# Patient Record
Sex: MALE | Race: WHITE | Employment: UNEMPLOYED | URBAN - METROPOLITAN AREA
[De-identification: names, ages, dates, MRNs, and addresses within clinical notes are randomized per-mention and may not be internally consistent; named-entity substitution may affect disease eponyms.]

---

## 2022-02-07 ENCOUNTER — CONSULT (OUTPATIENT)
Dept: NEUROLOGY | Facility: CLINIC | Age: 47
End: 2022-02-07
Payer: COMMERCIAL

## 2022-02-07 ENCOUNTER — TELEPHONE (OUTPATIENT)
Dept: NEUROLOGY | Facility: CLINIC | Age: 47
End: 2022-02-07

## 2022-02-07 VITALS
BODY MASS INDEX: 32.93 KG/M2 | WEIGHT: 235.2 LBS | HEART RATE: 71 BPM | SYSTOLIC BLOOD PRESSURE: 121 MMHG | DIASTOLIC BLOOD PRESSURE: 75 MMHG | HEIGHT: 71 IN | TEMPERATURE: 97.4 F

## 2022-02-07 DIAGNOSIS — R47.1 DYSARTHRIA: ICD-10-CM

## 2022-02-07 DIAGNOSIS — R29.810 LOWER FACIAL WEAKNESS: Primary | ICD-10-CM

## 2022-02-07 DIAGNOSIS — G37.9 CNS DEMYELINATING DISEASE (HCC): ICD-10-CM

## 2022-02-07 DIAGNOSIS — G81.91 RIGHT HEMIPARESIS (HCC): ICD-10-CM

## 2022-02-07 PROCEDURE — 99244 OFF/OP CNSLTJ NEW/EST MOD 40: CPT | Performed by: PSYCHIATRY & NEUROLOGY

## 2022-02-07 RX ORDER — ERGOCALCIFEROL 1.25 MG/1
50000 CAPSULE ORAL WEEKLY
Qty: 12 CAPSULE | Refills: 0 | Status: SHIPPED | OUTPATIENT
Start: 2022-02-07 | End: 2022-04-10 | Stop reason: SDUPTHER

## 2022-02-07 RX ORDER — GABAPENTIN 300 MG/1
CAPSULE ORAL
COMMUNITY
Start: 2022-01-27 | End: 2022-02-19 | Stop reason: HOSPADM
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
## 2022-02-07 NOTE — LETTER
[address] [place] [address]      To apply for Social Security Disability there are two ways in which you can do the application:     phone :  [phone],     Apply online:   [email or web address]      For a consultation regarding Lissette Efrain Jaquelin Katina Robertson [facility] service in [address] matters  Their past claims cover many conditions, including extensive experience with neurological disorders  [email or web address]        If there is any additional assistance that  Jada Joshi can provide please contact her at [phone]            Sincerely,          RUPALI Walker     [phone]  [facility]

## 2022-02-07 NOTE — TELEPHONE ENCOUNTER
Pt calling to report that [facility] is electronically sending images for MRI head, neck, and back  Pt to be reached if images are not received  [phone]  Okay to leave detailed message

## 2022-02-07 NOTE — TELEPHONE ENCOUNTER
Please follow on this request and let me know when imaging are available  Monserrat Roberson - Patient is planning SSI - please reach the patient to guide how to submit his initial forms; Patient was offered SW at [address] was tried online and unsuccessful, as per the patient  Patient is planning unemployment now

## 2022-02-08 NOTE — TELEPHONE ENCOUNTER
MSW phoned pt and discussed information about how to apply for SSD  Provided website as well as phone number  Additionally provided contact info for [employer]   Patient reported that at this time he does not have any questions    Apply for SSD via phone   [phone],     Apply for [facility] online   [email or web address]    Record[employer]t     [address] [address]

## 2022-02-09 NOTE — TELEPHONE ENCOUNTER
List of community resources listed in this encounter was sent to patient via mail  MSW remains available for any questions/ future social needs

## 2022-02-13 ENCOUNTER — APPOINTMENT (EMERGENCY)
Dept: CT IMAGING | Facility: HOSPITAL | Age: 47
DRG: 043 | End: 2022-02-13
Payer: COMMERCIAL

## 2022-02-13 ENCOUNTER — HOSPITAL ENCOUNTER (INPATIENT)
Facility: HOSPITAL | Age: 47
LOS: 6 days | DRG: 043 | End: 2022-02-19
Attending: EMERGENCY MEDICINE | Admitting: INTERNAL MEDICINE
Payer: COMMERCIAL

## 2022-02-13 ENCOUNTER — APPOINTMENT (EMERGENCY)
Dept: RADIOLOGY | Facility: HOSPITAL | Age: 47
DRG: 043 | End: 2022-02-13
Payer: COMMERCIAL

## 2022-02-13 DIAGNOSIS — R53.1 ACUTE RIGHT-SIDED WEAKNESS: Primary | ICD-10-CM

## 2022-02-13 DIAGNOSIS — G37.9 CNS DEMYELINATING DISEASE (HCC): ICD-10-CM

## 2022-02-13 DIAGNOSIS — R47.1 DYSARTHRIA: ICD-10-CM

## 2022-02-13 DIAGNOSIS — R29.810 FACIAL DROOP: ICD-10-CM

## 2022-02-13 LAB
ANION GAP SERPL CALCULATED.3IONS-SCNC: 10 MMOL/L (ref 4–13)
APTT PPP: 33 SECONDS (ref 23–37)
ATRIAL RATE: 60 BPM
ATRIAL RATE: 72 BPM
BUN SERPL-MCNC: 29 MG/DL (ref 5–25)
CALCIUM SERPL-MCNC: 9 MG/DL (ref 8.3–10.1)
CARDIAC TROPONIN I PNL SERPL HS: <2 NG/L
CHLORIDE SERPL-SCNC: 106 MMOL/L (ref 100–108)
CO2 SERPL-SCNC: 23 MMOL/L (ref 21–32)
CREAT SERPL-MCNC: 0.97 MG/DL (ref 0.6–1.3)
ERYTHROCYTE [DISTWIDTH] IN BLOOD BY AUTOMATED COUNT: 12.3 % (ref 11.6–15.1)
GFR SERPL CREATININE-BSD FRML MDRD: 91 ML/MIN/1.73SQ M
GLUCOSE SERPL-MCNC: 78 MG/DL (ref 65–140)
GLUCOSE SERPL-MCNC: 87 MG/DL (ref 65–140)
HCT VFR BLD AUTO: 45.1 % (ref 36.5–49.3)
HGB BLD-MCNC: 14.5 G/DL (ref 12–17)
INR PPP: 1.03 (ref 0.84–1.19)
MCH RBC QN AUTO: 29.6 PG (ref 26.8–34.3)
MCHC RBC AUTO-ENTMCNC: 32.2 G/DL (ref 31.4–37.4)
MCV RBC AUTO: 92 FL (ref 82–98)
P AXIS: 32 DEGREES
P AXIS: 48 DEGREES
PLATELET # BLD AUTO: 242 THOUSANDS/UL (ref 149–390)
PMV BLD AUTO: 10.1 FL (ref 8.9–12.7)
POTASSIUM SERPL-SCNC: 4.2 MMOL/L (ref 3.5–5.3)
PR INTERVAL: 174 MS
PR INTERVAL: 192 MS
PROTHROMBIN TIME: 13.4 SECONDS (ref 11.6–14.5)
QRS AXIS: 22 DEGREES
QRS AXIS: 42 DEGREES
QRSD INTERVAL: 102 MS
QRSD INTERVAL: 98 MS
QT INTERVAL: 402 MS
QT INTERVAL: 436 MS
QTC INTERVAL: 436 MS
QTC INTERVAL: 440 MS
RBC # BLD AUTO: 4.9 MILLION/UL (ref 3.88–5.62)
SODIUM SERPL-SCNC: 139 MMOL/L (ref 136–145)
T WAVE AXIS: 24 DEGREES
T WAVE AXIS: 47 DEGREES
VENTRICULAR RATE: 60 BPM
VENTRICULAR RATE: 72 BPM
WBC # BLD AUTO: 8.91 THOUSAND/UL (ref 4.31–10.16)

## 2022-02-13 PROCEDURE — 85027 COMPLETE CBC AUTOMATED: CPT | Performed by: EMERGENCY MEDICINE

## 2022-02-13 PROCEDURE — 3E03317 INTRODUCTION OF OTHER THROMBOLYTIC INTO PERIPHERAL VEIN, PERCUTANEOUS APPROACH: ICD-10-PCS | Performed by: EMERGENCY MEDICINE

## 2022-02-13 PROCEDURE — 99285 EMERGENCY DEPT VISIT HI MDM: CPT

## 2022-02-13 PROCEDURE — 93005 ELECTROCARDIOGRAM TRACING: CPT

## 2022-02-13 PROCEDURE — 85610 PROTHROMBIN TIME: CPT | Performed by: EMERGENCY MEDICINE

## 2022-02-13 PROCEDURE — G1004 CDSM NDSC: HCPCS

## 2022-02-13 PROCEDURE — 96360 HYDRATION IV INFUSION INIT: CPT

## 2022-02-13 PROCEDURE — 71045 X-RAY EXAM CHEST 1 VIEW: CPT

## 2022-02-13 PROCEDURE — 99291 CRITICAL CARE FIRST HOUR: CPT | Performed by: EMERGENCY MEDICINE

## 2022-02-13 PROCEDURE — 70496 CT ANGIOGRAPHY HEAD: CPT

## 2022-02-13 PROCEDURE — 85730 THROMBOPLASTIN TIME PARTIAL: CPT | Performed by: EMERGENCY MEDICINE

## 2022-02-13 PROCEDURE — 80048 BASIC METABOLIC PNL TOTAL CA: CPT | Performed by: EMERGENCY MEDICINE

## 2022-02-13 PROCEDURE — 99223 1ST HOSP IP/OBS HIGH 75: CPT | Performed by: INTERNAL MEDICINE

## 2022-02-13 PROCEDURE — 82948 REAGENT STRIP/BLOOD GLUCOSE: CPT

## 2022-02-13 PROCEDURE — 70498 CT ANGIOGRAPHY NECK: CPT

## 2022-02-13 PROCEDURE — 84484 ASSAY OF TROPONIN QUANT: CPT | Performed by: EMERGENCY MEDICINE

## 2022-02-13 PROCEDURE — 36415 COLL VENOUS BLD VENIPUNCTURE: CPT | Performed by: EMERGENCY MEDICINE

## 2022-02-13 PROCEDURE — 93010 ELECTROCARDIOGRAM REPORT: CPT | Performed by: INTERNAL MEDICINE

## 2022-02-13 RX ORDER — AMOXICILLIN 250 MG
1 CAPSULE ORAL
Status: DISCONTINUED | OUTPATIENT
Start: 2022-02-13 | End: 2022-02-19 | Stop reason: HOSPADM

## 2022-02-13 RX ORDER — RIBOFLAVIN (VITAMIN B2) 100 MG
2 TABLET ORAL DAILY
COMMUNITY
End: 2022-05-23 | Stop reason: SDUPTHER

## 2022-02-13 RX ORDER — LANOLIN ALCOHOL/MO/W.PET/CERES
100 CREAM (GRAM) TOPICAL DAILY
Status: DISCONTINUED | OUTPATIENT
Start: 2022-02-13 | End: 2022-02-19 | Stop reason: HOSPADM

## 2022-02-13 RX ORDER — ATORVASTATIN CALCIUM 40 MG/1
40 TABLET, FILM COATED ORAL EVERY EVENING
Status: DISCONTINUED | OUTPATIENT
Start: 2022-02-13 | End: 2022-02-15

## 2022-02-13 RX ORDER — GABAPENTIN 300 MG/1
300 CAPSULE ORAL 3 TIMES DAILY
Status: DISCONTINUED | OUTPATIENT
Start: 2022-02-13 | End: 2022-02-19 | Stop reason: HOSPADM

## 2022-02-13 RX ORDER — ASPIRIN 81 MG/1
81 TABLET, CHEWABLE ORAL DAILY
Status: DISCONTINUED | OUTPATIENT
Start: 2022-02-14 | End: 2022-02-13

## 2022-02-13 RX ORDER — THIAMINE HCL 50 MG
100 TABLET ORAL DAILY
COMMUNITY

## 2022-02-13 RX ORDER — ERGOCALCIFEROL 1.25 MG/1
50000 CAPSULE ORAL WEEKLY
Status: DISCONTINUED | OUTPATIENT
Start: 2022-02-15 | End: 2022-02-19 | Stop reason: HOSPADM

## 2022-02-13 RX ADMIN — GABAPENTIN 300 MG: 300 CAPSULE ORAL at 11:27

## 2022-02-13 RX ADMIN — THIAMINE HCL TAB 100 MG 100 MG: 100 TAB at 16:40

## 2022-02-13 RX ADMIN — IOHEXOL 90 ML: 350 INJECTION, SOLUTION INTRAVENOUS at 08:00

## 2022-02-13 RX ADMIN — CYANOCOBALAMIN TAB 500 MCG 1000 MCG: 500 TAB at 16:40

## 2022-02-13 RX ADMIN — ATORVASTATIN CALCIUM 40 MG: 40 TABLET, FILM COATED ORAL at 18:03

## 2022-02-13 RX ADMIN — SODIUM CHLORIDE 1000 ML: 0.9 INJECTION, SOLUTION INTRAVENOUS at 08:28

## 2022-02-13 RX ADMIN — GABAPENTIN 300 MG: 300 CAPSULE ORAL at 16:38

## 2022-02-13 RX ADMIN — ALTEPLASE 81 MG: KIT at 08:16

## 2022-02-13 RX ADMIN — GABAPENTIN 300 MG: 300 CAPSULE ORAL at 21:50

## 2022-02-13 RX ADMIN — SENNOSIDES AND DOCUSATE SODIUM 1 TABLET: 50; 8.6 TABLET ORAL at 21:50

## 2022-02-13 NOTE — ASSESSMENT & PLAN NOTE
· As outlined below  · Continue gabapentin for neuropathic pain  · Neurology following, appreciate recommendations

## 2022-02-13 NOTE — ED PROVIDER NOTES
History  Chief Complaint   Patient presents with    STROKE Alert     To ED with EMS for right sided weakness and speech difficultyt  Started at 5  Last known well at 0500  Patient is a 48year old M with a past medical history significant for recently diagnosed MS, per review of medical records that were scanned from [facility], patient presented with acute on chronic right sided weakness and dysarthria at the beginning of January 2020, had evaluation for CVA versus MS, did not get tpa during admission, who today is brought in by ambulance for right-sided weakness, numbness, right-sided facial droop and dysarthria  Patient reports that he woke up at 5:00 a m  This morning, was at his baseline, then fell asleep again and when he woke up he could not move his right side at all, could not feel his right side and his voice/speech sounded different  Prior to Admission Medications   Prescriptions Last Dose Informant Patient Reported? Taking? Cyanocobalamin 1000 MCG SUBL   No No   Sig: Place 1 tablet (1,000 mcg total) under the tongue daily   ergocalciferol (VITAMIN D2) 50,000 units   No No   Sig: Take 1 capsule (50,000 Units total) by mouth once a week   gabapentin (NEURONTIN) 300 mg capsule   Yes No   Sig: TAKE ONE CAPSULE BY MOUTH ON DAY 1, THEN ONE TWICE DAILY ON DAYS 2, AND ONE THREE TIMES DAILY THEREAFTER      Facility-Administered Medications: None       Past Medical History:   Diagnosis Date    MS (multiple sclerosis) ([facility] [address] )        Past Surgical History:   Procedure Laterality Date    HERNIA REPAIR      3 times    KNEE SURGERY Right        History reviewed  No pertinent family history  I have reviewed and agree with the history as documented      E-Cigarette/Vaping    E-Cigarette Use Former User      E-Cigarette/Vaping Substances    Nicotine No     THC No     CBD No     Flavoring No      Social History     Tobacco Use    Smoking status: Former Smoker    Smokeless tobacco: Former User   Vaping Use    Vaping Use: Former   Substance Use Topics    Alcohol use: Not Currently    Drug use: Never       Review of Systems   Constitutional: Negative for chills and fever  HENT: Negative for congestion and rhinorrhea  Eyes: Negative for photophobia and visual disturbance  Respiratory: Negative for cough and shortness of breath  Cardiovascular: Negative for chest pain and palpitations  Gastrointestinal: Negative for abdominal pain, constipation, diarrhea, nausea and vomiting  Genitourinary: Negative for dysuria, flank pain and hematuria  Musculoskeletal: Negative for back pain and neck pain  Skin: Negative for color change and pallor  Neurological: Positive for facial asymmetry, speech difficulty, weakness and numbness  Negative for dizziness, light-headedness and headaches  Physical Exam  Physical Exam  Vitals and nursing note reviewed  Constitutional:       General: He is not in acute distress  Appearance: Normal appearance  He is not ill-appearing, toxic-appearing or diaphoretic  HENT:      Head: Normocephalic and atraumatic  Mouth/Throat:      Mouth: Mucous membranes are moist    Eyes:      Conjunctiva/sclera: Conjunctivae normal       Pupils: Pupils are equal, round, and reactive to light  Cardiovascular:      Rate and Rhythm: Normal rate and regular rhythm  Pulses: Normal pulses  Heart sounds: Normal heart sounds  No murmur heard  Pulmonary:      Effort: Pulmonary effort is normal  No respiratory distress  Breath sounds: Normal breath sounds  No stridor  No wheezing, rhonchi or rales  Chest:      Chest wall: No tenderness  Abdominal:      General: Bowel sounds are normal  There is no distension  Palpations: Abdomen is soft  Tenderness: There is no abdominal tenderness  There is no guarding or rebound  Musculoskeletal:      Cervical back: Neck supple  Right lower leg: No edema  Left lower leg: No edema  Skin:     General: Skin is warm and dry  Neurological:      General: No focal deficit present  Mental Status: He is alert and oriented to person, place, and time  Mental status is at baseline  GCS: GCS eye subscore is 4  GCS verbal subscore is 5  GCS motor subscore is 6  Cranial Nerves: Cranial nerve deficit, dysarthria and facial asymmetry present  Sensory: Sensory deficit present  Motor: Weakness and abnormal muscle tone present     Psychiatric:         Mood and Affect: Mood normal          Behavior: Behavior normal          Vital Signs  ED Triage Vitals   Temperature Pulse Respirations Blood Pressure SpO2   02/13/22 0759 02/13/22 0753 02/13/22 0753 02/13/22 0753 02/13/22 0753   98 2 °F (36 8 °C) 77 16 119/71 99 %      Temp Source Heart Rate Source Patient Position - Orthostatic VS BP Location FiO2 (%)   02/13/22 0759 02/13/22 0753 02/13/22 0753 02/13/22 0753 --   Tympanic Monitor Lying Left arm       Pain Score       02/13/22 0753       No Pain           Vitals:    02/13/22 0900 02/13/22 0915 02/13/22 0930 02/13/22 0945   BP: 112/71 113/73 111/74 113/74   Pulse: 68 69 65 65   Patient Position - Orthostatic VS: Lying Lying Lying Lying         Visual Acuity  Visual Acuity      Most Recent Value   L Pupil Size (mm) 3   R Pupil Size (mm) 3          ED Medications  Medications   alteplase (ACTIVASE) IV bolus 9 mg (9 mg Intravenous Given 2/13/22 0815)     Followed by   alteplase (ACTIVASE) infusion 81 mg (0 mg Intravenous Stopped 2/13/22 0826)     Followed by   sodium chloride 0 9 % bolus 50 mL (has no administration in time range)   atorvastatin (LIPITOR) tablet 40 mg (has no administration in time range)   gabapentin (NEURONTIN) capsule 300 mg (has no administration in time range)   iohexol (OMNIPAQUE) 350 MG/ML injection (SINGLE-DOSE) 90 mL (90 mL Intravenous Given 2/13/22 0800)   sodium chloride 0 9 % bolus 1,000 mL (1,000 mL Intravenous New Bag 2/13/22 0828)       Diagnostic Studies  Results Reviewed     Procedure Component Value Units Date/Time    HS Troponin I 2hr [[id number]]     Lab Status: No result Specimen: Blood     HS Troponin I 4hr [[id number]]     Lab Status: No result Specimen: Blood     HS Troponin 0hr (reflex protocol) [[id number]]  (Normal) Collected: 02/13/22 0757    Lab Status: Final result Specimen: Blood from Arm, Right Updated: 02/13/22 0847     hs TnI 0hr <2 ng/L     Protime-INR [[id number]]  (Normal) Collected: 02/13/22 0757    Lab Status: Final result Specimen: Blood from Arm, Right Updated: 02/13/22 0840     Protime 13 4 seconds      INR 1 03    APTT [[id number]]  (Normal) Collected: 02/13/22 0757    Lab Status: Final result Specimen: Blood from Arm, Right Updated: 02/13/22 0840     PTT 33 seconds     Basic metabolic panel [[id number]]  (Abnormal) Collected: 02/13/22 0757    Lab Status: Final result Specimen: Blood from Arm, Right Updated: 02/13/22 0833     Sodium 139 mmol/L      Potassium 4 2 mmol/L      Chloride 106 mmol/L      CO2 23 mmol/L      ANION GAP 10 mmol/L      BUN 29 mg/dL      Creatinine 0 97 mg/dL      Glucose 78 mg/dL      Calcium 9 0 mg/dL      eGFR 91 ml/min/1 73sq m     Narrative:      Toni guidelines for Chronic Kidney Disease (CKD):     Stage 1 with normal or high GFR (GFR > 90 mL/min/1 73 square meters)    Stage 2 Mild CKD (GFR = 60-89 mL/min/1 73 square meters)    Stage 3A Moderate CKD (GFR = 45-59 mL/min/1 73 square meters)    Stage 3B Moderate CKD (GFR = 30-44 mL/min/1 73 square meters)    Stage 4 Severe CKD (GFR = 15-29 mL/min/1 73 square meters)    Stage 5 End Stage CKD (GFR <15 mL/min/1 73 square meters)  Note: GFR calculation is accurate only with a steady state creatinine    CBC and Platelet [[id number]]  (Normal) Collected: 02/13/22 0757    Lab Status: Final result Specimen: Blood from Arm, Right Updated: 02/13/22 0815     WBC 8 91 Thousand/uL      RBC 4 90 Million/uL      Hemoglobin 14 5 g/dL Hematocrit 45 1 %      MCV 92 fL      MCH 29 6 pg      MCHC 32 2 g/dL      RDW 12 3 %      Platelets 616 Thousands/uL      MPV 10 1 fL     Fingerstick Glucose (POCT) [[id number]]  (Normal) Collected: 02/13/22 0802    Lab Status: Final result Updated: 02/13/22 0803     POC Glucose 87 mg/dl                  XR chest 1 view portable   ED Interpretation by Richi Harvey DO (02/13 5801)   No acute abnormalities as interpreted by me independently       CTA stroke alert (head/neck)   Final Result by Kev Thorne MD (02/13 0846)      1  No intracranial large vessel occlusion or critical stenosis  No aneurysm  2   No hemodynamically significant stenosis of cervical carotid and vertebral arteries  No dissection  I personally discussed this study with Dr Adryan Moore on 2/13/2022 at 8:16 AM                 Workstation performed: [id number]         CT stroke alert brain   Final Result by Kev Thorne MD (02/13 0847)      1  No acute intracranial hemorrhage  2   White matter change in keeping with known history of demyelinating disease  Focal hypoattenuation centered at posterior limb of left internal capsule of uncertain etiology, ischemia versus underlying demyelinating disease                        I personally discussed this study with Dr Adryan Moore on 2/13/2022 at 8:16 AM                 Workstation performed: [id number]                    Procedures  CriticalCare Time  Performed by: Richi Harvey DO  Authorized by: Richi Harvey DO     Critical care provider statement:     Critical care time (minutes):  65    Critical care start time:  2/13/2022 8:09 AM    Critical care end time:  2/13/2022 9:30 AM    Critical care time was exclusive of:  Separately billable procedures and treating other patients and teaching time    Critical care was necessary to treat or prevent imminent or life-threatening deterioration of the following conditions:  CNS failure or compromise    Critical care was time spent personally by me on the following activities:  Blood draw for specimens, obtaining history from patient or surrogate, development of treatment plan with patient or surrogate, discussions with consultants, discussions with primary provider, evaluation of patient's response to treatment, examination of patient, review of old charts, re-evaluation of patient's condition, ordering and review of radiographic studies, ordering and review of laboratory studies and ordering and performing treatments and interventions    I assumed direction of critical care for this patient from another provider in my specialty: no      ECG 12 Lead Documentation Only    Date/Time: 2/13/2022 9:46 AM  Performed by: Poncho Mclcure DO  Authorized by: Poncho Mcclure DO     Indications / Diagnosis:  Stroke alert  ECG reviewed by me, the ED Provider: yes    Patient location:  ED  Previous ECG:     Previous ECG:  Unavailable  Interpretation:     Interpretation: normal    Rate:     ECG rate:  72    ECG rate assessment: normal    Rhythm:     Rhythm: sinus rhythm    Ectopy:     Ectopy: none    QRS:     QRS axis:  Normal    QRS intervals:  Normal  Conduction:     Conduction: normal    ST segments:     ST segments:  Normal  T waves:     T waves: normal    Comments:      qtc 440             ED Course  ED Course as of 02/13/22 0959   Sun Feb 13, 2022   0813 Patient is a tpa candidate  Discussed with Dr Doyle Conroy, neuro stroke team with whom I reviewed medical records together with  Then discussed with patient  He read up on tpa during his prior admission and is aware of risks and benefits which I reviewed with him again, including but not limited to bleeding, permanent disability  He did NOT get tpa at [facility] during his admission in January 2022   He states that he had a CT head which was negative, then an MRI which he was told showed no stroke, but did show demyelinating lesions concerning for diagnosis of MS    0829 Tpa bolus given, but per Dr Balaji Miranda, who is on the phone with radiology right now, to hold drip as imaging is concerning for either prior stroke versus demyelinating lesion in setting of demyelinating disease  4740 Dr Balaji Miranda and myself both currently calling [facility] to obtain records  Getting consent form for release of records by patient   1934 Repeat exam at this time- patient now has normal sensation (all 4 extremities feel the same) and strength is now 4 out of 5 in the RUE and RLE extremities  Slight facial droop and dysarthria still present  2119 Per Dr Balaji Miranda, at this point, we will not be able to see imaging or get report within a reasonable timeframe, so decision made not to continue the tpa post bolus administration in setting of potential old stroke versus lesions seen on imaging today  9319 Critical care at bedside   0950 [place] MRI report Impression:  There are multiple enhancing and nonenhancing lesions in the white matter of the cerebral hemispheres  The largest of these lesions is a non hand thing 1 6 cm x 1 7 cm lesion at the medial right parietal lobe  The appearance is most suspicious for demyelinating disease/multiple sclerosis  Metastatic disease is less likely  I see no evidence of acute infarction  Entire report as follows, on series 6, image 16 a 1 1 cm x 1 2 cm lesion that demonstrates increased T2 and FLAIR signal and minimal enhancement is seen at the left basal ganglia  On series 6, image 17 there are 2 foci of nonenhancing increased T2 and FLAIR signal area seen at the left corona radiata  These measure at 9 mm and 7 mm respectively  And so on- there is a full report that I will attempt to scan into epic if possible       6033 I scanned in photos of MRI reports from [facility] into media                          Stroke Assessment     Row Name 02/13/22 9323             NIH Stroke Scale    Interval Baseline      Level of Consciousness (1a ) 0      LOC Questions (1b ) 0 LOC Commands (1c ) 0      Best Gaze (2 ) 0      Visual (3 ) 0      Facial Palsy (4 ) 1      Motor Arm, Left (5a ) 0      Motor Arm, Right (5b ) 4      Motor Leg, Left (6a ) 0      Motor Leg, Right (6b ) 4      Limb Ataxia (7 ) 0      Sensory (8 ) 2      Best Language (9 ) 0      Dysarthria (10 ) 1      Extinction and Inattention (11 ) (Formerly Neglect) 0      Total 12                            SBIRT 20yo+      Most Recent Value   SBIRT (25 yo +)    In order to provide better care to our patients, we are screening all of our patients for alcohol and drug use  Would it be okay to ask you these screening questions? No Filed at: 02/13/2022 0800                    MDM  Number of Diagnoses or Management Options  Acute right-sided weakness  Dysarthria  Facial droop  Diagnosis management comments: Assessment and Plan:   49 yo M p/w acute onset right sided weakness, slurred speech, and right sided facial droop that started after 0500 this morning  NIH stroke scale 12  tpa candidate  Discussed with neurology, Dr Jourdan Torres  Disposition  Final diagnoses:   Acute right-sided weakness   Facial droop   Dysarthria     Time reflects when diagnosis was documented in both MDM as applicable and the Disposition within this note     Time User Action Codes Description Comment    2/13/2022  7:55 AM Lekiersten Chen Add [R53 1] Acute right-sided weakness     2/13/2022  8:16 AM Leocadia Gustavo Add [R29 810] Facial droop     2/13/2022  8:16 AM Leocadiish Chen Add [R47 1] Dysarthria       ED Disposition     ED Disposition Condition Date/Time Comment    Admit Stable Sun Feb 13, 2022  9:23 AM Case was discussed with critical care team and the patient's admission status was agreed to be Admission Status: inpatient status to the service of Dr Anabella Becker  Follow-up Information    None         Patient's Medications   Discharge Prescriptions    No medications on file       No discharge procedures on file      PDMP Review     None ED Provider  Electronically Signed by           Chantal Simon DO  02/13/22 1643

## 2022-02-13 NOTE — H&P
Hans Jo-Annon  H&P- Matildafelicitanehal Fee 1975, 48 y o  male MRN: [id number]  Unit/Bed#: ADRIEL Encounter: [id number]  Primary Care Provider: Bud Oates MD   Date and time admitted to hospital: 2/13/2022  7:56 AM    CNS demyelinating disease ([facility] [address] )  Assessment & Plan  · As outlined below  · Continue gabapentin for neuropathic pain  · Neurology following, appreciate recommendations     Dysarthria  Assessment & Plan  · Patient states that he had acute onset of dysarthria and R-sided weakness at 7 AM this morning  · Recently admitted to [facility] for acute onset R-sided weakness, dysarthria, R facial droop  Work-up for CVA negative, diagnosed with demyelinating disease, most likely MS  · 1/10/22 MRI brain: Multiple enhancing and nonenhancing lesions in the white matter of the cerebral hemispheres  The largest of these lesions is a nonenhancing 1 6 cm x 1 7 cm lesion at the medial right parietal lobe  The appearance is most suspicious for demyelinating disease/multiple sclerosis  Metastatic disease is less likely  I see no evidence of acute infarction  · Discharged on steroid taper with significant improvement in his symptoms  · Recently evaluated at [address] [place] [address] Road  · On 2/7 slight dysarthria still appreciated per Dr Jacobo Gibson  · 2/13 CTH: No acute intracranial hemorrhage  White matter change in keeping with known history of demyelinating disease  Focal hypoattenuation centered at posterior limb of left internal capsule of uncertain etiology, ischemia versus underlying demyelinating disease  · 2/13 CTA: No intracranial large vessel occlusion or critical stenosis  No aneurysm  No hemodynamically significant stenosis of cervical carotid and vertebral arteries  No dissection     · Patient initially given tPA bolus, however remainder of tPA not given secondary to concern for ischemia vs  new lesion  · Follow post-tPA protocol  · q1 hour neuro checks    * Right hemiparesis ([facility] [address] )  Assessment & Plan  · Recently admitted to [facility] for acute onset R-sided weakness, dysarthria, R facial droop  Work-up for CVA negative, diagnosed with demyelinating disease, most likely MS  · 1/10/22 MRI brain: Multiple enhancing and nonenhancing lesions in the white matter of the cerebral hemispheres  The largest of these lesions is a nonenhancing 1 6 cm x 1 7 cm lesion at the medial right parietal lobe  The appearance is most suspicious for demyelinating disease/multiple sclerosis  Metastatic disease is less likely  I see no evidence of acute infarction  · Discharged on steroid taper with significant improvement in his symptoms  · Recently evaluated at [address] [place] [address]  · On 2/7 slight dysarthria, 3/5 strength in RUE, 4/5 strength in LLE  · Patient was recommended to continue gabapentin for neuropathic pain and to start vitamins D, B1, B2, and B12 with follow-up in 3-4 weeks  · Unclear if current R-sided weakness and dysarthria are secondary to CVA vs  TIA vs  MS  · 2/13 CTH: No acute intracranial hemorrhage  White matter change in keeping with known history of demyelinating disease  Focal hypoattenuation centered at posterior limb of left internal capsule of uncertain etiology, ischemia versus underlying demyelinating disease  · 2/13 CTA: No intracranial large vessel occlusion or critical stenosis  No aneurysm  No hemodynamically significant stenosis of cervical carotid and vertebral arteries  No dissection     · Neurology following, appreciate recommendations  · Continue q 1 hour neuro checks and post-tPA/stroke protocol  · Start atorvastatin  · Start aspirin after 24 hours post-tPA  · Repeat CTH in 24 hours post-tPA, consider repeat MRI  · Follow-up A1C and FLP  · Swallow eval      -------------------------------------------------------------------------------------------------------------  Chief Complaint: "I couldn't move my right side "    History of Present Illness     Rayne Maher is a 48 y o  male with PMHx significant for newly diagnosed MS who presented to the Ed on 2/13 with acute-onset R-sided weakness, dysarthria, and R-sided facial droop  Patient states that he awoke around 5 am and felt normal, then around 7 AM he had tried to get out of bed with acute-onset R sided weakness and he lowered himself to the floor  He denies loss of consciousness or hitting his head during this event  He called EMS and on arrival to the ED he had a mild R facial droop, R hemiparesis, and dysarthria  He was recently evaluated at [facility] for similar symptoms but stated that those symptoms had improved and his current symptoms were acute onset  He was taken for CTH/CTA which was initially felt to be clear and he was bolused with tPA  Shortly after bolus of tPA radiology reported a small area on [facility] concerning for a new lesion from MS vs  Ischemia  Due to concern for possible new lesion, remainder of tPA was held  The patient had rapid improvement in his RUE/RLE weakness and R facial droop following the tPA administration though he has persistent dyarthria  He will be admitted to the ICU under critical care for close neurologic monitoring following tPA administration  History obtained from chart review and the patient   -------------------------------------------------------------------------------------------------------------  Dispo: Admit to Critical Care     Code Status: Level 1 - Full Code  --------------------------------------------------------------------------------------------------------------  Review of Systems   Respiratory: Negative for cough and shortness of breath  Cardiovascular: Negative for chest pain and palpitations  Gastrointestinal: Negative for abdominal distention, abdominal pain, constipation, diarrhea and nausea  Musculoskeletal: Negative for back pain  Neurological: Positive for facial asymmetry, speech difficulty, weakness, numbness and headaches   Negative for dizziness, tremors, seizures, syncope and light-headedness  All other systems reviewed and are negative  A 12-point, complete review of systems was reviewed and negative except as stated above     Physical Exam  Vitals reviewed  Constitutional:       General: He is not in acute distress  Appearance: He is not ill-appearing, toxic-appearing or diaphoretic  HENT:      Head: Normocephalic and atraumatic  Nose: Nose normal       Mouth/Throat:      Mouth: Mucous membranes are moist    Eyes:      Extraocular Movements: Extraocular movements intact  Pupils: Pupils are equal, round, and reactive to light  Cardiovascular:      Rate and Rhythm: Normal rate and regular rhythm  Heart sounds: No murmur heard  No friction rub  No gallop  Pulmonary:      Breath sounds: No wheezing, rhonchi or rales  Abdominal:      General: Abdomen is flat  There is no distension  Palpations: Abdomen is soft  Tenderness: There is no abdominal tenderness  There is no guarding or rebound  Musculoskeletal:      Right lower leg: No edema  Left lower leg: No edema  Skin:     General: Skin is warm and dry  Capillary Refill: Capillary refill takes less than 2 seconds  Neurological:      Mental Status: He is alert  Cranial Nerves: No cranial nerve deficit  Comments: Valentin VIRGEN  Strength: RUE 4/5, LUE 5/5, RLE 4/5, LLE 5/5  No pronator drift   Sensation intact throughout   Psychiatric:         Mood and Affect: Mood normal          Behavior: Behavior normal        --------------------------------------------------------------------------------------------------------------  Vitals:   Vitals:    02/13/22 0900 02/13/22 0915 02/13/22 0930 02/13/22 0945   BP: 112/71 113/73 111/74 113/74   BP Location: Left arm Left arm Left arm Left arm   Pulse: 68 69 65 65   Resp: 14 15 20 20   Temp:       TempSrc:       SpO2: 96% 96% 97% 99%   Weight:       Height:         Temp  Min: 98 2 °F (36 8 °C)  Max: 98 2 °F (36 8 °C)  IBW (Ideal Body Weight): 75 3 kg  Height: 5' 11" (180 3 cm)  Body mass index is 32 9 kg/m²  Laboratory and Diagnostics:  Results from last 7 days   Lab Units 02/13/22  0757   WBC Thousand/uL 8 91   HEMOGLOBIN g/dL 14 5   HEMATOCRIT % 45 1   PLATELETS Thousands/uL 242     Results from last 7 days   Lab Units 02/13/22  0757   SODIUM mmol/L 139   POTASSIUM mmol/L 4 2   CHLORIDE mmol/L 106   CO2 mmol/L 23   ANION GAP mmol/L 10   BUN mg/dL 29*   CREATININE mg/dL 0 97   CALCIUM mg/dL 9 0   GLUCOSE RANDOM mg/dL 78          Results from last 7 days   Lab Units 02/13/22  0757   INR  1 03   PTT seconds 33              ABG:    VBG:          Micro:        EKG: NSR  Imaging: I have personally reviewed pertinent reports  Historical Information   Past Medical History:   Diagnosis Date    MS (multiple sclerosis) ([facility] [address] )      Past Surgical History:   Procedure Laterality Date    HERNIA REPAIR      3 times    KNEE SURGERY Right      Social History   Social History     Substance and Sexual Activity   Alcohol Use Not Currently     Social History     Substance and Sexual Activity   Drug Use Never     Social History     Tobacco Use   Smoking Status Former Smoker   Smokeless Tobacco Former User       Family History:   History reviewed  No pertinent family history  I have reviewed this patient's family history and commented on sigificant items within the HPI      Medications:  Current Facility-Administered Medications   Medication Dose Route Frequency    atorvastatin (LIPITOR) tablet 40 mg  40 mg Oral QPM    gabapentin (NEURONTIN) capsule 300 mg  300 mg Oral TID    sodium chloride 0 9 % bolus 50 mL  50 mL Intravenous Once     Home medications:  Prior to Admission Medications   Prescriptions Last Dose Informant Patient Reported? Taking?    Cyanocobalamin 1000 MCG SUBL   No No   Sig: Place 1 tablet (1,000 mcg total) under the tongue daily   ergocalciferol (VITAMIN D2) 50,000 units   No No   Sig: Take 1 capsule (50,000 Units total) by mouth once a week   gabapentin (NEURONTIN) 300 mg capsule   Yes No   Sig: TAKE ONE CAPSULE BY MOUTH ON DAY 1, THEN ONE TWICE DAILY ON DAYS 2, AND ONE THREE TIMES DAILY THEREAFTER      Facility-Administered Medications: None     Allergies:  No Known Allergies    ------------------------------------------------------------------------------------------------------------  Advance Directive and Living Will:      Power of :    POLST:    ------------------------------------------------------------------------------------------------------------  Anticipated Length of Stay is > 2 midnights    Care Time Delivered:   No Critical Care time spent       Jodee Paiz PA-C        Portions of the record may have been created with voice recognition software  Occasional wrong word or "sound a like" substitutions may have occurred due to the inherent limitations of voice recognition software    Read the chart carefully and recognize, using context, where substitutions have occurred

## 2022-02-13 NOTE — ED NOTES
Faxed medical records release after speaking to Nursing Supervisor regarding medical records at [address] [place] [address] Delta, RN  02/13/22 8630

## 2022-02-13 NOTE — ASSESSMENT & PLAN NOTE
· Patient states that he had acute onset of dysarthria and R-sided weakness at 7 AM this morning  · Recently admitted to [facility] for acute onset R-sided weakness, dysarthria, R facial droop  Work-up for CVA negative, diagnosed with demyelinating disease, most likely MS  · 1/10/22 MRI brain: Multiple enhancing and nonenhancing lesions in the white matter of the cerebral hemispheres  The largest of these lesions is a nonenhancing 1 6 cm x 1 7 cm lesion at the medial right parietal lobe  The appearance is most suspicious for demyelinating disease/multiple sclerosis  Metastatic disease is less likely  I see no evidence of acute infarction  · Discharged on steroid taper with significant improvement in his symptoms  · Recently evaluated at [address]  · On 2/7 slight dysarthria still appreciated per Dr Glendy Hernandez  · 2/13 CTH: No acute intracranial hemorrhage  White matter change in keeping with known history of demyelinating disease  Focal hypoattenuation centered at posterior limb of left internal capsule of uncertain etiology, ischemia versus underlying demyelinating disease  · 2/13 CTA: No intracranial large vessel occlusion or critical stenosis  No aneurysm  No hemodynamically significant stenosis of cervical carotid and vertebral arteries  No dissection     · Patient initially given tPA bolus, however remainder of tPA not given secondary to concern for ischemia vs  new lesion  · Follow post-tPA protocol  · q1 hour neuro checks

## 2022-02-13 NOTE — ASSESSMENT & PLAN NOTE
· Recently admitted to [facility] for acute onset R-sided weakness, dysarthria, R facial droop  Work-up for CVA negative, diagnosed with demyelinating disease, most likely MS  · 1/10/22 MRI brain: Multiple enhancing and nonenhancing lesions in the white matter of the cerebral hemispheres  The largest of these lesions is a nonenhancing 1 6 cm x 1 7 cm lesion at the medial right parietal lobe  The appearance is most suspicious for demyelinating disease/multiple sclerosis  Metastatic disease is less likely  I see no evidence of acute infarction  · Discharged on steroid taper with significant improvement in his symptoms  · Recently evaluated at [address] [place] [address]  · On 2/7 slight dysarthria, 3/5 strength in RUE, 4/5 strength in LLE  · Patient was recommended to continue gabapentin for neuropathic pain and to start vitamins D, B1, B2, and B12 with follow-up in 3-4 weeks  · Unclear if current R-sided weakness and dysarthria are secondary to CVA vs  TIA vs  MS  · 2/13 CTH: No acute intracranial hemorrhage  White matter change in keeping with known history of demyelinating disease  Focal hypoattenuation centered at posterior limb of left internal capsule of uncertain etiology, ischemia versus underlying demyelinating disease  · 2/13 CTA: No intracranial large vessel occlusion or critical stenosis  No aneurysm  No hemodynamically significant stenosis of cervical carotid and vertebral arteries  No dissection     · Neurology following, appreciate recommendations  · Continue q 1 hour neuro checks and post-tPA/stroke protocol  · Start atorvastatin  · Start aspirin after 24 hours post-tPA  · Repeat CTH in 24 hours post-tPA, consider repeat MRI  · Follow-up A1C and FLP  · Swallow eval

## 2022-02-13 NOTE — Clinical Note
Case was discussed with critical care team and the patient's admission status was agreed to be Admission Status: inpatient status to the service of

## 2022-02-14 ENCOUNTER — APPOINTMENT (INPATIENT)
Dept: MRI IMAGING | Facility: HOSPITAL | Age: 47
DRG: 043 | End: 2022-02-14
Payer: COMMERCIAL

## 2022-02-14 ENCOUNTER — APPOINTMENT (INPATIENT)
Dept: NON INVASIVE DIAGNOSTICS | Facility: HOSPITAL | Age: 47
DRG: 043 | End: 2022-02-14
Payer: COMMERCIAL

## 2022-02-14 ENCOUNTER — APPOINTMENT (INPATIENT)
Dept: CT IMAGING | Facility: HOSPITAL | Age: 47
DRG: 043 | End: 2022-02-14
Payer: COMMERCIAL

## 2022-02-14 LAB
ANION GAP SERPL CALCULATED.3IONS-SCNC: 3 MMOL/L (ref 4–13)
BUN SERPL-MCNC: 24 MG/DL (ref 5–25)
CALCIUM SERPL-MCNC: 8.8 MG/DL (ref 8.3–10.1)
CHLORIDE SERPL-SCNC: 107 MMOL/L (ref 100–108)
CHOLEST SERPL-MCNC: 103 MG/DL
CO2 SERPL-SCNC: 29 MMOL/L (ref 21–32)
CREAT SERPL-MCNC: 0.88 MG/DL (ref 0.6–1.3)
ERYTHROCYTE [DISTWIDTH] IN BLOOD BY AUTOMATED COUNT: 12.7 % (ref 11.6–15.1)
EST. AVERAGE GLUCOSE BLD GHB EST-MCNC: 100 MG/DL
GFR SERPL CREATININE-BSD FRML MDRD: 101 ML/MIN/1.73SQ M
GLUCOSE SERPL-MCNC: 81 MG/DL (ref 65–140)
HBA1C MFR BLD: 5.1 %
HCT VFR BLD AUTO: 42.4 % (ref 36.5–49.3)
HDLC SERPL-MCNC: 29 MG/DL
HGB BLD-MCNC: 13.3 G/DL (ref 12–17)
LDLC SERPL CALC-MCNC: 51 MG/DL (ref 0–100)
MCH RBC QN AUTO: 29.5 PG (ref 26.8–34.3)
MCHC RBC AUTO-ENTMCNC: 31.4 G/DL (ref 31.4–37.4)
MCV RBC AUTO: 94 FL (ref 82–98)
PLATELET # BLD AUTO: 203 THOUSANDS/UL (ref 149–390)
PMV BLD AUTO: 10 FL (ref 8.9–12.7)
POTASSIUM SERPL-SCNC: 4.2 MMOL/L (ref 3.5–5.3)
RBC # BLD AUTO: 4.51 MILLION/UL (ref 3.88–5.62)
SODIUM SERPL-SCNC: 139 MMOL/L (ref 136–145)
TRIGL SERPL-MCNC: 117 MG/DL
WBC # BLD AUTO: 7.31 THOUSAND/UL (ref 4.31–10.16)

## 2022-02-14 PROCEDURE — 80048 BASIC METABOLIC PNL TOTAL CA: CPT | Performed by: PHYSICIAN ASSISTANT

## 2022-02-14 PROCEDURE — 93306 TTE W/DOPPLER COMPLETE: CPT

## 2022-02-14 PROCEDURE — G1004 CDSM NDSC: HCPCS

## 2022-02-14 PROCEDURE — 70553 MRI BRAIN STEM W/O & W/DYE: CPT

## 2022-02-14 PROCEDURE — 99232 SBSQ HOSP IP/OBS MODERATE 35: CPT | Performed by: INTERNAL MEDICINE

## 2022-02-14 PROCEDURE — 70450 CT HEAD/BRAIN W/O DYE: CPT

## 2022-02-14 PROCEDURE — 85027 COMPLETE CBC AUTOMATED: CPT | Performed by: PHYSICIAN ASSISTANT

## 2022-02-14 PROCEDURE — 93306 TTE W/DOPPLER COMPLETE: CPT | Performed by: INTERNAL MEDICINE

## 2022-02-14 PROCEDURE — A9585 GADOBUTROL INJECTION: HCPCS | Performed by: EMERGENCY MEDICINE

## 2022-02-14 PROCEDURE — 83036 HEMOGLOBIN GLYCOSYLATED A1C: CPT | Performed by: PHYSICIAN ASSISTANT

## 2022-02-14 PROCEDURE — 80061 LIPID PANEL: CPT | Performed by: PHYSICIAN ASSISTANT

## 2022-02-14 PROCEDURE — 99255 IP/OBS CONSLTJ NEW/EST HI 80: CPT | Performed by: PSYCHIATRY & NEUROLOGY

## 2022-02-14 PROCEDURE — 92610 EVALUATE SWALLOWING FUNCTION: CPT

## 2022-02-14 PROCEDURE — 72156 MRI NECK SPINE W/O & W/DYE: CPT

## 2022-02-14 RX ORDER — ASPIRIN 81 MG/1
81 TABLET, CHEWABLE ORAL DAILY
Status: DISCONTINUED | OUTPATIENT
Start: 2022-02-14 | End: 2022-02-15

## 2022-02-14 RX ORDER — SODIUM CHLORIDE, SODIUM GLUCONATE, SODIUM ACETATE, POTASSIUM CHLORIDE, MAGNESIUM CHLORIDE, SODIUM PHOSPHATE, DIBASIC, AND POTASSIUM PHOSPHATE .53; .5; .37; .037; .03; .012; .00082 G/100ML; G/100ML; G/100ML; G/100ML; G/100ML; G/100ML; G/100ML
75 INJECTION, SOLUTION INTRAVENOUS CONTINUOUS
Status: DISCONTINUED | OUTPATIENT
Start: 2022-02-14 | End: 2022-02-14

## 2022-02-14 RX ADMIN — SENNOSIDES AND DOCUSATE SODIUM 1 TABLET: 50; 8.6 TABLET ORAL at 21:18

## 2022-02-14 RX ADMIN — ASPIRIN 81 MG CHEWABLE TABLET 81 MG: 81 TABLET CHEWABLE at 19:02

## 2022-02-14 RX ADMIN — CYANOCOBALAMIN TAB 500 MCG 1000 MCG: 500 TAB at 12:06

## 2022-02-14 RX ADMIN — GABAPENTIN 300 MG: 300 CAPSULE ORAL at 12:06

## 2022-02-14 RX ADMIN — ATORVASTATIN CALCIUM 40 MG: 40 TABLET, FILM COATED ORAL at 19:02

## 2022-02-14 RX ADMIN — GABAPENTIN 300 MG: 300 CAPSULE ORAL at 19:02

## 2022-02-14 RX ADMIN — THIAMINE HCL TAB 100 MG 100 MG: 100 TAB at 12:06

## 2022-02-14 RX ADMIN — GADOBUTROL 10 ML: 604.72 INJECTION INTRAVENOUS at 18:25

## 2022-02-14 RX ADMIN — SODIUM CHLORIDE, SODIUM GLUCONATE, SODIUM ACETATE, POTASSIUM CHLORIDE, MAGNESIUM CHLORIDE, SODIUM PHOSPHATE, DIBASIC, AND POTASSIUM PHOSPHATE 75 ML/HR: .53; .5; .37; .037; .03; .012; .00082 INJECTION, SOLUTION INTRAVENOUS at 00:21

## 2022-02-14 RX ADMIN — GABAPENTIN 300 MG: 300 CAPSULE ORAL at 21:18

## 2022-02-14 NOTE — QUICK NOTE
Stroke Alert Note   Lou Mckeon 48 y o  male  MRN: [id number]   Unit/Bed#: -01 Encounter: [id number]     Stroke alert text received at: 7:44am  Neurology response by phone was immediate    47 y/o man with recent brain imaging reportedly suggestive of MS, presented with right sided weakness, numbness, and dysarthria  Last known well at 5:00am, when he first woke up, but he went back to sleep, and when he woke again, around 6:45am, he had significant right sided weakness, numbness, and speech change  In the ED, he was found to be flaccid on the right, with complete loss of sensation on the right, ? Subtle facial droop, and subjectively abnormal speech (pt reported, not appreciated by ED staff)  Pt also presented to [facility] hospital last month with sudden onset stroke like symptoms, with the result of the workup being him diagnosed with probably MS  He was seen in the [place] [address] clinic and imaging was requested for review to aid in the diagnosis  There are records scanned into Epic, however, none of them include imaging reports, or neurology notes after the initial consult so it is unknown if this lesion was present at that time or not  Reached out to [facility] and some of the imaging reports were sent, with report of enhancing and nonenhancing lesions in the brain, and 1 in the left basal ganglia, but cannot be certain of this being the same lesion without direct comparison  NIHSSS 12 (1 point for facial droop, 4 points each for right arm and leg weakness, 2 points for sensory loss, and 1 point for dysarthria)      CT head wo: multiple periventricular lesions that are consistent with MS  Also a left internal capsule/basal ganglia hypodensity that is not as chronic appearing, and is in an atypical location for MS, and per discussion with radiology, possibly consistent with a recent stroke  CTA head/neck: unremarkable          Had initially discussed tPA with the ED attending, specifically to get it ready to administer, however, to do this, tPA has to be ordered  While I was on the phone discussing his imaging with radiology, I tried to make sure that the tPA was not administered until we could clarify the imaging finding, due to the concern for possible subacute stroke noted above, which would be a contraindication to tPA  Unfortunately, the tPA bolus had already been given  At that point we held the infusion pending additional clarification of the lesion, but this could not be obtained before he was beyond 4 5 hours from last known normal so the remainder of the tPA was not given  However, within 30 minutes of the initial tPA bolus, his symptoms had dramatically improved, with symmetric sensation, and nearly full strength on the right  His acute presentation is not consistent with an MS flare  Unclear what to make of 2 presentations with acute onset neurologic symptoms in the last month or so  - despite not receiving full dose of tPA, he will need post tPA care and monitoring in the ICU     - Post tPA vitals and neurocheck protocol  - admit on stroke pathway to ICU/critical care for 24 hours  - repeat head CT 24 hours after administering  - no AP or AC prior to above-mentioned repeat head CT  - start lipitor   - MRI brain w/wo, echo, HbA1c, lipid panel  - monitor on telemetry  - normothermia, euglycemia  - avoid hypotonic fluids        Kylie Niño MD

## 2022-02-14 NOTE — ASSESSMENT & PLAN NOTE
· Patient states that he had acute onset of dysarthria and R-sided weakness at 7 AM this morning  · Recently admitted to [facility] for acute onset R-sided weakness, dysarthria, R facial droop  Work-up for CVA negative, diagnosed with demyelinating disease, most likely MS  · 1/10/22 MRI brain: Multiple enhancing and nonenhancing lesions in the white matter of the cerebral hemispheres  The largest of these lesions is a nonenhancing 1 6 cm x 1 7 cm lesion at the medial right parietal lobe  The appearance is most suspicious for demyelinating disease/multiple sclerosis  Metastatic disease is less likely  I see no evidence of acute infarction  · Discharged on steroid taper with significant improvement in his symptoms  · Recently evaluated at [address] [facility] [address]  · On 2/7 slight dysarthria still appreciated per Dr Efren Campo  · 2/13 CTH: No acute intracranial hemorrhage  White matter change in keeping with known history of demyelinating disease  Focal hypoattenuation centered at posterior limb of left internal capsule of uncertain etiology, ischemia versus underlying demyelinating disease  · 2/13 CTA: No intracranial large vessel occlusion or critical stenosis  No aneurysm  No hemodynamically significant stenosis of cervical carotid and vertebral arteries  No dissection     · Patient initially given tPA bolus, however remainder of tPA not given secondary to concern for ischemia vs  new lesion  · Follow post-tPA protocol  · Repeat CTH 24-hr post tpa pending this morning at 0830  · Neurology input appreciated

## 2022-02-14 NOTE — PLAN OF CARE
Problem: Potential for Falls  Goal: Patient will remain free of falls  Description: INTERVENTIONS:  - Educate patient/family on patient safety including physical limitations  - Instruct patient to call for assistance with activity   - Consult OT/PT to assist with strengthening/mobility   - Keep Call bell within reach  - Keep bed low and locked with side rails adjusted as appropriate  - Keep care items and personal belongings within reach  - Initiate and maintain comfort rounds  - Make Fall Risk Sign visible to staff  - Apply yellow socks and bracelet for high fall risk patients  - Consider moving patient to room near nurses station  Outcome: Progressing     Problem: MOBILITY - ADULT  Goal: Maintain or return to baseline ADL function  Description: INTERVENTIONS:  -  Assess patient's ability to carry out ADLs; assess patient's baseline for ADL function and identify physical deficits which impact ability to perform ADLs (bathing, care of mouth/teeth, toileting, grooming, dressing, etc )  - Assess/evaluate cause of self-care deficits   - Assess range of motion  - Assess patient's mobility; develop plan if impaired  - Assess patient's need for assistive devices and provide as appropriate  - Encourage maximum independence but intervene and supervise when necessary  - Involve family in performance of ADLs  - Assess for home care needs following discharge   - Consider OT consult to assist with ADL evaluation and planning for discharge  - Provide patient education as appropriate  Outcome: Progressing  Goal: Maintains/Returns to pre admission functional level  Description: INTERVENTIONS:  - Perform BMAT or MOVE assessment daily    - Set and communicate daily mobility goal to care team and patient/family/caregiver     - Collaborate with rehabilitation services on mobility goals if consulted  - Out of bed for toileting  - Record patient progress and toleration of activity level   Outcome: Progressing     Problem: Prexisting or High Potential for Compromised Skin Integrity  Goal: Skin integrity is maintained or improved  Description: INTERVENTIONS:  - Identify patients at risk for skin breakdown  - Assess and monitor skin integrity  - Assess and monitor nutrition and hydration status  - Monitor labs   - Assess for incontinence   - Turn and reposition patient  - Assist with mobility/ambulation  - Relieve pressure over bony prominences  - Avoid friction and shearing  - Provide appropriate hygiene as needed including keeping skin clean and dry  - Evaluate need for skin moisturizer/barrier cream  - Collaborate with interdisciplinary team   - Patient/family teaching  - Consider wound care consult   Outcome: Progressing     Problem: NEUROSENSORY - ADULT  Goal: Achieves stable or improved neurological status  Description: INTERVENTIONS  - Monitor and report changes in neurological status  - Monitor vital signs such as temperature, blood pressure, glucose, and any other labs ordered   - Initiate measures to prevent increased intracranial pressure  - Monitor for seizure activity and implement precautions if appropriate      Outcome: Progressing     Problem: PAIN - ADULT  Goal: Verbalizes/displays adequate comfort level or baseline comfort level  Description: Interventions:  - Encourage patient to monitor pain and request assistance  - Assess pain using appropriate pain scale  - Administer analgesics based on type and severity of pain and evaluate response  - Implement non-pharmacological measures as appropriate and evaluate response  - Consider cultural and social influences on pain and pain management  - Notify physician/advanced practitioner if interventions unsuccessful or patient reports new pain  Outcome: Progressing     Problem: INFECTION - ADULT  Goal: Absence or prevention of progression during hospitalization  Description: INTERVENTIONS:  - Assess and monitor for signs and symptoms of infection  - Monitor lab/diagnostic results  - Monitor all insertion sites, i e  indwelling lines, tubes, and drains  - Monitor endotracheal if appropriate and nasal secretions for changes in amount and color  - [place] appropriate cooling/warming therapies per order  - Administer medications as ordered  - Instruct and encourage patient and family to use good hand hygiene technique  - Identify and instruct in appropriate isolation precautions for identified infection/condition  Outcome: Progressing     Problem: SAFETY ADULT  Goal: Patient will remain free of falls  Description: INTERVENTIONS:  - Educate patient/family on patient safety including physical limitations  - Instruct patient to call for assistance with activity   - Consult OT/PT to assist with strengthening/mobility   - Keep Call bell within reach  - Keep bed low and locked with side rails adjusted as appropriate  - Keep care items and personal belongings within reach  - Initiate and maintain comfort rounds  - Make Fall Risk Sign visible to staff  - Apply yellow socks and bracelet for high fall risk patients  - Consider moving patient to room near nurses station  Outcome: Progressing  Goal: Maintain or return to baseline ADL function  Description: INTERVENTIONS:  -  Assess patient's ability to carry out ADLs; assess patient's baseline for ADL function and identify physical deficits which impact ability to perform ADLs (bathing, care of mouth/teeth, toileting, grooming, dressing, etc )  - Assess/evaluate cause of self-care deficits   - Assess range of motion  - Assess patient's mobility; develop plan if impaired  - Assess patient's need for assistive devices and provide as appropriate  - Encourage maximum independence but intervene and supervise when necessary  - Involve family in performance of ADLs  - Assess for home care needs following discharge   - Consider OT consult to assist with ADL evaluation and planning for discharge  - Provide patient education as appropriate  Outcome: Progressing  Goal: Maintains/Returns to pre admission functional level  Description: INTERVENTIONS:  - Perform BMAT or MOVE assessment daily    - Set and communicate daily mobility goal to care team and patient/family/caregiver  - Collaborate with rehabilitation services on mobility goals if consulted  - Out of bed for toileting  - Record patient progress and toleration of activity level   Outcome: Progressing     Problem: DISCHARGE PLANNING  Goal: Discharge to home or other facility with appropriate resources  Description: INTERVENTIONS:  - Identify barriers to discharge w/patient and caregiver  - Arrange for needed discharge resources and transportation as appropriate  - Identify discharge learning needs (meds, wound care, etc )  - Arrange for interpretive services to assist at discharge as needed  - Refer to Case Management Department for coordinating discharge planning if the patient needs post-hospital services based on physician/advanced practitioner order or complex needs related to functional status, cognitive ability, or social support system  Outcome: Progressing     Problem: Knowledge Deficit  Goal: Patient/family/caregiver demonstrates understanding of disease process, treatment plan, medications, and discharge instructions  Description: Complete learning assessment and assess knowledge base    Interventions:  - Provide teaching at level of understanding  - Provide teaching via preferred learning methods  Outcome: Progressing

## 2022-02-14 NOTE — UTILIZATION REVIEW
Initial Clinical Review    Admission: Date/Time/Statement:   Admission Orders (From admission, onward)     Ordered        02/13/22 0924  Inpatient Admission  Once                      Orders Placed This Encounter   Procedures    Inpatient Admission     Standing Status:   Standing     Number of Occurrences:   1     Order Specific Question:   Level of Care     Answer:   Critical Care [15]     Order Specific Question:   Estimated length of stay     Answer:   More than 2 Midnights     Order Specific Question:   Certification     Answer:   I certify that inpatient services are medically necessary for this patient for a duration of greater than two midnights  See H&P and MD Progress Notes for additional information about the patient's course of treatment  ED Arrival Information     Expected Arrival Acuity    - 2/13/2022 07:53 Emergent         Means of arrival Escorted by Service Admission type    Ambulance [facility]) Critical Care/ICU Emergency         Arrival complaint    poss stroke symptoms        Chief Complaint   Patient presents with    STROKE Alert     To ED with EMS for right sided weakness and speech difficultyt  Started at 5  Last known well at 0500  Initial Presentation: 48 y o  male who presented to [facility] ED  Inpatient admission for evaluation and treatment of  Acute right-sided weakness, with Facial droop and Dysarthria  He has a past medical history of MS (multiple sclerosis)  Presented w/  Acute on chronic right side weakness and dysarthria at the beginning of January 2020 at [facility], he was evaluated for  CVA vs MS  Today he presents for right side weakness, numbness , R side facial droop and dysarthria  He reports waking up at 5 am  And was at his baseline, he fell asleep and when he woke he could not move his right side art all, he could not feel his right side and his voice speech sounded different    He was bolused with tPA, with complete resolution of his symptoms  He is admitted to ICU for post tPA protocol q1 neuro checks, start atorvastatin, ASA   Continue home meds for his MS Neurology consult  Date: 2/14/2022   Day 2:  Pt is s/p TPA on admission, he was in ICU for post tPA care  His R side facial droop and hemiparesis have improved  CTH at 24 hours ordered  On IVF @ 75 for SBP 90's with improvement of SBP to 100's  He is A & O x 3 on NAD, mild slurred speech noted, very mild R sided corner of mouth droop, very mild R pronator drift, L hand grasps L 5/5, R 4/5  UE push/pull - L 5/5, R 4/5  Plantar/dorsi flexion L5/5,R5/5,   Leglifts l5/5,R4/5    Neurology consult - 2/14  -  49 yo m with recent workup for MS( January 2022 with neuroimaging, s/p 5 day course of IV steroids  He presented to the ED as a stroke alert with R side weakness and dysarthria, s/p tPA given concern for L BG/internal capsule hypodensity on CT head  -CT head during alert noting multiple periventricular lesions (consistent with MS)   Also noted left internal capsule/basal ganglia hypodensity that is not as chronic appearing, and is in an atypical location for MS, and per discussion with radiology, possibly consistent with a recent stroke  -CTA head/neck negative for vascular abnormalities/flow restrictive disease  -CT head this morning repeated (24 hours post tPA bolus): Was negative for acute finding/intracranial pathology  -2D echo pending  -hemoglobin A1c of 5 1, lipid panel with LDL of 51  -continue statin  -MRI brain pending; will obtain MRI cervical spine with/without contrast as well;     2/15 MRI showed,  Multiple small supratentorial enhancing foci of acute demyelination consistent with disease progression of multiple sclerosis  Plan started on Solumedrol 1000mg iv qd x 5 days       ED Triage Vitals   Temperature Pulse Respirations Blood Pressure SpO2   02/13/22 0759 02/13/22 0753 02/13/22 0753 02/13/22 0753 02/13/22 0753   98 2 °F (36 8 °C) 77 16 119/71 99 % Temp Source Heart Rate Source Patient Position - Orthostatic VS BP Location FiO2 (%)   02/13/22 0759 02/13/22 0753 02/13/22 0753 02/13/22 0753 --   Tympanic Monitor Lying Left arm       Pain Score       02/13/22 0753       No Pain          Wt Readings from Last 1 Encounters:   02/14/22 103 kg (227 lb)     Additional Vital Signs:   Date/Time Temp Pulse Resp BP MAP (mmHg) SpO2 O2 Device Patient Position - Orthostatic VS   02/14/22 1108 97 9 °F (36 6 °C) 67 12 109/64 82 98 % None (Room air) Lying   02/14/22 0825 -- -- -- 93/77 -- -- -- --   02/14/22 0700 98 °F (36 7 °C) -- -- -- -- -- -- --   02/14/22 0610 -- 64 20 93/77 82 96 % -- --   02/14/22 0500 -- 60 18 105/64 79 95 % -- --   02/14/22 0450 98 6 °F (37 °C) 61 16 109/69 84 95 % -- --   02/14/22 0400 -- 70 15 103/77 87 95 % None (Room air) --   02/14/22 0300 -- 64 12 99/64 75 95 % -- --   02/14/22 0200 -- 64 12 97/58 73 93 % -- --   02/13/22 2345 -- 66 12 99/56 72 93 % -- --   02/13/22 2340 97 8 °F (36 6 °C) -- -- -- -- -- -- --   02/13/22 2300 -- 67 15 92/57 68 92 % -- --   02/13/22 2245 -- 64 15 102/59 74 91 % -- --   02/13/22 2200 -- 64 14 96/59 73 92 % -- --   02/13/22 2100 -- 74 15 99/62 75 95 % -- --   02/13/22 2000 -- 76 19 102/63 77 94 % -- --   02/13/22 1900 97 8 °F (36 6 °C) 76 14 95/52 66 96 % None (Room air) Lying   02/13/22 1830 -- 78 16 114/64 84 96 % -- --   02/13/22 1800 -- 76 16 120/60 83 97 % -- --   02/13/22 1730 -- 76 15 113/75 89 94 % -- --   02/13/22 1700 -- 65 13 117/82 96 95 % -- --   02/13/22 1630 98 °F (36 7 °C) 60 17 119/80 96 98 % -- --   02/13/22 1615 -- 64 14 118/82 96 96 % -- --   02/13/22 1545 -- 65 19 104/68 82 96 % -- --   02/13/22 1430 -- 66 13 130/72 94 96 % -- --   02/13/22 1415 -- 74 25 Abnormal  127/92 101 98 % -- --   02/13/22 1400 -- 69 17 126/59 86 96 % -- --   02/13/22 1345 -- 71 16 140/66 95 95 % -- --   02/13/22 1330 -- 71 13 121/75 91 95 % -- --   02/13/22 1315 -- 73 6 Abnormal  112/70 87 97 % -- --   02/13/22 1300 -- 66 17 126/81 96 97 % -- --   02/13/22 1245 -- 66 20 117/75 91 97 % -- --   02/13/22 1230 -- 66 15 112/66 84 95 % -- --   02/13/22 1215 -- 60 15 111/63 82 97 % -- --   02/13/22 1200 98 1 °F (36 7 °C) 65 18 124/84 100 98 % -- --   02/13/22 1100 -- 62 14 108/74 88 97 % -- --   02/13/22 1045 -- 66 20 116/82 93 98 % -- --   02/13/22 1030 -- 69 16 126/64 90 98 % -- --   02/13/22 1000 97 8 °F (36 6 °C) 68 14 118/76 91 96 % None (Room air) --   02/13/22 0945 -- 65 20 113/74 88 99 % None (Room air) Lying   02/13/22 0930 -- 65 20 111/74 89 97 % None (Room air) Lying   02/13/22 0915 -- 69 15 113/73 87 96 % None (Room air) Lying   02/13/22 0900 -- 68 14 112/71 87 96 % None (Room air) Lying   02/13/22 0845 -- 72 20 111/65 83 97 % None (Room air) Lying   02/13/22 0830 -- 70 12 112/64 83 95 % None (Room air) Lying   02/13/22 0815 -- 71 12 125/74 93 98 % None (Room air) Lying   02/13/22 0800 -- 70 16 119/71 88 99 % None (Room air) Lying             Pertinent Labs/Diagnostic Test Results:       Results from last 7 days   Lab Units 02/14/22  0435 02/13/22  0757   WBC Thousand/uL 7 31 8 91   HEMOGLOBIN g/dL 13 3 14 5   HEMATOCRIT % 42 4 45 1   PLATELETS Thousands/uL 203 242         Results from last 7 days   Lab Units 02/14/22  0436 02/13/22  0757   SODIUM mmol/L 139 139   POTASSIUM mmol/L 4 2 4 2   CHLORIDE mmol/L 107 106   CO2 mmol/L 29 23   ANION GAP mmol/L 3* 10   BUN mg/dL 24 29*   CREATININE mg/dL 0 88 0 97   EGFR ml/min/1 73sq m 101 91   CALCIUM mg/dL 8 8 9 0         Results from last 7 days   Lab Units 02/13/22  0802   POC GLUCOSE mg/dl 87     Results from last 7 days   Lab Units 02/14/22  0436 02/13/22  0757   GLUCOSE RANDOM mg/dL 81 78         Results from last 7 days   Lab Units 02/14/22  0435   HEMOGLOBIN A1C % 5 1   EAG mg/dl 100       Results from last 7 days   Lab Units 02/13/22  0757   HS TNI 0HR ng/L <2       Results from last 7 days   Lab Units 02/13/22  0757   PROTIME seconds 13 4   INR  1 03   PTT seconds 33     CT Head - 2/14 - No intracranial hemorrhage  No significant interval change  CXR 2/13 - No acute cardiopulmonary disease  CTA Head & Neck - 2/13 - 1   No intracranial large vessel occlusion or critical stenosis  No aneurysm  2   No hemodynamically significant stenosis of cervical carotid and vertebral arteries  No dissection  CT Brain - 2/13 - 1   No acute intracranial hemorrhage  2   White matter change in keeping with known history of demyelinating disease   Focal hypoattenuation centered at posterior limb of left internal capsule of uncertain etiology, ischemia versus underlying demyelinating disease  ECG - 2/13 - NSR- no acute     MRI Brain -2/15 - Multiple small supratentorial enhancing foci of acute demyelination consistent with disease progression of multiple sclerosis  Late subacute to chronic demyelination in the left posterior limb of the internal capsule   This lesion has evolved in shape when compared to the prior outside MRI study of June 1, 2021  Moderate supratentorial predominant chronic demyelinating disease of multiple sclerosis  MRI cervical; Spine -2/15 - Acute to early subacute subcentimeter demyelinating plaque at the C5 vertebral body level  Additional smaller nonenhancing chronic appearing demyelinating plaques within the upper and mid cervical cord           ED Treatment:   Medication Administration from 02/13/2022 0752 to 02/13/2022 1022       Date/Time Order Dose Route Action Comments     02/13/2022 0815 alteplase (ACTIVASE) IV bolus 9 mg 9 mg Intravenous Given      02/13/2022 0816 alteplase (ACTIVASE) infusion 81 mg 81 mg Intravenous New Bag      02/13/2022 0800 iohexol (OMNIPAQUE) 350 MG/ML injection (SINGLE-DOSE) 90 mL 90 mL Intravenous Given      02/13/2022 0828 sodium chloride 0 9 % bolus 1,000 mL 1,000 mL Intravenous New Bag         Past Medical History:   Diagnosis Date    [facility]      Present on Admission:   Dysarthria   [facility] demyelinating disease [facility] [address] )   [facility]      Admitting Diagnosis: Facial droop [R29 810]  Acute right-sided weakness [R53 1]  Dysarthria [R47 1]  Age/Sex: 48 y o  male         Admission Orders:  Scheduled Medications:  aspirin, 81 mg, Oral, Daily  atorvastatin, 40 mg, Oral, QPM  cyanocobalamin, 1,000 mcg, Oral, Daily  [START ON 2/15/2022] ergocalciferol, 50,000 Units, Oral, Weekly  gabapentin, 300 mg, Oral, TID  senna-docusate sodium, 1 tablet, Oral, HS  sodium chloride, 50 mL, Intravenous, Once  thiamine, 100 mg, Oral, Daily  Solumedrol 1000 mg iv qd x 5 days      Continuous IV Infusions:    multi-electrolyte (PLASMALYTE-A/ISOLYTE-S PH 7 4) IV solution  Rate: 75 mL/hr Dose: 75 mL/hr  Freq: Continuous Route: IV  Indications of Use: IV Hydration  Last Dose: Stopped (02/14/22 1300)  Start: 02/14/22 0015 End: 02/14/22 1221        PRN Meds:    Nursing Orders - Neuro cks q4 - Telem - I & O q 2 - daily weights - SCD's to le's -  Dysphagia diet -  Fall precautions -  Turn q 2 - up with assistance -     Network Utilization Review Department  ATTENTION: Please call with any questions or concerns to [phone] and carefully listen to the prompts so that you are directed to the right person  All voicemails are confidential   Bonifacio Ward all requests for admission clinical reviews, approved or denied determinations and any other requests to dedicated fax number below belonging to the campus where the patient is receiving treatment   List of dedicated fax numbers for the Facilities:  [address] DENIALS (Administrative/Medical Necessity) [phone]   [address] [place] [address] (Maternity/NICU/Pediatrics) 261 [facility],[address]  [address] [facility] [place] [address] [place] [address] [place] [address] [address] [phone]

## 2022-02-14 NOTE — PROGRESS NOTES
[place]  Progress Note - Sue Ramos 1975, 48 y o  male MRN: [id number]  Unit/Bed#: -01 Encounter: [id number]  Primary Care Provider: Patti Connors MD   Date and time admitted to hospital: 2/13/2022  7:56 AM    CNS demyelinating disease ([address] )  Assessment & Plan  · As outlined below  · Continue gabapentin for neuropathic pain  · Neurology following, appreciate recommendations     Dysarthria  Assessment & Plan  · Patient states that he had acute onset of dysarthria and R-sided weakness at 7 AM this morning  · Recently admitted to [facility] for acute onset R-sided weakness, dysarthria, R facial droop  Work-up for CVA negative, diagnosed with demyelinating disease, most likely MS  · 1/10/22 MRI brain: Multiple enhancing and nonenhancing lesions in the white matter of the cerebral hemispheres  The largest of these lesions is a nonenhancing 1 6 cm x 1 7 cm lesion at the medial right parietal lobe  The appearance is most suspicious for demyelinating disease/multiple sclerosis  Metastatic disease is less likely  I see no evidence of acute infarction  · Discharged on steroid taper with significant improvement in his symptoms  · Recently evaluated at [address] [place] [address]  · On 2/7 slight dysarthria still appreciated per Dr Danny Saini  · 2/13 CTH: No acute intracranial hemorrhage  White matter change in keeping with known history of demyelinating disease  Focal hypoattenuation centered at posterior limb of left internal capsule of uncertain etiology, ischemia versus underlying demyelinating disease  · 2/13 CTA: No intracranial large vessel occlusion or critical stenosis  No aneurysm  No hemodynamically significant stenosis of cervical carotid and vertebral arteries  No dissection     · Patient initially given tPA bolus, however remainder of tPA not given secondary to concern for ischemia vs  new lesion  · Follow post-tPA protocol  · Repeat CTH 24-hr post tpa pending this morning at 0830  · Neurology input appreciated     * Right hemiparesis [facility])  Assessment & Plan  · Recently admitted to [facility] for acute onset R-sided weakness, dysarthria, R facial droop  Work-up for CVA negative, diagnosed with demyelinating disease, most likely MS  · 1/10/22 MRI brain: Multiple enhancing and nonenhancing lesions in the white matter of the cerebral hemispheres  The largest of these lesions is a nonenhancing 1 6 cm x 1 7 cm lesion at the medial right parietal lobe  The appearance is most suspicious for demyelinating disease/multiple sclerosis  Metastatic disease is less likely  I see no evidence of acute infarction  · Discharged on steroid taper with significant improvement in his symptoms  · Recently evaluated at [address]  · On 2/7 slight dysarthria, 3/5 strength in RUE, 4/5 strength in LLE  · Patient was recommended to continue gabapentin for neuropathic pain and to start vitamins D, B1, B2, and B12 with follow-up in 3-4 weeks  · Unclear if current R-sided weakness and dysarthria are secondary to CVA vs  TIA vs  MS  · 2/13 CTH: No acute intracranial hemorrhage  White matter change in keeping with known history of demyelinating disease  Focal hypoattenuation centered at posterior limb of left internal capsule of uncertain etiology, ischemia versus underlying demyelinating disease  · 2/13 CTA: No intracranial large vessel occlusion or critical stenosis  No aneurysm  No hemodynamically significant stenosis of cervical carotid and vertebral arteries  No dissection     · Neurology following, appreciate recommendations  · Post-tPA/stroke protocol  · Cont atorvastatin  · Start aspirin after 24 hours post-tPA  · Repeat CTH in 24 hours post-tPA, consider MRI  · Follow-up A1C and FLP  · Notably, patient reports baseline RLE weakness after ACL issues several years ago      ----------------------------------------------------------------------------------------  HPI/24hr events: He received bolus of TPA on admission to the ER and was subsequently transferred to ICU for post tpa care  His R sided facial droop and hemiparesis appears to have improved  His 24h CTH is ordered for this morning  He endorses no complaints  Isolyte @75 ordered overnight for SBP 90s (patient asymptomatic) with improvement of SBP to 100s  Patient appropriate for transfer out of the ICU today?: Patient does not meet criteria for referral to the ICU Follow-Up Clinic; referral has not been made  Disposition: Transfer to [facility] with Telemetry   Code Status: Level 1 - Full Code  ---------------------------------------------------------------------------------------  SUBJECTIVE  "I'm pretty good"    Review of Systems  Review of systems was reviewed and negative unless stated above in HPI/24-hour events   ---------------------------------------------------------------------------------------  OBJECTIVE    Vitals   Vitals:    22 2345 22 0200 22 0300 22 0400   BP: 99/56 97/58 99/64 103/77   BP Location:       Pulse: 66 64 64 70   Resp:  12 15   Temp:       TempSrc:       SpO2: 93% 93% 95% 95%   Weight:       Height:         Temp (24hrs), Av °F (36 7 °C), Min:97 8 °F (36 6 °C), Max:98 2 °F (36 8 °C)  Current: Temperature: 97 8 °F (36 6 °C)          Respiratory:  SpO2: SpO2: 95 %, SpO2 Activity: SpO2 Activity: At Rest, SpO2 Device: O2 Device: None (Room air)       Invasive/non-invasive ventilation settings   Respiratory  Report   Lab Data (Last 4 hours)    None         O2/Vent Data (Last 4 hours)    None                Physical Exam  Vitals and nursing note reviewed  Constitutional:       General: He is awake  He is not in acute distress  Appearance: He is well-developed and well-groomed  He is not ill-appearing, toxic-appearing or diaphoretic  HENT:      Head: Normocephalic and atraumatic  Eyes:      General: Lids are normal    Cardiovascular:      Rate and Rhythm: Normal rate and regular rhythm  Pulmonary:      Effort: Pulmonary effort is normal       Breath sounds: Normal breath sounds  Abdominal:      General: Abdomen is flat  Palpations: Abdomen is soft  Tenderness: There is no abdominal tenderness  Genitourinary:     Comments: Voiding independently   Musculoskeletal:      Right lower leg: No edema  Left lower leg: No edema  Skin:     General: Skin is warm  Capillary Refill: Capillary refill takes less than 2 seconds  Neurological:      Mental Status: He is alert and oriented to person, place, and time  GCS: GCS eye subscore is 4  GCS verbal subscore is 5  GCS motor subscore is 6  Cranial Nerves: Cranial nerves are intact  Comments: AAO x3, in NAD, mildly slurred speech noted   Very mild R sided corner of mouth droop   Very mild R pronator drift  Shoulder shrugs L 5/5, R 5/5  Hand grasps L 5/5, R 4/5  UE push/pull L 5/5, R 5/5  Plantar/dorsi flexion L 5/5, R 5/5  Leg lifts L 5/5, R 4/5   Psychiatric:         Mood and Affect: Mood and affect normal          Behavior: Behavior is cooperative  Laboratory and Diagnostics:  Results from last 7 days   Lab Units 02/13/22  0757   WBC Thousand/uL 8 91   HEMOGLOBIN g/dL 14 5   HEMATOCRIT % 45 1   PLATELETS Thousands/uL 242     Results from last 7 days   Lab Units 02/13/22  0757   SODIUM mmol/L 139   POTASSIUM mmol/L 4 2   CHLORIDE mmol/L 106   CO2 mmol/L 23   ANION GAP mmol/L 10   BUN mg/dL 29*   CREATININE mg/dL 0 97   CALCIUM mg/dL 9 0   GLUCOSE RANDOM mg/dL 78          Results from last 7 days   Lab Units 02/13/22  0757   INR  1 03   PTT seconds 33              ABG:    VBG:          Micro        EKG: NSR on tele with HR 63  Imaging: I have personally reviewed pertinent reports  Intake and Output  I/O       02/12 0701 02/13 0700 02/13 0701 02/14 0700    P  O   180    IV Piggyback  1081    Total Intake(mL/kg)  1261 (11 8)    Urine (mL/kg/hr)  1150    Total Output  1150    Net  +111                Height and Weights   Height: 5' 11" (180 3 cm)  IBW (Ideal Body Weight): 75 3 kg  Body mass index is 32 9 kg/m²  Weight (last 2 days)     Date/Time Weight    02/13/22 0759 107 (235 89)    02/13/22 0753 107 (235 89)            Nutrition       Diet Orders   (From admission, onward)             Start     Ordered    02/13/22 1447  Diet Regular; Regular House  Diet effective now        References:    Nutrtion Support Algorithm Enteral vs  Parenteral   Question Answer Comment   Diet Type Regular    Regular Regular House    RD to adjust diet per protocol? No        02/13/22 1446                  Active Medications  Scheduled Meds:  Current Facility-Administered Medications   Medication Dose Route Frequency Provider Last Rate    atorvastatin  40 mg Oral QPM ADI Beck-KENJI      cyanocobalamin  1,000 mcg Oral Daily ADI Beck-KENJI      [START ON 2/15/2022] ergocalciferol  50,000 Units Oral Weekly Alexandree  PA-C      gabapentin  300 mg Oral TID Alexandree  PA-C      multi-electrolyte  75 mL/hr Intravenous Continuous SHYANN French 75 mL/hr (02/14/22 0021)    senna-docusate sodium  1 tablet Oral HS Medallialle  PA-C      sodium chloride  50 mL Intravenous Once Cooper Maya DO      thiamine  100 mg Oral Daily Zackery Lr PA-C       Continuous Infusions:  multi-electrolyte, 75 mL/hr, Last Rate: 75 mL/hr (02/14/22 0021)      PRN Meds:        Invasive Devices Review  Invasive Devices  Report    Peripheral Intravenous Line            Peripheral IV 02/13/22 Right Antecubital <1 day                ---------------------------------------------------------------------------------------  Advance Directive and Living Will:      Power of :    POLST:    ---------------------------------------------------------------------------------------  Care Time Delivered:   No Critical Care time spent       SHYANN Cruz      Portions of the record may have been created with voice recognition software    Occasional wrong word or "sound a like" substitutions may have occurred due to the inherent limitations of voice recognition software    Read the chart carefully and recognize, using context, where substitutions have occurred

## 2022-02-14 NOTE — SPEECH THERAPY NOTE
Speech Language/Pathology     Speech-Language Pathology Bedside Swallow Evaluation      Patient Name: Aubrey Arellano    [id number] Date: 2/14/2022     Problem List  Principal Problem:    Right hemiparesis [facility])  Active Problems:    Dysarthria    CNS demyelinating disease ([facility] [address] )      Past Medical History  Past Medical History:   Diagnosis Date    MS (multiple sclerosis) ([facility] [address] )        Past Surgical History  Past Surgical History:   Procedure Laterality Date    HERNIA REPAIR      3 times    KNEE SURGERY Right        Summary   Pt presented with functional appearing oral and pharyngeal stage swallowing skills with materials administered today  No significant oral difficulties  Swallows appear timely  No overt s/s aspiration across trials today  Pt denies dysphagia, odynophagia, and/or s/s aspiration  Pt reports he was d/c from [facility] on Friday for his dysarthria  Risk/s for Aspiration: Low     Recommended Diet: regular diet and thin liquids   Recommended Form of Meds: whole with liquid   Aspiration precautions and swallowing strategies: upright posture and only feed when fully alert  Other Recommendations: Continue frequent oral care        Current Medical Status  Pt is a 48 y o  male who presented to [address] [place] [address] with PMHx significant for newly diagnosed MS who presented to the Ed on 2/13 with acute-onset R-sided weakness, dysarthria, and R-sided facial droop  Patient states that he awoke around 5 am and felt normal, then around 7 AM he had tried to get out of bed with acute-onset R sided weakness and he lowered himself to the floor  He denies loss of consciousness or hitting his head during this event  He called EMS and on arrival to the ED he had a mild R facial droop, R hemiparesis, and dysarthria  He was recently evaluated at [facility] for similar symptoms but stated that those symptoms had improved and his current symptoms were acute onset   He was taken for CTH/CTA which was initially felt to be clear and he was bolused with tPA  Shortly after bolus of tPA radiology reported a small area on [facility] concerning for a new lesion from MS vs  Ischemia  Due to concern for possible new lesion, remainder of tPA was held  The patient had rapid improvement in his RUE/RLE weakness and R facial droop following the tPA administration though he has persistent dyarthria  He will be admitted to the ICU under critical care for close neurologic monitoring following tPA administration      History obtained from chart review and the patient  Current Precautions:  Fall      Allergies:  No known food allergies    Past medical history:  Please see H&P for details    Special Studies:  CT stroke alert brain 2/13: 1  No acute intracranial hemorrhage      2  White matter change in keeping with known history of demyelinating disease  Focal hypoattenuation centered at posterior limb of left internal capsule of uncertain etiology, ischemia versus underlying demyelinating disease      CTA stroke alert 2/13: 1  No intracranial large vessel occlusion or critical stenosis  No aneurysm      2  No hemodynamically significant stenosis of cervical carotid and vertebral arteries  No dissection  CXR 2/13: No acute cardiopulmonary disease        Social/Education/Vocational Hx:  Pt lives home    Swallow Information   Current Risks for Dysphagia & Aspiration: MS, stroke alert  Current Symptoms/Concerns: R facial droop  Current Diet: regular diet and thin liquids   Baseline Diet: regular diet and thin liquids      Baseline Assessment   Behavior/Cognition: alert  Speech/Language Status: able to participate in conversation and able to follow commands, mild dysarthria - pt reports at "new baseline"   Patient Positioning: upright in bed  Pain Status/Interventions/Response to Interventions:  No report of or nonverbal indications of pain         Swallow Mechanism Exam  Facial: right facial droop  Labial: WFL  Lingual: WFL  Velum: symmetrical  Mandible: adequate ROM  Dentition: adequate  Vocal quality:clear/adequate   Volitional Cough: strong/productive   Respiratory Status: on RA     Consistencies Assessed and Performance   Consistencies Administered: thin liquids, puree and hard solids     Oral Stage: WFL  Mastication was adequate with the materials administered today  Bolus formation and transfer were functional with no significant oral residue noted  No overt s/s reduced oral control  Pharyngeal Stage: WFL  Swallow Mechanics:  Swallowing initiation appeared prompt  Laryngeal rise was palpated and judged to be within functional limits  No coughing, throat clearing, change in vocal quality or respiratory status noted today       Esophageal Concerns: none reported    Strategies and Efficacy: -     Summary and Recommendations (see above)    Results Reviewed with: patient, RN and PA     Treatment Recommended: Not at this time, please re-consult if medically necessary

## 2022-02-14 NOTE — ASSESSMENT & PLAN NOTE
· Recently admitted to [facility] for acute onset R-sided weakness, dysarthria, R facial droop  Work-up for CVA negative, diagnosed with demyelinating disease, most likely MS  · 1/10/22 MRI brain: Multiple enhancing and nonenhancing lesions in the white matter of the cerebral hemispheres  The largest of these lesions is a nonenhancing 1 6 cm x 1 7 cm lesion at the medial right parietal lobe  The appearance is most suspicious for demyelinating disease/multiple sclerosis  Metastatic disease is less likely  I see no evidence of acute infarction  · Discharged on steroid taper with significant improvement in his symptoms  · Recently evaluated at [address] [place] [address]  · On 2/7 slight dysarthria, 3/5 strength in RUE, 4/5 strength in LLE  · Patient was recommended to continue gabapentin for neuropathic pain and to start vitamins D, B1, B2, and B12 with follow-up in 3-4 weeks  · Unclear if current R-sided weakness and dysarthria are secondary to CVA vs  TIA vs  MS  · 2/13 CTH: No acute intracranial hemorrhage  White matter change in keeping with known history of demyelinating disease  Focal hypoattenuation centered at posterior limb of left internal capsule of uncertain etiology, ischemia versus underlying demyelinating disease  · 2/13 CTA: No intracranial large vessel occlusion or critical stenosis  No aneurysm  No hemodynamically significant stenosis of cervical carotid and vertebral arteries  No dissection     · Neurology following, appreciate recommendations  · Post-tPA/stroke protocol  · Cont atorvastatin  · Start aspirin after 24 hours post-tPA  · Repeat CTH in 24 hours post-tPA, consider MRI  · Follow-up A1C and FLP  · Notably, patient reports baseline RLE weakness after ACL issues several years ago

## 2022-02-14 NOTE — CONSULTS
Consultation - Neurology   Terrell Greenwood 48 y o  male MRN: [id number]  Unit/Bed#: -01 Encounter: [id number]      Assessment/Plan   48year old male with recent workup for demyelinating disease/MS (admission in January 2022 with neuroimaging, 5 day course of IV steroids), presenting as stroke alert on 02/13 with worsening R sided weakness/numbness and dysarthria  Did receive tPA bolus (tPA infusion was deferred given concern for L BG/internal capsule hypodensity on CT head)    Minimal suspicion for cerebrovascular event; unclear if MS flare?, imaging will help clarify  * Right hemiparesis ([facility] [address] )  Assessment & Plan  -CT head during alert noting multiple periventricular lesions (consistent with MS)  Also noted left internal capsule/basal ganglia hypodensity that is not as chronic appearing, and is in an atypical location for MS, and per discussion with radiology, possibly consistent with a recent stroke  -CTA head/neck negative for vascular abnormalities/flow restrictive disease  -CT head this morning repeated (24 hours post tPA bolus):   Was negative for acute finding/intracranial pathology  -2D echo pending  -hemoglobin A1c of 5 1, lipid panel with LDL of 51  -continue statin  -MRI brain pending; will obtain MRI cervical spine with/without contrast as well;   -awaiting imaging from [facility]; will discuss uploading imaging with PACS to review   -MRI brain and thoracic spine were reportedly performed  -await imaging/therapy evaluations to further determine if patient needs repeat course of steroids/IV Solu-Medrol (as he just had 5 days of steroids in early January)  -supportive care continue  -continuing gabapentin for pain  -await PT/OT evaluations and input  -patient follows closely with  [facility] (Dr Bhavana Aguilar; saw her 1 week ago) has upcoming follow up appointment in early March    Recent workup at [facility]:  -1/10/22 MRI brain: Multiple enhancing and nonenhancing lesions in the white matter of the cerebral hemispheres  The largest of these lesions is a nonenhancing 1 6 cm x 1 7 cm lesion at the medial right parietal lobe  The appearance is most suspicious for demyelinating disease/multiple sclerosis  Metastatic disease is less likely  I see no evidence of acute infarction        Discussed plan of care with attending neurologist     Patient has follow-up scheduled in early March 2022 with Dr Nora Webb  History of Present Illness     Reason for Consult / Principal Problem: Status post stroke alert, R sided weakness, numbness, dysarthria    HPI: Jeannette Larry is a 48 y o  male with history as mentioned above in assessment who neurology is asked to evaluate following stroke alert yesterday:    Please see Dr Liza Haque note yesterday for stroke alert details  tPA bolus was administered, with tPA infusion held given concern for possible subacute stroke  Patient was admitted to critical care service for management  He did have improvement in symptoms within 30 minutes after receiving the tPA bolus  Used urinal overnight; awoke around 6 AM to again use the bathroom; noticed R sided weakness (UE and LE)  Called for his mother, who called EMS; by the time EMS arrived he was still with deficit  In the ED: symptoms notably improved right after receiving tPA  Reviewed notes and discussed with patient; seen at [facility] for gait trouble, significant R sided weakness, speech difficulty; underwent MRI brain and thoracic spine, findings concerning for demyelinating disease (possible MS), he was treated with IV Solumedrol; since then, continues to experience degree of R sided weakness; muscle/joint aches  Patient lives with his parents and daughter: prior to January, no issues with gait stability, bowel/bladder changes  Continues to use gabapentin following recent hospitalization in    Consult to Neurology  Consult performed by:  Everardo Trinidad PA-C  Consult ordered by: Cooepr Maya DO          Review of Systems   Constitutional: Negative  HENT: Negative  Negative for hearing loss, tinnitus, trouble swallowing and voice change  Eyes: Negative for photophobia and visual disturbance  Respiratory: Negative  Cardiovascular: Negative  Gastrointestinal: Negative for nausea and vomiting  Musculoskeletal: Positive for gait problem  Negative for neck pain and neck stiffness  Skin: Negative  Neurological: Positive for speech difficulty, weakness and numbness  Negative for dizziness, tremors, seizures, syncope, facial asymmetry, light-headedness and headaches  All other ROS reviewed and negative  Historical Information   Past Medical History:   Diagnosis Date    MS (multiple sclerosis) ([facility] [address] )      Past Surgical History:   Procedure Laterality Date    HERNIA REPAIR      3 times    KNEE SURGERY Right      Social History   Social History     Substance and Sexual Activity   Alcohol Use Not Currently     Social History     Substance and Sexual Activity   Drug Use Never     E-Cigarette/Vaping    E-Cigarette Use Former User      E-Cigarette/Vaping Substances    Nicotine No     THC No     CBD No     Flavoring No      Social History     Tobacco Use   Smoking Status Former Smoker   Smokeless Tobacco Former User     Family History: History reviewed  No pertinent family history  Review of previous medical records was completed       Meds/Allergies   current meds:   Current Facility-Administered Medications   Medication Dose Route Frequency    atorvastatin (LIPITOR) tablet 40 mg  40 mg Oral QPM    cyanocobalamin (VITAMIN B-12) tablet 1,000 mcg  1,000 mcg Oral Daily    [START ON 2/15/2022] ergocalciferol (VITAMIN D2) capsule 50,000 Units  50,000 Units Oral Weekly    gabapentin (NEURONTIN) capsule 300 mg  300 mg Oral TID    multi-electrolyte (PLASMALYTE-A/ISOLYTE-S PH 7 4) IV solution  75 mL/hr Intravenous Continuous    senna-docusate sodium (SENOKOT S) 8 6-50 mg per tablet 1 tablet  1 tablet Oral HS    sodium chloride 0 9 % bolus 50 mL  50 mL Intravenous Once    thiamine tablet 100 mg  100 mg Oral Daily    and PTA meds:   Prior to Admission Medications   Prescriptions Last Dose Informant Patient Reported? Taking? Cyanocobalamin 1000 MCG SUBL   No No   Sig: Place 1 tablet (1,000 mcg total) under the tongue daily   Riboflavin ([employer] Vitamin B-2) 100 MG TABS   Yes Yes   Sig: Take 2 tablets by mouth 2 (two) times a day   ergocalciferol (VITAMIN D2) 50,000 units   No No   Sig: Take 1 capsule (50,000 Units total) by mouth once a week   gabapentin (NEURONTIN) 300 mg capsule   Yes No   Sig: TAKE ONE CAPSULE BY MOUTH ON DAY 1, THEN ONE TWICE DAILY ON DAYS 2, AND ONE THREE TIMES DAILY THEREAFTER   thiamine (VITAMIN B1) 50 mg tablet   Yes Yes   Sig: Take 100 mg by mouth daily      Facility-Administered Medications: None       No Known Allergies    Objective   Vitals:Blood pressure 93/77, pulse 64, temperature 98 °F (36 7 °C), temperature source Oral, resp  rate 20, height 5' 11" (1 803 m), weight 103 kg (227 lb 15 3 oz), SpO2 96 %  ,Body mass index is 31 79 kg/m²  Intake/Output Summary (Last 24 hours) at 2/14/2022 0801  Last data filed at 2/14/2022 0741  Gross per 24 hour   Intake 1926 ml   Output 2025 ml   Net -99 ml       Invasive Devices: Invasive Devices  Report    Peripheral Intravenous Line            Peripheral IV 02/13/22 Right Antecubital 1 day                Physical Exam  Constitutional:       Appearance: Normal appearance  HENT:      Head: Normocephalic and atraumatic  Eyes:      Extraocular Movements: EOM normal       Pupils: Pupils are equal, round, and reactive to light  Cardiovascular:      Rate and Rhythm: Normal rate and regular rhythm  Pulmonary:      Effort: Pulmonary effort is normal       Breath sounds: Normal breath sounds  Abdominal:      General: There is no distension  Musculoskeletal:      Cervical back: Normal range of motion and neck supple     Skin: General: Skin is warm and dry  Neurological:      Mental Status: He is alert  Deep Tendon Reflexes:      Reflex Scores:       Patellar reflexes are 3+ on the right side and 3+ on the left side  Neurologic Exam     Mental Status   Awake and alert, oriented to person, place, time  Speech clear, no dysarthria nor aphasia  Following commands  Cranial Nerves     CN II   Visual fields full to confrontation  CN III, IV, VI   Pupils are equal, round, and reactive to light  Extraocular motions are normal      CN V   Facial sensation intact  CN VII   Facial expression full, symmetric  CN VIII   CN VIII normal      CN IX, X   CN IX normal    CN X normal      CN XI   CN XI normal      CN XII   CN XII normal      Motor Exam 5- R deltoid, 5- R bicep/tricep,   4-R wrist extension 3 R wrist flexion, 4 R , 3 interossei    4/5 R hip flexion, 4/5 distally with R LE  Diminished sensation to pin in R hand, symmetric in forearm; symmetric pin prick in feet  Diminished temperature in R foot compared to L  Gait, Coordination, and Reflexes     Reflexes   Right patellar: 3+  Left patellar: 3+R ataxia with finger to nose  Limited heel to shin testing with pain/proximal weakness  Brisk patellars, 2+ UE and achilles  Down-going toes, no ankle clonus  Gait deferred for safety         Lab Results:   CBC:   Results from last 7 days   Lab Units 02/14/22 0435 02/13/22  0757   WBC Thousand/uL 7 31 8 91   RBC Million/uL 4 51 4 90   HEMOGLOBIN g/dL 13 3 14 5   HEMATOCRIT % 42 4 45 1   MCV fL 94 92   PLATELETS Thousands/uL 203 242   , BMP/CMP:   Results from last 7 days   Lab Units 02/14/22  0436 02/13/22  0757   SODIUM mmol/L 139 139   POTASSIUM mmol/L 4 2 4 2   CHLORIDE mmol/L 107 106   CO2 mmol/L 29 23   BUN mg/dL 24 29*   CREATININE mg/dL 0 88 0 97   CALCIUM mg/dL 8 8 9 0   EGFR ml/min/1 73sq m 101 91   , Vitamin B12:   , HgBA1C:   Results from last 7 days   Lab Units 02/14/22 0435 HEMOGLOBIN A1C % 5 1   , TSH:   , Coagulation:   Results from last 7 days   Lab Units 02/13/22  0757   INR  1 03   , Lipid Profile:   Results from last 7 days   Lab Units 02/14/22  0435   HDL mg/dL 29*   LDL CALC mg/dL 51   TRIGLYCERIDES mg/dL 117   , Ammonia:   , Urinalysis:       Invalid input(s): URIBILINOGEN, Drug Screen:   , Medication Drug Levels:       Invalid input(s): CARBAMAZEPINE,  PHENOBARB, LACOSAMIDE, OXCARBAZEPINE  Imaging Studies: I have personally reviewed pertinent films in PACS   CT head wo contrast   Final Result by Tremaine Ferris MD (02/14 2427)      No intracranial hemorrhage  No significant interval change  Workstation performed: [id number]         XR chest 1 view portable   ED Interpretation by Salma Parada DO (02/13 8296)   No acute abnormalities as interpreted by me independently       Final Result by Toyin Loaiza MD (02/13 2945)      No acute cardiopulmonary disease  Workstation performed: [id number]         CTA stroke alert (head/neck)   Final Result by Eunice Toledo MD (02/13 8094)      1  No intracranial large vessel occlusion or critical stenosis  No aneurysm  2   No hemodynamically significant stenosis of cervical carotid and vertebral arteries  No dissection  I personally discussed this study with Dr Sidney Quezada on 2/13/2022 at 8:16 AM                 Workstation performed: [id number]         CT stroke alert brain   Final Result by Eunice Toledo MD (02/13 4332)      1  No acute intracranial hemorrhage  2   White matter change in keeping with known history of demyelinating disease  Focal hypoattenuation centered at posterior limb of left internal capsule of uncertain etiology, ischemia versus underlying demyelinating disease                        I personally discussed this study with Dr Sidney Quezada on 2/13/2022 at 8:16 AM                 Workstation performed: [id number]         MRI inpatient order    (Results Pending)       EKG, Pathology, and Other Studies: I have personally reviewed pertinent reports  VTE Prophylaxis: Sequential compression device (Venodyne)  and Reason for no pharmacologic prophylaxis tPA bolus given    Code Status: Level 1 - Full Code    Total time spent today 45 minutes  Discussed plan of care with patient and critical care: discussed likely etiology of symptoms, awaiting repeat MRI's, minimal suspicion for stroke, await therapy evaluations, close MS team follow up in few weeks

## 2022-02-15 LAB
AORTIC ROOT: 3.8 CM
APICAL FOUR CHAMBER EJECTION FRACTION: 65 %
E WAVE DECELERATION TIME: 318 MS
FRACTIONAL SHORTENING: 27 % (ref 28–44)
INTERVENTRICULAR SEPTUM IN DIASTOLE (PARASTERNAL SHORT AXIS VIEW): 1.3 CM (ref 0.56–1.06)
INTERVENTRICULAR SEPTUM: 1.3 CM (ref 0.6–1.1)
LAAS-AP2: 10.8 CM2
LAAS-AP4: 21.3 CM2
LEFT ATRIUM AREA SYSTOLE SINGLE PLANE A4C: 19.6 CM2
LEFT ATRIUM SIZE: 3.5 CM
LEFT INTERNAL DIMENSION IN SYSTOLE: 2.7 CM (ref 2.1–4)
LEFT VENTRICLE DIASTOLIC VOLUME (MOD BIPLANE): 132 ML (ref 112.63–253.65)
LEFT VENTRICLE SYSTOLIC VOLUME (MOD BIPLANE): 47 ML
LEFT VENTRICULAR INTERNAL DIMENSION IN DIASTOLE: 3.7 CM (ref 7.3–10.88)
LEFT VENTRICULAR POSTERIOR WALL IN END DIASTOLE: 1.3 CM (ref 0.55–1.04)
LEFT VENTRICULAR STROKE VOLUME: 34 ML
LV EF: 65 %
LVSV (TEICH): 34 ML
MV E'TISSUE VEL-SEP: 8 CM/S
MV PEAK A VEL: 0.58 M/S
MV PEAK E VEL: 44 CM/S
MV STENOSIS PRESSURE HALF TIME: 0 MS
RIGHT ATRIUM AREA SYSTOLE A4C: 32.5 CM2
RIGHT VENTRICLE ID DIMENSION: 5.5 CM
SL CV LEFT ATRIUM LENGTH A2C: 4.2 CM
SL CV LV DIAS VOL ENDO Z SCORE: -1.45
SL CV LV EF: 65
SL CV PED ECHO LEFT VENTRICLE DIASTOLIC VOLUME (MOD BIPLANE) 2D: 60 ML
SL CV PED ECHO LEFT VENTRICLE SYSTOLIC VOLUME (MOD BIPLANE) 2D: 26 ML
Z-SCORE OF INTERVENTRICULAR SEPTUM IN END DIASTOLE: 3.91
Z-SCORE OF LEFT VENTRICULAR DIMENSION IN END SYSTOLE: -8.79
Z-SCORE OF LEFT VENTRICULAR POSTERIOR WALL IN END DIASTOLE: 4.02

## 2022-02-15 PROCEDURE — 99232 SBSQ HOSP IP/OBS MODERATE 35: CPT | Performed by: PSYCHIATRY & NEUROLOGY

## 2022-02-15 PROCEDURE — 97163 PT EVAL HIGH COMPLEX 45 MIN: CPT

## 2022-02-15 PROCEDURE — 99232 SBSQ HOSP IP/OBS MODERATE 35: CPT | Performed by: HOSPITALIST

## 2022-02-15 PROCEDURE — 97167 OT EVAL HIGH COMPLEX 60 MIN: CPT

## 2022-02-15 PROCEDURE — 97530 THERAPEUTIC ACTIVITIES: CPT

## 2022-02-15 RX ADMIN — GABAPENTIN 300 MG: 300 CAPSULE ORAL at 16:47

## 2022-02-15 RX ADMIN — GABAPENTIN 300 MG: 300 CAPSULE ORAL at 21:21

## 2022-02-15 RX ADMIN — ERGOCALCIFEROL 50000 UNITS: 1.25 CAPSULE ORAL at 10:51

## 2022-02-15 RX ADMIN — CYANOCOBALAMIN TAB 500 MCG 1000 MCG: 500 TAB at 10:21

## 2022-02-15 RX ADMIN — SODIUM CHLORIDE 1000 MG: 0.9 INJECTION, SOLUTION INTRAVENOUS at 10:53

## 2022-02-15 RX ADMIN — GABAPENTIN 300 MG: 300 CAPSULE ORAL at 10:21

## 2022-02-15 RX ADMIN — THIAMINE HCL TAB 100 MG 100 MG: 100 TAB at 10:21

## 2022-02-15 RX ADMIN — SENNOSIDES AND DOCUSATE SODIUM 1 TABLET: 50; 8.6 TABLET ORAL at 21:21

## 2022-02-15 NOTE — PLAN OF CARE
Problem: Potential for Falls  Goal: Patient will remain free of falls  Description: INTERVENTIONS:  - Educate patient/family on patient safety including physical limitations  - Instruct patient to call for assistance with activity   - Consult OT/PT to assist with strengthening/mobility   - Keep Call bell within reach  - Keep bed low and locked with side rails adjusted as appropriate  - Keep care items and personal belongings within reach  - Initiate and maintain comfort rounds  - Make Fall Risk Sign visible to staff  - Apply yellow socks and bracelet for high fall risk patients  - Consider moving patient to room near nurses station  Outcome: Progressing     Problem: MOBILITY - ADULT  Goal: Maintain or return to baseline ADL function  Description: INTERVENTIONS:  -  Assess patient's ability to carry out ADLs; assess patient's baseline for ADL function and identify physical deficits which impact ability to perform ADLs (bathing, care of mouth/teeth, toileting, grooming, dressing, etc )  - Assess/evaluate cause of self-care deficits   - Assess range of motion  - Assess patient's mobility; develop plan if impaired  - Assess patient's need for assistive devices and provide as appropriate  - Encourage maximum independence but intervene and supervise when necessary  - Involve family in performance of ADLs  - Assess for home care needs following discharge   - Consider OT consult to assist with ADL evaluation and planning for discharge  - Provide patient education as appropriate  Outcome: Progressing  Goal: Maintains/Returns to pre admission functional level  Description: INTERVENTIONS:  - Perform BMAT or MOVE assessment daily    - Set and communicate daily mobility goal to care team and patient/family/caregiver     - Collaborate with rehabilitation services on mobility goals if consulted  - Out of bed for toileting  - Record patient progress and toleration of activity level   Outcome: Progressing     Problem: Prexisting or High Potential for Compromised Skin Integrity  Goal: Skin integrity is maintained or improved  Description: INTERVENTIONS:  - Identify patients at risk for skin breakdown  - Assess and monitor skin integrity  - Assess and monitor nutrition and hydration status  - Monitor labs   - Assess for incontinence   - Turn and reposition patient  - Assist with mobility/ambulation  - Relieve pressure over bony prominences  - Avoid friction and shearing  - Provide appropriate hygiene as needed including keeping skin clean and dry  - Evaluate need for skin moisturizer/barrier cream  - Collaborate with interdisciplinary team   - Patient/family teaching  - Consider wound care consult   Outcome: Progressing     Problem: NEUROSENSORY - ADULT  Goal: Achieves stable or improved neurological status  Description: INTERVENTIONS  - Monitor and report changes in neurological status  - Monitor vital signs such as temperature, blood pressure, glucose, and any other labs ordered   - Initiate measures to prevent increased intracranial pressure  - Monitor for seizure activity and implement precautions if appropriate      Outcome: Progressing     Problem: PAIN - ADULT  Goal: Verbalizes/displays adequate comfort level or baseline comfort level  Description: Interventions:  - Encourage patient to monitor pain and request assistance  - Assess pain using appropriate pain scale  - Administer analgesics based on type and severity of pain and evaluate response  - Implement non-pharmacological measures as appropriate and evaluate response  - Consider cultural and social influences on pain and pain management  - Notify physician/advanced practitioner if interventions unsuccessful or patient reports new pain  Outcome: Progressing     Problem: INFECTION - ADULT  Goal: Absence or prevention of progression during hospitalization  Description: INTERVENTIONS:  - Assess and monitor for signs and symptoms of infection  - Monitor lab/diagnostic results  - Monitor all insertion sites, i e  indwelling lines, tubes, and drains  - Monitor endotracheal if appropriate and nasal secretions for changes in amount and color  - [place] appropriate cooling/warming therapies per order  - Administer medications as ordered  - Instruct and encourage patient and family to use good hand hygiene technique  - Identify and instruct in appropriate isolation precautions for identified infection/condition  Outcome: Progressing     Problem: SAFETY ADULT  Goal: Patient will remain free of falls  Description: INTERVENTIONS:  - Educate patient/family on patient safety including physical limitations  - Instruct patient to call for assistance with activity   - Consult OT/PT to assist with strengthening/mobility   - Keep Call bell within reach  - Keep bed low and locked with side rails adjusted as appropriate  - Keep care items and personal belongings within reach  - Initiate and maintain comfort rounds  - Make Fall Risk Sign visible to staff  - Apply yellow socks and bracelet for high fall risk patients  - Consider moving patient to room near nurses station  Outcome: Progressing  Goal: Maintain or return to baseline ADL function  Description: INTERVENTIONS:  -  Assess patient's ability to carry out ADLs; assess patient's baseline for ADL function and identify physical deficits which impact ability to perform ADLs (bathing, care of mouth/teeth, toileting, grooming, dressing, etc )  - Assess/evaluate cause of self-care deficits   - Assess range of motion  - Assess patient's mobility; develop plan if impaired  - Assess patient's need for assistive devices and provide as appropriate  - Encourage maximum independence but intervene and supervise when necessary  - Involve family in performance of ADLs  - Assess for home care needs following discharge   - Consider OT consult to assist with ADL evaluation and planning for discharge  - Provide patient education as appropriate  Outcome: Progressing  Goal: Maintains/Returns to pre admission functional level  Description: INTERVENTIONS:  - Perform BMAT or MOVE assessment daily    - Set and communicate daily mobility goal to care team and patient/family/caregiver  - Collaborate with rehabilitation services on mobility goals if consulted  - Out of bed for toileting  - Record patient progress and toleration of activity level   Outcome: Progressing     Problem: DISCHARGE PLANNING  Goal: Discharge to home or other facility with appropriate resources  Description: INTERVENTIONS:  - Identify barriers to discharge w/patient and caregiver  - Arrange for needed discharge resources and transportation as appropriate  - Identify discharge learning needs (meds, wound care, etc )  - Arrange for interpretive services to assist at discharge as needed  - Refer to Case Management Department for coordinating discharge planning if the patient needs post-hospital services based on physician/advanced practitioner order or complex needs related to functional status, cognitive ability, or social support system  Outcome: Progressing     Problem: Knowledge Deficit  Goal: Patient/family/caregiver demonstrates understanding of disease process, treatment plan, medications, and discharge instructions  Description: Complete learning assessment and assess knowledge base    Interventions:  - Provide teaching at level of understanding  - Provide teaching via preferred learning methods  Outcome: Progressing

## 2022-02-15 NOTE — PHYSICAL THERAPY NOTE
PT eval/tx   02/15/22 1015   PT Last Visit   PT Visit Date 02/15/22   Note Type   Note type Evaluation   Pain Assessment   Pain Assessment Tool Wilson-Baker FACES   Wilson-Baker FACES Pain Rating 4   Pain Location/Orientation Orientation: Right;Orientation: Upper; Location: Arm   Restrictions/Precautions   Weight Bearing Precautions Per Order No   Other Precautions Chair Alarm; Fall Risk;Telemetry;Multiple lines   Home Living   Type of [place] to enter with rails;Bed/bath upstairs; Two level  (bath on 1st level was doing steps to bedroom with 1 rail and)   [address] [facility]  (2 one each level)   Prior Function   Level of [place] Needs assistance with IADLs; Needs assistance with ADLs and functional mobility   Lives With Daughter  (mother is disabled, father drives during day)   Receives Help From Family;Home health   ADL Assistance Independent   IADLs Needs assistance   Falls in the last 6 months 0   Vocational On disability   General   Additional Pertinent History Pt with recent MS dx with steroid course  1/2022; adm with increase in R sided weakness, dysarthria, r/o cva  No infarct seen on studies  Neuro following  Some improvement in symptoms since admission but not returned to pre admission status  Family/Caregiver Present No   Cognition   Arousal/Participation Alert   Attention Within functional limits   Orientation Level Oriented X4   Memory Within functional limits   Following Commands Follows all commands and directions without difficulty   Comments dysarthric   Subjective   Subjective Feels weak, better than on admission but not at baseline   anxious to get up   RUE Assessment   RUE Assessment   (aarom wfl prox>distal weakness see OT)   LUE Assessment   LUE Assessment WFL   RLE Assessment   RLE Assessment   (old knee injury aarom wfl, strength 2/5 throughout)   LLE Assessment   LLE Assessment WFL  (strength 3+/5)   Coordination   Movements are Fluid and Coordinated 0 Coordination and Movement Description bradykinetic decreased fine motor R ue   Proprioception   RLE Proprioception Grossly intact   LLE Proprioception Grossly Intact   Bed Mobility   Supine to Sit 4  Minimal assistance   Additional items Assist x 2;Verbal cues   Transfers   Sit to Stand 3  Moderate assistance   Additional items Assist x 2;Verbal cues   Stand to Sit 4  Minimal assistance   Additional items Assist x 2   Stand pivot 3  Moderate assistance   Additional items Assist x 2; Increased time required;Verbal cues;Armrests  (rw)   Ambulation/Elevation   Gait pattern Narrow MG; Forward Flexion; Shuffling; Inconsistent sigrid; Short stride;Decreased R stance; Excessively slow; Step to   Gait Assistance 3  Moderate assist   Additional items Assist x 2;Verbal cues; Tactile cues   Assistive Device Rolling walker   Distance 5'   Balance   Static Sitting Fair +   Dynamic Sitting Fair   Static Standing Poor +   Dynamic Standing Poor   Ambulatory Poor   Endurance Deficit   Endurance Deficit Yes   Endurance Deficit Description tires quickly   Activity Tolerance   Activity Tolerance Patient limited by fatigue   Medical Staff Made Aware OT Jean Claude   Nurse Made Aware LADI Salcedo   Assessment   Prognosis Good   Problem List Decreased strength;Decreased range of motion;Decreased endurance; Impaired balance;Decreased mobility; Decreased coordination; Impaired sensation   Assessment Pt presents with weakness R extremties but also Lle and dysarthria r/o cva  Studies do not reflect infarction  Neuro assessing for steroid needs/tx  REquires assist of 2 for safe trnasfers and minimmal gait  Recommend in-pt rehab at d/c to maximize functional recovery  Will need careful pacing to minimize fatigue to lacho 3 hrs tx/day   Will follow see goals   Barriers to Discharge   (medical clearance)   Goals   Patient Goals get better learn about disease process/tx   STG Expiration Date 02/26/22   Short Term Goal #1 1) increase astregnth B les 1/2 grade 2) safe idn trnasfers with rw 3) imrpvoe blanace to goos 4) safe ind  amb with rw 50' level    Plan   Treatment/Interventions ADL retraining;Functional transfer training;LE strengthening/ROM; Elevations; Therapeutic exercise; Endurance training;Patient/family training;Bed mobility;Gait training;Spoke to nursing;Spoke to case management;OT   PT Frequency 3-5x/wk   Recommendation   PT Discharge Recommendation Post acute rehabilitation services   Equipment Recommended [facility] Recommended Wheeled walker   [address] Inpatient   Turning in Bed Without Bedrails 2   Lying on Back to Sitting on Edge of Flat Bed 2   Moving Bed to Chair 2   Standing Up From Chair 2   Walk in Room 2   Climb 3-5 Stairs 1   Basic Mobility Inpatient Raw Score 11   Basic Mobility Standardized Score 30 25   Highest Level Of Mobility   JH-HLM Goal 4: Move to chair/commode   JH-HLM Highest Level of Mobility 4: Move to chair/commode   JH-HLM Goal Achieved Yes   Modified [place] Scale   Modified [place] Scale 4   Additional Treatment Session   Start Time 1005   End Time 1015   Treatment Assessment Pt tx continues for trunk control with sitting in chair feet supported and R knee block to assist with scoot back in chair  PT using ues to balance and push  Leans to L occas without support but able to correct with v cues  Remains oob in chair after session with needs in reach   End of Consult   Patient Position at End of Consult Bedside chair; All needs within reach;Bed/Chair alarm activated   End of Consult Comments fatigued after session   Gurdeep Elder, PT
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
## 2022-02-15 NOTE — PLAN OF CARE
Problem: Potential for Falls  Goal: Patient will remain free of falls  Description: INTERVENTIONS:  - Educate patient/family on patient safety including physical limitations  - Instruct patient to call for assistance with activity   - Consult OT/PT to assist with strengthening/mobility   - Keep Call bell within reach  - Keep bed low and locked with side rails adjusted as appropriate  - Keep care items and personal belongings within reach  - Initiate and maintain comfort rounds  - Make Fall Risk Sign visible to staff  - Apply yellow socks and bracelet for high fall risk patients  - Consider moving patient to room near nurses station  2/14/2022 1901 by Sasha Shanks RN  Outcome: Progressing  2/14/2022 1900 by Sasha Shanks RN  Outcome: Progressing     Problem: MOBILITY - ADULT  Goal: Maintain or return to baseline ADL function  Description: INTERVENTIONS:  -  Assess patient's ability to carry out ADLs; assess patient's baseline for ADL function and identify physical deficits which impact ability to perform ADLs (bathing, care of mouth/teeth, toileting, grooming, dressing, etc )  - Assess/evaluate cause of self-care deficits   - Assess range of motion  - Assess patient's mobility; develop plan if impaired  - Assess patient's need for assistive devices and provide as appropriate  - Encourage maximum independence but intervene and supervise when necessary  - Involve family in performance of ADLs  - Assess for home care needs following discharge   - Consider OT consult to assist with ADL evaluation and planning for discharge  - Provide patient education as appropriate  2/14/2022 1901 by Sasha Shanks RN  Outcome: Progressing  2/14/2022 1900 by Sasha Shanks RN  Outcome: Progressing  Goal: Maintains/Returns to pre admission functional level  Description: INTERVENTIONS:  - Perform BMAT or MOVE assessment daily    - Set and communicate daily mobility goal to care team and patient/family/caregiver     - Collaborate with rehabilitation services on mobility goals if consulted  - Out of bed for toileting  - Record patient progress and toleration of activity level   2/14/2022 1901 by Jhoana Breen RN  Outcome: Progressing  2/14/2022 1900 by Jhoana Breen RN  Outcome: Progressing     Problem: Prexisting or High Potential for Compromised Skin Integrity  Goal: Skin integrity is maintained or improved  Description: INTERVENTIONS:  - Identify patients at risk for skin breakdown  - Assess and monitor skin integrity  - Assess and monitor nutrition and hydration status  - Monitor labs   - Assess for incontinence   - Turn and reposition patient  - Assist with mobility/ambulation  - Relieve pressure over bony prominences  - Avoid friction and shearing  - Provide appropriate hygiene as needed including keeping skin clean and dry  - Evaluate need for skin moisturizer/barrier cream  - Collaborate with interdisciplinary team   - Patient/family teaching  - Consider wound care consult   2/14/2022 1901 by Jhoana Breen RN  Outcome: Progressing  2/14/2022 1900 by Jhoana Breen RN  Outcome: Progressing     Problem: NEUROSENSORY - ADULT  Goal: Achieves stable or improved neurological status  Description: INTERVENTIONS  - Monitor and report changes in neurological status  - Monitor vital signs such as temperature, blood pressure, glucose, and any other labs ordered   - Initiate measures to prevent increased intracranial pressure  - Monitor for seizure activity and implement precautions if appropriate      2/14/2022 1901 by Jhoana Breen RN  Outcome: Progressing  2/14/2022 1900 by Jhoana Breen RN  Outcome: Progressing     Problem: PAIN - ADULT  Goal: Verbalizes/displays adequate comfort level or baseline comfort level  Description: Interventions:  - Encourage patient to monitor pain and request assistance  - Assess pain using appropriate pain scale  - Administer analgesics based on type and severity of pain and evaluate response  - Implement non-pharmacological measures as appropriate and evaluate response  - Consider cultural and social influences on pain and pain management  - Notify physician/advanced practitioner if interventions unsuccessful or patient reports new pain  2/14/2022 1901 by Tyree Escalante RN  Outcome: Progressing  2/14/2022 1900 by Tyree Escalante RN  Outcome: Progressing     Problem: INFECTION - ADULT  Goal: Absence or prevention of progression during hospitalization  Description: INTERVENTIONS:  - Assess and monitor for signs and symptoms of infection  - Monitor lab/diagnostic results  - Monitor all insertion sites, i e  indwelling lines, tubes, and drains  - Monitor endotracheal if appropriate and nasal secretions for changes in amount and color  - [place] appropriate cooling/warming therapies per order  - Administer medications as ordered  - Instruct and encourage patient and family to use good hand hygiene technique  - Identify and instruct in appropriate isolation precautions for identified infection/condition  2/14/2022 1901 by Tyree Escalante RN  Outcome: Progressing  2/14/2022 1900 by Tyree Escalante RN  Outcome: Progressing     Problem: SAFETY ADULT  Goal: Patient will remain free of falls  Description: INTERVENTIONS:  - Educate patient/family on patient safety including physical limitations  - Instruct patient to call for assistance with activity   - Consult OT/PT to assist with strengthening/mobility   - Keep Call bell within reach  - Keep bed low and locked with side rails adjusted as appropriate  - Keep care items and personal belongings within reach  - Initiate and maintain comfort rounds  - Make Fall Risk Sign visible to staff  - Apply yellow socks and bracelet for high fall risk patients  - Consider moving patient to room near nurses station  2/14/2022 1901 by Tyree Escalante RN  Outcome: Progressing  2/14/2022 1900 by Tyree Escalante RN  Outcome: Progressing  Goal: Maintain or return to baseline ADL function  Description: INTERVENTIONS:  -  Assess patient's ability to carry out ADLs; assess patient's baseline for ADL function and identify physical deficits which impact ability to perform ADLs (bathing, care of mouth/teeth, toileting, grooming, dressing, etc )  - Assess/evaluate cause of self-care deficits   - Assess range of motion  - Assess patient's mobility; develop plan if impaired  - Assess patient's need for assistive devices and provide as appropriate  - Encourage maximum independence but intervene and supervise when necessary  - Involve family in performance of ADLs  - Assess for home care needs following discharge   - Consider OT consult to assist with ADL evaluation and planning for discharge  - Provide patient education as appropriate  2/14/2022 1901 by Adriana Nunes RN  Outcome: Progressing  2/14/2022 1900 by Adriana Nunes RN  Outcome: Progressing  Goal: Maintains/Returns to pre admission functional level  Description: INTERVENTIONS:  - Perform BMAT or MOVE assessment daily    - Set and communicate daily mobility goal to care team and patient/family/caregiver     - Collaborate with rehabilitation services on mobility goals if consulted  - Out of bed for toileting  - Record patient progress and toleration of activity level   2/14/2022 1901 by Adriana Nunes RN  Outcome: Progressing  2/14/2022 1900 by Adriana Nunes RN  Outcome: Progressing     Problem: DISCHARGE PLANNING  Goal: Discharge to home or other facility with appropriate resources  Description: INTERVENTIONS:  - Identify barriers to discharge w/patient and caregiver  - Arrange for needed discharge resources and transportation as appropriate  - Identify discharge learning needs (meds, wound care, etc )  - Arrange for interpretive services to assist at discharge as needed  - Refer to Case Management Department for coordinating discharge planning if the patient needs post-hospital services based on physician/advanced practitioner order or complex needs related to functional status, cognitive ability, or social support system  2/14/2022 1901 by Charline Pandya RN  Outcome: Progressing  2/14/2022 1900 by Charline Pandya RN  Outcome: Progressing     Problem: Knowledge Deficit  Goal: Patient/family/caregiver demonstrates understanding of disease process, treatment plan, medications, and discharge instructions  Description: Complete learning assessment and assess knowledge base    Interventions:  - Provide teaching at level of understanding  - Provide teaching via preferred learning methods  2/14/2022 1901 by Charline Pandya RN  Outcome: Progressing  2/14/2022 1900 by Charline Pandya RN  Outcome: Progressing

## 2022-02-15 NOTE — PLAN OF CARE
Problem: Potential for Falls  Goal: Patient will remain free of falls  Description: INTERVENTIONS:  - Educate patient/family on patient safety including physical limitations  - Instruct patient to call for assistance with activity   - Consult OT/PT to assist with strengthening/mobility   - Keep Call bell within reach  - Keep bed low and locked with side rails adjusted as appropriate  - Keep care items and personal belongings within reach  - Initiate and maintain comfort rounds  - Make Fall Risk Sign visible to staff  - Offer Toileting every 2 Hours, in advance of need  - Initiate/Maintain bed alarm  - Obtain necessary fall risk management equipment: alarm, bracelet, socks, hourly rounding  - Apply yellow socks and bracelet for high fall risk patients  - Consider moving patient to room near nurses station  Outcome: Progressing    Problem: MOBILITY - ADULT  Goal: Maintain or return to baseline ADL function  Description: INTERVENTIONS:  -  Assess patient's ability to carry out ADLs; assess patient's baseline for ADL function and identify physical deficits which impact ability to perform ADLs (bathing, care of mouth/teeth, toileting, grooming, dressing, etc )  - Assess/evaluate cause of self-care deficits   - Assess range of motion  - Assess patient's mobility; develop plan if impaired  - Assess patient's need for assistive devices and provide as appropriate  - Encourage maximum independence but intervene and supervise when necessary  - Involve family in performance of ADLs  - Assess for home care needs following discharge   - Consider OT consult to assist with ADL evaluation and planning for discharge  - Provide patient education as appropriate  Outcome: Progressing  Goal: Maintains/Returns to pre admission functional level  Description: INTERVENTIONS:  - Perform BMAT or MOVE assessment daily    - Set and communicate daily mobility goal to care team and patient/family/caregiver     - Collaborate with rehabilitation services on mobility goals if consulted  - Perform Range of Motion 5 times a day  - Reposition patient every 2 hours    - Dangle patient 5 times a day  - Stand patient 5 times a day  - Ambulate patient 5 times a day  - Out of bed to chair 3 times a day   - Out of bed for meals 3 times a day  - Out of bed for toileting  - Record patient progress and toleration of activity level   Outcome: Progressing     Problem: Prexisting or High Potential for Compromised Skin Integrity  Goal: Skin integrity is maintained or improved  Description: INTERVENTIONS:  - Identify patients at risk for skin breakdown  - Assess and monitor skin integrity  - Assess and monitor nutrition and hydration status  - Monitor labs   - Assess for incontinence   - Turn and reposition patient  - Assist with mobility/ambulation  - Relieve pressure over bony prominences  - Avoid friction and shearing  - Provide appropriate hygiene as needed including keeping skin clean and dry  - Evaluate need for skin moisturizer/barrier cream  - Collaborate with interdisciplinary team   - Patient/family teaching  - Consider wound care consult   Outcome: Progressing     Problem: NEUROSENSORY - ADULT  Goal: Achieves stable or improved neurological status  Description: INTERVENTIONS  - Monitor and report changes in neurological status  - Monitor vital signs such as temperature, blood pressure, glucose, and any other labs ordered   - Initiate measures to prevent increased intracranial pressure  - Monitor for seizure activity and implement precautions if appropriate    Problem: INFECTION - ADULT  Goal: Absence or prevention of progression during hospitalization  Description: INTERVENTIONS:  - Assess and monitor for signs and symptoms of infection  - Monitor lab/diagnostic results  - Monitor all insertion sites, i e  indwelling lines, tubes, and drains  - Monitor endotracheal if appropriate and nasal secretions for changes in amount and color  - [place] appropriate cooling/warming therapies per order  - Administer medications as ordered  - Instruct and encourage patient and family to use good hand hygiene technique  - Identify and instruct in appropriate isolation precautions for identified infection/condition  Outcome: Progressing     Problem: PAIN - ADULT  Goal: Verbalizes/displays adequate comfort level or baseline comfort level  Description: Interventions:  - Encourage patient to monitor pain and request assistance  - Assess pain using appropriate pain scale  - Administer analgesics based on type and severity of pain and evaluate response  - Implement non-pharmacological measures as appropriate and evaluate response  - Consider cultural and social influences on pain and pain management  - Notify physician/advanced practitioner if interventions unsuccessful or patient reports new pain  Outcome: Progressing     Problem: SAFETY ADULT  Goal: Patient will remain free of falls  Description: INTERVENTIONS:  - Educate patient/family on patient safety including physical limitations  - Instruct patient to call for assistance with activity   - Consult OT/PT to assist with strengthening/mobility   - Keep Call bell within reach  - Keep bed low and locked with side rails adjusted as appropriate  - Keep care items and personal belongings within reach  - Initiate and maintain comfort rounds  - Make Fall Risk Sign visible to staff  - Apply yellow socks and bracelet for high fall risk patients  - Consider moving patient to room near nurses station  Outcome: Progressing  Goal: Maintain or return to baseline ADL function  Description: INTERVENTIONS:  -  Assess patient's ability to carry out ADLs; assess patient's baseline for ADL function and identify physical deficits which impact ability to perform ADLs (bathing, care of mouth/teeth, toileting, grooming, dressing, etc )  - Assess/evaluate cause of self-care deficits   - Assess range of motion  - Assess patient's mobility; develop plan if impaired  - Assess patient's need for assistive devices and provide as appropriate  - Encourage maximum independence but intervene and supervise when necessary  - Involve family in performance of ADLs  - Assess for home care needs following discharge   - Consider OT consult to assist with ADL evaluation and planning for discharge  - Provide patient education as appropriate  Outcome: Progressing

## 2022-02-15 NOTE — PROGRESS NOTES
[place]  Progress Note - Nasra Begum 1975, 48 y o  male MRN: [id number]  Unit/Bed#: -01 Encounter: [id number]  Primary Care Provider: Sachi Chun MD   Date and time admitted to hospital: 2022  7:56 AM    * CNS demyelinating disease ([address] )  Assessment & Plan  Concern for MS exacerbation  Patient feels improved   Cont solumedrol - pulse dosing 1000 mg/day  MRI brain  Multiple small supratentorial enhancing foci of acute demyelination consistent with disease progression of multiple sclerosis  Late subacute to chronic demyelination in the left posterior limb of the internal capsule   This lesion has evolved in shape when compared to the prior outside MRI study of 2021  Moderate supratentorial predominant chronic demyelinating disease of multiple sclerosis  MRI c spine   Acute to early subacute subcentimeter demyelinating plaque at the C5 vertebral body level  Additional smaller nonenhancing chronic appearing demyelinating plaques within the upper and mid cervical cord  Right hemiparesis [facility])  Assessment & Plan  Reviewed neuro/ICU notes, complex hx past hospitalizations etc was not felt to have CVA     Dysarthria  Assessment & Plan  Improved, related to MS flare         VTE  Prophylaxis:   Pharmacologic: in place    Patient Centered Rounds: I have performed bedside rounds with nursing staff today  Discussions with Specialists or Other Care Team Provider: case management    Education and Discussions with Family / Patient: pt     Current Length of Stay: 2 day(s)    Current Patient Status: Inpatient        Code Status: Level 1 - Full Code      Subjective:   Started on solumedrol today   He feels dysarthria better  R weakness improved     Patient is seen and examined at bedside  All other ROS are negative      Objective:     Vitals:   Temp (24hrs), Av 4 °F (36 9 °C), Min:97 8 °F (36 6 °C), Max:98 6 °F (37 °C)    Temp:  [97 8 °F (36 6 °C)-98 6 °F (37 °C)] 97 8 °F (36 6 °C)  HR:  [57-69] 67  Resp:  [13-22] 20  BP: ()/(54-84) 131/84  SpO2:  [91 %-98 %] 98 %  Body mass index is 31 79 kg/m²  Input and Output Summary (last 24 hours): Intake/Output Summary (Last 24 hours) at 2/15/2022 1416  Last data filed at 2/15/2022 0856  Gross per 24 hour   Intake 222 ml   Output 1700 ml   Net -1478 ml       Physical Exam:       GEN: No acute distress, comfortable  HEEENT: No JVD, PERRLA, no scleral icterus  RESP: Lungs clear to auscultation bilaterally  CV: RRR, +s1/s2   ABD: SOFT NON TENDER, POSITIVE BOWEL SOUNDS, NO DISTENTION  PSYCH: CALM  NEURO: A X O X 3, slight dysarthria, R sided weakness (complicated by his metal plates, R side was weaker to begin with per his report)  SKIN: NO RASH  EXTREM: NO EDEMA    Additional Data:     Labs:    Results from last 7 days   Lab Units 02/14/22  0435   WBC Thousand/uL 7 31   HEMOGLOBIN g/dL 13 3   HEMATOCRIT % 42 4   PLATELETS Thousands/uL 203     Results from last 7 days   Lab Units 02/14/22  0436   SODIUM mmol/L 139   POTASSIUM mmol/L 4 2   CHLORIDE mmol/L 107   CO2 mmol/L 29   BUN mg/dL 24   CREATININE mg/dL 0 88   ANION GAP mmol/L 3*   CALCIUM mg/dL 8 8   GLUCOSE RANDOM mg/dL 81     Results from last 7 days   Lab Units 02/13/22  0757   INR  1 03     Results from last 7 days   Lab Units 02/13/22  0802   POC GLUCOSE mg/dl 87     Results from last 7 days   Lab Units 02/14/22  0435   HEMOGLOBIN A1C % 5 1               * I Have Reviewed All Lab Data Listed Above  Imaging:     Results for orders placed during the hospital encounter of 02/13/22    XR chest 1 view portable    Narrative  CHEST    INDICATION:   stroke alert  COMPARISON:  None    EXAM PERFORMED/VIEWS:  XR CHEST PORTABLE      FINDINGS:    Cardiomediastinal silhouette appears unremarkable  The lungs are clear  No pneumothorax or pleural effusion  Dextroscoliosis of the thoracic spine      Impression  No acute cardiopulmonary disease  Workstation performed: [id number]    No results found for this or any previous visit  *I have reviewed all imaging reports listed above      Recent Cultures (last 7 days):           Last 24 Hours Medication List:   Current Facility-Administered Medications   Medication Dose Route Frequency Provider Last Rate    cyanocobalamin  1,000 mcg Oral Daily Eyvonne Dancer, PA-C      ergocalciferol  50,000 Units Oral Weekly Eyvonne Dancer, PA-C      gabapentin  300 mg Oral TID Eyvonne Dancer, PA-C      methylPREDNISolone sodium succinate  1,000 mg Intravenous Daily Mary Rodriguez PA-C 1,000 mg (02/15/22 1053)    senna-docusate sodium  1 tablet Oral HS Eyvonne Dancer, PA-C      thiamine  100 mg Oral Daily Eyvonne Dancer, PA-C          Today, Patient Was Seen By: Tariq Thornton MD    ** Please Note: Dictation voice to text software may have been used in the creation of this document   **

## 2022-02-15 NOTE — PROGRESS NOTES
Progress Note - Neurology   Geeta Pantoja 48 y o  male [id number]  Unit/Bed#: /-01    Assessment/Plan:    CNS demyelinating disease [facility])  Assessment & Plan  53-year-old male recent workup for demyelinating disease, seen at [facility] and s/p 5 days IV steroids with improvement, presented as a stroke alert on 2/13 with worsening right-sided weakness, numbness, and dysarthria  He did receive a tPA bolus this admission, however infusion was then deferred given some concern for a left basal ganglia hypodensity on CT head  Subsequent MRI of the brain revealed multiple demyelinating lesions, some active and some chronic, and consistent with progression of MS  Hypodensity noted on CT head was consistent chronic demyelination, and not subacute stroke  Acute demyelinating plaques also noted in the cervical spine  Plan:  - Initiate 1000 mg IV Solu-Medrol x 5 days  Outpatient steroids were offered, but patient would have significant difficulty getting to the infusion center for daily appointments  Will continue all 5 days inpatient at this time  - After completion of IV steroids, patient will need to be initiated on a 60 mg prednisone taper  - Can now defer 2D echo if not yet completed and remainder of stroke pathway from neuro standpoint, given confirmation that lesion is not an acute stroke    - PT/OT  - Medical management and supportive care per primary team  Correction of any metabolic or infectious disturbances    - Follow up with Dr Lynne as scheduled for 3/8/22  Subjective:   Patient reports pain and stiffness in his upper right arm today  He thinks it is muscle pain  This is different than pain he was having in the past, which he was prescribed Neurontin for  Pain comes and goes and is worse in the AM  Arm still feels weak  Speech is somewhat hesitant at times           Past Medical History:   Diagnosis Date    MS (multiple sclerosis) [facility])      Past Surgical History:   Procedure Laterality Date    HERNIA REPAIR      3 times    KNEE SURGERY Right      History reviewed  No pertinent family history  Social History     Socioeconomic History    Marital status: Unknown     Spouse name: None    Number of children: None    Years of education: None    Highest education level: None   Occupational History    None   Tobacco Use    Smoking status: Former Smoker    Smokeless tobacco: Former User   Vaping Use    Vaping Use: Former   Substance and Sexual Activity    Alcohol use: Not Currently    Drug use: Never    Sexual activity: None   Other Topics Concern    None   Social History Narrative    None     Social Determinants of Health     Financial Resource Strain: Not on file   Food Insecurity: No Food Insecurity    Worried About Running Out of Food in the Last Year: Never true    Antonia of Food in the Last Year: Never true   Transportation Needs: No Transportation Needs    Lack of Transportation (Medical): No    Lack of Transportation (Non-Medical): No   Physical Activity: Not on file   Stress: Not on file   Social Connections: Not on file   Intimate Partner Violence: Not on file   Housing Stability: Low Risk     Unable to Pay for Housing in the Last Year: No    Number of Places Lived in the Last Year: 1    Unstable Housing in the Last Year: No         Medications:   All current active meds have been reviewed and current meds:  Scheduled Meds:  Current Facility-Administered Medications   Medication Dose Route Frequency Provider Last Rate    cyanocobalamin  1,000 mcg Oral Daily Maria Elena Luciano PA-C      ergocalciferol  50,000 Units Oral Weekly Maria Elena Luciano PA-C      gabapentin  300 mg Oral TID Maria Elena Luciano PA-C      methylPREDNISolone sodium succinate  1,000 mg Intravenous Daily Mary Rodriguez PA-C 1,000 mg (02/15/22 1053)    senna-docusate sodium  1 tablet Oral HS Maria Elena Luciano PA-C      thiamine  100 mg Oral Daily Maria Elena Luciano PA-C             ROS:   Review of Systems Musculoskeletal: Positive for myalgias  Neurological: Positive for weakness  All other systems reviewed and are negative  Vitals:   /84   Pulse 67   Temp 97 8 °F (36 6 °C) (Oral)   Resp 20   Ht 5' 11" (1 803 m)   Wt 103 kg (227 lb 15 3 oz)   SpO2 98%   BMI 31 79 kg/m²     Physical Exam:   Physical Exam  Vitals and nursing note reviewed  Constitutional:       General: He is not in acute distress  Appearance: He is not toxic-appearing or diaphoretic  HENT:      Head: Normocephalic and atraumatic  Right Ear: External ear normal       Left Ear: External ear normal       Nose: Nose normal       Mouth/Throat:      Mouth: Mucous membranes are moist       Pharynx: Oropharynx is clear  Eyes:      General: No scleral icterus  Right eye: No discharge  Left eye: No discharge  Extraocular Movements: Extraocular movements intact  Pupils: Pupils are equal, round, and reactive to light  Cardiovascular:      Rate and Rhythm: Normal rate  Pulmonary:      Effort: Pulmonary effort is normal  No respiratory distress  Breath sounds: No stridor  Musculoskeletal:      Cervical back: Normal range of motion and neck supple  Right lower leg: No edema  Left lower leg: No edema  Skin:     General: Skin is warm and dry  Coloration: Skin is not jaundiced  Findings: No erythema  Neurological:      Mental Status: He is alert and oriented to person, place, and time  Motor: Weakness present  Coordination: Finger-nose-finger test: Dysmetria, ataxia on the R  Heel-to-shin test: Unable to perform on the R due to weakness  Comments: Detailed below   Psychiatric:         Mood and Affect: Mood normal          Speech: Speech normal          Behavior: Behavior normal          Thought Content: Thought content normal          Judgment: Judgment normal        Neurologic Exam     Mental Status   Oriented to person, place, and time  Attention: normal  Concentration: normal    Speech: speech is normal   Able to repeat  Mild dysarthria/stuttering speech present  Cranial Nerves     CN III, IV, VI   Pupils are equal, round, and reactive to light  CN V   Facial sensation intact  CN VII   Facial expression full, symmetric  CN VIII   Hearing: intact    CN XII   Tongue: not atrophic  Fasciculations: absent  Tongue deviation: none  EOMs are full, but not fluent  Motor Exam     Strength   Strength 5/5 except as noted  5-/5 R deltoid  Trace weakness R bicep  4/5 R wrist extension, 3/5 wrist flexion   R interossei 4/5    4/5 L HF  4+/5 R HF    4/5 R dorsiflexion        Gait, Coordination, and Reflexes     Gait  Gait: (Deferred for safety)    Coordination   Finger-nose-finger test: Dysmetria, ataxia on the R   Heel-to-shin test: Unable to perform on the R due to weakness  Tremor   Resting tremor: absent          Labs: I have personally reviewed pertinent reports  Imaging: I have personally reviewed pertinent imaging in PACS, including MRI brain and C spine, prior MRIs from [facility],  and I have personally reviewed PACS reports  EKG, Pathology, and Other Studies: I have personally reviewed pertinent reports  VTE Prophylaxis: Sequential compression device (Venodyne)       Counseling / Coordination of Care  Total time spent today 24 minutes  Greater than 50% of total time was spent with the patient and/or family counseling and/or coordination of care  A description of the counseling/coordination of care:  Patient was seen and evaluated  Discussed with attending  Chart reviewed thoroughly including laboratory and imaging studies  Discussed imaging results, steroids   Plan of care discussed with patient and primary team        History and physical examination obtained by attending neurologist  AP in room as witness to history and physical examination and acted solely as a scribe for attending neurologist for this encounter  All medical decision making per attending
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
## 2022-02-15 NOTE — ASSESSMENT & PLAN NOTE
Concern for MS exacerbation  Patient feels improved   Cont solumedrol - pulse dosing 1000 mg/day  MRI brain  Multiple small supratentorial enhancing foci of acute demyelination consistent with disease progression of multiple sclerosis  Late subacute to chronic demyelination in the left posterior limb of the internal capsule   This lesion has evolved in shape when compared to the prior outside MRI study of June 1, 2021  Moderate supratentorial predominant chronic demyelinating disease of multiple sclerosis  MRI c spine   Acute to early subacute subcentimeter demyelinating plaque at the C5 vertebral body level  Additional smaller nonenhancing chronic appearing demyelinating plaques within the upper and mid cervical cord

## 2022-02-15 NOTE — PLAN OF CARE
Problem: PHYSICAL THERAPY ADULT  Goal: Performs mobility at highest level of function for planned discharge setting  See evaluation for individualized goals  Description: Treatment/Interventions: ADL retraining,Functional transfer training,LE strengthening/ROM,Elevations,Therapeutic exercise,Endurance training,Patient/family training,Bed mobility,Gait training,Spoke to nursing,Spoke to case management,OT  Equipment Recommended: Shashank Ndiaye       See flowsheet documentation for full assessment, interventions and recommendations  Outcome: Progressing  Note: Prognosis: Good  Problem List: Decreased strength,Decreased range of motion,Decreased endurance,Impaired balance,Decreased mobility,Decreased coordination,Impaired sensation  Assessment: Pt presents with weakness R extremties but also Lle and dysarthria r/o cva  Studies do not reflect infarction  Neuro assessing for steroid needs/tx  REquires assist of 2 for safe trnasfers and minimmal gait  Recommend in-pt rehab at d/c to maximize functional recovery  Will need careful pacing to minimize fatigue to lacho 3 hrs tx/day  Will follow see goals  Barriers to Discharge:  (medical clearance)        PT Discharge Recommendation: Post acute rehabilitation services          See flowsheet documentation for full assessment

## 2022-02-15 NOTE — ASSESSMENT & PLAN NOTE
25-year-old male recent workup in January 2022 for demyelinating disease (seen at [facility] and s/p 5 days IV steroids with improvement), presented as a stroke alert on 2/13 with worsening right-sided weakness, numbness, and dysarthria  He did receive a tPA bolus this admission, however infusion was then deferred given some concern for a left basal ganglia hypodensity on CT head  Subsequent MRI of the brain revealed multiple demyelinating lesions, some active and some chronic, and consistent with progression of MS  Hypodensity noted on CT head was consistent chronic demyelination, and not subacute stroke  Acute demyelinating plaques also noted in the cervical spine  Plan:  - MRI brain and cervical spine performed, as mentioned above   -Continue 1000 mg IV Solu-Medrol x 5 days  Today is day 4/5    - beginning Sunday 2/20, will start prednisone taper:   -60 mg daily for 1 week, then 50 mg daily for 1 week, then 40 mg daily for 1 week, then 30 mg daily for 1 week, then 20 mg daily for 1 week, then 10 mg daily for 1 week   -initiate PPI/vitamin-D supplementation with prednisone taper  - PT/OT  - Medical management and supportive care per primary team  Correction of any metabolic or infectious disturbances

## 2022-02-16 LAB
ALBUMIN SERPL BCP-MCNC: 3.3 G/DL (ref 3.5–5)
ALP SERPL-CCNC: 64 U/L (ref 46–116)
ALT SERPL W P-5'-P-CCNC: 14 U/L (ref 12–78)
ANION GAP SERPL CALCULATED.3IONS-SCNC: 12 MMOL/L (ref 4–13)
AST SERPL W P-5'-P-CCNC: 10 U/L (ref 5–45)
BASOPHILS # BLD AUTO: 0.01 THOUSANDS/ΜL (ref 0–0.1)
BASOPHILS NFR BLD AUTO: 0 % (ref 0–1)
BILIRUB SERPL-MCNC: 0.2 MG/DL (ref 0.2–1)
BUN SERPL-MCNC: 19 MG/DL (ref 5–25)
CALCIUM ALBUM COR SERPL-MCNC: 9.7 MG/DL (ref 8.3–10.1)
CALCIUM SERPL-MCNC: 9.1 MG/DL (ref 8.3–10.1)
CHLORIDE SERPL-SCNC: 108 MMOL/L (ref 100–108)
CO2 SERPL-SCNC: 23 MMOL/L (ref 21–32)
CREAT SERPL-MCNC: 0.96 MG/DL (ref 0.6–1.3)
EOSINOPHIL # BLD AUTO: 0 THOUSAND/ΜL (ref 0–0.61)
EOSINOPHIL NFR BLD AUTO: 0 % (ref 0–6)
ERYTHROCYTE [DISTWIDTH] IN BLOOD BY AUTOMATED COUNT: 12.4 % (ref 11.6–15.1)
GFR SERPL CREATININE-BSD FRML MDRD: 93 ML/MIN/1.73SQ M
GLUCOSE SERPL-MCNC: 129 MG/DL (ref 65–140)
GLUCOSE SERPL-MCNC: 160 MG/DL (ref 65–140)
GLUCOSE SERPL-MCNC: 165 MG/DL (ref 65–140)
HCT VFR BLD AUTO: 42.4 % (ref 36.5–49.3)
HGB BLD-MCNC: 13.9 G/DL (ref 12–17)
IMM GRANULOCYTES # BLD AUTO: 0.06 THOUSAND/UL (ref 0–0.2)
IMM GRANULOCYTES NFR BLD AUTO: 1 % (ref 0–2)
LYMPHOCYTES # BLD AUTO: 0.8 THOUSANDS/ΜL (ref 0.6–4.47)
LYMPHOCYTES NFR BLD AUTO: 8 % (ref 14–44)
MCH RBC QN AUTO: 30 PG (ref 26.8–34.3)
MCHC RBC AUTO-ENTMCNC: 32.8 G/DL (ref 31.4–37.4)
MCV RBC AUTO: 91 FL (ref 82–98)
MONOCYTES # BLD AUTO: 0.26 THOUSAND/ΜL (ref 0.17–1.22)
MONOCYTES NFR BLD AUTO: 2 % (ref 4–12)
NEUTROPHILS # BLD AUTO: 9.51 THOUSANDS/ΜL (ref 1.85–7.62)
NEUTS SEG NFR BLD AUTO: 89 % (ref 43–75)
NRBC BLD AUTO-RTO: 0 /100 WBCS
PLATELET # BLD AUTO: 243 THOUSANDS/UL (ref 149–390)
PMV BLD AUTO: 10.4 FL (ref 8.9–12.7)
POTASSIUM SERPL-SCNC: 4.5 MMOL/L (ref 3.5–5.3)
PROT SERPL-MCNC: 6.9 G/DL (ref 6.4–8.2)
RBC # BLD AUTO: 4.64 MILLION/UL (ref 3.88–5.62)
SODIUM SERPL-SCNC: 143 MMOL/L (ref 136–145)
WBC # BLD AUTO: 10.64 THOUSAND/UL (ref 4.31–10.16)

## 2022-02-16 PROCEDURE — 80053 COMPREHEN METABOLIC PANEL: CPT | Performed by: HOSPITALIST

## 2022-02-16 PROCEDURE — 85025 COMPLETE CBC W/AUTO DIFF WBC: CPT | Performed by: HOSPITALIST

## 2022-02-16 PROCEDURE — 99232 SBSQ HOSP IP/OBS MODERATE 35: CPT | Performed by: HOSPITALIST

## 2022-02-16 PROCEDURE — 82948 REAGENT STRIP/BLOOD GLUCOSE: CPT

## 2022-02-16 PROCEDURE — 99232 SBSQ HOSP IP/OBS MODERATE 35: CPT | Performed by: PSYCHIATRY & NEUROLOGY

## 2022-02-16 PROCEDURE — 97530 THERAPEUTIC ACTIVITIES: CPT

## 2022-02-16 RX ADMIN — SENNOSIDES AND DOCUSATE SODIUM 1 TABLET: 50; 8.6 TABLET ORAL at 21:58

## 2022-02-16 RX ADMIN — INSULIN LISPRO 1 UNITS: 100 INJECTION, SOLUTION INTRAVENOUS; SUBCUTANEOUS at 13:02

## 2022-02-16 RX ADMIN — CYANOCOBALAMIN TAB 500 MCG 1000 MCG: 500 TAB at 08:59

## 2022-02-16 RX ADMIN — THIAMINE HCL TAB 100 MG 100 MG: 100 TAB at 08:59

## 2022-02-16 RX ADMIN — GABAPENTIN 300 MG: 300 CAPSULE ORAL at 18:07

## 2022-02-16 RX ADMIN — INSULIN LISPRO 1 UNITS: 100 INJECTION, SOLUTION INTRAVENOUS; SUBCUTANEOUS at 18:06

## 2022-02-16 RX ADMIN — GABAPENTIN 300 MG: 300 CAPSULE ORAL at 08:59

## 2022-02-16 RX ADMIN — GABAPENTIN 300 MG: 300 CAPSULE ORAL at 21:58

## 2022-02-16 RX ADMIN — SODIUM CHLORIDE 1000 MG: 0.9 INJECTION, SOLUTION INTRAVENOUS at 09:33

## 2022-02-16 NOTE — PROGRESS NOTES
[place]  Progress Note - Andrea Gutierrez 1975, 48 y o  male MRN: [id number]  Unit/Bed#: -01 Encounter: [id number]  Primary Care Provider: Ruby Brandt MD   Date and time admitted to hospital: 2/13/2022  7:56 AM    * CNS demyelinating disease ([address] )  Assessment & Plan  Concern for MS exacerbation  Patient feels improved from admission  Cont solumedrol - pulse dosing 1000 mg/day - day 2  MRI brain  Multiple small supratentorial enhancing foci of acute demyelination consistent with disease progression of multiple sclerosis  Late subacute to chronic demyelination in the left posterior limb of the internal capsule   This lesion has evolved in shape when compared to the prior outside MRI study of June 1, 2021  Moderate supratentorial predominant chronic demyelinating disease of multiple sclerosis  MRI c spine   Acute to early subacute subcentimeter demyelinating plaque at the C5 vertebral body level  Additional smaller nonenhancing chronic appearing demyelinating plaques within the upper and mid cervical cord  Monitor blood glucose on high dose steroids        Right hemiparesis (HCC)  Assessment & Plan  Reviewed neuro/ICU notes, complex hx past hospitalizations etc was not felt to have CVA     Dysarthria  Assessment & Plan  Improved, related to MS flare       VTE  Prophylaxis:   Pharmacologic: in place    Patient Centered Rounds: I have performed bedside rounds with nursing staff today  Discussions with Specialists or Other Care Team Provider: case management    Education and Discussions with Family / Patient: pt      Current Length of Stay: 3 day(s)    Current Patient Status: Inpatient        Code Status: Level 1 - Full Code      Subjective:   Feels improved from admission  About the same as yesterday  Less weak compared to admission  No chest pain, sob    Patient is seen and examined at bedside  All other ROS are negative      Objective:     Vitals:   Temp (24hrs), Av 7 °F (36 5 °C), Min:97 5 °F (36 4 °C), Max:98 °F (36 7 °C)    Temp:  [97 5 °F (36 4 °C)-98 °F (36 7 °C)] 97 5 °F (36 4 °C)  HR:  [67-98] 79  Resp:  [18-20] 18  BP: (109-131)/(70-84) 114/74  SpO2:  [91 %-98 %] 96 %  Body mass index is 32 04 kg/m²  Input and Output Summary (last 24 hours): Intake/Output Summary (Last 24 hours) at 2022 0946  Last data filed at 2022 0509  Gross per 24 hour   Intake 610 ml   Output 1250 ml   Net -640 ml       Physical Exam:       GEN: No acute distress, comfortable  HEEENT: No JVD, PERRLA, no scleral icterus  RESP: Lungs clear to auscultation bilaterally  CV: RRR, +s1/s2   ABD: SOFT NON TENDER, POSITIVE BOWEL SOUNDS, NO DISTENTION  PSYCH: CALM  NEURO: A X O X 3, dysathria is improved, R sided weakness though had some due to orthopedic surgeries PTA   SKIN: NO RASH  EXTREM: NO EDEMA    Additional Data:     Labs:    Results from last 7 days   Lab Units 22  0506   WBC Thousand/uL 10 64*   HEMOGLOBIN g/dL 13 9   HEMATOCRIT % 42 4   PLATELETS Thousands/uL 243   NEUTROS PCT % 89*   LYMPHS PCT % 8*   MONOS PCT % 2*   EOS PCT % 0     Results from last 7 days   Lab Units 22  0506   SODIUM mmol/L 143   POTASSIUM mmol/L 4 5   CHLORIDE mmol/L 108   CO2 mmol/L 23   BUN mg/dL 19   CREATININE mg/dL 0 96   ANION GAP mmol/L 12   CALCIUM mg/dL 9 1   ALBUMIN g/dL 3 3*   TOTAL BILIRUBIN mg/dL 0 20   ALK PHOS U/L 64   ALT U/L 14   AST U/L 10   GLUCOSE RANDOM mg/dL 129     Results from last 7 days   Lab Units 22  0757   INR  1 03     Results from last 7 days   Lab Units 22  0802   POC GLUCOSE mg/dl 87     Results from last 7 days   Lab Units 22  0435   HEMOGLOBIN A1C % 5 1               * I Have Reviewed All Lab Data Listed Above  Imaging:     Results for orders placed during the hospital encounter of 22    XR chest 1 view portable    Narrative  CHEST    INDICATION:   stroke alert      COMPARISON:  None    EXAM PERFORMED/VIEWS:  XR CHEST PORTABLE      FINDINGS:    Cardiomediastinal silhouette appears unremarkable  The lungs are clear  No pneumothorax or pleural effusion  Dextroscoliosis of the thoracic spine  Impression  No acute cardiopulmonary disease  Workstation performed: [id number]    No results found for this or any previous visit  *I have reviewed all imaging reports listed above      Recent Cultures (last 7 days):           Last 24 Hours Medication List:   Current Facility-Administered Medications   Medication Dose Route Frequency Provider Last Rate    cyanocobalamin  1,000 mcg Oral Daily Mehran Turner PA-C      ergocalciferol  50,000 Units Oral Weekly Mehran Turner PA-C      gabapentin  300 mg Oral TID Mehran Turner PA-C      insulin lispro  1-6 Units Subcutaneous TID [facility] Santa Petersen MD      methylPREDNISolone sodium succinate  1,000 mg Intravenous Daily Scarlett Mcclure PA-C 1,000 mg (02/16/22 0933)    senna-docusate sodium  1 tablet Oral HS Mehran Turner PA-C      thiamine  100 mg Oral Daily Mehran Turner PA-C          Today, Patient Was Seen By: Santa Petersen MD    ** Please Note: Dictation voice to text software may have been used in the creation of this document   **

## 2022-02-16 NOTE — ASSESSMENT & PLAN NOTE
Concern for MS exacerbation  Patient feels improved from admission  Cont solumedrol - pulse dosing 1000 mg/day - day 2  MRI brain  Multiple small supratentorial enhancing foci of acute demyelination consistent with disease progression of multiple sclerosis  Late subacute to chronic demyelination in the left posterior limb of the internal capsule   This lesion has evolved in shape when compared to the prior outside MRI study of June 1, 2021  Moderate supratentorial predominant chronic demyelinating disease of multiple sclerosis  MRI c spine   Acute to early subacute subcentimeter demyelinating plaque at the C5 vertebral body level  Additional smaller nonenhancing chronic appearing demyelinating plaques within the upper and mid cervical cord     Monitor blood glucose on high dose steroids

## 2022-02-16 NOTE — PLAN OF CARE
Problem: PHYSICAL THERAPY ADULT  Goal: Performs mobility at highest level of function for planned discharge setting  See evaluation for individualized goals  Description: Treatment/Interventions: ADL retraining,Functional transfer training,LE strengthening/ROM,Elevations,Therapeutic exercise,Endurance training,Patient/family training,Bed mobility,Gait training,Spoke to nursing,Spoke to case management,OT  Equipment Recommended: Emeterio Armando       See flowsheet documentation for full assessment, interventions and recommendations  Outcome: Progressing  Note: Prognosis: Good  Problem List: Decreased strength,Decreased endurance,Impaired balance,Decreased mobility,Decreased coordination,Impaired sensation  Assessment: Pt able to mobilize with Ax1 and rw  Does best moving to his L  Instr to turn chair when retuening to bed  R hemiparesis persists    Con't to recommned in-pt rehab at d/c  Will attmpet to see post steroid infusion per pt requenst  Barriers to Discharge:  (medical status)        PT Discharge Recommendation: Post acute rehabilitation services          See flowsheet documentation for full assessment

## 2022-02-16 NOTE — PROGRESS NOTES
Progress Note - Neurology   Lou Mckeon 48 y o  male [id number]  Unit/Bed#: /-01    Assessment/Plan:    * CNS demyelinating disease [facility])  Assessment & Plan  49-year-old male recent workup for demyelinating disease, seen at [facility] and s/p 5 days IV steroids with improvement, presented as a stroke alert on 2/13 with worsening right-sided weakness, numbness, and dysarthria  He did receive a tPA bolus this admission, however infusion was then deferred given some concern for a left basal ganglia hypodensity on CT head  Subsequent MRI of the brain revealed multiple demyelinating lesions, some active and some chronic, and consistent with progression of MS  Hypodensity noted on CT head was consistent chronic demyelination, and not subacute stroke  Acute demyelinating plaques also noted in the cervical spine  Plan:  - Continue 1000 mg IV Solu-Medrol x 5 days  Today is day 2/5  - After completion of IV steroids, patient will need to be initiated on a 60 mg prednisone taper  - PT/OT  - Medical management and supportive care per primary team  Correction of any metabolic or infectious disturbances  Lou Mckeon  has an appointment with Dr Vincenzo Bañuelos on 3/8/22  Verified with outpatient team that this timeframe for follow up is appropriate  Subjective:   Patient seen and examined with attending  He feels great when the steroids are in his system  He states that his speech significantly improves for about 3 hours after the steroids are initiated  He continues to have weakness in his R arm and leg, as well as pain in his R arm  Speech is dysarthric again this AM  He was able to ambulate with help of PT and a walker yesterday  After sitting up in the chair for a bit, he became very tired and slept for 2 hours             Past Medical History:   Diagnosis Date    MS (multiple sclerosis) ([address] )      Past Surgical History:   Procedure Laterality Date    HERNIA REPAIR      3 times    KNEE SURGERY Right      History reviewed  No pertinent family history  Social History     Socioeconomic History    Marital status: Unknown     Spouse name: None    Number of children: None    Years of education: None    Highest education level: None   Occupational History    None   Tobacco Use    Smoking status: Former Smoker    Smokeless tobacco: Former User   Vaping Use    Vaping Use: Former   Substance and Sexual Activity    Alcohol use: Not Currently    Drug use: Never    Sexual activity: None   Other Topics Concern    None   Social History Narrative    None     Social Determinants of Health     Financial Resource Strain: Not on file   Food Insecurity: No Food Insecurity    Worried About Running Out of Food in the Last Year: Never true    Antonia of Food in the Last Year: Never true   Transportation Needs: No Transportation Needs    Lack of Transportation (Medical): No    Lack of Transportation (Non-Medical): No   Physical Activity: Not on file   Stress: Not on file   Social Connections: Not on file   Intimate Partner Violence: Not on file   Housing Stability: Low Risk     Unable to Pay for Housing in the Last Year: No    Number of Places Lived in the Last Year: 1    Unstable Housing in the Last Year: No         Medications:   All current active meds have been reviewed and current meds:  Scheduled Meds:  Current Facility-Administered Medications   Medication Dose Route Frequency Provider Last Rate    cyanocobalamin  1,000 mcg Oral Daily Sanjiv Urrutia PA-C      ergocalciferol  50,000 Units Oral Weekly Sanjiv Urrutia PA-C      gabapentin  300 mg Oral TID Sanjiv Urrutia PA-C      insulin lispro  1-6 Units Subcutaneous TID [facility] Henna Diaz MD      methylPREDNISolone sodium succinate  1,000 mg Intravenous Daily Trevor Workman PA-C 1,000 mg (02/16/22 0933)    senna-docusate sodium  1 tablet Oral HS Sanjiv Urrutia PA-C      thiamine  100 mg Oral Daily Sanjiv Urrutia PA-C       Continuous Infusions:   PRN Meds:        ROS:   Review of Systems   Musculoskeletal: Positive for gait problem and myalgias  Neurological: Positive for speech difficulty and weakness  All other systems reviewed and are negative  Vitals:   /74   Pulse 79   Temp 97 5 °F (36 4 °C)   Resp 18   Ht 5' 11" (1 803 m)   Wt 104 kg (229 lb 11 5 oz)   SpO2 96%   BMI 32 04 kg/m²     Physical Exam:   Physical Exam  Vitals and nursing note reviewed  Constitutional:       General: He is not in acute distress  Appearance: He is not ill-appearing or diaphoretic  HENT:      Head: Normocephalic and atraumatic  Right Ear: External ear normal       Left Ear: External ear normal       Nose: Nose normal       Mouth/Throat:      Mouth: Mucous membranes are moist       Pharynx: Oropharynx is clear  Eyes:      General: No scleral icterus  Right eye: No discharge  Left eye: No discharge  Extraocular Movements: Extraocular movements intact and EOM normal    Cardiovascular:      Rate and Rhythm: Normal rate  Pulmonary:      Effort: Pulmonary effort is normal  No respiratory distress  Breath sounds: No stridor  Musculoskeletal:         General: Tenderness present  No signs of injury  Cervical back: Neck supple  Right lower leg: No edema  Left lower leg: No edema  Skin:     General: Skin is warm and dry  Coloration: Skin is not jaundiced  Findings: No rash  Neurological:      Mental Status: He is alert and oriented to person, place, and time  Motor: Weakness present  Coordination: Coordination abnormal  Finger-nose-finger test: Ataxia on the R    Psychiatric:         Mood and Affect: Mood normal          Behavior: Behavior normal          Thought Content: Thought content normal          Judgment: Judgment normal        Neurologic Exam     Mental Status   Oriented to person, place, and time  Level of consciousness: alert  Dysarthria, speech hesitation noted     Follows commands without difficulty  Cranial Nerves     CN II   Visual fields full to confrontation  CN III, IV, VI   Extraocular motions are normal    Upgaze: normal  Downgaze: normal  Conjugate gaze: present    CN V   Facial sensation intact  CN VII   Facial expression full, symmetric  CN VIII   Hearing: intact (to voice)    CN XII   Tongue: not atrophic  Fasciculations: absent  Tongue deviation: none    Motor Exam     Strength   Strength 5/5 except as noted  R deltoid, R biceps, R wrist extension 4+-5/5  R interossei 2-3/5  B/L hip flexors 4/5  R knee flexion 4+/5  R dorsiflexion 3-4/5       Gait, Coordination, and Reflexes     Gait  Gait: (Deferred for safety)    Coordination   Finger-nose-finger test: Ataxia on the R  Tremor   Resting tremor: absent  Action tremor: absent          Labs: I have personally reviewed pertinent reports     Recent Results (from the past 24 hour(s))   CBC and differential    Collection Time: 02/16/22  5:06 AM   Result Value Ref Range    WBC 10 64 (H) 4 31 - 10 16 Thousand/uL    RBC 4 64 3 88 - 5 62 Million/uL    Hemoglobin 13 9 12 0 - 17 0 g/dL    Hematocrit 42 4 36 5 - 49 3 %    MCV 91 82 - 98 fL    MCH 30 0 26 8 - 34 3 pg    MCHC 32 8 31 4 - 37 4 g/dL    RDW 12 4 11 6 - 15 1 %    MPV 10 4 8 9 - 12 7 fL    Platelets 379 836 - 142 Thousands/uL    nRBC 0 /100 WBCs    Neutrophils Relative 89 (H) 43 - 75 %    Immat GRANS % 1 0 - 2 %    Lymphocytes Relative 8 (L) 14 - 44 %    Monocytes Relative 2 (L) 4 - 12 %    Eosinophils Relative 0 0 - 6 %    Basophils Relative 0 0 - 1 %    Neutrophils Absolute 9 51 (H) 1 85 - 7 62 Thousands/µL    Immature Grans Absolute 0 06 0 00 - 0 20 Thousand/uL    Lymphocytes Absolute 0 80 0 60 - 4 47 Thousands/µL    Monocytes Absolute 0 26 0 17 - 1 22 Thousand/µL    Eosinophils Absolute 0 00 0 00 - 0 61 Thousand/µL    Basophils Absolute 0 01 0 00 - 0 10 Thousands/µL   Comprehensive metabolic panel    Collection Time: 02/16/22  5:06 AM   Result Value Ref Range    Sodium 143 136 - 145 mmol/L    Potassium 4 5 3 5 - 5 3 mmol/L    Chloride 108 100 - 108 mmol/L    CO2 23 21 - 32 mmol/L    ANION GAP 12 4 - 13 mmol/L    BUN 19 5 - 25 mg/dL    Creatinine 0 96 0 60 - 1 30 mg/dL    Glucose 129 65 - 140 mg/dL    Calcium 9 1 8 3 - 10 1 mg/dL    Corrected Calcium 9 7 8 3 - 10 1 mg/dL    AST 10 5 - 45 U/L    ALT 14 12 - 78 U/L    Alkaline Phosphatase 64 46 - 116 U/L    Total Protein 6 9 6 4 - 8 2 g/dL    Albumin 3 3 (L) 3 5 - 5 0 g/dL    Total Bilirubin 0 20 0 20 - 1 00 mg/dL    eGFR 93 ml/min/1 73sq m       Imaging: No new neuroimaging to review today    EKG, Pathology, and Other Studies: I have personally reviewed pertinent reports  VTE Prophylaxis: Sequential compression device (Venodyne)       Counseling / Coordination of Care  Total time spent today 26 minutes  Greater than 50% of total time was spent with the patient and/or family counseling and/or coordination of care  A description of the counseling/coordination of care:  Patient was seen and evaluated  Discussed with attending  Chart reviewed thoroughly including laboratory and imaging studies  Discussed plan for f/u, imaging, medications  Plan of care discussed with patient and primary team      History and physical examination obtained by attending neurologist  AP in room as witness to history and physical examination and acted solely as a scribe for attending neurologist for this encounter  All medical decision making per attending

## 2022-02-17 LAB
ALBUMIN SERPL BCP-MCNC: 3.3 G/DL (ref 3.5–5)
ALP SERPL-CCNC: 58 U/L (ref 46–116)
ALT SERPL W P-5'-P-CCNC: 17 U/L (ref 12–78)
ANION GAP SERPL CALCULATED.3IONS-SCNC: 12 MMOL/L (ref 4–13)
AST SERPL W P-5'-P-CCNC: 12 U/L (ref 5–45)
BASOPHILS # BLD AUTO: 0.01 THOUSANDS/ΜL (ref 0–0.1)
BASOPHILS NFR BLD AUTO: 0 % (ref 0–1)
BILIRUB SERPL-MCNC: 0.2 MG/DL (ref 0.2–1)
BUN SERPL-MCNC: 22 MG/DL (ref 5–25)
CALCIUM ALBUM COR SERPL-MCNC: 9.5 MG/DL (ref 8.3–10.1)
CALCIUM SERPL-MCNC: 8.9 MG/DL (ref 8.3–10.1)
CHLORIDE SERPL-SCNC: 109 MMOL/L (ref 100–108)
CO2 SERPL-SCNC: 23 MMOL/L (ref 21–32)
CREAT SERPL-MCNC: 0.92 MG/DL (ref 0.6–1.3)
EOSINOPHIL # BLD AUTO: 0 THOUSAND/ΜL (ref 0–0.61)
EOSINOPHIL NFR BLD AUTO: 0 % (ref 0–6)
ERYTHROCYTE [DISTWIDTH] IN BLOOD BY AUTOMATED COUNT: 12.9 % (ref 11.6–15.1)
GFR SERPL CREATININE-BSD FRML MDRD: 98 ML/MIN/1.73SQ M
GLUCOSE SERPL-MCNC: 102 MG/DL (ref 65–140)
GLUCOSE SERPL-MCNC: 113 MG/DL (ref 65–140)
GLUCOSE SERPL-MCNC: 157 MG/DL (ref 65–140)
GLUCOSE SERPL-MCNC: 190 MG/DL (ref 65–140)
GLUCOSE SERPL-MCNC: 98 MG/DL (ref 65–140)
HCT VFR BLD AUTO: 42.1 % (ref 36.5–49.3)
HGB BLD-MCNC: 13.7 G/DL (ref 12–17)
IMM GRANULOCYTES # BLD AUTO: 0.2 THOUSAND/UL (ref 0–0.2)
IMM GRANULOCYTES NFR BLD AUTO: 1 % (ref 0–2)
LYMPHOCYTES # BLD AUTO: 1.17 THOUSANDS/ΜL (ref 0.6–4.47)
LYMPHOCYTES NFR BLD AUTO: 7 % (ref 14–44)
MCH RBC QN AUTO: 29.8 PG (ref 26.8–34.3)
MCHC RBC AUTO-ENTMCNC: 32.5 G/DL (ref 31.4–37.4)
MCV RBC AUTO: 92 FL (ref 82–98)
MONOCYTES # BLD AUTO: 0.72 THOUSAND/ΜL (ref 0.17–1.22)
MONOCYTES NFR BLD AUTO: 4 % (ref 4–12)
NEUTROPHILS # BLD AUTO: 14.76 THOUSANDS/ΜL (ref 1.85–7.62)
NEUTS SEG NFR BLD AUTO: 88 % (ref 43–75)
NRBC BLD AUTO-RTO: 0 /100 WBCS
PLATELET # BLD AUTO: 234 THOUSANDS/UL (ref 149–390)
PMV BLD AUTO: 10.7 FL (ref 8.9–12.7)
POTASSIUM SERPL-SCNC: 4.7 MMOL/L (ref 3.5–5.3)
PROT SERPL-MCNC: 6.7 G/DL (ref 6.4–8.2)
RBC # BLD AUTO: 4.6 MILLION/UL (ref 3.88–5.62)
SODIUM SERPL-SCNC: 144 MMOL/L (ref 136–145)
WBC # BLD AUTO: 16.86 THOUSAND/UL (ref 4.31–10.16)

## 2022-02-17 PROCEDURE — 97530 THERAPEUTIC ACTIVITIES: CPT

## 2022-02-17 PROCEDURE — 99232 SBSQ HOSP IP/OBS MODERATE 35: CPT | Performed by: HOSPITALIST

## 2022-02-17 PROCEDURE — 82948 REAGENT STRIP/BLOOD GLUCOSE: CPT

## 2022-02-17 PROCEDURE — 80053 COMPREHEN METABOLIC PANEL: CPT | Performed by: HOSPITALIST

## 2022-02-17 PROCEDURE — 97110 THERAPEUTIC EXERCISES: CPT

## 2022-02-17 PROCEDURE — 85025 COMPLETE CBC W/AUTO DIFF WBC: CPT | Performed by: HOSPITALIST

## 2022-02-17 PROCEDURE — 99232 SBSQ HOSP IP/OBS MODERATE 35: CPT | Performed by: PSYCHIATRY & NEUROLOGY

## 2022-02-17 RX ADMIN — GABAPENTIN 300 MG: 300 CAPSULE ORAL at 16:46

## 2022-02-17 RX ADMIN — GABAPENTIN 300 MG: 300 CAPSULE ORAL at 08:13

## 2022-02-17 RX ADMIN — THIAMINE HCL TAB 100 MG 100 MG: 100 TAB at 08:13

## 2022-02-17 RX ADMIN — GABAPENTIN 300 MG: 300 CAPSULE ORAL at 22:39

## 2022-02-17 RX ADMIN — SODIUM CHLORIDE 1000 MG: 0.9 INJECTION, SOLUTION INTRAVENOUS at 08:13

## 2022-02-17 RX ADMIN — CYANOCOBALAMIN TAB 500 MCG 1000 MCG: 500 TAB at 08:13

## 2022-02-17 RX ADMIN — INSULIN LISPRO 2 UNITS: 100 INJECTION, SOLUTION INTRAVENOUS; SUBCUTANEOUS at 16:56

## 2022-02-17 RX ADMIN — SENNOSIDES AND DOCUSATE SODIUM 1 TABLET: 50; 8.6 TABLET ORAL at 22:39

## 2022-02-17 NOTE — PROGRESS NOTES
[place]  Progress Note - Sylvain Rausch 1975, 48 y o  male MRN: [id number]  Unit/Bed#: -01 Encounter: [id number]  Primary Care Provider: Shania Crespo MD   Date and time admitted to hospital: 2/13/2022  7:56 AM    * CNS demyelinating disease [facility])  Assessment & Plan  Concern for MS exacerbation  Patient feels improved from admission; he reports improvement from 2/16 as well  Cont solumedrol - pulse dosing 1000 mg/day - day 3  Taper steroids as outpt per neurology as well  MRI brain  Multiple small supratentorial enhancing foci of acute demyelination consistent with disease progression of multiple sclerosis  Late subacute to chronic demyelination in the left posterior limb of the internal capsule   This lesion has evolved in shape when compared to the prior outside MRI study of June 1, 2021  Moderate supratentorial predominant chronic demyelinating disease of multiple sclerosis  MRI c spine   Acute to early subacute subcentimeter demyelinating plaque at the C5 vertebral body level  Additional smaller nonenhancing chronic appearing demyelinating plaques within the upper and mid cervical cord  Monitor blood glucose on high dose steroids        Right hemiparesis (HCC)  Assessment & Plan  Reviewed neuro/ICU notes, complex hx past hospitalizations etc was not felt to have CVA     Dysarthria  Assessment & Plan  Improved, related to MS flare         VTE  Prophylaxis:   Pharmacologic: in place    Patient Centered Rounds: I have performed bedside rounds with nursing staff today      Discussions with Specialists or Other Care Team Provider: case management    Education and Discussions with Family / Patient: pt    Current Length of Stay: 4 day(s)    Current Patient Status: Inpatient        Code Status: Level 1 - Full Code      Subjective:   Reports doing well today  Says speech clearing up  Can hold his R arm against gravity which is improved for him    Patient is seen and examined at bedside  All other ROS are negative  Objective:     Vitals:   Temp (24hrs), Av 4 °F (36 3 °C), Min:97 2 °F (36 2 °C), Max:97 6 °F (36 4 °C)    Temp:  [97 2 °F (36 2 °C)-97 6 °F (36 4 °C)] 97 2 °F (36 2 °C)  HR:  [61-89] 61  Resp:  [18-20] 20  BP: (111-115)/(72-74) 111/72  SpO2:  [93 %-94 %] 94 %  Body mass index is 32 93 kg/m²  Input and Output Summary (last 24 hours): Intake/Output Summary (Last 24 hours) at 2022 0929  Last data filed at 2022 4916  Gross per 24 hour   Intake --   Output 2080 ml   Net -2080 ml       Physical Exam:       GEN: No acute distress, comfortable  HEEENT: No JVD, PERRLA, no scleral icterus  RESP: Lungs clear to auscultation bilaterally  CV: RRR, +s1/s2   ABD: SOFT NON TENDER, POSITIVE BOWEL SOUNDS, NO DISTENTION  PSYCH: CALM  NEURO:r weakness improved, speech improved  SKIN: NO RASH  EXTREM: NO EDEMA    Additional Data:     Labs:    Results from last 7 days   Lab Units 22  0405   WBC Thousand/uL 16 86*   HEMOGLOBIN g/dL 13 7   HEMATOCRIT % 42 1   PLATELETS Thousands/uL 234   NEUTROS PCT % 88*   LYMPHS PCT % 7*   MONOS PCT % 4   EOS PCT % 0     Results from last 7 days   Lab Units 22  0405   SODIUM mmol/L 144   POTASSIUM mmol/L 4 7   CHLORIDE mmol/L 109*   CO2 mmol/L 23   BUN mg/dL 22   CREATININE mg/dL 0 92   ANION GAP mmol/L 12   CALCIUM mg/dL 8 9   ALBUMIN g/dL 3 3*   TOTAL BILIRUBIN mg/dL 0 20   ALK PHOS U/L 58   ALT U/L 17   AST U/L 12   GLUCOSE RANDOM mg/dL 113     Results from last 7 days   Lab Units 22  0757   INR  1 03     Results from last 7 days   Lab Units 22  0730 22  1617 22  1253 22  0802   POC GLUCOSE mg/dl 98 165* 160* 87     Results from last 7 days   Lab Units 22  0435   HEMOGLOBIN A1C % 5 1               * I Have Reviewed All Lab Data Listed Above             Imaging:     Results for orders placed during the hospital encounter of 22    XR chest 1 view portable    Narrative  CHEST    INDICATION:   stroke alert  COMPARISON:  None    EXAM PERFORMED/VIEWS:  XR CHEST PORTABLE      FINDINGS:    Cardiomediastinal silhouette appears unremarkable  The lungs are clear  No pneumothorax or pleural effusion  Dextroscoliosis of the thoracic spine  Impression  No acute cardiopulmonary disease  Workstation performed: [id number]    No results found for this or any previous visit  *I have reviewed all imaging reports listed above      Recent Cultures (last 7 days):           Last 24 Hours Medication List:   Current Facility-Administered Medications   Medication Dose Route Frequency Provider Last Rate    cyanocobalamin  1,000 mcg Oral Daily Mehran Turner PA-C      ergocalciferol  50,000 Units Oral Weekly Mehran Turner PA-C      gabapentin  300 mg Oral TID Mehran Turner PA-C      insulin lispro  1-6 Units Subcutaneous TID [facility] Santa Petersen MD      methylPREDNISolone sodium succinate  1,000 mg Intravenous Daily Scarlett Mcclure PA-C 1,000 mg (02/17/22 0813)    senna-docusate sodium  1 tablet Oral HS Mehran Turner PA-C      thiamine  100 mg Oral Daily Mehran Turner PA-C          Today, Patient Was Seen By: Santa Petersen MD    ** Please Note: Dictation voice to text software may have been used in the creation of this document   **

## 2022-02-17 NOTE — CASE MANAGEMENT
Case Management Discharge Planning Note    Patient name Nasra Begum  Location [address] [id number] MRN [id number]  : 1975 Date 2022       Current Admission Date: 2022  Current Admission Diagnosis:CNS demyelinating disease [facility])   Patient Active Problem List    Diagnosis Date Noted    Dysarthria 2022    Lower facial weakness 2022    CNS demyelinating disease ([facility] [address] ) 2022    Right hemiparesis ([facility] [address] ) 2022      LOS (days): 4  Geometric Mean LOS (GMLOS) (days):   Days to GMLOS:     OBJECTIVE:  Risk of Unplanned Readmission Score: 8     Current admission status: Inpatient   Preferred Pharmacy:   [facility] [address] [facility]ONE[facility] [place]  [facility], [address] - [address]  [address]  [place] [address] [address]  Phone: [phone] Fax: [phone]    Primary Care Provider: Sachi Chun MD    Primary Insurance: [place] [employer]  Secondary Insurance:     DISCHARGE DETAILS:    Additional Comments: Met with Pt re: discharge planning  Pt reports [facility] has been in contact with his sister and he is [facility]  CM informed [facility] via [facility] that Pt is choosing their facility  [employer] will obtain insurance auth prior to discharge  CM to follow

## 2022-02-17 NOTE — PLAN OF CARE
Problem: Potential for Falls  Goal: Patient will remain free of falls  Description: INTERVENTIONS:  - Educate patient/family on patient safety including physical limitations  - Instruct patient to call for assistance with activity   - Consult OT/PT to assist with strengthening/mobility   - Keep Call bell within reach  - Keep bed low and locked with side rails adjusted as appropriate  - Keep care items and personal belongings within reach  - Initiate and maintain comfort rounds  - Make Fall Risk Sign visible to staff  - Offer Toileting every 3 Hours, in advance of need  - Initiate/Maintain 3alarm  - Obtain necessary fall risk management equipment: 3  - Apply yellow socks and bracelet for high fall risk patients  - Consider moving patient to room near nurses station  Outcome: Progressing     Problem: MOBILITY - ADULT  Goal: Maintain or return to baseline ADL function  Description: INTERVENTIONS:  -  Assess patient's ability to carry out ADLs; assess patient's baseline for ADL function and identify physical deficits which impact ability to perform ADLs (bathing, care of mouth/teeth, toileting, grooming, dressing, etc )  - Assess/evaluate cause of self-care deficits   - Assess range of motion  - Assess patient's mobility; develop plan if impaired  - Assess patient's need for assistive devices and provide as appropriate  - Encourage maximum independence but intervene and supervise when necessary  - Involve family in performance of ADLs  - Assess for home care needs following discharge   - Consider OT consult to assist with ADL evaluation and planning for discharge  - Provide patient education as appropriate  Outcome: Progressing  Goal: Maintains/Returns to pre admission functional level  Description: INTERVENTIONS:  - Perform BMAT or MOVE assessment daily    - Set and communicate daily mobility goal to care team and patient/family/caregiver     - Collaborate with rehabilitation services on mobility goals if consulted  - Perform Range of Motion 3 times a day  - Reposition patient every 3 hours    - Dangle patient 3 times a day  - Stand patient 3 times a day  - Ambulate patient 3 times a day  - Out of bed to chair 3 times a day   - Out of bed for meals 3 times a day  - Out of bed for toileting  - Record patient progress and toleration of activity level   Outcome: Progressing     Problem: Prexisting or High Potential for Compromised Skin Integrity  Goal: Skin integrity is maintained or improved  Description: INTERVENTIONS:  - Identify patients at risk for skin breakdown  - Assess and monitor skin integrity  - Assess and monitor nutrition and hydration status  - Monitor labs   - Assess for incontinence   - Turn and reposition patient  - Assist with mobility/ambulation  - Relieve pressure over bony prominences  - Avoid friction and shearing  - Provide appropriate hygiene as needed including keeping skin clean and dry  - Evaluate need for skin moisturizer/barrier cream  - Collaborate with interdisciplinary team   - Patient/family teaching  - Consider wound care consult   Outcome: Progressing     Problem: NEUROSENSORY - ADULT  Goal: Achieves stable or improved neurological status  Description: INTERVENTIONS  - Monitor and report changes in neurological status  - Monitor vital signs such as temperature, blood pressure, glucose, and any other labs ordered   - Initiate measures to prevent increased intracranial pressure  - Monitor for seizure activity and implement precautions if appropriate      Outcome: Progressing     Problem: PAIN - ADULT  Goal: Verbalizes/displays adequate comfort level or baseline comfort level  Description: Interventions:  - Encourage patient to monitor pain and request assistance  - Assess pain using appropriate pain scale  - Administer analgesics based on type and severity of pain and evaluate response  - Implement non-pharmacological measures as appropriate and evaluate response  - Consider cultural and social influences on pain and pain management  - Notify physician/advanced practitioner if interventions unsuccessful or patient reports new pain  Outcome: Progressing     Problem: INFECTION - ADULT  Goal: Absence or prevention of progression during hospitalization  Description: INTERVENTIONS:  - Assess and monitor for signs and symptoms of infection  - Monitor lab/diagnostic results  - Monitor all insertion sites, i e  indwelling lines, tubes, and drains  - Monitor endotracheal if appropriate and nasal secretions for changes in amount and color  - [place] appropriate cooling/warming therapies per order  - Administer medications as ordered  - Instruct and encourage patient and family to use good hand hygiene technique  - Identify and instruct in appropriate isolation precautions for identified infection/condition  Outcome: Progressing     Problem: SAFETY ADULT  Goal: Patient will remain free of falls  Description: INTERVENTIONS:  - Educate patient/family on patient safety including physical limitations  - Instruct patient to call for assistance with activity   - Consult OT/PT to assist with strengthening/mobility   - Keep Call bell within reach  - Keep bed low and locked with side rails adjusted as appropriate  - Keep care items and personal belongings within reach  - Initiate and maintain comfort rounds  - Make Fall Risk Sign visible to staff  - Offer Toileting every 3 Hours, in advance of need  - Initiate/Maintain 3alarm  - Obtain necessary fall risk management equipment: 3  - Apply yellow socks and bracelet for high fall risk patients  - Consider moving patient to room near nurses station  Outcome: Progressing  Goal: Maintain or return to baseline ADL function  Description: INTERVENTIONS:  -  Assess patient's ability to carry out ADLs; assess patient's baseline for ADL function and identify physical deficits which impact ability to perform ADLs (bathing, care of mouth/teeth, toileting, grooming, dressing, etc )  - Assess/evaluate cause of self-care deficits   - Assess range of motion  - Assess patient's mobility; develop plan if impaired  - Assess patient's need for assistive devices and provide as appropriate  - Encourage maximum independence but intervene and supervise when necessary  - Involve family in performance of ADLs  - Assess for home care needs following discharge   - Consider OT consult to assist with ADL evaluation and planning for discharge  - Provide patient education as appropriate  Outcome: Progressing  Goal: Maintains/Returns to pre admission functional level  Description: INTERVENTIONS:  - Perform BMAT or MOVE assessment daily    - Set and communicate daily mobility goal to care team and patient/family/caregiver  - Collaborate with rehabilitation services on mobility goals if consulted  - Perform Range of Motion 3 times a day  - Reposition patient every 3 hours    - Dangle patient 3 times a day  - Stand patient 3 times a day  - Ambulate patient 3 times a day  - Out of bed to chair 3 times a day   - Out of bed for meals 3 times a day  - Out of bed for toileting  - Record patient progress and toleration of activity level   Outcome: Progressing     Problem: DISCHARGE PLANNING  Goal: Discharge to home or other facility with appropriate resources  Description: INTERVENTIONS:  - Identify barriers to discharge w/patient and caregiver  - Arrange for needed discharge resources and transportation as appropriate  - Identify discharge learning needs (meds, wound care, etc )  - Arrange for interpretive services to assist at discharge as needed  - Refer to Case Management Department for coordinating discharge planning if the patient needs post-hospital services based on physician/advanced practitioner order or complex needs related to functional status, cognitive ability, or social support system  Outcome: Progressing     Problem: Knowledge Deficit  Goal: Patient/family/caregiver demonstrates understanding of disease process, treatment plan, medications, and discharge instructions  Description: Complete learning assessment and assess knowledge base    Interventions:  - Provide teaching at level of understanding  - Provide teaching via preferred learning methods  Outcome: Progressing

## 2022-02-17 NOTE — ASSESSMENT & PLAN NOTE
Concern for MS exacerbation  Patient feels improved from admission; he reports improvement from 2/16 as well  Cont solumedrol - pulse dosing 1000 mg/day - day 3  Taper steroids as outpt per neurology as well  MRI brain  Multiple small supratentorial enhancing foci of acute demyelination consistent with disease progression of multiple sclerosis  Late subacute to chronic demyelination in the left posterior limb of the internal capsule   This lesion has evolved in shape when compared to the prior outside MRI study of June 1, 2021  Moderate supratentorial predominant chronic demyelinating disease of multiple sclerosis  MRI c spine   Acute to early subacute subcentimeter demyelinating plaque at the C5 vertebral body level  Additional smaller nonenhancing chronic appearing demyelinating plaques within the upper and mid cervical cord     Monitor blood glucose on high dose steroids

## 2022-02-17 NOTE — OCCUPATIONAL THERAPY NOTE
Occupational Therapy Tx Note     Patient Name: Rayne Maher  JNFCH'U Date: 2/17/2022  Problem List  Principal Problem:    CNS demyelinating disease [facility])  Active Problems:    Dysarthria    Right hemiparesis ([address] )          02/17/22 1050   OT Last Visit   OT Visit Date 02/17/22   Note Type   Note Type Treatment   Restrictions/Precautions   Weight Bearing Precautions Per Order No   Other Precautions Fall Risk; Chair Alarm   Pain Assessment   Pain Assessment Tool Wilson-Baker FACES   Wilson-Baker FACES Pain Rating 4   Pain Location/Orientation Orientation: Right;Location: Arm   Pain Onset/Description Descriptor: Sore   Effect of Pain on Daily Activities movement   ADL   Grooming Assistance 4  Minimal Assistance   Grooming Deficit Brushing hair  (with RUE)   Grooming Comments Support required at R elbow to perform   Bed Mobility   Supine to Sit 4  Minimal assistance   Additional items Assist x 1;Verbal cues   Additional Comments Pt remained seated in recliner by end of session   Transfers   Sit to Stand 4  Minimal assistance   Additional items Assist x 2;Verbal cues   Stand to Sit 4  Minimal assistance   Additional items Assist x 1;Verbal cues   Stand pivot 4  Minimal assistance   Additional items Assist x 2;Verbal cues  (RW)   Functional Mobility   Functional Mobility 4  Minimal assistance   Additional Comments x 2, RW  Pt with noted difficulty weight shifting with RLE   Coordination   Gross Motor Performed functional activity to facilate gross motor and fine motor coordinaton with RUE  Pt required to grasp straws with RUE,cross midline, and place in cup on left side of table  Pt required VC for appropriate pincer grasp of RUE as well as to maintain adequate R shoulder flexion to carry straw to designated desination     In Hand Manipulation Performed functional activity requiring pt to retrive small squares and perform in-hand translation to maintain items in palm    Cognition   Overall Cognitive Status [facility] Arousal/Participation Alert   Attention Within functional limits   Orientation Level Oriented X4   Memory Within functional limits   Following Commands Follows all commands and directions without difficulty   Activity Tolerance   Activity Tolerance Patient tolerated treatment well   Medical Staff Made Aware PT LADI Bosees   Assessment   Assessment Pt seen for OT tx session with focus on functional balance, functional mobility, ADL status, and transfer safety  Patient agreeable to OT treatment session  Pt received supine in bed  Performed bed mobility with min A x 1  Performed tranfers with min A 1-2  RUE ROM and strength continues to improve, however continue to have difficulty with manipulating objects  Transfers also continue to improve  Patient continues to be functioning below baseline level, occupational performance remains limited secondary to factors listed above, and pt at increased risk for falls and injury  The patient's raw score on the AM-PAC Daily Activity inpatient short form is 17, standardized score is 37 26, less than 39 4  Patients at this level are likely to benefit from DC to post-acute rehabilitation services  Please refer to the recommendation of the Occupational Therapist for safe DC planning  From OT standpoint, recommendation at time of d/c would be Short Term Rehab  Patient to benefit from continued Occupational Therapy treatment while in the hospital to address deficits as defined above and maximize level of functional independence with ADLs and functional mobility  Pt left with call bell in reach, tray table in reach, needs met, chair alarm activated  Plan   Treatment Interventions ADL retraining;Functional transfer training;UE strengthening/ROM; Endurance training;Patient/family training; Compensatory technique education; Fine motor coordination activities; Neuromuscular reeducation   Goal Expiration Date 02/25/22   OT Treatment Day 1   OT Frequency 3-5x/wk   Recommendation   OT Discharge Recommendation Post acute rehabilitation services   AM-PAC Daily Activity Inpatient   Lower Body Dressing 2   Bathing 2   Toileting 2   Upper Body Dressing 3   Grooming 4   Eating 4   Daily Activity Raw Score 17   Daily Activity Standardized Score (Calc for Raw Score >=11) 37 26   AM-PAC Applied Cognition Inpatient   Following a Speech/Presentation 4   Understanding Ordinary Conversation 4   Taking Medications 4   Remembering Where Things Are Placed or Put Away 4   Remembering List of 4-5 Errands 4   Taking Care of Complicated Tasks 4   Applied Cognition Raw Score 24   Applied Cognition Standardized Score 62 21           Kari Jackson, OTR/L

## 2022-02-17 NOTE — PLAN OF CARE
Problem: Potential for Falls  Goal: Patient will remain free of falls  Description: INTERVENTIONS:  - Educate patient/family on patient safety including physical limitations  - Instruct patient to call for assistance with activity   - Consult OT/PT to assist with strengthening/mobility   - Keep Call bell within reach  - Keep bed low and locked with side rails adjusted as appropriate  - Keep care items and personal belongings within reach  - Initiate and maintain comfort rounds  - Make Fall Risk Sign visible to staff  - Offer Toileting every  Hours, in advance of need  - Initiate/Maintain alarm  - Obtain necessary fall risk management equipment:   - Apply yellow socks and bracelet for high fall risk patients  - Consider moving patient to room near nurses station  Outcome: Progressing     Problem: MOBILITY - ADULT  Goal: Maintain or return to baseline ADL function  Description: INTERVENTIONS:  -  Assess patient's ability to carry out ADLs; assess patient's baseline for ADL function and identify physical deficits which impact ability to perform ADLs (bathing, care of mouth/teeth, toileting, grooming, dressing, etc )  - Assess/evaluate cause of self-care deficits   - Assess range of motion  - Assess patient's mobility; develop plan if impaired  - Assess patient's need for assistive devices and provide as appropriate  - Encourage maximum independence but intervene and supervise when necessary  - Involve family in performance of ADLs  - Assess for home care needs following discharge   - Consider OT consult to assist with ADL evaluation and planning for discharge  - Provide patient education as appropriate  Outcome: Progressing  Goal: Maintains/Returns to pre admission functional level  Description: INTERVENTIONS:  - Perform BMAT or MOVE assessment daily    - Set and communicate daily mobility goal to care team and patient/family/caregiver     - Collaborate with rehabilitation services on mobility goals if consulted  - Perform Range of Motion 2 times a day  - Reposition patient every 2 hours    - Dangle patient 2 times a day  - Stand patient 2 times a day  - Ambulate patient 2 times a day  - Out of bed to chair 2 times a day   - Out of bed for meals 2 times a day  - Out of bed for toileting  - Record patient progress and toleration of activity level   Outcome: Progressing     Problem: Prexisting or High Potential for Compromised Skin Integrity  Goal: Skin integrity is maintained or improved  Description: INTERVENTIONS:  - Identify patients at risk for skin breakdown  - Assess and monitor skin integrity  - Assess and monitor nutrition and hydration status  - Monitor labs   - Assess for incontinence   - Turn and reposition patient  - Assist with mobility/ambulation  - Relieve pressure over bony prominences  - Avoid friction and shearing  - Provide appropriate hygiene as needed including keeping skin clean and dry  - Evaluate need for skin moisturizer/barrier cream  - Collaborate with interdisciplinary team   - Patient/family teaching  - Consider wound care consult   Outcome: Progressing     Problem: NEUROSENSORY - ADULT  Goal: Achieves stable or improved neurological status  Description: INTERVENTIONS  - Monitor and report changes in neurological status  - Monitor vital signs such as temperature, blood pressure, glucose, and any other labs ordered   - Initiate measures to prevent increased intracranial pressure  - Monitor for seizure activity and implement precautions if appropriate      Outcome: Progressing     Problem: PAIN - ADULT  Goal: Verbalizes/displays adequate comfort level or baseline comfort level  Description: Interventions:  - Encourage patient to monitor pain and request assistance  - Assess pain using appropriate pain scale  - Administer analgesics based on type and severity of pain and evaluate response  - Implement non-pharmacological measures as appropriate and evaluate response  - Consider cultural and social influences on pain and pain management  - Notify physician/advanced practitioner if interventions unsuccessful or patient reports new pain  Outcome: Progressing     Problem: INFECTION - ADULT  Goal: Absence or prevention of progression during hospitalization  Description: INTERVENTIONS:  - Assess and monitor for signs and symptoms of infection  - Monitor lab/diagnostic results  - Monitor all insertion sites, i e  indwelling lines, tubes, and drains  - Monitor endotracheal if appropriate and nasal secretions for changes in amount and color  - [place] appropriate cooling/warming therapies per order  - Administer medications as ordered  - Instruct and encourage patient and family to use good hand hygiene technique  - Identify and instruct in appropriate isolation precautions for identified infection/condition  Outcome: Progressing     Problem: SAFETY ADULT  Goal: Patient will remain free of falls  Description: INTERVENTIONS:  - Educate patient/family on patient safety including physical limitations  - Instruct patient to call for assistance with activity   - Consult OT/PT to assist with strengthening/mobility   - Keep Call bell within reach  - Keep bed low and locked with side rails adjusted as appropriate  - Keep care items and personal belongings within reach  - Initiate and maintain comfort rounds  - Make Fall Risk Sign visible to staff  - Offer Toileting every  Hours, in advance of need  - Initiate/Maintain alarm  - Obtain necessary fall risk management equipment:   - Apply yellow socks and bracelet for high fall risk patients  - Consider moving patient to room near nurses station  Outcome: Progressing  Goal: Maintain or return to baseline ADL function  Description: INTERVENTIONS:  -  Assess patient's ability to carry out ADLs; assess patient's baseline for ADL function and identify physical deficits which impact ability to perform ADLs (bathing, care of mouth/teeth, toileting, grooming, dressing, etc )  - Assess/evaluate cause of self-care deficits   - Assess range of motion  - Assess patient's mobility; develop plan if impaired  - Assess patient's need for assistive devices and provide as appropriate  - Encourage maximum independence but intervene and supervise when necessary  - Involve family in performance of ADLs  - Assess for home care needs following discharge   - Consider OT consult to assist with ADL evaluation and planning for discharge  - Provide patient education as appropriate  Outcome: Progressing  Goal: Maintains/Returns to pre admission functional level  Description: INTERVENTIONS:  - Perform BMAT or MOVE assessment daily    - Set and communicate daily mobility goal to care team and patient/family/caregiver  - Collaborate with rehabilitation services on mobility goals if consulted  - Perform Range of Motion 2 times a day  - Reposition patient every 2 hours    - Dangle patient 2 times a day  - Stand patient 2 times a day  - Ambulate patient 2 times a day  - Out of bed to chair 2 times a day   - Out of bed for meals 2 times a day  - Out of bed for toileting  - Record patient progress and toleration of activity level   Outcome: Progressing     Problem: DISCHARGE PLANNING  Goal: Discharge to home or other facility with appropriate resources  Description: INTERVENTIONS:  - Identify barriers to discharge w/patient and caregiver  - Arrange for needed discharge resources and transportation as appropriate  - Identify discharge learning needs (meds, wound care, etc )  - Arrange for interpretive services to assist at discharge as needed  - Refer to Case Management Department for coordinating discharge planning if the patient needs post-hospital services based on physician/advanced practitioner order or complex needs related to functional status, cognitive ability, or social support system  Outcome: Progressing     Problem: Knowledge Deficit  Goal: Patient/family/caregiver demonstrates understanding of disease process, treatment plan, medications, and discharge instructions  Description: Complete learning assessment and assess knowledge base    Interventions:  - Provide teaching at level of understanding  - Provide teaching via preferred learning methods  Outcome: Progressing

## 2022-02-17 NOTE — PLAN OF CARE
Problem: OCCUPATIONAL THERAPY ADULT  Goal: Performs self-care activities at highest level of function for planned discharge setting  See evaluation for individualized goals  Description: Treatment Interventions: ADL retraining,Functional transfer training,UE strengthening/ROM,Endurance training,Patient/family training,Compensatory technique education,Fine motor coordination activities,Neuromuscular reeducation          See flowsheet documentation for full assessment, interventions and recommendations  Note: Limitation: Decreased ADL status,Decreased self-care trans,Decreased high-level ADLs,Decreased UE strength,Decreased endurance,Decreased fine motor control  Prognosis: Good  Assessment: Pt seen for OT tx session with focus on functional balance, functional mobility, ADL status, and transfer safety  Patient agreeable to OT treatment session  Pt received supine in bed  Performed bed mobility with min A x 1  Performed tranfers with min A 1-2  RUE ROM and strength continues to improve, however continue to have difficulty with manipulating objects  Transfers also continue to improve  Patient continues to be functioning below baseline level, occupational performance remains limited secondary to factors listed above, and pt at increased risk for falls and injury  The patient's raw score on the AM-PAC Daily Activity inpatient short form is 17, standardized score is 37 26, less than 39 4  Patients at this level are likely to benefit from DC to post-acute rehabilitation services  Please refer to the recommendation of the Occupational Therapist for safe DC planning  From OT standpoint, recommendation at time of d/c would be Short Term Rehab  Patient to benefit from continued Occupational Therapy treatment while in the hospital to address deficits as defined above and maximize level of functional independence with ADLs and functional mobility   Pt left with call bell in reach, tray table in reach, needs met, chair alarm activated       OT Discharge Recommendation: Post acute rehabilitation services

## 2022-02-17 NOTE — PHYSICAL THERAPY NOTE
PT tx     02/17/22 1051   PT Last Visit   PT Visit Date 02/17/22   Note Type   Note Type Treatment   Pain Assessment   Pain Assessment Tool Wilson-Baker FACES   Wilson-Baker FACES Pain Rating 4   Pain Location/Orientation Orientation: Right;Orientation: Upper; Location: Arm   Restrictions/Precautions   Weight Bearing Precautions Per Order No   Other Precautions Fall Risk   General   Chart Reviewed Yes   Additional Pertinent History just finished steroid infusion   Cognition   Overall Cognitive Status WFL   Arousal/Participation Alert   Attention Within functional limits   Orientation Level Oriented X4   Following Commands Follows all commands and directions without difficulty   Subjective   Subjective agres to mobilize   Bed Mobility   Supine to Sit 4  Minimal assistance   Additional items Assist x 1;Leg ;Verbal cues;LE management   Transfers   Sit to Stand 4  Minimal assistance   Additional items Assist x 2   Stand to Sit 4  Minimal assistance   Additional items Assist x 2   Stand pivot 4  Minimal assistance   Additional items Assist x 2  (rw)   Ambulation/Elevation   Gait pattern Narrow MG; Forward Flexion; Shuffling; Inconsistent sigrid; Short stride; Excessively slow; Step to;Decreased R stance  (assist to advance R le)   Gait Assistance 4  Minimal assist   Additional items Assist x 2;Verbal cues; Tactile cues   Assistive Device Rolling walker   Distance 5'   Ambulation/Elevation Additional Comments stood x 1 5 min wih rw and sba of 1   Balance   Static Sitting Fair   Dynamic Sitting Fair -   Static Standing Fair -   Dynamic Standing Fair -   Ambulatory Poor +   Endurance Deficit   Endurance Deficit No   Activity Tolerance   Activity Tolerance Patient tolerated treatment well   Medical Staff Made Aware KATIE Juarez   Nurse Made Aware LADI Francis   Assessment   Prognosis Good   Problem List Decreased strength;Decreased endurance; Impaired balance;Decreased mobility; Decreased coordination   Assessment Pt with slightly improved strength R le  Needs encouragement to increased activity with pacing to avoid over fatigue  Prox le, trunk weakness persists  COntinue to recomend in-pt rehab at d/c to maximize funcitonal recovery  Con't PT   Goals   Patient Goals get better   Plan   Treatment/Interventions ADL retraining;Functional transfer training;LE strengthening/ROM; Elevations; Therapeutic exercise; Endurance training;Cognitive reorientation;Patient/family training;Equipment eval/education; Bed mobility;Gait training;Spoke to nursing;Spoke to case management;OT   Progress Improving as expected   Recommendation   PT Discharge Recommendation Post acute rehabilitation services   Equipment Recommended [address] Basic Mobility Inpatient   Turning in Bed Without Bedrails 3   Lying on Back to Sitting on Edge of Flat Bed 3   Moving Bed to Chair 3   Standing Up From Chair 3   Walk in Room 2   Climb 3-5 Stairs 1   Basic Mobility Inpatient Raw Score 15   Basic Mobility Standardized Score 36 97   Highest Level Of Mobility   JH-HLM Goal 4: Move to chair/commode   JH-HLM Highest Level of Mobility 4: Move to chair/commode   JH-HLM Goal Achieved Yes   Allyn Robbins, PT

## 2022-02-17 NOTE — PROGRESS NOTES
Progress Note - Neurology   Kailash Marin 48 y o  male [id number]  Unit/Bed#: /-01    Assessment/Plan:    * CNS demyelinating disease [facility])  Assessment & Plan  25-year-old male recent workup for demyelinating disease, seen at [facility] and s/p 5 days IV steroids with improvement, presented as a stroke alert on 2/13 with worsening right-sided weakness, numbness, and dysarthria  He did receive a tPA bolus this admission, however infusion was then deferred given some concern for a left basal ganglia hypodensity on CT head  Subsequent MRI of the brain revealed multiple demyelinating lesions, some active and some chronic, and consistent with progression of MS  Hypodensity noted on CT head was consistent chronic demyelination, and not subacute stroke  Acute demyelinating plaques also noted in the cervical spine  Plan:  - Continue 1000 mg IV Solu-Medrol x 5 days  Today is day 3/5  - After completion of IV steroids, patient will need to be initiated on a 60 mg prednisone taper  - PT/OT  - Medical management and supportive care per primary team  Correction of any metabolic or infectious disturbances  Appointment scheduled with Dr Amie Simpson for 3/8/22  Subjective:   He notes some improvement today in his strength on the R and his speech  He denies new complaints overnight  He does feel much better after steroids are initiated  He is planning to go to [address] [place] [address] after discharge           Past Medical History:   Diagnosis Date    MS (multiple sclerosis) ([facility] [address] )      Past Surgical History:   Procedure Laterality Date    HERNIA REPAIR      3 times    KNEE SURGERY Right      Family History   Problem Relation Age of Onset    Stroke Maternal Grandmother     Multiple sclerosis Other      Social History     Socioeconomic History    Marital status: Unknown     Spouse name: None    Number of children: None    Years of education: None    Highest education level: None   Occupational History    None Tobacco Use    Smoking status: Former Smoker    Smokeless tobacco: Former User   Vaping Use    Vaping Use: Former   Substance and Sexual Activity    Alcohol use: Not Currently    Drug use: Never    Sexual activity: None   Other Topics Concern    None   Social History Narrative    None     Social Determinants of Health     Financial Resource Strain: Not on file   Food Insecurity: No Food Insecurity    Worried About Running Out of Food in the Last Year: Never true    Antonia of Food in the Last Year: Never true   Transportation Needs: No Transportation Needs    Lack of Transportation (Medical): No    Lack of Transportation (Non-Medical): No   Physical Activity: Not on file   Stress: Not on file   Social Connections: Not on file   Intimate Partner Violence: Not on file   Housing Stability: Low Risk     Unable to Pay for Housing in the Last Year: No    Number of Places Lived in the Last Year: 1    Unstable Housing in the Last Year: No         Medications: All current active meds have been reviewed and current meds:  Scheduled Meds:  Current Facility-Administered Medications   Medication Dose Route Frequency Provider Last Rate    cyanocobalamin  1,000 mcg Oral Daily Welford Cue, PA-C      ergocalciferol  50,000 Units Oral Weekly Welford Cue, PA-C      gabapentin  300 mg Oral TID Welford Cue, PA-C      insulin lispro  1-6 Units Subcutaneous TID [facility] Gris Santos MD      methylPREDNISolone sodium succinate  1,000 mg Intravenous Daily Viridiana Coleman PA-C 1,000 mg (02/17/22 0813)    senna-docusate sodium  1 tablet Oral HS Welford Cue, PA-C      thiamine  100 mg Oral Daily Welford Cue, PA-C       Continuous Infusions:   PRN Meds:  ROS:   Review of Systems   Neurological: Positive for speech difficulty and weakness  Coordination issues on the R   All other systems reviewed and are negative              Vitals:   /72 (BP Location: Left arm)   Pulse 61   Temp (!) 97 2 °F (36 2 °C) (Oral)   Resp 20   Ht 5' 11" (1 803 m)   Wt 107 kg (236 lb 1 8 oz)   SpO2 94%   BMI 32 93 kg/m²     Physical Exam:   Physical Exam  Vitals and nursing note reviewed  Constitutional:       Appearance: He is not ill-appearing, toxic-appearing or diaphoretic  HENT:      Head: Normocephalic and atraumatic  Right Ear: External ear normal       Left Ear: External ear normal       Nose: Nose normal       Mouth/Throat:      Mouth: Mucous membranes are moist       Pharynx: Oropharynx is clear  Eyes:      General: No scleral icterus  Right eye: No discharge  Left eye: No discharge  Extraocular Movements: Extraocular movements intact  Cardiovascular:      Rate and Rhythm: Normal rate  Pulmonary:      Effort: Pulmonary effort is normal  No respiratory distress  Breath sounds: No stridor  Musculoskeletal:         General: No swelling or tenderness  Cervical back: Normal range of motion and neck supple  Right lower leg: No edema  Left lower leg: No edema  Skin:     General: Skin is warm and dry  Coloration: Skin is not jaundiced or pale  Neurological:      Mental Status: He is alert and oriented to person, place, and time  Coordination: Finger-nose-finger test: Ataxia on the R  Heel-to-shin test: Clumsy on R but improved ROM  Comments: Detailed below   Psychiatric:         Mood and Affect: Mood normal          Behavior: Behavior normal          Thought Content: Thought content normal          Judgment: Judgment normal        Neurologic Exam     Mental Status   Oriented to person, place, and time  Level of consciousness: alert  Mild dysarthria/stuttering  Improved from yesterday  Cranial Nerves   Visual acuity grossly intact  EOMs intact, just not smooth movements  Conjugate gaze   No nystagmus  Slight weakness of R eyelid closure  Face symmetric  Tongue midline  Motor Exam     Strength   Strength 5/5 except as noted     R: Biceps-  5-/5  Wrist Flexion- 3/5  Hip flexor 3/5  Knee flexion 4+/5  Plantar flexion 5-/5     Gait, Coordination, and Reflexes     Gait  Gait: (Deferred for safety)    Coordination   Finger-nose-finger test: Ataxia on the R   Heel-to-shin test: Clumsy on R but improved ROM  Tremor   Resting tremor: absent          Labs: I have personally reviewed pertinent reports     Recent Results (from the past 24 hour(s))   Fingerstick Glucose (POCT)    Collection Time: 02/16/22  4:17 PM   Result Value Ref Range    POC Glucose 165 (H) 65 - 140 mg/dl   CBC and differential    Collection Time: 02/17/22  4:05 AM   Result Value Ref Range    WBC 16 86 (H) 4 31 - 10 16 Thousand/uL    RBC 4 60 3 88 - 5 62 Million/uL    Hemoglobin 13 7 12 0 - 17 0 g/dL    Hematocrit 42 1 36 5 - 49 3 %    MCV 92 82 - 98 fL    MCH 29 8 26 8 - 34 3 pg    MCHC 32 5 31 4 - 37 4 g/dL    RDW 12 9 11 6 - 15 1 %    MPV 10 7 8 9 - 12 7 fL    Platelets 263 959 - 360 Thousands/uL    nRBC 0 /100 WBCs    Neutrophils Relative 88 (H) 43 - 75 %    Immat GRANS % 1 0 - 2 %    Lymphocytes Relative 7 (L) 14 - 44 %    Monocytes Relative 4 4 - 12 %    Eosinophils Relative 0 0 - 6 %    Basophils Relative 0 0 - 1 %    Neutrophils Absolute 14 76 (H) 1 85 - 7 62 Thousands/µL    Immature Grans Absolute 0 20 0 00 - 0 20 Thousand/uL    Lymphocytes Absolute 1 17 0 60 - 4 47 Thousands/µL    Monocytes Absolute 0 72 0 17 - 1 22 Thousand/µL    Eosinophils Absolute 0 00 0 00 - 0 61 Thousand/µL    Basophils Absolute 0 01 0 00 - 0 10 Thousands/µL   Comprehensive metabolic panel    Collection Time: 02/17/22  4:05 AM   Result Value Ref Range    Sodium 144 136 - 145 mmol/L    Potassium 4 7 3 5 - 5 3 mmol/L    Chloride 109 (H) 100 - 108 mmol/L    CO2 23 21 - 32 mmol/L    ANION GAP 12 4 - 13 mmol/L    BUN 22 5 - 25 mg/dL    Creatinine 0 92 0 60 - 1 30 mg/dL    Glucose 113 65 - 140 mg/dL    Calcium 8 9 8 3 - 10 1 mg/dL    Corrected Calcium 9 5 8 3 - 10 1 mg/dL    AST 12 5 - 45 U/L    ALT 17 12 - 78 U/L    Alkaline Phosphatase 58 46 - 116 U/L    Total Protein 6 7 6 4 - 8 2 g/dL    Albumin 3 3 (L) 3 5 - 5 0 g/dL    Total Bilirubin 0 20 0 20 - 1 00 mg/dL    eGFR 98 ml/min/1 73sq m   Fingerstick Glucose (POCT)    Collection Time: 02/17/22  7:30 AM   Result Value Ref Range    POC Glucose 98 65 - 140 mg/dl   Fingerstick Glucose (POCT)    Collection Time: 02/17/22 11:27 AM   Result Value Ref Range    POC Glucose 102 65 - 140 mg/dl       Imaging: No new neuroimaging to review  EKG, Pathology, and Other Studies: I have personally reviewed pertinent reports  VTE Prophylaxis: Sequential compression device (Venodyne)       Counseling / Coordination of Care  Total time spent today 22 minutes  Greater than 50% of total time was spent with the patient and/or family counseling and/or coordination of care  A description of the counseling/coordination of care:  Patient was seen and evaluated  Discussed with attending  Chart reviewed thoroughly including laboratory and imaging studies  Discussed medications  Plan of care discussed with patient and primary team        History and physical examination obtained by attending neurologist  AP in room as witness to history and physical examination and acted solely as a scribe for attending neurologist for this encounter  All medical decision making per attending

## 2022-02-17 NOTE — PLAN OF CARE
Problem: PHYSICAL THERAPY ADULT  Goal: Performs mobility at highest level of function for planned discharge setting  See evaluation for individualized goals  Description: Treatment/Interventions: ADL retraining,Functional transfer training,LE strengthening/ROM,Elevations,Therapeutic exercise,Endurance training,Patient/family training,Bed mobility,Gait training,Spoke to nursing,Spoke to case management,OT  Equipment Recommended: Isela Barone       See flowsheet documentation for full assessment, interventions and recommendations  Outcome: Progressing  Note: Prognosis: Good  Problem List: Decreased strength,Decreased endurance,Impaired balance,Decreased mobility,Decreased coordination  Assessment: Pt with slightly improved strength R le  Needs encouragement to increased activity with pacing to avoid over fatigue  Prox le, trunk weakness persists  COntinue to recomend in-pt rehab at d/c to maximize funcitonal recovery  Con't PT  Barriers to Discharge:  (medical status)        PT Discharge Recommendation: Post acute rehabilitation services          See flowsheet documentation for full assessment

## 2022-02-18 LAB
ALBUMIN SERPL BCP-MCNC: 3 G/DL (ref 3.5–5)
ALP SERPL-CCNC: 50 U/L (ref 46–116)
ALT SERPL W P-5'-P-CCNC: 18 U/L (ref 12–78)
ANION GAP SERPL CALCULATED.3IONS-SCNC: 6 MMOL/L (ref 4–13)
AST SERPL W P-5'-P-CCNC: 15 U/L (ref 5–45)
BASOPHILS # BLD AUTO: 0.02 THOUSANDS/ΜL (ref 0–0.1)
BASOPHILS NFR BLD AUTO: 0 % (ref 0–1)
BILIRUB SERPL-MCNC: 0.2 MG/DL (ref 0.2–1)
BUN SERPL-MCNC: 25 MG/DL (ref 5–25)
CALCIUM ALBUM COR SERPL-MCNC: 9.2 MG/DL (ref 8.3–10.1)
CALCIUM SERPL-MCNC: 8.4 MG/DL (ref 8.3–10.1)
CHLORIDE SERPL-SCNC: 107 MMOL/L (ref 100–108)
CO2 SERPL-SCNC: 26 MMOL/L (ref 21–32)
CREAT SERPL-MCNC: 0.83 MG/DL (ref 0.6–1.3)
EOSINOPHIL # BLD AUTO: 0 THOUSAND/ΜL (ref 0–0.61)
EOSINOPHIL NFR BLD AUTO: 0 % (ref 0–6)
ERYTHROCYTE [DISTWIDTH] IN BLOOD BY AUTOMATED COUNT: 13 % (ref 11.6–15.1)
FLUAV RNA RESP QL NAA+PROBE: NEGATIVE
FLUBV RNA RESP QL NAA+PROBE: NEGATIVE
GFR SERPL CREATININE-BSD FRML MDRD: 104 ML/MIN/1.73SQ M
GLUCOSE SERPL-MCNC: 114 MG/DL (ref 65–140)
GLUCOSE SERPL-MCNC: 144 MG/DL (ref 65–140)
GLUCOSE SERPL-MCNC: 86 MG/DL (ref 65–140)
GLUCOSE SERPL-MCNC: 97 MG/DL (ref 65–140)
HCT VFR BLD AUTO: 41.3 % (ref 36.5–49.3)
HGB BLD-MCNC: 13.5 G/DL (ref 12–17)
IMM GRANULOCYTES # BLD AUTO: 0.17 THOUSAND/UL (ref 0–0.2)
IMM GRANULOCYTES NFR BLD AUTO: 1 % (ref 0–2)
LYMPHOCYTES # BLD AUTO: 1.3 THOUSANDS/ΜL (ref 0.6–4.47)
LYMPHOCYTES NFR BLD AUTO: 8 % (ref 14–44)
MCH RBC QN AUTO: 29.9 PG (ref 26.8–34.3)
MCHC RBC AUTO-ENTMCNC: 32.7 G/DL (ref 31.4–37.4)
MCV RBC AUTO: 92 FL (ref 82–98)
MONOCYTES # BLD AUTO: 0.92 THOUSAND/ΜL (ref 0.17–1.22)
MONOCYTES NFR BLD AUTO: 6 % (ref 4–12)
NEUTROPHILS # BLD AUTO: 13.37 THOUSANDS/ΜL (ref 1.85–7.62)
NEUTS SEG NFR BLD AUTO: 85 % (ref 43–75)
NRBC BLD AUTO-RTO: 0 /100 WBCS
PLATELET # BLD AUTO: 212 THOUSANDS/UL (ref 149–390)
PMV BLD AUTO: 10.4 FL (ref 8.9–12.7)
POTASSIUM SERPL-SCNC: 4.8 MMOL/L (ref 3.5–5.3)
PROT SERPL-MCNC: 6.4 G/DL (ref 6.4–8.2)
RBC # BLD AUTO: 4.51 MILLION/UL (ref 3.88–5.62)
RSV RNA RESP QL NAA+PROBE: NEGATIVE
SARS-COV-2 RNA RESP QL NAA+PROBE: NEGATIVE
SODIUM SERPL-SCNC: 139 MMOL/L (ref 136–145)
WBC # BLD AUTO: 15.78 THOUSAND/UL (ref 4.31–10.16)

## 2022-02-18 PROCEDURE — 99232 SBSQ HOSP IP/OBS MODERATE 35: CPT | Performed by: PSYCHIATRY & NEUROLOGY

## 2022-02-18 PROCEDURE — 85025 COMPLETE CBC W/AUTO DIFF WBC: CPT | Performed by: HOSPITALIST

## 2022-02-18 PROCEDURE — 82948 REAGENT STRIP/BLOOD GLUCOSE: CPT

## 2022-02-18 PROCEDURE — 99232 SBSQ HOSP IP/OBS MODERATE 35: CPT | Performed by: HOSPITALIST

## 2022-02-18 PROCEDURE — 80053 COMPREHEN METABOLIC PANEL: CPT | Performed by: HOSPITALIST

## 2022-02-18 PROCEDURE — 0241U HB NFCT DS VIR RESP RNA 4 TRGT: CPT | Performed by: HOSPITALIST

## 2022-02-18 RX ORDER — PANTOPRAZOLE SODIUM 40 MG/1
40 TABLET, DELAYED RELEASE ORAL
Status: DISCONTINUED | OUTPATIENT
Start: 2022-02-18 | End: 2022-02-19 | Stop reason: HOSPADM

## 2022-02-18 RX ADMIN — CYANOCOBALAMIN TAB 500 MCG 1000 MCG: 500 TAB at 08:05

## 2022-02-18 RX ADMIN — SODIUM CHLORIDE 1000 MG: 0.9 INJECTION, SOLUTION INTRAVENOUS at 08:08

## 2022-02-18 RX ADMIN — SENNOSIDES AND DOCUSATE SODIUM 1 TABLET: 50; 8.6 TABLET ORAL at 21:53

## 2022-02-18 RX ADMIN — THIAMINE HCL TAB 100 MG 100 MG: 100 TAB at 08:05

## 2022-02-18 RX ADMIN — GABAPENTIN 300 MG: 300 CAPSULE ORAL at 21:54

## 2022-02-18 RX ADMIN — GABAPENTIN 300 MG: 300 CAPSULE ORAL at 08:05

## 2022-02-18 RX ADMIN — PANTOPRAZOLE SODIUM 40 MG: 40 TABLET, DELAYED RELEASE ORAL at 10:36

## 2022-02-18 RX ADMIN — GABAPENTIN 300 MG: 300 CAPSULE ORAL at 16:51

## 2022-02-18 NOTE — ASSESSMENT & PLAN NOTE
Concern for MS exacerbation  Patient feels improved from admission; he reports improvement from 2/16 as well, improving slowly further  Cont solumedrol - pulse dosing 1000 mg/day - day 4  Taper steroids as outpt per neurology as well  MRI brain  Multiple small supratentorial enhancing foci of acute demyelination consistent with disease progression of multiple sclerosis  Late subacute to chronic demyelination in the left posterior limb of the internal capsule   This lesion has evolved in shape when compared to the prior outside MRI study of June 1, 2021  Moderate supratentorial predominant chronic demyelinating disease of multiple sclerosis  MRI c spine   Acute to early subacute subcentimeter demyelinating plaque at the C5 vertebral body level  Additional smaller nonenhancing chronic appearing demyelinating plaques within the upper and mid cervical cord     Monitor blood glucose on high dose steroids

## 2022-02-18 NOTE — UTILIZATION REVIEW
Continued Stay Review    Date: 2/18/22                       Current Patient Class: inpatient   Current Level of Care: med surg    HPI:46 y o  male initially admitted on 2/13/22 inpatient due to CNS demyelinating disease/MS exacerbation  Presented as stroke alert and did receive bolus of tpa  Ct head showed hypodensity  MRI showed progression of MS  Has acute demyelinating plaques on C spine  Treated with high dose IV steroids  Assessment/Plan:   2/18/22:  Able to sleep on right side, fine motor difficulty in right hand/fingers is slightly improved  On exam:  Mild R deltoid weakness  Near full with maximal effort with L Bicep/tricep  4+ ROM right wrist   3/5  R finger muscles/interossei  3/4 Right hip flexion, impersistent R dorsiflexion, right plantar flexion  Slight diminished vibration in fingers/wrist bilaterally  Still ataxic with R finger to nose  Still with clumsy R heel to shin, but better ROM with R hip  Flexion  Fine motor tasks with distal RUE, still slow and slightly clumsy, slightly improved  On day 4/5 of high dose IV steroids  Vital Signs:   02/18/22 07:48:36 97 9 °F (36 6 °C) 52 Abnormal  18 131/73 92 95 % -- --   02/17/22 22:47:20 97 5 °F (36 4 °C) 68 18 112/76 88 94 %       Date and Time Trauma Responsiveness Trauma Length of LOC (Seconds) R Pupil Size (mm) L Pupil Size (mm) R Pupil Reaction L Pupil Reaction   02/18/22 0813 -- -- 3 3 Brisk Brisk   02/17/22 1900 -- -- 3 3 Brisk Brisk   02/17/22 0900 -- -- 3 3 Brisk Brisk   02/17/22 0400 -- -- 3 3 Brisk Brisk       Pertinent Labs/Diagnostic Results:   CXR 2/13 - No acute cardiopulmonary disease  CTA Head & Neck - 2/13 - 1   No intracranial large vessel occlusion or critical stenosis  No aneurysm  2   No hemodynamically significant stenosis of cervical carotid and vertebral arteries  No dissection  CT Brain - 2/13 - 1   No acute intracranial hemorrhage     2   White matter change in keeping with known history of demyelinating disease   Focal hypoattenuation centered at posterior limb of left internal capsule of uncertain etiology, ischemia versus underlying demyelinating disease  ECG - 2/13 - NSR- no acute     CT Head - 2/14 - No intracranial hemorrhage  No significant interval change       MRI Brain -2/14/22 - Multiple small supratentorial enhancing foci of acute demyelination consistent with disease progression of multiple sclerosis  Late subacute to chronic demyelination in the left posterior limb of the internal capsule   This lesion has evolved in shape when compared to the prior outside MRI study of June 1, 2021  Moderate supratentorial predominant chronic demyelinating disease of multiple sclerosis       MRI cervical; Spine -2/14/22 - Acute to early subacute subcentimeter demyelinating plaque at the C5 vertebral body level  Additional smaller nonenhancing chronic appearing demyelinating plaques within the upper and mid cervical cord  Echo 2/14/22 - Left Ventricle: Left ventricular cavity size is normal  Wall thickness is mildly increased  The left ventricular ejection fraction is 65%  Systolic function is normal  Wall motion is normal  Diastolic function is mildly abnormal, consistent with grade I (abnormal) relaxation  There is mild concentric hypertrophy    Right Ventricle: Right ventricular cavity size is mildly dilated  Systolic function is normal     Mitral Valve: There is trace regurgitation    Tricuspid Valve: There is mild regurgitation    Aorta: The aortic root is normal in size  The ascending aorta is mildly dilated      Results from last 7 days   Lab Units 02/18/22  0905   SARS-COV-2  Negative     Results from last 7 days   Lab Units 02/18/22  0545 02/17/22  0405 02/16/22  0506 02/14/22  0435 02/14/22  0435 02/13/22  0757 02/13/22  0757   WBC Thousand/uL 15 78* 16 86* 10 64*   < > 7 31   < > 8 91   HEMOGLOBIN g/dL 13 5 13 7 13 9  --  13 3  --  14 5   HEMATOCRIT % 41 3 42 1 42 4  --  42 4 --  45 1   PLATELETS Thousands/uL 212 234 243   < > 203   < > 242   NEUTROS ABS Thousands/µL 13 37* 14 76* 9 51*  --   --   --   --     < > = values in this interval not displayed       Results from last 7 days   Lab Units 02/18/22  0545 02/17/22  0405 02/16/22  0506 02/14/22  0436 02/13/22  0757   SODIUM mmol/L 139 144 143 139 139   POTASSIUM mmol/L 4 8 4 7 4 5 4 2 4 2   CHLORIDE mmol/L 107 109* 108 107 106   CO2 mmol/L 26 23 23 29 23   ANION GAP mmol/L 6 12 12 3* 10   BUN mg/dL 25 22 19 24 29*   CREATININE mg/dL 0 83 0 92 0 96 0 88 0 97   EGFR ml/min/1 73sq m 104 98 93 101 91   CALCIUM mg/dL 8 4 8 9 9 1 8 8 9 0     Results from last 7 days   Lab Units 02/18/22  0545 02/17/22  0405 02/16/22  0506   AST U/L 15 12 10   ALT U/L 18 17 14   ALK PHOS U/L 50 58 64   TOTAL PROTEIN g/dL 6 4 6 7 6 9   ALBUMIN g/dL 3 0* 3 3* 3 3*   TOTAL BILIRUBIN mg/dL 0 20 0 20 0 20     Results from last 7 days   Lab Units 02/18/22  0746 02/17/22  2053 02/17/22  1615 02/17/22  1127 02/17/22  0730 02/16/22  1617 02/16/22  1253 02/13/22  0802   POC GLUCOSE mg/dl 86 157* 190* 102 98 165* 160* 87     Results from last 7 days   Lab Units 02/18/22  0545 02/17/22  0405 02/16/22  0506 02/14/22  0436 02/13/22  0757   GLUCOSE RANDOM mg/dL 114 113 129 81 78     Results from last 7 days   Lab Units 02/14/22  0435   HEMOGLOBIN A1C % 5 1   EAG mg/dl 100     Results from last 7 days   Lab Units 02/13/22  0757   HS TNI 0HR ng/L <2     Results from last 7 days   Lab Units 02/13/22  0757   PROTIME seconds 13 4   INR  1 03   PTT seconds 33     Results from last 7 days   Lab Units 02/18/22  0905   INFLUENZA A PCR  Negative   INFLUENZA B PCR  Negative   RSV PCR  Negative       Medications:   Scheduled Medications:  cyanocobalamin, 1,000 mcg, Oral, Daily  ergocalciferol, 50,000 Units, Oral, Weekly  gabapentin, 300 mg, Oral, TID  insulin lispro, 1-6 Units, Subcutaneous, TID AC  methylPREDNISolone sodium succinate, 1,000 mg, Intravenous, Daily  pantoprazole, 40 mg, Oral, Daily Before Breakfast  senna-docusate sodium, 1 tablet, Oral, HS  thiamine, 100 mg, Oral, Daily    Continuous IV Infusions: none      PRN Meds: none        Discharge Plan: to be determined  Network Utilization Review Department  ATTENTION: Please call with any questions or concerns to [phone] and carefully listen to the prompts so that you are directed to the right person  All voicemails are confidential   Julisa Cowan all requests for admission clinical reviews, approved or denied determinations and any other requests to dedicated fax number below belonging to the campus where the patient is receiving treatment   List of dedicated fax numbers for the Facilities:  [address] DENIALS (Administrative/Medical Necessity) [phone]   [address] (Maternity/NICU/Pediatrics) [phone]   [address]  [address] [facility] [place] [address] [address] [place] [address] [address] [facility] [facility] [phone]

## 2022-02-18 NOTE — PROGRESS NOTES
Progress Note - Neurology   Rashawn Kohler 48 y o  male MRN: [id number]  Unit/Bed#: -01 Encounter: [id number]      Assessment/Plan   * CNS demyelinating disease [facility])  Assessment & Plan  53-year-old male recent workup in January 2022 for demyelinating disease (seen at [facility] and s/p 5 days IV steroids with improvement), presented as a stroke alert on 2/13 with worsening right-sided weakness, numbness, and dysarthria  He did receive a tPA bolus this admission, however infusion was then deferred given some concern for a left basal ganglia hypodensity on CT head  Subsequent MRI of the brain revealed multiple demyelinating lesions, some active and some chronic, and consistent with progression of MS  Hypodensity noted on CT head was consistent chronic demyelination, and not subacute stroke  Acute demyelinating plaques also noted in the cervical spine  Plan:  - MRI brain and cervical spine performed, as mentioned above   -Continue 1000 mg IV Solu-Medrol x 5 days  Today is day 4/5    - beginning Sunday 2/20, will start prednisone taper:   -60 mg daily for 1 week, then 50 mg daily for 1 week, then 40 mg daily for 1 week, then 30 mg daily for 1 week, then 20 mg daily for 1 week, then 10 mg daily for 1 week   -initiate PPI/vitamin-D supplementation with prednisone taper  - PT/OT  - Medical management and supportive care per primary team  Correction of any metabolic or infectious disturbances  Discussed plan of care with attending neurologist      Patient has upcoming follow-up with [facility] MS team/Dr Fidel Borrero on March 8th  Subjective:   Patient resting in bed, eating breakfast   Doing slightly better today than days prior; voiced he was able to sleep on his right side without pain or discomfort  Fine motor difficulty in the right hand/fingers is slightly improved (able to now hold onto items/utensils more reliably)  No other new neuro deficits in the interim      Vitals: Blood pressure 131/73, pulse (!) 52, temperature 97 9 °F (36 6 °C), resp  rate 18, height 5' 11" (1 803 m), weight 103 kg (226 lb 3 1 oz), SpO2 95 %  ,Body mass index is 31 55 kg/m²  Physical/neurologic exam performed alongside Dr Solo Gutierrez  Physical Exam:   Physical Exam  Constitutional:       Appearance: Normal appearance  HENT:      Head: Normocephalic and atraumatic  Eyes:      Extraocular Movements: Extraocular movements intact and EOM normal       Conjunctiva/sclera: Conjunctivae normal       Pupils: Pupils are equal, round, and reactive to light  Cardiovascular:      Rate and Rhythm: Normal rate and regular rhythm  Pulmonary:      Effort: Pulmonary effort is normal       Breath sounds: Normal breath sounds  Musculoskeletal:      Cervical back: Normal range of motion and neck supple  Skin:     General: Skin is warm and dry  Neurological:      Mental Status: He is alert and oriented to person, place, and time  Psychiatric:         Speech: Speech normal         Neurologic Exam     Mental Status   Oriented to person, place, and time  Attention: normal  Concentration: normal    Speech: speech is normal   Normal comprehension  Cranial Nerves     CN II   Visual fields full to confrontation  CN III, IV, VI   Pupils are equal, round, and reactive to light  Extraocular motions are normal      CN V   Facial sensation intact  CN VII   Facial expression full, symmetric       CN VIII   CN VIII normal      CN IX, X   CN IX normal    CN X normal      CN XI   CN XI normal      CN XII   CN XII normal      Motor Exam -Mild R deltoid weakness  -Near full with maximal effort with L bicep/tricep   -4+ R wrist ROM strength    -3/5 R finger muscles/interossei  -3+/4- R hip flexion, impersistent R dorsiflexion, right plantar flexion near fully intact    No apparent deficit in the left upper and lower extremity         Sensory Exam   Slight diminished vibration in R foot/ankle, symmetric vibration in the fingers/wrist bilaterally  Temperature symmetric in distal UE/LE  Gait, Coordination, and Reflexes Still ataxic with R finger to nose (slightly better than prior exam)  Still with clumsy R heel to shin, but better ROM with R hip flexion    Fine motor tasks with distal R UE  still slow and slightly clumsy, but slightly improved compared to prior exams  Gait deferred  Lab, Imaging and other studies:   MRI brain MS wo and w contrast   Final Result by Dioni Spicer MD (02/15 4154)      Multiple small supratentorial enhancing foci of acute demyelination consistent with disease progression of multiple sclerosis  Late subacute to chronic demyelination in the left posterior limb of the internal capsule  This lesion has evolved in shape when compared to the prior outside MRI study of June 1, 2021  Moderate supratentorial predominant chronic demyelinating disease of multiple sclerosis  Workstation performed: [id number]         MRI cervical spine w wo contrast   Final Result by Dioni Spicer MD (02/15 3417)      Acute to early subacute subcentimeter demyelinating plaque at the C5 vertebral body level  Additional smaller nonenhancing chronic appearing demyelinating plaques within the upper and mid cervical cord  Workstation performed: [id number]         CT head wo contrast   Final Result by Neal Gambino MD (02/14 8578)      No intracranial hemorrhage  No significant interval change  Workstation performed: [id number]         XR chest 1 view portable   ED Interpretation by Ibrahima Gonsalves DO (02/13 4867)   No acute abnormalities as interpreted by me independently       Final Result by Diomedes White MD (02/13 2722)      No acute cardiopulmonary disease  Workstation performed: [id number]         CTA stroke alert (head/neck)   Final Result by Flavia Avila MD (02/13 9491)      1  No intracranial large vessel occlusion or critical stenosis   No aneurysm  2   No hemodynamically significant stenosis of cervical carotid and vertebral arteries  No dissection  I personally discussed this study with Dr Alma Monk on 2/13/2022 at 8:16 AM                 Workstation performed: [id number]         CT stroke alert brain   Final Result by Tiesha Osborne MD (02/13 0847)      1  No acute intracranial hemorrhage  2   White matter change in keeping with known history of demyelinating disease  Focal hypoattenuation centered at posterior limb of left internal capsule of uncertain etiology, ischemia versus underlying demyelinating disease                        I personally discussed this study with Dr Alma Monk on 2/13/2022 at 8:16 AM                 Workstation performed: [id number]             CBC:   Results from last 7 days   Lab Units 02/18/22  0545 02/17/22  0405 02/16/22  0506   WBC Thousand/uL 15 78* 16 86* 10 64*   RBC Million/uL 4 51 4 60 4 64   HEMOGLOBIN g/dL 13 5 13 7 13 9   HEMATOCRIT % 41 3 42 1 42 4   MCV fL 92 92 91   PLATELETS Thousands/uL 212 234 243   , BMP/CMP:   Results from last 7 days   Lab Units 02/18/22  0545 02/17/22  0405 02/16/22  0506   SODIUM mmol/L 139 144 143   POTASSIUM mmol/L 4 8 4 7 4 5   CHLORIDE mmol/L 107 109* 108   CO2 mmol/L 26 23 23   BUN mg/dL 25 22 19   CREATININE mg/dL 0 83 0 92 0 96   CALCIUM mg/dL 8 4 8 9 9 1   AST U/L 15 12 10   ALT U/L 18 17 14   ALK PHOS U/L 50 58 64   EGFR ml/min/1 73sq m 104 98 93   , Vitamin B12:   , HgBA1C:   Results from last 7 days   Lab Units 02/14/22  0435   HEMOGLOBIN A1C % 5 1   , TSH:   , Coagulation:   Results from last 7 days   Lab Units 02/13/22  0757   INR  1 03   , Lipid Profile:   Results from last 7 days   Lab Units 02/14/22  0435   HDL mg/dL 29*   LDL CALC mg/dL 51   TRIGLYCERIDES mg/dL 117   , Ammonia:   , Urinalysis:       Invalid input(s): URIBILINOGEN, Drug Screen:   , Medication Drug Levels:       Invalid input(s): CARBAMAZEPINE,  PHENOBARB, LACOSAMIDE, OXCARBAZEPINE  VTE Prophylaxis: None    Total time spent today 20 minutes  Discussed plan of care with patient and primary team: continuing IV Solumedrol, plan for prednisone taper starting Sunday, continued therapies, MS clinic follow up scheduled in several weeks with Dr Najma Flores

## 2022-02-18 NOTE — PLAN OF CARE
Problem: Potential for Falls  Goal: Patient will remain free of falls  Description: INTERVENTIONS:  - Educate patient/family on patient safety including physical limitations  - Instruct patient to call for assistance with activity   - Consult OT/PT to assist with strengthening/mobility   - Keep Call bell within reach  - Keep bed low and locked with side rails adjusted as appropriate  - Keep care items and personal belongings within reach  - Initiate and maintain comfort rounds  - Make Fall Risk Sign visible to staff  - Offer Toileting every  Hours, in advance of need  - Initiate/Maintain alarm  - Obtain necessary fall risk management equipment:   - Apply yellow socks and bracelet for high fall risk patients  - Consider moving patient to room near nurses station  Outcome: Progressing     Problem: MOBILITY - ADULT  Goal: Maintain or return to baseline ADL function  Description: INTERVENTIONS:  -  Assess patient's ability to carry out ADLs; assess patient's baseline for ADL function and identify physical deficits which impact ability to perform ADLs (bathing, care of mouth/teeth, toileting, grooming, dressing, etc )  - Assess/evaluate cause of self-care deficits   - Assess range of motion  - Assess patient's mobility; develop plan if impaired  - Assess patient's need for assistive devices and provide as appropriate  - Encourage maximum independence but intervene and supervise when necessary  - Involve family in performance of ADLs  - Assess for home care needs following discharge   - Consider OT consult to assist with ADL evaluation and planning for discharge  - Provide patient education as appropriate  Outcome: Progressing  Goal: Maintains/Returns to pre admission functional level  Description: INTERVENTIONS:  - Perform BMAT or MOVE assessment daily    - Set and communicate daily mobility goal to care team and patient/family/caregiver     - Collaborate with rehabilitation services on mobility goals if consulted  - Perform Range of Motion 2 times a day  - Reposition patient every 2 hours    - Dangle patient 2 times a day  - Stand patient 2 times a day  - Ambulate patient 2 times a day  - Out of bed to chair 2 times a day   - Out of bed for meals 2 times a day  - Out of bed for toileting  - Record patient progress and toleration of activity level   Outcome: Progressing     Problem: Prexisting or High Potential for Compromised Skin Integrity  Goal: Skin integrity is maintained or improved  Description: INTERVENTIONS:  - Identify patients at risk for skin breakdown  - Assess and monitor skin integrity  - Assess and monitor nutrition and hydration status  - Monitor labs   - Assess for incontinence   - Turn and reposition patient  - Assist with mobility/ambulation  - Relieve pressure over bony prominences  - Avoid friction and shearing  - Provide appropriate hygiene as needed including keeping skin clean and dry  - Evaluate need for skin moisturizer/barrier cream  - Collaborate with interdisciplinary team   - Patient/family teaching  - Consider wound care consult   Outcome: Progressing     Problem: NEUROSENSORY - ADULT  Goal: Achieves stable or improved neurological status  Description: INTERVENTIONS  - Monitor and report changes in neurological status  - Monitor vital signs such as temperature, blood pressure, glucose, and any other labs ordered   - Initiate measures to prevent increased intracranial pressure  - Monitor for seizure activity and implement precautions if appropriate      Outcome: Progressing     Problem: PAIN - ADULT  Goal: Verbalizes/displays adequate comfort level or baseline comfort level  Description: Interventions:  - Encourage patient to monitor pain and request assistance  - Assess pain using appropriate pain scale  - Administer analgesics based on type and severity of pain and evaluate response  - Implement non-pharmacological measures as appropriate and evaluate response  - Consider cultural and social influences on pain and pain management  - Notify physician/advanced practitioner if interventions unsuccessful or patient reports new pain  Outcome: Progressing     Problem: INFECTION - ADULT  Goal: Absence or prevention of progression during hospitalization  Description: INTERVENTIONS:  - Assess and monitor for signs and symptoms of infection  - Monitor lab/diagnostic results  - Monitor all insertion sites, i e  indwelling lines, tubes, and drains  - Monitor endotracheal if appropriate and nasal secretions for changes in amount and color  - [place] appropriate cooling/warming therapies per order  - Administer medications as ordered  - Instruct and encourage patient and family to use good hand hygiene technique  - Identify and instruct in appropriate isolation precautions for identified infection/condition  Outcome: Progressing     Problem: SAFETY ADULT  Goal: Patient will remain free of falls  Description: INTERVENTIONS:  - Educate patient/family on patient safety including physical limitations  - Instruct patient to call for assistance with activity   - Consult OT/PT to assist with strengthening/mobility   - Keep Call bell within reach  - Keep bed low and locked with side rails adjusted as appropriate  - Keep care items and personal belongings within reach  - Initiate and maintain comfort rounds  - Make Fall Risk Sign visible to staff  - Offer Toileting every  Hours, in advance of need  - Initiate/Maintain alarm  - Obtain necessary fall risk management equipment:   - Apply yellow socks and bracelet for high fall risk patients  - Consider moving patient to room near nurses station  Outcome: Progressing  Goal: Maintain or return to baseline ADL function  Description: INTERVENTIONS:  -  Assess patient's ability to carry out ADLs; assess patient's baseline for ADL function and identify physical deficits which impact ability to perform ADLs (bathing, care of mouth/teeth, toileting, grooming, dressing, etc )  - Assess/evaluate cause of self-care deficits   - Assess range of motion  - Assess patient's mobility; develop plan if impaired  - Assess patient's need for assistive devices and provide as appropriate  - Encourage maximum independence but intervene and supervise when necessary  - Involve family in performance of ADLs  - Assess for home care needs following discharge   - Consider OT consult to assist with ADL evaluation and planning for discharge  - Provide patient education as appropriate  Outcome: Progressing  Goal: Maintains/Returns to pre admission functional level  Description: INTERVENTIONS:  - Perform BMAT or MOVE assessment daily    - Set and communicate daily mobility goal to care team and patient/family/caregiver  - Collaborate with rehabilitation services on mobility goals if consulted  - Perform Range of Motion 2 times a day  - Reposition patient every 2 hours    - Dangle patient 2 times a day  - Stand patient 2 times a day  - Ambulate patient 2 times a day  - Out of bed to chair 2 times a day   - Out of bed for meals 2 times a day  - Out of bed for toileting  - Record patient progress and toleration of activity level   Outcome: Progressing     Problem: DISCHARGE PLANNING  Goal: Discharge to home or other facility with appropriate resources  Description: INTERVENTIONS:  - Identify barriers to discharge w/patient and caregiver  - Arrange for needed discharge resources and transportation as appropriate  - Identify discharge learning needs (meds, wound care, etc )  - Arrange for interpretive services to assist at discharge as needed  - Refer to Case Management Department for coordinating discharge planning if the patient needs post-hospital services based on physician/advanced practitioner order or complex needs related to functional status, cognitive ability, or social support system  Outcome: Progressing     Problem: Knowledge Deficit  Goal: Patient/family/caregiver demonstrates understanding of disease process, treatment plan, medications, and discharge instructions  Description: Complete learning assessment and assess knowledge base    Interventions:  - Provide teaching at level of understanding  - Provide teaching via preferred learning methods  Outcome: Progressing

## 2022-02-18 NOTE — CASE MANAGEMENT
Case Management Discharge Planning Note    Patient name Anthony Garnett  Location [address] [id number] MRN [id number]  : 1975 Date 2022       Current Admission Date: 2022  Current Admission Diagnosis:CNS demyelinating disease [facility])   Patient Active Problem List    Diagnosis Date Noted    Dysarthria 2022    Lower facial weakness 2022    CNS demyelinating disease ([facility] [address] ) 2022    Right hemiparesis ([facility] [address] ) 2022      LOS (days): 5  Geometric Mean LOS (GMLOS) (days):   Days to GMLOS:     OBJECTIVE:  Risk of Unplanned Readmission Score: 8     Current admission status: Inpatient   Preferred Pharmacy:   [address]  [facility], [place] - [address]  [address]  [facility] [address]  Phone: [phone] Fax: [phone]    Primary Care Provider: Juan José Vincent MD    Primary Insurance: [place] [employer]  Secondary Insurance:     DISCHARGE DETAILS:    Additional Comments: Pastor Cain [facility] obtained insurance [facility] #[id number] update   SLE wheelchair [place] transport at 11:30am on   Pt informed of transport to [facility] on   Report number for  Ant is [phone], fax # is [phone]  Pt's nurse(Maria Dolores) informed of transport time on   Andrea Latif at [facility] informed of transport time on   Pt informed of transport time [facility] on   Pt aware of luis barajas and in agreement

## 2022-02-18 NOTE — PLAN OF CARE
Problem: Potential for Falls  Goal: Patient will remain free of falls  Description: INTERVENTIONS:  - Educate patient/family on patient safety including physical limitations  - Instruct patient to call for assistance with activity   - Consult OT/PT to assist with strengthening/mobility   - Keep Call bell within reach  - Keep bed low and locked with side rails adjusted as appropriate  - Keep care items and personal belongings within reach  - Initiate and maintain comfort rounds  - Make Fall Risk Sign visible to staff  - Offer Toileting every 2 Hours, in advance of need  - Initiate/Maintain bed alarm  - Obtain necessary fall risk management equipment: socks  - Apply yellow socks and bracelet for high fall risk patients  - Consider moving patient to room near nurses station  2/18/2022 0820 by Maricarmen Eugene RN  Outcome: Progressing  2/18/2022 0818 by Maricarmen Eugene RN  Outcome: Progressing     Problem: MOBILITY - ADULT  Goal: Maintain or return to baseline ADL function  Description: INTERVENTIONS:  - Educate patient/family on patient safety including physical limitations  - Instruct patient to call for assistance with activity   - Consult OT/PT to assist with strengthening/mobility   - Keep Call bell within reach  - Keep bed low and locked with side rails adjusted as appropriate  - Keep care items and personal belongings within reach  - Initiate and maintain comfort rounds  - Make Fall Risk Sign visible to staff  - Offer Toileting every 2 Hours, in advance of need  - Initiate/Maintain bed alarm  - Obtain necessary fall risk management equipment: socks  - Apply yellow socks and bracelet for high fall risk patients  - Consider moving patient to room near nurses station  2/18/2022 0820 by Maricarmen Eugene RN  Outcome: Progressing  2/18/2022 0818 by Maricarmen Eugene RN  Outcome: Progressing  Goal: Maintains/Returns to pre admission functional level  Description: INTERVENTIONS:  - Perform BMAT or MOVE assessment daily  - Set and communicate daily mobility goal to care team and patient/family/caregiver  - Collaborate with rehabilitation services on mobility goals if consulted  - Perform Range of Motion 2 times a day  - Reposition patient every 2 hours    - Dangle patient 2 times a day  - Stand patient   Problem: Prexisting or High Potential for Compromised Skin Integrity  Goal: Skin integrity is maintained or improved  Description: INTERVENTIONS:  - Identify patients at risk for skin breakdown  - Assess and monitor skin integrity  - Assess and monitor nutrition and hydration status  - Monitor labs   - Assess for incontinence   - Turn and reposition patient  - Assist with mobility/ambulation  - Relieve pressure over bony prominences  - Avoid friction and shearing  - Provide appropriate hygiene as needed including keeping skin clean and dry  - Evaluate need for skin moisturizer/barrier cream  - Collaborate with interdisciplinary team   - Patient/family teaching  - Consider wound care consult   2/18/2022 0820 by Clive Pimentel RN  Outcome: Progressing  2/18/2022 0818 by Clive Pimentel RN  Outcome: Progressing     Problem: NEUROSENSORY - ADULT  Goal: Achieves stable or improved neurological status  Description: INTERVENTIONS  - Monitor and report changes in neurological status  - Monitor vital signs such as temperature, blood pressure, glucose, and any other labs ordered   - Initiate measures to prevent increased intracranial pressure  - Monitor for seizure activity and implement precautions if appropriate      2/18/2022 0820 by Clive Pimentel RN  Outcome: Progressing  2/18/2022 0818 by Clive Pimentel RN  Outcome: Progressing     Problem: PAIN - ADULT  Goal: Verbalizes/displays adequate comfort level or baseline comfort level  Description: Interventions:  - Encourage patient to monitor pain and request assistance  - Assess pain using appropriate pain scale  - Administer analgesics based on type and severity of pain and evaluate response  - Implement non-pharmacological measures as appropriate and evaluate response  - Consider cultural and social influences on pain and pain management  - Notify physician/advanced practitioner if interventions unsuccessful or patient reports new pain  2/18/2022 0820 by Akua Serrato RN  Outcome: Progressing  2/18/2022 0818 by Akua Serrato RN  Outcome: Progressing     Problem: INFECTION - ADULT  Goal: Absence or prevention of progression during hospitalization  Description: INTERVENTIONS:  - Assess and monitor for signs and symptoms of infection  - Monitor lab/diagnostic results  - Monitor all insertion sites, i e  indwelling lines, tubes, and drains  - Monitor endotracheal if appropriate and nasal secretions for changes in amount and color  - [place] appropriate cooling/warming therapies per order  - Administer medications as ordered  - Instruct and encourage patient and family to use good hand hygiene technique  - Identify and instruct in appropriate isolation precautions for identified infection/condition  2/18/2022 0820 by Akua Serrato RN  Outcome: Progressing  2/18/2022 0818 by Akua Serrato RN  Outcome: Progressing   2 times a day  - Ambulate patient 2 times a day  - Out of bed to chair 2 times a day   - Out of bed for meals 2 times a day  - Out of bed for toileting  - Record patient progress and toleration of activity level   2/18/2022 0820 by Akua Serrato RN  Outcome: Progressing  2/18/2022 0818 by Akua Serrato RN  Outcome: Progressing

## 2022-02-18 NOTE — PROGRESS NOTES
[facility]  [facility] - Carleene Rinne 1975, 48 y o  male MRN: [id number]  Unit/Bed#: -01 Encounter: [id number]  Primary Care Provider: Lynda Cuevas MD   Date and time admitted to hospital: 2/13/2022  7:56 AM    * CNS demyelinating disease [facility])  Assessment & Plan  Concern for MS exacerbation  Patient feels improved from admission; he reports improvement from 2/16 as well, improving slowly further  Cont solumedrol - pulse dosing 1000 mg/day - day 4  Taper steroids as outpt per neurology as well  MRI brain  Multiple small supratentorial enhancing foci of acute demyelination consistent with disease progression of multiple sclerosis  Late subacute to chronic demyelination in the left posterior limb of the internal capsule   This lesion has evolved in shape when compared to the prior outside MRI study of June 1, 2021  Moderate supratentorial predominant chronic demyelinating disease of multiple sclerosis  MRI c spine   Acute to early subacute subcentimeter demyelinating plaque at the C5 vertebral body level  Additional smaller nonenhancing chronic appearing demyelinating plaques within the upper and mid cervical cord  Monitor blood glucose on high dose steroids        Right hemiparesis (HCC)  Assessment & Plan  Reviewed neuro/ICU notes, complex hx past hospitalizations etc was not felt to have CVA     Dysarthria  Assessment & Plan  Improved, related to MS flare       VTE  Prophylaxis:   Pharmacologic: in place    Patient Centered Rounds: I have performed bedside rounds with nursing staff today      Discussions with Specialists or Other Care Team Provider: case management    Education and Discussions with Family / Patient: pt      Current Length of Stay: 5 day(s)    Current Patient Status: Inpatient        Code Status: Level 1 - Full Code      Subjective:   Pt seen  Reports slowly improving R sided weakness  No other complaints    Patient is seen and examined at bedside  All other ROS are negative  Objective:     Vitals:   Temp (24hrs), Av 6 °F (36 4 °C), Min:97 5 °F (36 4 °C), Max:97 9 °F (36 6 °C)    Temp:  [97 5 °F (36 4 °C)-97 9 °F (36 6 °C)] 97 9 °F (36 6 °C)  HR:  [52-75] 52  Resp:  [18] 18  BP: ()/(54-76) 131/73  SpO2:  [94 %-95 %] 95 %  Body mass index is 31 55 kg/m²  Input and Output Summary (last 24 hours): Intake/Output Summary (Last 24 hours) at 2022 0958  Last data filed at 2022 8317  Gross per 24 hour   Intake 942 ml   Output 500 ml   Net 442 ml       Physical Exam:       GEN: No acute distress, comfortable  HEEENT: No JVD, PERRLA, no scleral icterus  RESP: Lungs clear to auscultation bilaterally  CV: RRR, +s1/s2   ABD: SOFT NON TENDER, POSITIVE BOWEL SOUNDS, NO DISTENTION  PSYCH: CALM  NEURO: A X O X 3, moderate R sided weakness overall improved, slight dysarthria  SKIN: NO RASH  EXTREM: NO EDEMA    Additional Data:     Labs:    Results from last 7 days   Lab Units 22  0545   WBC Thousand/uL 15 78*   HEMOGLOBIN g/dL 13 5   HEMATOCRIT % 41 3   PLATELETS Thousands/uL 212   NEUTROS PCT % 85*   LYMPHS PCT % 8*   MONOS PCT % 6   EOS PCT % 0     Results from last 7 days   Lab Units 22  0545   SODIUM mmol/L 139   POTASSIUM mmol/L 4 8   CHLORIDE mmol/L 107   CO2 mmol/L 26   BUN mg/dL 25   CREATININE mg/dL 0 83   ANION GAP mmol/L 6   CALCIUM mg/dL 8 4   ALBUMIN g/dL 3 0*   TOTAL BILIRUBIN mg/dL 0 20   ALK PHOS U/L 50   ALT U/L 18   AST U/L 15   GLUCOSE RANDOM mg/dL 114     Results from last 7 days   Lab Units 22  0757   INR  1 03     Results from last 7 days   Lab Units 22  0746 22  2053 22  1615 22  1127 22  0730 22  1617 22  1253 22  0802   POC GLUCOSE mg/dl 86 157* 190* 102 98 165* 160* 87     Results from last 7 days   Lab Units 22  0435   HEMOGLOBIN A1C % 5 1               * I Have Reviewed All Lab Data Listed Above             Imaging:     Results for orders placed during the hospital encounter of 02/13/22    XR chest 1 view portable    Narrative  CHEST    INDICATION:   stroke alert  COMPARISON:  None    EXAM PERFORMED/VIEWS:  XR CHEST PORTABLE      FINDINGS:    Cardiomediastinal silhouette appears unremarkable  The lungs are clear  No pneumothorax or pleural effusion  Dextroscoliosis of the thoracic spine  Impression  No acute cardiopulmonary disease  Workstation performed: [id number]    No results found for this or any previous visit  *I have reviewed all imaging reports listed above      Recent Cultures (last 7 days):           Last 24 Hours Medication List:   Current Facility-Administered Medications   Medication Dose Route Frequency Provider Last Rate    cyanocobalamin  1,000 mcg Oral Daily Rosaleegalileo Copeland PA-C      ergocalciferol  50,000 Units Oral Weekly Rosalee Copeland PA-C      gabapentin  300 mg Oral TID RosaleeADI Adam-KENJI      insulin lispro  1-6 Units Subcutaneous TID [facility] Gris Echevarria MD      methylPREDNISolone sodium succinate  1,000 mg Intravenous Daily Arjun De La Cruz PA-C 1,000 mg (02/18/22 0808)    pantoprazole  40 mg Oral Daily Before Breakfast Fabio Jared SATISH Becker      senna-docusate sodium  1 tablet Oral HS Rosalee Copeland PA-C      thiamine  100 mg Oral Daily Rosalee Copeland PA-C          Today, Patient Was Seen By: Darius Hanson MD    ** Please Note: Dictation voice to text software may have been used in the creation of this document   **

## 2022-02-19 VITALS
BODY MASS INDEX: 31.48 KG/M2 | OXYGEN SATURATION: 96 % | WEIGHT: 224.87 LBS | RESPIRATION RATE: 18 BRPM | HEART RATE: 53 BPM | DIASTOLIC BLOOD PRESSURE: 71 MMHG | SYSTOLIC BLOOD PRESSURE: 113 MMHG | TEMPERATURE: 98.2 F | HEIGHT: 71 IN

## 2022-02-19 LAB
ALBUMIN SERPL BCP-MCNC: 3 G/DL (ref 3.5–5)
ALP SERPL-CCNC: 51 U/L (ref 46–116)
ALT SERPL W P-5'-P-CCNC: 23 U/L (ref 12–78)
ANION GAP SERPL CALCULATED.3IONS-SCNC: 11 MMOL/L (ref 4–13)
AST SERPL W P-5'-P-CCNC: 13 U/L (ref 5–45)
BASOPHILS # BLD AUTO: 0.03 THOUSANDS/ΜL (ref 0–0.1)
BASOPHILS NFR BLD AUTO: 0 % (ref 0–1)
BILIRUB SERPL-MCNC: 0.3 MG/DL (ref 0.2–1)
BUN SERPL-MCNC: 24 MG/DL (ref 5–25)
CALCIUM ALBUM COR SERPL-MCNC: 9.4 MG/DL (ref 8.3–10.1)
CALCIUM SERPL-MCNC: 8.6 MG/DL (ref 8.3–10.1)
CHLORIDE SERPL-SCNC: 109 MMOL/L (ref 100–108)
CO2 SERPL-SCNC: 22 MMOL/L (ref 21–32)
CREAT SERPL-MCNC: 0.78 MG/DL (ref 0.6–1.3)
EOSINOPHIL # BLD AUTO: 0 THOUSAND/ΜL (ref 0–0.61)
EOSINOPHIL NFR BLD AUTO: 0 % (ref 0–6)
ERYTHROCYTE [DISTWIDTH] IN BLOOD BY AUTOMATED COUNT: 12.7 % (ref 11.6–15.1)
GFR SERPL CREATININE-BSD FRML MDRD: 106 ML/MIN/1.73SQ M
GLUCOSE SERPL-MCNC: 101 MG/DL (ref 65–140)
GLUCOSE SERPL-MCNC: 115 MG/DL (ref 65–140)
GLUCOSE SERPL-MCNC: 90 MG/DL (ref 65–140)
HCT VFR BLD AUTO: 42.1 % (ref 36.5–49.3)
HGB BLD-MCNC: 13.6 G/DL (ref 12–17)
IMM GRANULOCYTES # BLD AUTO: 0.2 THOUSAND/UL (ref 0–0.2)
IMM GRANULOCYTES NFR BLD AUTO: 2 % (ref 0–2)
LYMPHOCYTES # BLD AUTO: 1.05 THOUSANDS/ΜL (ref 0.6–4.47)
LYMPHOCYTES NFR BLD AUTO: 8 % (ref 14–44)
MCH RBC QN AUTO: 29.7 PG (ref 26.8–34.3)
MCHC RBC AUTO-ENTMCNC: 32.3 G/DL (ref 31.4–37.4)
MCV RBC AUTO: 92 FL (ref 82–98)
MONOCYTES # BLD AUTO: 0.78 THOUSAND/ΜL (ref 0.17–1.22)
MONOCYTES NFR BLD AUTO: 6 % (ref 4–12)
NEUTROPHILS # BLD AUTO: 10.56 THOUSANDS/ΜL (ref 1.85–7.62)
NEUTS SEG NFR BLD AUTO: 84 % (ref 43–75)
NRBC BLD AUTO-RTO: 0 /100 WBCS
PLATELET # BLD AUTO: 212 THOUSANDS/UL (ref 149–390)
PMV BLD AUTO: 10.7 FL (ref 8.9–12.7)
POTASSIUM SERPL-SCNC: 4.7 MMOL/L (ref 3.5–5.3)
PROT SERPL-MCNC: 6.3 G/DL (ref 6.4–8.2)
RBC # BLD AUTO: 4.58 MILLION/UL (ref 3.88–5.62)
SODIUM SERPL-SCNC: 142 MMOL/L (ref 136–145)
WBC # BLD AUTO: 12.62 THOUSAND/UL (ref 4.31–10.16)

## 2022-02-19 PROCEDURE — 99239 HOSP IP/OBS DSCHRG MGMT >30: CPT | Performed by: HOSPITALIST

## 2022-02-19 PROCEDURE — 80053 COMPREHEN METABOLIC PANEL: CPT | Performed by: HOSPITALIST

## 2022-02-19 PROCEDURE — 82948 REAGENT STRIP/BLOOD GLUCOSE: CPT

## 2022-02-19 PROCEDURE — 85025 COMPLETE CBC W/AUTO DIFF WBC: CPT | Performed by: HOSPITALIST

## 2022-02-19 RX ORDER — PANTOPRAZOLE SODIUM 40 MG/1
40 TABLET, DELAYED RELEASE ORAL
Refills: 0
Start: 2022-02-19 | End: 2022-03-29 | Stop reason: SDUPTHER

## 2022-02-19 RX ORDER — PREDNISONE 10 MG/1
TABLET ORAL
Qty: 147 TABLET | Refills: 0 | Status: SHIPPED | OUTPATIENT
Start: 2022-02-19 | End: 2022-04-02

## 2022-02-19 RX ORDER — GABAPENTIN 300 MG/1
300 CAPSULE ORAL 3 TIMES DAILY
Refills: 0
Start: 2022-02-19 | End: 2022-04-22 | Stop reason: SDUPTHER

## 2022-02-19 RX ADMIN — THIAMINE HCL TAB 100 MG 100 MG: 100 TAB at 08:10

## 2022-02-19 RX ADMIN — CYANOCOBALAMIN TAB 500 MCG 1000 MCG: 500 TAB at 08:10

## 2022-02-19 RX ADMIN — PANTOPRAZOLE SODIUM 40 MG: 40 TABLET, DELAYED RELEASE ORAL at 05:57

## 2022-02-19 RX ADMIN — SODIUM CHLORIDE 1000 MG: 0.9 INJECTION, SOLUTION INTRAVENOUS at 08:10

## 2022-02-19 RX ADMIN — GABAPENTIN 300 MG: 300 CAPSULE ORAL at 08:09

## 2022-02-19 NOTE — DISCHARGE SUMMARY
[place]  Discharge- Millie Jansen 1975, 48 y o  male MRN: [id number]  Unit/Bed#: -01 Encounter: [id number]  Primary Care Provider: Yesenia Bonilla MD   Date and time admitted to hospital: 2/13/2022  7:56 AM    * CNS demyelinating disease [facility])  Assessment & Plan  Concern for MS exacerbation  Patient feels improved from admission; he reports improvement from 2/16 as well, improving slowly further  Cont solumedrol - pulse dosing 1000 mg/day - day 5   Taper steroids as outpt per neurology as well  MRI brain  Multiple small supratentorial enhancing foci of acute demyelination consistent with disease progression of multiple sclerosis  Late subacute to chronic demyelination in the left posterior limb of the internal capsule   This lesion has evolved in shape when compared to the prior outside MRI study of June 1, 2021  Moderate supratentorial predominant chronic demyelinating disease of multiple sclerosis  MRI c spine   Acute to early subacute subcentimeter demyelinating plaque at the C5 vertebral body level  Additional smaller nonenhancing chronic appearing demyelinating plaques within the upper and mid cervical cord  Monitor blood glucose on high dose steroids        Right hemiparesis (HCC)  Assessment & Plan  Reviewed neuro/ICU notes, complex hx past hospitalizations etc was not felt to have CVA     Dysarthria  Assessment & Plan  Improved, related to MS flare         Hospital Course:     Millie Jansen is a 48 y o  male patient who originally presented to the hospital on   Admission Orders (From admission, onward)     Ordered        02/13/22 0924  Inpatient Admission  Once                     due to  concerns for demyelinating disease  He had extensive workup in the past as well and was felt not to have stroke based on neurological imaging    MRI of the C-spine demonstrated demyelinating plaques as well as demyelination in the internal capsule and other multiple lesions consistent with multiple sclerosis  He was placed on high-dose intravenous Solu-Medrol  He had significant improvement of his dysarthria and right-sided weakness  He was stable for discharge from neurological standpoint  He will go to acute rehab to work on strength reconditioning  Patient will complete an oral steroid taper  Please see above list of diagnoses and related plan for additional information  Physical Exam:    GEN: No acute distress, comfortable  HEEENT: No JVD, PERRLA, no scleral icterus  RESP: Lungs clear to auscultation bilaterally  CV: RRR, +s1/s2   ABD: SOFT NON TENDER, POSITIVE BOWEL SOUNDS, NO DISTENTION  PSYCH: CALM  NEURO: A X O X 3, R sided weakness - significantly improved 3 5/5, mild dysarthria  SKIN: NO RASH  EXTREM: NO EDEMA      Condition at Discharge:  good      Discharge instructions/Information to patient and family:   See after visit summary for information provided to patient and family  Provisions for Follow-Up Care:  See after visit summary for information related to follow-up care and any pertinent home health orders  Disposition:     Acute Rehab at         Discharge Statement:  I spent 37 minutes discharging the patient  This time was spent on the day of discharge  I had direct contact with the patient on the day of discharge  Greater than 50% of the total time was spent examining patient, answering all patient questions, arranging and discussing plan of care with patient as well as directly providing post-discharge instructions  Additional time then spent on discharge activities  Discharge Medications:  See after visit summary for reconciled discharge medications provided to patient and family        ** Please Note: This note has been constructed using a voice recognition system **

## 2022-02-19 NOTE — PLAN OF CARE
Problem: Potential for Falls  Goal: Patient will remain free of falls  Description: INTERVENTIONS:  - Educate patient/family on patient safety including physical limitations  - Instruct patient to call for assistance with activity   - Consult OT/PT to assist with strengthening/mobility   - Keep Call bell within reach  - Keep bed low and locked with side rails adjusted as appropriate  - Keep care items and personal belongings within reach  - Initiate and maintain comfort rounds  - Make Fall Risk Sign visible to staff  - Offer Toileting every 2 Hours, in advance of need  - Initiate/Maintain bed alarm  - Obtain necessary fall risk management equipment: socks  - Apply yellow socks and bracelet for high fall risk patients  - Consider moving patient to room near nurses station  Outcome: Progressing     Problem: MOBILITY - ADULT  Goal: Maintain or return to baseline ADL function  Description: INTERVENTIONS:  - Educate patient/family on patient safety including physical limitations  - Instruct patient to call for assistance with activity   - Consult OT/PT to assist with strengthening/mobility   - Keep Call bell within reach  - Keep bed low and locked with side rails adjusted as appropriate  - Keep care items and personal belongings within reach  - Initiate and maintain comfort rounds  - Make Fall Risk Sign visible to staff  - Offer Toileting every 2 Hours, in advance of need  - Initiate/Maintain bed alarm  - Obtain necessary fall risk management equipment: socks  - Apply yellow socks and bracelet for high fall risk patients  - Consider moving patient to room near nurses station  Outcome: Progressing  Goal: Maintains/Returns to pre admission functional level  Description: INTERVENTIONS:  - Perform BMAT or MOVE assessment daily    - Set and communicate daily mobility goal to care team and patient/family/caregiver     - Collaborate with rehabilitation services on mobility goals if consulted  - Perform Range of Motion  times a day   - Reposition patient every  hours    - Dangle patient  times a day  - Stand patient  times a day  - Ambulate patient  times a day  - Out of bed to chair times a day   - Out of bed for meals  times a day  - Out of bed for toileting  - Record patient progress and toleration of activity level   Outcome: Progressing     Problem: Prexisting or High Potential for Compromised Skin Integrity  Goal: Skin integrity is maintained or improved  Description: INTERVENTIONS:  - Identify patients at risk for skin breakdown  - Assess and monitor skin integrity  - Assess and monitor nutrition and hydration status  - Monitor labs   - Assess for incontinence   - Turn and reposition patient  - Assist with mobility/ambulation  - Relieve pressure over bony prominences  - Avoid friction and shearing  - Provide appropriate hygiene as needed including keeping skin clean and dry  - Evaluate need for skin moisturizer/barrier cream  - Collaborate with interdisciplinary team   - Patient/family teaching  - Consider wound care consult   Outcome: Progressing     Problem: NEUROSENSORY - ADULT  Goal: Achieves stable or improved neurological status  Description: INTERVENTIONS  - Monitor and report changes in neurological status  - Monitor vital signs such as temperature, blood pressure, glucose, and any other labs ordered   - Initiate measures to prevent increased intracranial pressure  - Monitor for seizure activity and implement precautions if appropriate      Outcome: Progressing     Problem: PAIN - ADULT  Goal: Verbalizes/displays adequate comfort level or baseline comfort level  Description: Interventions:  - Encourage patient to monitor pain and request assistance  - Assess pain using appropriate pain scale  - Administer analgesics based on type and severity of pain and evaluate response  - Implement non-pharmacological measures as appropriate and evaluate response  - Consider cultural and social influences on pain and pain management  - Notify physician/advanced practitioner if interventions unsuccessful or patient reports new pain  Outcome: Progressing     Problem: INFECTION - ADULT  Goal: Absence or prevention of progression during hospitalization  Description: INTERVENTIONS:  - Assess and monitor for signs and symptoms of infection  - Monitor lab/diagnostic results  - Monitor all insertion sites, i e  indwelling lines, tubes, and drains  - Monitor endotracheal if appropriate and nasal secretions for changes in amount and color  - [place] appropriate cooling/warming therapies per order  - Administer medications as ordered  - Instruct and encourage patient and family to use good hand hygiene technique  - Identify and instruct in appropriate isolation precautions for identified infection/condition  Outcome: Progressing     Problem: SAFETY ADULT  Goal: Patient will remain free of falls  Description: INTERVENTIONS:  - Educate patient/family on patient safety including physical limitations  - Instruct patient to call for assistance with activity   - Consult OT/PT to assist with strengthening/mobility   - Keep Call bell within reach  - Keep bed low and locked with side rails adjusted as appropriate  - Keep care items and personal belongings within reach  - Initiate and maintain comfort rounds  - Make Fall Risk Sign visible to staff  - Offer Toileting every 2 Hours, in advance of need  - Initiate/Maintain bed alarm  - Obtain necessary fall risk management equipment: socks  - Apply yellow socks and bracelet for high fall risk patients  - Consider moving patient to room near nurses station  Outcome: Progressing  Goal: Maintain or return to baseline ADL function  Description: INTERVENTIONS:  - Educate patient/family on patient safety including physical limitations  - Instruct patient to call for assistance with activity   - Consult OT/PT to assist with strengthening/mobility   - Keep Call bell within reach  - Keep bed low and locked with side rails adjusted as appropriate  - Keep care items and personal belongings within reach  - Initiate and maintain comfort rounds  - Make Fall Risk Sign visible to staff  - Offer Toileting every 2 Hours, in advance of need  - Initiate/Maintain bed alarm  - Obtain necessary fall risk management equipment: socks  - Apply yellow socks and bracelet for high fall risk patients  - Consider moving patient to room near nurses station  Outcome: Progressing  Goal: Maintains/Returns to pre admission functional level  Description: INTERVENTIONS:  - Perform BMAT or MOVE assessment daily    - Set and communicate daily mobility goal to care team and patient/family/caregiver  - Collaborate with rehabilitation services on mobility goals if consulted  - Perform Range of Motion  times a day  - Reposition patient every  hours    - Dangle patient  times a day  - Stand patient  times a day  - Ambulate patient  times a day  - Out of bed to chair  times a day   - Out of bed for neil times a day  - Out of bed for toileting  - Record patient progress and toleration of activity level   Outcome: Progressing     Problem: DISCHARGE PLANNING  Goal: Discharge to home or other facility with appropriate resources  Description: INTERVENTIONS:  - Identify barriers to discharge w/patient and caregiver  - Arrange for needed discharge resources and transportation as appropriate  - Identify discharge learning needs (meds, wound care, etc )  - Arrange for interpretive services to assist at discharge as needed  - Refer to Case Management Department for coordinating discharge planning if the patient needs post-hospital services based on physician/advanced practitioner order or complex needs related to functional status, cognitive ability, or social support system  Outcome: Progressing     Problem: Knowledge Deficit  Goal: Patient/family/caregiver demonstrates understanding of disease process, treatment plan, medications, and discharge instructions  Description: Complete learning assessment and assess knowledge base    Interventions:  - Provide teaching at level of understanding  - Provide teaching via preferred learning methods  Outcome: Progressing

## 2022-02-19 NOTE — ASSESSMENT & PLAN NOTE
Concern for MS exacerbation  Patient feels improved from admission; he reports improvement from 2/16 as well, improving slowly further  Cont solumedrol - pulse dosing 1000 mg/day - day 5   Taper steroids as outpt per neurology as well  MRI brain  Multiple small supratentorial enhancing foci of acute demyelination consistent with disease progression of multiple sclerosis  Late subacute to chronic demyelination in the left posterior limb of the internal capsule   This lesion has evolved in shape when compared to the prior outside MRI study of June 1, 2021  Moderate supratentorial predominant chronic demyelinating disease of multiple sclerosis  MRI c spine   Acute to early subacute subcentimeter demyelinating plaque at the C5 vertebral body level  Additional smaller nonenhancing chronic appearing demyelinating plaques within the upper and mid cervical cord     Monitor blood glucose on high dose steroids

## 2022-02-19 NOTE — PLAN OF CARE
Problem: Potential for Falls  Goal: Patient will remain free of falls  Description: INTERVENTIONS:  - Educate patient/family on patient safety including physical limitations  - Instruct patient to call for assistance with activity   - Consult OT/PT to assist with strengthening/mobility   - Keep Call bell within reach  - Keep bed low and locked with side rails adjusted as appropriate  - Keep care items and personal belongings within reach  - Initiate and maintain comfort rounds  - Make Fall Risk Sign visible to staff  - Offer Toileting every 2 Hours, in advance of need  - Initiate/Maintain bed alarm  - Obtain necessary fall risk management equipment: socks  - Apply yellow socks and bracelet for high fall risk patients  - Consider moving patient to room near nurses station  Outcome: Progressing     Problem: MOBILITY - ADULT  Goal: Maintain or return to baseline ADL function  Description: INTERVENTIONS:  - Educate patient/family on patient safety including physical limitations  - Instruct patient to call for assistance with activity   - Consult OT/PT to assist with strengthening/mobility   - Keep Call bell within reach  - Keep bed low and locked with side rails adjusted as appropriate  - Keep care items and personal belongings within reach  - Initiate and maintain comfort rounds  - Make Fall Risk Sign visible to staff  - Offer Toileting every 2 Hours, in advance of need  - Initiate/Maintain bed alarm  - Obtain necessary fall risk management equipment: socks  - Apply yellow socks and bracelet for high fall risk patients  - Consider moving patient to room near nurses station  Outcome: Progressing  Goal: Maintains/Returns to pre admission functional level  Description: INTERVENTIONS:  - Perform BMAT or MOVE assessment daily    - Set and communicate daily mobility goal to care team and patient/family/caregiver     - Collaborate with rehabilitation services on mobility goals if consulted  - Perform Range of Motion 2 times a day   - Reposition patient every 2 hours    - Dangle patient 2 times a day  - Stand patient 2 times a day  - Ambulate patient 2 times a day  - Out of bed to chair 2 times a day   - Out of bed for meals 2  Problem: NEUROSENSORY - ADULT  Goal: Achieves stable or improved neurological status  Description: INTERVENTIONS  - Monitor and report changes in neurological status  - Monitor vital signs such as temperature, blood pressure, glucose, and any other labs ordered   - Initiate measures to prevent increased intracranial pressure  - Monitor for seizure activity and implement precautions if appropriate      Outcome: Progressing     Problem: PAIN - ADULT  Goal: Verbalizes/displays adequate comfort level or baseline comfort level  Description: Interventions:  - Encourage patient to monitor pain and request assistance  - Assess pain using appropriate pain scale  - Administer analgesics based on type and severity of pain and evaluate response  - Implement non-pharmacological measures as appropriate and evaluate response  - Consider cultural and social influences on pain and pain management  - Notify physician/advanced practitioner if interventions unsuccessful or patient reports new pain  Outcome: Progressing     Problem: INFECTION - ADULT  Goal: Absence or prevention of progression during hospitalization  Description: INTERVENTIONS:  - Assess and monitor for signs and symptoms of infection  - Monitor lab/diagnostic results  - Monitor all insertion sites, i e  indwelling lines, tubes, and drains  - Monitor endotracheal if appropriate and nasal secretions for changes in amount and color  - [place] appropriate cooling/warming therapies per order  - Administer medications as ordered  - Instruct and encourage patient and family to use good hand hygiene technique  - Identify and instruct in appropriate isolation precautions for identified infection/condition  Outcome: Progressing     Problem: SAFETY ADULT  Goal: Patient will remain free of falls  Description: INTERVENTIONS:  - Educate patient/family on patient safety including physical limitations  - Instruct patient to call for assistance with activity   - Consult OT/PT to assist with strengthening/mobility   - Keep Call bell within reach  - Keep bed low and locked with side rails adjusted as appropriate  - Keep care items and personal belongings within reach  - Initiate and maintain comfort rounds  - Make Fall Risk Sign visible to staff  - Offer Toileting every 2 Hours, in advance of need  - Initiate/Maintain bed alarm  - Obtain necessary fall risk management equipment: socks  - Apply yellow socks and bracelet for high fall risk patients  - Consider moving patient to room near nurses station  Outcome: Progressing  Goal: Maintain or return to baseline ADL function  Description: INTERVENTIONS:  - Educate patient/family on patient safety including physical limitations  - Instruct patient to call for assistance with activity   - Consult OT/PT to assist with strengthening/mobility   - Keep Call bell within reach  - Keep bed low and locked with side rails adjusted as appropriate  - Keep care items and personal belongings within reach  - Initiate and maintain comfort rounds  - Make Fall Risk Sign visible to staff  - Offer Toileting every 2 Hours, in advance of need  - Initiate/Maintain bed alarm  - Obtain necessary fall risk management equipment: socks  - Apply yellow socks and bracelet for high fall risk patients  - Consider moving patient to room near nurses station  Outcome: Progressing  Goal: Maintains/Returns to pre admission functional level  Description: INTERVENTIONS:  - Perform BMAT or MOVE assessment daily    - Set and communicate daily mobility goal to care team and patient/family/caregiver  - Collaborate with rehabilitation services on mobility goals if consulted  - Perform Range of Motion 2 times a day  - Reposition patient every 2 hours    - Dangle patient 2 times a day  - Stand patient 2 times a day  - Ambulate patient 2 times a day  - Out of bed to chair 2 times a day   - Out of bed for meals 2 times a day  - Out of bed for toileting  - Record patient progress and toleration of activity level   Outcome: Progressing     Problem: DISCHARGE PLANNING  Goal: Discharge to home or other facility with appropriate resources  Description: INTERVENTIONS:  - Identify barriers to discharge w/patient and caregiver  - Arrange for needed discharge resources and transportation as appropriate  - Identify discharge learning needs (meds, wound care, etc )  - Arrange for interpretive services to assist at discharge as needed  - Refer to Case Management Department for coordinating discharge planning if the patient needs post-hospital services based on physician/advanced practitioner order or complex needs related to functional status, cognitive ability, or social support system  Outcome: Progressing    times a day  - Out of bed for toileting  - Record patient progress and toleration of activity level   Outcome: Progressing     Problem: Prexisting or High Potential for Compromised Skin Integrity  Goal: Skin integrity is maintained or improved  Description: INTERVENTIONS:  - Identify patients at risk for skin breakdown  - Assess and monitor skin integrity  - Assess and monitor nutrition and hydration status  - Monitor labs   - Assess for incontinence   - Turn and reposition patient  - Assist with mobility/ambulation  - Relieve pressure over bony prominences  - Avoid friction and shearing  - Provide appropriate hygiene as needed including keeping skin clean and dry  - Evaluate need for skin moisturizer/barrier cream  - Collaborate with interdisciplinary team   - Patient/family teaching  - Consider wound care consult   Outcome: Progressing

## 2022-02-21 ENCOUNTER — TELEPHONE (OUTPATIENT)
Dept: NEUROLOGY | Facility: CLINIC | Age: 47
End: 2022-02-21

## 2022-02-21 NOTE — TELEPHONE ENCOUNTER
Hardeep Barrios from [place] [address] called to let us know Patient was unalbe to find transportation for appt  We rescheudluled to March 15th at 3:00 with Dr Lilly Clinton

## 2022-02-21 NOTE — UTILIZATION REVIEW
Notification of Discharge   This is a Notification of Discharge from our facility [address]  Please be advised that this patient has been discharge from our facility  Below you will find the admission and discharge date and time including the patients disposition  UTILIZATION REVIEW CONTACT:  Jennie Smith  Utilization   Network Utilization Review Department  Phone: [phone] carefully listen to the prompts  All voicemails are confidential   Email: [email or web address]     PHYSICIAN ADVISORY SERVICES:  FOR [facility] REVIEW - MEDICAL NECESSITY DENIAL  Phone: [phone]  Fax: [phone]  Email: [email or web address]     PRESENTATION DATE: 2/13/2022  7:56 AM  OBERVATION ADMISSION DATE:   INPATIENT ADMISSION DATE: 2/13/22  9:24 AM   DISCHARGE DATE: 2/19/2022  1:22 PM  DISPOSITION: Non [facility] Non [facility]      IMPORTANT INFORMATION:  Send all requests for admission clinical reviews, approved or denied determinations and any other requests to dedicated fax number below belonging to the campus where the patient is receiving treatment   List of dedicated fax numbers:  [address] DENIALS (Administrative/Medical Necessity) [phone]   [address] (Maternity/NICU/Pediatrics) [phone]   [facility] [phone]   130 [facility] [phone]   [address] [place] [address] [phone]   [address] [phone]   [facility]  [phone]   [address] [place] [address], [address]  2401 [facility] 1000 [address] [facility] [phone]

## 2022-02-22 ENCOUNTER — TELEPHONE (OUTPATIENT)
Dept: NEUROLOGY | Facility: CLINIC | Age: 47
End: 2022-02-22

## 2022-02-22 NOTE — TELEPHONE ENCOUNTER
HFU/SLUB/ATT/CNS demyelinating disease/DC2/19    Note from Chart:     Patient has upcoming follow-up with [facility] MS team/Dr Lucy Graham on March 8th

## 2022-03-10 ENCOUNTER — TELEPHONE (OUTPATIENT)
Dept: NEUROLOGY | Facility: CLINIC | Age: 47
End: 2022-03-10

## 2022-03-10 NOTE — TELEPHONE ENCOUNTER
Reminder appt call     Patient is scheduled on 3/15/2022 @ 3 pm with an arrival time  245 pm in the [facility] location with Dr Pinky Singh       Left message on [employer]

## 2022-03-15 ENCOUNTER — OFFICE VISIT (OUTPATIENT)
Dept: NEUROLOGY | Facility: CLINIC | Age: 47
End: 2022-03-15
Payer: COMMERCIAL

## 2022-03-15 VITALS
BODY MASS INDEX: 31.48 KG/M2 | SYSTOLIC BLOOD PRESSURE: 114 MMHG | DIASTOLIC BLOOD PRESSURE: 67 MMHG | HEART RATE: 85 BPM | HEIGHT: 71 IN | WEIGHT: 224.87 LBS | TEMPERATURE: 98 F

## 2022-03-15 DIAGNOSIS — R26.2 AMBULATORY DYSFUNCTION: ICD-10-CM

## 2022-03-15 DIAGNOSIS — R29.810 LOWER FACIAL WEAKNESS: ICD-10-CM

## 2022-03-15 DIAGNOSIS — G81.91 RIGHT HEMIPARESIS (HCC): ICD-10-CM

## 2022-03-15 DIAGNOSIS — G35 MS (MULTIPLE SCLEROSIS) (HCC): Primary | ICD-10-CM

## 2022-03-15 DIAGNOSIS — R47.1 DYSARTHRIA: ICD-10-CM

## 2022-03-15 PROCEDURE — 99215 OFFICE O/P EST HI 40 MIN: CPT | Performed by: PSYCHIATRY & NEUROLOGY

## 2022-03-15 NOTE — PROGRESS NOTES
[facility] MULTIPLE SCLEROSIS CENTER  PATIENT:  Isis Knapp  MRN:  [id number]  :  1975  DATE OF SERVICE:  3/15/2022    Assessment/Plan:     Problem List Items Addressed This Visit        Nervous and Auditory    Right hemiparesis ([facility] [address] )    MS (multiple sclerosis) ([facility] [address] ) - Primary    Relevant Orders    Hepatitis B surface antibody    Hepatitis B core antibody, total    Hepatitis C Ab W/Refl To HCV RNA, Qn, PCR    Hepatitis B surface antigen    NMO IgG Autoantibodies    Varicella zoster antibody, IgG    Quantiferon TB Gold Plus       Other    Dysarthria    Lower facial weakness    Ambulatory dysfunction         Mr Rio Ramirez has presented to [facility] multiple [address] for follow-up after recent hospitalization  Unfortunately, patient developed another MS relapse without obvious cause or infection, patient was found to have multiple active demyelinating lesions in brain parenchyma with active plaque at C5 level with edema suggestive of MS relapse  Patient describes some improvement after steroid regimen  Patient has been working with physical therapy and occupational therapy at home, patient is currently nonambulatory despite diagnosis was established only couple months ago  In the light of progressive nature of patient underlying CNS demyelination with 2nd major relapse over last several months, patient will be advised to consider B-cell depleting regimen, patient will complete serological workup and we will start ocrelizumab up 600 mg  Risk and benefits has been extensively discussed, additional information has been provided for review  Form was filled out during this office visit requires immediate submission  Patient is still in good spirits, is wheelchair-bound, right-sided hemiparesis noted in his exam   Patient is to continue gabapentin 300 mg 3 times daily and vitamin-D  Patient stated he has no pain, no muscle spasm    Patient has significant MS relapse; patient requires right foot brace  Patient has known history of right knee injury;     Patient is to follow with [facility] Neurology within 2 months  Subjective:  Multiple sclerosis and related issues    HPI    Mr Thelma Trejo has presented to  [address] [place] [address] for follow-up on CNS demyelination  Patient presented to emergency department on February 13 where he described he has been experiencing right-sided weakness, numbness and dysarthria  Stroke alert was called  Patient described he was doing well till recently when he developed right-sided weakness and dysarthria  Patient has longstanding ambulatory dysfunction due to right knee and right hip injury; In patient team completed additional imaging of the brain and cervical spine were patient was found to have multiple acute demyelinating plaques involving brain and cervical spine which brought concern for CNS demyelination  Patient received 5 doses of Solu-Medrol and he was discharged on tapering dose of prednisone, patient is currently taking prednisone 30 mg  Patient stated he has some improvement of his right-sided weakness, the same time his actively working with physical therapy/occupational therapy and nurse  Patient has not started disease modifying regimen yet  Work up was completed at [address] [place] [address] during hospitalization and patient findings were initially considered due to CVA due to left facial weakness and right sided body weakness, but after MRI B/C completed, steroid regimen was provided for 5 days  Patient ambulates with a cane and was in wheelchair during this visit; Residual right hand dexterity with muscle atrophy noted, dysarthric speech appreciated; Residual retro-orbital pain and headache described; Patient had developed post-steroid myalgia and paresthesia, he is to continue gabapentin; PT/OT should be continued;     Patient is to bring CD with imaging to his follow up visit for review, CVA vs CNS demyelination was considered;  No LP was completed       Patient is  Working on unemployment and SSI- MSW will be helping guiding the patient through filling out SSI       Vitamin D 50 000 IU weekly and Vitamin B12 1000 mcg SL daily in addition to B2 200 mg bid and B1 50 mg daily  MRI brain 2/14/2022: Multiple small supratentorial enhancing foci of acute demyelination consistent with disease progression of multiple sclerosis      Late subacute to chronic demyelination in the left posterior limb of the internal capsule  This lesion has evolved in shape when compared to the prior outside MRI study of June 1, 2021      Moderate supratentorial predominant chronic demyelinating disease of multiple sclerosis  MRI C-spine : Acute to early subacute subcentimeter demyelinating plaque at the C5 vertebral body level      Additional smaller nonenhancing chronic appearing demyelinating plaques within the upper and mid cervical cord  The following portions of the patient's history were reviewed and updated as appropriate:   He  has a past medical history of MS (multiple sclerosis) ([facility] [address] )  He   Patient Active Problem List    Diagnosis Date Noted    Ambulatory dysfunction 03/15/2022    MS (multiple sclerosis) ([facility] [address] ) 03/15/2022    Dysarthria 02/07/2022    Lower facial weakness 02/07/2022    CNS demyelinating disease ([facility] [address] ) 02/07/2022    Right hemiparesis ([facility] [address] ) 02/07/2022     He  has a past surgical history that includes Hernia repair and Knee surgery (Right)  His family history includes Multiple sclerosis in his other; Stroke in his maternal grandmother  He  reports that he has quit smoking  He has quit using smokeless tobacco  He reports previous alcohol use  He reports that he does not use drugs    Current Outpatient Medications   Medication Sig Dispense Refill    Cyanocobalamin 1000 MCG SUBL Place 1 tablet (1,000 mcg total) under the tongue daily 90 tablet 0    ergocalciferol (VITAMIN D2) 50,000 units Take 1 capsule (50,000 Units total) by mouth once a week 12 capsule 0    gabapentin (NEURONTIN) 300 mg capsule Take 1 capsule (300 mg total) by mouth 3 (three) times a day  0    pantoprazole (PROTONIX) 40 mg tablet Take 1 tablet (40 mg total) by mouth daily before breakfast  0    predniSONE 10 mg tablet Take 6 tablets (60 mg total) by mouth daily for 7 days, THEN 5 tablets (50 mg total) daily for 7 days, THEN 4 tablets (40 mg total) daily for 7 days, THEN 3 tablets (30 mg total) daily for 7 days, THEN 2 tablets (20 mg total) daily for 7 days, THEN 1 tablet (10 mg total) daily for 7 days  147 tablet 0    Riboflavin ([employer] Vitamin B-2) 100 MG TABS Take 2 tablets by mouth in the morning        thiamine (VITAMIN B1) 50 mg tablet Take 100 mg by mouth daily      insulin lispro (HumaLOG) 100 units/mL injection Inject 1-6 Units under the skin 3 (three) times a day before meals (Patient not taking: Reported on 3/15/2022 )  0     No current facility-administered medications for this visit  Current Outpatient Medications on File Prior to Visit   Medication Sig    Cyanocobalamin 1000 MCG SUBL Place 1 tablet (1,000 mcg total) under the tongue daily    ergocalciferol (VITAMIN D2) 50,000 units Take 1 capsule (50,000 Units total) by mouth once a week    gabapentin (NEURONTIN) 300 mg capsule Take 1 capsule (300 mg total) by mouth 3 (three) times a day    pantoprazole (PROTONIX) 40 mg tablet Take 1 tablet (40 mg total) by mouth daily before breakfast    predniSONE 10 mg tablet Take 6 tablets (60 mg total) by mouth daily for 7 days, THEN 5 tablets (50 mg total) daily for 7 days, THEN 4 tablets (40 mg total) daily for 7 days, THEN 3 tablets (30 mg total) daily for 7 days, THEN 2 tablets (20 mg total) daily for 7 days, THEN 1 tablet (10 mg total) daily for 7 days      Riboflavin ([employer] Vitamin B-2) 100 MG TABS Take 2 tablets by mouth in the morning      thiamine (VITAMIN B1) 50 mg tablet Take 100 mg by mouth daily    insulin lispro (HumaLOG) 100 units/mL injection Inject 1-6 Units under the skin 3 (three) times a day before meals (Patient not taking: Reported on 3/15/2022 )     No current facility-administered medications on file prior to visit  He has No Known Allergies            Objective:    Blood pressure 114/67, pulse 85, temperature 98 °F (36 7 °C), temperature source Skin, height 5' 11" (1 803 m), weight 102 kg (224 lb 13 9 oz)  Physical Exam    Neurological Exam  CONSTITUTIONAL: NAD, pleasant  NECK: supple, no lymphadenopathy, no thyromegaly, no JVD  CARDIOVASCULAR: RRR, normal S1S2, no murmurs, no rubs  RESP: clear to auscultation bilaterally, no wheezes/rhonchi/rales  ABDOMEN: soft, non tender, non distended  SKIN: no rash or skin lesions  EXTREMITIES: no edema, pulses 2+bilaterally  PSYCH: appropriate mood and affect  NEUROLOGIC COMPREHENSIVE EXAM: Patient is oriented to person, place and time, NAD; appropriate affect  CN II, III, IV, V, VI, VII,VIII,IX,X,XI-XII intact with EOMI, PERRLA, OKN intact, VF grossly intact, fundi poorly visualized secondary to pupillary constriction; symmetric face noted  Motor: RUE 3/5 shoulder abduction and elbow flexion, 4/5 ; RLE 2/5 hip flexion and 3/5 knee extension with 3/5 dorsiflexion; LUE/LLE  4/5 through; Sensory: diminished to light touch and pinprick bilaterally; normal vibration sensation feet bilaterally; Coordination within normal limits on FTN and TEJINDER testing; DTR: 2++/4 through, no Babinski, no clonus  Tandem gait is abnormal  Romberg: absent  ROS:  12 points of review of system was reviewed with the patient and was unremarkable with exception: see HPI  Review of Systems   Constitutional: Negative  Negative for appetite change and fever  HENT: Negative  Negative for hearing loss, tinnitus, trouble swallowing and voice change  Eyes: Negative  Negative for photophobia and pain  Respiratory: Negative  Negative for shortness of breath  Cardiovascular: Negative  Negative for palpitations  Gastrointestinal: Negative  Negative for nausea and vomiting  Endocrine: Negative  Negative for cold intolerance  Genitourinary: Negative  Negative for dysuria, frequency and urgency  Musculoskeletal: Negative  Negative for myalgias and neck pain  Skin: Negative  Negative for rash  Neurological: Positive for speech difficulty and weakness (right arm and right leg)  Negative for dizziness, tremors, seizures, syncope, facial asymmetry, light-headedness, numbness and headaches  Hematological: Negative  Does not bruise/bleed easily  Psychiatric/Behavioral: Negative  Negative for confusion, hallucinations and sleep disturbance

## 2022-03-16 ENCOUNTER — TELEPHONE (OUTPATIENT)
Dept: NEUROLOGY | Facility: CLINIC | Age: 47
End: 2022-03-16

## 2022-03-16 DIAGNOSIS — G35 MS (MULTIPLE SCLEROSIS) (HCC): Primary | ICD-10-CM

## 2022-03-16 NOTE — TELEPHONE ENCOUNTER
Ocrevus Start Form completed, signed and fax to [phone] along with copy of insurance card, medication list and allergy list       Magalie Cruz Start Form scanned into patient chart along with confirmation fax sheet

## 2022-03-21 NOTE — TELEPHONE ENCOUNTER
Labs have not been drawn  These are needed to start Ocrevus process  Called pt  He reports labs were completed today with Abby Bower  Awaiting results

## 2022-03-23 ENCOUNTER — TELEPHONE (OUTPATIENT)
Dept: NEUROLOGY | Facility: CLINIC | Age: 47
End: 2022-03-23

## 2022-03-23 NOTE — TELEPHONE ENCOUNTER
Pt made aware of below, he would like to proceed with infusions due to worsening symptoms  I offered infusions at the [facility] infusion center  Advised we can attempt home infusion asap if needed  Pt states at this time he believes it would be easiest to proceed with home infusion  I advised orders will be sent to [facility] home infusion asap  Infusion orders written and emailed to Luan Gan MA for signature  Please fax order to [phone] when signed and scan into the chart

## 2022-03-23 NOTE — TELEPHONE ENCOUNTER
Pt calling regarding below  He reports he reduced prednisone dose to 20mg and has since noticed that he is having a harder time speaking and his arm strength has decreased  Pt denies any new/ worsening numbness/ tingling, fatigue, bowel/bladder changes, visual changes, or signs of infection or illness  Asking if he can increase prednisone does back to 30mg daily  Please advise  [phone]  Okay to leave detailed message

## 2022-03-23 NOTE — TELEPHONE ENCOUNTER
Still awaiting lab results  Once received, will submit urgent PA  Pt has Novinda- must obtain medication through specialty pharmacy  Please review and sign script  Thanks!

## 2022-03-23 NOTE — TELEPHONE ENCOUNTER
Unfortunately, prednisone is not a treatment of MS, it is just taper  If patient has worsening - 3 doses of solumedrol 1000 mg might be considered to transition the patient to DMT     No increasing prednisone to 30 mg recommended

## 2022-03-23 NOTE — TELEPHONE ENCOUNTER
----- Message from Carolynn Butterfield sent at 3/23/2022  3:07 PM EDT -----  Regarding: FW: Steroids     ----- Message -----  From: Colton Castillo  Sent: 3/23/2022   2:59 PM EDT  To: Neurology [facility]  Subject: Steroids                                         I've noticed since I reduced my steroids to 20 mg my speech and arm has gotten worse  Can I increase the steroids back to 30 mg   Please

## 2022-03-24 NOTE — TELEPHONE ENCOUNTER
Received voicemail from [address] with [facility] home infusion  She reports they are not in network with pt's insurance  Will attempt to send pt's infusion order to [facility] home infusion to see if they can service the patient  Infusion order faxed to [facility] (marked as urgent) with face sheet, insurance information, and last office note  Will f/u

## 2022-03-25 NOTE — TELEPHONE ENCOUNTER
Called Rishi at [phone]- Julieta Cheng will call back with an update  My phone # was provided  Pt provided with update, he verbalizes understanding  Pt asked if any of his medications interact with grapefruit  I advised for neurology medications including Vitamin B12, vitamin D2, prednisone, and gabapentin-- per [employer]ex no interaction is listed for grapefruit

## 2022-03-25 NOTE — TELEPHONE ENCOUNTER
Missy Root calling back  He reports he finished verifying pt's benefits  Drug copay will be $1  Pt should be covered for nursing services  He left a voicemail for pt to review this information  Once reviewed, they should be able to ship medication today and work to set up nursing services       Missy Root [phone] ext [phone]

## 2022-03-26 DIAGNOSIS — R29.810 LOWER FACIAL WEAKNESS: ICD-10-CM

## 2022-03-26 DIAGNOSIS — G37.9 CNS DEMYELINATING DISEASE (HCC): ICD-10-CM

## 2022-03-26 RX ORDER — ERGOCALCIFEROL 1.25 MG/1
50000 CAPSULE ORAL WEEKLY
Qty: 12 CAPSULE | Refills: 0 | Status: CANCELLED | OUTPATIENT
Start: 2022-03-26

## 2022-03-27 RX ORDER — OCRELIZUMAB 300 MG/10ML
300 INJECTION INTRAVENOUS
Qty: 20 ML | Refills: 0 | Status: SHIPPED | OUTPATIENT
Start: 2022-03-27 | End: 2022-04-11

## 2022-03-28 NOTE — TELEPHONE ENCOUNTER
Received voicemail from [facility]  He reports pt started Solumedrol infusions yesterday

## 2022-03-29 DIAGNOSIS — G37.9 CNS DEMYELINATING DISEASE (HCC): ICD-10-CM

## 2022-03-29 DIAGNOSIS — G35 MS (MULTIPLE SCLEROSIS) (HCC): Primary | ICD-10-CM

## 2022-03-29 RX ORDER — ACETAMINOPHEN 325 MG/1
650 TABLET ORAL ONCE
Status: CANCELLED | OUTPATIENT
Start: 2022-04-29

## 2022-03-29 RX ORDER — PANTOPRAZOLE SODIUM 40 MG/1
40 TABLET, DELAYED RELEASE ORAL
Qty: 30 TABLET | Refills: 0 | Status: SHIPPED | OUTPATIENT
Start: 2022-03-29

## 2022-03-29 RX ORDER — SODIUM CHLORIDE 9 MG/ML
20 INJECTION, SOLUTION INTRAVENOUS ONCE
Status: CANCELLED | OUTPATIENT
Start: 2022-04-29

## 2022-03-29 NOTE — TELEPHONE ENCOUNTER
Pt called to notify our office that [facility] is ready to ship Alexandre Turner  Called [facility] and scheduled delivery of medication for 3/30/22  Confirmed [facility] infusion center address  Will schedule pt as soon as plan is signed  Will need to confirm both vials are received for pt

## 2022-03-29 NOTE — TELEPHONE ENCOUNTER
Alba Garvin is approved with [employer] from 3/25/22 to 4/22/22 for split dose  Maintenance dose approved from 4/23/22 to 3/25/23  Medication must be obtained from [facility]  Rx sent on 3/27/22  Therapy plan sent for signature  Referral updated

## 2022-03-29 NOTE — TELEPHONE ENCOUNTER
----- Message from Jeanenne Osler sent at 3/28/2022  7:29 AM EDT -----  Regarding: FW: Refills     ----- Message -----  From: Sury Mcgowan  Sent: 3/26/2022   5:40 AM EDT  To: [facility]  Subject: Refills                                          I need refills on  Vitamin L93  Folic Acid  Vitamin B1  Pantoprazole SOD 40MG TAB CAM

## 2022-03-29 NOTE — TELEPHONE ENCOUNTER
Pt calling to question why refills for cyanocobalamin and Vitamin D2 were denied  I advised both script were sent on 2/7/22 for 90 day supply  He reports he only received a 30 day supply of both  Pt also notes he only received 30 day supply of vitamin B1  Unsure who prescribes this  [address] [facility] and spoke with Carolyn  She reports pt did  12 caps of vitamin D2 50,000 units  Pt should not need refill of this  Cyanocobalamin sublingual tabs not dispensed- this medication is not available and not covered by insurance  Regular tabs are needed  Folic acid and Vitamin B1 are prescribed by Dr Leo Schmid per Carolyn  Pantoprazole prescribed by inpatient team for CNS demyelinating disease  Pt was receiving Solumedrol inpatient as well as PO prednisone  Today is last day of outpatient Solumedrol infusions  Please review and sign if agreeable  Pt will need call back

## 2022-03-30 NOTE — TELEPHONE ENCOUNTER
Patient returned call  Advised him of information below  Patient verbalized understanding  normal...

## 2022-03-30 NOTE — TELEPHONE ENCOUNTER
Patient returned call  Advised him of infusion dates/times below  Patient verbalized understanding and agreement

## 2022-03-30 NOTE — TELEPHONE ENCOUNTER
Plan is signed  Called [facility] infusion center and spoke with Ming  Pt is scheduled for the following (soonest available):    4/29/22 @ 8am  5/13/22 @ 8am    Called and Left a message on pt's answering machine for a call back  Received message from José Rincon that Chantal Khalil was received  Awaiting confirmation of # of vials received

## 2022-03-30 NOTE — TELEPHONE ENCOUNTER
Called and Left a message on pt's answering machine for a call back   Need to make pt aware of the following:    Vitamin B12- sent to pharmacy  Pantoprazole- sent to pharmacy  Folic Acid- need to contact Diomedes Massey for refill  Vitamin B1- need to contact Diomedes Massey for refill  Vitamin D2- 12 caps (3 month supply) already picked up from [facility] [address]

## 2022-04-10 DIAGNOSIS — G37.9 CNS DEMYELINATING DISEASE (HCC): ICD-10-CM

## 2022-04-10 DIAGNOSIS — R29.810 LOWER FACIAL WEAKNESS: ICD-10-CM

## 2022-04-13 ENCOUNTER — TELEPHONE (OUTPATIENT)
Dept: NEUROLOGY | Facility: CLINIC | Age: 47
End: 2022-04-13

## 2022-04-13 DIAGNOSIS — G81.91 RIGHT HEMIPARESIS (HCC): Primary | ICD-10-CM

## 2022-04-13 DIAGNOSIS — G35 MS (MULTIPLE SCLEROSIS) (HCC): ICD-10-CM

## 2022-04-13 RX ORDER — ERGOCALCIFEROL 1.25 MG/1
50000 CAPSULE ORAL WEEKLY
Qty: 12 CAPSULE | Refills: 0 | Status: SHIPPED | OUTPATIENT
Start: 2022-04-13 | End: 2022-07-18

## 2022-04-13 NOTE — TELEPHONE ENCOUNTER
Patient called requesting referrals for PT and OT  He has been receiving these services thru [facility] in [facility] ([phone])  Current services will  right before he starts Ocrevus infusions  Patient would like to continue with these services if possible  Dr Dulce Maria Acosta - are you agreeable to ordering PT and OT?

## 2022-04-13 NOTE — TELEPHONE ENCOUNTER
Spoke w/patient  He requested referrals be mailed  Referrals printed and mailed to home address on file

## 2022-04-15 ENCOUNTER — TELEPHONE (OUTPATIENT)
Dept: NEUROLOGY | Facility: CLINIC | Age: 47
End: 2022-04-15

## 2022-04-15 DIAGNOSIS — Z76.89 ENCOUNTER BEFORE STARTING MEDICATION: ICD-10-CM

## 2022-04-15 DIAGNOSIS — G35 MS (MULTIPLE SCLEROSIS) (HCC): Primary | ICD-10-CM

## 2022-04-15 NOTE — TELEPHONE ENCOUNTER
Pt is scheduled for first Ocrevus infusion on 4/29/22  Randi Flores is approved with [employer] from 3/25/22 to 4/22/22 for split dose  Maintenance dose approved from 4/23/22 to 3/25/23  Medication must be obtained from [facility]  2 vials of Ocrevus were delivered on 3/30/22  CBC, CMP, and T&B lymphocytes orders available in Epic  MyChart message sent to pt advising him of needed labs within 2-6 days prior to infusion  If no response is received, will need to call pt

## 2022-04-20 NOTE — TELEPHONE ENCOUNTER
Patient called requesting lab scripts be sent to [facility] [phone]  They will do labs at his home  [facility]  Slime kruger/Pamela  She provided fax number:    [phone] (f)     Lab scripts printed and faxed to number above

## 2022-04-21 NOTE — TELEPHONE ENCOUNTER
Received call from Dilcia Alegre, nurse with [facility]  She is asking when pt should have labs completed  Advised this should be drawn between 4/23/22 and 4/27/22  She verbalizes understanding

## 2022-04-22 DIAGNOSIS — G37.9 CNS DEMYELINATING DISEASE (HCC): ICD-10-CM

## 2022-04-22 RX ORDER — GABAPENTIN 300 MG/1
300 CAPSULE ORAL 3 TIMES DAILY
Qty: 270 CAPSULE | Refills: 3 | Status: SHIPPED | OUTPATIENT
Start: 2022-04-22

## 2022-04-22 NOTE — TELEPHONE ENCOUNTER
----- Message from Nini Ruggiero sent at 4/21/2022  7:11 AM EDT -----  Regarding: Renewal   Please review and address  ----- Message -----  From: Jami Pop  Sent: 4/20/2022   5:08 PM EDT  To: Neurology [facility]  Subject: Renewal                                          I need Gabapentin renewed please

## 2022-04-22 NOTE — TELEPHONE ENCOUNTER
Pt calling regarding below  Confirms he is taking gabapentin 300mg caps 1 cap TID  Asking for Rx to be sent to [facility] in [place]  Pt is out of medication  Please review and sign if agreeable

## 2022-04-27 NOTE — TELEPHONE ENCOUNTER
CMP and T&B lymphocyte results received (includes CBC values)  Per chart review, pt had Solumedrol infusions x3 3/27/22-3/29/22  No other infection, illness, antibiotic use, or changes in health status since last office visit  Okay to proceed with Branda Cogan?

## 2022-04-28 ENCOUNTER — TELEPHONE (OUTPATIENT)
Dept: INFUSION CENTER | Facility: HOSPITAL | Age: 47
End: 2022-04-28

## 2022-04-29 ENCOUNTER — TELEPHONE (OUTPATIENT)
Dept: NEUROLOGY | Facility: CLINIC | Age: 47
End: 2022-04-29

## 2022-04-29 ENCOUNTER — HOSPITAL ENCOUNTER (OUTPATIENT)
Dept: INFUSION CENTER | Facility: HOSPITAL | Age: 47
Discharge: HOME | End: 2022-04-29
Attending: PSYCHIATRY & NEUROLOGY
Payer: COMMERCIAL

## 2022-04-29 VITALS
TEMPERATURE: 97.4 F | SYSTOLIC BLOOD PRESSURE: 126 MMHG | DIASTOLIC BLOOD PRESSURE: 75 MMHG | OXYGEN SATURATION: 96 % | HEART RATE: 75 BPM | RESPIRATION RATE: 16 BRPM

## 2022-04-29 DIAGNOSIS — G35 MS (MULTIPLE SCLEROSIS) (HCC): Primary | ICD-10-CM

## 2022-04-29 PROCEDURE — 96413 CHEMO IV INFUSION 1 HR: CPT

## 2022-04-29 PROCEDURE — 96367 TX/PROPH/DG ADDL SEQ IV INF: CPT

## 2022-04-29 PROCEDURE — 96415 CHEMO IV INFUSION ADDL HR: CPT

## 2022-04-29 RX ORDER — ACETAMINOPHEN 325 MG/1
650 TABLET ORAL ONCE
Status: CANCELLED | OUTPATIENT
Start: 2022-05-13

## 2022-04-29 RX ORDER — SODIUM CHLORIDE 9 MG/ML
20 INJECTION, SOLUTION INTRAVENOUS ONCE
Status: COMPLETED | OUTPATIENT
Start: 2022-04-29 | End: 2022-04-29

## 2022-04-29 RX ORDER — SODIUM CHLORIDE 9 MG/ML
20 INJECTION, SOLUTION INTRAVENOUS ONCE
Status: CANCELLED | OUTPATIENT
Start: 2022-05-13

## 2022-04-29 RX ORDER — ACETAMINOPHEN 325 MG/1
650 TABLET ORAL ONCE
Status: COMPLETED | OUTPATIENT
Start: 2022-04-29 | End: 2022-04-29

## 2022-04-29 RX ADMIN — ACETAMINOPHEN 650 MG: 325 TABLET, FILM COATED ORAL at 09:47

## 2022-04-29 RX ADMIN — SODIUM CHLORIDE 100 MG: 0.9 INJECTION, SOLUTION INTRAVENOUS at 09:47

## 2022-04-29 RX ADMIN — FAMOTIDINE 20 MG: 10 INJECTION INTRAVENOUS at 08:58

## 2022-04-29 RX ADMIN — OCRELIZUMAB 300 MG: 300 INJECTION INTRAVENOUS at 10:47

## 2022-04-29 RX ADMIN — DIPHENHYDRAMINE HYDROCHLORIDE 50 MG: 50 INJECTION, SOLUTION INTRAMUSCULAR; INTRAVENOUS at 09:24

## 2022-04-29 RX ADMIN — SODIUM CHLORIDE 20 ML/HR: 9 INJECTION, SOLUTION INTRAVENOUS at 08:58

## 2022-04-29 NOTE — TELEPHONE ENCOUNTER
Pt is scheduled for second Ocrevus infusion on 5/13/22       Nba Vaughn is approved with [employer] from 3/25/22 to 4/22/22 for split dose  Maintenance dose approved from 4/23/22 to 3/25/23  Medication must be obtained from [facility]       2 vials of Ocrevus were delivered on 3/30/22      CBC and CMP orders available in Epic  T&B lymphocytes drawn 4/26/22       Will need to fax labs/ remind pt

## 2022-04-29 NOTE — PROGRESS NOTES
Pt tolerated Ocrevus infusion with no adverse reaction  VSS throughout entire infusion  Pt completed observation time, no complications  Pt educated on s/s to report to MD  Assisted pt back to  and escorted off unit to meet sister for transport   Pt refused AVS

## 2022-05-02 NOTE — TELEPHONE ENCOUNTER
Attempted to fax labs to [facility] at [phone]  Received busy message  Will try again later

## 2022-05-02 NOTE — TELEPHONE ENCOUNTER
Called [place] [facility] and obtained alternative fax #  Faxed successfully  Pt reminded of needed labs  He verbalizes understanding

## 2022-05-11 NOTE — TELEPHONE ENCOUNTER
Spoke with pt, he reports labs were drawn this AM  Awaiting results from [address] [place] [address]

## 2022-05-13 ENCOUNTER — HOSPITAL ENCOUNTER (OUTPATIENT)
Dept: INFUSION CENTER | Facility: HOSPITAL | Age: 47
Discharge: HOME | End: 2022-05-13
Attending: PSYCHIATRY & NEUROLOGY
Payer: COMMERCIAL

## 2022-05-13 VITALS
RESPIRATION RATE: 18 BRPM | DIASTOLIC BLOOD PRESSURE: 80 MMHG | SYSTOLIC BLOOD PRESSURE: 118 MMHG | HEART RATE: 82 BPM | TEMPERATURE: 98.5 F

## 2022-05-13 DIAGNOSIS — G35 MS (MULTIPLE SCLEROSIS) (HCC): Primary | ICD-10-CM

## 2022-05-13 PROCEDURE — 96415 CHEMO IV INFUSION ADDL HR: CPT

## 2022-05-13 PROCEDURE — 96413 CHEMO IV INFUSION 1 HR: CPT

## 2022-05-13 PROCEDURE — 96367 TX/PROPH/DG ADDL SEQ IV INF: CPT

## 2022-05-13 RX ORDER — ACETAMINOPHEN 325 MG/1
650 TABLET ORAL ONCE
Status: CANCELLED | OUTPATIENT
Start: 2022-05-27

## 2022-05-13 RX ORDER — SODIUM CHLORIDE 9 MG/ML
20 INJECTION, SOLUTION INTRAVENOUS ONCE
OUTPATIENT
Start: 2022-05-27

## 2022-05-13 RX ORDER — SODIUM CHLORIDE 9 MG/ML
20 INJECTION, SOLUTION INTRAVENOUS ONCE
Status: COMPLETED | OUTPATIENT
Start: 2022-05-13 | End: 2022-05-13

## 2022-05-13 RX ORDER — ACETAMINOPHEN 325 MG/1
650 TABLET ORAL ONCE
Status: COMPLETED | OUTPATIENT
Start: 2022-05-13 | End: 2022-05-13

## 2022-05-13 RX ORDER — SODIUM CHLORIDE 9 MG/ML
20 INJECTION, SOLUTION INTRAVENOUS ONCE
Status: CANCELLED | OUTPATIENT
Start: 2022-05-27

## 2022-05-13 RX ORDER — ACETAMINOPHEN 325 MG/1
650 TABLET ORAL ONCE
OUTPATIENT
Start: 2022-05-27

## 2022-05-13 RX ADMIN — FAMOTIDINE 20 MG: 10 INJECTION INTRAVENOUS at 08:25

## 2022-05-13 RX ADMIN — SODIUM CHLORIDE 100 MG: 0.9 INJECTION, SOLUTION INTRAVENOUS at 09:28

## 2022-05-13 RX ADMIN — SODIUM CHLORIDE 20 ML/HR: 0.9 INJECTION, SOLUTION INTRAVENOUS at 08:25

## 2022-05-13 RX ADMIN — DIPHENHYDRAMINE HYDROCHLORIDE 50 MG: 50 INJECTION, SOLUTION INTRAMUSCULAR; INTRAVENOUS at 08:53

## 2022-05-13 RX ADMIN — ACETAMINOPHEN 650 MG: 325 TABLET ORAL at 09:27

## 2022-05-13 RX ADMIN — OCRELIZUMAB 300 MG: 300 INJECTION INTRAVENOUS at 10:06

## 2022-05-13 NOTE — PROGRESS NOTES
Pt received Ocrevus infusion without incident  1 hr waiting period uneventful  AVS printed and given  Pt taken to waiting room in [facility] to wait for sister to bring home  Left in stable condition

## 2022-05-23 ENCOUNTER — TELEPHONE (OUTPATIENT)
Dept: NEUROLOGY | Facility: CLINIC | Age: 47
End: 2022-05-23

## 2022-05-23 ENCOUNTER — OFFICE VISIT (OUTPATIENT)
Dept: NEUROLOGY | Facility: CLINIC | Age: 47
End: 2022-05-23
Payer: COMMERCIAL

## 2022-05-23 VITALS
BODY MASS INDEX: 33.01 KG/M2 | DIASTOLIC BLOOD PRESSURE: 70 MMHG | SYSTOLIC BLOOD PRESSURE: 113 MMHG | TEMPERATURE: 97.7 F | HEIGHT: 71 IN | WEIGHT: 235.8 LBS | HEART RATE: 72 BPM

## 2022-05-23 DIAGNOSIS — R26.2 AMBULATORY DYSFUNCTION: ICD-10-CM

## 2022-05-23 DIAGNOSIS — R29.810 LOWER FACIAL WEAKNESS: ICD-10-CM

## 2022-05-23 DIAGNOSIS — R47.1 DYSARTHRIA: ICD-10-CM

## 2022-05-23 DIAGNOSIS — G81.91 RIGHT HEMIPARESIS (HCC): Primary | ICD-10-CM

## 2022-05-23 DIAGNOSIS — G35 MS (MULTIPLE SCLEROSIS) (HCC): ICD-10-CM

## 2022-05-23 PROCEDURE — 99215 OFFICE O/P EST HI 40 MIN: CPT | Performed by: PSYCHIATRY & NEUROLOGY

## 2022-05-23 RX ORDER — RIBOFLAVIN (VITAMIN B2) 100 MG
2 TABLET ORAL DAILY
Qty: 360 TABLET | Refills: 2 | Status: SHIPPED | OUTPATIENT
Start: 2022-05-23

## 2022-05-23 NOTE — TELEPHONE ENCOUNTER
During the patient's appointment today he expressed interest/need in cooling supplies  The patient asked Dr Ambar Chavez for assistance with this  MSW was asked to meet with the patient during his appointment today to assist      MSW provided the patient with the cooling vest application through the [facility]  MSW informed him that a signed letter will be needed by Dr Robert Fortune confirming his diagnosis  She was unavailable at the time so MSW offered to mail it to his home  Patient was okay with this  Patient also expressed interest in connecting with Cony Wade for assistance of applying for SSDI  MSW will mail the patient his information  MSW also gave the patient information on MS Patient Screenings that are offered in our office  He expressed interest and will read the handout   MSW will follow up with him to learn if he would like to schedule an appointment with [facility] Work team

## 2022-05-23 NOTE — PROGRESS NOTES
[facility]  PATIENT:  Blaise Frazier  MRN:  [id number]  :  1975  DATE OF SERVICE:  2022    Assessment/Plan:           Problem List Items Addressed This Visit        Nervous and Auditory    Right hemiparesis ([address] ) - Primary    Relevant Medications    Riboflavin ([employer] Vitamin B-2) 100 MG TABS    MS (multiple sclerosis) (HCC)    Relevant Medications    Riboflavin ([employer] Vitamin B-2) 100 MG TABS       Other    Dysarthria    Lower facial weakness    Relevant Medications    Cyanocobalamin 1000 MCG SUBL    Ambulatory dysfunction         Mr Christal Hernández has presented to  [address] [place] [address] for follow up on MS and related issues  Patient has persistent motor dysfunction in lower extremities and right upper extremity has not recovered from his recent MS relapse and active demyelination at C5  Patient had completed steroid regimen and split dose of Ocrevus with no infusion reaction has been described  Patient will be advised imaging in 6-8 months  Evusheld 600 mg IM may be beneficial for the patient   Patient had worsening right arm dexterity over the last several days during heat wave in the region - we discussed Uhthoff's phenomenon and cooling vest will be further advised through the summer time  Patient is to continue vitamin supplements including B12,  B2 and Vitamin D  We discussed caffeinated drinks are welcomed in moderation and should be taken regularly- significant improvement in word findings difficulties has been described  Patient is to continue regular physical activity and strengthening exercises  Follow up with Viviann Nageotte in 4 months       Subjective: dizziness, tremors on right leg, speech difficulty and light headedness  Pt here with sister       HPI    Mr Christal Hernández is a 47 yo male who has presented to [facility] multiple [address] for follow-up On MS and related issues   Patient has been paraplegic since his inital evaluation in this [place]  Patient had worsening right arm and hand weakness since last several days during the heat wave in the region  Unfortunately, patient developed another MS relapse after his hospitalization and without obvious cause or infection, patient was found to have multiple active demyelinating lesions in brain parenchyma with active plaque at C5 level with edema suggestive of MS relapse in February 2022  Patient describes some improvement after steroid regimen  Patient has been working with physical therapy and occupational therapy at home, patient is currently nonambulatory despite diagnosis was established only couple months ago      In the light of progressive nature of patient underlying CNS demyelination with 2nd major relapse over last several months, patient will be advised to consider B-cell depleting regimen, patient will complete serological workup and we will start ocrelizumab up 600 mg  Risk and benefits has been extensively discussed, additional information has been provided for review  Form was filled out during this office visit requires immediate submission  Ocrelizumab 300 mg was provided on 4/29/2022 and 5/13/2022  Patient is still in good spirits, is wheelchair-bound, right-sided hemiparesis noted in his exam   Patient is to continue gabapentin 300 mg 3 times daily and vitamin-D  Patient stated he has no pain, no muscle spasm  Patient has significant MS relapse; patient requires right foot brace  Patient has known history of right knee injury; The following portions of the patient's history were reviewed and updated as appropriate:   He  has a past medical history of MS (multiple sclerosis) ([facility] )    He   Patient Active Problem List    Diagnosis Date Noted    Ambulatory dysfunction 03/15/2022    MS (multiple sclerosis) ([facility] ) 03/15/2022    Dysarthria 02/07/2022    Lower facial weakness 02/07/2022    CNS demyelinating disease ([facility] ) 02/07/2022    Right hemiparesis ([facility] ) 02/07/2022     He  has a past surgical history that includes Hernia repair and Knee surgery (Right)  His family history includes Multiple sclerosis in his other; Stroke in his maternal grandmother  He  reports that he has quit smoking  He has quit using smokeless tobacco  He reports previous alcohol use  He reports that he does not use drugs  Current Outpatient Medications   Medication Sig Dispense Refill    cyanocobalamin (VITAMIN B-12) 1000 MCG tablet Take 1 tablet (1,000 mcg total) by mouth daily 90 tablet 0    Cyanocobalamin 1000 MCG SUBL Place 1 tablet (1,000 mcg total) under the tongue in the morning  90 tablet 3    ergocalciferol (VITAMIN D2) 50,000 units Take 1 capsule (50,000 Units total) by mouth once a week 12 capsule 0    gabapentin (NEURONTIN) 300 mg capsule Take 1 capsule (300 mg total) by mouth 3 (three) times a day 270 capsule 3    Riboflavin ([employer]S Vitamin B-2) 100 MG TABS Take 2 tablets (200 mg total) by mouth in the morning 360 tablet 2    thiamine (VITAMIN B1) 50 mg tablet Take 100 mg by mouth daily      insulin lispro (HumaLOG) 100 units/mL injection Inject 1-6 Units under the skin 3 (three) times a day before meals (Patient not taking: Reported on 3/15/2022 )  0    pantoprazole (PROTONIX) 40 mg tablet Take 1 tablet (40 mg total) by mouth daily before breakfast (Patient not taking: Reported on 5/23/2022) 30 tablet 0     No current facility-administered medications for this visit       Current Outpatient Medications on File Prior to Visit   Medication Sig    cyanocobalamin (VITAMIN B-12) 1000 MCG tablet Take 1 tablet (1,000 mcg total) by mouth daily    ergocalciferol (VITAMIN D2) 50,000 units Take 1 capsule (50,000 Units total) by mouth once a week    gabapentin (NEURONTIN) 300 mg capsule Take 1 capsule (300 mg total) by mouth 3 (three) times a day    thiamine (VITAMIN B1) 50 mg tablet Take 100 mg by mouth daily    [DISCONTINUED] Cyanocobalamin 1000 MCG SUBL Place 1 tablet (1,000 mcg total) under the tongue daily    [DISCONTINUED] Riboflavin (CVS Vitamin B-2) 100 MG TABS Take 2 tablets by mouth in the morning      insulin lispro (HumaLOG) 100 units/mL injection Inject 1-6 Units under the skin 3 (three) times a day before meals (Patient not taking: Reported on 3/15/2022 )    pantoprazole (PROTONIX) 40 mg tablet Take 1 tablet (40 mg total) by mouth daily before breakfast (Patient not taking: Reported on 5/23/2022)     No current facility-administered medications on file prior to visit  He has No Known Allergies            Objective:    Blood pressure 113/70, pulse 72, temperature 97 7 °F (36 5 °C), temperature source Skin, height 5' 11" (1 803 m), weight 107 kg (235 lb 12 8 oz)  Physical Exam    Neurological Exam  CONSTITUTIONAL: NAD, pleasant  NECK: supple, no lymphadenopathy, no thyromegaly, no JVD  CARDIOVASCULAR: RRR, normal S1S2, no murmurs, no rubs  RESP: clear to auscultation bilaterally, no wheezes/rhonchi/rales  ABDOMEN: soft, non tender, non distended  SKIN: no rash or skin lesions  EXTREMITIES: no edema, pulses 2+bilaterally  PSYCH: appropriate mood and affect  NEUROLOGIC COMPREHENSIVE EXAM: Patient is oriented to person, place and time, NAD; appropriate affect  CN II, III, IV, V, VI, VII,VIII,IX,X,XI-XII intact with EOMI, PERRLA, OKN intact, VF grossly intact, fundi poorly visualized secondary to pupillary constriction; symmetric face noted  Motor: RUE 3/5 shoulder abduction and elbow flexion, 4/5 ; RLE 2/5 hip flexion and 3/5 knee extension with 3/5 dorsiflexion; LUE/LLE  4/5 through; Sensory: diminished to light touch and pinprick bilaterally; normal vibration sensation feet bilaterally; Coordination within normal limits on FTN and TEJINDER testing; DTR: 2++/4 through, no Babinski, no clonus  Tandem gait is abnormal  Romberg: absent        ROS:  12 points of review of system was reviewed with the patient and was unremarkable with exception: see HPI  Review of Systems Constitutional: Negative  Negative for appetite change and fever  HENT: Negative  Negative for hearing loss, tinnitus, trouble swallowing and voice change  Eyes: Negative  Negative for photophobia and pain  Respiratory: Negative  Negative for shortness of breath  Cardiovascular: Negative  Negative for palpitations  Gastrointestinal: Negative  Negative for nausea and vomiting  Endocrine: Negative  Negative for cold intolerance  Genitourinary: Negative  Negative for dysuria, frequency and urgency  Musculoskeletal: Negative  Negative for myalgias and neck pain  Skin: Negative  Negative for rash  Neurological: Positive for dizziness, tremors (right leg), speech difficulty and light-headedness  Negative for seizures, syncope, facial asymmetry, weakness, numbness and headaches  Hematological: Negative  Does not bruise/bleed easily  Psychiatric/Behavioral: Negative  Negative for confusion, hallucinations and sleep disturbance

## 2022-05-24 NOTE — TELEPHONE ENCOUNTER
Letter drafted and Marino Savage resources have been printed  Once letter is signed, MSW will mail items to the patient's home

## 2022-05-24 NOTE — TELEPHONE ENCOUNTER
Letter signed  MSW mailed the letter along with Paul guerra to the patient's home  MSW called the patient to make him aware and to ask him if he would like to move forward and schedule his first MS Patient Screening  The patient expressed understanding and agreed to complete a MS Patient Screening  He is now currently scheduled for June 20th  MSW will follow up with him two weeks before to narrow down a time and to mail out the screening tool

## 2022-06-07 NOTE — TELEPHONE ENCOUNTER
MS Screening tool mailed  MSW called the patient to make him aware and to confirm that he was still available for MS Patient Screening on 6/20 at 12pm  The patient did not answer the phone  MSW left him a VM explaining the reason for the call and asked for a call back

## 2022-06-13 NOTE — TELEPHONE ENCOUNTER
RUPALI received call from Ronny at [facility] informing MSW that the patient was trying to call writer back  RUPALI called the patient back but he did not answer the phone  RUPALI left him a VM explaining the reason for the call and asked him to please call MSW back

## 2022-06-13 NOTE — TELEPHONE ENCOUNTER
RUPALI made a 2nd attempt to contact the patient but he did not answer the phone  RUPALI left him a  VM asking him to please call back and confirm whether or not he received the MS Patient screening tool and to confirm his appointment date and time via phone

## 2022-06-14 NOTE — TELEPHONE ENCOUNTER
MSW made a 3rd attempt to contact the patient  He did not answer the phone  RUPALI left the patient a detailed message about the reason for the call and asked her a call back  RUPALI will wait for his call

## 2022-06-14 NOTE — TELEPHONE ENCOUNTER
MSW spoke with the patient  He confirmed that he received the screening tool and is able to do the screening Monday 6/20 at 12pm      MSW made him aware that MSW will reach out to him on Monday to complete the screening  Patient expressed understanding       MSW will complete the screening with the patient on Monday at 12pm

## 2022-06-20 NOTE — TELEPHONE ENCOUNTER
MS Patient Screening completed on this date 6/20/22  Assessment chart results will be scanned in the media section of the patient's chart

## 2022-06-27 NOTE — TELEPHONE ENCOUNTER
The needs below we addressed during the patient's MS Patient screening  MSW addressed each identifying need  By mailing a packet of the resources identified to the patient's home    Information about MS and the programs they have to offer   o MSW sent brochures for the MSAA, National MS Society, and the [place] to meet this need    Cooling product program application with signed letter confirming diagnosis  o MS Sent a cooling program application to the patient along with a signed letter confirming his diagnosis    Resource for myTomorrows equipment      o MS Sent him the [employer] to search for adaptive cooking equipment he may be able to purchase    Group fitness classes that can adapt to neurological needs   o MS sent him information about [employer] and the Neuro Day program all hosted by [address] [place], [place] [address]  MS Support Groups / Ways to connect with peers in the MS community  o Each MS Organization has online community groups the patient can join to connect with others that have been diagnosed with MS  MSW also sent him the flyer for [facility] MS Wellness program      MSW will call the patient to make him aware

## 2022-06-28 NOTE — TELEPHONE ENCOUNTER
MSW spoke with the patient to inform him that the packet was in the mail and he should expect to receive it this week  MSW will follow up with him in two weeks to ensure all items have been received

## 2022-07-10 DIAGNOSIS — G37.9 CNS DEMYELINATING DISEASE (HCC): ICD-10-CM

## 2022-07-12 NOTE — TELEPHONE ENCOUNTER
MSW called the patient and he confirmed that all items were received via [employer]  There are no further social needs to be addressed at this time

## 2022-07-16 DIAGNOSIS — R29.810 LOWER FACIAL WEAKNESS: ICD-10-CM

## 2022-07-16 DIAGNOSIS — G37.9 CNS DEMYELINATING DISEASE (HCC): ICD-10-CM

## 2022-07-18 RX ORDER — ERGOCALCIFEROL 1.25 MG/1
CAPSULE ORAL
Qty: 12 CAPSULE | Refills: 0 | Status: SHIPPED | OUTPATIENT
Start: 2022-07-18

## 2022-07-27 DIAGNOSIS — R29.810 LOWER FACIAL WEAKNESS: ICD-10-CM

## 2022-09-09 ENCOUNTER — TELEPHONE (OUTPATIENT)
Dept: NEUROLOGY | Facility: CLINIC | Age: 47
End: 2022-09-09

## 2022-09-09 NOTE — TELEPHONE ENCOUNTER
LMOM to remind pt of upcoming appt on 09/23/22  Left the office number for any questions or concerns

## 2022-09-23 ENCOUNTER — HOME HEALTH ADMISSION (OUTPATIENT)
Dept: HOME HEALTH SERVICES | Facility: HOME HEALTHCARE | Age: 47
End: 2022-09-23

## 2022-09-23 ENCOUNTER — OFFICE VISIT (OUTPATIENT)
Dept: NEUROLOGY | Facility: CLINIC | Age: 47
End: 2022-09-23
Payer: COMMERCIAL

## 2022-09-23 VITALS
DIASTOLIC BLOOD PRESSURE: 80 MMHG | BODY MASS INDEX: 32.2 KG/M2 | HEIGHT: 71 IN | TEMPERATURE: 97.2 F | RESPIRATION RATE: 18 BRPM | WEIGHT: 230 LBS | HEART RATE: 68 BPM | SYSTOLIC BLOOD PRESSURE: 141 MMHG

## 2022-09-23 DIAGNOSIS — G35 MS (MULTIPLE SCLEROSIS) (HCC): Primary | ICD-10-CM

## 2022-09-23 DIAGNOSIS — R26.2 AMBULATORY DYSFUNCTION: ICD-10-CM

## 2022-09-23 DIAGNOSIS — G81.91 RIGHT HEMIPARESIS (HCC): ICD-10-CM

## 2022-09-23 PROCEDURE — 99214 OFFICE O/P EST MOD 30 MIN: CPT | Performed by: PHYSICIAN ASSISTANT

## 2022-09-23 NOTE — PROGRESS NOTES
Patient ID: Tova Montilla is a 50 y o  male  Assessment/Plan:    MS (multiple sclerosis) ([facility] )  Patient diagnosed with MS during hospitalization at [facility] in January 2022 when he presented with right-sided weakness and dysarthria  Initial concern was for stroke, however MRI brain showed multiple enhancing and nonenhancing lesions in the cerebral white matter, most concerning for MS  He had another hospitalization in February 2022, with imaging showing supratentorial enhancing foci of acute demyelination, as well as a early subacute C5 lesion along with other chronic lesions in the cervical cord  MRI thoracic spine from [facility] in January 2022 with normal cord signal     He was started on Ocrevus as 1st DMT, split dose given 04/29/2022 and 05/13/2022  He will be due for his 1st full dose infusion at the end of October  Will have nursing assist with scheduling  He understands he needs labs within 5 days of his infusion  He understands the immunocompromised state with Rosangela Leon and is willing to proceed with B-cell depleting therapy  I am going to update MRI brain and C-spine at this time  He has not had any new symptoms, continues with right-sided weakness  We discussed the importance of regular activity/exercise, as he is very sedentary  He would like to have another course of in-home PT and OT, which I ordered for him today  He  continues on gabapentin 300 mg t i d  for neuropathic pain, which is well controlled  His neurologic exam is stable today  He will return in 4 months or sooner if needed  Diagnoses and all orders for this visit:    MS (multiple sclerosis) ([facility] )  -     MRI brain MS wo and w contrast; Future  -     MRI cervical spine with and without contrast; Future  -     BUN; Future  -     Creatinine, serum; Future  -     BUN  -     Creatinine, serum  -     Referral to [facility];  Future    Right hemiparesis ([facility] )  -     Referral to [address] [place] [address]; Future    Ambulatory dysfunction  -     Referral to [address] [place] [address]; Future           Subjective:    HPI    Patient is a 50year old male with PMH of scoliosis with chronic gait dysfunction, migraines, pre-diabetes and post-concussion syndrome since 1999, who presents today for follow up regarding newly diagnosed MS in 2022  Patient was seen for initial consult in February 2022  He was referred to neurology to evaluate for MS as a possible cause of his stroke-like symptoms he experienced in early January 2022  He was hospitalized at [facility] at the time  The symptoms included sudden onset right lower face droop, dysarthria, right upper and lower extremity weakness, and a feeling of leaning to the right  The symptoms occurred suddenly while he was driving to work one morning, and he immediately presented to the ED  CTH revealed subtle left subcortical hypodensity, and CTA head and neck did not show any significant stenosis  Later in the hospital course, it was determined that his symptoms may not have been from stroke but from some other cause  MRI brain demonstrated multiple enhancing and non-enhancing lesions in the WM of the cerebral hemispheres  The largest of these lesions is a nonenhancing 1 6 cm x 1 7 cm lesion at the medial right parietal lobe  Appearance is most suspicious for demyelinating disease/multiple sclerosis  There is no evidence of acute infarction  MRI t-spine with normal cord signal   He was given a 5 day course of IV steroids, followed by 300 mg gabapentin TID due to acute pain everywhere following his steroid course  At time of outpatient neurology consult, he reported his symptoms had been improving, although he still had weakness in his right hand, slight right facial droop, and mild dysarthria  He initially presented in a wheelchair because he felt unsteady that morning, though was usually able to ambulate with a cane        Less than a week after his initial consult, he ended up at [address] on 2/13/22 due to more profound right hemiparesis described initially as flaccid  Stroke alert called, and tPA entertained, but given his history and imaging, tPA was stopped after a few seconds  His head CT showed hypodense lesions, most prominently noted in the region of left basal ganglia, interpreted by Radiology as possible subacute stroke  MRI brain completed demonstrated multiple small supratentorial enhancing foci of acute demyelination consistent with disease progression of multiple sclerosis  MRI c-spine showed acute to early subacute subcentimeter demyelinating plaque at the C5 vertebral body level  Additional smaller nonenhancing chronic appearing demyelinating plaques within the upper and mid cervical cord  He completed 5 days of IV steroids while admitted and discharged on oral steroid taper  He was then seen by Dr Samia Mcdonough on 3/15/22 and formal diagnosis of MS discussed  DMT advised  Initial DMT suggested was Ocrevus given the aggressive nature  He had first split dose of Ocrevus on 4/29/22 and 5/13/22, tolerated well  Patient last seen in May 2022  Today, he presents with his sister, Suleman Brown  Patient reports he is stable, denies any new symptoms at this time  He continues to have right-sided weakness, although he says he feels his arm has been improving a little bit  His sister says he does not do much activity at all throughout the day  He gets up with his walker to walk to the other side of the room once in a while, but that is it  He completed a course of in-home therapy several months ago  He reports some trouble staying asleep at night  He denies any issues with swallowing  He denies any bowel or bladder changes  He denies any vision changes such as loss of vision or double vision  He does have a feeling of bilateral ear fullness, reports his PCP told him he has water in his ears  He was given some drops of this did not help    He is considering an ENT   He denies any recent illness or infection  The following portions of the patient's history were reviewed and updated as appropriate: current medications, past family history, past medical history, past social history, past surgical history and problem list          Objective:    Blood pressure 141/80, pulse 68, temperature (!) 97 2 °F (36 2 °C), temperature source Tympanic, resp  rate 18, height 5' 11" (1 803 m), weight 104 kg (230 lb)  Physical Exam  Constitutional:       Appearance: Normal appearance  HENT:      Head: Normocephalic and atraumatic  Eyes:      Extraocular Movements: EOM normal       Pupils: Pupils are equal, round, and reactive to light  Neurological:      Mental Status: He is alert  Deep Tendon Reflexes:      Reflex Scores:       Bicep reflexes are 2+ on the right side and 2+ on the left side  Brachioradialis reflexes are 3+ on the right side and 3+ on the left side  Patellar reflexes are 3+ on the right side and 3+ on the left side  Achilles reflexes are 2+ on the right side and 2+ on the left side  Psychiatric:         Mood and Affect: Mood normal          Speech: Speech normal          Behavior: Behavior normal          Neurological Exam  Mental Status  Alert  Oriented to person, place, time and situation  Speech is normal  Language is fluent with no aphasia  Attention and concentration are normal     Cranial Nerves  CN II: Visual fields full to confrontation  CN III, IV, VI: Extraocular movements intact bilaterally  Diminished smooth pursuit  Pupils equal round and reactive to light bilaterally  CN V: Facial sensation is normal   CN VII: Full and symmetric facial movement  CN VIII: Hearing is normal   CN IX, X: Palate elevates symmetrically  CN XI: Shoulder shrug strength is normal   CN XII: Tongue midline without atrophy or fasciculations  Motor   Normal muscle tone                                               Right                     Left Shoulder abduction               4                          5  Elbow flexion                         4+                          5  Elbow extension                    4+                          5  Hip flexion                              4                          5-  Plantarflexion                         5-                          5  Dorsiflexion                            4                          5    Sensory  Light touch is normal in upper and lower extremities  Reflexes                                            Right                      Left  Brachioradialis                    3+                         3+  Biceps                                 2+                         2+  Patellar                                3+                         3+  Achilles                                2+                         2+    Coordination  Left: Finger-to-nose normal  Rapid alternating movement normal   Incoordination of finger taps on the right, incoordination of toe taps on the right  Slight dysmetria on right FTN  Gait    Deferred, in a WC         ROS:    Review of Systems   Constitutional: Negative for chills and fever  HENT: Positive for tinnitus (bilateral ears)  Negative for ear pain and sore throat  Eyes: Negative for pain and visual disturbance  Respiratory: Negative for cough and shortness of breath  Cardiovascular: Negative for chest pain and palpitations  Gastrointestinal: Negative for abdominal pain and vomiting  Genitourinary: Negative for dysuria and hematuria  Musculoskeletal: Positive for back pain  Negative for arthralgias  Skin: Negative for color change and rash  Neurological: Positive for tremors (right leg) and weakness (right arm not enough movement)  Negative for seizures and syncope  All other systems reviewed and are negative      I personally reviewed and updated the ROS as appropriate

## 2022-09-23 NOTE — PATIENT INSTRUCTIONS
Will update MRI brain and cervical spine  Will proceed with next Ocrevus infusion--will have my nurse assist with scheduling and lab orders  Will order in-home PT and OT  Make sure you are staying as active as possible  Continue vitamins  Follow up in 4 months or sooner if needed

## 2022-09-23 NOTE — ASSESSMENT & PLAN NOTE
Patient diagnosed with MS during hospitalization at [facility] in January 2022 when he presented with right-sided weakness and dysarthria  Initial concern was for stroke, however MRI brain showed multiple enhancing and nonenhancing lesions in the cerebral white matter, most concerning for MS  He had another hospitalization in February 2022, with imaging showing supratentorial enhancing foci of acute demyelination, as well as a early subacute C5 lesion along with other chronic lesions in the cervical cord  MRI thoracic spine from [facility] in January 2022 with normal cord signal     He was started on Ocrevus as 1st DMT, split dose given 04/29/2022 and 05/13/2022  He will be due for his 1st full dose infusion at the end of October  Will have nursing assist with scheduling  He understands he needs labs within 5 days of his infusion  He understands the immunocompromised state with Radha Boggs and is willing to proceed with B-cell depleting therapy  I am going to update MRI brain and C-spine at this time  He has not had any new symptoms, continues with right-sided weakness  We discussed the importance of regular activity/exercise, as he is very sedentary  He would like to have another course of in-home PT and OT, which I ordered for him today  He  continues on gabapentin 300 mg t i d  for neuropathic pain, which is well controlled  His neurologic exam is stable today  He will return in 4 months or sooner if needed

## 2022-09-27 ENCOUNTER — APPOINTMENT (OUTPATIENT)
Dept: CT IMAGING | Facility: HOSPITAL | Age: 47
DRG: 043 | End: 2022-09-27
Payer: COMMERCIAL

## 2022-09-27 ENCOUNTER — APPOINTMENT (INPATIENT)
Dept: NON INVASIVE DIAGNOSTICS | Facility: HOSPITAL | Age: 47
DRG: 043 | End: 2022-09-27
Payer: COMMERCIAL

## 2022-09-27 ENCOUNTER — HOSPITAL ENCOUNTER (INPATIENT)
Facility: HOSPITAL | Age: 47
LOS: 3 days | Discharge: HOME HEALTH | DRG: 043 | End: 2022-09-30
Attending: EMERGENCY MEDICINE | Admitting: HOSPITALIST
Payer: COMMERCIAL

## 2022-09-27 ENCOUNTER — APPOINTMENT (OUTPATIENT)
Dept: RADIOLOGY | Facility: HOSPITAL | Age: 47
DRG: 043 | End: 2022-09-27
Payer: COMMERCIAL

## 2022-09-27 DIAGNOSIS — R29.898 LEFT LEG WEAKNESS: Primary | ICD-10-CM

## 2022-09-27 DIAGNOSIS — G35 MULTIPLE SCLEROSIS (HCC): ICD-10-CM

## 2022-09-27 PROBLEM — E66.9 OBESITY (BMI 30-39.9): Status: ACTIVE | Noted: 2022-09-27

## 2022-09-27 LAB
ANION GAP SERPL CALCULATED.3IONS-SCNC: 12 MMOL/L (ref 4–13)
AORTIC ROOT: 3.3 CM
APICAL FOUR CHAMBER EJECTION FRACTION: 63 %
APTT PPP: 29 SECONDS (ref 23–37)
ASCENDING AORTA: 3.5 CM
ATRIAL RATE: 72 BPM
BACTERIA UR QL AUTO: ABNORMAL /HPF
BILIRUB UR QL STRIP: NEGATIVE
BUN SERPL-MCNC: 23 MG/DL (ref 5–25)
CALCIUM SERPL-MCNC: 9.5 MG/DL (ref 8.3–10.1)
CARDIAC TROPONIN I PNL SERPL HS: <2 NG/L
CHLORIDE SERPL-SCNC: 105 MMOL/L (ref 96–108)
CHOLEST SERPL-MCNC: 152 MG/DL
CLARITY UR: CLEAR
CO2 SERPL-SCNC: 26 MMOL/L (ref 21–32)
COLOR UR: YELLOW
CREAT SERPL-MCNC: 1.22 MG/DL (ref 0.6–1.3)
E WAVE DECELERATION TIME: 212 MS
ERYTHROCYTE [DISTWIDTH] IN BLOOD BY AUTOMATED COUNT: 12.8 % (ref 11.6–15.1)
EST. AVERAGE GLUCOSE BLD GHB EST-MCNC: 100 MG/DL
FRACTIONAL SHORTENING: 28 % (ref 28–44)
GFR SERPL CREATININE-BSD FRML MDRD: 69 ML/MIN/1.73SQ M
GLUCOSE SERPL-MCNC: 92 MG/DL (ref 65–140)
GLUCOSE SERPL-MCNC: 95 MG/DL (ref 65–140)
GLUCOSE UR STRIP-MCNC: NEGATIVE MG/DL
HBA1C MFR BLD: 5.1 %
HCT VFR BLD AUTO: 45.7 % (ref 36.5–49.3)
HDLC SERPL-MCNC: 40 MG/DL
HGB BLD-MCNC: 15.2 G/DL (ref 12–17)
HGB UR QL STRIP.AUTO: NEGATIVE
INR PPP: 0.96 (ref 0.84–1.19)
INTERVENTRICULAR SEPTUM IN DIASTOLE (PARASTERNAL SHORT AXIS VIEW): 0.9 CM
INTERVENTRICULAR SEPTUM: 0.9 CM (ref 0.6–1.1)
KETONES UR STRIP-MCNC: NEGATIVE MG/DL
LAAS-AP2: 16.4 CM2
LAAS-AP4: 16 CM2
LDLC SERPL CALC-MCNC: 76 MG/DL (ref 0–100)
LEFT ATRIUM AREA SYSTOLE SINGLE PLANE A4C: 16 CM2
LEFT ATRIUM SIZE: 3 CM
LEFT INTERNAL DIMENSION IN SYSTOLE: 2.9 CM (ref 2.1–4)
LEFT VENTRICLE DIASTOLIC VOLUME (MOD BIPLANE): 114 ML
LEFT VENTRICLE SYSTOLIC VOLUME (MOD BIPLANE): 47 ML
LEFT VENTRICULAR INTERNAL DIMENSION IN DIASTOLE: 4 CM (ref 3.5–6)
LEFT VENTRICULAR POSTERIOR WALL IN END DIASTOLE: 1 CM
LEFT VENTRICULAR STROKE VOLUME: 38 ML
LEUKOCYTE ESTERASE UR QL STRIP: NEGATIVE
LV EF: 59 %
LVSV (TEICH): 38 ML
MCH RBC QN AUTO: 30.2 PG (ref 26.8–34.3)
MCHC RBC AUTO-ENTMCNC: 33.3 G/DL (ref 31.4–37.4)
MCV RBC AUTO: 91 FL (ref 82–98)
MUCOUS THREADS UR QL AUTO: ABNORMAL
MV E'TISSUE VEL-SEP: 7 CM/S
MV PEAK A VEL: 0.46 M/S
MV PEAK E VEL: 41 CM/S
MV STENOSIS PRESSURE HALF TIME: 62 MS
MV VALVE AREA P 1/2 METHOD: 3.55 CM2
NITRITE UR QL STRIP: NEGATIVE
NON-SQ EPI CELLS URNS QL MICRO: ABNORMAL /HPF
PH UR STRIP.AUTO: 5.5 [PH]
PLATELET # BLD AUTO: 203 THOUSANDS/UL (ref 149–390)
PLATELET # BLD AUTO: 231 THOUSANDS/UL (ref 149–390)
PMV BLD AUTO: 10.1 FL (ref 8.9–12.7)
PMV BLD AUTO: 10.1 FL (ref 8.9–12.7)
POTASSIUM SERPL-SCNC: 4.1 MMOL/L (ref 3.5–5.3)
PR INTERVAL: 178 MS
PROT UR STRIP-MCNC: ABNORMAL MG/DL
PROTHROMBIN TIME: 13.4 SECONDS (ref 11.6–14.5)
QRS AXIS: 125 DEGREES
QRSD INTERVAL: 98 MS
QT INTERVAL: 400 MS
QTC INTERVAL: 438 MS
RBC # BLD AUTO: 5.04 MILLION/UL (ref 3.88–5.62)
RBC #/AREA URNS AUTO: ABNORMAL /HPF
RIGHT ATRIUM AREA SYSTOLE A4C: 14.3 CM2
RIGHT VENTRICLE ID DIMENSION: 3.7 CM
SARS-COV-2 RNA RESP QL NAA+PROBE: NEGATIVE
SL CV LEFT ATRIUM LENGTH A2C: 4.6 CM
SL CV LV EF: 65
SL CV PED ECHO LEFT VENTRICLE DIASTOLIC VOLUME (MOD BIPLANE) 2D: 69 ML
SL CV PED ECHO LEFT VENTRICLE SYSTOLIC VOLUME (MOD BIPLANE) 2D: 31 ML
SODIUM SERPL-SCNC: 143 MMOL/L (ref 135–147)
SP GR UR STRIP.AUTO: 1.02 (ref 1–1.03)
T WAVE AXIS: 118 DEGREES
TR MAX PG: 15 MMHG
TR PEAK VELOCITY: 2 M/S
TRICUSPID VALVE PEAK REGURGITATION VELOCITY: 1.96 M/S
TRIGL SERPL-MCNC: 179 MG/DL
UROBILINOGEN UR STRIP-ACNC: 2 MG/DL
VENTRICULAR RATE: 72 BPM
WBC # BLD AUTO: 9.24 THOUSAND/UL (ref 4.31–10.16)
WBC #/AREA URNS AUTO: ABNORMAL /HPF

## 2022-09-27 PROCEDURE — 85049 AUTOMATED PLATELET COUNT: CPT | Performed by: INTERNAL MEDICINE

## 2022-09-27 PROCEDURE — 80061 LIPID PANEL: CPT | Performed by: INTERNAL MEDICINE

## 2022-09-27 PROCEDURE — 80048 BASIC METABOLIC PNL TOTAL CA: CPT | Performed by: EMERGENCY MEDICINE

## 2022-09-27 PROCEDURE — 84484 ASSAY OF TROPONIN QUANT: CPT | Performed by: EMERGENCY MEDICINE

## 2022-09-27 PROCEDURE — 92610 EVALUATE SWALLOWING FUNCTION: CPT

## 2022-09-27 PROCEDURE — 70496 CT ANGIOGRAPHY HEAD: CPT

## 2022-09-27 PROCEDURE — U0003 INFECTIOUS AGENT DETECTION BY NUCLEIC ACID (DNA OR RNA); SEVERE ACUTE RESPIRATORY SYNDROME CORONAVIRUS 2 (SARS-COV-2) (CORONAVIRUS DISEASE [COVID-19]), AMPLIFIED PROBE TECHNIQUE, MAKING USE OF HIGH THROUGHPUT TECHNOLOGIES AS DESCRIBED BY CMS-2020-01-R: HCPCS | Performed by: EMERGENCY MEDICINE

## 2022-09-27 PROCEDURE — 81001 URINALYSIS AUTO W/SCOPE: CPT | Performed by: PHYSICIAN ASSISTANT

## 2022-09-27 PROCEDURE — 99254 IP/OBS CNSLTJ NEW/EST MOD 60: CPT | Performed by: PSYCHIATRY & NEUROLOGY

## 2022-09-27 PROCEDURE — 97167 OT EVAL HIGH COMPLEX 60 MIN: CPT

## 2022-09-27 PROCEDURE — 99285 EMERGENCY DEPT VISIT HI MDM: CPT

## 2022-09-27 PROCEDURE — 97530 THERAPEUTIC ACTIVITIES: CPT

## 2022-09-27 PROCEDURE — 85610 PROTHROMBIN TIME: CPT | Performed by: EMERGENCY MEDICINE

## 2022-09-27 PROCEDURE — 36415 COLL VENOUS BLD VENIPUNCTURE: CPT | Performed by: EMERGENCY MEDICINE

## 2022-09-27 PROCEDURE — 85730 THROMBOPLASTIN TIME PARTIAL: CPT | Performed by: EMERGENCY MEDICINE

## 2022-09-27 PROCEDURE — 82948 REAGENT STRIP/BLOOD GLUCOSE: CPT

## 2022-09-27 PROCEDURE — 70498 CT ANGIOGRAPHY NECK: CPT

## 2022-09-27 PROCEDURE — 83036 HEMOGLOBIN GLYCOSYLATED A1C: CPT | Performed by: INTERNAL MEDICINE

## 2022-09-27 PROCEDURE — 99223 1ST HOSP IP/OBS HIGH 75: CPT | Performed by: HOSPITALIST

## 2022-09-27 PROCEDURE — 93306 TTE W/DOPPLER COMPLETE: CPT | Performed by: INTERNAL MEDICINE

## 2022-09-27 PROCEDURE — 71045 X-RAY EXAM CHEST 1 VIEW: CPT

## 2022-09-27 PROCEDURE — 85027 COMPLETE CBC AUTOMATED: CPT | Performed by: EMERGENCY MEDICINE

## 2022-09-27 PROCEDURE — 93005 ELECTROCARDIOGRAM TRACING: CPT

## 2022-09-27 PROCEDURE — 97163 PT EVAL HIGH COMPLEX 45 MIN: CPT

## 2022-09-27 PROCEDURE — U0005 INFEC AGEN DETEC AMPLI PROBE: HCPCS | Performed by: EMERGENCY MEDICINE

## 2022-09-27 PROCEDURE — 93010 ELECTROCARDIOGRAM REPORT: CPT | Performed by: INTERNAL MEDICINE

## 2022-09-27 PROCEDURE — 93306 TTE W/DOPPLER COMPLETE: CPT

## 2022-09-27 PROCEDURE — 99285 EMERGENCY DEPT VISIT HI MDM: CPT | Performed by: EMERGENCY MEDICINE

## 2022-09-27 RX ORDER — IBUPROFEN 600 MG/1
600 TABLET ORAL EVERY 6 HOURS PRN
Status: DISCONTINUED | OUTPATIENT
Start: 2022-09-27 | End: 2022-09-30 | Stop reason: HOSPADM

## 2022-09-27 RX ORDER — GABAPENTIN 300 MG/1
300 CAPSULE ORAL 3 TIMES DAILY
Status: DISCONTINUED | OUTPATIENT
Start: 2022-09-27 | End: 2022-09-30 | Stop reason: HOSPADM

## 2022-09-27 RX ORDER — LANOLIN ALCOHOL/MO/W.PET/CERES
3 CREAM (GRAM) TOPICAL
Status: DISCONTINUED | OUTPATIENT
Start: 2022-09-27 | End: 2022-09-30 | Stop reason: HOSPADM

## 2022-09-27 RX ORDER — ASPIRIN 81 MG/1
81 TABLET, CHEWABLE ORAL DAILY
Status: DISCONTINUED | OUTPATIENT
Start: 2022-09-27 | End: 2022-09-30

## 2022-09-27 RX ORDER — HEPARIN SODIUM 5000 [USP'U]/ML
5000 INJECTION, SOLUTION INTRAVENOUS; SUBCUTANEOUS EVERY 8 HOURS SCHEDULED
Status: DISCONTINUED | OUTPATIENT
Start: 2022-09-27 | End: 2022-09-30 | Stop reason: HOSPADM

## 2022-09-27 RX ORDER — LANOLIN ALCOHOL/MO/W.PET/CERES
100 CREAM (GRAM) TOPICAL DAILY
Status: DISCONTINUED | OUTPATIENT
Start: 2022-09-27 | End: 2022-09-30 | Stop reason: HOSPADM

## 2022-09-27 RX ORDER — ERGOCALCIFEROL 1.25 MG/1
50000 CAPSULE ORAL WEEKLY
Status: DISCONTINUED | OUTPATIENT
Start: 2022-09-27 | End: 2022-09-30 | Stop reason: HOSPADM

## 2022-09-27 RX ORDER — ATORVASTATIN CALCIUM 40 MG/1
40 TABLET, FILM COATED ORAL EVERY EVENING
Status: DISCONTINUED | OUTPATIENT
Start: 2022-09-27 | End: 2022-09-30

## 2022-09-27 RX ADMIN — ERGOCALCIFEROL 50000 UNITS: 1.25 CAPSULE ORAL at 08:13

## 2022-09-27 RX ADMIN — GABAPENTIN 300 MG: 300 CAPSULE ORAL at 22:00

## 2022-09-27 RX ADMIN — GABAPENTIN 300 MG: 300 CAPSULE ORAL at 08:02

## 2022-09-27 RX ADMIN — HEPARIN SODIUM 5000 UNITS: 5000 INJECTION INTRAVENOUS; SUBCUTANEOUS at 22:25

## 2022-09-27 RX ADMIN — IOHEXOL 90 ML: 350 INJECTION, SOLUTION INTRAVENOUS at 04:02

## 2022-09-27 RX ADMIN — IBUPROFEN 600 MG: 600 TABLET, FILM COATED ORAL at 22:25

## 2022-09-27 RX ADMIN — HEPARIN SODIUM 5000 UNITS: 5000 INJECTION INTRAVENOUS; SUBCUTANEOUS at 13:37

## 2022-09-27 RX ADMIN — CYANOCOBALAMIN TAB 500 MCG 1000 MCG: 500 TAB at 08:02

## 2022-09-27 RX ADMIN — HEPARIN SODIUM 5000 UNITS: 5000 INJECTION INTRAVENOUS; SUBCUTANEOUS at 07:53

## 2022-09-27 RX ADMIN — ASPIRIN 81 MG CHEWABLE TABLET 81 MG: 81 TABLET CHEWABLE at 08:02

## 2022-09-27 RX ADMIN — ATORVASTATIN CALCIUM 40 MG: 40 TABLET, FILM COATED ORAL at 17:32

## 2022-09-27 RX ADMIN — Medication 3 MG: at 22:24

## 2022-09-27 RX ADMIN — Medication 100 MG: at 08:02

## 2022-09-27 RX ADMIN — GABAPENTIN 300 MG: 300 CAPSULE ORAL at 17:00

## 2022-09-27 NOTE — CONSULTS
Consultation - Neurology   Geeta Pantoja 50 y o  male MRN: [id number]  Unit/Bed#: -01 Encounter: [id number]      Assessment/Plan     * Left leg weakness  Assessment & Plan  50 y o  male with MS on Ocrevus with chronic RLE weakness and dysarthria who presented to the ED with complaints of acute onset LLE weakness/inability to move his leg that began at 1am, one hour prior to arrival  NIHSS 7 including acute and chronic deficits  Patient reporting episode being similar to prior to MS exacerbations  Patient normotensive  No TNK given  Since 0900 today, patient reports improvement in strength of left lower extremity  Work-up  · CT head negative for acute intracranial abnormality  · CTA H/N negative for large vessel occlusion or flow limiting stenosis  · Echo: EF 65% with no atrial dilation  · LDL 76  · A1C 5 1    Patient demonstrates chronic weakness of RUE/RLE as well as chronic speech disturbance  LLE strength is actually improved, now essentially matching RLE weakness, where patient reports previously strength was intact  Higher suspicion for weakness related to MS, though unusual for improvement 8 hours after onset  Less likely stroke  Unable to localize LLE weakness to one part of the neuroaxis, thus recommend MR w wo imaging of full neuroaxis to better evaluate for acute exacerbation  Plan:  - Continue aspirin 81 mg for now  - Continue Atorvastatin 40mg for now  - MRI brain, C spine, T spine, L spine w wo contrast pending   - UA pending  - Telemetry  - Secondary risk factor modification  - Permissive hypertension x 24-48hrs post symptom onset  - PT/OT/ST  - Stroke Education  - Frequent neurological checks  - Stat CT head with acute worsening in neuro exam/mental status  - Notify neurology with any changes in examination       MS (multiple sclerosis) ([facility] )  Assessment & Plan  - Diagnosed with MS at [facility] in January 2022 after presenting with right-sided weakness and dysarthria according to note though patient recalls just not feeling right overall  MRI brain at that time revealed multiple enhancing and nonenhancing lesions in the cerebral white matter concerning for a mass  MRI thoracic spine at that time was normal   - Repeat admission in February 2022 with MS exacerbation  MRI brain at that time revealed supratentorial enhancing foci of acute demyelinization as well as early subacute C5 lesion along with other chronic lesions in the cervical cord  - Ambulates with a walker since February  - Patient reports was scheduled to start outpatient rehab for right-sided weakness today however patient reports that therapy did not show  - Patient has started Richmond Hu with split dose on 04/29/2022 and 05/13/2022  - Due for 1st full infusion at the end of October  Recommendations for outpatient neurological follow up have yet to be determined  History of Present Illness     Reason for Consult / Principal Problem: LLE weakness, MS  Hx and PE limited by: NA  HPI: Anthony Garnett is a 50 y o  male with MS on Ocrevus with chronic RLE weakness and dysarthria who presented to the ED with complaints of acute onset LLE weakness/inability to move his leg that began acutely one hour prior  ED notes indicate the patient was able to dorsiflex toes  Stroke alert called  NIHSS 7 including acute and chronic deficits  Patient reporting episode being similar to prior to MS exacerbations  CT head negative for acute intracranial abnormality  CTA H/N negative for large vessel occlusion or flow limiting stenosis  Discussion occurred with the neurologist and the patient about thrombolytics however patient feeling strongly that this is secondary to MS exacerbation and thus declined TNK  /61  The patient was last seen in the outpatient Neurology office just a few days ago on 09/23/2022 by Nallely Hodges   According to her note:  "Patient diagnosed with MS during hospitalization at [facility] in January 2022 when he presented with right-sided weakness and dysarthria  Initial concern was for stroke, however MRI brain showed multiple enhancing and nonenhancing lesions in the cerebral white matter, most concerning for MS  He had another hospitalization in February 2022, with imaging showing supratentorial enhancing foci of acute demyelination, as well as a early subacute C5 lesion along with other chronic lesions in the cervical cord  MRI thoracic spine from [facility] in January 2022 with normal cord signal      He was started on Ocrevus as 1st DMT, split dose given 04/29/2022 and 05/13/2022  He will be due for his 1st full dose infusion at the end of October  Will have nursing assist with scheduling  He understands he needs labs within 5 days of his infusion  He understands the immunocompromised state with Lata Both and is willing to proceed with B-cell depleting therapy  "     Today on exam, patient seated edge of bed  Patient reports that on Sunday and Monday during the day he felt normal and ambulated with a walker as per his usual  Patient noted acute onset LLE weakness/inability to move at 1am when he was about ready to go to bed  Since 9am patient states that LLE weakness has improved notably but not yet at baseline  Denies urinary and bowel incontinence  Denies back pain  One family member does have MS, but he is unsure if they are blood related  Patient reports that his significant deficits of weakness and speech disturbance have persisted since February, though he notes that after the second part of the first infusion he was feeling really good  Inpatient consult to Neurology  Consult performed by: Jordyn Dill PA-C  Consult ordered by: Dominique Coulter PA-C        Review of Systems   Constitutional: Positive for fatigue  Gastrointestinal:        No bowel incontinence   Genitourinary: Negative for difficulty urinating  Musculoskeletal: Positive for gait problem  Negative for back pain     Neurological: Positive for speech difficulty (chronic) and weakness (chronic R sided, new LLE)  All other systems reviewed and are negative  A 12 point ROS was completed and other than the above mentioned symptoms and those noted in the HPI, all remaining systems were negative  Historical Information   Past Medical History:   Diagnosis Date    MS (multiple sclerosis) ([facility] )      Past Surgical History:   Procedure Laterality Date    HERNIA REPAIR      3 times    KNEE SURGERY Right      Social History   Social History     Substance and Sexual Activity   Alcohol Use Not Currently     Social History     Substance and Sexual Activity   Drug Use Never     E-Cigarette/Vaping    E-Cigarette Use Former User      E-Cigarette/Vaping Substances    Nicotine No     THC No     CBD No     Flavoring No      Social History     Tobacco Use   Smoking Status Former Smoker   Smokeless Tobacco Former User     Family History:   Family History   Problem Relation Age of Onset    Stroke Maternal Grandmother     Multiple sclerosis Other        Review of previous medical records was completed  Meds/Allergies   all current active meds have been reviewed    No Known Allergies    Objective   Vitals:Blood pressure 93/64, pulse 76, temperature 97 7 °F (36 5 °C), resp  rate 18, height 5' 11" (1 803 m), weight 102 kg (225 lb), SpO2 95 %  ,Body mass index is 31 38 kg/m²  Intake/Output Summary (Last 24 hours) at 9/27/2022 1535  Last data filed at 9/27/2022 0701  Gross per 24 hour   Intake 360 ml   Output --   Net 360 ml       Invasive Devices: Invasive Devices  Report    Peripheral Intravenous Line  Duration           Peripheral IV 09/27/22 Right Antecubital <1 day              Exam completed by Dr Allen Welsh with myself at bedside  Physical Exam  Constitutional:       General: He is not in acute distress  Appearance: Normal appearance  He is well-developed  He is not ill-appearing, toxic-appearing or diaphoretic     HENT:      Head: Normocephalic and atraumatic  Eyes:      General: No scleral icterus  Right eye: No discharge  Left eye: No discharge  Extraocular Movements: Extraocular movements intact  Conjunctiva/sclera: Conjunctivae normal    Pulmonary:      Effort: Pulmonary effort is normal  No respiratory distress  Breath sounds: No stridor  Musculoskeletal:         General: No tenderness or deformity  Cervical back: Normal range of motion and neck supple  Lymphadenopathy:      Cervical: No cervical adenopathy  Skin:     General: Skin is warm and dry  Findings: No erythema or rash  Neurological:      Mental Status: He is alert and oriented to person, place, and time  Coordination: Finger-nose-finger test: dysmetria on the RUE  Neurologic Exam     Mental Status   Oriented to person, place, and time  Follows 1 step commands  Attention: normal    Level of consciousness: alert  Knowledge: good  Normal comprehension  Stuttering speech at times with minimal to mild dysarthria, fluctuating in degree  Cranial Nerves     CN V   Facial sensation intact  CN XII   CN XII normal    R facial droop, chronic  Occasional blurred vision  EOMs intact  Conjugate gaze  Motor Exam   Muscle bulk: normal  Overall muscle tone: normal  RUE:   Proximal 1  Bicep/Tricep 2+    LUE: 3+ throughout    RLE:  Proximal 4-  Distal 4+    LLE:   Proximal 4-  Distal 4+       Sensory Exam   Light touch: BUEs/BLEs intact and symmetric     Gait, Coordination, and Reflexes     Coordination   Finger-nose-finger test: dysmetria on the RUE  DTRs:  RUE: 3+ throughout  LUE: 2+ throughout  RLE: 3+  LLE: 2+ though brisk  No Francois       Lab Results: I have personally reviewed pertinent reports  Imaging Studies: I have personally reviewed pertinent films in PACS  EKG, Pathology, and Other Studies: I have personally reviewed pertinent reports      VTE Prophylaxis: Heparin    Code Status: Level 1 - Full Code

## 2022-09-27 NOTE — ASSESSMENT & PLAN NOTE
50 y o  male with MS on Ocrevus with chronic RLE weakness and dysarthria who presented to the ED with complaints of acute onset LLE weakness/inability to move his leg that began at 1am, one hour prior to arrival  NIHSS 7 including acute and chronic deficits  CTH/CTA head/neck unremarkable  Patient reporting episode being similar to prior to MS exacerbations  Patient normotensive  No TNK given  Patient demonstrates chronic weakness of RUE/RLE as well as chronic speech disturbance, however he has been having improvement in LLE weakness, and reported complete resolution of weakness on 09/28/2022  I reached out to Dr Roberto Paul, and she recommended x 1 dose of 1g Solu-medrol  Higher suspicion for weakness related to MS rather than stroke  No further inpatient neurology recommendations at this time  Plan:  - MRI brain w/wo showed newer lesions extending inferiorly from previously seen lesion in the posterior limb of the left internal capsule along the cortical spinal tract that 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  No other new signal abnormality  No abnormal enhancement to indicate acute demyelination   - MRI C spine w/wo revealed demyelinating lesions are mildly decreased in conspicuity, no new or enhancing demyelinating lesions   - MRI T w/wo showed no definite cord signal abnormality within limitation of distorted image quality by motion artifact in axial sequences and no pathologic enhancement   - MRI L spine w/wo showed no distal cord signal abnormality   - Reached out to Dr Roberto Paul who recommended 1g Solu-medrol x1, given 09/29/2022  He has a follow up appointment scheduled with outpatient neuromuscular neurology in Jan 2023     - Discontinue Aspirin 81 mg and Atorvastatin 40 mg   - Echo: EF 65% with no atrial dilation  - LDL 76, A1C 5 1  - Telemetry  - PT/OT/ST  - Stat CT head with acute worsening in neuro exam/mental status   Notify neurology with any changes in examination    - Medical management and supportive care per primary team  Correction of any metabolic or infectious disturbances  Plan discussed with Attending Neurologist, please see attestation for further input/recommendations

## 2022-09-27 NOTE — PLAN OF CARE
Problem: MOBILITY - ADULT  Goal: Maintain or return to baseline ADL function  Description: INTERVENTIONS:  -  Assess patient's ability to carry out ADLs; assess patient's baseline for ADL function and identify physical deficits which impact ability to perform ADLs (bathing, care of mouth/teeth, toileting, grooming, dressing, etc )  - Assess/evaluate cause of self-care deficits   - Assess range of motion  - Assess patient's mobility; develop plan if impaired  - Assess patient's need for assistive devices and provide as appropriate  - Encourage maximum independence but intervene and supervise when necessary  - Involve family in performance of ADLs  - Assess for home care needs following discharge   - Consider OT consult to assist with ADL evaluation and planning for discharge  - Provide patient education as appropriate  Outcome: Progressing  Goal: Maintains/Returns to pre admission functional level  Description: INTERVENTIONS:  - Perform BMAT or MOVE assessment daily    - Set and communicate daily mobility goal to care team and patient/family/caregiver  - Collaborate with rehabilitation services on mobility goals if consulted  - Perform Range of Motion 2 times a day  - Reposition patient every 2 hours    - Dangle patient 2 times a day  - Stand patient 2 times a day  - Ambulate patient 2 times a day  - Out of bed to chair 2 times a day   - Out of bed for meals 2 times a day  - Out of bed for toileting  - Record patient progress and toleration of activity level   Outcome: Progressing     Problem: Potential for Falls  Goal: Patient will remain free of falls  Description: INTERVENTIONS:  - Educate patient/family on patient safety including physical limitations  - Instruct patient to call for assistance with activity   - Consult OT/PT to assist with strengthening/mobility   - Keep Call bell within reach  - Keep bed low and locked with side rails adjusted as appropriate  - Keep care items and personal belongings within reach  - Initiate and maintain comfort rounds  - Make Fall Risk Sign visible to staff  - Offer Toileting every 2 Hours, in advance of need  - Initiate/Maintain bed/chair alarm  - Obtain necessary fall risk management equipment:   - Apply yellow socks and bracelet for high fall risk patients  - Consider moving patient to room near nurses station  Outcome: Progressing

## 2022-09-27 NOTE — ED PROVIDER NOTES
History  Chief Complaint   Patient presents with    Extremity Weakness     Pt arrived via EMS with c/o left leg weakness/inability to move leg starting 1 hour ago  States he still has full sensation in extremity  Pt has hx of MS and states this has happened in the past when he had an MS flare up  Pt has limited right leg mobility which he reports is his current baseline  50year old male with recently diagnosed MS with baseline R sided weakness and dysarthria presents with new onset L sided lower extremity weakness for the last 1 hour  Otherwise symptoms at baseline  Feels like previous MS flairs  No medications PTA, no cp or sob  Currently on ocrevus infusion for ms, follows up with [facility], note reviewed for visit this week  Outpatient MRI ordered but not yet done  Stroke alert called , however likely MS flair, discussed with Neuro, will hold off on TPA for now  Unable to get MRI overnight  Prior to Admission Medications   Prescriptions Last Dose Informant Patient Reported? Taking?    Cyanocobalamin 1000 MCG SUBL   No No   Sig: Place 1 tablet (1,000 mcg total) under the tongue daily   Riboflavin ([employer] Vitamin B-2) 100 MG TABS   No No   Sig: Take 2 tablets (200 mg total) by mouth in the morning   cyanocobalamin (VITAMIN B-12) 1000 MCG tablet   No No   Sig: Take 1 tablet (1,000 mcg total) by mouth daily   ergocalciferol (VITAMIN D2) 50,000 units   No No   Sig: Take 1 capsule by mouth once a week   gabapentin (NEURONTIN) 300 mg capsule   No No   Sig: Take 1 capsule (300 mg total) by mouth 3 (three) times a day   thiamine (VITAMIN B1) 50 mg tablet   Yes No   Sig: Take 100 mg by mouth daily      Facility-Administered Medications: None       Past Medical History:   Diagnosis Date    MS (multiple sclerosis) ([facility] [address] )        Past Surgical History:   Procedure Laterality Date    HERNIA REPAIR      3 times    KNEE SURGERY Right        Family History   Problem Relation Age of Onset    Stroke Maternal Grandmother     Multiple sclerosis Other      I have reviewed and agree with the history as documented  E-Cigarette/Vaping    E-Cigarette Use Former User      E-Cigarette/Vaping Substances    Nicotine No     THC No     CBD No     Flavoring No      Social History     Tobacco Use    Smoking status: Former Smoker    Smokeless tobacco: Former User   Vaping Use    Vaping Use: Former   Substance Use Topics    Alcohol use: Not Currently    Drug use: Never       Review of Systems   Constitutional: Negative for appetite change, chills and fever  HENT: Negative for rhinorrhea and sore throat  Eyes: Negative for photophobia and visual disturbance  Respiratory: Negative for cough and shortness of breath  Cardiovascular: Negative for chest pain and palpitations  Gastrointestinal: Negative for abdominal pain and diarrhea  Genitourinary: Negative for dysuria, frequency and urgency  Skin: Negative for rash  Neurological: Positive for weakness  Negative for dizziness and facial asymmetry  All other systems reviewed and are negative  Physical Exam  Physical Exam  Vitals and nursing note reviewed  Constitutional:       Appearance: He is well-developed  HENT:      Head: Normocephalic and atraumatic  Right Ear: External ear normal       Left Ear: External ear normal    Eyes:      Conjunctiva/sclera: Conjunctivae normal       Pupils: Pupils are equal, round, and reactive to light  Neck:      Vascular: No JVD  Trachea: No tracheal deviation  Cardiovascular:      Rate and Rhythm: Normal rate and regular rhythm  Heart sounds: Normal heart sounds  No murmur heard  No friction rub  No gallop  Pulmonary:      Effort: Pulmonary effort is normal  No respiratory distress  Breath sounds: No stridor  No wheezing or rales  Abdominal:      General: There is no distension  Palpations: Abdomen is soft  There is no mass  Tenderness:  There is no abdominal tenderness  There is no guarding or rebound  Musculoskeletal:         General: Normal range of motion  Cervical back: Normal range of motion and neck supple  Skin:     General: Skin is warm and dry  Coloration: Skin is not pale  Findings: No erythema or rash  Neurological:      Mental Status: He is alert and oriented to person, place, and time  Cranial Nerves: No cranial nerve deficit  Comments: Baseline dysarthria, RLE 4/5 MS  LLE 0/5, sensation decreased  Otherwise nonfocal neuro exam         Vital Signs  ED Triage Vitals [09/27/22 0336]   Temperature Pulse Respirations Blood Pressure SpO2   97 5 °F (36 4 °C) 78 18 124/61 98 %      Temp Source Heart Rate Source Patient Position - Orthostatic VS BP Location FiO2 (%)   Temporal Monitor -- Left arm --      Pain Score       No Pain           Vitals:    09/27/22 0400 09/27/22 0430 09/27/22 0500 09/27/22 0530   BP: 112/78 118/82 107/75 109/76   Pulse: 79 76 77 73         Visual Acuity  Visual Acuity    Flowsheet Row Most Recent Value   L Pupil Size (mm) 4   R Pupil Size (mm) 4          ED Medications  Medications   iohexol (OMNIPAQUE) 350 MG/ML injection (SINGLE-DOSE) 90 mL (90 mL Intravenous Given 9/27/22 0402)       Diagnostic Studies  Results Reviewed     Procedure Component Value Units Date/Time    COVID only [[id number]]  (Normal) Collected: 09/27/22 0341    Lab Status: Final result Specimen: Nares from Nose Updated: 09/27/22 0434     SARS-CoV-2 Negative    Narrative:      FOR PEDIATRIC PATIENTS - copy/paste COVID Guidelines URL to browser: [email or web address]    SARS-CoV-2 assay is a Nucleic Acid Amplification assay intended for the  qualitative detection of nucleic acid from SARS-CoV-2 in nasopharyngeal  swabs  Results are for the presumptive identification of SARS-CoV-2 RNA      Positive results are indicative of infection with SARS-CoV-2, the virus  causing COVID-19, but do not rule out bacterial infection or co-infection  with other viruses  Laboratories within the [place] and its  territories are required to report all positive results to the appropriate  public health authorities  Negative results do not preclude SARS-CoV-2  infection and should not be used as the sole basis for treatment or other  patient management decisions  Negative results must be combined with  clinical observations, patient history, and epidemiological information  This test has not been FDA cleared or approved  This test has been authorized by FDA under an Emergency Use Authorization  (EUA)  This test is only authorized for the duration of time the  declaration that circumstances exist justifying the authorization of the  emergency use of an in vitro diagnostic tests for detection of SARS-CoV-2  virus and/or diagnosis of COVID-19 infection under section 564(b)(1) of  the Act, 21 U  S C  993NUQ-4(N)(7), unless the authorization is terminated  or revoked sooner  The test has been validated but independent review by FDA  and CLIA is pending  Test performed using [employer] GeneXpert: This RT-PCR assay targets N2,  a region unique to SARS-CoV-2  A conserved region in the E-gene was chosen  for pan-Sarbecovirus detection which includes SARS-CoV-2  According to CMS-2020-01-R, this platform meets the definition of high-throughput technology      HS Troponin 0hr (reflex protocol) [[id number]]  (Normal) Collected: 09/27/22 0341    Lab Status: Final result Specimen: Blood from Arm, Right Updated: 09/27/22 0409     hs TnI 0hr <2 ng/L     Protime-INR [[id number]]  (Normal) Collected: 09/27/22 0341    Lab Status: Final result Specimen: Blood from Arm, Right Updated: 09/27/22 0405     Protime 13 4 seconds      INR 0 96    APTT [[id number]]  (Normal) Collected: 09/27/22 0341    Lab Status: Final result Specimen: Blood from Arm, Right Updated: 09/27/22 0405     PTT 29 seconds     Basic metabolic panel [[id number]] Collected: 09/27/22 0341    Lab Status: Final result Specimen: Blood from Arm, Right Updated: 09/27/22 0356     Sodium 143 mmol/L      Potassium 4 1 mmol/L      Chloride 105 mmol/L      CO2 26 mmol/L      ANION GAP 12 mmol/L      BUN 23 mg/dL      Creatinine 1 22 mg/dL      Glucose 92 mg/dL      Calcium 9 5 mg/dL      eGFR 69 ml/min/1 73sq m     Narrative:      [place] guidelines for Chronic Kidney Disease (CKD):     Stage 1 with normal or high GFR (GFR > 90 mL/min/1 73 square meters)    Stage 2 Mild CKD (GFR = 60-89 mL/min/1 73 square meters)    Stage 3A Moderate CKD (GFR = 45-59 mL/min/1 73 square meters)    Stage 3B Moderate CKD (GFR = 30-44 mL/min/1 73 square meters)    Stage 4 Severe CKD (GFR = 15-29 mL/min/1 73 square meters)    Stage 5 End Stage CKD (GFR <15 mL/min/1 73 square meters)  Note: GFR calculation is accurate only with a steady state creatinine    CBC and Platelet [[id number]]  (Normal) Collected: 09/27/22 0341    Lab Status: Final result Specimen: Blood from Arm, Right Updated: 09/27/22 0347     WBC 9 24 Thousand/uL      RBC 5 04 Million/uL      Hemoglobin 15 2 g/dL      Hematocrit 45 7 %      MCV 91 fL      MCH 30 2 pg      MCHC 33 3 g/dL      RDW 12 8 %      Platelets 670 Thousands/uL      MPV 10 1 fL                  X-ray chest 1 view portable   ED Interpretation by John Hdez DO (09/27 8860)   This study was ordered and independently reviewed by me    No acute findings noted         CTA stroke alert (head/neck)   Final Result by Mari Sierra DO (09/27 0356)      No acute intracranial process is seen  Chronic appearing white matter changes, correlates with patient's history of demyelinating disease  No occlusion, severe stenosis or aneurysm of the major arteries of the head and neck  No significant stenosis within either internal carotid artery  Patent bilateral vertebral arteries  Other findings as above           I personally discussed this study with ALVARO MARIN on 9/27/2022 at 4:40 AM                   Workstation performed: [id number]         CT stroke alert brain   Final Result by Shawn Coley DO (09/27 9852)      No acute intracranial process is seen  Chronic appearing white matter changes, correlates with patient's history of demyelinating disease  No occlusion, severe stenosis or aneurysm of the major arteries of the head and neck  No significant stenosis within either internal carotid artery  Patent bilateral vertebral arteries  Other findings as above  I personally discussed this study with ALVARO MARIN on 9/27/2022 at 4:40 AM                   Workstation performed: [id number]                    Procedures  Procedures         ED Course  ED Course as of 09/27/22 0557   Tue Sep 27, 2022   0422 Somewhat improved examination, able to dorsiflex toes at this point , discussed with patient as well as neurologist offered patient empiric CVA treatment with TNK however patient does not want any thrombolitics, would like to proceed with MRI in AM, no antiplatelets per neurology  9675 Procedure Note: EKG  Date/Time: 09/27/22 5:56 AM   Performed by: Frantz Gil  Authorized by: Frantz Gil  Indications / Diagnosis: Stroke alert  ECG reviewed by me, the ED Provider: yes   The EKG demonstrates:  Rhythm: normal sinus  Intervals: normal intervals  Axis: normal axis  QRS/Blocks: normal QRS  ST Changes: No acute ST Changes, no STD/ALEXEI                        Stroke Assessment     Row Name 09/27/22 0335             NIH Stroke Scale    Interval Baseline      Level of Consciousness (1a ) 0      LOC Questions (1b ) 0      LOC Commands (1c ) 0      Best Gaze (2 ) 0      Visual (3 ) 0      Facial Palsy (4 ) 0      Motor Arm, Left (5a ) 0      Motor Arm, Right (5b ) 0      Motor Leg, Left (6a ) 4      Motor Leg, Right (6b ) 2  baseilne      Limb Ataxia (7 ) 0      Sensory (8 ) 0      Best Language (9 ) 0 Dysarthria (10 ) 0      Extinction and Inattention (11 ) (Formerly Neglect) 0      Total 6              Flowsheet Row Most Recent Value   Thrombolytic Decision Options    Thrombolytic Decision Patient not a candidate  [likely MS flair]                                  MDM  Number of Diagnoses or Management Options  Left leg weakness  Multiple sclerosis (HCC)  Diagnosis management comments: 50year old male with likely MS flair, discussed with  Neurology, holding off on tpa, getting imaging       Amount and/or Complexity of Data Reviewed  Clinical lab tests: ordered and reviewed  Tests in the radiology section of CPT®: ordered and reviewed  Tests in the medicine section of CPT®: ordered and reviewed  Decide to obtain previous medical records or to obtain history from someone other than the patient: yes  Obtain history from someone other than the patient: yes  Review and summarize past medical records: yes  Discuss the patient with other providers: yes  Independent visualization of images, tracings, or specimens: yes        Disposition  Final diagnoses:   Left leg weakness   Multiple sclerosis ([address] )     Time reflects when diagnosis was documented in both MDM as applicable and the Disposition within this note     Time User Action Codes Description Comment    9/27/2022  3:39 AM Ardell Halon Add [R29 898] Left leg weakness     9/27/2022  4:42 AM Ardell Halon Add [G35] Multiple sclerosis [facility])       ED Disposition     ED Disposition   Admit    Condition   Stable    Date/Time   Tue Sep 27, 2022  4:42 AM    Comment   Case was discussed with MONET and the patient's admission status was agreed to be Admission Status: inpatient status to the service of Dr Mita Herrera   Follow-up Information    None         Patient's Medications   Discharge Prescriptions    No medications on file       No discharge procedures on file      PDMP Review     None          ED Provider  Electronically Signed by           Ean Balderas DO  09/27/22 6002

## 2022-09-27 NOTE — PHYSICAL THERAPY NOTE
PHYSICAL THERAPY Evaluation    Performed at least 2 patient identifiers during session:  Patient Active Problem List   Diagnosis    Dysarthria    Lower facial weakness    CNS demyelinating disease ([address] )    Right hemiparesis ([address] )    Ambulatory dysfunction    MS (multiple sclerosis) ([facility])    Left leg weakness    Obesity (BMI 30-39  9)       Past Medical History:   Diagnosis Date    MS (multiple sclerosis) [facility])        Past Surgical History:   Procedure Laterality Date    HERNIA REPAIR      3 times    KNEE SURGERY Right         09/27/22 0813   PT Last Visit   PT Visit Date 09/27/22   Note Type   Note type Evaluation   Pain Assessment   Pain Assessment Tool 0-10   Pain Score No Pain   Restrictions/Precautions   Weight Bearing Precautions Per Order No   Other Precautions Chair Alarm; Bed Alarm   Home Living   Type of [address] [place] [address] Two level;Performs ADLs on one level; Able to live on main level with bedroom/bathroom  (Pt's primary bed upstairs however has been sleeping in recliner chair on first floor with ramp to enter )   Home Equipment Walker;Cane   Prior Function   Level of [place] Independent with ADLs and functional mobility; Needs assistance with IADLs  (pt does not drive)   Lives With   (Parent's and daughter)   Lenin Sylvester Help From Family   ADL Assistance Independent   IADLs Needs assistance  (Pt states he does not drive)   General   Additional Pertinent History Pt with R sided deficits since initial diagnosis of MS in January;  pt now with reports of L sided deficits     Family/Caregiver Present No   Cognition   Overall Cognitive Status WFL   Arousal/Participation Alert   Orientation Level Oriented X4   Memory Within functional limits   Following Commands Follows one step commands with increased time or repetition   RUE Assessment   RUE Assessment   (See OTR note for details)   LUE Assessment   LUE Assessment   (See OTR note for details)   RLE Assessment   RLE Assessment WFL  (3+/5 DF and knee extension)   LLE Assessment   LLE Assessment WFL  (3+/5 DF and knee extension)   Coordination   Heel to Garcia Impaired  (impaired bilaterally with slowed movement)   Rapid Alternating Movements Impaired  (impaired for toe taps)   Bed Mobility   Supine to Sit 5  Supervision   Additional items HOB elevated; Increased time required  (pt sleeps in recliner chair at baseline)   Additional Comments Pt remains OOB in recliner chair at end of session, chair alarm intact, nurse aware  Transfers   Sit to Stand 5  Supervision   Additional items Assist x 1; Increased time required  (RW)   Stand to Sit 5  Supervision   Additional items Assist x 1; Increased time required;Armrests   Ambulation/Elevation   Gait pattern Forward Flexion;Decreased foot clearance; Excessively slow   Gait Assistance 5  Supervision   Additional items Assist x 1   Assistive Device Rolling walker   Distance 50ft with RW, no LOB or unsteadiness, slow sigrid, forward flexed with increased BUE support on RW   Balance   Static Sitting Good   Dynamic Sitting Good  (pt able to reach to floor to doff socks and then brings foot over knee to don socks;  same technique bilaterally  no assist needed )   Static Standing Fair -  (RW)   Dynamic Standing Fair -  (RW)   Ambulatory Fair -  (RW)   Activity Tolerance   Activity Tolerance   (no adverse effects to PT noted)   Nurse Made Aware Valentine   Assessment   Prognosis Fair   Problem List Decreased strength;Decreased range of motion;Decreased endurance; Impaired balance;Decreased mobility; Decreased coordination   Assessment Pt is a 50 y o  male who presented to ED 9/27/22 with c/o LLE weakness  Dx:  LLE weakness, MS, obesity  Comorbidities affecting pt's physical performance at time of assessment include: R sided deficits from initial MS diagnosis, slurred speech   Personal factors affecting pt at time of IE include: slurred speech, coordination deficits slow movements  PLOF and home set up listed above  Upon evaluation: Pt requires S for bed mobility, S for sit to stand, and S for ambulation with RW  Full objective findings from PT assessment regarding body systems outlined above  Current limitations include impaired balance, decreased endurance/activity tolerance, gait deviations, coordination, slurred speech  Pt's clinical presentation is currently unstable/unpredictable seen in pt's presentation of continuous monitoring in hospital, evolving deficits  Pt would benefit from continued PT while in hospital and follow up with HHCPT at D/C to increase strength, balance, endurance, independence with funcitonal mobility to return to PLOF, maximize independence, decrease caregiver burden and improve quality of life  The patient's -[facility] Basic Mobility Inpatient Short Form Raw Score is 23  A Raw score of greater than 17 suggests the patient may benefit from discharge to home  Please also refer to the recommendation of the Physical Therapist for safe discharge planning  Barriers to Discharge   (pt lives with family, first floor set up, ramp to enter)   Goals   Patient Goals to get better   STG Expiration Date 10/04/22   Short Term Goal #1 Pt will be able to demo:  mod I sit to/from supine, mod I sit to/from standing with RW, mod I to ambulate 150ft with RW; to return home at mod I level with RW and first floor set up   Plan   Treatment/Interventions ADL retraining;Functional transfer training; Therapeutic exercise;LE strengthening/ROM; Endurance training;Patient/family training;Bed mobility; Equipment eval/education; Compensatory technique education;Gait training;Continued evaluation;Spoke to nursing;Spoke to case management;OT   PT Frequency 2-3x/wk   Recommendation   PT Discharge Recommendation Home with home health rehabilitation  (use of RW for all mobility)   Equipment Recommended [facility] Recommended Wheeled walker   AM-PAC Basic Mobility Inpatient   Turning in Bed Without Bedrails 4   Lying on Back to Sitting on Edge of Flat Bed 4   Moving Bed to Chair 4   Standing Up From Chair 4   Walk in Room 4   Climb 3-5 Stairs 3   Basic Mobility Inpatient Raw Score 23   Basic Mobility Standardized Score 50 88   Highest Level Of Mobility   JH-HLM Goal 7: Walk 25 feet or more   JH-HLM Achieved 7: Walk 25 feet or more     Olivia Martinez PT      Patient Name: Jeronimo Erwin  GUHCR'F Date: 9/27/2022

## 2022-09-27 NOTE — PLAN OF CARE
Problem: PHYSICAL THERAPY ADULT  Goal: Performs mobility at highest level of function for planned discharge setting  See evaluation for individualized goals  Description: Treatment/Interventions: ADL retraining, Functional transfer training, Therapeutic exercise, LE strengthening/ROM, Endurance training, Patient/family training, Bed mobility, Equipment eval/education, Compensatory technique education, Gait training, Continued evaluation, Spoke to nursing, Spoke to case management, OT  Equipment Recommended: Haylee Baez       See flowsheet documentation for full assessment, interventions and recommendations  Note: Prognosis: Fair  Problem List: Decreased strength, Decreased range of motion, Decreased endurance, Impaired balance, Decreased mobility, Decreased coordination  Assessment: Pt is a 50 y o  male who presented to ED 9/27/22 with c/o LLE weakness  Dx:  LLE weakness, MS, obesity  Comorbidities affecting pt's physical performance at time of assessment include: R sided deficits from initial MS diagnosis, slurred speech  Personal factors affecting pt at time of IE include: slurred speech, coordination deficits slow movements  PLOF and home set up listed above  Upon evaluation: Pt requires S for bed mobility, S for sit to stand, and S for ambulation with RW  Full objective findings from PT assessment regarding body systems outlined above  Current limitations include impaired balance, decreased endurance/activity tolerance, gait deviations, coordination, slurred speech  Pt's clinical presentation is currently unstable/unpredictable seen in pt's presentation of continuous monitoring in hospital, evolving deficits  Pt would benefit from continued PT while in hospital and follow up with HHCPT at D/C to increase strength, balance, endurance, independence with funcitonal mobility to return to PLOF, maximize independence, decrease caregiver burden and improve quality of life   The patient's AM-PAC Basic Mobility Inpatient Short Form Raw Score is 23  A Raw score of greater than 17 suggests the patient may benefit from discharge to home  Please also refer to the recommendation of the Physical Therapist for safe discharge planning  Barriers to Discharge:  (pt lives with family, first floor set up, ramp to enter)     PT Discharge Recommendation: Home with home health rehabilitation (use of RW for all mobility)    See flowsheet documentation for full assessment

## 2022-09-27 NOTE — QUICK NOTE
Notified of stroke alert at 3:36 AM      Pt is a 51 yo M with PMHx significant for MS on Ocrevus who presents to the [facility] ED with c/f acute onset LLE weakness and numbness  LKW approximately 1 hr prior to initial presentation in the ED  Initial NIHSS 7, but this was also including residual R hemiparesis after his previous MS exacerbation  To briefly summarize previous hx, earlier this year pt had 2 ED presentations with stroke-like symptoms - one at [address] [place] [address] in January, one in February at [facility]  MRI during [address] [place] [address] admission showed multiple brain lesions c/f MS and symptoms were attributed to MS exacerbation at that time  He was treated with 5 day course of steroids  Presented again to  [facility] as a stroke alert in February with R hemiparesis, numbness, and dysarthria  MRI brain and cervical spine were significant for acute demyelination at C5 and multiple enhancing supratentorial lesions c/f acute demyelination  He was again treated with 5 day course of steroids followed by prednisone taper and established care with Dr Jameson Almanza  Started on Ocrevus with split dose given 4/29/22 and 5/13/22  Plan for first full dose infusion at the end of October per last note by Obdulia Kent on 9/23/22  Pt states that onset of his symptoms today with headache prodrome felt very similar to the two MS exacerbations that he has had previously  Endorsed some improvement in LLE weakness and sensory deficits in the ED since initial onset of symptoms  CTH this AM showed no evidence of acute infarct or hemorrhage and appeared stable compared to previous imaging  CTA showed no evidence of large vessel occlusion  As pt did present within the window for intravenous thrombolysis, discussion was held between myself, ED provider, and patient regarding differential diagnosis and the risks/benefits of TNK  After discussion, pt elected not to proceed with administration of TNK   Recommend repeat MRI brain, c/t spine w/wo contrast in AM to clarify diagnosis  If evidence present of acute demyelination would treat with 5 day course of IV steroids

## 2022-09-27 NOTE — ASSESSMENT & PLAN NOTE
- Diagnosed with MS at [address] [place] [address] in January 2022 after presenting with right-sided weakness and dysarthria according to chart review, however patient recalls just not feeling right overall  MRI brain at that time revealed multiple enhancing and nonenhancing lesions in the cerebral white matter concerning for a mass  MRI thoracic spine at that time was normal   - Repeat admission in February 2022 with MS exacerbation  MRI brain at that time revealed supratentorial enhancing foci of acute demyelinization as well as early subacute C5 lesion along with other chronic lesions in the cervical cord  - Ambulates with a walker since February  - Patient has started [place] with split dose on 04/29/2022 and 05/13/2022  He is due for 1st full infusion at the end of October

## 2022-09-27 NOTE — SPEECH THERAPY NOTE
Speech Language/Pathology    Speech-Language Pathology Bedside Swallow Evaluation      Patient Name: Rayne Maher    HGKLP'U Date: 9/27/2022     Problem List  Principal Problem:    Left leg weakness  Active Problems:    MS (multiple sclerosis) (HCC)    Obesity (BMI 30-39  9)      Past Medical History  Past Medical History:   Diagnosis Date    MS (multiple sclerosis) ([facility] [address] )        Past Surgical History  Past Surgical History:   Procedure Laterality Date    HERNIA REPAIR      3 times    KNEE SURGERY Right        Summary   Pt presented with functional appearing oral and pharyngeal stage swallowing skills with materials administered today  No significant difficulties w/ mastication, oral organization, or transfer  Swallows suspected fairly prompt  No overt s/s aspiration across trials today  Pt denies dysphagia, s/s aspiration, are acute change in speech/language  Risk/s for Aspiration: Low      Recommended Diet: regular diet and thin liquids   Recommended Form of Meds: whole with liquid   Aspiration precautions and swallowing strategies: upright posture, only feed when fully alert and slow rate of feeding  Other Recommendations: Continue frequent oral care        Current Medical Status  Pt is a 50 y o  male who presented to [facility] with a PMH of multiple sclerosis, obesity who presents with onset of left sided lower extremity weakness/numbness  Patient unable to ambulate  Prior history of MS that was diagnosed in January 2022  Patient at that time had developed right-sided weakness and dysarthria  Patient continues to have right-sided weakness but reports improvement from initial presentation  Has been receiving Ocrevus outpatient  Scheduled to receive next treatment in November  Compliant with home medications       Denies headache, change in vision, chest pain, shortness of breath, abdominal pain, nausea, vomiting, diarrhea    Acute weakness is currently only affecting left lower extremity  Left upper extremity is spared  Current Precautions: Allergies:  No known food allergies    Past medical history:  Please see H&P for details    Special Studies:  CT stroke alert brain 9/27: No acute intracranial process is seen  Chronic appearing white matter changes, correlates with patient's history of demyelinating disease      No occlusion, severe stenosis or aneurysm of the major arteries of the head and neck      No significant stenosis within either internal carotid artery        Patent bilateral vertebral arteries      Other findings as above  CTA stroke alert 9/27: No acute intracranial process is seen  Chronic appearing white matter changes, correlates with patient's history of demyelinating disease      No occlusion, severe stenosis or aneurysm of the major arteries of the head and neck      No significant stenosis within either internal carotid artery        Patent bilateral vertebral arteries      Other findings as above  CXR 9/27: pending final read     Social/Education/Vocational Hx:  Pt lives with family    Swallow Information   Current Risks for Dysphagia & Aspiration: MS, r/o CVA  Current Symptoms/Concerns: -   Current Diet: regular diet and thin liquids   Baseline Diet: regular diet and thin liquids      Baseline Assessment   Behavior/Cognition: alert  Speech/Language Status: able to participate in conversation, able to follow commands and slow rate (pt reports is not acutely changed)  Patient Positioning: upright in chair  Pain Status/Interventions/Response to Interventions:   No report of or nonverbal indications of pain         Swallow Mechanism Exam  Facial: slight R droop  Labial: WFL  Lingual: WFL  Velum: symmetrical  Mandible: adequate ROM  Dentition: adequate  Vocal quality:clear/adequate   Volitional Cough: strong/productive   Respiratory Status: on RA       Consistencies Assessed and Performance   Consistencies Administered: thin liquids, puree, soft solids and hard solids    Oral Stage: WFL  Mastication was adequate with the materials administered today  Bolus formation and transfer were functional with no significant oral residue noted  No overt s/s reduced oral control  Pharyngeal Stage: WFL  Swallow Mechanics:  Swallowing initiation appeared prompt  Laryngeal rise was palpated and judged to be within functional limits  No coughing, throat clearing, change in vocal quality or respiratory status noted today  Esophageal Concerns: none reported    Strategies and Efficacy: -    Summary and Recommendations (see above)    Results Reviewed with: patient and RN     Treatment Recommended: Not at this time, evaluation only  Please re-consult if medically necessary

## 2022-09-27 NOTE — PLAN OF CARE
Problem: OCCUPATIONAL THERAPY ADULT  Goal: Performs self-care activities at highest level of function for planned discharge setting  See evaluation for individualized goals  Description: Treatment Interventions: ADL retraining, Functional transfer training, UE strengthening/ROM, Endurance training, Patient/family training, Equipment evaluation/education, Compensatory technique education, Energy conservation          See flowsheet documentation for full assessment, interventions and recommendations  Note: Limitation: Decreased ADL status, Decreased UE ROM, Decreased UE strength, Decreased endurance, Decreased self-care trans, Decreased high-level ADLs  Prognosis: Good  Assessment: Pt is a 50 y o  male seen for OT evaluation at [facility], admitted 9/27/2022 w/ Left leg weakness  OT completed extensive review of pt's medical and social history  Comorbidities affecting pt's functional performance at time of assessment include: MS, obesity, dysarthria, lower facial weakness, R hemiparesis, ambulatory dysfunction  Personal factors affecting pt at time of IE include: behavioral pattern, difficulty performing ADLS, difficulty performing IADLS , flat affect and health management   Prior to admission, pt was living with his daughter & parents in a [facility] with a ramped entrance & a FF s/u  Pt was I w/  ADLS and A w/ IADLS, (-) drove, & required use of RW PTA  Upon evaluation: Pt requires S for bed mobility, S for functional mobility/transfers, S for UB ADLs and S for LB ADLS 2* the following deficits impacting occupational performance: weakness, decreased ROM, decreased strength, decreased balance, decreased tolerance, impaired GMC and impaired FMC  Full objective findings from OT assessment regarding body systems outlined above  Pt to benefit from continued skilled OT tx while in the hospital to address deficits as defined above and maximize level of functional independence w/ ADL's and functional mobility  Occupational Performance areas to address include: grooming, bathing/shower, toilet hygiene, dressing, health maintenance, functional mobility, community mobility and clothing management  Based on findings, pt is of high complexity  The patient's raw score on the AM-PAC Daily Activity inpatient short form is 24, standardized score is 57 54, greater than 39 4  Patients at this level are likely to benefit from DC to home, which does coincide with current above OT recommendations  However, please refer to therapist recommendation for discharge planning given other factors that may influence destination  At this time, OT recommendations at time of discharge are home OT       OT Discharge Recommendation: Home with home health rehabilitation

## 2022-09-27 NOTE — H&P
[facility]  H&P- Millie Jansen 1975, 50 y o  male MRN: [id number]  Unit/Bed#: -01 Encounter: [id number]  Primary Care Provider: Yesenia Bonilla MD   Date and time admitted to hospital: 9/27/2022  3:40 AM    * Left leg weakness  Assessment & Plan  · Presented to the ER due to onset of left-sided lower extremity weakness  · History of of mass that causes right sided weakness and dysarthria at baseline  · CTA head/neck  · No acute intracranial process is seen  Chronic appearing white matter changes, correlates with patient's history of demyelinating disease  · No occlusion, severe stenosis or aneurysm of the major arteries of the head and neck  · No significant stenosis within either internal carotid artery  · Patent bilateral vertebral arteries  · Neuro on-call suspects MS flare but to treat as a stroke workup for now  · Order MRI/echo  · Neuro checks   · Telemetry  · Consult Neurology, PT/OT    MS (multiple sclerosis) [facility])  Assessment & Plan  · Diagnosed with MS in January 2022  Chronic right-sided weakness and dysarthria  · [facility] outpatient    Obesity (BMI 30-39  9)  Assessment & Plan  · Body mass index is 34 87 kg/m²  · Encouraged lifestyle changes    VTE Pharmacologic Prophylaxis: VTE Score: 7 High Risk (Score >/= 5) - Pharmacological DVT Prophylaxis Ordered: heparin  Sequential Compression Devices Ordered  Code Status:  Level 1 full code  Discussion with family: Patient declined call to   Anticipated Length of Stay: Patient will be admitted on an inpatient basis with an anticipated length of stay of greater than 2 midnights secondary to Left leg weakness  Total Time for Visit, including Counseling / Coordination of Care: 60 minutes Greater than 50% of this total time spent on direct patient counseling and coordination of care      Chief Complaint:  Left leg weakness    History of Present Illness:  Millie Jansen is a 50 y o  male with a PMH of multiple sclerosis, obesity who presents with onset of left sided lower extremity weakness/numbness  Patient unable to ambulate  Prior history of MS that was diagnosed in January 2022  Patient at that time had developed right-sided weakness and dysarthria  Patient continues to have right-sided weakness but reports improvement from initial presentation  Has been receiving Ocrevus outpatient  Scheduled to receive next treatment in November  Compliant with home medications  Denies headache, change in vision, chest pain, shortness of breath, abdominal pain, nausea, vomiting, diarrhea  Acute weakness is currently only affecting left lower extremity  Left upper extremity is spared  Review of Systems:  Review of Systems   Constitutional: Negative for fatigue and fever  HENT: Negative for sore throat  Respiratory: Negative for cough, chest tightness and shortness of breath  Cardiovascular: Negative for chest pain  Gastrointestinal: Negative for abdominal distention, abdominal pain, diarrhea, nausea and vomiting  Genitourinary: Negative for difficulty urinating  Musculoskeletal: Negative for arthralgias  Neurological: Positive for weakness and numbness (left leg)  Negative for headaches  Psychiatric/Behavioral: Negative for agitation and behavioral problems  All other systems reviewed and are negative  Past Medical and Surgical History:   Past Medical History:   Diagnosis Date    MS [facility]multiple sclerosis) ([facility] [address] )        Past Surgical History:   Procedure Laterality Date    HERNIA REPAIR      3 times    KNEE SURGERY Right        Meds/Allergies:  Prior to Admission medications    Medication Sig Start Date End Date Taking?  Authorizing Provider   cyanocobalamin (VITAMIN B-12) 1000 MCG tablet Take 1 tablet (1,000 mcg total) by mouth daily 7/13/22   Leilani Cummins MD   Cyanocobalamin 1000 MCG SUBL Place 1 tablet (1,000 mcg total) under the tongue daily 7/27/22 Barb Stokes MD   ergocalciferol (VITAMIN D2) 50,000 units Take 1 capsule by mouth once a week 7/18/22   Barb Stokes MD   gabapentin (NEURONTIN) 300 mg capsule Take 1 capsule (300 mg total) by mouth 3 (three) times a day 4/22/22   Barb Stokes MD   Riboflavin ([employer] Vitamin B-2) 100 MG TABS Take 2 tablets (200 mg total) by mouth in the morning 5/23/22   Barb Stokes MD   thiamine (VITAMIN B1) 50 mg tablet Take 100 mg by mouth daily    Historical Provider, MD ROGERS have reviewed home medications with patient personally  Allergies: No Known Allergies    Social History:  Marital Status: Unknown   Occupation:  Unknown  Patient Pre-hospital Living Situation: Home  Patient Pre-hospital Level of Mobility: walks  Patient Pre-hospital Diet Restrictions:  Regular  Substance Use History:   Social History     Substance and Sexual Activity   Alcohol Use Not Currently     Social History     Tobacco Use   Smoking Status Former Smoker   Smokeless Tobacco Former User     Social History     Substance and Sexual Activity   Drug Use Never       Family History:  Family History   Problem Relation Age of Onset    Stroke Maternal Grandmother     Multiple sclerosis Other        Physical Exam:     Vitals:   Blood Pressure: 118/79 (09/27/22 0616)  Pulse: 61 (09/27/22 0616)  Temperature: 97 6 °F (36 4 °C) (09/27/22 0616)  Temp Source: Temporal (09/27/22 0336)  Respirations: 20 (09/27/22 0616)  Height: 5' 11" (180 3 cm) (09/27/22 0620)  Weight - Scale: 102 kg (225 lb 5 oz) (09/27/22 0620)  SpO2: 98 % (09/27/22 0616)    Physical Exam  Vitals and nursing note reviewed  Constitutional:       Appearance: Normal appearance  He is obese  HENT:      Head: Normocephalic  Eyes:      Extraocular Movements: Extraocular movements intact  Pupils: Pupils are equal, round, and reactive to light  Cardiovascular:      Rate and Rhythm: Normal rate and regular rhythm  Heart sounds: No murmur heard      No gallop  Pulmonary:      Effort: No respiratory distress  Breath sounds: Normal breath sounds  No wheezing  Abdominal:      General: Bowel sounds are normal  There is no distension  Tenderness: There is no abdominal tenderness  Musculoskeletal:         General: Normal range of motion  Cervical back: Normal range of motion  Comments: Right upper extremities 4/5 strength  Left upper extremity 5/5 strength  Right lower extremity 4/5  Left lower extremity 2/5   Skin:     General: Skin is warm  Neurological:      General: No focal deficit present  Mental Status: He is alert and oriented to person, place, and time  Mental status is at baseline  Psychiatric:         Mood and Affect: Mood normal          Behavior: Behavior normal          Thought Content: Thought content normal           Additional Data:     Lab Results:  Results from last 7 days   Lab Units 09/27/22  0341   WBC Thousand/uL 9 24   HEMOGLOBIN g/dL 15 2   HEMATOCRIT % 45 7   PLATELETS Thousands/uL 231     Results from last 7 days   Lab Units 09/27/22  0341   SODIUM mmol/L 143   POTASSIUM mmol/L 4 1   CHLORIDE mmol/L 105   CO2 mmol/L 26   BUN mg/dL 23   CREATININE mg/dL 1 22   ANION GAP mmol/L 12   CALCIUM mg/dL 9 5   GLUCOSE RANDOM mg/dL 92     Results from last 7 days   Lab Units 09/27/22  0341   INR  0 96                   Imaging: Reviewed radiology reports from this admission including: CT head and xray(s)  X-ray chest 1 view portable   ED Interpretation by Eulogio Nunes DO (09/27 5432)   This study was ordered and independently reviewed by me    No acute findings noted         CTA stroke alert (head/neck)   Final Result by Cordell Ferrera DO (09/27 4746)      No acute intracranial process is seen  Chronic appearing white matter changes, correlates with patient's history of demyelinating disease  No occlusion, severe stenosis or aneurysm of the major arteries of the head and neck        No significant stenosis within either internal carotid artery  Patent bilateral vertebral arteries  Other findings as above  I personally discussed this study with ALVARO MARIN on 9/27/2022 at 4:40 AM                   Workstation performed: [id number]         CT stroke alert brain   Final Result by Chester Shelby DO (09/27 1890)      No acute intracranial process is seen  Chronic appearing white matter changes, correlates with patient's history of demyelinating disease  No occlusion, severe stenosis or aneurysm of the major arteries of the head and neck  No significant stenosis within either internal carotid artery  Patent bilateral vertebral arteries  Other findings as above  I personally discussed this study with ALVARO MARIN on 9/27/2022 at 4:40 AM                   Workstation performed: [id number]         MRI Inpatient Order    (Results Pending)       ** Please Note: This note has been constructed using a voice recognition system   **

## 2022-09-27 NOTE — OCCUPATIONAL THERAPY NOTE
Occupational Therapy Evaluation (9287-8994) & Treat (1853-0350)     Patient Name: Hilario CROWELL'S Date: 9/27/2022  Problem List  Principal Problem:    Left leg weakness  Active Problems:    MS (multiple sclerosis) (HCC)    Obesity (BMI 30-39  9)    Past Medical History  Past Medical History:   Diagnosis Date    MS (multiple sclerosis) ([address] )      Past Surgical History  Past Surgical History:   Procedure Laterality Date    HERNIA REPAIR      3 times    KNEE SURGERY Right            09/27/22 1137   OT Last Visit   OT Visit Date 09/27/22   Note Type   Note type Evaluation   Restrictions/Precautions   Weight Bearing Precautions Per Order No   Other Precautions   (MS)   Pain Assessment   Pain Assessment Tool 0-10   Pain Score No Pain   Home Living   Type of [address] [place] [address] Two level;Ramped entrance; Able to live on main level with bedroom/bathroom   Bathroom Shower/Tub Tub/shower unit   [address] [place] [address] chair   Home Equipment Walker;Cane  (Transport chair)   Additional Comments Primary bedroom on 2nd floor but has been staying on FF sleeping in recliner couch  Primarily uses RW & transport chair   Prior Function   Level of [place] Independent with ADLs and functional mobility; Needs assistance with IADLs   Lives With Daughter  (Parents)   Receives Help From Family   ADL Assistance Independent   IADLs Needs assistance   Vocational On disability  ()   Comments (-) drives, dad cooks/cleans/shops   Lifestyle   Autonomy Remains overall Mod I level for ADL/Mobility   Reciprocal Relationships Pt lives with daughter & parents   Service to Others Pt was a    Intrinsic Gratification Cooking   Subjective   Subjective "You call it a flare up, I call it an attack"   ADL   Eating Assistance 7  Independent   Grooming Assistance 5  Supervision/Setup   UB Bathing Assistance 5  Supervision/Setup   LB Bathing Assistance 5  Supervision/Setup   UB Dressing Assistance 5  Supervision/Setup   LB Dressing Assistance 5  Supervision/Setup   Toileting Assistance  5  Supervision/Setup   Additional Comments Increased time   Bed Mobility   Supine to Sit 5  Supervision   Additional Comments Please see additional treatment session for further bed mobility   Transfers   Additional Comments Please see treatment session for transfers   Functional Mobility   Functional Mobility 5  Supervision   Additional items Rolling walker   Balance   Static Sitting Good   Dynamic Sitting Good   Static Standing Fair +   Dynamic Standing Fair +   Ambulatory Fair +   Activity Tolerance   Activity Tolerance Patient limited by fatigue;Patient tolerated treatment well   Nurse Made Aware LADI Grijalva   RUE Assessment   RUE Assessment X  (Limited AROM in shoulder flexion/abduction  0-50* abduction, approx 0-60* flexion  WFL PROM  Pt reports biceps tightness  Chronic since last flare up)   LUE Assessment   LUE Assessment WFL   Hand Function   Gross Motor Coordination Impaired  (In RUE - dysmetria)   Fine Motor Coordination Impaired  (RUE)   Sensation   Light Touch No apparent deficits   Vision-Basic Assessment   Current Vision Wears glasses for distance only   Patient Visual Report Blurring of vision when changing focal distance;Past pointing/reaching  (RUE past/point)   Vision - Complex Assessment   Ocular Range of Motion WFL   Head Position WDL   Tracking Decreased smoothness of horizontal tracking;Decreased smoothness of vertical tracking; Requires cues, head turns, or add eye shifts to track   Acuity Able to read clock/calendar on wall without difficulty   Diplopia Assessment   (No diplopia)   Cognition   [facility]   Arousal/Participation Alert   Attention Within functional limits   Orientation Level Oriented X4   Memory Within functional limits   Following Commands Follows one step commands without difficulty   Assessment   Limitation Decreased ADL status; Decreased UE ROM; Decreased UE strength;Decreased endurance;Decreased self-care trans;Decreased high-level ADLs   Prognosis Good   Assessment Pt is a 50 y o  male seen for OT evaluation at [facility], admitted 9/27/2022 w/ Left leg weakness  OT completed extensive review of pt's medical and social history  Comorbidities affecting pt's functional performance at time of assessment include: MS, obesity, dysarthria, lower facial weakness, R hemiparesis, ambulatory dysfunction  Personal factors affecting pt at time of IE include: behavioral pattern, difficulty performing ADLS, difficulty performing IADLS , flat affect and health management   Prior to admission, pt was living with his daughter & parents in a [facility] with a ramped entrance & a FF s/u  Pt was I w/  ADLS and A w/ IADLS, (-) drove, & required use of RW PTA  Upon evaluation: Pt requires S for bed mobility, S for functional mobility/transfers, S for UB ADLs and S for LB ADLS 2* the following deficits impacting occupational performance: weakness, decreased ROM, decreased strength, decreased balance, decreased tolerance, impaired GMC and impaired FMC  Full objective findings from OT assessment regarding body systems outlined above  Pt to benefit from continued skilled OT tx while in the hospital to address deficits as defined above and maximize level of functional independence w/ ADL's and functional mobility  Occupational Performance areas to address include: grooming, bathing/shower, toilet hygiene, dressing, health maintenance, functional mobility, community mobility and clothing management  Based on findings, pt is of high complexity  The patient's raw score on the AM-PAC Daily Activity inpatient short form is 24, standardized score is 57 54, greater than 39 4  Patients at this level are likely to benefit from DC to home, which does coincide with current above OT recommendations  However, please refer to therapist recommendation for discharge planning given other factors that may influence destination   At this time, OT recommendations at time of discharge are home OT  Goals   Patient Goals To get better   Plan   Treatment Interventions ADL retraining;Functional transfer training;UE strengthening/ROM; Endurance training;Patient/family training;Equipment evaluation/education; Compensatory technique education; Energy conservation   Goal Expiration Date 10/07/22   OT Treatment Day 0   OT Frequency 2-3x/wk   Additional Treatment Session   Start Time 1125   End Time 2987   Treatment Assessment Pt able to adjust R sock while seated EOB with S  Pt performed sit <> stand & functional mobility with RW & S  Pt returned to supine independently  Pt left supine in bed with call bell in reach & needs met  Recommendation   OT Discharge Recommendation Home with home health rehabilitation   AM-PAC Daily Activity Inpatient   Lower Body Dressing 4   Bathing 4   Toileting 4   Upper Body Dressing 4   Grooming 4   Eating 4   Daily Activity Raw Score 24   Daily Activity Standardized Score (Calc for Raw Score >=11) 57 54   AM-PAC Applied Cognition Inpatient   Following a Speech/Presentation 4   Understanding Ordinary Conversation 4   Taking Medications 4   Remembering Where Things Are Placed or Put Away 4   Remembering List of 4-5 Errands 4   Taking Care of Complicated Tasks 4   Applied Cognition Raw Score 24   Applied Cognition Standardized Score 62 21     Pt will achieve the following goals within 10 days  *Pt will complete UB bathing and dressing with Mod I     *Pt will complete LB bathing and dressing with Mod I & DME PRN  *Pt will complete toileting w/ Mod I w/ G hygiene/thoroughness using DME PRN    *Pt will perform functional transfers with on/off all surfaces with Mod I using DME as needed w/ G balance/safety  *Pt will demonstrate increased activity tolerance >20 min in order to complete ADL routine  *Pt will improve functional mobility during ADL/IADL/leisure tasks to Mod I using DME as needed w/ G balance/safety       *Pt will improve standing balance to G for 8-10 minutes during purposeful activity w/ independence & G endurance       *Pt will demonstrate G carryover of pt/caregiver education and training as appropriate w/ Mod I w/o cues w/ good tolerance    *Assess DME needs     Sophia Mann, OTR/L

## 2022-09-27 NOTE — ASSESSMENT & PLAN NOTE
· Presented to the ER due to onset of left-sided lower extremity weakness  · History of of mass that causes right sided weakness and dysarthria at baseline  · CTA head/neck  · No acute intracranial process is seen  Chronic appearing white matter changes, correlates with patient's history of demyelinating disease  · No occlusion, severe stenosis or aneurysm of the major arteries of the head and neck  · No significant stenosis within either internal carotid artery  · Patent bilateral vertebral arteries    · Neuro on-call suspects MS flare but to treat as a stroke workup for now  · Order MRI/echo  · Neuro checks   · Telemetry  · Consult Neurology, PT/OT

## 2022-09-27 NOTE — ASSESSMENT & PLAN NOTE
· Diagnosed with MS in January 2022    Chronic right-sided weakness and dysarthria  · [facility] outpatient

## 2022-09-27 NOTE — CASE MANAGEMENT
Case Management Assessment & Discharge Planning Note    Patient name Tova Montilla  Location [address] [id number] MRN [id number]  : 1975 Date 2022       Current Admission Date: 2022  Current Admission Diagnosis:Left leg weakness   Patient Active Problem List    Diagnosis Date Noted    Left leg weakness 2022    Obesity (BMI 30-39 9) 2022    Ambulatory dysfunction 03/15/2022    MS [facility]) [facility] ) 03/15/2022    Dysarthria 2022    Lower facial weakness 2022    CNS demyelinating disease ([facility] ) 2022    Right hemiparesis ([facility] ) 2022      LOS (days): 0  Geometric Mean LOS (GMLOS) (days):   Days to GMLOS:     OBJECTIVE:    Risk of Unplanned Readmission Score: 7 93     Current admission status: Inpatient    Preferred Pharmacy:   [address]  [address]    [facility] [address]  Phone: [phone] Fax: [phone]    Primary Care Provider: Rogerio Flores MD    Primary Insurance: [employer]  Secondary Insurance:     ASSESSMENT:  Active Health Care Proxies     Suzi cedilloViri Lior [facility] Representative - Sister   Primary Phone: [phone] (Home)               Advance Directives  Does patient have a [address] [place] [address] Avenue?: No  Was patient offered paperwork?: Yes (Declined)  Does patient currently have a Health Care decision maker?: Yes, please see Health Care Proxy section  Does patient have Advance Directives?: No  Was patient offered paperwork?: Yes (Declined)  Primary Contact: Tiffany Rodrigez: sister: [phone]    Readmission Root Cause  30 Day Readmission: No    Patient Information  Admitted from[de-identified] Home  Mental Status: Alert  During Assessment patient was accompanied by: Not accompanied during assessment  Assessment information provided by[de-identified] Patient  Primary Caregiver: Self  Support Systems: Children, Family members, Parent  [place] of Residence:  [facility] do you live in?: [address] [place] [address] access options   Select all that apply : Ramp, Stairs  Number of steps to enter home : 3  Do the steps have railings?: Yes  Type of Current Residence: 2 story home  Upon entering residence, is there a bedroom on the main floor (no further steps)?: No  A bedroom is located on the following floor levels of residence (select all that apply):: 2nd Floor  Upon entering residence, is there a bathroom on the main floor (no further steps)?: No  Indicate which floors of current residence have a bathroom (select all the apply):: 2nd Floor  Number of steps to 2nd floor from main floor: One Flight  In the last 12 months, was there a time when you were not able to pay the mortgage or rent on time?: No  In the last 12 months, how many places have you lived?: 1  In the last 12 months, was there a time when you did not have a steady place to sleep or slept in a shelter (including now)?: No  Homeless/housing insecurity resource given?: N/A  Living Arrangements: Lives w/ Parent(s), Lives w/ Daughter  Is patient a ?: No    Activities of Daily Living Prior to Admission  Functional Status: Independent  Completes ADLs independently?: Yes  Ambulates independently?: Yes (uses walker)  Does patient use assisted devices?: Yes  Assisted Devices (DME) used: Kiesha Bergman  Does patient currently own DME?: Yes  What DME does the patient currently own?: Kiesha Bergman, Other (Comment) (transport chair)  Does patient have a history of Outpatient Therapy (PT/OT)?: No  Does the patient have a history of Short-Term Rehab?: Yes ([facility])  Does patient have a history of HHC?: Yes [facility])  Does patient currently have [place] [facility]?: No    Patient Information Continued  Income Source: SSI/SSD  Does patient have prescription coverage?: Yes  Within the past 12 months, you worried that your food would run out before you got the money to buy more : Never true  Within the past 12 months, the food you bought just didn't last and you didn't have money to get more : Never true  Food insecurity resource given?: N/A  Does patient receive dialysis treatments?: No  Does patient have a history of substance abuse?: No  Does patient have a history of Mental Health Diagnosis?: No    Means of Transportation  Means of Transport to Appts[de-identified] Family transport  In the past 12 months, has lack of transportation kept you from medical appointments or from getting medications?: No  In the past 12 months, has lack of transportation kept you from meetings, work, or from getting things needed for daily living?: No  Was application for public transport provided?: N/A    DISCHARGE DETAILS:    [address]         Is the patient interested in Mikayla Lao at discharge?: Yes  [address] requested[de-identified] Nursing, Occupational Therapy, Physical [address] Name[de-identified] Other ([address])  [facility] Provider[de-identified] PCP  Home Health Services Needed[de-identified] Evaluate Functional Status and Safety, Gait/ADL Training, Strengthening/Theraputic Exercises to Improve Function  Homebound Criteria Met[de-identified] Requires the Assistance of Another Person for Safe Ambulation or to Leave the Home, Uses an Assist Device (i e  cane, walker, etc)  Supporting Clincal Findings[de-identified] Limited Endurance    Additional Comments: Met with Pt  Pt presents AA&Ox3  Discussed role of   Pt reports he lives with his parents and dtr in 2sh, ramp to enter and also 3 pritesh with railings  Pt reports he uses walker and has transport chair  Pt reports he has been to [facility] in past for rehab  Pt reports he had [address] for VNA  Pt denies hx MH/D&A  Pt reports he was supposed to start with [facility] on Monday but they never came  Pt reports he spoke with [facility] on Friday  CM explained to Pt that [facility] does not accept Pt's insurance but CM will send referral to [facility] to double check   Pt is also agreeable for [address] if [facility] cannot [facility]  Referral sent to [facility] [place] [facility] via [address]   [facility] cannot accept due to no contract with [employer]  [facility] can accept  Pt in agreement

## 2022-09-27 NOTE — ASSESSMENT & PLAN NOTE
· Diagnosed with MS in January 2022    Chronic right-sided weakness and dysarthria  · [facility] outpatient

## 2022-09-28 ENCOUNTER — APPOINTMENT (OUTPATIENT)
Dept: MRI IMAGING | Facility: HOSPITAL | Age: 47
DRG: 043 | End: 2022-09-28
Payer: COMMERCIAL

## 2022-09-28 LAB
ALBUMIN SERPL BCP-MCNC: 3.5 G/DL (ref 3.5–5)
ALP SERPL-CCNC: 51 U/L (ref 46–116)
ALT SERPL W P-5'-P-CCNC: 18 U/L (ref 12–78)
ANION GAP SERPL CALCULATED.3IONS-SCNC: 10 MMOL/L (ref 4–13)
AST SERPL W P-5'-P-CCNC: 7 U/L (ref 5–45)
BASOPHILS # BLD AUTO: 0.07 THOUSANDS/ΜL (ref 0–0.1)
BASOPHILS NFR BLD AUTO: 1 % (ref 0–1)
BILIRUB SERPL-MCNC: 0.4 MG/DL (ref 0.2–1)
BUN SERPL-MCNC: 22 MG/DL (ref 5–25)
CALCIUM SERPL-MCNC: 9.1 MG/DL (ref 8.3–10.1)
CHLORIDE SERPL-SCNC: 107 MMOL/L (ref 96–108)
CO2 SERPL-SCNC: 24 MMOL/L (ref 21–32)
CREAT SERPL-MCNC: 0.94 MG/DL (ref 0.6–1.3)
EOSINOPHIL # BLD AUTO: 0.57 THOUSAND/ΜL (ref 0–0.61)
EOSINOPHIL NFR BLD AUTO: 10 % (ref 0–6)
ERYTHROCYTE [DISTWIDTH] IN BLOOD BY AUTOMATED COUNT: 12.8 % (ref 11.6–15.1)
GFR SERPL CREATININE-BSD FRML MDRD: 94 ML/MIN/1.73SQ M
GLUCOSE SERPL-MCNC: 85 MG/DL (ref 65–140)
HCT VFR BLD AUTO: 42.9 % (ref 36.5–49.3)
HGB BLD-MCNC: 13.9 G/DL (ref 12–17)
IMM GRANULOCYTES # BLD AUTO: 0.03 THOUSAND/UL (ref 0–0.2)
IMM GRANULOCYTES NFR BLD AUTO: 1 % (ref 0–2)
LYMPHOCYTES # BLD AUTO: 1.71 THOUSANDS/ΜL (ref 0.6–4.47)
LYMPHOCYTES NFR BLD AUTO: 29 % (ref 14–44)
MCH RBC QN AUTO: 29.2 PG (ref 26.8–34.3)
MCHC RBC AUTO-ENTMCNC: 32.4 G/DL (ref 31.4–37.4)
MCV RBC AUTO: 90 FL (ref 82–98)
MONOCYTES # BLD AUTO: 0.49 THOUSAND/ΜL (ref 0.17–1.22)
MONOCYTES NFR BLD AUTO: 8 % (ref 4–12)
NEUTROPHILS # BLD AUTO: 2.95 THOUSANDS/ΜL (ref 1.85–7.62)
NEUTS SEG NFR BLD AUTO: 51 % (ref 43–75)
NRBC BLD AUTO-RTO: 0 /100 WBCS
PLATELET # BLD AUTO: 183 THOUSANDS/UL (ref 149–390)
PMV BLD AUTO: 10.4 FL (ref 8.9–12.7)
POTASSIUM SERPL-SCNC: 4 MMOL/L (ref 3.5–5.3)
PROT SERPL-MCNC: 7 G/DL (ref 6.4–8.4)
RBC # BLD AUTO: 4.76 MILLION/UL (ref 3.88–5.62)
SODIUM SERPL-SCNC: 141 MMOL/L (ref 135–147)
WBC # BLD AUTO: 5.82 THOUSAND/UL (ref 4.31–10.16)

## 2022-09-28 PROCEDURE — G1004 CDSM NDSC: HCPCS

## 2022-09-28 PROCEDURE — 85025 COMPLETE CBC W/AUTO DIFF WBC: CPT | Performed by: HOSPITALIST

## 2022-09-28 PROCEDURE — 80053 COMPREHEN METABOLIC PANEL: CPT | Performed by: HOSPITALIST

## 2022-09-28 PROCEDURE — NC001 PR NO CHARGE: Performed by: PSYCHIATRY & NEUROLOGY

## 2022-09-28 PROCEDURE — 72156 MRI NECK SPINE W/O & W/DYE: CPT

## 2022-09-28 PROCEDURE — 99232 SBSQ HOSP IP/OBS MODERATE 35: CPT | Performed by: HOSPITALIST

## 2022-09-28 PROCEDURE — A9585 GADOBUTROL INJECTION: HCPCS | Performed by: PHYSICIAN ASSISTANT

## 2022-09-28 PROCEDURE — 70553 MRI BRAIN STEM W/O & W/DYE: CPT

## 2022-09-28 RX ORDER — KETOROLAC TROMETHAMINE 30 MG/ML
15 INJECTION, SOLUTION INTRAMUSCULAR; INTRAVENOUS ONCE
Status: COMPLETED | OUTPATIENT
Start: 2022-09-28 | End: 2022-09-28

## 2022-09-28 RX ADMIN — GADOBUTROL 10 ML: 604.72 INJECTION INTRAVENOUS at 16:58

## 2022-09-28 RX ADMIN — GABAPENTIN 300 MG: 300 CAPSULE ORAL at 22:15

## 2022-09-28 RX ADMIN — GABAPENTIN 300 MG: 300 CAPSULE ORAL at 09:29

## 2022-09-28 RX ADMIN — ASPIRIN 81 MG CHEWABLE TABLET 81 MG: 81 TABLET CHEWABLE at 09:28

## 2022-09-28 RX ADMIN — Medication 3 MG: at 22:17

## 2022-09-28 RX ADMIN — HEPARIN SODIUM 5000 UNITS: 5000 INJECTION INTRAVENOUS; SUBCUTANEOUS at 14:09

## 2022-09-28 RX ADMIN — Medication 100 MG: at 09:29

## 2022-09-28 RX ADMIN — HEPARIN SODIUM 5000 UNITS: 5000 INJECTION INTRAVENOUS; SUBCUTANEOUS at 06:00

## 2022-09-28 RX ADMIN — GABAPENTIN 300 MG: 300 CAPSULE ORAL at 15:09

## 2022-09-28 RX ADMIN — HEPARIN SODIUM 5000 UNITS: 5000 INJECTION INTRAVENOUS; SUBCUTANEOUS at 22:16

## 2022-09-28 RX ADMIN — CYANOCOBALAMIN TAB 500 MCG 1000 MCG: 500 TAB at 09:29

## 2022-09-28 RX ADMIN — IBUPROFEN 600 MG: 600 TABLET, FILM COATED ORAL at 22:16

## 2022-09-28 RX ADMIN — KETOROLAC TROMETHAMINE 15 MG: 30 INJECTION, SOLUTION INTRAMUSCULAR; INTRAVENOUS at 09:29

## 2022-09-28 RX ADMIN — ATORVASTATIN CALCIUM 40 MG: 40 TABLET, FILM COATED ORAL at 17:22

## 2022-09-28 RX ADMIN — IBUPROFEN 600 MG: 600 TABLET, FILM COATED ORAL at 14:15

## 2022-09-28 NOTE — QUICK NOTE
Notes reviewed  Awaiting MR imaging of neuroaxis  Will provide additional recommendations pending imaging results

## 2022-09-28 NOTE — PLAN OF CARE
Problem: MOBILITY - ADULT  Goal: Maintain or return to baseline ADL function  Description: INTERVENTIONS:  -  Assess patient's ability to carry out ADLs; assess patient's baseline for ADL function and identify physical deficits which impact ability to perform ADLs (bathing, care of mouth/teeth, toileting, grooming, dressing, etc )  - Assess/evaluate cause of self-care deficits   - Assess range of motion  - Assess patient's mobility; develop plan if impaired  - Assess patient's need for assistive devices and provide as appropriate  - Encourage maximum independence but intervene and supervise when necessary  - Involve family in performance of ADLs  - Assess for home care needs following discharge   - Consider OT consult to assist with ADL evaluation and planning for discharge  - Provide patient education as appropriate  Outcome: Progressing  Goal: Maintains/Returns to pre admission functional level  Description: INTERVENTIONS:  - Perform BMAT or MOVE assessment daily    - Set and communicate daily mobility goal to care team and patient/family/caregiver  - Collaborate with rehabilitation services on mobility goals if consulted  - Perform Range of Motion 2 times a day  - Reposition patient every 2 hours    - Dangle patient 2 times a day  - Stand patient 2 times a day  - Ambulate patient 2 times a day  - Out of bed to chair 2 times a day   - Out of bed for meals 2 times a day  - Out of bed for toileting  - Record patient progress and toleration of activity level   Outcome: Progressing     Problem: Potential for Falls  Goal: Patient will remain free of falls  Description: INTERVENTIONS:  - Educate patient/family on patient safety including physical limitations  - Instruct patient to call for assistance with activity   - Consult OT/PT to assist with strengthening/mobility   - Keep Call bell within reach  - Keep bed low and locked with side rails adjusted as appropriate  - Keep care items and personal belongings within reach  - Initiate and maintain comfort rounds  - Make Fall Risk Sign visible to staff  - Offer Toileting every 2 Hours, in advance of need  - Initiate/Maintain bed/chair alarm  - Obtain necessary fall risk management equipment:   - Apply yellow socks and bracelet for high fall risk patients  - Consider moving patient to room near nurses station  Outcome: Progressing     Problem: Nutrition/Hydration-ADULT  Goal: Nutrient/Hydration intake appropriate for improving, restoring or maintaining nutritional needs  Description: Monitor and assess patient's nutrition/hydration status for malnutrition  Collaborate with interdisciplinary team and initiate plan and interventions as ordered  Monitor patient's weight and dietary intake as ordered or per policy  Utilize nutrition screening tool and intervene as necessary  Determine patient's food preferences and provide high-protein, high-caloric foods as appropriate       INTERVENTIONS:  - Monitor oral intake, urinary output, labs, and treatment plans  - Assess nutrition and hydration status and recommend course of action  - Evaluate amount of meals eaten  - Assist patient with eating if necessary   - Allow adequate time for meals  - Recommend/ encourage appropriate diets, oral nutritional supplements, and vitamin/mineral supplements  - Order, calculate, and assess calorie counts as needed  - Recommend, monitor, and adjust tube feedings and TPN/PPN based on assessed needs  - Assess need for intravenous fluids  - Provide specific nutrition/hydration education as appropriate  - Include patient/family/caregiver in decisions related to nutrition  Outcome: Progressing

## 2022-09-28 NOTE — PROGRESS NOTES
[place]  Progress Note - Cailin Fernando 1975, 50 y o  male MRN: [id number]  Unit/Bed#: -01 Encounter: [id number]  Primary Care Provider: Jeremiah Hodgkin, MD   Date and time admitted to hospital: 2022  3:40 AM    Obesity (BMI 30-39  9)  Assessment & Plan  Body mass index is 31 42 kg/m²  · Encouraged lifestyle changes    MS (multiple sclerosis) ([facility] [address] )  Assessment & Plan  · Diagnosed with MS in 2022  Chronic right-sided weakness and dysarthria  · Lanette Shells outpatient    * Left leg weakness  Assessment & Plan  · Presented to the ER due to onset of left-sided lower extremity weakness  · History of of mass that causes right sided weakness and dysarthria at baseline  · CTA head/neck  · No acute intracranial process is seen  Chronic appearing white matter changes, correlates with patient's history of demyelinating disease  · No occlusion, severe stenosis or aneurysm of the major arteries of the head and neck  · No significant stenosis within either internal carotid artery  · Patent bilateral vertebral arteries  · Neuro on-call suspects MS flare but to treat as a stroke workup for now  · Order MRI spine/brain  · Echo unremarkable  · Neuro checks   · Telemetry  · Consult Neurology, PT/OT         VTE  Prophylaxis:   Pharmacologic: in place    Patient Centered Rounds: I have performed bedside rounds with nursing staff today  Discussions with Specialists or Other Care Team Provider: case management    Education and Discussions with Family / Patient: pt states      Current Length of Stay: 1 day(s)    Current Patient Status: Inpatient        Code Status: Level 1 - Full Code      Subjective:   Improving LLE weakness feels better    Patient is seen and examined at bedside  All other ROS are negative      Objective:     Vitals:   Temp (24hrs), Av 6 °F (36 4 °C), Min:97 3 °F (36 3 °C), Max:97 9 °F (36 6 °C)    Temp:  [97 3 °F (36 3 °C)-97 9 °F (36 6 °C)] 97 9 °F (36 6 °C)  HR:  [69-82] 71  Resp:  [17-18] 17  BP: ()/(64-79) 99/67  SpO2:  [94 %-96 %] 95 %  Body mass index is 31 38 kg/m²  Input and Output Summary (last 24 hours): Intake/Output Summary (Last 24 hours) at 9/28/2022 1145  Last data filed at 9/28/2022 0837  Gross per 24 hour   Intake 560 ml   Output 1150 ml   Net -590 ml       Physical Exam:       GEN: No acute distress, comfortable  HEEENT: No JVD, PERRLA, no scleral icterus  RESP: Lungs clear to auscultation bilaterally  CV: RRR, +s1/s2   ABD: SOFT NON TENDER, POSITIVE BOWEL SOUNDS, NO DISTENTION  PSYCH: CALM  NEURO: A X O X 3, chronic R weakness, LLE improved  SKIN: NO RASH  EXTREM: NO EDEMA    Additional Data:     Labs:    Results from last 7 days   Lab Units 09/28/22  0514   WBC Thousand/uL 5 82   HEMOGLOBIN g/dL 13 9   HEMATOCRIT % 42 9   PLATELETS Thousands/uL 183   NEUTROS PCT % 51   LYMPHS PCT % 29   MONOS PCT % 8   EOS PCT % 10*     Results from last 7 days   Lab Units 09/28/22  0514   SODIUM mmol/L 141   POTASSIUM mmol/L 4 0   CHLORIDE mmol/L 107   CO2 mmol/L 24   BUN mg/dL 22   CREATININE mg/dL 0 94   ANION GAP mmol/L 10   CALCIUM mg/dL 9 1   ALBUMIN g/dL 3 5   TOTAL BILIRUBIN mg/dL 0 40   ALK PHOS U/L 51   ALT U/L 18   AST U/L 7   GLUCOSE RANDOM mg/dL 85     Results from last 7 days   Lab Units 09/27/22  0341   INR  0 96     Results from last 7 days   Lab Units 09/27/22  0336   POC GLUCOSE mg/dl 95     Results from last 7 days   Lab Units 09/27/22  0341   HEMOGLOBIN A1C % 5 1               * I Have Reviewed All Lab Data Listed Above  Imaging:     Results for orders placed during the hospital encounter of 09/27/22    X-ray chest 1 view portable    Narrative  CHEST    INDICATION:   stroke alert  COMPARISON:  2/13/2022    EXAM PERFORMED/VIEWS:  XR CHEST PORTABLE      FINDINGS:  Monitoring wires partially obscure the lungs  Hypoventilation  Cardiomediastinal silhouette appears unremarkable  The lungs are clear    No pneumothorax or pleural effusion  Osseous structures appear within normal limits for patient age ;  Scoliosis is noted  Impression  No acute cardiopulmonary disease  Workstation performed: [id number]    No results found for this or any previous visit  *I have reviewed all imaging reports listed above      Recent Cultures (last 7 days):           Last 24 Hours Medication List:   Current Facility-Administered Medications   Medication Dose Route Frequency Provider Last Rate    aspirin  81 mg Oral Daily Huntingdon Maxcy, PA-C      atorvastatin  40 mg Oral QPM Huntingdon Maxcy, PA-C      cyanocobalamin  1,000 mcg Oral Daily Vista Maxcy, PA-C      ergocalciferol  50,000 Units Oral Weekly Huntingdon Maxcy, PA-C      gabapentin  300 mg Oral TID Huntingdon Maxcy, PA-C      heparin (porcine)  5,000 Units Subcutaneous [facility] Huntingdon Maxcy, PA-C      ibuprofen  600 mg Oral Q6H PRN Huntingdon Maxcy, PA-C      melatonin  3 mg Oral HS Huntingdon Maxcy, PA-C      thiamine  100 mg Oral Daily Huntingdon Maxcy, PA-C          Today, Patient Was Seen By: Soniya Valencia    ** Please Note: Dictation voice to text software may have been used in the creation of this document   **

## 2022-09-28 NOTE — UTILIZATION REVIEW
Initial Clinical Review    Admission: Date/Time/Statement:   Admission Orders (From admission, onward)     Ordered        09/27/22 0503  INPATIENT ADMISSION  Once                      Orders Placed This Encounter   Procedures    INPATIENT ADMISSION     Standing Status:   Standing     Number of Occurrences:   1     Order Specific Question:   Level of Care     Answer:   Med Surg [16]     Order Specific Question:   Estimated length of stay     Answer:   More than 2 Midnights     Order Specific Question:   Certification     Answer:   I certify that inpatient services are medically necessary for this patient for a duration of greater than two midnights  See H&P and MD Progress Notes for additional information about the patient's course of treatment  ED Arrival Information     Expected   -    Arrival   9/27/2022 03:33    Acuity   Emergent            Means of arrival   Ambulance    Escorted by   [facility]    Service   Hospitalist    Admission type   Emergency            Arrival complaint   MS           Chief Complaint   Patient presents with    Extremity Weakness     Pt arrived via EMS with c/o left leg weakness/inability to move leg starting 1 hour ago  States he still has full sensation in extremity  Pt has hx of MS and states this has happened in the past when he had an MS flare up  Pt has limited right leg mobility which he reports is his current baseline  Initial Presentation: 50 y o  male from home to ED via ems admitted inpatient due to Left Leg weakness with differential of MS flare vs TIA/CVA  Recent diagnosis of MS  Ranjan Fernley outpatient   Presented with new onset Left sided lower extremity weakness starting 1 hour prior to arrival   Feels like previous MS flares  On exam: baseline dysarthria  RLE 4/5, LLE 0/5 sensation decreased  Plan is consult neurology, PT/OT  MRI, echo  Telemetry  Neuro checks        9/27/22 per neurology - Patient with ? transient  LLE weakness of acute onset, now is improving  Differential is MS flare  Plan is MRI brain, cervical, thoracic and lumbar spine MRI to rule out new lesions  If + start IV steroids  PT/OT    Date: 9/28/22   Day 2:  Has improving LLE weakness  On exam has chronic Right weakness, LLE improved  MRI in progress  ED Triage Vitals   Temperature Pulse Respirations Blood Pressure SpO2   09/27/22 0336 09/27/22 0336 09/27/22 0336 09/27/22 0336 09/27/22 0336   97 5 °F (36 4 °C) 78 18 124/61 98 %      Temp Source Heart Rate Source Patient Position - Orthostatic VS BP Location FiO2 (%)   09/27/22 0336 09/27/22 0336 09/28/22 1945 09/27/22 0336 --   Temporal Monitor Lying Left arm       Pain Score       09/27/22 0336       No Pain          Wt Readings from Last 1 Encounters:   09/29/22 99 5 kg (219 lb 5 7 oz)     Additional Vital Signs:   09/28/22 10:57:20 97 9 °F (36 6 °C) 71 17 99/67 78 95 % --   09/28/22 05:13:08 97 3 °F (36 3 °C) Abnormal  69 -- 104/69 81 94 % --   09/27/22 21:04:30 97 3 °F (36 3 °C) Abnormal  79 -- 95/67 76 96 % --   09/27/22 16:46:28 97 9 °F (36 6 °C) 82 18 95/67 76 95 % None (Room air)   09/27/22 14:09:50 97 7 °F (36 5 °C) 76 18 93/64 74 95 % --   09/27/22 1305 -- -- -- 118/79 -- -- --   09/27/22 11:43:55 97 1 °F (36 2 °C) Abnormal  76 18 111/75 87 94 % --   09/27/22 09:05:40 97 5 °F (36 4 °C) 85 18 109/70 83 94 % --   09/27/22 0830 -- -- -- -- -- -- None (Room air)   09/27/22 06:16:24 97 6 °F (36 4 °C) 61 20 118/79 92 98 %        Pertinent Labs/Diagnostic Test Results:   MRI brain MS wo and w contrast   Final Result by Krystyna Mcclure MD (09/29 0810)   Redemonstration of multiple white matter lesions compatible with chronic demyelinating disease  Several lesions are decreased in conspicuity and no longer enhance   New or more conspicuous FLAIR signal extending inferiorly from previously seen    lesion in the posterior limb of the left internal capsule along the cortical spinal tract that 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  No other new signal abnormality  No abnormal enhancement to indicate acute demyelination  Workstation performed: [id number]         MRI cervical spine w wo contrast   Final Result by Dawn Clay MD (09/29 0809)      Demyelinating lesions are mildly decreased in conspicuity  No new or enhancing demyelinating lesions  Workstation performed: [id number]         X-ray chest 1 view portable   ED Interpretation by eDvan Negrete DO (09/27 0881)   This study was ordered and independently reviewed by me    No acute findings noted         Final Result by Jonathan Evans MD (09/27 9425)      No acute cardiopulmonary disease  Workstation performed: [id number]         CTA stroke alert (head/neck)   Final Result by Olga Coulter DO (09/27 2177)      No acute intracranial process is seen  Chronic appearing white matter changes, correlates with patient's history of demyelinating disease  No occlusion, severe stenosis or aneurysm of the major arteries of the head and neck  No significant stenosis within either internal carotid artery  Patent bilateral vertebral arteries  Other findings as above  I personally discussed this study with ALVARO MARIN on 9/27/2022 at 4:40 AM                   Workstation performed: [id number]         CT stroke alert brain   Final Result by Olga Coulter DO (09/27 0190)      No acute intracranial process is seen  Chronic appearing white matter changes, correlates with patient's history of demyelinating disease  No occlusion, severe stenosis or aneurysm of the major arteries of the head and neck  No significant stenosis within either internal carotid artery  Patent bilateral vertebral arteries  Other findings as above           I personally discussed this study with ALVARO MARIN on 9/27/2022 at 4:40 AM                   Workstation performed: [id number]             9/27/22 ecg - Suspect arm lead reversal, interpretation assumes no reversal  Normal sinus rhythm  Low voltage QRS  Nonspecific ST abnormality  Abnormal ECG  When compared with ECG of 13-FEB-2022 12:15,  No significant change was found    9/27/22 echo Left Ventricle: Left ventricular cavity size is normal  Wall thickness is normal  The left ventricular ejection fraction is 65%  Systolic function is normal  Wall motion is normal  Diastolic function is normal     Mitral Valve: There is trace regurgitation    Tricuspid Valve: There is mild regurgitation    Pulmonary Artery: The pulmonary artery systolic pressure is normal       Results from last 7 days   Lab Units 09/27/22  0341   SARS-COV-2  Negative     Results from last 7 days   Lab Units 09/30/22  0443 09/29/22  0502 09/28/22  0514 09/27/22  0755 09/27/22  0341   WBC Thousand/uL 5 99 6 82 5 82  --  9 24   HEMOGLOBIN g/dL 13 0 13 7 13 9  --  15 2   HEMATOCRIT % 38 6 42 2 42 9  --  45 7   PLATELETS Thousands/uL 186 200 183   < > 231   NEUTROS ABS Thousands/µL 5 36 4 10 2 95  --   --     < > = values in this interval not displayed       Results from last 7 days   Lab Units 09/30/22 0443 09/29/22  0502 09/28/22  0514 09/27/22  0341   SODIUM mmol/L 139 141 141 143   POTASSIUM mmol/L 4 0 4 1 4 0 4 1   CHLORIDE mmol/L 108 109* 107 105   CO2 mmol/L 23 23 24 26   ANION GAP mmol/L 8 9 10 12   BUN mg/dL 18 23 22 23   CREATININE mg/dL 0 85 0 97 0 94 1 22   EGFR ml/min/1 73sq m 102 91 94 69   CALCIUM mg/dL 9 0 8 6 9 1 9 5     Results from last 7 days   Lab Units 09/30/22 0443 09/29/22  0502 09/28/22  0514   AST U/L 6 26 7   ALT U/L 15 14 18   ALK PHOS U/L 48 51 51   TOTAL PROTEIN g/dL 6 9 6 7 7 0   ALBUMIN g/dL 3 4* 3 3* 3 5   TOTAL BILIRUBIN mg/dL 0 20 0 40 0 40     Results from last 7 days   Lab Units 09/27/22  0336   POC GLUCOSE mg/dl 95     Results from last 7 days   Lab Units 09/30/22  0443 09/29/22  0502 09/28/22  0514 09/27/22  0341   GLUCOSE RANDOM mg/dL 177* 79 85 92 Results from last 7 days   Lab Units 09/27/22  0341   HEMOGLOBIN A1C % 5 1   EAG mg/dl 100     Results from last 7 days   Lab Units 09/27/22  0341   HS TNI 0HR ng/L <2     Results from last 7 days   Lab Units 09/27/22  0341   PROTIME seconds 13 4   INR  0 96   PTT seconds 29     Results from last 7 days   Lab Units 09/27/22  1730   CLARITY UA  Clear   COLOR UA  Yellow   SPEC GRAV UA  1 025   PH UA  5 5   GLUCOSE UA mg/dl Negative   KETONES UA mg/dl Negative   BLOOD UA  Negative   PROTEIN UA mg/dl Trace*   NITRITE UA  Negative   BILIRUBIN UA  Negative   UROBILINOGEN UA (BE) mg/dl 2 0*   LEUKOCYTES UA  Negative   WBC UA /hpf 0-1*   RBC UA /hpf None Seen   BACTERIA UA /hpf Occasional   EPITHELIAL CELLS WET PREP /hpf Occasional   MUCUS THREADS  Occasional*       ED Treatment: no medication   Medication Administration from 09/27/2022 0333 to 09/27/2022 0607       Date/Time Order Dose Route Action Action by Comments        Past Medical History:   Diagnosis Date    MS (multiple sclerosis) ([place] [address] )      Present on Admission:   MS (multiple sclerosis) ([place] [address] )      Admitting Diagnosis: Multiple sclerosis ([place] [address] ) [G35]  Left leg weakness [R29 898]  Age/Sex: 50 y o  male  Admission Orders:  9/27/22 0503 inpatient   Scheduled Medications:  aspirin, 81 mg, Oral, Daily  atorvastatin, 40 mg, Oral, QPM  cyanocobalamin, 1,000 mcg, Oral, Daily  ergocalciferol, 50,000 Units, Oral, Weekly  gabapentin, 300 mg, Oral, TID  heparin (porcine), 5,000 Units, Subcutaneous, Q8H OFE  melatonin, 3 mg, Oral, HS  thiamine, 100 mg, Oral, Daily    ketorolac (TORADOL) injection 15 mg  Dose: 15 mg  Freq:  Once Route: IV  Start: 09/28/22 0845 End: 09/28/22 0929    Continuous IV Infusions: none      PRN Meds:  ibuprofen, 600 mg, Oral, Q6H PRN - used x 1 9/27/22 , x 1 9/28/22     Neuro checks Every 1 hour x 4 hours, then every 2 hours x 8 hours, then every 4 hours x 72 hours    IP CONSULT TO NEUROLOGY      Network Utilization Review Department  ATTENTION: Please call with any questions or concerns to [phone] and carefully listen to the prompts so that you are directed to the right person  All voicemails are confidential   [facility] all requests for admission clinical reviews, approved or denied determinations and any other requests to dedicated fax number below belonging to the campus where the patient is receiving treatment   List of dedicated fax numbers for the Facilities:  [address] DENIALS (Administrative/Medical Necessity) [phone]   [address] (Maternity/NICU/Pediatrics) [phone]   [address]  [address] [facility] [place] [address] [address] [place] [address] [address] [place] [address] [phone]

## 2022-09-28 NOTE — ASSESSMENT & PLAN NOTE
· Presented to the ER due to onset of left-sided lower extremity weakness  · History of of mass that causes right sided weakness and dysarthria at baseline  · CTA head/neck  · No acute intracranial process is seen  Chronic appearing white matter changes, correlates with patient's history of demyelinating disease  · No occlusion, severe stenosis or aneurysm of the major arteries of the head and neck  · No significant stenosis within either internal carotid artery  · Patent bilateral vertebral arteries    · Neuro on-call suspects MS flare but to treat as a stroke workup for now  · Order MRI spine/brain  · Echo unremarkable  · Neuro checks   · Telemetry  · Consult Neurology, PT/OT

## 2022-09-29 ENCOUNTER — APPOINTMENT (OUTPATIENT)
Dept: MRI IMAGING | Facility: HOSPITAL | Age: 47
DRG: 043 | End: 2022-09-29
Payer: COMMERCIAL

## 2022-09-29 LAB
ALBUMIN SERPL BCP-MCNC: 3.3 G/DL (ref 3.5–5)
ALP SERPL-CCNC: 51 U/L (ref 46–116)
ALT SERPL W P-5'-P-CCNC: 14 U/L (ref 12–78)
ANION GAP SERPL CALCULATED.3IONS-SCNC: 9 MMOL/L (ref 4–13)
AST SERPL W P-5'-P-CCNC: 26 U/L (ref 5–45)
BASOPHILS # BLD AUTO: 0.07 THOUSANDS/ΜL (ref 0–0.1)
BASOPHILS NFR BLD AUTO: 1 % (ref 0–1)
BILIRUB SERPL-MCNC: 0.4 MG/DL (ref 0.2–1)
BUN SERPL-MCNC: 23 MG/DL (ref 5–25)
CALCIUM ALBUM COR SERPL-MCNC: 9.2 MG/DL (ref 8.3–10.1)
CALCIUM SERPL-MCNC: 8.6 MG/DL (ref 8.3–10.1)
CHLORIDE SERPL-SCNC: 109 MMOL/L (ref 96–108)
CO2 SERPL-SCNC: 23 MMOL/L (ref 21–32)
CREAT SERPL-MCNC: 0.97 MG/DL (ref 0.6–1.3)
EOSINOPHIL # BLD AUTO: 0.52 THOUSAND/ΜL (ref 0–0.61)
EOSINOPHIL NFR BLD AUTO: 8 % (ref 0–6)
ERYTHROCYTE [DISTWIDTH] IN BLOOD BY AUTOMATED COUNT: 12.8 % (ref 11.6–15.1)
GFR SERPL CREATININE-BSD FRML MDRD: 91 ML/MIN/1.73SQ M
GLUCOSE SERPL-MCNC: 79 MG/DL (ref 65–140)
HCT VFR BLD AUTO: 42.2 % (ref 36.5–49.3)
HGB BLD-MCNC: 13.7 G/DL (ref 12–17)
IMM GRANULOCYTES # BLD AUTO: 0.05 THOUSAND/UL (ref 0–0.2)
IMM GRANULOCYTES NFR BLD AUTO: 1 % (ref 0–2)
LYMPHOCYTES # BLD AUTO: 1.55 THOUSANDS/ΜL (ref 0.6–4.47)
LYMPHOCYTES NFR BLD AUTO: 23 % (ref 14–44)
MCH RBC QN AUTO: 29.2 PG (ref 26.8–34.3)
MCHC RBC AUTO-ENTMCNC: 32.5 G/DL (ref 31.4–37.4)
MCV RBC AUTO: 90 FL (ref 82–98)
MONOCYTES # BLD AUTO: 0.53 THOUSAND/ΜL (ref 0.17–1.22)
MONOCYTES NFR BLD AUTO: 8 % (ref 4–12)
NEUTROPHILS # BLD AUTO: 4.1 THOUSANDS/ΜL (ref 1.85–7.62)
NEUTS SEG NFR BLD AUTO: 59 % (ref 43–75)
NRBC BLD AUTO-RTO: 0 /100 WBCS
PLATELET # BLD AUTO: 200 THOUSANDS/UL (ref 149–390)
PMV BLD AUTO: 10.3 FL (ref 8.9–12.7)
POTASSIUM SERPL-SCNC: 4.1 MMOL/L (ref 3.5–5.3)
PROT SERPL-MCNC: 6.7 G/DL (ref 6.4–8.4)
RBC # BLD AUTO: 4.69 MILLION/UL (ref 3.88–5.62)
SODIUM SERPL-SCNC: 141 MMOL/L (ref 135–147)
WBC # BLD AUTO: 6.82 THOUSAND/UL (ref 4.31–10.16)

## 2022-09-29 PROCEDURE — 72157 MRI CHEST SPINE W/O & W/DYE: CPT

## 2022-09-29 PROCEDURE — G1004 CDSM NDSC: HCPCS

## 2022-09-29 PROCEDURE — 99232 SBSQ HOSP IP/OBS MODERATE 35: CPT | Performed by: HOSPITALIST

## 2022-09-29 PROCEDURE — 72158 MRI LUMBAR SPINE W/O & W/DYE: CPT

## 2022-09-29 PROCEDURE — 80053 COMPREHEN METABOLIC PANEL: CPT | Performed by: HOSPITALIST

## 2022-09-29 PROCEDURE — A9585 GADOBUTROL INJECTION: HCPCS | Performed by: PHYSICIAN ASSISTANT

## 2022-09-29 PROCEDURE — 97116 GAIT TRAINING THERAPY: CPT

## 2022-09-29 PROCEDURE — 85025 COMPLETE CBC W/AUTO DIFF WBC: CPT | Performed by: HOSPITALIST

## 2022-09-29 RX ADMIN — Medication 3 MG: at 22:03

## 2022-09-29 RX ADMIN — ATORVASTATIN CALCIUM 40 MG: 40 TABLET, FILM COATED ORAL at 17:03

## 2022-09-29 RX ADMIN — GABAPENTIN 300 MG: 300 CAPSULE ORAL at 16:52

## 2022-09-29 RX ADMIN — CYANOCOBALAMIN TAB 500 MCG 1000 MCG: 500 TAB at 08:46

## 2022-09-29 RX ADMIN — ASPIRIN 81 MG CHEWABLE TABLET 81 MG: 81 TABLET CHEWABLE at 08:46

## 2022-09-29 RX ADMIN — GABAPENTIN 300 MG: 300 CAPSULE ORAL at 22:04

## 2022-09-29 RX ADMIN — HEPARIN SODIUM 5000 UNITS: 5000 INJECTION INTRAVENOUS; SUBCUTANEOUS at 16:52

## 2022-09-29 RX ADMIN — SODIUM CHLORIDE 1000 MG: 0.9 INJECTION, SOLUTION INTRAVENOUS at 11:54

## 2022-09-29 RX ADMIN — Medication 100 MG: at 08:46

## 2022-09-29 RX ADMIN — GADOBUTROL 10 ML: 604.72 INJECTION INTRAVENOUS at 16:24

## 2022-09-29 RX ADMIN — GABAPENTIN 300 MG: 300 CAPSULE ORAL at 08:46

## 2022-09-29 RX ADMIN — HEPARIN SODIUM 5000 UNITS: 5000 INJECTION INTRAVENOUS; SUBCUTANEOUS at 07:06

## 2022-09-29 RX ADMIN — HEPARIN SODIUM 5000 UNITS: 5000 INJECTION INTRAVENOUS; SUBCUTANEOUS at 22:04

## 2022-09-29 NOTE — PHYSICAL THERAPY NOTE
PHYSICAL THERAPY NOTE         09/29/22 0829   PT Last Visit   PT Visit Date 09/29/22   Note Type   Note Type Treatment   Pain Assessment   Pain Assessment Tool 0-10   Pain Score No Pain   Restrictions/Precautions   Weight Bearing Precautions Per Order No   Other Precautions Chair Alarm; Bed Alarm   General   Chart Reviewed Yes   Cognition   Overall Cognitive Status WFL   Arousal/Participation Alert   Attention Within functional limits   Orientation Level Oriented X4   Memory Within functional limits   Following Commands Follows one step commands without difficulty   Bed Mobility   Supine to Sit 6  Modified independent   Additional items   (pt sleeps in recliner chair at baseline)   Additional Comments Pt remains seated on toilet, pt states he will ring the call bell when he is finished, states he needs time to have a bowel movement   Transfers   Sit to Stand 6  Modified independent   Additional items Increased time required  (RW)   Stand to Sit 6  Modified independent   Additional items Increased time required  (RW)   Toilet transfer 6  Modified independent   Additional items Increased time required   Ambulation/Elevation   Gait pattern Forward Flexion;Decreased foot clearance; Excessively slow   Gait Assistance 5  Supervision   Additional items Assist x 1   Assistive Device Rolling walker   Distance 200ft with RW, no LOB, slow sigrid   Balance   Static Sitting Good   Dynamic Sitting Good   Static Standing Good   Dynamic Standing Fair   Ambulatory Fair   Activity Tolerance   Activity Tolerance   (limited by needing to have a bowel movement)   Nurse Made Aware Lucina   Assessment   Prognosis Good   Problem List Decreased strength;Decreased range of motion;Decreased endurance; Impaired balance;Obesity; Decreased mobility   Assessment Treatment consisted of bed mobility,balance training, ambulation training, safety awareness, fall prevention, endurance training to increase upright positions and functional mobility  Pt states he does not remember PT IE but does recognize this PT face-to-face  Pt with improved independence in bed mobility and transfers to mod I level; Pt was eating breakfast in bed;  PT re-educated pt for OOB for all meals and ambulate TID and to/from bathroom for all toileting needs  Pt verbalizes understanding  Pt able to ambulate in halls this session however limited by feeling like he needs to have a bowel movement; Pt remains seated on toilet, pt states he will use call bell when finished as he needs some time to have a bowel movement  The patient's AM-PAC Basic Mobility Inpatient Short Form Raw Score is 23  A Raw score of greater than 17 suggests the patient may benefit from discharge to home  Please also refer to the recommendation of the Physical Therapist for safe discharge planning  Barriers to Discharge   (pt lives with family, first floor set up, ramp to enter)   Goals   Patient Goals to have a bowel movement   STG Expiration Date 10/04/22   Plan   Treatment/Interventions ADL retraining;Functional transfer training;LE strengthening/ROM; Elevations; Therapeutic exercise; Endurance training;Patient/family training;Equipment eval/education; Bed mobility;Gait training;Spoke to nursing;OT   PT Frequency 2-3x/wk   Recommendation   PT Discharge Recommendation Home with home health rehabilitation  (use of RW for all mobility)   Equipment Recommended [place] walker   [address] [place] [address] Mobility Inpatient   Turning in Bed Without Bedrails 4   Lying on Back to Sitting on Edge of Flat Bed 4   Moving Bed to Chair 4   Standing Up From Chair 4   Walk in Room 4   Climb 3-5 Stairs 3   Basic Mobility Inpatient Raw Score 23   Basic Mobility Standardized Score 50 88   Highest Level Of Mobility   JH-HLM Goal 7: Walk 25 feet or more   JH-HLM Achieved 7: Walk 25 feet or more     Tammi Krishnan      Patient Name: Cristina Carmen Svitlana Guzman Date: 9/29/2022

## 2022-09-29 NOTE — ASSESSMENT & PLAN NOTE
· Diagnosed with MS in January 2022    Chronic right-sided weakness and dysarthria  · Cbe Brenda outpatient

## 2022-09-29 NOTE — PLAN OF CARE
Problem: PHYSICAL THERAPY ADULT  Goal: Performs mobility at highest level of function for planned discharge setting  See evaluation for individualized goals  Description: Treatment/Interventions: ADL retraining, Functional transfer training, Therapeutic exercise, LE strengthening/ROM, Endurance training, Patient/family training, Bed mobility, Equipment eval/education, Compensatory technique education, Gait training, Continued evaluation, Spoke to nursing, Spoke to case management, OT  Equipment Recommended: Tyra Garcia       See flowsheet documentation for full assessment, interventions and recommendations  Note: Prognosis: Good  Problem List: Decreased strength, Decreased range of motion, Decreased endurance, Impaired balance, Obesity, Decreased mobility  Assessment: Treatment consisted of bed mobility,balance training, ambulation training, safety awareness, fall prevention, endurance training to increase upright positions and functional mobility  Pt states he does not remember PT IE but does recognize this PT face-to-face  Pt with improved independence in bed mobility and transfers to mod I level; Pt was eating breakfast in bed;  PT re-educated pt for OOB for all meals and ambulate TID and to/from bathroom for all toileting needs  Pt verbalizes understanding  Pt able to ambulate in halls this session however limited by feeling like he needs to have a bowel movement; Pt remains seated on toilet, pt states he will use call bell when finished as he needs some time to have a bowel movement  The patient's AM-PAC Basic Mobility Inpatient Short Form Raw Score is 23  A Raw score of greater than 17 suggests the patient may benefit from discharge to home  Please also refer to the recommendation of the Physical Therapist for safe discharge planning    Barriers to Discharge:  (pt lives with family, first floor set up, ramp to enter)     PT Discharge Recommendation: Home with home health rehabilitation (use of RW for all mobility)    See flowsheet documentation for full assessment

## 2022-09-29 NOTE — PROGRESS NOTES
[place]  Progress Note - Marcos Garcia 1975, 50 y o  male MRN: [id number]  Unit/Bed#: -01 Encounter: [id number]  Primary Care Provider: Porter Cadet MD   Date and time admitted to hospital: 2022  3:40 AM    Obesity (BMI 30-39  9)  Assessment & Plan  Body mass index is 30 59 kg/m²  · Encouraged lifestyle changes    MS (multiple sclerosis) ([facility] )  Assessment & Plan  · Diagnosed with MS in 2022  Chronic right-sided weakness and dysarthria  · Jt Jarrell outpatient    * Left leg weakness  Assessment & Plan  · Presented to the ER due to onset of left-sided lower extremity weakness  · History of of mass that causes right sided weakness and dysarthria at baseline  · CTA head/neck  · No acute intracranial process is seen  Chronic appearing white matter changes, correlates with patient's history of demyelinating disease  · No occlusion, severe stenosis or aneurysm of the major arteries of the head and neck  · No significant stenosis within either internal carotid artery  · Patent bilateral vertebral arteries  · Order MRI spine/brain - thus far no clear active demylenation, await rest of MRIs   · Echo unremarkable  · Neuro checks   · Telemetry  · apprec neurology  fortunately his leg weakness is resolving to baseline  VTE  Prophylaxis:   Pharmacologic: in place    Patient Centered Rounds: I have performed bedside rounds with nursing staff today  Discussions with Specialists or Other Care Team Provider: case management    Education and Discussions with Family / Patient: pt      Current Length of Stay: 2 day(s)    Current Patient Status: Inpatient        Code Status: Level 1 - Full Code      Subjective:     Pt seen  States LLE back to baseline  No other complaints    Patient is seen and examined at bedside  All other ROS are negative      Objective:     Vitals:   Temp (24hrs), Av 7 °F (36 5 °C), Min:97 5 °F (36 4 °C), Max:98 °F (36 7 °C)    Temp:  [97 5 °F (36 4 °C)-98 °F (36 7 °C)] 97 5 °F (36 4 °C)  HR:  [73-82] 73  Resp:  [16-20] 20  BP: ()/(65-68) 102/68  SpO2:  [91 %-96 %] 96 %  Body mass index is 30 59 kg/m²  Input and Output Summary (last 24 hours): Intake/Output Summary (Last 24 hours) at 9/29/2022 1156  Last data filed at 9/28/2022 2222  Gross per 24 hour   Intake 350 ml   Output 350 ml   Net 0 ml       Physical Exam:       GEN: No acute distress, comfortable  HEEENT: No JVD, PERRLA, no scleral icterus  RESP: Lungs clear to auscultation bilaterally  CV: RRR, +s1/s2   ABD: SOFT NON TENDER, POSITIVE BOWEL SOUNDS, NO DISTENTION  PSYCH: CALM  NEURO: A X O X 3, rle chronic weak, lle back to baseline moving freely  SKIN: NO RASH  EXTREM: NO EDEMA    Additional Data:     Labs:    Results from last 7 days   Lab Units 09/29/22  0502   WBC Thousand/uL 6 82   HEMOGLOBIN g/dL 13 7   HEMATOCRIT % 42 2   PLATELETS Thousands/uL 200   NEUTROS PCT % 59   LYMPHS PCT % 23   MONOS PCT % 8   EOS PCT % 8*     Results from last 7 days   Lab Units 09/29/22  0502   SODIUM mmol/L 141   POTASSIUM mmol/L 4 1   CHLORIDE mmol/L 109*   CO2 mmol/L 23   BUN mg/dL 23   CREATININE mg/dL 0 97   ANION GAP mmol/L 9   CALCIUM mg/dL 8 6   ALBUMIN g/dL 3 3*   TOTAL BILIRUBIN mg/dL 0 40   ALK PHOS U/L 51   ALT U/L 14   AST U/L 26   GLUCOSE RANDOM mg/dL 79     Results from last 7 days   Lab Units 09/27/22  0341   INR  0 96     Results from last 7 days   Lab Units 09/27/22  0336   POC GLUCOSE mg/dl 95     Results from last 7 days   Lab Units 09/27/22  0341   HEMOGLOBIN A1C % 5 1               * I Have Reviewed All Lab Data Listed Above  Imaging:     Results for orders placed during the hospital encounter of 09/27/22    X-ray chest 1 view portable    Narrative  CHEST    INDICATION:   stroke alert  COMPARISON:  2/13/2022    EXAM PERFORMED/VIEWS:  XR CHEST PORTABLE      FINDINGS:  Monitoring wires partially obscure the lungs  Hypoventilation  Cardiomediastinal silhouette appears unremarkable  The lungs are clear  No pneumothorax or pleural effusion  Osseous structures appear within normal limits for patient age ;  Scoliosis is noted  Impression  No acute cardiopulmonary disease  Workstation performed: [id number]    No results found for this or any previous visit  *I have reviewed all imaging reports listed above      Recent Cultures (last 7 days):           Last 24 Hours Medication List:   Current Facility-Administered Medications   Medication Dose Route Frequency Provider Last Rate    aspirin  81 mg Oral Daily Bettye Maria E, PA-C      atorvastatin  40 mg Oral QPM Bettye Maria E, PA-C      cyanocobalamin  1,000 mcg Oral Daily Bettye Marai E, PA-C      ergocalciferol  50,000 Units Oral Weekly Bettye Maria E, PA-C      gabapentin  300 mg Oral TID Bettye Maria E, PA-C      heparin (porcine)  5,000 Units Subcutaneous [facility] Bettye Maria E, PA-C      ibuprofen  600 mg Oral Q6H PRN Bettye Maria E, PA-C      melatonin  3 mg Oral HS ADI Clayton-KENJI      methylPREDNISolone sodium succinate  1,000 mg Intravenous Once [facility]anesh [employer] Ganesh, CRNP      thiamine  100 mg Oral Daily Bettye Maria E, PA-C          Today, Patient Was Seen By: Juan M Reyna    ** Please Note: Dictation voice to text software may have been used in the creation of this document   **

## 2022-09-29 NOTE — PROGRESS NOTES
Pastoral Care Progress Note    2022  Patient: Tre Rogers : 1975  Admission Date & Time: 2022 0340  MRN: [id number] [facility]: [id number]       visited with patient, discussing his diagnosis of MS and how it has changed his life  Processing and looking at his "new normal"                   Chaplaincy Interventions Utilized:   Empowerment: Provided anxiety containment and Provided grief counseling encouragement      Exploration: Explored emotional needs & resources, Explored spiritual needs & resources, Facilitated life review, Facilitated story telling, and Identified, evaluated & reinforced appropriate coping strategies      Relationship Building: Cultivated a relationship of care and support, Listened empathically, and Provided silent and supportive presence    Ritual: Provided prayer    Chaplaincy Outcomes Achieved:  Expressed gratitude, Expressed humor, and Verbally processed emotions       22 1345   Clinical Encounter Type   Visited With Patient   Routine Visit Introduction   Referral From Nurse   Mandaeism Encounters   Mandaeism Needs Prayer

## 2022-09-29 NOTE — ASSESSMENT & PLAN NOTE
· Presented to the ER due to onset of left-sided lower extremity weakness  · History of of mass that causes right sided weakness and dysarthria at baseline  · CTA head/neck  · No acute intracranial process is seen  Chronic appearing white matter changes, correlates with patient's history of demyelinating disease  · No occlusion, severe stenosis or aneurysm of the major arteries of the head and neck  · No significant stenosis within either internal carotid artery  · Patent bilateral vertebral arteries  · Order MRI spine/brain - thus far no clear active demylenation, await rest of MRIs   · Echo unremarkable  · Neuro checks   · Telemetry  · apprec neurology  fortunately his leg weakness is resolving to baseline

## 2022-09-30 VITALS
BODY MASS INDEX: 30.71 KG/M2 | TEMPERATURE: 97.6 F | OXYGEN SATURATION: 94 % | SYSTOLIC BLOOD PRESSURE: 104 MMHG | WEIGHT: 219.36 LBS | HEIGHT: 71 IN | HEART RATE: 85 BPM | DIASTOLIC BLOOD PRESSURE: 78 MMHG | RESPIRATION RATE: 16 BRPM

## 2022-09-30 LAB
ALBUMIN SERPL BCP-MCNC: 3.4 G/DL (ref 3.5–5)
ALP SERPL-CCNC: 48 U/L (ref 46–116)
ALT SERPL W P-5'-P-CCNC: 15 U/L (ref 12–78)
ANION GAP SERPL CALCULATED.3IONS-SCNC: 8 MMOL/L (ref 4–13)
AST SERPL W P-5'-P-CCNC: 6 U/L (ref 5–45)
BASOPHILS # BLD AUTO: 0 THOUSANDS/ΜL (ref 0–0.1)
BASOPHILS NFR BLD AUTO: 0 % (ref 0–1)
BILIRUB SERPL-MCNC: 0.2 MG/DL (ref 0.2–1)
BUN SERPL-MCNC: 18 MG/DL (ref 5–25)
CALCIUM ALBUM COR SERPL-MCNC: 9.5 MG/DL (ref 8.3–10.1)
CALCIUM SERPL-MCNC: 9 MG/DL (ref 8.3–10.1)
CHLORIDE SERPL-SCNC: 108 MMOL/L (ref 96–108)
CO2 SERPL-SCNC: 23 MMOL/L (ref 21–32)
CREAT SERPL-MCNC: 0.85 MG/DL (ref 0.6–1.3)
EOSINOPHIL # BLD AUTO: 0 THOUSAND/ΜL (ref 0–0.61)
EOSINOPHIL NFR BLD AUTO: 0 % (ref 0–6)
ERYTHROCYTE [DISTWIDTH] IN BLOOD BY AUTOMATED COUNT: 12.5 % (ref 11.6–15.1)
GFR SERPL CREATININE-BSD FRML MDRD: 102 ML/MIN/1.73SQ M
GLUCOSE SERPL-MCNC: 177 MG/DL (ref 65–140)
HCT VFR BLD AUTO: 38.6 % (ref 36.5–49.3)
HGB BLD-MCNC: 13 G/DL (ref 12–17)
IMM GRANULOCYTES # BLD AUTO: 0.03 THOUSAND/UL (ref 0–0.2)
IMM GRANULOCYTES NFR BLD AUTO: 1 % (ref 0–2)
LYMPHOCYTES # BLD AUTO: 0.47 THOUSANDS/ΜL (ref 0.6–4.47)
LYMPHOCYTES NFR BLD AUTO: 8 % (ref 14–44)
MCH RBC QN AUTO: 30.2 PG (ref 26.8–34.3)
MCHC RBC AUTO-ENTMCNC: 33.7 G/DL (ref 31.4–37.4)
MCV RBC AUTO: 90 FL (ref 82–98)
MONOCYTES # BLD AUTO: 0.13 THOUSAND/ΜL (ref 0.17–1.22)
MONOCYTES NFR BLD AUTO: 2 % (ref 4–12)
NEUTROPHILS # BLD AUTO: 5.36 THOUSANDS/ΜL (ref 1.85–7.62)
NEUTS SEG NFR BLD AUTO: 89 % (ref 43–75)
NRBC BLD AUTO-RTO: 0 /100 WBCS
PLATELET # BLD AUTO: 186 THOUSANDS/UL (ref 149–390)
PMV BLD AUTO: 10.5 FL (ref 8.9–12.7)
POTASSIUM SERPL-SCNC: 4 MMOL/L (ref 3.5–5.3)
PROT SERPL-MCNC: 6.9 G/DL (ref 6.4–8.4)
RBC # BLD AUTO: 4.31 MILLION/UL (ref 3.88–5.62)
SODIUM SERPL-SCNC: 139 MMOL/L (ref 135–147)
WBC # BLD AUTO: 5.99 THOUSAND/UL (ref 4.31–10.16)

## 2022-09-30 PROCEDURE — 80053 COMPREHEN METABOLIC PANEL: CPT | Performed by: HOSPITALIST

## 2022-09-30 PROCEDURE — 99239 HOSP IP/OBS DSCHRG MGMT >30: CPT | Performed by: HOSPITALIST

## 2022-09-30 PROCEDURE — 99232 SBSQ HOSP IP/OBS MODERATE 35: CPT | Performed by: PSYCHIATRY & NEUROLOGY

## 2022-09-30 PROCEDURE — 85025 COMPLETE CBC W/AUTO DIFF WBC: CPT | Performed by: HOSPITALIST

## 2022-09-30 RX ADMIN — HEPARIN SODIUM 5000 UNITS: 5000 INJECTION INTRAVENOUS; SUBCUTANEOUS at 14:54

## 2022-09-30 RX ADMIN — IBUPROFEN 600 MG: 600 TABLET, FILM COATED ORAL at 04:36

## 2022-09-30 RX ADMIN — Medication 100 MG: at 09:09

## 2022-09-30 RX ADMIN — HEPARIN SODIUM 5000 UNITS: 5000 INJECTION INTRAVENOUS; SUBCUTANEOUS at 06:08

## 2022-09-30 RX ADMIN — ASPIRIN 81 MG CHEWABLE TABLET 81 MG: 81 TABLET CHEWABLE at 09:09

## 2022-09-30 RX ADMIN — GABAPENTIN 300 MG: 300 CAPSULE ORAL at 09:09

## 2022-09-30 RX ADMIN — CYANOCOBALAMIN TAB 500 MCG 1000 MCG: 500 TAB at 09:09

## 2022-09-30 NOTE — CASE MANAGEMENT
Case Management Progress Note    Patient name Rayne Maher  Location [address] [id number] MRN [id number]  : 1975 Date 2022       LOS (days): 3  Geometric Mean LOS (GMLOS) (days):   Days to GMLOS:        OBJECTIVE:        Current admission status: Inpatient  Preferred Pharmacy:   [facility]  [address]  [facility] [address]  Phone: [phone] Fax: [phone]    Primary Care Provider: Nori Hawkins MD    Primary Insurance: [place] [employer]  Secondary Insurance:     PROGRESS NOTE:  Faxed discharge instructions to [facility] at [phone]

## 2022-09-30 NOTE — DISCHARGE SUMMARY
[facility]  Discharge- Tre Rogers 1975, 50 y o  male MRN: [id number]  Unit/Bed#: -01 Encounter: [id number]  Primary Care Provider: Yanet Leung MD   Date and time admitted to hospital: 9/27/2022  3:40 AM    Obesity (BMI 30-39  9)  Assessment & Plan  Body mass index is 30 59 kg/m²  · Encouraged lifestyle changes    MS (multiple sclerosis) ([facility] )  Assessment & Plan  · Diagnosed with MS in January 2022  Chronic right-sided weakness and dysarthria  · Cindy Dec outpatient    * Left leg weakness  Assessment & Plan  · Presented to the ER due to onset of left-sided lower extremity weakness  · History of of mass that causes right sided weakness and dysarthria at baseline  · CTA head/neck  · No acute intracranial process is seen  Chronic appearing white matter changes, correlates with patient's history of demyelinating disease  · No occlusion, severe stenosis or aneurysm of the major arteries of the head and neck  · No significant stenosis within either internal carotid artery  · Patent bilateral vertebral arteries  ·   MRI spine/brain -  no clear active demyelination  Per neuro received 1 dose solumedrol  · Echo unremarkable  · Neuro checks   · Telemetry  · apprec neurology  fortunately his leg weakness is resolving to baseline  Hospital Course:     Tre Rogers is a 50 y o  male patient who originally presented to the hospital on   Admission Orders (From admission, onward)     Ordered        09/27/22 0503  INPATIENT ADMISSION  Once                     due to  left leg weakness  Fortunately this route resolved to his baseline  He was seen by Neurology and ruled out for stroke and MS flare  Both of these diagnoses were ruled out    Patient will follow-up with his outpatient neurologist following release from the hospital   He has no complaints and is at baseline at the time of release from hospital     Please see above list of diagnoses and related plan for additional information  Physical Exam:    GEN: No acute distress, comfortable  HEEENT: No JVD, PERRLA, no scleral icterus  RESP: Lungs clear to auscultation bilaterally  CV: RRR, +s1/s2   ABD: SOFT NON TENDER, POSITIVE BOWEL SOUNDS, NO DISTENTION  PSYCH: CALM  NEURO: A X O X 3, NO FOCAL DEFICITS  SKIN: NO RASH  EXTREM: NO EDEMA      Condition at Discharge:  good      Discharge instructions/Information to patient and family:   See after visit summary for information provided to patient and family  Provisions for Follow-Up Care:  See after visit summary for information related to follow-up care and any pertinent home health orders  Disposition:     Home       Discharge Statement:  I spent 37 minutes discharging the patient  This time was spent on the day of discharge  I had direct contact with the patient on the day of discharge  Greater than 50% of the total time was spent examining patient, answering all patient questions, arranging and discussing plan of care with patient as well as directly providing post-discharge instructions  Additional time then spent on discharge activities  Discharge Medications:  See after visit summary for reconciled discharge medications provided to patient and family        ** Please Note: This note has been constructed using a voice recognition system **

## 2022-09-30 NOTE — PLAN OF CARE
Problem: MOBILITY - ADULT  Goal: Maintain or return to baseline ADL function  Description: INTERVENTIONS:  -  Assess patient's ability to carry out ADLs; assess patient's baseline for ADL function and identify physical deficits which impact ability to perform ADLs (bathing, care of mouth/teeth, toileting, grooming, dressing, etc )  - Assess/evaluate cause of self-care deficits   - Assess range of motion  - Assess patient's mobility; develop plan if impaired  - Assess patient's need for assistive devices and provide as appropriate  - Encourage maximum independence but intervene and supervise when necessary  - Involve family in performance of ADLs  - Assess for home care needs following discharge   - Consider OT consult to assist with ADL evaluation and planning for discharge  - Provide patient education as appropriate  Outcome: Progressing  Goal: Maintains/Returns to pre admission functional level  Description: INTERVENTIONS:  - Perform BMAT or MOVE assessment daily    - Set and communicate daily mobility goal to care team and patient/family/caregiver  - Collaborate with rehabilitation services on mobility goals if consulted  - Perform Range of Motion 2 times a day  - Reposition patient every 2 hours  - Dangle patient 2 times a day  - Stand patient 2 times a day  - Ambulate patient 2 times a day  - Out of bed to chair 2 times a day   - Out of bed for meals 2 times a day  - Out of bed for toileting  - Record patient progress and toleration of activity level   Outcome: Progressing     Problem: Nutrition/Hydration-ADULT  Goal: Nutrient/Hydration intake appropriate for improving, restoring or maintaining nutritional needs  Description: Monitor and assess patient's nutrition/hydration status for malnutrition  Collaborate with interdisciplinary team and initiate plan and interventions as ordered  Monitor patient's weight and dietary intake as ordered or per policy   Utilize nutrition screening tool and intervene as necessary  Determine patient's food preferences and provide high-protein, high-caloric foods as appropriate       INTERVENTIONS:  - Monitor oral intake, urinary output, labs, and treatment plans  - Assess nutrition and hydration status and recommend course of action  - Evaluate amount of meals eaten  - Assist patient with eating if necessary   - Allow adequate time for meals  - Recommend/ encourage appropriate diets, oral nutritional supplements, and vitamin/mineral supplements  - Order, calculate, and assess calorie counts as needed  - Recommend, monitor, and adjust tube feedings and TPN/PPN based on assessed needs  - Assess need for intravenous fluids  - Provide specific nutrition/hydration education as appropriate  - Include patient/family/caregiver in decisions related to nutrition  Outcome: Progressing

## 2022-09-30 NOTE — PROGRESS NOTES
Progress Note - Neurology   Yuliana Michel 50 y o  male [id number]  Unit/Bed#: /-01    Assessment/Plan:  * Left leg weakness  Assessment & Plan  50 y o  male with MS on O[place]vus with chronic RLE weakness and dysarthria who presented to the ED with complaints of acute onset LLE weakness/inability to move his leg that began at 1am, one hour prior to arrival  NIHSS 7 including acute and chronic deficits  CTH/CTA head/neck unremarkable  Patient reporting episode being similar to prior to MS exacerbations  Patient normotensive  No TNK given  Patient demonstrates chronic weakness of RUE/RLE as well as chronic speech disturbance, however he has been having improvement in LLE weakness, and reported complete resolution of weakness on 09/28/2022  I reached out to Dr Kolby Blake, and she recommended x 1 dose of 1g Solu-medrol  Higher suspicion for weakness related to MS rather than stroke  No further inpatient neurology recommendations at this time  Plan:  - MRI brain w/wo showed newer lesions extending inferiorly from previously seen lesion in the posterior limb of the left internal capsule along the cortical spinal tract that 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  No other new signal abnormality  No abnormal enhancement to indicate acute demyelination   - MRI C spine w/wo revealed demyelinating lesions are mildly decreased in conspicuity, no new or enhancing demyelinating lesions   - MRI T w/wo showed no definite cord signal abnormality within limitation of distorted image quality by motion artifact in axial sequences and no pathologic enhancement   - MRI L spine w/wo showed no distal cord signal abnormality   - Reached out to Dr Kolby Blake who recommended 1g Solu-medrol x1, given 09/29/2022   He has a follow up appointment scheduled with outpatient neuromuscular neurology in Jan 2023     - Discontinue Aspirin 81 mg and Atorvastatin 40 mg   - Echo: EF 65% with no atrial dilation  - LDL 76, A1C 5 1  - Telemetry  - PT/OT/ST  - Stat CT head with acute worsening in neuro exam/mental status  Notify neurology with any changes in examination    - Medical management and supportive care per primary team  Correction of any metabolic or infectious disturbances  Plan discussed with Attending Neurologist, please see attestation for further input/recommendations  MS (multiple sclerosis) ([facility] )  Assessment & Plan  - Diagnosed with MS at [facility] in January 2022 after presenting with right-sided weakness and dysarthria according to chart review, however patient recalls just not feeling right overall  MRI brain at that time revealed multiple enhancing and nonenhancing lesions in the cerebral white matter concerning for a mass  MRI thoracic spine at that time was normal   - Repeat admission in February 2022 with MS exacerbation  MRI brain at that time revealed supratentorial enhancing foci of acute demyelinization as well as early subacute C5 lesion along with other chronic lesions in the cervical cord  - Ambulates with a walker since February  - Patient has started Faith Mule with split dose on 04/29/2022 and 05/13/2022  He is due for 1st full infusion at the end of October  Patient has a follow up appointment with Alicia Guadarrama on 01/23/2023 which he should keep  Subjective:   Patient reports full resolution of presenting LLE weakness  He was given 1 g solumedrol x 1 yesterday per Dr Silvio Bejarano recommendations  Neuro imaging completed, he was updated with the results       Past Medical History:   Diagnosis Date    MS (multiple sclerosis) ([facility] )      Past Surgical History:   Procedure Laterality Date    HERNIA REPAIR      3 times    KNEE SURGERY Right      Family History   Problem Relation Age of Onset    Stroke Maternal Grandmother     Multiple sclerosis Other      Social History     Socioeconomic History    Marital status: Unknown     Spouse name: None    Number of children: None    Years of education: None    Highest education level: None   Occupational History    None   Tobacco Use    Smoking status: Former Smoker    Smokeless tobacco: Former User   Vaping Use    Vaping Use: Former   Substance and Sexual Activity    Alcohol use: Not Currently    Drug use: Never    Sexual activity: None   Other Topics Concern    None   Social History Narrative    None     Social Determinants of Health     Financial Resource Strain: Not on file   Food Insecurity: No Food Insecurity    Worried About Running Out of Food in the Last Year: Never true    Antonia of Food in the Last Year: Never true   Transportation Needs: No Transportation Needs    Lack of Transportation (Medical): No    Lack of Transportation (Non-Medical): No   Physical Activity: Not on file   Stress: Not on file   Social Connections: Not on file   Intimate Partner Violence: Not on file   Housing Stability: Low Risk     Unable to Pay for Housing in the Last Year: No    Number of Places Lived in the Last Year: 1    Unstable Housing in the Last Year: No       Medications: Reviewed in detail by me  ROS: Review of Systems   Constitutional: Negative for fatigue and fever  Eyes: Negative for photophobia and visual disturbance  Musculoskeletal: Positive for gait problem (ongoing since feburary secondary to right sided weakness)  Negative for back pain and neck pain  Skin: Negative for color change and pallor  Neurological: Positive for weakness (right sided weakness, ongoing) and headaches  Negative for dizziness, tremors, seizures, syncope, facial asymmetry, speech difficulty, light-headedness and numbness  Psychiatric/Behavioral: Negative for confusion  The patient is not nervous/anxious  All other systems reviewed and are negative         Vitals: BP 99/69 (BP Location: Left arm)   Pulse 59   Temp 97 5 °F (36 4 °C) (Oral)   Resp 16   Ht 5' 11" (1 803 m)   Wt 99 5 kg (219 lb 5 7 oz)   SpO2 94%   BMI 30 59 kg/m²     Physical Exam: Physical Exam  Vitals reviewed  Constitutional:       General: He is not in acute distress  Appearance: Normal appearance  He is normal weight  He is not ill-appearing  HENT:      Head: Normocephalic and atraumatic  Right Ear: External ear normal       Left Ear: External ear normal       Nose: Nose normal       Mouth/Throat:      Mouth: Mucous membranes are moist    Eyes:      General:         Right eye: No discharge  Left eye: No discharge  Extraocular Movements: Extraocular movements intact and EOM normal       Conjunctiva/sclera: Conjunctivae normal       Pupils: Pupils are equal, round, and reactive to light  Cardiovascular:      Rate and Rhythm: Normal rate  Pulmonary:      Effort: Pulmonary effort is normal  No respiratory distress  Musculoskeletal:         General: Normal range of motion  Cervical back: Normal range of motion  No rigidity  Right lower leg: No edema  Left lower leg: No edema  Skin:     General: Skin is warm and dry  Coloration: Skin is not jaundiced or pale  Neurological:      Mental Status: He is alert and oriented to person, place, and time  Coordination: Finger-Nose-Finger Test normal       Deep Tendon Reflexes:      Reflex Scores:       Tricep reflexes are 3+ on the right side and 2+ on the left side  Bicep reflexes are 3+ on the right side and 2+ on the left side  Brachioradialis reflexes are 3+ on the right side and 2+ on the left side  Patellar reflexes are 3+ on the right side and 3+ on the left side  Achilles reflexes are 3+ on the right side and 2+ on the left side  Comments: See below    Psychiatric:         Mood and Affect: Mood normal          Speech: Speech normal          Behavior: Behavior normal        Neurologic Exam     Mental Status   Oriented to person, place, and time  Follows 2 step commands     Attention: normal  Concentration: normal    Speech: speech is normal   Level of consciousness: alert  Knowledge: good  Able to name object  Normal comprehension  Cranial Nerves     CN II   Visual fields full to confrontation  CN III, IV, VI   Pupils are equal, round, and reactive to light  Extraocular motions are normal    Nystagmus: none   Diplopia: none  Conjugate gaze: present    CN V   Facial sensation intact  CN VII   Facial expression full, symmetric  CN VIII   CN VIII normal      CN XII   CN XII normal      Motor Exam   Muscle bulk: normal  Overall muscle tone: normal  Right arm pronator drift: absent  Left arm pronator drift: absent    Strength   Strength 5/5 except as noted  Right upper extremity: 5/5  strength, 4-/5 biceps, 4/5 triceps     Right lower extremity: 5/5 plantar flexion, 4+/5 dorsiflexion, able to lift antigravity but unable to provide resistance     Left upper and lower extremity 5/5 throughout      Sensory Exam   Light touch normal      Gait, Coordination, and Reflexes     Gait  Gait: (deferred for safety)    Coordination   Finger to nose coordination: normal    Tremor   Resting tremor: absent  Intention tremor: absent  Action tremor: absent    Reflexes   Right brachioradialis: 3+  Left brachioradialis: 2+  Right biceps: 3+  Left biceps: 2+  Right triceps: 3+  Left triceps: 2+  Right patellar: 3+  Left patellar: 3+  Right achilles: 3+  Left achilles: 2+  Negative caro        Labs: Reviewed in detail by me  Imaging: I have personally reviewed pertinent imaging and PACS reports  VTE Prophylaxis: Heparin    Total time spent today 22 minutes  Greater than 50% of total time was spent with the patient and/or family counseling and/or coordination of care  A description of the counseling/coordination of care:  Patient was seen and evaluated  Discussed with attending  Chart reviewed thoroughly including laboratory and imaging studies  Discussed medication regimen, imaging reviewed   Plan of care discussed with patient and primary team

## 2022-09-30 NOTE — ASSESSMENT & PLAN NOTE
· Presented to the ER due to onset of left-sided lower extremity weakness  · History of of mass that causes right sided weakness and dysarthria at baseline  · CTA head/neck  · No acute intracranial process is seen  Chronic appearing white matter changes, correlates with patient's history of demyelinating disease  · No occlusion, severe stenosis or aneurysm of the major arteries of the head and neck  · No significant stenosis within either internal carotid artery  · Patent bilateral vertebral arteries  ·   MRI spine/brain -  no clear active demyelination  Per neuro received 1 dose solumedrol  · Echo unremarkable  · Neuro checks   · Telemetry  · apprec neurology  fortunately his leg weakness is resolving to baseline

## 2022-10-03 NOTE — UTILIZATION REVIEW
Notification of Discharge   This is a Notification of Discharge from our facility [address]  Please be advised that this patient has been discharge from our facility  Below you will find the admission and discharge date and time including the patients disposition  UTILIZATION REVIEW CONTACT:  Mat Wong  Utilization   Network Utilization Review Department  Phone: [phone] carefully listen to the prompts  All voicemails are confidential   Email: [email or web address]     PHYSICIAN ADVISORY SERVICES:  FOR AHVU-SK-MKSY REVIEW - MEDICAL NECESSITY DENIAL  Phone: [phone]  Fax: [phone]  Email: [email or web address]     PRESENTATION DATE: 9/27/2022  3:40 AM  OBERVATION ADMISSION DATE:   INPATIENT ADMISSION DATE: 9/27/22  5:03 AM   DISCHARGE DATE: 9/30/2022  4:34 PM  DISPOSITION: Home with [place] with [address] INFORMATION:  Send all requests for admission clinical reviews, approved or denied determinations and any other requests to dedicated fax number below belonging to the campus where the patient is receiving treatment   List of dedicated fax numbers:  [address] DENIALS (Administrative/Medical Necessity) [phone]   [address] (Maternity/NICU/Pediatrics) [phone]   Aviva Domínguez [phone]   130 [facility] [phone]   [address] [place] [address] [phone]   [address] [phone]   [facility]  [phone]   [address] [place] [address], [facility]  [address] [phone]

## 2022-10-28 ENCOUNTER — TELEPHONE (OUTPATIENT)
Dept: NEUROLOGY | Facility: CLINIC | Age: 47
End: 2022-10-28

## 2022-10-28 DIAGNOSIS — G35 MS (MULTIPLE SCLEROSIS) (HCC): Primary | ICD-10-CM

## 2022-10-28 NOTE — TELEPHONE ENCOUNTER
Pt called back again stating that he is having trouble scheduling his second infusion  Ptis asking for a call back at #[phone]  Thank you

## 2022-10-28 NOTE — TELEPHONE ENCOUNTER
pt left vm asking for a call back  [phone]  when i first tried to look pt up by phone number, it did not pull any one up  i left pt a message asking for a call back with name,  and more detailed message as to his concerns  I then searched phone number again and located pt

## 2022-10-31 DIAGNOSIS — R29.810 LOWER FACIAL WEAKNESS: ICD-10-CM

## 2022-10-31 DIAGNOSIS — G37.9 CNS DEMYELINATING DISEASE (HCC): ICD-10-CM

## 2022-10-31 NOTE — TELEPHONE ENCOUNTER
Patient left third voicemail regarding the same: difficulty scheduling 2nd infusion  Requesting call back      [phone]

## 2022-10-31 NOTE — TELEPHONE ENCOUNTER
Per chart review, pt is due now for Ocrevus infusion  It appears plan was placed on hold due to previous admission  Released hold with note that pt will be cleared for infusion per protocol  Spoke with pt  He reports he thought he scheduled next infusion at last infusion appt, no appt on file though  Unsure what happened  Pt would like nursing to call hospitals infusion center to schedule next available infusion appt  He will need a call back as soon as this is scheduled so that he may contact his sister regarding transportation  Advised pt I will call infusion center tomorrow morning and give him a call back once scheduled  Pt is agreeable

## 2022-10-31 NOTE — TELEPHONE ENCOUNTER
Pt requesting refill of vitamin B12 and vitamin D2 to [address]  Please review and sign if appropriate

## 2022-11-01 DIAGNOSIS — G35 MS (MULTIPLE SCLEROSIS) (HCC): Primary | ICD-10-CM

## 2022-11-01 RX ORDER — ERGOCALCIFEROL 1.25 MG/1
50000 CAPSULE ORAL WEEKLY
Qty: 12 CAPSULE | Refills: 0 | Status: SHIPPED | OUTPATIENT
Start: 2022-11-01

## 2022-11-01 RX ORDER — OCRELIZUMAB 300 MG/10ML
600 INJECTION INTRAVENOUS
Qty: 20 ML | Refills: 1 | Status: SHIPPED | OUTPATIENT
Start: 2022-11-01

## 2022-11-01 RX ORDER — SODIUM CHLORIDE 9 MG/ML
20 INJECTION, SOLUTION INTRAVENOUS ONCE
OUTPATIENT
Start: 2022-11-17

## 2022-11-01 RX ORDER — ACETAMINOPHEN 325 MG/1
650 TABLET ORAL ONCE
OUTPATIENT
Start: 2022-11-17

## 2022-11-01 NOTE — TELEPHONE ENCOUNTER
Called [facility] infusion center  Pt is scheduled for Ocrevus on 11/17/22 @ 8:30am      Pt made aware, he is agreeable  Karime Garrett is approved with [employer] from 4/23/22 to 3/25/23 for maintenance dose  Medication must be obtained from [facility]  New Rx needed  Please review and sign if agreeable

## 2022-11-03 NOTE — TELEPHONE ENCOUNTER
Called [facility] and spoke with Osito Albright  He reports claim is being rejected by insurance  They sent a request to [facility] for assistance in resolving this  Once resolved they will contact pt for consent  Did advise pt is scheduled for next infusion on 11/17/22

## 2022-11-04 ENCOUNTER — TELEPHONE (OUTPATIENT)
Dept: NEUROLOGY | Facility: CLINIC | Age: 47
End: 2022-11-04

## 2022-11-04 DIAGNOSIS — Z92.29 PERSONAL HISTORY OF HIGH RISK MEDICATION TREATMENT: ICD-10-CM

## 2022-11-04 DIAGNOSIS — Z92.25 HISTORY OF IMMUNOSUPPRESSION THERAPY: ICD-10-CM

## 2022-11-04 DIAGNOSIS — D84.821 IMMUNODEFICIENCY DUE TO TREATMENT WITH IMMUNOSUPPRESSIVE MEDICATION (HCC): ICD-10-CM

## 2022-11-04 DIAGNOSIS — Z79.899 IMMUNODEFICIENCY DUE TO TREATMENT WITH IMMUNOSUPPRESSIVE MEDICATION (HCC): ICD-10-CM

## 2022-11-04 DIAGNOSIS — G35 MS (MULTIPLE SCLEROSIS) (HCC): Primary | ICD-10-CM

## 2022-11-04 NOTE — TELEPHONE ENCOUNTER
Pt is scheduled for Ocrevus on 11/17/22  Deloresmel Pugh is approved with [employer] from 4/23/22 to 3/25/23 for maintenance dose  Medication must be obtained from [facility]  Working on scheduling medication delivery in other encounter  CBC, CMP, and T&B lymphocytes orders available in Epic  Mychart message sent to pt reminding him of needed labs

## 2022-11-07 NOTE — TELEPHONE ENCOUNTER
Called [facility] and spoke with Tom Fortune  She reports insurance is still rejecting claim for Ocrevus even though PA is in place  She reached billing team- override request was already submitted twice  They have started a stat request to get this resolved  She reports if our office does not hear from them tomorrow we should f/u back up on Wednesday

## 2022-11-09 NOTE — TELEPHONE ENCOUNTER
Called [facility] and spoke with Darlin Davila  Confirmed previous and future infusion date  Confirmed infusion center address  Scheduled delivery of medication for 11/15/22

## 2022-11-15 ENCOUNTER — APPOINTMENT (OUTPATIENT)
Dept: LAB | Facility: HOSPITAL | Age: 47
End: 2022-11-15
Attending: PSYCHIATRY & NEUROLOGY

## 2022-11-15 DIAGNOSIS — Z00.00 ROUTINE GENERAL MEDICAL EXAMINATION AT A HEALTH CARE FACILITY: ICD-10-CM

## 2022-11-15 LAB
ALBUMIN SERPL BCP-MCNC: 3.8 G/DL (ref 3.5–5)
ALP SERPL-CCNC: 67 U/L (ref 46–116)
ALT SERPL W P-5'-P-CCNC: 17 U/L (ref 12–78)
ANION GAP SERPL CALCULATED.3IONS-SCNC: 11 MMOL/L (ref 4–13)
AST SERPL W P-5'-P-CCNC: 9 U/L (ref 5–45)
BASOPHILS # BLD AUTO: 0.1 THOUSANDS/ÂΜL (ref 0–0.1)
BASOPHILS NFR BLD AUTO: 1 % (ref 0–1)
BILIRUB SERPL-MCNC: 0.26 MG/DL (ref 0.2–1)
BUN SERPL-MCNC: 30 MG/DL (ref 5–25)
CALCIUM SERPL-MCNC: 9.8 MG/DL (ref 8.3–10.1)
CHLORIDE SERPL-SCNC: 112 MMOL/L (ref 96–108)
CO2 SERPL-SCNC: 19 MMOL/L (ref 21–32)
CREAT SERPL-MCNC: 0.85 MG/DL (ref 0.6–1.3)
EOSINOPHIL # BLD AUTO: 0.08 THOUSAND/ÂΜL (ref 0–0.61)
EOSINOPHIL NFR BLD AUTO: 1 % (ref 0–6)
ERYTHROCYTE [DISTWIDTH] IN BLOOD BY AUTOMATED COUNT: 12.5 % (ref 11.6–15.1)
GFR SERPL CREATININE-BSD FRML MDRD: 102 ML/MIN/1.73SQ M
GLUCOSE SERPL-MCNC: 92 MG/DL (ref 65–140)
HCT VFR BLD AUTO: 49.6 % (ref 36.5–49.3)
HGB BLD-MCNC: 16.1 G/DL (ref 12–17)
IMM GRANULOCYTES # BLD AUTO: 0.06 THOUSAND/UL (ref 0–0.2)
IMM GRANULOCYTES NFR BLD AUTO: 1 % (ref 0–2)
LYMPHOCYTES # BLD AUTO: 1 THOUSANDS/ÂΜL (ref 0.6–4.47)
LYMPHOCYTES NFR BLD AUTO: 9 % (ref 14–44)
MCH RBC QN AUTO: 29.8 PG (ref 26.8–34.3)
MCHC RBC AUTO-ENTMCNC: 32.5 G/DL (ref 31.4–37.4)
MCV RBC AUTO: 92 FL (ref 82–98)
MONOCYTES # BLD AUTO: 0.6 THOUSAND/ÂΜL (ref 0.17–1.22)
MONOCYTES NFR BLD AUTO: 5 % (ref 4–12)
NEUTROPHILS # BLD AUTO: 9.18 THOUSANDS/ÂΜL (ref 1.85–7.62)
NEUTS SEG NFR BLD AUTO: 83 % (ref 43–75)
NRBC BLD AUTO-RTO: 0 /100 WBCS
PLATELET # BLD AUTO: 254 THOUSANDS/UL (ref 149–390)
PMV BLD AUTO: 10.5 FL (ref 8.9–12.7)
POTASSIUM SERPL-SCNC: 4 MMOL/L (ref 3.5–5.3)
PROT SERPL-MCNC: 8.1 G/DL (ref 6.4–8.4)
RBC # BLD AUTO: 5.41 MILLION/UL (ref 3.88–5.62)
SODIUM SERPL-SCNC: 142 MMOL/L (ref 135–147)
WBC # BLD AUTO: 11.02 THOUSAND/UL (ref 4.31–10.16)

## 2022-11-16 LAB
BASOPHILS # BLD AUTO: 0.1 X10E3/UL (ref 0–0.2)
BASOPHILS NFR BLD AUTO: 1 %
CD19 CELLS # BLD: 6 /UL (ref 12–645)
CD19 CELLS NFR BLD: 0.6 % (ref 3.3–25.4)
CD3 CELLS # BLD: 880 /UL (ref 622–2402)
CD3 CELLS NFR BLD: 88 % (ref 57.5–86.2)
CD3+CD4+ CELLS # BLD: 584 /UL (ref 359–1519)
CD3+CD4+ CELLS NFR BLD: 58.4 % (ref 30.8–58.5)
CD3+CD4+ CELLS/CD3+CD8+ CLL BLD: 1.88 % (ref 0.92–3.72)
CD3+CD8+ CELLS # BLD: 311 /UL (ref 109–897)
CD3+CD8+ CELLS NFR BLD: 31.1 % (ref 12–35.5)
EOSINOPHIL # BLD AUTO: 0.1 X10E3/UL (ref 0–0.4)
EOSINOPHIL NFR BLD AUTO: 1 %
ERYTHROCYTE [DISTWIDTH] IN BLOOD BY AUTOMATED COUNT: 12.9 % (ref 11.6–15.4)
HCT VFR BLD AUTO: 49.3 % (ref 37.5–51)
HGB BLD-MCNC: 16.2 G/DL (ref 13–17.7)
IMM GRANULOCYTES # BLD: 0.1 X10E3/UL (ref 0–0.1)
IMM GRANULOCYTES NFR BLD: 1 %
LYMPHOCYTES # BLD AUTO: 1 X10E3/UL (ref 0.7–3.1)
LYMPHOCYTES NFR BLD AUTO: 10 %
MCH RBC QN AUTO: 29.2 PG (ref 26.6–33)
MCHC RBC AUTO-ENTMCNC: 32.9 G/DL (ref 31.5–35.7)
MCV RBC AUTO: 89 FL (ref 79–97)
MONOCYTES # BLD AUTO: 0.6 X10E3/UL (ref 0.1–0.9)
MONOCYTES NFR BLD AUTO: 5 %
NEUTROPHILS # BLD AUTO: 8.5 X10E3/UL (ref 1.4–7)
NEUTROPHILS NFR BLD AUTO: 82 %
PLATELET # BLD AUTO: 234 X10E3/UL (ref 150–450)
RBC # BLD AUTO: 5.54 X10E6/UL (ref 4.14–5.8)
WBC # BLD AUTO: 10.3 X10E3/UL (ref 3.4–10.8)

## 2022-11-16 NOTE — TELEPHONE ENCOUNTER
CBC and CMP results in chart  T&B lymphocytes pending  Since last OV, pt had hospital admission for left leg weakness on 9/27/22  See notes  No other changes  Okay to proceed with Ocrevus infusion tomorrow?

## 2022-11-17 ENCOUNTER — HOSPITAL ENCOUNTER (OUTPATIENT)
Dept: INFUSION CENTER | Facility: HOSPITAL | Age: 47
Discharge: HOME | End: 2022-11-17
Attending: PSYCHIATRY & NEUROLOGY

## 2022-11-17 VITALS
HEART RATE: 61 BPM | DIASTOLIC BLOOD PRESSURE: 80 MMHG | SYSTOLIC BLOOD PRESSURE: 115 MMHG | TEMPERATURE: 97.8 F | RESPIRATION RATE: 16 BRPM

## 2022-11-17 DIAGNOSIS — G35 MS (MULTIPLE SCLEROSIS) (HCC): Primary | ICD-10-CM

## 2022-11-17 RX ORDER — ACETAMINOPHEN 325 MG/1
650 TABLET ORAL ONCE
Status: COMPLETED | OUTPATIENT
Start: 2022-11-17 | End: 2022-11-17

## 2022-11-17 RX ORDER — SODIUM CHLORIDE 9 MG/ML
20 INJECTION, SOLUTION INTRAVENOUS ONCE
OUTPATIENT
Start: 2023-05-10

## 2022-11-17 RX ORDER — ACETAMINOPHEN 325 MG/1
650 TABLET ORAL ONCE
OUTPATIENT
Start: 2023-05-10

## 2022-11-17 RX ORDER — SODIUM CHLORIDE 9 MG/ML
20 INJECTION, SOLUTION INTRAVENOUS ONCE
Status: COMPLETED | OUTPATIENT
Start: 2022-11-17 | End: 2022-11-17

## 2022-11-17 RX ADMIN — ACETAMINOPHEN 650 MG: 325 TABLET ORAL at 08:56

## 2022-11-17 RX ADMIN — SODIUM CHLORIDE 100 MG: 0.9 INJECTION, SOLUTION INTRAVENOUS at 09:18

## 2022-11-17 RX ADMIN — FAMOTIDINE 20 MG: 10 INJECTION INTRAVENOUS at 08:31

## 2022-11-17 RX ADMIN — DIPHENHYDRAMINE HYDROCHLORIDE 50 MG: 50 INJECTION, SOLUTION INTRAMUSCULAR; INTRAVENOUS at 08:56

## 2022-11-17 RX ADMIN — SODIUM CHLORIDE 20 ML/HR: 0.9 INJECTION, SOLUTION INTRAVENOUS at 08:31

## 2022-11-17 RX ADMIN — OCRELIZUMAB 600 MG: 300 INJECTION INTRAVENOUS at 10:21

## 2022-11-17 NOTE — PROGRESS NOTES
Ocrevus and 1 hour observation complete without incident  Pt will made next appt after office visit closer to when next infusion is due   Declined avs

## 2022-12-15 ENCOUNTER — PATIENT MESSAGE (OUTPATIENT)
Dept: NEUROLOGY | Facility: CLINIC | Age: 47
End: 2022-12-15

## 2022-12-19 ENCOUNTER — TELEPHONE (OUTPATIENT)
Dept: NEUROLOGY | Facility: CLINIC | Age: 47
End: 2022-12-19

## 2022-12-19 DIAGNOSIS — G35 MS (MULTIPLE SCLEROSIS) (HCC): Primary | ICD-10-CM

## 2022-12-19 NOTE — TELEPHONE ENCOUNTER
Called pt  Received vm  Left msg requesting return call  Need to triage  MyChart msg sent to pt as well

## 2022-12-19 NOTE — TELEPHONE ENCOUNTER
December 16, 2022       ACE    7:21 AM  Denita Hutson routed this conversation to Neurology [facility] Clinical Team 3   December 15, 2022  Etta Erickson  to [facility] Neurology [facility] Clinical (supporting Elvis Green, [place])    [place]    4:26 PM  I am having bad headaches almost every day  I am just getting movement back in my arm  My headaches are really bad      I don't want to go to the hospital again but it hurts like the last attack I had

## 2022-12-22 NOTE — TELEPHONE ENCOUNTER
I do not see anything in his history about headaches, even on his initial MS presentation  He just established care with us in Feb, I have only seen him once  Nothing about headaches  Where is the HA located? Any associated symptoms with the HA such as N/V, blurry vision, etc? What has he been taking for his headaches? How long have they been going on for?

## 2022-12-22 NOTE — TELEPHONE ENCOUNTER
December 21, 2022           7:16 AM  Dane Pennington routed this conversation to Me   December 20, 2022  Hasmukh Blancas  to Me    DJ    7:43 PM  Are you having a new numbness/tingling? If so, where? No numbness   Are you having any new weakness? If so, where? Sometimes   Any new or worsening fatigue? Any bowel or bladder changes? If so, please explain  No  Any UTI symptoms (burning, itching, painful urination, urgency, etc ) No  Any vision changes? No  Any recent illness or infection (cough, cold, fever, chills, etc ) No  Any increase in stress (new/lost job, divorce, family death, etc )? No  Any history of diabetes? Pre-diabetes   Any psychiatric history? No  Any drug use (including prescribed medical marijuana, cannabis, CBD, illicit street drugs)?  No I don't smoke anything anymore   If so, when was last dose and how often used?     Just have the Headaches

## 2022-12-23 RX ORDER — DEXAMETHASONE 2 MG/1
TABLET ORAL
Qty: 5 TABLET | Refills: 0 | Status: SHIPPED | OUTPATIENT
Start: 2022-12-23

## 2022-12-23 NOTE — TELEPHONE ENCOUNTER
The headaches are likely unrelated to his MS  The last time he was in the hospital was in Sept, his MRIs were all essentially stable with no new/active lesions at that time  He is on Ocrevus  Hard to get the proper info from him since it seems we really cannot speak with him and we are communicating via [employer] messages, which is not ideal   He says he is taking Excedrin and I am assuming he is overusing it  He is likely causing rebound headaches  Patient should be educated that he should not use OTC analgesics to abort the headaches more than 2-3 times a week, or it may cause rebound headaches  I am going to send in dexamethasone to break the current headache, but he needs to stop ALL OTC meds for HA during this time  Will take dexamethasone 2mg once day x 5 days  He should make sure he is staying well hydrated, eating regularly, getting adequate sleep

## 2023-01-05 ENCOUNTER — PATIENT MESSAGE (OUTPATIENT)
Dept: NEUROLOGY | Facility: CLINIC | Age: 48
End: 2023-01-05

## 2023-01-09 ENCOUNTER — TELEPHONE (OUTPATIENT)
Dept: NEUROLOGY | Facility: CLINIC | Age: 48
End: 2023-01-09

## 2023-01-23 ENCOUNTER — OFFICE VISIT (OUTPATIENT)
Dept: NEUROLOGY | Facility: CLINIC | Age: 48
End: 2023-01-23

## 2023-01-23 VITALS
DIASTOLIC BLOOD PRESSURE: 78 MMHG | BODY MASS INDEX: 30.59 KG/M2 | RESPIRATION RATE: 18 BRPM | TEMPERATURE: 96.8 F | HEIGHT: 71 IN | SYSTOLIC BLOOD PRESSURE: 127 MMHG | HEART RATE: 77 BPM

## 2023-01-23 DIAGNOSIS — R51.9 NEW ONSET HEADACHE: ICD-10-CM

## 2023-01-23 DIAGNOSIS — G35 MS (MULTIPLE SCLEROSIS) (HCC): Primary | ICD-10-CM

## 2023-01-23 RX ORDER — DEXAMETHASONE 2 MG/1
TABLET ORAL
Qty: 5 TABLET | Refills: 0 | Status: SHIPPED | OUTPATIENT
Start: 2023-01-23

## 2023-01-23 RX ORDER — CYANOCOBALAMIN (VITAMIN B-12) 1000 MCG
TABLET, EXTENDED RELEASE ORAL
COMMUNITY
Start: 2023-01-10

## 2023-01-23 NOTE — PROGRESS NOTES
Patient ID: Roberto Mcgill is a 50 y o  male  Assessment/Plan:    MS (multiple sclerosis) ([facility] )  Patient diagnosed with MS during hospitalization at [facility] in January 2022 when he presented with right-sided weakness and dysarthria  Initial concern was for stroke, however MRI brain showed multiple enhancing and nonenhancing lesions in the cerebral white matter, most concerning for MS  He had another hospitalization in February 2022, with imaging showing supratentorial enhancing foci of acute demyelination, as well as an early subacute C5 lesion along with other chronic lesions in the cervical cord  MRI thoracic spine from [facility] in January 2022 with normal cord signal      He was started on Ocrevus as 1st DMT, split dose given 04/29/2022 and 05/13/2022  He was hospitalized in Sept 2022 for LLE weakness, which resolved in less than 12 hours  MRI brain, c-spine, t-spine and l-spine were updated while admitted  There was improvement on his brain and c-spine imaging overall, except for some extension of a previously seen lesion in the posterior limb of the left internal capsule along the cortical spinal tract that 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  New onset headache  Patient called last month reporting intense left-sided headaches, not relieved with OTC meds, although some suspicion for rebound headaches caused by medication overuse    He was given dexamethasone 2mg x 5 days, which did resolve the HA, but it has stated again over the last 2 days  Due to new onset headaches, will update MRI brain and MRA head due to focality  Additionally, c/o speech difficulty  Will Rx dexamethasone 2mg x 5 days again for current HA  I would like him to start magnesium oxide 400mg daily and B2 400mg daily for headache prevention  If headaches continue to be persistent, will Rx migraine preventative for him  We extensively discussed lifestyle factors that contribute to headaches  I encouraged him to stay well-hydrated, eat regularly/not skip meals and get adequate sleep  ED precautions discussed  Patient will follow-up in 3 months or sooner if needed  He was advised to call the office for any new or worsening symptoms in the meantime  Diagnoses and all orders for this visit:    [facility]) ([facility] )  -     dexamethasone (DECADRON) 2 mg tablet; Take 1 po daily with meals x 5 days  -     MRI brain MS wo and w contrast; Future  -     MRI angiogram head with and without contrast; Future  -     Ambulatory Referral to Physical Therapy; Future    New onset headache  -     MRI brain MS wo and w contrast; Future  -     MRI angiogram head with and without contrast; Future    Other orders  -     Cyanocobalamin (Vitamin B-12 ER) 1000 MCG TBCR; PLACE 1 TABLET UNDER THE TONGUE ONCE DAILY           Subjective:    HPI    Magdiel Mcclain is a 50year old male with PMH of scoliosis with chronic gait dysfunction, migraines, pre-diabetes and “post-concussion syndrome” since 1999, who presents today for follow up regarding newly diagnosed MS in 2022  Patient was seen for initial consult in February 2022  He was referred to neurology to evaluate for MS as a possible cause of his stroke-like symptoms he experienced in early January 2022  He was hospitalized at [facility] at the time   The symptoms included sudden onset right lower face droop, dysarthria, right upper and lower extremity weakness, and a feeling of leaning to the right  The symptoms occurred suddenly while he was driving to work one morning, and he immediately presented to the ED  CTH revealed subtle left subcortical hypodensity, and CTA head and neck did not show any significant stenosis  Later in the hospital course, it was determined that his symptoms may not have been from stroke but from some other cause  MRI brain demonstrated multiple enhancing and non-enhancing lesions in the WM of the cerebral hemispheres  The largest of these lesions is a nonenhancing 1 6 cm x 1 7 cm lesion at the medial right parietal lobe  Appearance is most suspicious for demyelinating disease/multiple sclerosis  There is no evidence of acute infarction  MRI t-spine with normal cord signal  He was given a 5 day course of IV steroids, followed by 300 mg gabapentin TID due to acute pain “everywhere” following his steroid course  At time of outpatient neurology consult, he reported his symptoms had been improving, although he still had weakness in his right hand, slight right facial droop, and mild dysarthria  He initially presented in a wheelchair because he felt unsteady that morning, though was usually able to ambulate with a cane  Less than a week after his initial consult, he ended up at [address] [place] [address] Road on 2/13/22 due to more profound right hemiparesis described initially as flaccid  Stroke alert called, and tPA entertained, but given his history and imaging, tPA was stopped after a few seconds  His head CT showed hypodense lesions, most prominently noted in the region of left basal ganglia, interpreted by Radiology as possible subacute stroke  MRI brain completed demonstrated multiple small supratentorial enhancing foci of acute demyelination consistent with disease progression of multiple sclerosis  MRI c-spine showed acute to early subacute subcentimeter demyelinating plaque at the C5 vertebral body level   Additional smaller nonenhancing chronic appearing demyelinating plaques within the upper and mid cervical cord  He completed 5 days of IV steroids while admitted and discharged on oral steroid taper  He was then seen by Dr Estela Clemons on 3/15/22 and formal diagnosis of MS discussed  DMT advised  Initial DMT suggested was Ocrevus given the aggressive nature  He had first split dose of Ocrevus on 4/29/22 and 5/13/22, tolerated well  Following his last visit here, he presented to the hospital on 9/27/22 due to acute onset of LLE weakness  Initially a stroke alert was called, but this was felt to be more likely related to his MS  His symptoms improved within 8 hours however, which was felt to be odd for MS presentation, but also lower suspicion for stroke, per inpatient neurology  His imaging was updated while he was admitted  -MRI brain with redemonstration of multiple white matter lesions compatible with chronic demyelinating disease  Several lesions are decreased in conspicuity and no longer enhance  New or more conspicuous FLAIR signal extending inferiorly from previously seen lesion in the posterior limb of the left internal capsule along the cortical spinal tract that 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  No other new signal abnormality  No abnormal enhancement to indicate acute demyelination  -MRI c-spine showed demyelinating lesions best appreciated at C5 are decreased in conspicuity  No definite new demyelinating lesions  No abnormal enhancement  -MRI t-spine no definite cord signal abnormality  -MRI l-spine with no distal cord signal abnormality  Mild degenerative changes without high-grade canal or foraminal stenosis or nerve root compression  He was given 1 dose of IV steroids after inpatient neurology’s discussion with Dr Estela Clemons  He received his first full dose infusion of Ocrevus on 11/17/22, tolerated well  Patient more recently has been contacting our office since December c/o severe headaches    We had a very hard time reaching the patient and most communication was done via [employer], which was not ideal   Patient reported in the message that headaches were located on the left side of his head, not associated with N/V or other symptoms  He was taking Excedrin for the headaches (daily)  He was advised to stop Excedrin and was given dexamethasone 2mg x 5 days  Today, he reports the headaches did subside after dexamethasone, but they returned about 2 days ago, although not as intense at this point yet  Headache only on the left side of his head  He reports it is a “really intense pain”  He also feels his speech has been slow for the last month, more difficult to get his words out  He denies swallowing difficulty, vision changes  He has no prior diagnosis of migraine  His sister Alejandro Corona, who is here today, says she has migraines  He reports decreased appetite in general, frequently skips meals  His sister says he does not do much activity at all throughout the day at all  He lives with his mother who is also disabled and they both do not do much to help themselves, per Alejandro Corona  He completed a course of in-home therapy several months ago but insurance stopped paying  The following portions of the patient's history were reviewed and updated as appropriate: current medications, past family history, past medical history, past social history, past surgical history and problem list          Objective:    Blood pressure 127/78, pulse 77, temperature (!) 96 8 °F (36 °C), temperature source Tympanic, resp  rate 18, height 5' 11" (1 803 m)  Physical Exam  Constitutional:       Appearance: Normal appearance  HENT:      Head: Normocephalic and atraumatic  Eyes:      Extraocular Movements: EOM normal       Pupils: Pupils are equal, round, and reactive to light  Neurological:      Mental Status: He is alert  Deep Tendon Reflexes:      Reflex Scores:       Bicep reflexes are 2+ on the right side and 2+ on the left side         Brachioradialis reflexes are 3+ on the right side and 3+ on the left side  Patellar reflexes are 3+ on the right side and 3+ on the left side  Achilles reflexes are 2+ on the right side and 2+ on the left side  Psychiatric:         Mood and Affect: Mood normal          Speech: Speech normal          Behavior: Behavior normal          Neurological Exam  Mental Status  Alert  Oriented to person, place, time and situation  Speech is normal  Language is fluent with no aphasia  Attention and concentration are normal     Cranial Nerves  CN II: Visual fields full to confrontation  CN III, IV, VI: Extraocular movements intact bilaterally  Diminished smooth pursuit  Pupils equal round and reactive to light bilaterally  CN V: Facial sensation is normal   CN VII: Full and symmetric facial movement  CN VIII: Hearing is normal   CN IX, X: Palate elevates symmetrically  CN XI: Shoulder shrug strength is normal   CN XII: Tongue midline without atrophy or fasciculations  Motor   Normal muscle tone  Right                     Left   Shoulder abduction               4                          5  Elbow flexion                         4+                          5  Elbow extension                    4+                          5  Hip flexion                              5-                          5-  Dorsiflexion                            4                          5    Sensory  Light touch is normal in upper and lower extremities       Reflexes                                            Right                      Left  Brachioradialis                    3+                         3+  Biceps                                 2+                         2+  Patellar                                3+                         3+  Achilles                                2+                         2+    Coordination  Left: Finger-to-nose normal  Rapid alternating movement normal   Incoordination of finger taps on the right, incoordination of toe taps on the right  Slight dysmetria on right FTN       Gait    Deferred, in a wheelchair  ROS:    Review of Systems   Constitutional: Negative for chills and fever  HENT: Negative for ear pain and sore throat  Eyes: Negative for pain and visual disturbance  Respiratory: Negative for cough and shortness of breath  Cardiovascular: Negative for chest pain and palpitations  Gastrointestinal: Negative for abdominal pain and vomiting  Genitourinary: Negative for dysuria and hematuria  Musculoskeletal: Positive for gait problem and neck pain  Negative for arthralgias and back pain  Skin: Negative for color change and rash  Neurological: Positive for tremors (right arm )  Negative for seizures and syncope  All other systems reviewed and are negative        I personally reviewed and updated the ROS as appropriate

## 2023-01-23 NOTE — PATIENT INSTRUCTIONS
Will update MRI brain and MRA head due to headaches  Take dexamethasone 2mg once a day x 5 days (with food)  Start supplements for headaches--magnesium oxide 400mg daily and B2 (riboflavin) 400mg daily  Make sure you are eating regularly, drinking plenty of water (or other non-caffeinated beverages), getting adequate sleep    Would recommend updated eye exam as well   Referral for physical therapy  Follow up in 3 months or sooner if needed

## 2023-01-25 NOTE — ASSESSMENT & PLAN NOTE
Patient called last month reporting intense left-sided headaches, not relieved with OTC meds, although some suspicion for rebound headaches caused by medication overuse  He was given dexamethasone 2mg x 5 days, which did resolve the HA, but it has stated again over the last 2 days  Due to new onset headaches, will update MRI brain and MRA head due to focality  Additionally, c/o speech difficulty  Will Rx dexamethasone 2mg x 5 days again for current HA  I would like him to start magnesium oxide 400mg daily and B2 400mg daily for headache prevention  If headaches continue to be persistent, will Rx migraine preventative for him  We extensively discussed lifestyle factors that contribute to headaches  I encouraged him to stay well-hydrated, eat regularly/not skip meals and get adequate sleep  ED precautions discussed

## 2023-01-25 NOTE — ASSESSMENT & PLAN NOTE
Patient diagnosed with MS during hospitalization at [facility] in January 2022 when he presented with right-sided weakness and dysarthria  Initial concern was for stroke, however MRI brain showed multiple enhancing and nonenhancing lesions in the cerebral white matter, most concerning for MS  He had another hospitalization in February 2022, with imaging showing supratentorial enhancing foci of acute demyelination, as well as an early subacute C5 lesion along with other chronic lesions in the cervical cord  MRI thoracic spine from [facility] in January 2022 with normal cord signal      He was started on Ocrevus as 1st DMT, split dose given 04/29/2022 and 05/13/2022  He was hospitalized in Sept 2022 for LLE weakness, which resolved in less than 12 hours  MRI brain, c-spine, t-spine and l-spine were updated while admitted  There was improvement on his brain and c-spine imaging overall, except for some extension of a previously seen lesion in the posterior limb of the left internal capsule along the cortical spinal tract that 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

## 2023-01-30 ENCOUNTER — PATIENT MESSAGE (OUTPATIENT)
Dept: NEUROLOGY | Facility: CLINIC | Age: 48
End: 2023-01-30

## 2023-01-30 DIAGNOSIS — R26.2 AMBULATORY DYSFUNCTION: ICD-10-CM

## 2023-01-30 DIAGNOSIS — G35 MS (MULTIPLE SCLEROSIS) (HCC): Primary | ICD-10-CM

## 2023-01-30 DIAGNOSIS — G81.91 RIGHT HEMIPARESIS (HCC): ICD-10-CM

## 2023-02-06 NOTE — PATIENT COMMUNICATION
Pt left  re: PT  Per pt, [facility] has not received the medical information for the physical therapy  Called pt back and PT is supposed to be in the pt's home, as he is unable to go to a facility to do it  He states that they are saying that they never received anything from our office

## 2023-02-08 ENCOUNTER — TELEPHONE (OUTPATIENT)
Dept: NEUROLOGY | Facility: CLINIC | Age: 48
End: 2023-02-08

## 2023-02-08 DIAGNOSIS — G35 MS (MULTIPLE SCLEROSIS) (HCC): Primary | ICD-10-CM

## 2023-02-08 DIAGNOSIS — R26.2 AMBULATORY DYSFUNCTION: ICD-10-CM

## 2023-02-08 DIAGNOSIS — G81.91 RIGHT HEMIPARESIS (HCC): ICD-10-CM

## 2023-02-08 NOTE — TELEPHONE ENCOUNTER
Jacob Sal  to [facility] [place] [facility] (supporting Wilson Lopez, SATISH)    [place]    11:48 AM  It was supposed to be outpatient pt cause I can't get there

## 2023-02-08 NOTE — TELEPHONE ENCOUNTER
February 6, 2023       DB    12:31 PM  Doreen Howell RN routed this conversation to Neurology [facility] Clinical Team 3   Doreen Howell RN     DB    12:31 PM  Unsigned Note   Pt left VM re: PT  Per pt, [facility] has not received the medical information for the physical therapy      Called pt back and PT is supposed to be in the pt's home, as he is unable to go to a facility to do it   He states that they are saying that they never received anything from our office

## 2023-02-08 NOTE — TELEPHONE ENCOUNTER
February 2, 2023  Me  to Augustine Antonio CB    10:17 AM  Thank you for contacting [facility],     Good morning Tanna Fournier,     I reached out to [facility] this morning  The person I spoke with, Enola Olszewski, will be reaching out to the location in [facility] to have them contact you regarding your physical therapy  She assumed you would want this location as it is closest to your home  If you do not hear from them or need anything further, please let us know      Have a good day!     Ivana BLEDSOE        Thank you for choosing [facility] for your healthcare needs! Last read by Augustine Antonio at 11:46 AM on 2/6/2023

## 2023-02-08 NOTE — TELEPHONE ENCOUNTER
January 30, 2023       ACE    2:24 PM  Deon Berg routed this conversation to Neurology [facility] Clinical Team 3   Ravi Ansari  to Deon Berg    [facility]    2:13 PM  Please review   Rashid Noguera  to [facility] (supporting Drew Mccallum PA-C)    [place]    2:03 PM  Beverly has not even tried to contact me about setting up more PT       If I have to switch to a different PT I will but I liked who I had

## 2023-02-08 NOTE — TELEPHONE ENCOUNTER
[facility] -[phone]  Unable to reach staff  Left detailed msg requesting return call regarding what ppwk they require  [phone])    Awaiting call back

## 2023-02-13 NOTE — TELEPHONE ENCOUNTER
Referral to [facility] pended below  Сергей Garcia - please sign if agreeable  Thanks!

## 2023-02-14 ENCOUNTER — HOSPITAL ENCOUNTER (OUTPATIENT)
Dept: MRI IMAGING | Facility: HOSPITAL | Age: 48
Discharge: HOME | End: 2023-02-14

## 2023-02-14 DIAGNOSIS — R51.9 NEW ONSET HEADACHE: ICD-10-CM

## 2023-02-14 DIAGNOSIS — G35 MS (MULTIPLE SCLEROSIS) (HCC): ICD-10-CM

## 2023-02-14 RX ADMIN — GADOBUTROL 10 ML: 604.72 INJECTION INTRAVENOUS at 17:55

## 2023-02-15 NOTE — TELEPHONE ENCOUNTER
[facility]  Call  Transferred to in-home -[phone] (p)  Spoke w/Beverly  She states pt is schedule for in-home PT tomorrow 2/16/23  His therapist will be Neville Hough, who worked w/patient previously  Nothing further needed from our office at this time

## 2023-02-16 ENCOUNTER — TELEPHONE (OUTPATIENT)
Dept: NEUROLOGY | Facility: CLINIC | Age: 48
End: 2023-02-16

## 2023-02-16 NOTE — TELEPHONE ENCOUNTER
received -Hi, yes, this is Edward Figueroa, i'm a physical therapist from [facility] calling  I'm calling in regards to one of my clients  His name is Trevin Dill, his YOB: 1975  And he was referred by ihsan alvarenga  [phone]  --------------------------------------------------  called Matty Will   states that we he was doing the med rec, he received a level 2 severity that ocrevus interacts with decadron    per chart, decadron was only presceribed for 5 days for headache at last appt on 1/23  made him aware of this  He will make note of this and that pt is no longer taking decadron     nothing further needed from our office

## 2023-03-13 ENCOUNTER — TELEPHONE (OUTPATIENT)
Dept: NEUROLOGY | Facility: CLINIC | Age: 48
End: 2023-03-13

## 2023-03-13 NOTE — TELEPHONE ENCOUNTER
----- Message from Mica Turner MD sent at 3/13/2023  1:46 PM EDT -----  Regarding: FW: My MS   Contact: [phone]  Please offer FU with me and cancel fu with Christal Almaraz   ----- Message -----  From: Matthew Wood  Sent: 3/13/2023   7:21 AM EDT  To: Mica Turner MD  Subject: FW: My MS                                          ----- Message -----  From: Anselmo Gutierrez  Sent: 3/10/2023   9:27 PM EDT  To: [facility]  Subject: My MS                                            I want you back as my Doctor   Please

## 2023-03-13 NOTE — TELEPHONE ENCOUNTER
Patient schedule with Dr Kendra Regalado for 04/25/2023 @ 11am in [facility] location and cancel appt with Yoel Baez for same date

## 2023-04-06 ENCOUNTER — PATIENT MESSAGE (OUTPATIENT)
Dept: NEUROLOGY | Facility: CLINIC | Age: 48
End: 2023-04-06

## 2023-04-06 DIAGNOSIS — R26.2 AMBULATORY DYSFUNCTION: ICD-10-CM

## 2023-04-06 DIAGNOSIS — G81.91 RIGHT HEMIPARESIS (HCC): ICD-10-CM

## 2023-04-06 DIAGNOSIS — G35 MS (MULTIPLE SCLEROSIS) (HCC): Primary | ICD-10-CM

## 2023-04-25 ENCOUNTER — TELEPHONE (OUTPATIENT)
Dept: NEUROLOGY | Facility: CLINIC | Age: 48
End: 2023-04-25

## 2023-04-25 ENCOUNTER — OFFICE VISIT (OUTPATIENT)
Dept: NEUROLOGY | Facility: CLINIC | Age: 48
End: 2023-04-25

## 2023-04-25 VITALS
SYSTOLIC BLOOD PRESSURE: 123 MMHG | HEART RATE: 77 BPM | DIASTOLIC BLOOD PRESSURE: 75 MMHG | HEIGHT: 71 IN | BODY MASS INDEX: 30.97 KG/M2 | WEIGHT: 221.2 LBS | TEMPERATURE: 96.8 F

## 2023-04-25 DIAGNOSIS — G81.91 RIGHT HEMIPARESIS (HCC): ICD-10-CM

## 2023-04-25 DIAGNOSIS — G35 MS (MULTIPLE SCLEROSIS) (HCC): Primary | ICD-10-CM

## 2023-04-25 DIAGNOSIS — R26.2 AMBULATORY DYSFUNCTION: ICD-10-CM

## 2023-04-25 DIAGNOSIS — M62.838 NIGHT MUSCLE SPASMS: ICD-10-CM

## 2023-04-25 DIAGNOSIS — R29.810 LOWER FACIAL WEAKNESS: ICD-10-CM

## 2023-04-25 DIAGNOSIS — R51.9 NEW ONSET HEADACHE: ICD-10-CM

## 2023-04-25 DIAGNOSIS — R29.898 LEFT LEG WEAKNESS: ICD-10-CM

## 2023-04-25 RX ORDER — CYANOCOBALAMIN 1000 UG/ML
INJECTION, SOLUTION INTRAMUSCULAR; SUBCUTANEOUS
Qty: 9 ML | Refills: 0 | Status: SHIPPED | OUTPATIENT
Start: 2023-04-25

## 2023-04-25 RX ORDER — LORATADINE 10 MG/1
10 TABLET ORAL DAILY
Qty: 90 TABLET | Refills: 3 | Status: SHIPPED | OUTPATIENT
Start: 2023-04-25

## 2023-04-25 RX ORDER — TIZANIDINE 4 MG/1
4 TABLET ORAL
Qty: 90 TABLET | Refills: 0 | Status: SHIPPED | OUTPATIENT
Start: 2023-04-25

## 2023-04-25 NOTE — TELEPHONE ENCOUNTER
Please help with transportation as patient will require wheelchair clinic evaluation for motorized scooter as I provided F2F evaluation today  Transportation will be required for cognitive therapy-referral was provided

## 2023-04-25 NOTE — PROGRESS NOTES
"Patient ID: Megan Nguyen is a 50 y o  male  Assessment/Plan:           Problem List Items Addressed This Visit        Nervous and Auditory    Right hemiparesis (HCC)    Relevant Medications    tiZANidine (ZANAFLEX) 4 mg tablet    Other Relevant Orders    Ambulatory Referral to Speech Therapy    Motorized Scooter    Ambulatory Referral to Physical Therapy    MS (multiple sclerosis) ([facility] ) - Primary    Relevant Medications    cyanocobalamin 1,000 mcg/mL    SYRINGE-NEEDLE, DISP, 3 ML 25G X 5/8\" 3 ML MISC    loratadine (CLARITIN) 10 mg tablet    tiZANidine (ZANAFLEX) 4 mg tablet    Other Relevant Orders    Ambulatory Referral to [address]    Ambulatory Referral to Physical Therapy       Other    Lower facial weakness    Relevant Orders    Ambulatory Referral to Physical Therapy    Ambulatory dysfunction    Relevant Medications    loratadine (CLARITIN) 10 mg tablet    Other Relevant Orders    Ambulatory Referral to [address]    Ambulatory Referral to Physical Therapy    Left leg weakness    Relevant Orders    Ambulatory Referral to [address]    Ambulatory Referral to Physical Therapy    New onset headache   Other Visit Diagnoses     Night muscle spasms        Relevant Medications    tiZANidine (ZANAFLEX) 4 mg tablet          Mr Jose Serrato has presented to [place] [address] multiple sclerosis center for face-to-face evaluation for acquiring motorized assistive devices  Patient was diagnosed with multiple sclerosis with bilateral lower extremity weakness and paralysis noted on his initial presentation with worsening dexterity in his hands and weakness in upper extremities noted which interferes with the patient mobility related activities of daily living (MRADLs) in his home, including bathing, dressing, feeding, grooming, and or toileting in customary locations of the home      Patient continued describing cognitive dysfunction with memory problem " "in addition to ambulatory dysfunction with stable radiographic findings related to multiple sclerosis noted on his recent imaging  Patient has been taking cytotoxic regimen on scheduled basis; patient believes he has progression of his condition despite strong regimen provided  Patient is 221 pounds as his height is 5'11\"  Prognosis is fair patient presented to our practice with advanced disability and being nonambulatory as he is not capable to accommodate his MRADLs  Patient ambulates limited distance with walker in his household but he is not comfortable to stand independently; patient presented today in transport wheelchair; Unfortunately, past use of a cane, walker, or manual wheelchair will not meet mobility needs of this patient within the home due to bilateral lower extremity paralysis and weakness with dexterity problem in his hands    Additional assistive devices such as cane, walker or annual wc will not meet mobility needs of this patient within the home due to advanced disability  Motorized scooter felt to be best assistive device patient may reach some reasonable accommodation at his home to follow-up with or provide freedom for ambulation as well as some independence in this young otherwise healthy 49-year-old male  Mr Juan Carlos Bryant has presented to [place] [address] multiple sclerosis center for follow-up on multiple sclerosis and related issues  Patient was started on Ocrevus as 1st DMT, split dose given 04/29/2022 and 05/13/2022 with full dose of Ocrevus on 11/17/22  Patient describes no significant infusion reaction; patient is an wheelchair today brought by his sister to the appointment as patient has bilateral lower extremity weakness with weakness and dexterity was in upper extremity bilaterally  Patient cannot use manual wheelchair, patient using walker in his house with near falls been described      Patient also reported worsening cognitive function as he has limited " capacity to be engaged in social events  MRI brain and cervical spine completed on February 15, 2023 with stable chronic domination noted with mild to moderate in severity burden of MS, no new or progressive disease has been appreciated  Patient has been taking gabapentin for neuropathic pain involving upper lower extremity, patient takes gabapentin 300 mg 3 times daily with vivid dreams has been described and mood changes has been noted  Muscle spasm lower extremity has been noted on exam patient is going to bed-tizanidine 4 mg at bedtime will be highly advised  Patient will benefit from cognitive therapy in addition to vitamin B12 supplements in the form of injection for the next 6 months; referrals were provided and our  team will help with transportation for cognitive therapy as well  Subjective: tremors-right hand, light headedness, numbness left side of body and headaches  Pt here with sister-she mention the confusion        Patient is to continue regular physical activity and strengthening exercises  MRI brain : Stable chronic demyelinating plaque, overall mild to moderate in severity  No new signal abnormality or abnormal enhancement to suggest progressive or active demyelination  2   No acute infarction, intracranial hemorrhage or enhancing mass lesion  MRI angiogram  head was unremarkable    The following portions of the patient's history were reviewed and updated as appropriate:   He  has a past medical history of MS (multiple sclerosis) ([facility] [address] )    He   Patient Active Problem List    Diagnosis Date Noted   • New onset headache 01/23/2023   • Left leg weakness 09/27/2022   • Obesity (BMI 30-39 9) 09/27/2022   • Ambulatory dysfunction 03/15/2022   • MS (multiple sclerosis) ([address] ) 03/15/2022   • Dysarthria 02/07/2022   • Lower facial weakness 02/07/2022   • CNS demyelinating disease ([address] ) 02/07/2022   • Right hemiparesis ([address] ) 02/07/2022     He  has a past surgical history "that includes Hernia repair and Knee surgery (Right)  His family history includes Multiple sclerosis in his other; Stroke in his maternal grandmother  He  reports that he has quit smoking  He has quit using smokeless tobacco  He reports that he does not currently use alcohol  He reports that he does not use drugs  Current Outpatient Medications   Medication Sig Dispense Refill   • cyanocobalamin 1,000 mcg/mL Take B12 1000 mcg IM once a week for 4 weeks, then once a month for 5 months 9 mL 0   • ergocalciferol (VITAMIN D2) 50,000 units Take 1 capsule (50,000 Units total) by mouth once a week 12 capsule 0   • gabapentin (NEURONTIN) 300 mg capsule Take 1 capsule (300 mg total) by mouth 3 (three) times a day 270 capsule 3   • loratadine (CLARITIN) 10 mg tablet Take 1 tablet (10 mg total) by mouth daily 90 tablet 3   • MAGNESIUM PO Take by mouth     • ocrelizumab (Ocrevus) 300 MG/10ML SOLN Inject 20 mL (600 mg total) into a catheter in a vein every 6 (six) months 20 mL 1   • Riboflavin ([employer] Vitamin B-2) 100 MG TABS Take 2 tablets (200 mg total) by mouth in the morning 360 tablet 2   • SYRINGE-NEEDLE, DISP, 3 ML 25G X 5/8\" 3 ML MISC Use once a week Use syringes for B12 injections once a week for 4 weeks, then once a month for 5 months  9 each 0   • tiZANidine (ZANAFLEX) 4 mg tablet Take 1 tablet (4 mg total) by mouth daily at bedtime 90 tablet 0   • dexamethasone (DECADRON) 2 mg tablet Take 1 po daily with meals x 5 days (Patient not taking: Reported on 4/25/2023) 5 tablet 0   • thiamine (VITAMIN B1) 50 mg tablet Take 100 mg by mouth daily (Patient not taking: Reported on 4/25/2023)       No current facility-administered medications for this visit       Current Outpatient Medications on File Prior to Visit   Medication Sig   • ergocalciferol (VITAMIN D2) 50,000 units Take 1 capsule (50,000 Units total) by mouth once a week   • gabapentin (NEURONTIN) 300 mg capsule Take 1 capsule (300 mg total) by mouth 3 (three) times a " "day   • MAGNESIUM PO Take by mouth   • ocrelizumab (Ocrevus) 300 MG/10ML SOLN Inject 20 mL (600 mg total) into a catheter in a vein every 6 (six) months   • Riboflavin ([employer] Vitamin B-2) 100 MG TABS Take 2 tablets (200 mg total) by mouth in the morning   • dexamethasone (DECADRON) 2 mg tablet Take 1 po daily with meals x 5 days (Patient not taking: Reported on 4/25/2023)   • thiamine (VITAMIN B1) 50 mg tablet Take 100 mg by mouth daily (Patient not taking: Reported on 4/25/2023)   • [DISCONTINUED] Cyanocobalamin (Vitamin B-12 ER) 1000 MCG TBCR PLACE 1 TABLET UNDER THE TONGUE ONCE DAILY (Patient not taking: Reported on 4/25/2023)   • [DISCONTINUED] Cyanocobalamin 1000 MCG SUBL Place 1 tablet (1,000 mcg total) under the tongue daily (Patient not taking: Reported on 1/23/2023)     No current facility-administered medications on file prior to visit  He has No Known Allergies            Objective:    Blood pressure 123/75, pulse 77, temperature (!) 96 8 °F (36 °C), temperature source Skin, height 5' 11\" (1 803 m), weight 100 kg (221 lb 3 2 oz)  Physical Exam    Neurological Exam  CONSTITUTIONAL: NAD, pleasant  NECK: supple, no lymphadenopathy, no thyromegaly, no JVD  CARDIOVASCULAR: RRR, normal S1S2, no murmurs, no rubs  RESP: clear to auscultation bilaterally, no wheezes/rhonchi/rales  ABDOMEN: soft, non tender, non distended  SKIN: no rash or skin lesions  EXTREMITIES: no edema, pulses 2+bilaterally  PSYCH: appropriate mood and affect  NEUROLOGIC COMPREHENSIVE EXAM: Patient is oriented to person, place and time, NAD; appropriate affect  CN II, III, IV, V, VI, VII,VIII,IX,X,XI-XII intact with EOMI, PERRLA, OKN intact, VF grossly intact, fundi poorly visualized secondary to pupillary constriction; symmetric face noted  Motor: 4/5 shoulder abduction , with 3-/5 finger extension b/l; 2/5 right hip flexion and 4/5 knee flexion and 3/ 5 dorsiflexion;  4-/5 left hip flexion, 3/5 knee flexion and dorsiflexion;  Sensory: " decreased to light touch and pinprick bilaterally; normal vibration sensation feet bilaterally; Coordination within normal limits on FTN and TEJINDER testing; DTR: 2++/4 through, no Babinski, no clonus  Wheelchair bound  ROS:  12 points of review of system was reviewed with the patient and was unremarkable with exception: see HPI  Review of Systems   Constitutional: Negative  Negative for appetite change and fever  HENT: Negative  Negative for hearing loss, tinnitus, trouble swallowing and voice change  Eyes: Negative  Negative for photophobia, pain and visual disturbance  Respiratory: Negative  Negative for shortness of breath  Cardiovascular: Negative  Negative for palpitations  Gastrointestinal: Negative  Negative for nausea and vomiting  Endocrine: Negative  Negative for cold intolerance  Genitourinary: Negative  Negative for dysuria, frequency and urgency  Musculoskeletal: Negative  Negative for gait problem, myalgias and neck pain  Skin: Negative  Negative for rash  Allergic/Immunologic: Negative  Neurological: Positive for tremors (right hand), light-headedness, numbness (left side of body) and headaches  Negative for dizziness, seizures, syncope, facial asymmetry, speech difficulty and weakness  Hematological: Negative  Does not bruise/bleed easily  Psychiatric/Behavioral: Positive for confusion  Negative for hallucinations and sleep disturbance

## 2023-04-25 NOTE — LETTER
June 5, 2023        [address] [place] [address]      Our office has been unsuccessful in trying to reach you by phone regarding:        ? Other:___Transporation application, Cooling vest application, scheduling face to face apt for     mobility device____      If interested in pursuing any of the above please let me know so I can assist you  In order to obtain a mobiity device you must meet with the provider for a brief face to face  You may request virtual, not sure if this is possible but worth a try  The number to call to schedule is [phone]    Thank you,     Sincerely,      [address] [facility]  [phone]

## 2023-04-26 NOTE — TELEPHONE ENCOUNTER
FLORENCIO spoke with patient and also his mother, Jarrett Blanco  Currently, patient having issues w/ phone so best contact number is [phone]  Patient is going to call [facility] for outpatient PT and ST at #[phone]  Confirmed they can provide both disciplines  Preference for w/c evaluation is with [facility]  FLORENCIO called [phone]  Left message to [facility] therapy office w/ c/b request for w/c eval apt  Patient is interested in completing application for [facility] transit  Would like application mailed  Patient does have his sister and father who can assist with transportation at times  SW remains available

## 2023-05-01 ENCOUNTER — TELEPHONE (OUTPATIENT)
Dept: NEUROLOGY | Facility: CLINIC | Age: 48
End: 2023-05-01

## 2023-05-01 NOTE — TELEPHONE ENCOUNTER
"SW called patient at mother's number [phone] initially as was told prior that patient having difficulty w/ phone  Called patients number, [phone]  Cooling Vest-  Patient reports filling out an application for a cooling vest aprx 6 months ago but doesn't know w/ whom  Reviewed need to complete another application due to time lapse  Requested be sent by mail  SW to enclose approved letter of diagnosis, insurance information and partially completed application to assist patient  Transportation- Patient has not yet received [facility]  Was sent on 4/26 so patient may not have received however another sent with cooling applications  Included partially filled out applications, letter to patient and approved letter of diagnosis and need for cooling products  Informed patient he should try to get [employer] application done as soon as possible as they close on June 1st for their \"season\"  Spoke with patient and with mom  They are going to call to schedule eval for mobility device today  They have not yet heard from therapy office but will call there as well to schedule patient's PT and ST  Mom reports will be able to get patient to eval and to therapies  SW remains available      "

## 2023-05-01 NOTE — TELEPHONE ENCOUNTER
[facility] LAQUITA Simeon  [facility] [address] [place], [address] [address]  [facility]  [phone]  FAX # [phone]                May 1, 2023  Re: Arya Cai, SUDARSH[address] 71    To Whom it May Concern,       This letter is regarding our patient here at [facility], Arya Cai  Neida Alex was diagnosed with multiple sclerosis in 2022 and has been under my care since that time  Our patient, Neida Alex, needs cooling products from your cooling program to assist with his Multiple Sclerosis  Through the distribution of cooling products, Neida Alex will have fewer negative effects of heat and humidity  Beyond the medical reasons for this brace, it would also help to offer a better quality of life with the least amount of stress  If any other information is necessary, please feel free to contact my office at [phone]          Sincerely,     Odessa Van MD   MS Specialist  [facility]

## 2023-05-01 NOTE — TELEPHONE ENCOUNTER
[facility] LAQUITA Simeon  [facility] [address] [address] [place] [address]  [facility]  [phone]  FAX # [phone]              May 1, 2023    To Whom It May Concern at [facility]  [address] ,    Patient, Nena Gramajo is a current patient of [facility] and is being treated for Multiple Sclerosis  Multiple Sclerosis is a chronic demyelinating condition that causes a patient’s immune system to aggressively attack the coverage of the neuronal cells, called myelin, causing transient neurologic deficit  There are multiple subtypes of multiple sclerosis  This condition is reversible if the patient has other cells in the brain to repair the damage  Only in genetically predisposed patients can this condition transform to a progressive form with disability  There is no direct correlation between amount of the lesions in the brain and spine and neurologic dysfunction; patients may experience fatigue, intermittent muscle spasms, vision problems, numbness, weakness in arms and legs and/or balance issues  Establishing a diagnosis of multiple sclerosis does not mean the patient has to stop working, but work accommodations might be required based on the patient’s job description, Functional Capacity Evaluation (FCE) and/or neuropsychologic evaluation results  According to the [place], there is no clear indication of what causes relapses of Multiple Sclerosis  Patients are recommended to follow healthy dietary changes and regular physical activity  For more information about Multiple sclerosis, please visit the National MS Society’s website at [address] can also call them at [phone]       Sincerely,     Lauren Espinosa MD   MS Specialist  [facility]

## 2023-05-01 NOTE — TELEPHONE ENCOUNTER
Task for cooling vest assistance added to 4/25/23 encounter for:  Therapy  W/c evaluation  Transportation    SW remains available

## 2023-05-01 NOTE — TELEPHONE ENCOUNTER
Regarding: Cooling vest   Contact: [phone]  ----- Message from Maria Elena Cornejo RN sent at 5/1/2023  1:00 PM EDT -----       ----- Message from Yossi Mittal to Heean Garsia MD sent at 4/29/2023  6:20 PM -----   Can you possibly help me with this       I have applied for the vest but never heard about it

## 2023-05-02 DIAGNOSIS — G37.9 CNS DEMYELINATING DISEASE (HCC): ICD-10-CM

## 2023-05-04 NOTE — TELEPHONE ENCOUNTER
Patient has wheelchair evaluation on 5/15 at [address] [facility] Therapy office  SW following for further w/c order needs

## 2023-05-04 NOTE — TELEPHONE ENCOUNTER
Next OV 10/27/2023 with Dr Dyana Garcia  Pt comments that she only will be out of medication very soon  Dr Dyana Garcia - Rx entered  Please review and sign if in agreement

## 2023-05-05 ENCOUNTER — TELEPHONE (OUTPATIENT)
Dept: NEUROLOGY | Facility: CLINIC | Age: 48
End: 2023-05-05

## 2023-05-05 DIAGNOSIS — G35 MS (MULTIPLE SCLEROSIS) (HCC): Primary | ICD-10-CM

## 2023-05-05 RX ORDER — ACETAMINOPHEN 325 MG/1
650 TABLET ORAL ONCE
OUTPATIENT
Start: 2023-05-22

## 2023-05-05 RX ORDER — SODIUM CHLORIDE 9 MG/ML
20 INJECTION, SOLUTION INTRAVENOUS ONCE
OUTPATIENT
Start: 2023-05-22

## 2023-05-05 NOTE — TELEPHONE ENCOUNTER
Called [facility] [phone]  Spoke w/Promise  She confirmed  does require a PA  She will fax form to:    [phone] (f)    Will submit PA once form is received  [place] [facility]  She confirmed no PA is required for Admin codes [address], [id number]  Per Diana Red, medication must be obtained from [facility] [phone]

## 2023-05-07 ENCOUNTER — PATIENT MESSAGE (OUTPATIENT)
Dept: NEUROLOGY | Facility: CLINIC | Age: 48
End: 2023-05-07

## 2023-05-07 RX ORDER — GABAPENTIN 300 MG/1
300 CAPSULE ORAL 3 TIMES DAILY
Qty: 270 CAPSULE | Refills: 3 | Status: SHIPPED | OUTPATIENT
Start: 2023-05-07

## 2023-05-07 NOTE — TELEPHONE ENCOUNTER
May 7, 2023  Emilee Beaulieu  to [facility] [place] Clinical (supporting Mara Anne MD)    DJ    6:09 AM  I need to have my Gabapentin refilled  I have no refills        Dr Marcelle Knapp - please sign pended rx below  Thanks!

## 2023-05-09 DIAGNOSIS — G35 MS (MULTIPLE SCLEROSIS) (HCC): Primary | ICD-10-CM

## 2023-05-09 NOTE — PATIENT COMMUNICATION
"Received fax from [address] stating \"script written for subcutaneous length sryinges but script for vit B states intramuscular administration  Okay to sub for 1 inche syringes? Thanks\"  Dr Kassy Paz - Please advise    "

## 2023-05-10 NOTE — TELEPHONE ENCOUNTER
Called [facility] [phone]  Spoke w/Promise  She confirmed  does require a PA  She will fax form to:     [phone] (f)     Will submit PA once form is received      Call transferred to [facility]  She confirmed no PA is required for Admin codes [address], [id number]      Per Devonte Zhang, medication must be obtained from [facility] [phone]            Note

## 2023-05-10 NOTE — PATIENT COMMUNICATION
"David Britton MD  to Neurology [facility] Clinical Team 3      9:11 PM  1\" needle syringes sent- if not suitable again, then please call in the pharmacy   ------------------------------------------------------------    Spoke with pharmacy staff  They received script and pharmacist says all good  Nothing further at this time    "

## 2023-05-12 NOTE — TELEPHONE ENCOUNTER
SW called patient and left detailed message  Called to make sure received letters, other information sent to him for resources  Also to wish luck at upcoming w/c evaluation on Monday, 5/15  Provided contact number for return call  If this writer does not hear back will call after eval to check in again  SW remains available

## 2023-05-15 ENCOUNTER — EVALUATION (OUTPATIENT)
Dept: PHYSICAL THERAPY | Facility: CLINIC | Age: 48
End: 2023-05-15

## 2023-05-15 DIAGNOSIS — R26.2 AMBULATORY DYSFUNCTION: ICD-10-CM

## 2023-05-15 DIAGNOSIS — Z76.89 ENCOUNTER FOR POWER MOBILITY DEVICE ASSESSMENT: Primary | ICD-10-CM

## 2023-05-15 DIAGNOSIS — G81.91 RIGHT HEMIPARESIS (HCC): ICD-10-CM

## 2023-05-15 DIAGNOSIS — R29.898 LEFT LEG WEAKNESS: ICD-10-CM

## 2023-05-15 DIAGNOSIS — R29.810 LOWER FACIAL WEAKNESS: ICD-10-CM

## 2023-05-15 DIAGNOSIS — G35 MS (MULTIPLE SCLEROSIS) (HCC): ICD-10-CM
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
## 2023-05-16 NOTE — TELEPHONE ENCOUNTER
May 16, 2023  Me     CB     4:01 PM  Unsigned Note   Called [facility]  Spoke w/Oksana BERRY  She advised PA may be processed by end of day  Check back then or tomorrow   They will arrange delivery once PA is approved      PA form for Ocrevus faxed along w/clinicals to:     [phone]     Marked as urgent       Awaiting determination

## 2023-05-16 NOTE — TELEPHONE ENCOUNTER
FLORENCIO called patient at [phone]  Patient received all of the information, letters and applications in mail from this writer  Patient has no questions regarding the transportation application with [facility], or the 2 cooling applications  Patient had w/c evaluation, pleased with recommendations  Patient now needs a  Face to face appointment scheduled with Dr Ranjana MATIAS will send message to the team to assist   SW remains available

## 2023-05-16 NOTE — TELEPHONE ENCOUNTER
Called [facility]  Spoke w/Oksana BERRY  She advised PA may be processed by end of day  Check back then or tomorrow  They will arrange delivery once PA is approved  PA form for Ocrevus faxed along w/clinicals to:    [phone]    Marked as urgent  Awaiting determination

## 2023-05-17 NOTE — TELEPHONE ENCOUNTER
May 16, 2023  Me  to [address] [place] [address]    4:33 PM  Noe Calabrese,      You are scheduled for your upcoming Ocrevus infusion on 5/22/23  You will need labs listed below completed prior to the infusion  As your infusion is on a Monday, these will need to be completed by Thursday as we will need the results before the weekend in order to clear you for your infusion  If you are going to a [facility] lab, the orders will be available in the [address] [place] [address] [employer] system for you  If you are going to any other lab, please let us know so that we may fax your orders       • CMP (This test requires fasting)  • CBC  • Immunoglobulins  • HIV  • Hepatitis  • Quantiferon TB Gold     Please reply to this message and let us know which lab you plan on using and when you plan to go      Please contact our office at [phone] with any questions or concerns      Have a good day!     Ivana BLEDSOE        Thank you for choosing [facility] for your healthcare needs!

## 2023-05-17 NOTE — TELEPHONE ENCOUNTER
Spoke w/pt  Confirmed infusion date of 5/22/23  Advised pt of needed labs  Ad[facility]  Pt verbalized understanding

## 2023-05-17 NOTE — TELEPHONE ENCOUNTER
Received fax sheet stating my PA fax did not go thru (memory full)  Called [facility]  Spoke w/Alexsandra  She provided alternate fax number [phone]  Provided verbal info to initiate PA  Will need to fax clinical notes to the number above  ADI CHERRY#[id number]    Marked as urgent TAT = w/in 24 hours    Clinicals faxed to above number  Awaiting determination

## 2023-05-17 NOTE — TELEPHONE ENCOUNTER
Called [facility] [phone]  Spoke w/Prosper  She confirmed PA ID: [id number] was received  It is marked as urgent  Status is still pending       Will follow up first thing in AM

## 2023-05-18 NOTE — TELEPHONE ENCOUNTER
received [facility], this is the pharmacy [facility] calling for patient of gilbert Carlos Jerniganl date of birth as May 25th, 1975  And patients medication is ocrevus 300mg single dose vial   following up on this patient  Um, I do have the prescription, but there is no prior auth on the patients pharmacy benefits  Since  the request was submitted to the insurance on the medical benefits with the j code So for the doctor to buy and bill  if you guys wants of the pharmacy to process the prescription, you will need to get in contact with [employer] to update on the prior auth on the patients pharmacy benefits and the telephone number is [phone]   Thank you and have a great day

## 2023-05-18 NOTE — TELEPHONE ENCOUNTER
Called [facility]  Spoke w/Cassidy  PA was approved for buy and bill in error  Once changed, they will be able to ship medication to [facility] center  [address] [place] [address]  [phone]  Spoke w/Yamileth VAUGHN  She states the PA was approved for pharmacy benefits  She does not know why pharmacy is saying otherwise  Agent looked in their billing system  Authorization information has not been entered yet; she does not know why it would be taking so long  She will check w/PA dept  Call placed on hold  Per agent, ocrevus was approved for buy and bill  She does not know if this was an error on their end when PA was initiated verbally or not  Specialty department has already reopened the PA and sent to pharmacy for updated approval  It will be approved w/in 24 hrs  KAREN#:[id number]    Will follow up later today

## 2023-05-19 ENCOUNTER — APPOINTMENT (EMERGENCY)
Dept: CT IMAGING | Facility: HOSPITAL | Age: 48
End: 2023-05-19

## 2023-05-19 ENCOUNTER — APPOINTMENT (EMERGENCY)
Dept: RADIOLOGY | Facility: HOSPITAL | Age: 48
End: 2023-05-19

## 2023-05-19 ENCOUNTER — HOSPITAL ENCOUNTER (INPATIENT)
Facility: HOSPITAL | Age: 48
LOS: 1 days | Discharge: HOME | End: 2023-05-21
Attending: EMERGENCY MEDICINE | Admitting: FAMILY MEDICINE

## 2023-05-19 DIAGNOSIS — G35 MULTIPLE SCLEROSIS EXACERBATION (HCC): ICD-10-CM

## 2023-05-19 DIAGNOSIS — E83.42 HYPOMAGNESEMIA: ICD-10-CM

## 2023-05-19 DIAGNOSIS — G35 MS (MULTIPLE SCLEROSIS) (HCC): Primary | ICD-10-CM

## 2023-05-19 LAB
ALBUMIN SERPL BCP-MCNC: 4 G/DL (ref 3.5–5)
ALP SERPL-CCNC: 47 U/L (ref 34–104)
ALT SERPL W P-5'-P-CCNC: 10 U/L (ref 7–52)
ANION GAP SERPL CALCULATED.3IONS-SCNC: 6 MMOL/L (ref 4–13)
APTT PPP: 28 SECONDS (ref 23–37)
AST SERPL W P-5'-P-CCNC: 10 U/L (ref 13–39)
ATRIAL RATE: 61 BPM
BASOPHILS # BLD AUTO: 0.06 THOUSANDS/ÂΜL (ref 0–0.1)
BASOPHILS NFR BLD AUTO: 1 % (ref 0–1)
BILIRUB SERPL-MCNC: 0.49 MG/DL (ref 0.2–1)
BILIRUB UR QL STRIP: NEGATIVE
BUN SERPL-MCNC: 21 MG/DL (ref 5–25)
CALCIUM SERPL-MCNC: 9.2 MG/DL (ref 8.4–10.2)
CHLORIDE SERPL-SCNC: 110 MMOL/L (ref 96–108)
CLARITY UR: CLEAR
CO2 SERPL-SCNC: 24 MMOL/L (ref 21–32)
COLOR UR: YELLOW
CREAT SERPL-MCNC: 0.75 MG/DL (ref 0.6–1.3)
EOSINOPHIL # BLD AUTO: 0.22 THOUSAND/ÂΜL (ref 0–0.61)
EOSINOPHIL NFR BLD AUTO: 3 % (ref 0–6)
ERYTHROCYTE [DISTWIDTH] IN BLOOD BY AUTOMATED COUNT: 12.3 % (ref 11.6–15.1)
GFR SERPL CREATININE-BSD FRML MDRD: 107 ML/MIN/1.73SQ M
GLUCOSE SERPL-MCNC: 76 MG/DL (ref 65–140)
GLUCOSE UR STRIP-MCNC: NEGATIVE MG/DL
HCT VFR BLD AUTO: 41.8 % (ref 36.5–49.3)
HGB BLD-MCNC: 14 G/DL (ref 12–17)
HGB UR QL STRIP.AUTO: NEGATIVE
IMM GRANULOCYTES # BLD AUTO: 0.03 THOUSAND/UL (ref 0–0.2)
IMM GRANULOCYTES NFR BLD AUTO: 1 % (ref 0–2)
INR PPP: 0.97 (ref 0.84–1.19)
KETONES UR STRIP-MCNC: NEGATIVE MG/DL
LEUKOCYTE ESTERASE UR QL STRIP: NEGATIVE
LYMPHOCYTES # BLD AUTO: 1.13 THOUSANDS/ÂΜL (ref 0.6–4.47)
LYMPHOCYTES NFR BLD AUTO: 17 % (ref 14–44)
MAGNESIUM SERPL-MCNC: 1.8 MG/DL (ref 1.9–2.7)
MCH RBC QN AUTO: 30 PG (ref 26.8–34.3)
MCHC RBC AUTO-ENTMCNC: 33.5 G/DL (ref 31.4–37.4)
MCV RBC AUTO: 90 FL (ref 82–98)
MONOCYTES # BLD AUTO: 0.47 THOUSAND/ÂΜL (ref 0.17–1.22)
MONOCYTES NFR BLD AUTO: 7 % (ref 4–12)
NEUTROPHILS # BLD AUTO: 4.62 THOUSANDS/ÂΜL (ref 1.85–7.62)
NEUTS SEG NFR BLD AUTO: 71 % (ref 43–75)
NITRITE UR QL STRIP: NEGATIVE
NRBC BLD AUTO-RTO: 0 /100 WBCS
P AXIS: 55 DEGREES
PH UR STRIP.AUTO: 5.5 [PH]
PLATELET # BLD AUTO: 194 THOUSANDS/UL (ref 149–390)
PMV BLD AUTO: 9.9 FL (ref 8.9–12.7)
POTASSIUM SERPL-SCNC: 3.8 MMOL/L (ref 3.5–5.3)
PR INTERVAL: 172 MS
PROT SERPL-MCNC: 6.8 G/DL (ref 6.4–8.4)
PROT UR STRIP-MCNC: NEGATIVE MG/DL
PROTHROMBIN TIME: 13.6 SECONDS (ref 11.6–14.5)
QRS AXIS: 56 DEGREES
QRSD INTERVAL: 98 MS
QT INTERVAL: 432 MS
QTC INTERVAL: 434 MS
RBC # BLD AUTO: 4.66 MILLION/UL (ref 3.88–5.62)
SODIUM SERPL-SCNC: 140 MMOL/L (ref 135–147)
SP GR UR STRIP.AUTO: 1.02 (ref 1–1.03)
T WAVE AXIS: 59 DEGREES
UROBILINOGEN UR STRIP-ACNC: <2 MG/DL
VENTRICULAR RATE: 61 BPM
WBC # BLD AUTO: 6.53 THOUSAND/UL (ref 4.31–10.16)

## 2023-05-19 RX ORDER — ERGOCALCIFEROL 1.25 MG/1
50000 CAPSULE ORAL WEEKLY
Status: DISCONTINUED | OUTPATIENT
Start: 2023-05-19 | End: 2023-05-21 | Stop reason: HOSPADM

## 2023-05-19 RX ORDER — ACETAMINOPHEN 325 MG/1
650 TABLET ORAL EVERY 6 HOURS PRN
Status: DISCONTINUED | OUTPATIENT
Start: 2023-05-19 | End: 2023-05-21 | Stop reason: HOSPADM

## 2023-05-19 RX ORDER — ONDANSETRON 2 MG/ML
4 INJECTION INTRAMUSCULAR; INTRAVENOUS EVERY 6 HOURS PRN
Status: DISCONTINUED | OUTPATIENT
Start: 2023-05-19 | End: 2023-05-21 | Stop reason: HOSPADM

## 2023-05-19 RX ORDER — ENOXAPARIN SODIUM 100 MG/ML
40 INJECTION SUBCUTANEOUS DAILY
Status: DISCONTINUED | OUTPATIENT
Start: 2023-05-20 | End: 2023-05-21 | Stop reason: HOSPADM

## 2023-05-19 RX ORDER — MAGNESIUM SULFATE HEPTAHYDRATE 40 MG/ML
2 INJECTION, SOLUTION INTRAVENOUS ONCE
Status: COMPLETED | OUTPATIENT
Start: 2023-05-19 | End: 2023-05-19

## 2023-05-19 RX ORDER — GABAPENTIN 300 MG/1
300 CAPSULE ORAL 3 TIMES DAILY
Status: DISCONTINUED | OUTPATIENT
Start: 2023-05-19 | End: 2023-05-21 | Stop reason: HOSPADM

## 2023-05-19 RX ORDER — LANOLIN ALCOHOL/MO/W.PET/CERES
100 CREAM (GRAM) TOPICAL DAILY
Status: DISCONTINUED | OUTPATIENT
Start: 2023-05-20 | End: 2023-05-21 | Stop reason: HOSPADM

## 2023-05-19 RX ORDER — DEXAMETHASONE 2 MG/1
2 TABLET ORAL EVERY 12 HOURS SCHEDULED
Status: DISCONTINUED | OUTPATIENT
Start: 2023-05-20 | End: 2023-05-20

## 2023-05-19 RX ORDER — LORATADINE 10 MG/1
10 TABLET ORAL DAILY
Status: DISCONTINUED | OUTPATIENT
Start: 2023-05-20 | End: 2023-05-21 | Stop reason: HOSPADM

## 2023-05-19 RX ADMIN — GABAPENTIN 300 MG: 300 CAPSULE ORAL at 21:59

## 2023-05-19 RX ADMIN — SODIUM CHLORIDE 1000 MG: 0.9 INJECTION, SOLUTION INTRAVENOUS at 15:45

## 2023-05-19 RX ADMIN — GABAPENTIN 300 MG: 300 CAPSULE ORAL at 18:14

## 2023-05-19 RX ADMIN — MAGNESIUM SULFATE HEPTAHYDRATE 2 G: 2 INJECTION, SOLUTION INTRAVENOUS at 18:15

## 2023-05-19 NOTE — TELEPHONE ENCOUNTER
Upon chart review to see if pt had labs drawn, discovered pt is currently in [facility] ER w/possible MS relapse  Therapy plan placed on hold  Per chart review, pt's 5/22/23 infusion appt rescheduled to 6/19/23 by infusion center

## 2023-05-19 NOTE — PLAN OF CARE
Problem: Potential for Falls  Goal: Patient will remain free of falls  Description: INTERVENTIONS:  - Educate patient/family on patient safety including physical limitations  - Instruct patient to call for assistance with activity   - Consult OT/PT to assist with strengthening/mobility   - Keep Call bell within reach  - Keep bed low and locked with side rails adjusted as appropriate  - Keep care items and personal belongings within reach  - Initiate and maintain comfort rounds  - Make Fall Risk Sign visible to staff  - Offer Toileting every 2 Hours, in advance of need  - Initiate/Maintain alarm  - Obtain necessary fall risk management equipment:   - Apply yellow socks and bracelet for high fall risk patients  - Consider moving patient to room near nurses station  Outcome: Progressing     Problem: PAIN - ADULT  Goal: Verbalizes/displays adequate comfort level or baseline comfort level  Description: Interventions:  - Encourage patient to monitor pain and request assistance  - Assess pain using appropriate pain scale  - Administer analgesics based on type and severity of pain and evaluate response  - Implement non-pharmacological measures as appropriate and evaluate response  - Consider cultural and social influences on pain and pain management  - Notify physician/advanced practitioner if interventions unsuccessful or patient reports new pain  Outcome: Progressing     Problem: INFECTION - ADULT  Goal: Absence or prevention of progression during hospitalization  Description: INTERVENTIONS:  - Assess and monitor for signs and symptoms of infection  - Monitor lab/diagnostic results  - Monitor all insertion sites, i e  indwelling lines, tubes, and drains  - Monitor endotracheal if appropriate and nasal secretions for changes in amount and color  - [place] appropriate cooling/warming therapies per order  - Administer medications as ordered  - Instruct and encourage patient and family to use good hand hygiene technique  - Identify and instruct in appropriate isolation precautions for identified infection/condition  Outcome: Progressing  Goal: Absence of fever/infection during neutropenic period  Description: INTERVENTIONS:  - Monitor WBC    Outcome: Progressing     Problem: SAFETY ADULT  Goal: Patient will remain free of falls  Description: INTERVENTIONS:  - Educate patient/family on patient safety including physical limitations  - Instruct patient to call for assistance with activity   - Consult OT/PT to assist with strengthening/mobility   - Keep Call bell within reach  - Keep bed low and locked with side rails adjusted as appropriate  - Keep care items and personal belongings within reach  - Initiate and maintain comfort rounds  - Make Fall Risk Sign visible to staff  - Offer Toileting every 2 Hours, in advance of need  - Initiate/Maintain alarm  - Obtain necessary fall risk management equipment:   - Apply yellow socks and bracelet for high fall risk patients  - Consider moving patient to room near nurses station  Outcome: Progressing  Goal: Maintain or return to baseline ADL function  Description: INTERVENTIONS:  -  Assess patient's ability to carry out ADLs; assess patient's baseline for ADL function and identify physical deficits which impact ability to perform ADLs (bathing, care of mouth/teeth, toileting, grooming, dressing, etc )  - Assess/evaluate cause of self-care deficits   - Assess range of motion  - Assess patient's mobility; develop plan if impaired  - Assess patient's need for assistive devices and provide as appropriate  - Encourage maximum independence but intervene and supervise when necessary  - Involve family in performance of ADLs  - Assess for home care needs following discharge   - Consider OT consult to assist with ADL evaluation and planning for discharge  - Provide patient education as appropriate  Outcome: Progressing  Goal: Maintains/Returns to pre admission functional level  Description: INTERVENTIONS:  - Perform BMAT or MOVE assessment daily    - Set and communicate daily mobility goal to care team and patient/family/caregiver  - Collaborate with rehabilitation services on mobility goals if consulted  - Out of bed for toileting  - Record patient progress and toleration of activity level   Outcome: Progressing     Problem: DISCHARGE PLANNING  Goal: Discharge to home or other facility with appropriate resources  Description: INTERVENTIONS:  - Identify barriers to discharge w/patient and caregiver  - Arrange for needed discharge resources and transportation as appropriate  - Identify discharge learning needs (meds, wound care, etc )  - Arrange for interpretive services to assist at discharge as needed  - Refer to Case Management Department for coordinating discharge planning if the patient needs post-hospital services based on physician/advanced practitioner order or complex needs related to functional status, cognitive ability, or social support system  Outcome: Progressing     Problem: Knowledge Deficit  Goal: Patient/family/caregiver demonstrates understanding of disease process, treatment plan, medications, and discharge instructions  Description: Complete learning assessment and assess knowledge base    Interventions:  - Provide teaching at level of understanding  - Provide teaching via preferred learning methods  Outcome: Progressing

## 2023-05-19 NOTE — TELEPHONE ENCOUNTER
Called pt  Received vm  Left detailed msg (per consent in chart) requesting return call  Will pt be able to get lab work done early today? If not, we may have to reschedule Ocrevus infusion  Waiting for call back

## 2023-05-19 NOTE — TELEPHONE ENCOUNTER
Called [facility]  Spoke w/Raghu  Updated shipping address to:    [address] [place] [address]    [phone]    [facility] made aware of correction in address  Sent Pt Medication to [facility]

## 2023-05-19 NOTE — TELEPHONE ENCOUNTER
May 19, 2023  Benja Pierson  to Neurology [facility] Clinical Team 3    JR    8:20 AM  Please review     May 18, 2023  Darian Arcos  to Me    [place]    11:07 PM  I couldn't get to get my blood

## 2023-05-19 NOTE — H&P
[facility]  H&P  Name: Franchesca Gomez 50 y o  male I MRN: [id number]  Unit/Bed#: -01 I Date of Admission: 5/19/2023   Date of Service: 5/19/2023 I Hospital Day: 0      Assessment/Plan   * MS (multiple sclerosis) ([facility] )  Assessment & Plan  History of MS with chronic right-sided weakness  Presented due to new left-sided weakness/numbness involving hand/face/leg which he noticed upon waking from nap around noon on 5/19  · Maintained on Ocrevus injections every 6 months, next scheduled for 5/22  · CTH with stable findings   · Neurology consult, appreciate recs  · MRI pending  · IV Solu-Medrol 1 g now, followed by Decadron 2 mg twice daily x 2 days   · Supportive care while inpatient, monitor symptoms  · Fall precautions, PT/OT    Hypomagnesemia  Assessment & Plan  · Replete and recheck       VTE Pharmacologic Prophylaxis: VTE Score: 7 Moderate Risk (Score 3-4) - Pharmacological DVT Prophylaxis Ordered: enoxaparin (Lovenox)  Code Status: Level 1 - Full Code per patient   Discussion with family: Patient declined call to   Anticipated Length of Stay: Patient will be admitted on an observation basis with an anticipated length of stay of less than 2 midnights secondary to IV solumedrol  Total Time Spent on Date of Encounter in care of patient: 65 minutes This time was spent on one or more of the following: performing physical exam; counseling and coordination of care; obtaining or reviewing history; documenting in the medical record; reviewing/ordering tests, medications or procedures; communicating with other healthcare professionals and discussing with patient's family/caregivers  Chief Complaint: Left-sided weakness    History of Present Illness:  Franchesca Gomez is a 50 y o  male with a PMH of MS who presents with new onset left-sided weakness involving the left face, hand, and leg  Patient states he took a nap this morning around 11 and woke about 1 hour later at which time he noted symptoms  Patient has mild fluency errors/speech disruption as well as right-sided weakness at baseline in setting of MS  Patient follows with outpatient neurology, is maintained on Ocrevus every 6 months with next infusion on 5/22  His last flair occurred Sept 2022  Patient currently endorses additional symptoms of mild L sided headache (typical chronic headache pattern) however denies fevers, chills, visual disturbances, chest pain, SOB, cough, wheeze, abdominal or urinary sx  Neurology consulted, recommend MRI, Solu-Medrol IV x1, monitor overnight per discussion with patient's primary neurologist  Patient currently reports weakness and numbness are improving since arrival to ED  Review of Systems:  Review of Systems   Constitutional: Positive for fatigue  Negative for chills and fever  HENT: Negative for congestion, rhinorrhea and sore throat  Eyes: Negative for visual disturbance  Respiratory: Negative for cough, chest tightness, shortness of breath and wheezing  Cardiovascular: Negative for chest pain, palpitations and leg swelling  Gastrointestinal: Negative for abdominal pain, constipation, diarrhea, nausea and vomiting  Genitourinary: Negative for difficulty urinating, dysuria and frequency  Musculoskeletal: Negative for arthralgias and myalgias  Skin: Negative for rash and wound  Neurological: Positive for weakness, numbness and headaches  Negative for dizziness and light-headedness  All other systems reviewed and are negative  Past Medical and Surgical History:   Past Medical History:   Diagnosis Date   • MS (multiple sclerosis) ([facility] [address] )        Past Surgical History:   Procedure Laterality Date   • HERNIA REPAIR      3 times   • KNEE SURGERY Right        Meds/Allergies:  Prior to Admission medications    Medication Sig Start Date End Date Taking?  Authorizing Provider   cyanocobalamin 1,000 mcg/mL Take B12 1000 mcg IM once a week for 4 weeks, then once a "month for 5 months 4/25/23   Oswaldo Shrestha MD   dexamethasone (DECADRON) 2 mg tablet Take 1 po daily with meals x 5 days  Patient not taking: Reported on 4/25/2023 1/23/23   Jamal Person PA-C   ergocalciferol (VITAMIN D2) 50,000 units Take 1 capsule (50,000 Units total) by mouth once a week 11/1/22   Jamal Person PA-C   gabapentin (NEURONTIN) 300 mg capsule Take 1 capsule (300 mg total) by mouth 3 (three) times a day 5/7/23   Oswaldo Shrestha MD   loratadine (CLARITIN) 10 mg tablet Take 1 tablet (10 mg total) by mouth daily 4/25/23   Oswaldo Shrestha MD   MAGNESIUM PO Take by mouth    Historical Provider, MD   ocrelizumab (Ocrevus) 300 MG/10ML SOLN Inject 20 mL (600 mg total) into a catheter in a vein every 6 (six) months 11/1/22   Oswaldo Shrestha MD   Riboflavin ([employer] Vitamin B-2) 100 MG TABS Take 2 tablets (200 mg total) by mouth in the morning 5/23/22   Oswaldo Shrestha MD   SYRINGE-NEEDLE, DISP, 3 ML 25G X 1\" 3 ML MISC Take with B12 regimen prescribed 5/9/23   Oswaldo Shrestha MD   thiamine (VITAMIN B1) 50 mg tablet Take 100 mg by mouth daily  Patient not taking: Reported on 4/25/2023    Historical Provider, MD   tiZANidine (ZANAFLEX) 4 mg tablet Take 1 tablet (4 mg total) by mouth daily at bedtime 4/25/23   Oswaldo Shrestha MD     I have reviewed home medications with patient personally      Allergies: No Known Allergies    Social History:  Marital Status: Unknown   Occupation: Not assessed   Patient Pre-hospital Living Situation: Home, With other family member: parents and daughter  Patient Pre-hospital Level of Mobility: walks with walker  Patient Pre-hospital Diet Restrictions: none   Substance Use History:   Social History     Substance and Sexual Activity   Alcohol Use Not Currently     Social History     Tobacco Use   Smoking Status Former   Smokeless Tobacco Former     Social History     Substance and Sexual Activity   Drug Use Never       Family History:  Family " History   Problem Relation Age of Onset   • Stroke Maternal Grandmother    • Multiple sclerosis Other        Physical Exam:     Vitals:   Blood Pressure: 124/68 (05/19/23 1600)  Pulse: 61 (05/19/23 1600)  Temperature: 98 2 °F (36 8 °C) (05/19/23 1356)  Temp Source: Oral (05/19/23 1356)  Respirations: 13 (05/19/23 1600)  Weight - Scale: 100 kg (220 lb 7 4 oz) (05/19/23 1356)  SpO2: 95 % (05/19/23 1600)    Physical Exam  Vitals and nursing note reviewed  Constitutional:       General: He is not in acute distress  Appearance: He is well-developed  He is obese  He is not ill-appearing  HENT:      Head: Normocephalic and atraumatic  Eyes:      General:         Right eye: No discharge  Left eye: No discharge  Extraocular Movements: Extraocular movements intact  Conjunctiva/sclera: Conjunctivae normal       Pupils: Pupils are equal, round, and reactive to light  Cardiovascular:      Rate and Rhythm: Normal rate and regular rhythm  Heart sounds: No murmur heard  Pulmonary:      Effort: Pulmonary effort is normal  No respiratory distress  Breath sounds: Normal breath sounds  No wheezing, rhonchi or rales  Abdominal:      General: Bowel sounds are normal  There is no distension  Palpations: Abdomen is soft  Tenderness: There is no abdominal tenderness  Musculoskeletal:         General: No swelling  Cervical back: Neck supple  Skin:     General: Skin is warm and dry  Capillary Refill: Capillary refill takes less than 2 seconds  Neurological:      Mental Status: He is alert and oriented to person, place, and time  Mental status is at baseline  Cranial Nerves: No cranial nerve deficit  Sensory: Sensory deficit present  Motor: Weakness present  Coordination: Coordination normal       Comments: Subjective sensory deficit L face/arm/leg  RUE motor 5/5, RLE motor 5/5, LUE and LLE motor 4-5/5   Patient appears to have mild speech fluency impairment however no specific slurring or dysarthria  Psychiatric:         Mood and Affect: Mood normal          Behavior: Behavior normal           Additional Data:     Lab Results:  Results from last 7 days   Lab Units 05/19/23  1443   WBC Thousand/uL 6 53   HEMOGLOBIN g/dL 14 0   HEMATOCRIT % 41 8   PLATELETS Thousands/uL 194   NEUTROS PCT % 71   LYMPHS PCT % 17   MONOS PCT % 7   EOS PCT % 3     Results from last 7 days   Lab Units 05/19/23  1443   SODIUM mmol/L 140   POTASSIUM mmol/L 3 8   CHLORIDE mmol/L 110*   CO2 mmol/L 24   BUN mg/dL 21   CREATININE mg/dL 0 75   ANION GAP mmol/L 6   CALCIUM mg/dL 9 2   ALBUMIN g/dL 4 0   TOTAL BILIRUBIN mg/dL 0 49   ALK PHOS U/L 47   ALT U/L 10   AST U/L 10*   GLUCOSE RANDOM mg/dL 76     Results from last 7 days   Lab Units 05/19/23  1443   INR  0 97                   Lines/Drains:  Invasive Devices     Peripheral Intravenous Line  Duration           Peripheral IV 05/19/23 Right Antecubital <1 day                    Imaging: Reviewed radiology reports from this admission including: CT head  CT head without contrast   Final Result by Ibeth Mcfarlane MD (05/19 8246)      No acute intracranial abnormality  Stable hypodense periventricular lesions related to patient's known history of multiple sclerosis  Workstation performed: [id number]         XR chest 1 view portable   Final Result by Cecil Moses MD (05/19 1522)      No acute cardiopulmonary disease  Workstation performed: [id number]         MRI inpatient order    (Results Pending)       EKG and Other Studies Reviewed on Admission:   · EKG: NSR  HR 61     ** Please Note: This note has been constructed using a voice recognition system   **

## 2023-05-19 NOTE — ASSESSMENT & PLAN NOTE
49-year-old male with MS on Ocrevus (chronic right-sided weakness) who presents with gradual onset of left-sided weakness x1 week, as well as acute onset of left facial/hand numbness  Patient reports current presentation feels similar to his prior MS flares  BP on arrival 130/88    Patient reports that his left hand numbness has resolved in the ED  He continues to have left facial numbness and left-sided weakness  I spoke with his outpatient neurologist Dr Jules Munoz regarding his case who recommended giving 1 g Solu-Medrol x1 now, obtaining MRI brain w/wo and keeping patient for observation  Plan:  - Plan to give 1 g Solu-Medrol x1 now  - MRI brain with/without contrast pending  - PT/OT  - Fall precautions  - STAT CT head with acute changes in symptoms  Please notify neurology for any acute changes  - Medical management and supportive care per primary team  Correction of any metabolic or infectious disturbances  Plan discussed with Attending Neurologist, please see attestation for further input/recommendations

## 2023-05-19 NOTE — CONSULTS
Consultation - Neurology   Jeremy Hobbs 50 y o  male MRN: [id number]  Unit/Bed#: ED 11 Encounter: [id number]      Assessment/Plan     MS (multiple sclerosis) [facility])  Assessment & Plan  68-year-old male with MS on Ocrevus (chronic right-sided weakness) who presents with gradual onset of left-sided weakness x1 week, as well as acute onset of left facial/hand numbness  Patient reports current presentation feels similar to his prior MS flares  BP on arrival 130/88    Patient reports that his left hand numbness has resolved in the ED  He continues to have left facial numbness and left-sided weakness  I spoke with his outpatient neurologist Dr Art Mcgrath regarding his case who recommended giving 1 g Solu-Medrol x1 now, obtaining MRI brain w/wo and keeping patient for observation  Plan:  - Plan to give 1 g Solu-Medrol x1 now  - MRI brain with/without contrast pending  - PT/OT  - Fall precautions  - STAT CT head with acute changes in symptoms  Please notify neurology for any acute changes  - Medical management and supportive care per primary team  Correction of any metabolic or infectious disturbances  Plan discussed with Attending Neurologist, please see attestation for further input/recommendations  Jeremy Hobbs will need follow up in at the next regular appointment with neuromuscular attending  Patient follows with Dr Art Mcgrath  History of Present Illness     Reason for Consult / Principal Problem: MS  Hx and PE limited by: N/A  HPI: Jeremy Hobbs is a 50 y o  male with MS on Ocrevus (chronic right-sided weakness) who presents with onset of left-sided weakness and left facial numbness  Patient reports that he has had gradual left-sided weakness (described as 75%) over the last week and has been needing to lean more on his walker than he normally does  Patient woke up from a nap at 1 PM today and noticed that he had left facial and left hand numbness which was new for him    He does report in the past, he has had transient left facial numbness that resolves on its own  Patient suspected he was having an MS flare and came to the emergency room for further evaluation  He reports his current presentation feels similar to his prior MS flares in the past (reports this is his fourth episode within this year that an episode has happened)  He does endorse that the left facial/hand numbness is new for him  BP on arrival 130/88  Patient was previously evaluated by [facility] inpatient neurology in September 2022  He initially presented with acute onset of left lower extremity weakness/inability to move his leg  Stroke was ruled out during this admission, and it was likely related to MS flare rather than acute ischemia as neuroimaging revealed newer lesions, and he was treated with Solu-Medrol  Per chart review, patient was diagnosed with MS at [facility] in January 2022 where he presented with right-sided weakness and dysarthria  Since diagnosis, patient has had bilateral upper and lower extremity weakness  He endorses ambulatory dysfunction, he can ambulate limited distance with a walker however cannot stand independently and occasionally uses a wheelchair  Patient also has experienced some cognitive dysfunction, specifically memory issues  Patient was started on Ocrevus in 2022 with no significant infusion reaction  Patient also takes gabapentin 300 mg 3 times a day for neuropathic pain  He reports his last infusion was approximately 6 months ago and that he is scheduled to have another infusion on Monday, 5/22  Most recent MRI brain and C-spine was completed February 15, 2023 that noted stable chronic demyelinating plaque, mild to moderate in severity burden of MS, no new or progressive disease  Per chart review, patient follows with [facility] outpatient neurology  He last had an office visit with Dr Dulce Maria Ford April 25, 2023    During this visit, he was recommended to start "cognitive therapy as well as vitamin B12 supplementations and tizanidine 4 mg at night for muscle spasms  Consult to neurology  Consult performed by: SHYANN Nichols  Consult ordered by: Thom Donaldson DO          Review of Systems   Constitutional: Negative for fatigue and fever  Eyes: Negative for photophobia and visual disturbance  Respiratory: Negative for cough and shortness of breath  Cardiovascular: Negative for chest pain and palpitations  Musculoskeletal: Positive for gait problem (Chronic)  Negative for back pain  Skin: Negative for color change and pallor  Neurological: Positive for speech difficulty (Decreased fluency, patient reports that he has dyslexia and ADHD and \"when I have a lot of thoughts it makes it harder for me to speak more fluently\"  Speech fluency improved throughout neuro exam), weakness and numbness  Negative for dizziness, tremors, seizures, syncope, facial asymmetry, light-headedness and headaches  Psychiatric/Behavioral: Negative for confusion  The patient is not nervous/anxious  All other systems reviewed and are negative  Historical Information   Past Medical History:   Diagnosis Date   • MS (multiple sclerosis) ([facility] [address] )      Past Surgical History:   Procedure Laterality Date   • HERNIA REPAIR      3 times   • KNEE SURGERY Right      Social History   Social History     Substance and Sexual Activity   Alcohol Use Not Currently     Social History     Substance and Sexual Activity   Drug Use Never     E-Cigarette/Vaping   • E-Cigarette Use Former User      E-Cigarette/Vaping Substances   • Nicotine No    • THC No    • CBD No    • Flavoring No      Social History     Tobacco Use   Smoking Status Former   Smokeless Tobacco Former     Family History:   Family History   Problem Relation Age of Onset   • Stroke Maternal Grandmother    • Multiple sclerosis Other        Review of previous medical records was completed       Meds/Allergies   all current active " "meds have been reviewed, current meds:   Current Facility-Administered Medications   Medication Dose Route Frequency   • methylPREDNISolone sodium succinate (Solu-MEDROL) 1,000 mg in sodium chloride 0 9 % 250 mL IVPB  1,000 mg Intravenous Once    and PTA meds:   Prior to Admission Medications   Prescriptions Last Dose Informant Patient Reported? Taking? MAGNESIUM PO   Yes No   Sig: Take by mouth   Riboflavin ([employer] Vitamin B-2) 100 MG TABS   No No   Sig: Take 2 tablets (200 mg total) by mouth in the morning   SYRINGE-NEEDLE, DISP, 3 ML 25G X 1\" 3 ML MISC   No No   Sig: Take with B12 regimen prescribed   cyanocobalamin 1,000 mcg/mL   No No   Sig: Take B12 1000 mcg IM once a week for 4 weeks, then once a month for 5 months   dexamethasone (DECADRON) 2 mg tablet   No No   Sig: Take 1 po daily with meals x 5 days   Patient not taking: Reported on 4/25/2023   ergocalciferol (VITAMIN D2) 50,000 units   No No   Sig: Take 1 capsule (50,000 Units total) by mouth once a week   gabapentin (NEURONTIN) 300 mg capsule   No No   Sig: Take 1 capsule (300 mg total) by mouth 3 (three) times a day   loratadine (CLARITIN) 10 mg tablet   No No   Sig: Take 1 tablet (10 mg total) by mouth daily   ocrelizumab (Ocrevus) 300 MG/10ML SOLN   No No   Sig: Inject 20 mL (600 mg total) into a catheter in a vein every 6 (six) months   thiamine (VITAMIN B1) 50 mg tablet   Yes No   Sig: Take 100 mg by mouth daily   Patient not taking: Reported on 4/25/2023   tiZANidine (ZANAFLEX) 4 mg tablet   No No   Sig: Take 1 tablet (4 mg total) by mouth daily at bedtime      Facility-Administered Medications: None       No Known Allergies    Objective   Vitals:Blood pressure 132/90, pulse 62, temperature 98 2 °F (36 8 °C), temperature source Oral, resp  rate 18, weight 100 kg (220 lb 7 4 oz), SpO2 100 %  ,Body mass index is 30 75 kg/m²  No intake or output data in the 24 hours ending 05/19/23 1523    Invasive Devices:    Invasive Devices     Peripheral " Intravenous Line  Duration           Peripheral IV 05/19/23 Right Antecubital <1 day                Physical Exam  Vitals reviewed  Constitutional:       General: He is not in acute distress  Appearance: Normal appearance  He is well-developed  He is obese  He is not ill-appearing  HENT:      Head: Normocephalic and atraumatic  Right Ear: External ear normal       Left Ear: External ear normal       Nose: Nose normal       Mouth/Throat:      Mouth: Mucous membranes are moist    Eyes:      General:         Right eye: No discharge  Left eye: No discharge  Extraocular Movements: Extraocular movements intact and EOM normal       Conjunctiva/sclera: Conjunctivae normal       Pupils: Pupils are equal, round, and reactive to light  Cardiovascular:      Rate and Rhythm: Normal rate  Pulmonary:      Effort: Pulmonary effort is normal  No respiratory distress  Musculoskeletal:         General: Normal range of motion  Cervical back: Normal range of motion  No rigidity  Right lower leg: No edema  Left lower leg: No edema  Skin:     General: Skin is warm and dry  Coloration: Skin is not jaundiced or pale  Neurological:      Mental Status: He is alert and oriented to person, place, and time  Coordination: Finger-Nose-Finger Test normal       Deep Tendon Reflexes:      Reflex Scores:       Tricep reflexes are 3+ on the right side  Bicep reflexes are 3+ on the right side  Brachioradialis reflexes are 3+ on the right side  Patellar reflexes are 3+ on the left side  Achilles reflexes are 3+ on the right side  Comments: See below   Psychiatric:         Mood and Affect: Mood normal          Speech: Speech normal          Behavior: Behavior normal        Neurologic Exam     Mental Status   Oriented to person, place, and time  Follows 2 step commands     Attention: normal  Concentration: normal    Speech: speech is normal (At the beginning of neuro exam, patient did have decreased fluency with his speech  He reported that he has dyslexia and ADHD which makes his fluency appear fragmented per patient )  Level of consciousness: alert  Knowledge: good  Able to name object  Able to repeat  Normal comprehension  Cranial Nerves     CN II   Visual fields full to confrontation  CN III, IV, VI   Pupils are equal, round, and reactive to light  Extraocular motions are normal    Nystagmus: none   Diplopia: none  Conjugate gaze: present    CN VII   Facial expression full, symmetric  CN VIII   CN VIII normal      CN IX, X   CN IX normal      CN XII   CN XII normal      Patient reports subjective left facial numbness (described as 75%)     Motor Exam   Muscle bulk: normal  Overall muscle tone: normal  Right arm pronator drift: absent  Left arm pronator drift: absent  Right upper extremity: 5/5  strength  There is some giveaway weakness throughout his right upper extremity, however with encouragement and coaching he is able to provide full resistance with bicep and tricep testing    Left upper extremity: 5/5  strength, 4+/5 biceps, 4+/5 triceps  Right lower extremity: 5/5 dorsi and plantarflexion  There is giveaway weakness with quadricep and hamstring testing however he is able to provide full resistance with coaching    Left lower extremity: 4+/5 dorsi flexion, 5/5 plantarflexion, 4+/5 quadriceps, 4+/5 hamstring     Sensory Exam     Reports decreased sensation to light touch and pinprick sensation on left upper extremity  Intact throughout right side and left lower extremity     Gait, Coordination, and Reflexes     Gait  Gait: (Deferred for safety)    Coordination   Finger to nose coordination: normal    Tremor   Resting tremor: absent  Intention tremor: absent  Action tremor: absent    Reflexes   Reflexes 2+ except as noted     Right brachioradialis: 3+  Right biceps: 3+  Right triceps: 3+  Left patellar: 3+  Right achilles: 3+      Lab Results: I have personally reviewed pertinent reports  Imaging Studies: I have personally reviewed pertinent reports  and I have personally reviewed pertinent films in PACS prior MRI's  EKG, Pathology, and Other Studies: I have personally reviewed pertinent reports     and I have personally reviewed pertinent films in PACS  VTE Prophylaxis: Patient currently in ED    Code Status: Prior

## 2023-05-19 NOTE — ASSESSMENT & PLAN NOTE
History of MS with chronic right-sided weakness  Presented due to new left-sided weakness/numbness involving hand/face/leg which he noticed upon waking from nap around noon on 5/19     · Maintained on Ocrevus injections every 6 months, next scheduled for 5/22  · CTH with stable findings   · Neurology consult, appreciate recs  · MRI pending  · IV Solu-Medrol 1 g now, followed by Decadron 2 mg twice daily x 2 days   · Supportive care while inpatient, monitor symptoms  · Fall precautions, PT/OT

## 2023-05-19 NOTE — TELEPHONE ENCOUNTER
Received approval letter from [facility] is approved from 5/18/23 - 5/17/24 for 20mls/180 days  Letter placed in bin to be scanned into chart  Called [facility]  Spoke w/Oksana Hanks Jose [phone] will be shipped today for delivery on Saturday, 5/20/23 via [employer] to [address] [place] [address] Pharmacy Purvi Welsh) made aware via Teams [email or web address]

## 2023-05-20 ENCOUNTER — APPOINTMENT (OUTPATIENT)
Dept: MRI IMAGING | Facility: HOSPITAL | Age: 48
End: 2023-05-20

## 2023-05-20 LAB
ALBUMIN SERPL BCP-MCNC: 3.9 G/DL (ref 3.5–5)
ALP SERPL-CCNC: 44 U/L (ref 34–104)
ALT SERPL W P-5'-P-CCNC: 11 U/L (ref 7–52)
ANION GAP SERPL CALCULATED.3IONS-SCNC: 7 MMOL/L (ref 4–13)
AST SERPL W P-5'-P-CCNC: 9 U/L (ref 13–39)
BASOPHILS # BLD MANUAL: 0 THOUSAND/UL (ref 0–0.1)
BASOPHILS NFR MAR MANUAL: 0 % (ref 0–1)
BILIRUB SERPL-MCNC: 0.36 MG/DL (ref 0.2–1)
BUN SERPL-MCNC: 18 MG/DL (ref 5–25)
CALCIUM SERPL-MCNC: 9.3 MG/DL (ref 8.4–10.2)
CHLORIDE SERPL-SCNC: 110 MMOL/L (ref 96–108)
CO2 SERPL-SCNC: 22 MMOL/L (ref 21–32)
CREAT SERPL-MCNC: 0.74 MG/DL (ref 0.6–1.3)
EOSINOPHIL # BLD MANUAL: 0 THOUSAND/UL (ref 0–0.4)
EOSINOPHIL NFR BLD MANUAL: 0 % (ref 0–6)
ERYTHROCYTE [DISTWIDTH] IN BLOOD BY AUTOMATED COUNT: 12 % (ref 11.6–15.1)
GFR SERPL CREATININE-BSD FRML MDRD: 108 ML/MIN/1.73SQ M
GLUCOSE P FAST SERPL-MCNC: 144 MG/DL (ref 65–99)
GLUCOSE SERPL-MCNC: 144 MG/DL (ref 65–140)
HCT VFR BLD AUTO: 40.3 % (ref 36.5–49.3)
HGB BLD-MCNC: 13.5 G/DL (ref 12–17)
LYMPHOCYTES # BLD AUTO: 0.21 THOUSAND/UL (ref 0.6–4.47)
LYMPHOCYTES # BLD AUTO: 4 % (ref 14–44)
MAGNESIUM SERPL-MCNC: 2 MG/DL (ref 1.9–2.7)
MCH RBC QN AUTO: 29.5 PG (ref 26.8–34.3)
MCHC RBC AUTO-ENTMCNC: 33.5 G/DL (ref 31.4–37.4)
MCV RBC AUTO: 88 FL (ref 82–98)
MONOCYTES # BLD AUTO: 0.05 THOUSAND/UL (ref 0–1.22)
MONOCYTES NFR BLD: 1 % (ref 4–12)
NEUTROPHILS # BLD MANUAL: 5.04 THOUSAND/UL (ref 1.85–7.62)
NEUTS SEG NFR BLD AUTO: 95 % (ref 43–75)
PLATELET # BLD AUTO: 203 THOUSANDS/UL (ref 149–390)
PLATELET BLD QL SMEAR: ADEQUATE
PMV BLD AUTO: 10.1 FL (ref 8.9–12.7)
POTASSIUM SERPL-SCNC: 3.9 MMOL/L (ref 3.5–5.3)
PROT SERPL-MCNC: 6.8 G/DL (ref 6.4–8.4)
RBC # BLD AUTO: 4.58 MILLION/UL (ref 3.88–5.62)
RBC MORPH BLD: NORMAL
SODIUM SERPL-SCNC: 139 MMOL/L (ref 135–147)
WBC # BLD AUTO: 5.3 THOUSAND/UL (ref 4.31–10.16)

## 2023-05-20 RX ORDER — DEXAMETHASONE 2 MG/1
2 TABLET ORAL EVERY 12 HOURS SCHEDULED
Status: DISCONTINUED | OUTPATIENT
Start: 2023-05-21 | End: 2023-05-21 | Stop reason: HOSPADM

## 2023-05-20 RX ADMIN — ENOXAPARIN SODIUM 40 MG: 100 INJECTION SUBCUTANEOUS at 10:32

## 2023-05-20 RX ADMIN — GABAPENTIN 300 MG: 300 CAPSULE ORAL at 20:37

## 2023-05-20 RX ADMIN — Medication 100 MG: at 10:32

## 2023-05-20 RX ADMIN — GABAPENTIN 300 MG: 300 CAPSULE ORAL at 16:42

## 2023-05-20 RX ADMIN — GADOBUTROL 10 ML: 604.72 INJECTION INTRAVENOUS at 17:49

## 2023-05-20 RX ADMIN — GABAPENTIN 300 MG: 300 CAPSULE ORAL at 10:32

## 2023-05-20 RX ADMIN — METHYLPREDNISOLONE SODIUM SUCCINATE 1000 MG: 1 INJECTION INTRAMUSCULAR; INTRAVENOUS at 16:29

## 2023-05-20 RX ADMIN — LORATADINE 10 MG: 10 TABLET ORAL at 10:32

## 2023-05-20 NOTE — PROGRESS NOTES
"Hans Smalls  Progress Note  Name: Rosalino Mcclure  MRN: [id number]  Unit/Bed#: -01 I Date of Admission: 5/19/2023   Date of Service: 5/20/2023 I Hospital Day: 0    Assessment/Plan   Hypomagnesemia  Assessment & Plan  · Resolved    * MS (multiple sclerosis) ([facility] [address] )  Assessment & Plan  History of MS with chronic right-sided weakness  Presented due to new left-sided weakness/numbness involving hand/face/leg which he noticed upon waking from nap around noon on 5/19  · Maintained on Ocrevus injections every 6 months, next scheduled for 5/22  · CTH with stable findings   · Neurology consult, appreciate recs  · MRI pending  · Patient received Solu-Medrol 1 g yesterday  Discussed with neurology and he recommended to repeat Solu-Medrol 1 g today and if MRI is unremarkable then can be discharged with 1 more day of Decadron 2 mg twice daily  · Supportive care while inpatient, monitor symptoms  · Fall precautions, PT/OT  · Patient is scheduled for his infusion of Ocrevus on Monday           Labs & Imaging: I have personally reviewed pertinent reports  VTE Prophylaxis: in place  Code Status:   Level 1 - Full Code    Patient Centered Rounds: I have performed bedside rounds with nursing staff today  Discussions with Specialists or Other Care Team Provider: Neurology    Education and Discussions with Family / Patient: Stated he will update his family  I offered to call    Current Length of Stay: 0 day(s)    Current Patient Status: Observation   Certification Statement: The patient will continue to require additional inpatient hospital stay due to see my assessment and plan  Subjective:   Patient is seen and examined at bedside  States he feels little better  Numbness of the face is improved  No other complaints  Afebrile  All other ROS are negative  Objective:    Vitals: Blood pressure 131/94, pulse 89, temperature 97 7 °F (36 5 °C), resp   rate 15, height 5' 11\" (1 803 m), " weight 100 kg (220 lb 7 4 oz), SpO2 95 %  ,Body mass index is 30 68 kg/m²  SPO2 RA Rest    Flowsheet Row ED to Hosp-Admission (Current) from 5/19/2023 in [address] Med Surg Unit   SpO2 95 %   SpO2 Activity At Rest   O2 Device None (Room air)   O2 Flow Rate --        I&O:     Intake/Output Summary (Last 24 hours) at 5/20/2023 0850  Last data filed at 5/20/2023 4843  Gross per 24 hour   Intake 660 ml   Output 1200 ml   Net -540 ml       Physical Exam:    General- Alert, lying comfortably in bed  Not in any acute distress  HEENT- MEGAN, EOM intact  Neck- Supple, No JVD  CVS- regular, S1 and S2 normal  Chest- Bilateral Air entry, No rhochi, crackles or wheezing present  Abdomen- soft, nontender, not distended, no guarding or rigidity, BS+  Extremities-  No pedal edema, No calf tenderness  CNS-   Alert, awake and orientedx3  No focal deficits present    Numbness and left arm/leg improving    Invasive Devices     Peripheral Intravenous Line  Duration           Peripheral IV 05/19/23 Right Antecubital <1 day                      Social History  reviewed  Family History   Problem Relation Age of Onset   • Stroke Maternal Grandmother    • Multiple sclerosis Other     reviewed    Meds:  Current Facility-Administered Medications   Medication Dose Route Frequency Provider Last Rate Last Admin   • acetaminophen (TYLENOL) tablet 650 mg  650 mg Oral Q6H PRN Loni Roper PA-C       • [START ON 5/21/2023] dexamethasone (DECADRON) tablet 2 mg  2 mg Oral Q12H Lenny Ornelas MD       • enoxaparin (LOVENOX) subcutaneous injection 40 mg  40 mg Subcutaneous Daily Loni Roper PA-C       • ergocalciferol (VITAMIN D2) capsule 50,000 Units  50,000 Units Oral Weekly Loni Roper PA-C       • gabapentin (NEURONTIN) capsule 300 mg  300 mg Oral TID Loni Roper PA-C   300 mg at 05/19/23 2159   • loratadine (CLARITIN) tablet 10 mg  10 mg Oral Daily Prem Bauer "Cinthia Roper PA-C       • methylPREDNISolone sodium succinate (Solu-MEDROL) 1,000 mg in sodium chloride 0 9 % 250 mL IVPB  1,000 mg Intravenous Once Cecily Butt MD       • ondansetron (ZOFRAN) injection 4 mg  4 mg Intravenous Q6H PRN Fernandory Juan M SATISH Roper       • thiamine tablet 100 mg  100 mg Oral Daily Fernandory Juan M SATISH Roper          Medications Prior to Admission   Medication   • cyanocobalamin 1,000 mcg/mL   • gabapentin (NEURONTIN) 300 mg capsule   • loratadine (CLARITIN) 10 mg tablet   • MAGNESIUM PO   • Riboflavin (CVS Vitamin B-2) 100 MG TABS   • tiZANidine (ZANAFLEX) 4 mg tablet   • ocrelizumab (Ocrevus) 300 MG/10ML SOLN   • SYRINGE-NEEDLE, DISP, 3 ML 25G X 1\" 3 ML MISC       Labs:  Results from last 7 days   Lab Units 05/20/23  0429 05/19/23  1443   WBC Thousand/uL 5 30 6 53   HEMOGLOBIN g/dL 13 5 14 0   HEMATOCRIT % 40 3 41 8   PLATELETS Thousands/uL 203 194   NEUTROS PCT %  --  71   LYMPHS PCT %  --  17   LYMPHO PCT % 4*  --    MONOS PCT %  --  7   MONO PCT % 1*  --    EOS PCT % 0 3     Results from last 7 days   Lab Units 05/20/23  0429 05/19/23  1443   POTASSIUM mmol/L 3 9 3 8   CHLORIDE mmol/L 110* 110*   CO2 mmol/L 22 24   BUN mg/dL 18 21   CREATININE mg/dL 0 74 0 75   CALCIUM mg/dL 9 3 9 2   ALK PHOS U/L 44 47   ALT U/L 11 10   AST U/L 9* 10*     No results found for: TROPONINI, CKMB, CKTOTAL  Results from last 7 days   Lab Units 05/19/23  1443   INR  0 97     No results found for: Seth Oglesby SPUTUMCULTUR      Imaging:  Results for orders placed during the hospital encounter of 05/19/23    XR chest 1 view portable    Narrative  CHEST    INDICATION:   Chills  COMPARISON: 9/27/2022    EXAM PERFORMED/VIEWS:  XR CHEST PORTABLE      FINDINGS:    Cardiomediastinal silhouette appears unremarkable  The lungs are clear  No pneumothorax or pleural effusion      Thoracic dextroscoliosis    Impression  No acute cardiopulmonary " disease  Workstation performed: [id number]    No results found for this or any previous visit  Last 24 Hours Medication List:   Current Facility-Administered Medications   Medication Dose Route Frequency Provider Last Rate   • acetaminophen  650 mg Oral Q6H PRN Loni Roper PA-C     • [START ON 5/21/2023] dexamethasone  2 mg Oral Q12H Kathe Fernández MD     • enoxaparin  40 mg Subcutaneous Daily Loni Hopper PA-C     • ergocalciferol  50,000 Units Oral Weekly Jeancarlos Briggs     • gabapentin  300 mg Oral TID Loni Roper PA-C     • loratadine  10 mg Oral Daily Loni Hopper PA-C     • methylPREDNISolone sodium succinate  1,000 mg Intravenous Once Madi Joaquin MD     • ondansetron  4 mg Intravenous Q6H PRN Loni Roper PA-C     • thiamine  100 mg Oral Daily Guillermo Horan PA-C          Today, Patient Was Seen By: Madi Joaquin MD    ** Please Note: Dictation voice to text software may have been used in the creation of this document   **

## 2023-05-20 NOTE — ASSESSMENT & PLAN NOTE
History of MS with chronic right-sided weakness  Presented due to new left-sided weakness/numbness involving hand/face/leg which he noticed upon waking from nap around noon on 5/19  · Maintained on Ocrevus injections every 6 months, next scheduled for 5/22  · CTH with stable findings   · Neurology consult, appreciate recs  · MRI pending  · Patient received Solu-Medrol 1 g yesterday    Discussed with neurology and he recommended to repeat Solu-Medrol 1 g today and if MRI is unremarkable then can be discharged with 1 more day of Decadron 2 mg twice daily  · Supportive care while inpatient, monitor symptoms  · Fall precautions, PT/OT  · Patient is scheduled for his infusion of Ocrevus on Monday

## 2023-05-20 NOTE — UTILIZATION REVIEW
OBSERVATION 5/19/23 @ 1622 CONVERTED TO INPATIENT 5/21/23 @ 1436 DUE TO MS REQUIRING IV DECADRON  Initial Clinical Review    Admission: Date/Time/Statement:   Admission Orders (From admission, onward)     Ordered        05/21/23 1436  Inpatient Admission  Once            05/19/23 1622  Place in Observation  Once                      Orders Placed This Encounter   Procedures   • Place in Observation     Standing Status:   Standing     Number of Occurrences:   1     Order Specific Question:   Level of Care     Answer:   Med Surg [16]   • Inpatient Admission     Standing Status:   Standing     Number of Occurrences:   1     Order Specific Question:   Level of Care     Answer:   Med Surg [16]     Order Specific Question:   Estimated length of stay     Answer:   More than 2 Midnights     Order Specific Question:   Certification     Answer:   I certify that inpatient services are medically necessary for this patient for a duration of greater than two midnights  See H&P and MD Progress Notes for additional information about the patient's course of treatment  ED Arrival Information     Expected   -    Arrival   5/19/2023 13:47    Acuity   Urgent            Means of arrival   Ambulance    [facility]    Service   Hospitalist    Admission type   Emergency            Arrival complaint   weakness           Chief Complaint   Patient presents with   • Extremity Weakness     Pt c/o left sided weakness since 13:00 TODAY  Pt has had similar episodes in the past  Pt has MS  Pt denies any falls/cp/sob/n/v/d or fevers       Initial Presentation: 50 y o  male to the ED from home via EMS with complaints of left sided weakness  Admitted under observation for MS then converted to inpatient  H/O MS with chronic right sided weakness  Left hand numbness resolved on arrival to the ED  Neurology consult  Neurology:  Given IV solumedrol  Check MRI brain  PT/OT  Right upper extremity: 5/5  strength    There is some giveaway weakness throughout his right upper extremity, however with encouragement and coaching he is able to provide full resistance with bicep and tricep testing    Left upper extremity: 5/5  strength, 4+/5 biceps, 4+/5 triceps  Right lower extremity: 5/5 dorsi and plantarflexion  There is giveaway weakness with quadricep and hamstring testing however he is able to provide full resistance with coaching    Left lower extremity: 4+/5 dorsi flexion, 5/5 plantarflexion, 4+/5 quadriceps, 4+/5 hamstring      Sensory Exam     Reports decreased sensation to light touch and pinprick sensation on left upper extremity  Intact throughout right side and left lower extremity     Date: 5/20 MRI pending  Continue with IV steroids  GCS 15  Continue with mild difficulty with expressive speech      5/21 Update INpatient: MRI shows no new lesion or acute changes  Continue iv decadron which is helping with intractable headache      ED Triage Vitals   Temperature Pulse Respirations Blood Pressure SpO2   05/19/23 1356 05/19/23 1356 05/19/23 1356 05/19/23 1356 05/19/23 1356   98 2 °F (36 8 °C) 62 18 132/90 100 %      Temp Source Heart Rate Source Patient Position - Orthostatic VS BP Location FiO2 (%)   05/19/23 1356 05/19/23 1356 05/19/23 1600 05/19/23 1356 --   Oral Monitor Lying Right arm       Pain Score       05/19/23 1356       No Pain          Wt Readings from Last 1 Encounters:   05/19/23 100 kg (220 lb 7 4 oz)     Additional Vital Signs:   Date/Time Temp Pulse Resp BP MAP (mmHg) SpO2 O2 Device Patient Position - Orthostatic VS   05/21/23 08:52:22 97 5 °F (36 4 °C) 73 -- 117/89 98 99 % -- --   05/21/23 0815 -- -- -- -- -- 99 % None (Room air) --   05/20/23 20:58:24 97 7 °F (36 5 °C) 84 -- 116/81 93 95 % -- --   05/20/23 15:16:39 98 1 °F (36 7 °C) 106 Abnormal  -- 127/84 98 97 %         Date/Time Temp Pulse Resp BP MAP (mmHg) SpO2 O2 Device Patient Position - Orthostatic VS   05/20/23 07:59:05 97 7 °F (36 5 °C) 89 15 131/94 106 95 % -- --   05/19/23 2227 97 7 °F (36 5 °C) 84 -- 119/76 90 98 % -- --   05/19/23 1600 -- 61 13 124/68 91 95 % None (Room air) Lying   05/19/23 1500 -- 62 15 134/80 102 94 % None (Room air) --   05/19/23 1401 -- -- -- -- -- -- None (Room air) --   05/19/23 1356 98 2 °F (36 8 °C) 62 18 132/90 107 100 % None (Room air        Pertinent Labs/Diagnostic Test Results:   5/20 MRI :  Stable moderate chronic demyelinating disease within the brain parenchyma  No enhancement to suggest active inflammation/demyelination  CT head without contrast   Final Result by Sadia Britt MD (05/19 1607)      No acute intracranial abnormality  Stable hypodense periventricular lesions related to patient's known history of multiple sclerosis  Workstation performed: [id number]         XR chest 1 view portable   Final Result by Doroteo Campuzano MD (05/19 1520)      No acute cardiopulmonary disease                    Workstation performed: [id number]                  Results from last 7 days   Lab Units 05/21/23  0522 05/20/23  0429 05/19/23  1443   WBC Thousand/uL 15 40* 5 30 6 53   HEMOGLOBIN g/dL 13 4 13 5 14 0   HEMATOCRIT % 39 8 40 3 41 8   PLATELETS Thousands/uL 217 203 194   NEUTROS ABS Thousands/µL 14 64*  --  4 62         Results from last 7 days   Lab Units 05/21/23  0522 05/20/23  0429 05/19/23  1443   SODIUM mmol/L 141 139 140   POTASSIUM mmol/L 3 8 3 9 3 8   CHLORIDE mmol/L 112* 110* 110*   CO2 mmol/L 21 22 24   ANION GAP mmol/L 8 7 6   BUN mg/dL 23 18 21   CREATININE mg/dL 0 77 0 74 0 75   EGFR ml/min/1 73sq m 106 108 107   CALCIUM mg/dL 9 0 9 3 9 2   MAGNESIUM mg/dL  --  2 0 1 8*     Results from last 7 days   Lab Units 05/20/23  0429 05/19/23  1443   AST U/L 9* 10*   ALT U/L 11 10   ALK PHOS U/L 44 47   TOTAL PROTEIN g/dL 6 8 6 8   ALBUMIN g/dL 3 9 4 0   TOTAL BILIRUBIN mg/dL 0 36 0 49     Results from last 7 days   Lab Units 05/21/23  1123   POC GLUCOSE mg/dl 132     Results from last 7 days   Lab Units 05/21/23  0522 05/20/23  0429 05/19/23  1443   GLUCOSE RANDOM mg/dL 139 144* 76             Results from last 7 days   Lab Units 05/19/23  1443   PROTIME seconds 13 6   INR  0 97   PTT seconds 28         Results from last 7 days   Lab Units 05/19/23  1928   CLARITY UA  Clear   COLOR UA  Yellow   SPEC GRAV UA  1 020   PH UA  5 5   GLUCOSE UA mg/dl Negative   KETONES UA mg/dl Negative   BLOOD UA  Negative   PROTEIN UA mg/dl Negative   NITRITE UA  Negative   BILIRUBIN UA  Negative   UROBILINOGEN UA (BE) mg/dl <2 0   LEUKOCYTES UA  Negative     ED Treatment:   Medication Administration from 05/19/2023 1347 to 05/19/2023 1703       Date/Time Order Dose Route Action Comments     05/19/2023 1545 EDT methylPREDNISolone sodium succinate (Solu-MEDROL) 1,000 mg in sodium chloride 0 9 % 250 mL IVPB 1,000 mg Intravenous New Bag --        Past Medical History:   Diagnosis Date   • MS (multiple sclerosis) ([facility] )      Present on Admission:  • MS (multiple sclerosis) ([facility] )  • Hypomagnesemia      Admitting Diagnosis: Hypomagnesemia [E83 42]  Weakness [R53 1]  MS (multiple sclerosis) (HCC) [G35]  Multiple sclerosis exacerbation (HCC) [G35]  Age/Sex: 50 y o  male  Admission Orders:  UP and oob  scds    Scheduled Medications:  dexamethasone, 2 mg, Oral, Q12H OFE  enoxaparin, 40 mg, Subcutaneous, Daily  ergocalciferol, 50,000 Units, Oral, Weekly  gabapentin, 300 mg, Oral, TID  ketorolac, 15 mg, Intravenous, Once  loratadine, 10 mg, Oral, Daily  metoclopramide, 10 mg, Intravenous, Once  thiamine, 100 mg, Oral, Daily      Continuous IV Infusions:     PRN Meds:  acetaminophen, 650 mg, Oral, Q6H PRN  calcium carbonate, 500 mg, Oral, Daily PRN  ondansetron, 4 mg, Intravenous, Q6H PRN        IP CONSULT TO NEUROLOGY    Network Utilization Review Department  ATTENTION: Please call with any questions or concerns to [phone] and carefully listen to the prompts so that you are directed to the right person   All voicemails are confidential   Marlinda Dies all requests for admission clinical reviews, approved or denied determinations and any other requests to dedicated fax number below belonging to the campus where the patient is receiving treatment   List of dedicated fax numbers for the Facilities:  [address] DENIALS (Administrative/Medical Necessity) [phone]   [address] (Maternity/NICU/Pediatrics) [phone]   917 Karla Mclaughlin [phone]   [facility] [address] [phone]   [facility] [id number] [place] [address] [phone]   [address] [facility] 134 815 [facility] [phone]

## 2023-05-20 NOTE — PLAN OF CARE
Problem: Potential for Falls  Goal: Patient will remain free of falls  Description: INTERVENTIONS:  - Educate patient/family on patient safety including physical limitations  - Instruct patient to call for assistance with activity   - Consult OT/PT to assist with strengthening/mobility   - Keep Call bell within reach  - Keep bed low and locked with side rails adjusted as appropriate  - Keep care items and personal belongings within reach  - Initiate and maintain comfort rounds  - Make Fall Risk Sign visible to staff  - Offer Toileting every 2 Hours, in advance of need  - Initiate/Maintain alarm  - Obtain necessary fall risk management equipment:   - Apply yellow socks and bracelet for high fall risk patients  - Consider moving patient to room near nurses station  Outcome: Progressing     Problem: PAIN - ADULT  Goal: Verbalizes/displays adequate comfort level or baseline comfort level  Description: Interventions:  - Encourage patient to monitor pain and request assistance  - Assess pain using appropriate pain scale  - Administer analgesics based on type and severity of pain and evaluate response  - Implement non-pharmacological measures as appropriate and evaluate response  - Consider cultural and social influences on pain and pain management  - Notify physician/advanced practitioner if interventions unsuccessful or patient reports new pain  Outcome: Progressing     Problem: INFECTION - ADULT  Goal: Absence or prevention of progression during hospitalization  Description: INTERVENTIONS:  - Assess and monitor for signs and symptoms of infection  - Monitor lab/diagnostic results  - Monitor all insertion sites, i e  indwelling lines, tubes, and drains  - Monitor endotracheal if appropriate and nasal secretions for changes in amount and color  - [place] appropriate cooling/warming therapies per order  - Administer medications as ordered  - Instruct and encourage patient and family to use good hand hygiene technique  - Identify and instruct in appropriate isolation precautions for identified infection/condition  Outcome: Progressing  Goal: Absence of fever/infection during neutropenic period  Description: INTERVENTIONS:  - Monitor WBC    Outcome: Progressing     Problem: SAFETY ADULT  Goal: Patient will remain free of falls  Description: INTERVENTIONS:  - Educate patient/family on patient safety including physical limitations  - Instruct patient to call for assistance with activity   - Consult OT/PT to assist with strengthening/mobility   - Keep Call bell within reach  - Keep bed low and locked with side rails adjusted as appropriate  - Keep care items and personal belongings within reach  - Initiate and maintain comfort rounds  - Make Fall Risk Sign visible to staff  - Offer Toileting every 2 Hours, in advance of need  - Initiate/Maintain alarm  - Obtain necessary fall risk management equipment:   - Apply yellow socks and bracelet for high fall risk patients  - Consider moving patient to room near nurses station  Outcome: Progressing  Goal: Maintain or return to baseline ADL function  Description: INTERVENTIONS:  -  Assess patient's ability to carry out ADLs; assess patient's baseline for ADL function and identify physical deficits which impact ability to perform ADLs (bathing, care of mouth/teeth, toileting, grooming, dressing, etc )  - Assess/evaluate cause of self-care deficits   - Assess range of motion  - Assess patient's mobility; develop plan if impaired  - Assess patient's need for assistive devices and provide as appropriate  - Encourage maximum independence but intervene and supervise when necessary  - Involve family in performance of ADLs  - Assess for home care needs following discharge   - Consider OT consult to assist with ADL evaluation and planning for discharge  - Provide patient education as appropriate  Outcome: Progressing  Goal: Maintains/Returns to pre admission functional level  Description: INTERVENTIONS:  - Perform BMAT or MOVE assessment daily    - Set and communicate daily mobility goal to care team and patient/family/caregiver  - Collaborate with rehabilitation services on mobility goals if consulted  - Out of bed for toileting  - Record patient progress and toleration of activity level   Outcome: Progressing     Problem: DISCHARGE PLANNING  Goal: Discharge to home or other facility with appropriate resources  Description: INTERVENTIONS:  - Identify barriers to discharge w/patient and caregiver  - Arrange for needed discharge resources and transportation as appropriate  - Identify discharge learning needs (meds, wound care, etc )  - Arrange for interpretive services to assist at discharge as needed  - Refer to Case Management Department for coordinating discharge planning if the patient needs post-hospital services based on physician/advanced practitioner order or complex needs related to functional status, cognitive ability, or social support system  Outcome: Progressing     Problem: Knowledge Deficit  Goal: Patient/family/caregiver demonstrates understanding of disease process, treatment plan, medications, and discharge instructions  Description: Complete learning assessment and assess knowledge base    Interventions:  - Provide teaching at level of understanding  - Provide teaching via preferred learning methods  Outcome: Progressing     Problem: MOBILITY - ADULT  Goal: Maintain or return to baseline ADL function  Description: INTERVENTIONS:  -  Assess patient's ability to carry out ADLs; assess patient's baseline for ADL function and identify physical deficits which impact ability to perform ADLs (bathing, care of mouth/teeth, toileting, grooming, dressing, etc )  - Assess/evaluate cause of self-care deficits   - Assess range of motion  - Assess patient's mobility; develop plan if impaired  - Assess patient's need for assistive devices and provide as appropriate  - Encourage maximum independence but intervene and supervise when necessary  - Involve family in performance of ADLs  - Assess for home care needs following discharge   - Consider OT consult to assist with ADL evaluation and planning for discharge  - Provide patient education as appropriate  Outcome: Progressing  Goal: Maintains/Returns to pre admission functional level  Description: INTERVENTIONS:  - Perform BMAT or MOVE assessment daily    - Set and communicate daily mobility goal to care team and patient/family/caregiver     - Collaborate with rehabilitation services on mobility goals if consulted  - Out of bed for toileting  - Record patient progress and toleration of activity level   Outcome: Progressing

## 2023-05-20 NOTE — PLAN OF CARE
Problem: Potential for Falls  Goal: Patient will remain free of falls  Description: INTERVENTIONS:  - Educate patient/family on patient safety including physical limitations  - Instruct patient to call for assistance with activity   - Consult OT/PT to assist with strengthening/mobility   - Keep Call bell within reach  - Keep bed low and locked with side rails adjusted as appropriate  - Keep care items and personal belongings within reach  - Initiate and maintain comfort rounds  - Make Fall Risk Sign visible to staff  - Offer Toileting every 2 Hours, in advance of need  - Initiate/Maintain alarm  - Obtain necessary fall risk management equipment:   - Apply yellow socks and bracelet for high fall risk patients  - Consider moving patient to room near nurses station  Outcome: Progressing     Problem: PAIN - ADULT  Goal: Verbalizes/displays adequate comfort level or baseline comfort level  Description: Interventions:  - Encourage patient to monitor pain and request assistance  - Assess pain using appropriate pain scale  - Administer analgesics based on type and severity of pain and evaluate response  - Implement non-pharmacological measures as appropriate and evaluate response  - Consider cultural and social influences on pain and pain management  - Notify physician/advanced practitioner if interventions unsuccessful or patient reports new pain  Outcome: Progressing     Problem: INFECTION - ADULT  Goal: Absence or prevention of progression during hospitalization  Description: INTERVENTIONS:  - Assess and monitor for signs and symptoms of infection  - Monitor lab/diagnostic results  - Monitor all insertion sites, i e  indwelling lines, tubes, and drains  - Monitor endotracheal if appropriate and nasal secretions for changes in amount and color  - Osborn appropriate cooling/warming therapies per order  - Administer medications as ordered  - Instruct and encourage patient and family to use good hand hygiene technique  - Identify and instruct in appropriate isolation precautions for identified infection/condition  Outcome: Progressing  Goal: Absence of fever/infection during neutropenic period  Description: INTERVENTIONS:  - Monitor WBC    Outcome: Progressing     Problem: SAFETY ADULT  Goal: Patient will remain free of falls  Description: INTERVENTIONS:  - Educate patient/family on patient safety including physical limitations  - Instruct patient to call for assistance with activity   - Consult OT/PT to assist with strengthening/mobility   - Keep Call bell within reach  - Keep bed low and locked with side rails adjusted as appropriate  - Keep care items and personal belongings within reach  - Initiate and maintain comfort rounds  - Make Fall Risk Sign visible to staff  - Offer Toileting every 2 Hours, in advance of need  - Initiate/Maintain alarm  - Obtain necessary fall risk management equipment:   - Apply yellow socks and bracelet for high fall risk patients  - Consider moving patient to room near nurses station  Outcome: Progressing  Goal: Maintain or return to baseline ADL function  Description: INTERVENTIONS:  -  Assess patient's ability to carry out ADLs; assess patient's baseline for ADL function and identify physical deficits which impact ability to perform ADLs (bathing, care of mouth/teeth, toileting, grooming, dressing, etc )  - Assess/evaluate cause of self-care deficits   - Assess range of motion  - Assess patient's mobility; develop plan if impaired  - Assess patient's need for assistive devices and provide as appropriate  - Encourage maximum independence but intervene and supervise when necessary  - Involve family in performance of ADLs  - Assess for home care needs following discharge   - Consider OT consult to assist with ADL evaluation and planning for discharge  - Provide patient education as appropriate  Outcome: Progressing  Goal: Maintains/Returns to pre admission functional level  Description: INTERVENTIONS:  - Perform BMAT or MOVE assessment daily    - Set and communicate daily mobility goal to care team and patient/family/caregiver  - Collaborate with rehabilitation services on mobility goals if consulted  - Out of bed for toileting  - Record patient progress and toleration of activity level   Outcome: Progressing     Problem: DISCHARGE PLANNING  Goal: Discharge to home or other facility with appropriate resources  Description: INTERVENTIONS:  - Identify barriers to discharge w/patient and caregiver  - Arrange for needed discharge resources and transportation as appropriate  - Identify discharge learning needs (meds, wound care, etc )  - Arrange for interpretive services to assist at discharge as needed  - Refer to Case Management Department for coordinating discharge planning if the patient needs post-hospital services based on physician/advanced practitioner order or complex needs related to functional status, cognitive ability, or social support system  Outcome: Progressing     Problem: Knowledge Deficit  Goal: Patient/family/caregiver demonstrates understanding of disease process, treatment plan, medications, and discharge instructions  Description: Complete learning assessment and assess knowledge base    Interventions:  - Provide teaching at level of understanding  - Provide teaching via preferred learning methods  Outcome: Progressing     Problem: MOBILITY - ADULT  Goal: Maintain or return to baseline ADL function  Description: INTERVENTIONS:  -  Assess patient's ability to carry out ADLs; assess patient's baseline for ADL function and identify physical deficits which impact ability to perform ADLs (bathing, care of mouth/teeth, toileting, grooming, dressing, etc )  - Assess/evaluate cause of self-care deficits   - Assess range of motion  - Assess patient's mobility; develop plan if impaired  - Assess patient's need for assistive devices and provide as appropriate  - Encourage maximum independence but intervene and supervise when necessary  - Involve family in performance of ADLs  - Assess for home care needs following discharge   - Consider OT consult to assist with ADL evaluation and planning for discharge  - Provide patient education as appropriate  Outcome: Progressing  Goal: Maintains/Returns to pre admission functional level  Description: INTERVENTIONS:  - Perform BMAT or MOVE assessment daily    - Set and communicate daily mobility goal to care team and patient/family/caregiver     - Collaborate with rehabilitation services on mobility goals if consulted  - Out of bed for toileting  - Record patient progress and toleration of activity level   Outcome: Progressing

## 2023-05-20 NOTE — SPEECH THERAPY NOTE
Speech Language/Pathology  Speech-Language Pathology Bedside Swallow Evaluation        Patient Name: Eliza Morales    IVJWI'K Date: 5/20/2023     Problem List  Principal Problem:    MS (multiple sclerosis) ([facility] [address] )  Active Problems:    Hypomagnesemia         Past Medical History  Past Medical History:   Diagnosis Date   • MS (multiple sclerosis) ([facility] [address] )        Past Surgical History  Past Surgical History:   Procedure Laterality Date   • HERNIA REPAIR      3 times   • KNEE SURGERY Right        Summary    Pt presents with oropharyngeal swallow that appears WNL  Swallows appear prompt  No overt s/s of aspiration  Pt c/o L side facial/labial numbness, but does not seem to impact the swallow  No pocketing or residue on L side  Encouraged pt to check L side for pocketing given numbness; he verbalized and demonstrated understanding  Discussed risk for dysphagia and aspiration as MS progresses; pt states he has read the literature and is aware  No further dysphagia tx indicated at this time  Pt's speech is somewhat halting, appears mildly dysarthric, with ?word finding issues  Pt states it's improved from yesterday  If this does not resolve, pt may benefit from speech assessment, IP, vs OP or HH  Recommendations:   Diet: regular diet and thin liquids   Meds: whole with liquid   Frequent Oral care: 2x/day  Aspiration precautions and compensatory swallowing strategies: upright posture and check L cheek for pocketing  Other Recommendations/ considerations: No further dysphagia tx indicated  Possible speech/language eval, if deficits continue  Current Medical Status  Copied from admission/physician notes:  Eliza Morales is a 50 y o  male with a PMH of MS who presents with new onset left-sided weakness involving the left face, hand, and leg  Patient states he took a nap this morning around 11 and woke about 1 hour later at which time he noted symptoms    Patient has mild fluency errors/speech disruption as well as right-sided weakness at baseline in setting of MS  Patient follows with outpatient neurology, is maintained on Ocrevus every 6 months with next infusion on 5/22  His last flair occurred Sept 2022  Patient currently endorses additional symptoms of mild L sided headache (typical chronic headache pattern) however denies fevers, chills, visual disturbances, chest pain, SOB, cough, wheeze, abdominal or urinary sx  Neurology consulted, recommend MRI, Solu-Medrol IV x1, monitor overnight per discussion with patient's primary neurologist  Patient currently reports weakness and numbness are improving since arrival to ED  Order received and chart reviewed  Discussed with nurse, who has not observed any dysphagia  Pt denies dysphagia, but does report L side facial/labial numbness and speech difficulty  Pt was seen by ST kumar [facility] in Sept 2022; at that time a regular diet and thin liquids were recommended  Past medical history:   Please see H&P for details      Special Studies:  MRI-pending    CXR-  No acute cardiopulmonary disease     CT head-  No acute intracranial abnormality  Stable hypodense periventricular lesions related to patient's known history of multiple sclerosis           Social/Education/Vocational Hx:  Pt lives with family    Swallow Information   Current Risks for Dysphagia & Aspiration: MS, r/o CVA  Current Symptoms/Concerns: L side facial/labial numbness  Current Diet: regular diet and thin liquids   Baseline Diet: regular diet and thin liquids    Baseline Assessment   Behavior/Cognition: alert  Speech/Language Status: able to participate in conversation and able to follow commands  Patient Positioning: upright in bed     Swallow Mechanism Exam   Facial: symmetrical  Labial: WFL but difficult to assess due to beard  Lingual: decreased ROM and decreased coordination  Velum: symmetrical  Mandible: adequate ROM  Dentition: adequate  Vocal quality:clear/adequate   Volitional Cough: weak Respiratory: RA    Consistencies Assessed and Performance   Consistencies Administered: thin liquids, puree and hard solids    Oral Stage: Adequate bolus retrieval and draw from straw  Prompt mastication, bolus formation and transfer  No residue or pocketing  Pt chews on both sides, despite c/o L side numbness  Adequate lip seal      Pharyngeal Stage: Hyolaryngeal elevation observed and palpated  Swallows appear prompt  No overt s/s of aspiration         Esophageal Concerns: none reported      Results Reviewed with: patient and RN   Dysphagia Goals: none at this time  Discharge recommendation: OP or HH if speech difficulty does not resolve    Speech Therapy Prognosis   Prognosis: fair    Prognosis Considerations: age, medical status, prior medical history, cognitive status, progressive disease process and therapeutic potential

## 2023-05-21 VITALS
BODY MASS INDEX: 30.86 KG/M2 | HEIGHT: 71 IN | HEART RATE: 74 BPM | SYSTOLIC BLOOD PRESSURE: 127 MMHG | DIASTOLIC BLOOD PRESSURE: 84 MMHG | RESPIRATION RATE: 15 BRPM | OXYGEN SATURATION: 95 % | WEIGHT: 220.46 LBS | TEMPERATURE: 97.9 F

## 2023-05-21 LAB
ANION GAP SERPL CALCULATED.3IONS-SCNC: 8 MMOL/L (ref 4–13)
BASOPHILS # BLD AUTO: 0.01 THOUSANDS/ÂΜL (ref 0–0.1)
BASOPHILS NFR BLD AUTO: 0 % (ref 0–1)
BUN SERPL-MCNC: 23 MG/DL (ref 5–25)
CALCIUM SERPL-MCNC: 9 MG/DL (ref 8.4–10.2)
CHLORIDE SERPL-SCNC: 112 MMOL/L (ref 96–108)
CO2 SERPL-SCNC: 21 MMOL/L (ref 21–32)
CREAT SERPL-MCNC: 0.77 MG/DL (ref 0.6–1.3)
EOSINOPHIL # BLD AUTO: 0 THOUSAND/ÂΜL (ref 0–0.61)
EOSINOPHIL NFR BLD AUTO: 0 % (ref 0–6)
ERYTHROCYTE [DISTWIDTH] IN BLOOD BY AUTOMATED COUNT: 12.6 % (ref 11.6–15.1)
GFR SERPL CREATININE-BSD FRML MDRD: 106 ML/MIN/1.73SQ M
GLUCOSE SERPL-MCNC: 115 MG/DL (ref 65–140)
GLUCOSE SERPL-MCNC: 132 MG/DL (ref 65–140)
GLUCOSE SERPL-MCNC: 139 MG/DL (ref 65–140)
HCT VFR BLD AUTO: 39.8 % (ref 36.5–49.3)
HGB BLD-MCNC: 13.4 G/DL (ref 12–17)
IMM GRANULOCYTES # BLD AUTO: 0.1 THOUSAND/UL (ref 0–0.2)
IMM GRANULOCYTES NFR BLD AUTO: 1 % (ref 0–2)
LYMPHOCYTES # BLD AUTO: 0.43 THOUSANDS/ÂΜL (ref 0.6–4.47)
LYMPHOCYTES NFR BLD AUTO: 3 % (ref 14–44)
MCH RBC QN AUTO: 30.4 PG (ref 26.8–34.3)
MCHC RBC AUTO-ENTMCNC: 33.7 G/DL (ref 31.4–37.4)
MCV RBC AUTO: 90 FL (ref 82–98)
MONOCYTES # BLD AUTO: 0.22 THOUSAND/ÂΜL (ref 0.17–1.22)
MONOCYTES NFR BLD AUTO: 1 % (ref 4–12)
NEUTROPHILS # BLD AUTO: 14.64 THOUSANDS/ÂΜL (ref 1.85–7.62)
NEUTS SEG NFR BLD AUTO: 95 % (ref 43–75)
NRBC BLD AUTO-RTO: 0 /100 WBCS
PLATELET # BLD AUTO: 217 THOUSANDS/UL (ref 149–390)
PMV BLD AUTO: 10.3 FL (ref 8.9–12.7)
POTASSIUM SERPL-SCNC: 3.8 MMOL/L (ref 3.5–5.3)
RBC # BLD AUTO: 4.41 MILLION/UL (ref 3.88–5.62)
SODIUM SERPL-SCNC: 141 MMOL/L (ref 135–147)
WBC # BLD AUTO: 15.4 THOUSAND/UL (ref 4.31–10.16)

## 2023-05-21 RX ORDER — CALCIUM CARBONATE 500 MG/1
500 TABLET, CHEWABLE ORAL DAILY PRN
Status: DISCONTINUED | OUTPATIENT
Start: 2023-05-21 | End: 2023-05-21 | Stop reason: HOSPADM

## 2023-05-21 RX ORDER — KETOROLAC TROMETHAMINE 30 MG/ML
15 INJECTION, SOLUTION INTRAMUSCULAR; INTRAVENOUS ONCE
Status: DISCONTINUED | OUTPATIENT
Start: 2023-05-21 | End: 2023-05-21 | Stop reason: HOSPADM

## 2023-05-21 RX ORDER — DEXAMETHASONE 2 MG/1
2 TABLET ORAL EVERY 12 HOURS SCHEDULED
Qty: 1 TABLET | Refills: 0 | Status: SHIPPED | OUTPATIENT
Start: 2023-05-21 | End: 2023-05-22

## 2023-05-21 RX ORDER — ACETAMINOPHEN 325 MG/1
650 TABLET ORAL EVERY 6 HOURS PRN
Qty: 30 TABLET | Refills: 0 | Status: SHIPPED | OUTPATIENT
Start: 2023-05-21

## 2023-05-21 RX ORDER — METOCLOPRAMIDE HYDROCHLORIDE 5 MG/ML
10 INJECTION INTRAMUSCULAR; INTRAVENOUS ONCE
Status: DISCONTINUED | OUTPATIENT
Start: 2023-05-21 | End: 2023-05-21 | Stop reason: HOSPADM

## 2023-05-21 RX ADMIN — ACETAMINOPHEN 650 MG: 325 TABLET, FILM COATED ORAL at 06:09

## 2023-05-21 RX ADMIN — GABAPENTIN 300 MG: 300 CAPSULE ORAL at 08:49

## 2023-05-21 RX ADMIN — Medication 100 MG: at 08:49

## 2023-05-21 RX ADMIN — ENOXAPARIN SODIUM 40 MG: 100 INJECTION SUBCUTANEOUS at 08:49

## 2023-05-21 RX ADMIN — GABAPENTIN 300 MG: 300 CAPSULE ORAL at 16:30

## 2023-05-21 RX ADMIN — DEXAMETHASONE 2 MG: 2 TABLET ORAL at 08:49

## 2023-05-21 RX ADMIN — CALCIUM CARBONATE (ANTACID) CHEW TAB 500 MG 500 MG: 500 CHEW TAB at 06:11

## 2023-05-21 RX ADMIN — LORATADINE 10 MG: 10 TABLET ORAL at 08:49

## 2023-05-21 NOTE — PROGRESS NOTES
[place]  Progress Note  Name: Kt Guzman  MRN: [id number]  Unit/Bed#: -01 I Date of Admission: 5/19/2023   Date of Service: 5/21/2023 I Hospital Day: 0    Assessment/Plan   * MS (multiple sclerosis) ([facility] )  Assessment & Plan  History of MS with chronic right-sided weakness  Presented due to new left-sided weakness/numbness involving hand/face/leg which he noticed upon waking from nap around noon on 5/19  · Maintained on Ocrevus injections every 6 months, next scheduled for 5/22  · CTH with stable findings   · Neurology consult, appreciate recs  · MRI -no new lesion or acute change  · Continue IV Decadron -Which seem also help with intractable headache  · Discussed with neurology since patient is complaining headache, recommends to treat with migraine cocktail and monitor  · Supportive care while inpatient, monitor symptoms  · Fall precautions, PT/OT  · Initially patient is scheduled for his infusion of Jessica Eans on 5/22/23-which patient rescheduled on 6/19/2023 due to hospitalization  Hypomagnesemia  Assessment & Plan  · Resolved    Intractable headache  Assessment & Plan  Complaining of intractable headache  MRI of the head no significant change  Discussed the case with on-call neurologist, recommending to treat with migraine cocktail with Reglan, Toradol, Benadryl-and monitor             VTE Pharmacologic Prophylaxis: VTE Score: 7 High Risk (Score >/= 5) - Pharmacological DVT Prophylaxis Ordered: enoxaparin (Lovenox)  Sequential Compression Devices Ordered  Patient Centered Rounds: I performed bedside rounds with nursing staff today  Discussions with Specialists or Other Care Team Provider: Neurology    Education and Discussions with Family / Patient: Try to reach patient's sister over the phone,-not excepting call       Total Time Spent on Date of Encounter in care of patient: 10 minutes This time was spent on one or more of the following: performing physical exam; counseling and coordination of care; obtaining or reviewing history; documenting in the medical record; reviewing/ordering tests, medications or procedures; communicating with other healthcare professionals and discussing with patient's family/caregivers  Current Length of Stay: 0 day(s)  Current Patient Status: Inpatient   Certification Statement: The patient will continue to require additional inpatient hospital stay due to To monitor above condition  Discharge Plan: Anticipate discharge in 24-48 hrs to home  Code Status: Level 1 - Full Code    Subjective:   Seen and evaluated during the rounds  Patient is complaining of intractable headache, affecting whole head  Also associated with nausea  Denies any blurry vision,  Patient also having some trouble with his speech with expression-as per patient whenever he get MS flareup, that affects his face and affects his speech  Denies any trouble swallowing  Objective:     Vitals:   Temp (24hrs), Av 9 °F (36 6 °C), Min:97 5 °F (36 4 °C), Max:98 1 °F (36 7 °C)    Temp:  [97 5 °F (36 4 °C)-98 1 °F (36 7 °C)] 97 5 °F (36 4 °C)  HR:  [] 73  BP: (116-127)/(81-89) 117/89  SpO2:  [95 %-99 %] 99 %  Body mass index is 30 68 kg/m²  Input and Output Summary (last 24 hours): Intake/Output Summary (Last 24 hours) at 2023 1437  Last data filed at 2023 1152  Gross per 24 hour   Intake 525 ml   Output 450 ml   Net 75 ml       Physical Exam:   Physical Exam  Vitals and nursing note reviewed  HENT:      Mouth/Throat:      Mouth: Mucous membranes are dry  Pharynx: No oropharyngeal exudate  Eyes:      General: No scleral icterus  Left eye: No discharge  Extraocular Movements: Extraocular movements intact  Conjunctiva/sclera: Conjunctivae normal       Pupils: Pupils are equal, round, and reactive to light  Cardiovascular:      Rate and Rhythm: Normal rate  Pulmonary:      Effort: No respiratory distress        Breath sounds: No stridor  No wheezing or rhonchi  Abdominal:      General: Abdomen is flat  Bowel sounds are normal  There is no distension  Palpations: There is no mass  Tenderness: There is no abdominal tenderness  Hernia: No hernia is present  Musculoskeletal:         General: No swelling, tenderness, deformity or signs of injury  Normal range of motion  Cervical back: Normal range of motion  No rigidity  Lymphadenopathy:      Cervical: No cervical adenopathy  Skin:     General: Skin is warm  Coloration: Skin is not jaundiced or pale  Findings: No bruising or erythema  Neurological:      Mental Status: He is alert and oriented to person, place, and time  Cranial Nerves: No cranial nerve deficit  Sensory: No sensory deficit  Motor: Weakness present        Coordination: Coordination normal       Comments: Mild difficulty with expression (speech)         Additional Data:     Labs:  Results from last 7 days   Lab Units 05/21/23  0522   WBC Thousand/uL 15 40*   HEMOGLOBIN g/dL 13 4   HEMATOCRIT % 39 8   PLATELETS Thousands/uL 217   NEUTROS PCT % 95*   LYMPHS PCT % 3*   MONOS PCT % 1*   EOS PCT % 0     Results from last 7 days   Lab Units 05/21/23  0522 05/20/23  0429   SODIUM mmol/L 141 139   POTASSIUM mmol/L 3 8 3 9   CHLORIDE mmol/L 112* 110*   CO2 mmol/L 21 22   BUN mg/dL 23 18   CREATININE mg/dL 0 77 0 74   ANION GAP mmol/L 8 7   CALCIUM mg/dL 9 0 9 3   ALBUMIN g/dL  --  3 9   TOTAL BILIRUBIN mg/dL  --  0 36   ALK PHOS U/L  --  44   ALT U/L  --  11   AST U/L  --  9*   GLUCOSE RANDOM mg/dL 139 144*     Results from last 7 days   Lab Units 05/19/23  1443   INR  0 97     Results from last 7 days   Lab Units 05/21/23  1123   POC GLUCOSE mg/dl 132               Lines/Drains:  Invasive Devices     Peripheral Intravenous Line  Duration           Peripheral IV 05/19/23 Right Antecubital 1 day                      Imaging: Reviewed radiology reports from this admission including: MRI brain    Recent Cultures (last 7 days):         Last 24 Hours Medication List:   Current Facility-Administered Medications   Medication Dose Route Frequency Provider Last Rate   • acetaminophen  650 mg Oral Q6H PRN Mihir Roper PA-C     • calcium carbonate  500 mg Oral Daily PRN Vick Burgess DO     • dexamethasone  2 mg Oral Q12H Sabina Bucio MD     • enoxaparin  40 mg Subcutaneous Daily Mihir Hopper PA-C     • ergocalciferol  50,000 Units Oral Weekly Jeancarlos Antonio     • gabapentin  300 mg Oral TID Mihir Roper PA-C     • ketorolac  15 mg Intravenous Once Humble Car MD     • loratadine  10 mg Oral Daily Mihir Roper PA-C     • metoclopramide  10 mg Intravenous Once Humble Car MD     • ondansetron  4 mg Intravenous Q6H PRN Mihir Roper PA-C     • thiamine  100 mg Oral Daily Anibal Boudreaux PA-C          Today, Patient Was Seen By: Humble Car MD    **Please Note: This note may have been constructed using a voice recognition system  **

## 2023-05-21 NOTE — ASSESSMENT & PLAN NOTE
History of MS with chronic right-sided weakness  Presented due to new left-sided weakness/numbness involving hand/face/leg which he noticed upon waking from nap around noon on 5/19  · Maintained on Ocrevus injections every 6 months, next scheduled for 5/22  · CTH with stable findings   · Neurology consult, appreciate recs  · MRI -no new lesion or acute change  · Continue IV Decadron -Which seem also help with intractable headache  · Discussed with neurology since patient is complaining headache, recommends to treat with migraine cocktail and monitor  · Supportive care while inpatient, monitor symptoms  · Fall precautions, PT/OT  · Initially patient is scheduled for his infusion of Evelynella Kaitlin on 5/22/23-which patient rescheduled on 6/19/2023 due to hospitalization

## 2023-05-21 NOTE — ASSESSMENT & PLAN NOTE
Complaining of intractable headache  MRI of the head no significant change  Discussed the case with on-call neurologist, recommending to treat with migraine cocktail with Reglan, Toradol, Benadryl-and monitor

## 2023-05-21 NOTE — ED PROVIDER NOTES
"History  Chief Complaint   Patient presents with   • Extremity Weakness     Pt c/o left sided weakness since 13:00 TODAY  Pt has had similar episodes in the past  Pt has MS  Pt denies any falls/cp/sob/n/v/d or fevers     64-year-old man with history of multiple sclerosis complains of acute onset of worsening of chronic left-sided weakness tingling of left side up to his face and new ptosis on the left side  He states this is typical of his MS exacerbations  States she has been worked up 3 prior times as stroke but each time the symptoms indicated MS flare  I discussed with neurology and they agree no need to start this as a stroke alert  Patient denies recent fever, illness, injury or change in medications  He lives with his parents and sister  He and his sister feel that he is unable to be cared for at home with his current symptoms  Prior to Admission Medications   Prescriptions Last Dose Informant Patient Reported? Taking?    MAGNESIUM PO 5/18/2023  Yes Yes   Sig: Take by mouth   Riboflavin ([employer] Vitamin B-2) 100 MG TABS 5/18/2023  No Yes   Sig: Take 2 tablets (200 mg total) by mouth in the morning   SYRINGE-NEEDLE, DISP, 3 ML 25G X 1\" 3 ML MISC   No No   Sig: Take with B12 regimen prescribed   cyanocobalamin 1,000 mcg/mL 5/18/2023  No Yes   Sig: Take B12 1000 mcg IM once a week for 4 weeks, then once a month for 5 months   gabapentin (NEURONTIN) 300 mg capsule 5/18/2023  No Yes   Sig: Take 1 capsule (300 mg total) by mouth 3 (three) times a day   loratadine (CLARITIN) 10 mg tablet 5/18/2023  No Yes   Sig: Take 1 tablet (10 mg total) by mouth daily   ocrelizumab (Ocrevus) 300 MG/10ML SOLN More than a month  No No   Sig: Inject 20 mL (600 mg total) into a catheter in a vein every 6 (six) months   tiZANidine (ZANAFLEX) 4 mg tablet 5/18/2023  No Yes   Sig: Take 1 tablet (4 mg total) by mouth daily at bedtime      Facility-Administered Medications: None       Past Medical History:   Diagnosis Date   • MS " (multiple sclerosis) ([facility] [address] )        Past Surgical History:   Procedure Laterality Date   • HERNIA REPAIR      3 times   • KNEE SURGERY Right        Family History   Problem Relation Age of Onset   • Stroke Maternal Grandmother    • Multiple sclerosis Other      I have reviewed and agree with the history as documented  E-Cigarette/Vaping   • E-Cigarette Use Former User      E-Cigarette/Vaping Substances   • Nicotine No    • THC No    • CBD No    • Flavoring No      Social History     Tobacco Use   • Smoking status: Former   • Smokeless tobacco: Former   Vaping Use   • Vaping Use: Former   Substance Use Topics   • Alcohol use: Not Currently   • Drug use: Never       Review of Systems   Constitutional: Negative for fever  Respiratory: Negative for cough and shortness of breath  Cardiovascular: Negative for chest pain and palpitations  Gastrointestinal: Negative for abdominal pain  Neurological: Positive for weakness and numbness  Physical Exam  Physical Exam  Vitals and nursing note reviewed  Constitutional:       General: He is not in acute distress  Appearance: He is well-developed and normal weight  He is ill-appearing  He is not diaphoretic  HENT:      Head: Normocephalic and atraumatic  Right Ear: External ear normal       Left Ear: External ear normal       Mouth/Throat:      Mouth: Mucous membranes are moist       Pharynx: Oropharynx is clear  Eyes:      General: No scleral icterus  Conjunctiva/sclera: Conjunctivae normal       Pupils: Pupils are equal, round, and reactive to light  Neck:      Vascular: No carotid bruit or JVD  Cardiovascular:      Rate and Rhythm: Normal rate and regular rhythm  Pulses: Normal pulses  Heart sounds: Normal heart sounds  No murmur heard  Pulmonary:      Effort: Pulmonary effort is normal       Breath sounds: Normal breath sounds  Abdominal:      General: Bowel sounds are normal       Palpations: Abdomen is soft   There is no mass       Tenderness: There is no abdominal tenderness  There is no guarding or rebound  Musculoskeletal:         General: No tenderness  Normal range of motion  Cervical back: Normal range of motion and neck supple  No tenderness  Right lower leg: No edema  Left lower leg: No edema  Lymphadenopathy:      Cervical: No cervical adenopathy  Skin:     General: Skin is warm and dry  Capillary Refill: Capillary refill takes less than 2 seconds  Findings: No lesion or rash  Neurological:      Mental Status: He is alert and oriented to person, place, and time  Cranial Nerves: Cranial nerve deficit ( Left ptosis  ) present  Sensory: Sensory deficit ( Decreased sensation left side of face and left upper extremity) present  Motor: Weakness ( Left arm and leg with drift ) present  Coordination: Coordination abnormal       Gait: Gait abnormal       Deep Tendon Reflexes: Reflexes are normal and symmetric     Psychiatric:         Mood and Affect: Mood normal          Behavior: Behavior normal          Vital Signs  ED Triage Vitals   Temperature Pulse Respirations Blood Pressure SpO2   05/19/23 1356 05/19/23 1356 05/19/23 1356 05/19/23 1356 05/19/23 1356   98 2 °F (36 8 °C) 62 18 132/90 100 %      Temp Source Heart Rate Source Patient Position - Orthostatic VS BP Location FiO2 (%)   05/19/23 1356 05/19/23 1356 05/19/23 1600 05/19/23 1356 --   Oral Monitor Lying Right arm       Pain Score       05/19/23 1356       No Pain           Vitals:    05/20/23 1516 05/20/23 1516 05/20/23 2058 05/21/23 0852   BP: 127/84 127/84 116/81 117/89   Pulse:  (!) 106 84 73   Patient Position - Orthostatic VS:             Visual Acuity  Visual Acuity    Flowsheet Row Most Recent Value   L Pupil Size (mm) 2   R Pupil Size (mm) 2          ED Medications  Medications   ergocalciferol (VITAMIN D2) capsule 50,000 Units (50,000 Units Oral Not Given 5/19/23 1819)   gabapentin (NEURONTIN) capsule 300 mg (300 mg Oral Given 5/21/23 0849)   loratadine (CLARITIN) tablet 10 mg (10 mg Oral Given 5/21/23 0849)   thiamine tablet 100 mg (100 mg Oral Given 5/21/23 0849)   ondansetron (ZOFRAN) injection 4 mg (has no administration in time range)   acetaminophen (TYLENOL) tablet 650 mg (650 mg Oral Given 5/21/23 0609)   enoxaparin (LOVENOX) subcutaneous injection 40 mg (40 mg Subcutaneous Given 5/21/23 0849)   dexamethasone (DECADRON) tablet 2 mg (2 mg Oral Given 5/21/23 0849)   calcium carbonate (TUMS) chewable tablet 500 mg (500 mg Oral Given 5/21/23 0611)   ketorolac (TORADOL) injection 15 mg (has no administration in time range)   metoclopramide (REGLAN) injection 10 mg (has no administration in time range)   methylPREDNISolone sodium succinate (Solu-MEDROL) 1,000 mg in sodium chloride 0 9 % 250 mL IVPB (1,000 mg Intravenous New Bag 5/19/23 1545)   magnesium sulfate 2 g/50 mL IVPB (premix) 2 g (0 g Intravenous Stopped 5/19/23 2001)   methylPREDNISolone sodium succinate (Solu-MEDROL) 1,000 mg in sodium chloride 0 9 % 250 mL IVPB (1,000 mg Intravenous New Bag 5/20/23 1629)   Gadobutrol injection (SINGLE-DOSE) SOLN 10 mL (10 mL Intravenous Given 5/20/23 1749)       Diagnostic Studies  Results Reviewed     Procedure Component Value Units Date/Time    CBC and differential [[id number]]  (Normal) Collected: 05/20/23 0429    Lab Status: Final result Specimen: Blood from Arm, Left Updated: 05/20/23 0539     WBC 5 30 Thousand/uL      RBC 4 58 Million/uL      Hemoglobin 13 5 g/dL      Hematocrit 40 3 %      MCV 88 fL      MCH 29 5 pg      MCHC 33 5 g/dL      RDW 12 0 %      MPV 10 1 fL      Platelets 451 Thousands/uL     Narrative: This is an appended report  These results have been appended to a previously verified report      Magnesium [[id number]]  (Normal) Collected: 05/20/23 0429    Lab Status: Final result Specimen: Blood from Arm, Left Updated: 05/20/23 0500     Magnesium 2 0 mg/dL     Comprehensive metabolic panel [[id number]] (Abnormal) Collected: 05/20/23 0429    Lab Status: Final result Specimen: Blood from Arm, Left Updated: 05/20/23 0500     Sodium 139 mmol/L      Potassium 3 9 mmol/L      Chloride 110 mmol/L      CO2 22 mmol/L      ANION GAP 7 mmol/L      BUN 18 mg/dL      Creatinine 0 74 mg/dL      Glucose 144 mg/dL      Glucose, Fasting 144 mg/dL      Calcium 9 3 mg/dL      AST 9 U/L      ALT 11 U/L      Alkaline Phosphatase 44 U/L      Total Protein 6 8 g/dL      Albumin 3 9 g/dL      Total Bilirubin 0 36 mg/dL      eGFR 108 ml/min/1 73sq m     Narrative:      [place] guidelines for Chronic Kidney Disease (CKD):   •  Stage 1 with normal or high GFR (GFR > 90 mL/min/1 73 square meters)  •  Stage 2 Mild CKD (GFR = 60-89 mL/min/1 73 square meters)  •  Stage 3A Moderate CKD (GFR = 45-59 mL/min/1 73 square meters)  •  Stage 3B Moderate CKD (GFR = 30-44 mL/min/1 73 square meters)  •  Stage 4 Severe CKD (GFR = 15-29 mL/min/1 73 square meters)  •  Stage 5 End Stage CKD (GFR <15 mL/min/1 73 square meters)  Note: GFR calculation is accurate only with a steady state creatinine    UA (URINE) with reflex to Scope [[id number]] Collected: 05/19/23 1928    Lab Status: Final result Specimen: Urine, Clean Catch Updated: 05/19/23 1943     Color, UA Yellow     Clarity, UA Clear     Specific [place], UA 1 020     pH, UA 5 5     Leukocytes, UA Negative     Nitrite, UA Negative     Protein, UA Negative mg/dl      Glucose, UA Negative mg/dl      Ketones, UA Negative mg/dl      Urobilinogen, UA <2 0 mg/dl      Bilirubin, UA Negative     Occult Blood, UA Negative    Comprehensive metabolic panel [[id number]]  (Abnormal) Collected: 05/19/23 1443    Lab Status: Final result Specimen: Blood from Arm, Right Updated: 05/19/23 1513     Sodium 140 mmol/L      Potassium 3 8 mmol/L      Chloride 110 mmol/L      CO2 24 mmol/L      ANION GAP 6 mmol/L      BUN 21 mg/dL      Creatinine 0 75 mg/dL      Glucose 76 mg/dL      Calcium 9 2 mg/dL AST 10 U/L      ALT 10 U/L      Alkaline Phosphatase 47 U/L      Total Protein 6 8 g/dL      Albumin 4 0 g/dL      Total Bilirubin 0 49 mg/dL      eGFR 107 ml/min/1 73sq m     Narrative:      [place] guidelines for Chronic Kidney Disease (CKD):   •  Stage 1 with normal or high GFR (GFR > 90 mL/min/1 73 square meters)  •  Stage 2 Mild CKD (GFR = 60-89 mL/min/1 73 square meters)  •  Stage 3A Moderate CKD (GFR = 45-59 mL/min/1 73 square meters)  •  Stage 3B Moderate CKD (GFR = 30-44 mL/min/1 73 square meters)  •  Stage 4 Severe CKD (GFR = 15-29 mL/min/1 73 square meters)  •  Stage 5 End Stage CKD (GFR <15 mL/min/1 73 square meters)  Note: GFR calculation is accurate only with a steady state creatinine    Magnesium [[id number]]  (Abnormal) Collected: 05/19/23 1443    Lab Status: Final result Specimen: Blood from Arm, Right Updated: 05/19/23 1513     Magnesium 1 8 mg/dL     Protime-INR [[id number]]  (Normal) Collected: 05/19/23 1443    Lab Status: Final result Specimen: Blood from Arm, Right Updated: 05/19/23 1504     Protime 13 6 seconds      INR 0 97    APTT [[id number]]  (Normal) Collected: 05/19/23 1443    Lab Status: Final result Specimen: Blood from Arm, Right Updated: 05/19/23 1504     PTT 28 seconds     CBC and differential [[id number]] Collected: 05/19/23 1443    Lab Status: Final result Specimen: Blood from Arm, Right Updated: 05/19/23 1450     WBC 6 53 Thousand/uL      RBC 4 66 Million/uL      Hemoglobin 14 0 g/dL      Hematocrit 41 8 %      MCV 90 fL      MCH 30 0 pg      MCHC 33 5 g/dL      RDW 12 3 %      MPV 9 9 fL      Platelets 186 Thousands/uL      nRBC 0 /100 WBCs      Neutrophils Relative 71 %      Immat GRANS % 1 %      Lymphocytes Relative 17 %      Monocytes Relative 7 %      Eosinophils Relative 3 %      Basophils Relative 1 %      Neutrophils Absolute 4 62 Thousands/µL      Immature Grans Absolute 0 03 Thousand/uL      Lymphocytes Absolute 1 13 Thousands/µL Monocytes Absolute 0 47 Thousand/µL      Eosinophils Absolute 0 22 Thousand/µL      Basophils Absolute 0 06 Thousands/µL                  MRI brain MS wo and w contrast   Final Result by Sathya Smiley DO (05/20 1849)      Stable moderate chronic demyelinating disease within the brain parenchyma  No enhancement to suggest active inflammation/demyelination  Workstation performed: [id number]         CT head without contrast   Final Result by Ibeth Mcfarlane MD (05/19 1607)      No acute intracranial abnormality  Stable hypodense periventricular lesions related to patient's known history of multiple sclerosis  Workstation performed: [id number]         XR chest 1 view portable   Final Result by Cecil Moses MD (05/19 1520)      No acute cardiopulmonary disease  Workstation performed: [id number]                    Procedures  Procedures         ED Course  ED Course as of 05/21/23 1348   Fri May 19, 2023   1418 Discussed with neurology, Dr Joshua Marin, at [phone]  He agrees no need to call stroke alert  Medical Decision Making  33year-old male with multiple sclerosis presents with worsening symptoms of left-sided weakness, tingling of left face and arm  This is similar to prior episodes where acute stroke work-up revealed no stroke but rather MS flare  Neurology agrees no need to call stroke work-up  Neuro AP saw patient rapidly  Will send for noncontrast head CT, check labs for hyperglycemia, dehydration, electrolyte abnormality, acute kidney injury, ACS, dysrhythmia, infection  Patient seen by neurologist at bedside as well  All studies reviewed  Per neurology will get a gram of Solu-Medrol  Patient unable to be cared for adequately at home  Will be admitted to trauma service for MRI and further work-up  Hypomagnesemia was treated here      Hypomagnesemia: acute illness or injury  Multiple sclerosis exacerbation [facility]): chronic illness or injury with exacerbation, progression, or side effects of treatment  Amount and/or Complexity of Data Reviewed  External Data Reviewed: labs and notes  Labs: ordered  Radiology: ordered  Discussion of management or test interpretation with external provider(s): Discussed with neurology AP and neurologists  Discussed with internal medicine attending  Risk  Decision regarding hospitalization  Disposition  Final diagnoses:   MS (multiple sclerosis) ([facility] [address] )   Hypomagnesemia   Multiple sclerosis exacerbation ([facility] )     Time reflects when diagnosis was documented in both MDM as applicable and the Disposition within this note     Time User Action Codes Description Comment    5/19/2023  2:26 PM Geneva Hany Add [G35] MS (multiple sclerosis) ([facility] [address] )     5/19/2023  3:38 PM Geneva Hany Add [E83 42] Hypomagnesemia     5/19/2023  3:39 PM Geneva Hany Add [G35] Multiple sclerosis exacerbation [facility])       ED Disposition     ED Disposition   Admit    Condition   Stable    Date/Time   Fri May 19, 2023  4:21 PM    Comment   Case was discussed with Dr Huber Hartley and the patient's admission status was agreed to be Admission Status: observation status to the service of Dr Huber Hartley   Follow-up Information     Follow up With Specialties Details Why Contact Info Additional Information    Neurology [facility] Neurology Follow up in 2 week(s) An appointment at our outpatient office has been requested for you    If you do not hear from them in 1 week after discharge, please call them at the number located above [address] [facility] [address] [facility]  [phone] Neurology [facility], [address] [place] [address], [address] [place] [address]          Current Discharge Medication List      CONTINUE these medications which have NOT CHANGED    Details   cyanocobalamin 1,000 mcg/mL Take B12 1000 mcg IM "once a week for 4 weeks, then once a month for 5 months  Qty: 9 mL, Refills: 0    Associated Diagnoses: MS (multiple sclerosis) ([facility])      gabapentin (NEURONTIN) 300 mg capsule Take 1 capsule (300 mg total) by mouth 3 (three) times a day  Qty: 270 capsule, Refills: 3    Associated Diagnoses: CNS demyelinating disease ([facility])      loratadine (CLARITIN) 10 mg tablet Take 1 tablet (10 mg total) by mouth daily  Qty: 90 tablet, Refills: 3    Associated Diagnoses: MS (multiple sclerosis) ([facility] [address] ); Ambulatory dysfunction      MAGNESIUM PO Take by mouth      Riboflavin ([employer] Vitamin B-2) 100 MG TABS Take 2 tablets (200 mg total) by mouth in the morning  Qty: 360 tablet, Refills: 2    Associated Diagnoses: Right hemiparesis ([facility]); MS (multiple sclerosis) ([facility])      tiZANidine (ZANAFLEX) 4 mg tablet Take 1 tablet (4 mg total) by mouth daily at bedtime  Qty: 90 tablet, Refills: 0    Associated Diagnoses: [facility] Night muscle spasms      ocrelizumab (Ocrevus) 300 MG/10ML SOLN Inject 20 mL (600 mg total) into a catheter in a vein every 6 (six) months  Qty: 20 mL, Refills: 1    Associated Diagnoses: MS (multiple sclerosis) ([facility])      SYRINGE-NEEDLE, DISP, 3 ML 25G X 1\" 3 ML MISC Take with B12 regimen prescribed  Qty: 9 each, Refills: 0    Associated Diagnoses: MS (multiple sclerosis) ([facility])         STOP taking these medications       ergocalciferol (VITAMIN D2) 50,000 units Comments:   Reason for Stopping:         thiamine (VITAMIN B1) 50 mg tablet Comments:   Reason for Stopping:               No discharge procedures on file      PDMP Review     None          ED Provider  Electronically Signed by           Yudy aCi DO  05/21/23 9796    "

## 2023-05-21 NOTE — ASSESSMENT & PLAN NOTE
Complaining of intractable headache  MRI of the head no significant change  Discussed the case with on-call neurologist, recommending to treat with migraine cocktail with Reglan, Toradol, Benadryl-and monitor  After receiving medication treatment for headache, patient condition significantly improved  End of the day, patient decided to go home  Patient is getting discharged home with follow-up with PCP and neurology with resuming all home medication

## 2023-05-21 NOTE — PLAN OF CARE
Problem: Potential for Falls  Goal: Patient will remain free of falls  Description: INTERVENTIONS:  - Educate patient/family on patient safety including physical limitations  - Instruct patient to call for assistance with activity   - Consult OT/PT to assist with strengthening/mobility   - Keep Call bell within reach  - Keep bed low and locked with side rails adjusted as appropriate  - Keep care items and personal belongings within reach  - Initiate and maintain comfort rounds  - Make Fall Risk Sign visible to staff  - Offer Toileting every 2 Hours, in advance of need  - Initiate/Maintain alarm  - Obtain necessary fall risk management equipment:   - Apply yellow socks and bracelet for high fall risk patients  - Consider moving patient to room near nurses station  Outcome: Progressing     Problem: PAIN - ADULT  Goal: Verbalizes/displays adequate comfort level or baseline comfort level  Description: Interventions:  - Encourage patient to monitor pain and request assistance  - Assess pain using appropriate pain scale  - Administer analgesics based on type and severity of pain and evaluate response  - Implement non-pharmacological measures as appropriate and evaluate response  - Consider cultural and social influences on pain and pain management  - Notify physician/advanced practitioner if interventions unsuccessful or patient reports new pain  Outcome: Progressing     Problem: INFECTION - ADULT  Goal: Absence or prevention of progression during hospitalization  Description: INTERVENTIONS:  - Assess and monitor for signs and symptoms of infection  - Monitor lab/diagnostic results  - Monitor all insertion sites, i e  indwelling lines, tubes, and drains  - Monitor endotracheal if appropriate and nasal secretions for changes in amount and color  - [place] appropriate cooling/warming therapies per order  - Administer medications as ordered  - Instruct and encourage patient and family to use good hand hygiene technique  - Identify and instruct in appropriate isolation precautions for identified infection/condition  Outcome: Progressing  Goal: Absence of fever/infection during neutropenic period  Description: INTERVENTIONS:  - Monitor WBC    Outcome: Progressing     Problem: SAFETY ADULT  Goal: Patient will remain free of falls  Description: INTERVENTIONS:  - Educate patient/family on patient safety including physical limitations  - Instruct patient to call for assistance with activity   - Consult OT/PT to assist with strengthening/mobility   - Keep Call bell within reach  - Keep bed low and locked with side rails adjusted as appropriate  - Keep care items and personal belongings within reach  - Initiate and maintain comfort rounds  - Make Fall Risk Sign visible to staff  - Offer Toileting every 2 Hours, in advance of need  - Initiate/Maintain alarm  - Obtain necessary fall risk management equipment:   - Apply yellow socks and bracelet for high fall risk patients  - Consider moving patient to room near nurses station  Outcome: Progressing  Goal: Maintain or return to baseline ADL function  Description: INTERVENTIONS:  -  Assess patient's ability to carry out ADLs; assess patient's baseline for ADL function and identify physical deficits which impact ability to perform ADLs (bathing, care of mouth/teeth, toileting, grooming, dressing, etc )  - Assess/evaluate cause of self-care deficits   - Assess range of motion  - Assess patient's mobility; develop plan if impaired  - Assess patient's need for assistive devices and provide as appropriate  - Encourage maximum independence but intervene and supervise when necessary  - Involve family in performance of ADLs  - Assess for home care needs following discharge   - Consider OT consult to assist with ADL evaluation and planning for discharge  - Provide patient education as appropriate  Outcome: Progressing  Goal: Maintains/Returns to pre admission functional level  Description: INTERVENTIONS:  - Perform BMAT or MOVE assessment daily    - Set and communicate daily mobility goal to care team and patient/family/caregiver  - Collaborate with rehabilitation services on mobility goals if consulted  - Out of bed for toileting  - Record patient progress and toleration of activity level   Outcome: Progressing     Problem: DISCHARGE PLANNING  Goal: Discharge to home or other facility with appropriate resources  Description: INTERVENTIONS:  - Identify barriers to discharge w/patient and caregiver  - Arrange for needed discharge resources and transportation as appropriate  - Identify discharge learning needs (meds, wound care, etc )  - Arrange for interpretive services to assist at discharge as needed  - Refer to Case Management Department for coordinating discharge planning if the patient needs post-hospital services based on physician/advanced practitioner order or complex needs related to functional status, cognitive ability, or social support system  Outcome: Progressing     Problem: Knowledge Deficit  Goal: Patient/family/caregiver demonstrates understanding of disease process, treatment plan, medications, and discharge instructions  Description: Complete learning assessment and assess knowledge base    Interventions:  - Provide teaching at level of understanding  - Provide teaching via preferred learning methods  Outcome: Progressing     Problem: MOBILITY - ADULT  Goal: Maintain or return to baseline ADL function  Description: INTERVENTIONS:  -  Assess patient's ability to carry out ADLs; assess patient's baseline for ADL function and identify physical deficits which impact ability to perform ADLs (bathing, care of mouth/teeth, toileting, grooming, dressing, etc )  - Assess/evaluate cause of self-care deficits   - Assess range of motion  - Assess patient's mobility; develop plan if impaired  - Assess patient's need for assistive devices and provide as appropriate  - Encourage maximum independence but intervene and supervise when necessary  - Involve family in performance of ADLs  - Assess for home care needs following discharge   - Consider OT consult to assist with ADL evaluation and planning for discharge  - Provide patient education as appropriate  Outcome: Progressing  Goal: Maintains/Returns to pre admission functional level  Description: INTERVENTIONS:  - Perform BMAT or MOVE assessment daily    - Set and communicate daily mobility goal to care team and patient/family/caregiver     - Collaborate with rehabilitation services on mobility goals if consulted  - Out of bed for toileting  - Record patient progress and toleration of activity level   Outcome: Progressing

## 2023-05-21 NOTE — DISCHARGE SUMMARY
[place]  Discharge- Bo Etienne 1975, 50 y o  male MRN: [id number]  Unit/Bed#: -01 Encounter: [id number]  Primary Care Provider: Marlen Ward MD   Date and time admitted to hospital: 5/19/2023  1:48 PM    * MS (multiple sclerosis) [facility])  Assessment & Plan  History of MS with chronic right-sided weakness  Presented due to new left-sided weakness/numbness involving hand/face/leg which he noticed upon waking from nap around noon on 5/19  · Maintained on Ocrevus injections every 6 months, next scheduled for 5/22  · CTH with stable findings   · Neurology consult, appreciate recs  · MRI -no new lesion or acute change  · Continue IV Decadron -Which seem also help with intractable headache  · Discussed with neurology since patient is complaining headache, recommends to treat with migraine cocktail and monitor  · Supportive care while inpatient, monitor symptoms  · Fall precautions, PT/OT  · Initially patient is scheduled for his infusion of Elois Fuchs on 5/22/23-which patient rescheduled on 6/19/2023 due to hospitalization  · After receiving medication treatment for headache, patient condition significantly improved  End of the day, patient decided to go home  Patient is getting discharged home with follow-up with PCP and neurology with resuming all home medication  Hypomagnesemia  Assessment & Plan  · Resolved    Intractable headache  Assessment & Plan  Complaining of intractable headache  MRI of the head no significant change  Discussed the case with on-call neurologist, recommending to treat with migraine cocktail with Reglan, Toradol, Benadryl-and monitor  After receiving medication treatment for headache, patient condition significantly improved  End of the day, patient decided to go home  Patient is getting discharged home with follow-up with PCP and neurology with resuming all home medication          Medical Problems     Resolved Problems  Date Reviewed: 5/20/2023 None       Discharging Physician / Practitioner: Puneet Barney MD  PCP: Yamil Hernandez MD  Admission Date:   Admission Orders (From admission, onward)     Ordered        05/21/23 1436  Inpatient Admission  Once            05/19/23 1622  Place in Observation  Once                      Discharge Date: 05/21/23    Consultations During Hospital Stay:  · Neurology    Procedures Performed:   MRI brain MS wo and w contrast   Final Result by Sathya Castro DO (05/20 1849)      Stable moderate chronic demyelinating disease within the brain parenchyma  No enhancement to suggest active inflammation/demyelination  Workstation performed: [id number]         CT head without contrast   Final Result by Negro Cabrera MD (05/19 1607)      No acute intracranial abnormality  Stable hypodense periventricular lesions related to patient's known history of multiple sclerosis  Workstation performed: [id number]         XR chest 1 view portable   Final Result by Deng Lawton MD (05/19 1520)      No acute cardiopulmonary disease  Workstation performed: [id number]         ·   ·     Significant Findings / Test Results:   Lab Results   Component Value Date    WBC 15 40 (H) 05/21/2023    HGB 13 4 05/21/2023    HCT 39 8 05/21/2023    MCV 90 05/21/2023     05/21/2023   ·   Lab Results   Component Value Date    SODIUM 141 05/21/2023    K 3 8 05/21/2023     (H) 05/21/2023    CO2 21 05/21/2023    AGAP 8 05/21/2023    BUN 23 05/21/2023    CREATININE 0 77 05/21/2023    GLUC 139 05/21/2023    GLUF 144 (H) 05/20/2023    CALCIUM 9 0 05/21/2023    AST 9 (L) 05/20/2023    ALT 11 05/20/2023    ALKPHOS 44 05/20/2023    TP 6 8 05/20/2023    TBILI 0 36 05/20/2023    EGFR 106 05/21/2023   ·   ·     Incidental Findings:   · As mentioned above  · I reviewed the above mentioned incidental findings with the patient and/or family and they expressed understanding      Test Results Pending at Discharge (will require follow up): · None     Outpatient Tests Requested:  · None    Complications: None    Reason for Admission: Left-sided weakness    Hospital Course:   Charanjit Lockhart is a 50 y o  male patient who originally presented to the hospital on 5/19/2023 due to left-sided weakness secondary to multiple sclerosis flareup  MRI done which shows no acute change, other than chronic issues  Patient received steroid therapy  With the treatment, patient condition improved  On 5/21/2023, during morning rounds patient was complaining of severe headache, after discussion is done with neurology, patient received migraine cocktail with Toradol, Benadryl and Reglan  End of the day, patient condition significantly improved and patient decided to get discharge and follow-up with PCP and neurology  All lab results, imaging findings, treatment plan and option discussed in details with patient and his sister on the bedside  Verbalizes to understand and agrees  Advised to follow-up with PCP and neurology as soon as possible  The patient, initially admitted to the hospital as inpatient, was discharged earlier than expected given the following: Patient condition significantly improved after receiving treatment       Please see above list of diagnoses and related plan for additional information  Condition at Discharge: stable    Discharge Day Visit / Exam:   * Please refer to separate progress note for these details *    Discussion with Family: Updated  (sister) at bedside  Discharge instructions/Information to patient and family:   See after visit summary for information provided to patient and family  Provisions for Follow-Up Care:  See after visit summary for information related to follow-up care and any pertinent home health orders         Disposition:   Home    Planned Readmission: If condition get worse     Discharge Statement:   Greater than 50% of the total time was spent examining patient, answering all patient questions, arranging and discussing plan of care with patient as well as directly providing post-discharge instructions  Additional time then spent on discharge activities  Discharge Medications:  See after visit summary for reconciled discharge medications provided to patient and/or family        **Please Note: This note may have been constructed using a voice recognition system**

## 2023-05-21 NOTE — ASSESSMENT & PLAN NOTE
History of MS with chronic right-sided weakness  Presented due to new left-sided weakness/numbness involving hand/face/leg which he noticed upon waking from nap around noon on 5/19  · Maintained on Ocrevus injections every 6 months, next scheduled for 5/22  · CTH with stable findings   · Neurology consult, appreciate recs  · MRI -no new lesion or acute change  · Continue IV Decadron -Which seem also help with intractable headache  · Discussed with neurology since patient is complaining headache, recommends to treat with migraine cocktail and monitor  · Supportive care while inpatient, monitor symptoms  · Fall precautions, PT/OT  · Initially patient is scheduled for his infusion of Ananya Horns on 5/22/23-which patient rescheduled on 6/19/2023 due to hospitalization  · After receiving medication treatment for headache, patient condition significantly improved  End of the day, patient decided to go home  Patient is getting discharged home with follow-up with PCP and neurology with resuming all home medication

## 2023-05-21 NOTE — PLAN OF CARE
Problem: Potential for Falls  Goal: Patient will remain free of falls  Description: INTERVENTIONS:  - Educate patient/family on patient safety including physical limitations  - Instruct patient to call for assistance with activity   - Consult OT/PT to assist with strengthening/mobility   - Keep Call bell within reach  - Keep bed low and locked with side rails adjusted as appropriate  - Keep care items and personal belongings within reach  - Initiate and maintain comfort rounds  - Make Fall Risk Sign visible to staff  - Offer Toileting every 2 Hours, in advance of need  - Initiate/Maintain alarm  - Obtain necessary fall risk management equipment:   - Apply yellow socks and bracelet for high fall risk patients  - Consider moving patient to room near nurses station  Outcome: Progressing     Problem: PAIN - ADULT  Goal: Verbalizes/displays adequate comfort level or baseline comfort level  Description: Interventions:  - Encourage patient to monitor pain and request assistance  - Assess pain using appropriate pain scale  - Administer analgesics based on type and severity of pain and evaluate response  - Implement non-pharmacological measures as appropriate and evaluate response  - Consider cultural and social influences on pain and pain management  - Notify physician/advanced practitioner if interventions unsuccessful or patient reports new pain  Outcome: Progressing     Problem: INFECTION - ADULT  Goal: Absence or prevention of progression during hospitalization  Description: INTERVENTIONS:  - Assess and monitor for signs and symptoms of infection  - Monitor lab/diagnostic results  - Monitor all insertion sites, i e  indwelling lines, tubes, and drains  - Monitor endotracheal if appropriate and nasal secretions for changes in amount and color  - [place] appropriate cooling/warming therapies per order  - Administer medications as ordered  - Instruct and encourage patient and family to use good hand hygiene technique  - Identify and instruct in appropriate isolation precautions for identified infection/condition  Outcome: Progressing  Goal: Absence of fever/infection during neutropenic period  Description: INTERVENTIONS:  - Monitor WBC    Outcome: Progressing     Problem: SAFETY ADULT  Goal: Patient will remain free of falls  Description: INTERVENTIONS:  - Educate patient/family on patient safety including physical limitations  - Instruct patient to call for assistance with activity   - Consult OT/PT to assist with strengthening/mobility   - Keep Call bell within reach  - Keep bed low and locked with side rails adjusted as appropriate  - Keep care items and personal belongings within reach  - Initiate and maintain comfort rounds  - Make Fall Risk Sign visible to staff  - Offer Toileting every 2 Hours, in advance of need  - Initiate/Maintain alarm  - Obtain necessary fall risk management equipment:   - Apply yellow socks and bracelet for high fall risk patients  - Consider moving patient to room near nurses station  Outcome: Progressing  Goal: Maintain or return to baseline ADL function  Description: INTERVENTIONS:  -  Assess patient's ability to carry out ADLs; assess patient's baseline for ADL function and identify physical deficits which impact ability to perform ADLs (bathing, care of mouth/teeth, toileting, grooming, dressing, etc )  - Assess/evaluate cause of self-care deficits   - Assess range of motion  - Assess patient's mobility; develop plan if impaired  - Assess patient's need for assistive devices and provide as appropriate  - Encourage maximum independence but intervene and supervise when necessary  - Involve family in performance of ADLs  - Assess for home care needs following discharge   - Consider OT consult to assist with ADL evaluation and planning for discharge  - Provide patient education as appropriate  Outcome: Progressing  Goal: Maintains/Returns to pre admission functional level  Description: INTERVENTIONS:  - Perform BMAT or MOVE assessment daily    - Set and communicate daily mobility goal to care team and patient/family/caregiver  - Collaborate with rehabilitation services on mobility goals if consulted  - Out of bed for toileting  - Record patient progress and toleration of activity level   Outcome: Progressing     Problem: DISCHARGE PLANNING  Goal: Discharge to home or other facility with appropriate resources  Description: INTERVENTIONS:  - Identify barriers to discharge w/patient and caregiver  - Arrange for needed discharge resources and transportation as appropriate  - Identify discharge learning needs (meds, wound care, etc )  - Arrange for interpretive services to assist at discharge as needed  - Refer to Case Management Department for coordinating discharge planning if the patient needs post-hospital services based on physician/advanced practitioner order or complex needs related to functional status, cognitive ability, or social support system  Outcome: Progressing     Problem: Knowledge Deficit  Goal: Patient/family/caregiver demonstrates understanding of disease process, treatment plan, medications, and discharge instructions  Description: Complete learning assessment and assess knowledge base    Interventions:  - Provide teaching at level of understanding  - Provide teaching via preferred learning methods  Outcome: Progressing     Problem: MOBILITY - ADULT  Goal: Maintain or return to baseline ADL function  Description: INTERVENTIONS:  -  Assess patient's ability to carry out ADLs; assess patient's baseline for ADL function and identify physical deficits which impact ability to perform ADLs (bathing, care of mouth/teeth, toileting, grooming, dressing, etc )  - Assess/evaluate cause of self-care deficits   - Assess range of motion  - Assess patient's mobility; develop plan if impaired  - Assess patient's need for assistive devices and provide as appropriate  - Encourage maximum independence but intervene and supervise when necessary  - Involve family in performance of ADLs  - Assess for home care needs following discharge   - Consider OT consult to assist with ADL evaluation and planning for discharge  - Provide patient education as appropriate  Outcome: Progressing  Goal: Maintains/Returns to pre admission functional level  Description: INTERVENTIONS:  - Perform BMAT or MOVE assessment daily    - Set and communicate daily mobility goal to care team and patient/family/caregiver     - Collaborate with rehabilitation services on mobility goals if consulted  - Out of bed for toileting  - Record patient progress and toleration of activity level   Outcome: Progressing

## 2023-05-22 ENCOUNTER — HOSPITAL ENCOUNTER (OUTPATIENT)
Dept: INFUSION CENTER | Facility: HOSPITAL | Age: 48
Discharge: HOME | End: 2023-05-22
Attending: PSYCHIATRY & NEUROLOGY

## 2023-05-22 NOTE — TELEPHONE ENCOUNTER
Per 5/21/23 ED note:  Assessment & Plan  Complaining of intractable headache  MRI of the head no significant change  Discussed the case with on-call neurologist, recommending to treat with migraine cocktail with Reglan, Toradol, Benadryl-and monitor  After receiving medication treatment for headache, patient condition significantly improved  End of the day, patient decided to go home  Patient is getting discharged home with follow-up with PCP and neurology with resuming all home medication        Negative for MS relapse

## 2023-05-30 NOTE — TELEPHONE ENCOUNTER
SW called and left detailed message for patient requesting c/b  Called to ask patient to call office to schedule F2F  Provided office number  Also called to check in on aps for cooling devices as well as transportation ap  Waiting return call

## 2023-06-01 NOTE — TELEPHONE ENCOUNTER
FLORENCIO called patient at [phone]  Left message checking in and requested c/b   SW remains available

## 2023-06-05 NOTE — TELEPHONE ENCOUNTER
Attempted to call patient again, no answer  Left message that unable to reach letter would be sent  If has further needs to contact office  No further questions or needs at this time  SW will be available for future social needs as requested

## 2023-06-06 ENCOUNTER — TELEPHONE (OUTPATIENT)
Dept: NEUROLOGY | Facility: CLINIC | Age: 48
End: 2023-06-06

## 2023-06-06 NOTE — TELEPHONE ENCOUNTER
ALEXANDERN and FLORENCIOO sent to Imelda Loco via fax and email  Patient has not returned call and needs a F2F set up if [place] and [employer] is to go ahead with mobility device for patient

## 2023-06-09 ENCOUNTER — TELEPHONE (OUTPATIENT)
Dept: NEUROLOGY | Facility: CLINIC | Age: 48
End: 2023-06-09

## 2023-06-09 DIAGNOSIS — D84.821 IMMUNODEFICIENCY DUE TO TREATMENT WITH IMMUNOSUPPRESSIVE MEDICATION (HCC): ICD-10-CM

## 2023-06-09 DIAGNOSIS — G35 MS (MULTIPLE SCLEROSIS) (HCC): Primary | ICD-10-CM

## 2023-06-09 DIAGNOSIS — Z79.899 IMMUNODEFICIENCY DUE TO TREATMENT WITH IMMUNOSUPPRESSIVE MEDICATION (HCC): ICD-10-CM

## 2023-06-09 DIAGNOSIS — Z11.59 ENCOUNTER FOR SCREENING FOR VIRAL DISEASE: ICD-10-CM

## 2023-06-09 RX ORDER — ACETAMINOPHEN 325 MG/1
650 TABLET ORAL ONCE
OUTPATIENT
Start: 2023-06-19

## 2023-06-09 RX ORDER — SODIUM CHLORIDE 9 MG/ML
20 INJECTION, SOLUTION INTRAVENOUS ONCE
OUTPATIENT
Start: 2023-06-19

## 2023-06-09 NOTE — TELEPHONE ENCOUNTER
Pt is scheduled for Ocrevus on 6/19/23. Inga Rodriges is approved with [facility] from 5/18/23 to 5/17/24 for 20mls/180 days. Medication must be obtained from [facility]. Medication previously scheduled for delivery on 5/20/23. The following labs are required and have been entered as appropriate per protocol:    CBC  CMP  Immunoglobulins  HIV  Quantiferon TB gold  Acute hepatitis panel    MyChart message sent to pt reminding him of needed labs. Awaiting labs.

## 2023-06-13 ENCOUNTER — PATIENT MESSAGE (OUTPATIENT)
Dept: NEUROLOGY | Facility: CLINIC | Age: 48
End: 2023-06-13

## 2023-06-13 ENCOUNTER — APPOINTMENT (OUTPATIENT)
Dept: LAB | Facility: HOSPITAL | Age: 48
End: 2023-06-13
Payer: COMMERCIAL

## 2023-06-13 DIAGNOSIS — G35 MS (MULTIPLE SCLEROSIS) (HCC): ICD-10-CM

## 2023-06-13 DIAGNOSIS — R53.83 OTHER FATIGUE: ICD-10-CM

## 2023-06-13 DIAGNOSIS — E78.2 MIXED HYPERTRIGLYCERIDEMIA: ICD-10-CM

## 2023-06-13 LAB
ALBUMIN SERPL BCP-MCNC: 3.5 G/DL (ref 3.5–5)
ALP SERPL-CCNC: 56 U/L (ref 46–116)
ALT SERPL W P-5'-P-CCNC: 22 U/L (ref 12–78)
ANION GAP SERPL CALCULATED.3IONS-SCNC: 4 MMOL/L (ref 4–13)
AST SERPL W P-5'-P-CCNC: 9 U/L (ref 5–45)
BILIRUB SERPL-MCNC: 0.48 MG/DL (ref 0.2–1)
BUN SERPL-MCNC: 28 MG/DL (ref 5–25)
CALCIUM SERPL-MCNC: 9.7 MG/DL (ref 8.3–10.1)
CHLORIDE SERPL-SCNC: 111 MMOL/L (ref 96–108)
CHOLEST SERPL-MCNC: 114 MG/DL
CO2 SERPL-SCNC: 23 MMOL/L (ref 21–32)
CREAT SERPL-MCNC: 1.09 MG/DL (ref 0.6–1.3)
ERYTHROCYTE [DISTWIDTH] IN BLOOD BY AUTOMATED COUNT: 12.7 % (ref 11.6–15.1)
GFR SERPL CREATININE-BSD FRML MDRD: 78 ML/MIN/1.73SQ M
GLUCOSE P FAST SERPL-MCNC: 91 MG/DL (ref 65–99)
HCT VFR BLD AUTO: 44.7 % (ref 36.5–49.3)
HDLC SERPL-MCNC: 38 MG/DL
HGB BLD-MCNC: 14.1 G/DL (ref 12–17)
HIV 1+2 AB+HIV1 P24 AG SERPL QL IA: NORMAL
HIV 2 AB SERPL QL IA: NORMAL
HIV1 AB SERPL QL IA: NORMAL
HIV1 P24 AG SERPL QL IA: NORMAL
LDLC SERPL CALC-MCNC: 52 MG/DL (ref 0–100)
MCH RBC QN AUTO: 29.5 PG (ref 26.8–34.3)
MCHC RBC AUTO-ENTMCNC: 31.5 G/DL (ref 31.4–37.4)
MCV RBC AUTO: 94 FL (ref 82–98)
NONHDLC SERPL-MCNC: 76 MG/DL
PLATELET # BLD AUTO: 225 THOUSANDS/UL (ref 149–390)
PMV BLD AUTO: 11.3 FL (ref 8.9–12.7)
POTASSIUM SERPL-SCNC: 4.8 MMOL/L (ref 3.5–5.3)
PROT SERPL-MCNC: 7 G/DL (ref 6.4–8.4)
RBC # BLD AUTO: 4.78 MILLION/UL (ref 3.88–5.62)
SODIUM SERPL-SCNC: 138 MMOL/L (ref 135–147)
TRIGL SERPL-MCNC: 118 MG/DL
TSH SERPL DL<=0.05 MIU/L-ACNC: 1.87 UIU/ML (ref 0.45–4.5)
WBC # BLD AUTO: 8.01 THOUSAND/UL (ref 4.31–10.16)

## 2023-06-13 PROCEDURE — 80061 LIPID PANEL: CPT

## 2023-06-13 PROCEDURE — 36415 COLL VENOUS BLD VENIPUNCTURE: CPT

## 2023-06-13 PROCEDURE — 84443 ASSAY THYROID STIM HORMONE: CPT

## 2023-06-13 PROCEDURE — 86480 TB TEST CELL IMMUN MEASURE: CPT

## 2023-06-13 PROCEDURE — 85027 COMPLETE CBC AUTOMATED: CPT

## 2023-06-13 PROCEDURE — 80053 COMPREHEN METABOLIC PANEL: CPT

## 2023-06-13 PROCEDURE — 87389 HIV-1 AG W/HIV-1&-2 AB AG IA: CPT

## 2023-06-13 NOTE — TELEPHONE ENCOUNTER
Pt had labs drawn this AM. Unfortunately immunogloblin and hepatitis panel not drawn. Active orders are in epic for both tests. I do not see that these tests can be added on to blood already drawn. Spoke with pt, asked him to return to the lab for testing. Awaiting results.

## 2023-06-14 ENCOUNTER — APPOINTMENT (OUTPATIENT)
Dept: LAB | Facility: HOSPITAL | Age: 48
End: 2023-06-14
Payer: COMMERCIAL

## 2023-06-14 DIAGNOSIS — Z11.59 ENCOUNTER FOR SCREENING FOR VIRAL DISEASE: ICD-10-CM

## 2023-06-14 DIAGNOSIS — Z79.899 IMMUNODEFICIENCY DUE TO TREATMENT WITH IMMUNOSUPPRESSIVE MEDICATION (HCC): ICD-10-CM

## 2023-06-14 DIAGNOSIS — D84.821 IMMUNODEFICIENCY DUE TO TREATMENT WITH IMMUNOSUPPRESSIVE MEDICATION (HCC): ICD-10-CM

## 2023-06-14 DIAGNOSIS — G35 MS (MULTIPLE SCLEROSIS) (HCC): ICD-10-CM

## 2023-06-14 LAB
HAV IGM SER QL: NORMAL
HBV CORE IGM SER QL: NORMAL
HBV SURFACE AG SER QL: NORMAL
HCV AB SER QL: NORMAL
IGA SERPL-MCNC: 242 MG/DL (ref 70–400)
IGG SERPL-MCNC: 996 MG/DL (ref 700–1600)
IGM SERPL-MCNC: 74 MG/DL (ref 40–230)

## 2023-06-14 PROCEDURE — 82784 ASSAY IGA/IGD/IGG/IGM EACH: CPT

## 2023-06-14 PROCEDURE — 80074 ACUTE HEPATITIS PANEL: CPT

## 2023-06-14 PROCEDURE — 36415 COLL VENOUS BLD VENIPUNCTURE: CPT

## 2023-06-16 NOTE — TELEPHONE ENCOUNTER
TB test still in process (possible issue with instrument in the lab). Please review over weekend or first thing Monday morning to determine clearance. Thanks!

## 2023-06-17 LAB
GAMMA INTERFERON BACKGROUND BLD IA-ACNC: 0.03 IU/ML
M TB IFN-G BLD-IMP: NEGATIVE
M TB IFN-G CD4+ BCKGRND COR BLD-ACNC: -0.01 IU/ML
M TB IFN-G CD4+ BCKGRND COR BLD-ACNC: 0 IU/ML
MITOGEN IGNF BCKGRD COR BLD-ACNC: >10 IU/ML

## 2023-06-19 ENCOUNTER — HOSPITAL ENCOUNTER (OUTPATIENT)
Dept: INFUSION CENTER | Facility: HOSPITAL | Age: 48
Discharge: HOME | End: 2023-06-19
Attending: PSYCHIATRY & NEUROLOGY
Payer: COMMERCIAL

## 2023-06-19 DIAGNOSIS — G35 MS (MULTIPLE SCLEROSIS) (HCC): Primary | ICD-10-CM

## 2023-06-19 RX ORDER — SODIUM CHLORIDE 9 MG/ML
20 INJECTION, SOLUTION INTRAVENOUS ONCE
OUTPATIENT
Start: 2023-12-16

## 2023-06-19 RX ORDER — SODIUM CHLORIDE 9 MG/ML
20 INJECTION, SOLUTION INTRAVENOUS ONCE
Status: COMPLETED | OUTPATIENT
Start: 2023-06-19 | End: 2023-06-19

## 2023-06-19 RX ORDER — ACETAMINOPHEN 325 MG/1
650 TABLET ORAL ONCE
OUTPATIENT
Start: 2023-12-16

## 2023-06-19 RX ORDER — ACETAMINOPHEN 325 MG/1
650 TABLET ORAL ONCE
Status: COMPLETED | OUTPATIENT
Start: 2023-06-19 | End: 2023-06-19

## 2023-06-19 RX ADMIN — DIPHENHYDRAMINE HYDROCHLORIDE 50 MG: 50 INJECTION, SOLUTION INTRAMUSCULAR; INTRAVENOUS at 08:13

## 2023-06-19 RX ADMIN — ACETAMINOPHEN 650 MG: 325 TABLET, FILM COATED ORAL at 08:13

## 2023-06-19 RX ADMIN — OCRELIZUMAB 600 MG: 300 INJECTION INTRAVENOUS at 09:35

## 2023-06-19 RX ADMIN — SODIUM CHLORIDE 20 ML/HR: 0.9 INJECTION, SOLUTION INTRAVENOUS at 07:50

## 2023-06-19 RX ADMIN — FAMOTIDINE 20 MG: 10 INJECTION INTRAVENOUS at 07:51

## 2023-06-19 RX ADMIN — SODIUM CHLORIDE 100 MG: 0.9 INJECTION, SOLUTION INTRAVENOUS at 08:36

## 2023-06-20 NOTE — TELEPHONE ENCOUNTER
Patient is cleared for infusion   ----- Message -----   From: Devyn Woodward RN   Sent: 6/9/2023   7:59 AM EDT   To: Anneliese Coley MD

## 2023-09-07 DIAGNOSIS — G37.9 CNS DEMYELINATING DISEASE (HCC): ICD-10-CM

## 2023-09-07 NOTE — TELEPHONE ENCOUNTER
----- Message from Bhumi Siddiqi sent at 9/5/2023  8:28 AM EDT -----  Regarding: Gabapentin   Contact: [phone]  Please review. ----- Message -----  From: Monica Howard  Sent: 9/4/2023   9:25 AM EDT  To: [facility]  Subject: Gabapentin                                       My primary changed my Gabapentin to 2 time a day  but did not update my script to the pharmacy. I was wondering cause I am not going through it as fast if maybe you can send an update to the pharmacy?       Cause I keep getting asked to get refill and I am no where near needing another refill

## 2023-09-08 RX ORDER — GABAPENTIN 300 MG/1
300 CAPSULE ORAL 2 TIMES DAILY
Qty: 180 CAPSULE | Refills: 3 | Status: SHIPPED | OUTPATIENT
Start: 2023-09-08

## 2023-10-06 ENCOUNTER — TELEPHONE (OUTPATIENT)
Dept: NEUROLOGY | Facility: CLINIC | Age: 48
End: 2023-10-06

## 2023-10-06 NOTE — TELEPHONE ENCOUNTER
Hello,     I have called patient no answer,LMOM. If patient calls back per ROWAN Ann apt should follow up with Edin Guo. 10/16/2023 at 9 am is available with Edin Guo at [facility].     Thank you,     Sue Luo

## 2023-10-06 NOTE — TELEPHONE ENCOUNTER
----- Message from Ish Bonilla RN sent at 10/5/2023  5:17 PM EDT -----  Regarding: FW: Meds   Contact: [phone]  Scheduling Team - please reach out to patient to schedule apppt w/Kathy or Tarsha per Dr. Missy Jose note. Thanks!  ----- Message -----  From: Re Camacho MD  Sent: 10/5/2023   3:30 PM EDT  To: Neurology [address] [place] [address] Clinical Team 3  Subject: FW: Meds                                         DONNELL aracelimaryuld be scheduled with Yovany Griffin - patient was seeing by me twice this year, close follow up advised with MS AP team   ----- Message -----  From: Ish Bonilla RN  Sent: 10/5/2023  12:56 PM EDT  To: Re Camacho MD  Subject: FW: Joanne Waldron - do you want to schedule pt for OV to discuss? If yes, is a VV okay?  ----- Message -----  From: Krystian Freeman  Sent: 10/5/2023  11:34 AM EDT  To: Neurology [address] [place] [address] Clinical Team 3  Subject: Meds                                             Please review and assist      ----- Message -----  From: Nissa Green  Sent: 10/5/2023  11:31 AM EDT  To: Neurology [place] Clinical  Subject: Meds                                             I have seen ads for monthly scripts would it help me.  Cause the infusion only last like a week maybe 2 weeks

## 2023-10-24 ENCOUNTER — TELEPHONE (OUTPATIENT)
Dept: NEUROLOGY | Facility: CLINIC | Age: 48
End: 2023-10-24

## 2023-10-24 ENCOUNTER — PATIENT MESSAGE (OUTPATIENT)
Dept: NEUROLOGY | Facility: CLINIC | Age: 48
End: 2023-10-24

## 2023-10-24 NOTE — LETTER
November 1, 2023        [place] [address]      Our office has been unsuccessful in trying to reach you by phone regarding:        ? Other:__walk in tub________________________________________________      If you are still interested in getting help to obtain a walk-in tub, please contact our office at your earliest convenience. Please call [phone] and follow the prompts. Sincerely,      [facility]  [phone]  [email or web address]

## 2023-10-24 NOTE — TELEPHONE ENCOUNTER
October 24, 2023  Molly Herring   to Neurology [place] Clinical Team [facility] Latisha Blanchadr   to [facility] Clinical (supporting Dieter Frias MD)   [place]      10/24/23  7:40 AM  For my insurance to cover a walk-in tub I need a script from you. May I please get one      SW - are you able to assist pt w/obtaining a walk-in tub?

## 2023-10-25 ENCOUNTER — TELEPHONE (OUTPATIENT)
Dept: NEUROLOGY | Facility: CLINIC | Age: 48
End: 2023-10-25

## 2023-10-27 ENCOUNTER — OFFICE VISIT (OUTPATIENT)
Dept: NEUROLOGY | Facility: CLINIC | Age: 48
End: 2023-10-27
Payer: COMMERCIAL

## 2023-10-27 ENCOUNTER — TELEPHONE (OUTPATIENT)
Dept: NEUROLOGY | Facility: CLINIC | Age: 48
End: 2023-10-27

## 2023-10-27 VITALS
HEIGHT: 71 IN | OXYGEN SATURATION: 98 % | WEIGHT: 228 LBS | BODY MASS INDEX: 31.92 KG/M2 | SYSTOLIC BLOOD PRESSURE: 126 MMHG | HEART RATE: 67 BPM | RESPIRATION RATE: 18 BRPM | DIASTOLIC BLOOD PRESSURE: 81 MMHG | TEMPERATURE: 97.9 F

## 2023-10-27 DIAGNOSIS — G35 MS (MULTIPLE SCLEROSIS) (HCC): Primary | ICD-10-CM

## 2023-10-27 DIAGNOSIS — G35 MULTIPLE SCLEROSIS (HCC): ICD-10-CM

## 2023-10-27 DIAGNOSIS — G35 MS (MULTIPLE SCLEROSIS) (HCC): ICD-10-CM

## 2023-10-27 DIAGNOSIS — R26.2 AMBULATORY DYSFUNCTION: ICD-10-CM

## 2023-10-27 DIAGNOSIS — G81.91 RIGHT HEMIPARESIS (HCC): ICD-10-CM

## 2023-10-27 PROCEDURE — 99215 OFFICE O/P EST HI 40 MIN: CPT | Performed by: PSYCHIATRY & NEUROLOGY

## 2023-10-27 RX ORDER — SODIUM CHLORIDE 9 MG/ML
20 INJECTION, SOLUTION INTRAVENOUS ONCE
OUTPATIENT
Start: 2023-11-02

## 2023-10-27 NOTE — PROGRESS NOTES
Patient ID: Lucia Butler is a 46 y.o. male. Assessment/Plan:           Problem List Items Addressed This Visit          Nervous and Auditory    Right hemiparesis ([address])    MS (multiple sclerosis) ([address]) - Primary       Other    Ambulatory dysfunction     Other Visit Diagnoses       Multiple sclerosis [facility])               Mr. Janeen White has presented to [facility] for follow-up on multiple sclerosis and related issues. Patient described stable and improving clinical presentation with no significant spasticity or worsening of motor function noted at this point. Patient has motorized scooter in his possession as he is using this in his household. Patient had Diana Ice as his 1st DMT, split dose given 04/29/2022 and 05/13/2022. No significant infusion reaction or side effects reported to ocrelizumab considering last infusion was provided on June 19, 2023. Patient is up-to-date on serological work-up including TB/HIV/hepatitis panel. Imaging of the brain were completed in May 2023 and we agreed patient has stable radiographic findings with no disease progression has been advised. Patient will be advised to continue ocrelizumab infusion with next follow-up infusion scheduled in December 19, 2023; patient has improvement in his function including the fatigue and even ambulatory function after 2 weeks after his cytotoxic regimen then patient believes he has no other improvement noted. Considering patient has chronic multiple sclerosis with likely transition to second stage of multiple sclerosis, patient will be advised to consider Solu-Medrol 500 mg IV q. 14 days for next 3 months. Patient may consider Solu-Medrol 500 mg monthly afterwards. Patient will be advised to continue stretching and healthy dietary approach in addition to using magnesium during the wintertime. Patient would benefit from considering flu/RSV vaccination 4 weeks prior to his next cytotoxic regimen.     Intermittent tension type headaches are nonconcerning. Patient is using extra cup of coffee for energy boost.     Patient is to follow-up with [facility] neurology within 4-6 months, follow-up will be scheduled with Leeann Barbosa. Subjective: numbness and tingling in his right fingers, hand, and arm. HPI  Mr. Wilber Campbell has presented to  [address] for follow up on MS and related issues. Patient described having intermittent headache in the form tension type headache in the morning after his sleep. Patient stated he is doing wonderful with ocrelizumab infusion with last dose provided on June 19, 2023 as MRI of the brain completed in April 2023. Patient has been using motorized wheelchair in household and he feels this is excellent idea. Patient main concern was he done wonderful 2 weeks after Solu-Medrol infusion and he does not have any significant improvement in his ambulatory dysfunction. Patient is motivated with good sense of humor, patient is planning to consider continuing physical activity. Patient has persistent motor dysfunction in lower extremities and right upper extremity has not recovered from his recent MS relapse and active demyelination at C5. Patient had completed steroid regimen and split dose of Ocrevus with no infusion reaction has been described. Patient will be advised imaging in 6-8 months. The following portions of the patient's history were reviewed and updated as appropriate: He  has a past medical history of MS (multiple sclerosis) ([address]).   He   Patient Active Problem List    Diagnosis Date Noted    Hypomagnesemia 05/19/2023    Intractable headache 01/23/2023    Left leg weakness 09/27/2022    Obesity (BMI 30-39.9) 09/27/2022    Ambulatory dysfunction 03/15/2022    MS (multiple sclerosis) ([address]) 03/15/2022    Dysarthria 02/07/2022    Lower facial weakness 02/07/2022    CNS demyelinating disease ([address]) 02/07/2022    Right hemiparesis ([address]) 02/07/2022     He  has a past surgical history that includes Hernia repair and Knee surgery (Right). His family history includes Multiple sclerosis in his other; Stroke in his maternal grandmother. He  reports that he has quit smoking. He has quit using smokeless tobacco. He reports that he does not currently use alcohol. He reports that he does not use drugs. Current Outpatient Medications   Medication Sig Dispense Refill    cyanocobalamin 1,000 mcg/mL Take B12 1000 mcg IM once a week for 4 weeks, then once a month for 5 months 9 mL 0    gabapentin (NEURONTIN) 300 mg capsule Take 1 capsule (300 mg total) by mouth 2 (two) times a day 180 capsule 3    MAGNESIUM PO Take by mouth      ocrelizumab (Ocrevus) 300 MG/10ML SOLN Inject 20 mL (600 mg total) into a catheter in a vein every 6 (six) months 20 mL 1    Riboflavin ([employer] Vitamin B-2) 100 MG TABS Take 2 tablets (200 mg total) by mouth in the morning 360 tablet 2    SYRINGE-NEEDLE, DISP, 3 ML 25G X 1" 3 ML MISC Take with B12 regimen prescribed 9 each 0    acetaminophen (TYLENOL) 325 mg tablet Take 2 tablets (650 mg total) by mouth every 6 (six) hours as needed for mild pain 30 tablet 0    loratadine (CLARITIN) 10 mg tablet Take 1 tablet (10 mg total) by mouth daily 90 tablet 3     No current facility-administered medications for this visit.      Current Outpatient Medications on File Prior to Visit   Medication Sig    cyanocobalamin 1,000 mcg/mL Take B12 1000 mcg IM once a week for 4 weeks, then once a month for 5 months    gabapentin (NEURONTIN) 300 mg capsule Take 1 capsule (300 mg total) by mouth 2 (two) times a day    MAGNESIUM PO Take by mouth    ocrelizumab (Ocrevus) 300 MG/10ML SOLN Inject 20 mL (600 mg total) into a catheter in a vein every 6 (six) months    Riboflavin ([employer] Vitamin B-2) 100 MG TABS Take 2 tablets (200 mg total) by mouth in the morning    SYRINGE-NEEDLE, DISP, 3 ML 25G X 1" 3 ML MISC Take with B12 regimen prescribed    acetaminophen (TYLENOL) 325 mg tablet Take 2 tablets (650 mg total) by mouth every 6 (six) hours as needed for mild pain    loratadine (CLARITIN) 10 mg tablet Take 1 tablet (10 mg total) by mouth daily     No current facility-administered medications on file prior to visit. He has No Known Allergies. .         Objective:    Blood pressure 126/81, pulse 67, temperature 97.9 °F (36.6 °C), temperature source Temporal, resp. rate 18, height 5' 11" (1.803 m), weight 103 kg (228 lb), SpO2 98 %. Physical Exam    Neurological Exam  CONSTITUTIONAL: NAD, pleasant. NECK: supple, no lymphadenopathy, no thyromegaly, no JVD. CARDIOVASCULAR: RRR, normal S1S2, no murmurs, no rubs. RESP: clear to auscultation bilaterally, no wheezes/rhonchi/rales. ABDOMEN: soft, non tender, non distended. SKIN: no rash or skin lesions. EXTREMITIES: no edema, pulses 2+bilaterally. PSYCH: appropriate mood and affect  NEUROLOGIC COMPREHENSIVE EXAM: Patient is oriented to person, place and time, NAD; appropriate affect. CN II, III, IV, V, VI, VII,VIII,IX,X,XI-XII intact with EOMI, PERRLA, OKN intact, VF grossly intact, fundi poorly visualized secondary to pupillary constriction; symmetric face noted. Motor: 4/5 shoulder abduction , with 3-/5 finger extension b/l; 2/5 right hip flexion and 4/5 knee flexion and 3/ 5 dorsiflexion;  4-/5 left hip flexion, 3/5 knee flexion and dorsiflexion; Sensory: decreased to light touch and pinprick bilaterally; normal vibration sensation feet bilaterally; Coordination within normal limits on FTN and TEJINDER testing; DTR: 2++/4 through, no Babinski, no clonus. Wheelchair bound. ROS:    Review of Systems   Constitutional:  Negative for chills and fever. HENT:  Negative for ear pain and sore throat. Eyes:  Negative for pain and visual disturbance. Respiratory:  Negative for cough and shortness of breath. Cardiovascular:  Negative for chest pain and palpitations. Gastrointestinal:  Negative for abdominal pain and vomiting.    Genitourinary:  Negative for dysuria and hematuria. Musculoskeletal:  Negative for arthralgias and back pain. Skin:  Negative for color change and rash. Neurological:  Positive for numbness (right arm, hand, and fingers along with tingling). Negative for seizures and syncope. All other systems reviewed and are negative.

## 2023-11-01 NOTE — TELEPHONE ENCOUNTER
SW made 6 attempts to contact patient to discuss how to help with tub. Will send unable to reach letter. No further questions or needs at this time. SW will be available for future social needs as requested. Will close task.

## 2023-11-07 RX ORDER — SODIUM CHLORIDE 9 MG/ML
INJECTION, SOLUTION INTRAVENOUS CONTINUOUS
Status: CANCELLED | OUTPATIENT
Start: 2023-11-07

## 2023-11-07 RX ORDER — METHYLPREDNISOLONE SODIUM SUCCINATE 125 MG/2ML
125 INJECTION, POWDER, LYOPHILIZED, FOR SOLUTION INTRAMUSCULAR; INTRAVENOUS ONCE
Status: CANCELLED | OUTPATIENT
Start: 2023-11-07

## 2023-11-14 RX ORDER — OCRELIZUMAB 300 MG/10ML
600 INJECTION INTRAVENOUS
Qty: 20 ML | Refills: 1 | Status: SHIPPED | OUTPATIENT
Start: 2023-11-14

## 2023-11-28 ENCOUNTER — HOSPITAL ENCOUNTER (OUTPATIENT)
Dept: INFUSION CENTER | Facility: HOSPITAL | Age: 48
Discharge: HOME | End: 2023-11-28
Attending: PSYCHIATRY & NEUROLOGY
Payer: COMMERCIAL

## 2023-11-28 VITALS
DIASTOLIC BLOOD PRESSURE: 78 MMHG | SYSTOLIC BLOOD PRESSURE: 125 MMHG | RESPIRATION RATE: 16 BRPM | TEMPERATURE: 98.2 F | HEART RATE: 75 BPM | OXYGEN SATURATION: 98 %

## 2023-11-28 DIAGNOSIS — G35 MS (MULTIPLE SCLEROSIS) (HCC): Primary | ICD-10-CM

## 2023-11-28 PROCEDURE — 96365 THER/PROPH/DIAG IV INF INIT: CPT

## 2023-11-28 RX ORDER — SODIUM CHLORIDE 9 MG/ML
20 INJECTION, SOLUTION INTRAVENOUS ONCE
OUTPATIENT
Start: 2023-12-12

## 2023-11-28 RX ORDER — SODIUM CHLORIDE 9 MG/ML
20 INJECTION, SOLUTION INTRAVENOUS ONCE
Status: COMPLETED | OUTPATIENT
Start: 2023-11-28 | End: 2023-11-28

## 2023-11-28 RX ADMIN — SODIUM CHLORIDE 20 ML/HR: 9 INJECTION, SOLUTION INTRAVENOUS at 15:25

## 2023-11-28 RX ADMIN — METHYLPREDNISOLONE SODIUM SUCCINATE 500 MG: 125 INJECTION, POWDER, LYOPHILIZED, FOR SOLUTION INTRAMUSCULAR; INTRAVENOUS at 15:56

## 2023-11-28 NOTE — PROGRESS NOTES
Solumedrol infusion completed with no issues. PIV site removed, dressing CD&I. AVS provided, left unit via WC.

## 2023-11-30 DIAGNOSIS — G37.9 CNS DEMYELINATING DISEASE (HCC): ICD-10-CM

## 2023-11-30 RX ORDER — GABAPENTIN 300 MG/1
300 CAPSULE ORAL 2 TIMES DAILY
Qty: 180 CAPSULE | Refills: 0 | Status: CANCELLED | OUTPATIENT
Start: 2023-11-30

## 2023-12-04 NOTE — TELEPHONE ENCOUNTER
Patient requesting refill of gabapentin 300mg - 1 cap BID    Rx for gabapentin 300mg sent to [facility] DR RONEY WALTER on 9/8/23 for a one year supply. Msg sent to patient. Rx request canceled.

## 2023-12-05 ENCOUNTER — TELEPHONE (OUTPATIENT)
Dept: NEUROLOGY | Facility: CLINIC | Age: 48
End: 2023-12-05

## 2023-12-05 DIAGNOSIS — G35 MS (MULTIPLE SCLEROSIS) (HCC): Primary | ICD-10-CM

## 2023-12-05 DIAGNOSIS — D84.821 IMMUNODEFICIENCY DUE TO TREATMENT WITH IMMUNOSUPPRESSIVE MEDICATION: ICD-10-CM

## 2023-12-05 DIAGNOSIS — Z79.899 IMMUNODEFICIENCY DUE TO TREATMENT WITH IMMUNOSUPPRESSIVE MEDICATION: ICD-10-CM

## 2023-12-05 DIAGNOSIS — Z11.59 ENCOUNTER FOR SCREENING FOR VIRAL DISEASE: ICD-10-CM

## 2023-12-05 DIAGNOSIS — Z11.1 TUBERCULOSIS SCREENING: ICD-10-CM

## 2023-12-05 NOTE — TELEPHONE ENCOUNTER
Pt is scheduled for Ocrevus on 12/19/23. Jerl Palms is approved with [employer] from 5/18/23 to 5/17/24 for 20mls/180 days. Medication must be obtained from [facility] pharmacy. See 10/27/23 encounter. Jerl Palms already delivered to [facility] via [facility] on 11/21/23. The following labs are required and have been entered as appropriate per protocol:    CBC  CMP  Immunoglobulins  HIV  Quantiferon TB gold  Acute hepatitis panel    MyChart message sent to pt reminding them of needed labs. Awaiting labs.

## 2023-12-06 ENCOUNTER — APPOINTMENT (EMERGENCY)
Dept: CT IMAGING | Facility: HOSPITAL | Age: 48
DRG: 043 | End: 2023-12-06
Payer: COMMERCIAL

## 2023-12-06 ENCOUNTER — HOSPITAL ENCOUNTER (INPATIENT)
Facility: HOSPITAL | Age: 48
LOS: 2 days | Discharge: HOME HEALTH | DRG: 043 | End: 2023-12-08
Attending: EMERGENCY MEDICINE | Admitting: INTERNAL MEDICINE
Payer: COMMERCIAL

## 2023-12-06 ENCOUNTER — APPOINTMENT (EMERGENCY)
Dept: RADIOLOGY | Facility: HOSPITAL | Age: 48
DRG: 043 | End: 2023-12-06
Payer: COMMERCIAL

## 2023-12-06 DIAGNOSIS — R10.10 PAIN OF UPPER ABDOMEN: ICD-10-CM

## 2023-12-06 DIAGNOSIS — R53.1 ACUTE RIGHT-SIDED WEAKNESS: Primary | ICD-10-CM

## 2023-12-06 DIAGNOSIS — R47.89 WORD FINDING DIFFICULTY: ICD-10-CM

## 2023-12-06 PROBLEM — M54.6 RIGHT-SIDED THORACIC BACK PAIN: Status: ACTIVE | Noted: 2023-12-06

## 2023-12-06 PROBLEM — R20.0 RIGHT SIDED NUMBNESS: Status: ACTIVE | Noted: 2023-12-06

## 2023-12-06 LAB
ALBUMIN SERPL BCP-MCNC: 4.2 G/DL (ref 3.5–5)
ALP SERPL-CCNC: 73 U/L (ref 34–104)
ALT SERPL W P-5'-P-CCNC: 21 U/L (ref 7–52)
AMORPH URATE CRY URNS QL MICRO: ABNORMAL
ANION GAP SERPL CALCULATED.3IONS-SCNC: 6 MMOL/L
APTT PPP: 28 SECONDS (ref 23–37)
AST SERPL W P-5'-P-CCNC: 16 U/L (ref 13–39)
BACTERIA UR QL AUTO: ABNORMAL /HPF
BILIRUB DIRECT SERPL-MCNC: 0.02 MG/DL (ref 0–0.2)
BILIRUB SERPL-MCNC: 0.29 MG/DL (ref 0.2–1)
BILIRUB UR QL STRIP: NEGATIVE
BUN SERPL-MCNC: 17 MG/DL (ref 5–25)
CALCIUM SERPL-MCNC: 10 MG/DL (ref 8.4–10.2)
CARDIAC TROPONIN I PNL SERPL HS: <2 NG/L
CARDIAC TROPONIN I PNL SERPL HS: <2 NG/L
CHLORIDE SERPL-SCNC: 101 MMOL/L (ref 96–108)
CLARITY UR: CLEAR
CO2 SERPL-SCNC: 29 MMOL/L (ref 21–32)
COLOR UR: YELLOW
CREAT SERPL-MCNC: 0.91 MG/DL (ref 0.6–1.3)
ERYTHROCYTE [DISTWIDTH] IN BLOOD BY AUTOMATED COUNT: 12.4 % (ref 11.6–15.1)
FLUAV RNA RESP QL NAA+PROBE: NEGATIVE
FLUBV RNA RESP QL NAA+PROBE: NEGATIVE
GFR SERPL CREATININE-BSD FRML MDRD: 97 ML/MIN/1.73SQ M
GLUCOSE SERPL-MCNC: 98 MG/DL (ref 65–140)
GLUCOSE SERPL-MCNC: 98 MG/DL (ref 65–140)
GLUCOSE UR STRIP-MCNC: NEGATIVE MG/DL
HCT VFR BLD AUTO: 47 % (ref 36.5–49.3)
HGB BLD-MCNC: 15.3 G/DL (ref 12–17)
HGB UR QL STRIP.AUTO: NEGATIVE
INR PPP: 0.94 (ref 0.84–1.19)
KETONES UR STRIP-MCNC: NEGATIVE MG/DL
LEUKOCYTE ESTERASE UR QL STRIP: NEGATIVE
LIPASE SERPL-CCNC: 37 U/L (ref 11–82)
MCH RBC QN AUTO: 29.5 PG (ref 26.8–34.3)
MCHC RBC AUTO-ENTMCNC: 32.6 G/DL (ref 31.4–37.4)
MCV RBC AUTO: 91 FL (ref 82–98)
NITRITE UR QL STRIP: NEGATIVE
NON-SQ EPI CELLS URNS QL MICRO: ABNORMAL /HPF
PH UR STRIP.AUTO: 8.5 [PH]
PLATELET # BLD AUTO: 232 THOUSANDS/UL (ref 149–390)
PMV BLD AUTO: 10.1 FL (ref 8.9–12.7)
POTASSIUM SERPL-SCNC: 4.3 MMOL/L (ref 3.5–5.3)
PROT SERPL-MCNC: 7.9 G/DL (ref 6.4–8.4)
PROT UR STRIP-MCNC: ABNORMAL MG/DL
PROTHROMBIN TIME: 12.9 SECONDS (ref 11.6–14.5)
RBC # BLD AUTO: 5.19 MILLION/UL (ref 3.88–5.62)
RBC #/AREA URNS AUTO: ABNORMAL /HPF
RSV RNA RESP QL NAA+PROBE: NEGATIVE
SARS-COV-2 RNA RESP QL NAA+PROBE: NEGATIVE
SODIUM SERPL-SCNC: 136 MMOL/L (ref 135–147)
SP GR UR STRIP.AUTO: <1.005 (ref 1–1.03)
UROBILINOGEN UR STRIP-ACNC: <2 MG/DL
WBC # BLD AUTO: 10.27 THOUSAND/UL (ref 4.31–10.16)
WBC #/AREA URNS AUTO: ABNORMAL /HPF

## 2023-12-06 PROCEDURE — 96374 THER/PROPH/DIAG INJ IV PUSH: CPT

## 2023-12-06 PROCEDURE — C9113 INJ PANTOPRAZOLE SODIUM, VIA: HCPCS | Performed by: EMERGENCY MEDICINE

## 2023-12-06 PROCEDURE — 84484 ASSAY OF TROPONIN QUANT: CPT | Performed by: EMERGENCY MEDICINE

## 2023-12-06 PROCEDURE — 99285 EMERGENCY DEPT VISIT HI MDM: CPT | Performed by: EMERGENCY MEDICINE

## 2023-12-06 PROCEDURE — 85730 THROMBOPLASTIN TIME PARTIAL: CPT | Performed by: EMERGENCY MEDICINE

## 2023-12-06 PROCEDURE — 80048 BASIC METABOLIC PNL TOTAL CA: CPT | Performed by: EMERGENCY MEDICINE

## 2023-12-06 PROCEDURE — 85610 PROTHROMBIN TIME: CPT | Performed by: EMERGENCY MEDICINE

## 2023-12-06 PROCEDURE — 99223 1ST HOSP IP/OBS HIGH 75: CPT | Performed by: INTERNAL MEDICINE

## 2023-12-06 PROCEDURE — 93005 ELECTROCARDIOGRAM TRACING: CPT

## 2023-12-06 PROCEDURE — 70496 CT ANGIOGRAPHY HEAD: CPT

## 2023-12-06 PROCEDURE — 80076 HEPATIC FUNCTION PANEL: CPT | Performed by: EMERGENCY MEDICINE

## 2023-12-06 PROCEDURE — 70498 CT ANGIOGRAPHY NECK: CPT

## 2023-12-06 PROCEDURE — 0241U HB NFCT DS VIR RESP RNA 4 TRGT: CPT | Performed by: EMERGENCY MEDICINE

## 2023-12-06 PROCEDURE — 71045 X-RAY EXAM CHEST 1 VIEW: CPT

## 2023-12-06 PROCEDURE — 85027 COMPLETE CBC AUTOMATED: CPT | Performed by: EMERGENCY MEDICINE

## 2023-12-06 PROCEDURE — 81001 URINALYSIS AUTO W/SCOPE: CPT | Performed by: INTERNAL MEDICINE

## 2023-12-06 PROCEDURE — 83690 ASSAY OF LIPASE: CPT | Performed by: EMERGENCY MEDICINE

## 2023-12-06 PROCEDURE — 82948 REAGENT STRIP/BLOOD GLUCOSE: CPT

## 2023-12-06 PROCEDURE — 36415 COLL VENOUS BLD VENIPUNCTURE: CPT | Performed by: EMERGENCY MEDICINE

## 2023-12-06 PROCEDURE — 99285 EMERGENCY DEPT VISIT HI MDM: CPT

## 2023-12-06 RX ORDER — PANTOPRAZOLE SODIUM 40 MG/10ML
40 INJECTION, POWDER, LYOPHILIZED, FOR SOLUTION INTRAVENOUS ONCE
Status: COMPLETED | OUTPATIENT
Start: 2023-12-06 | End: 2023-12-06

## 2023-12-06 RX ORDER — LIDOCAINE 50 MG/G
1 PATCH TOPICAL DAILY
Status: DISCONTINUED | OUTPATIENT
Start: 2023-12-06 | End: 2023-12-08 | Stop reason: HOSPADM

## 2023-12-06 RX ORDER — HEPARIN SODIUM 5000 [USP'U]/ML
5000 INJECTION, SOLUTION INTRAVENOUS; SUBCUTANEOUS EVERY 8 HOURS SCHEDULED
Status: DISCONTINUED | OUTPATIENT
Start: 2023-12-06 | End: 2023-12-08 | Stop reason: HOSPADM

## 2023-12-06 RX ORDER — ACETAMINOPHEN 325 MG/1
650 TABLET ORAL EVERY 6 HOURS PRN
Status: DISCONTINUED | OUTPATIENT
Start: 2023-12-06 | End: 2023-12-08 | Stop reason: HOSPADM

## 2023-12-06 RX ORDER — GABAPENTIN 300 MG/1
300 CAPSULE ORAL 2 TIMES DAILY
Status: DISCONTINUED | OUTPATIENT
Start: 2023-12-06 | End: 2023-12-08 | Stop reason: HOSPADM

## 2023-12-06 RX ADMIN — IOHEXOL 85 ML: 350 INJECTION, SOLUTION INTRAVENOUS at 11:52

## 2023-12-06 RX ADMIN — PANTOPRAZOLE SODIUM 40 MG: 40 INJECTION, POWDER, FOR SOLUTION INTRAVENOUS at 13:47

## 2023-12-06 RX ADMIN — LIDOCAINE 5% 1 PATCH: 700 PATCH TOPICAL at 15:58

## 2023-12-06 RX ADMIN — ACETAMINOPHEN 650 MG: 325 TABLET, FILM COATED ORAL at 16:09

## 2023-12-06 RX ADMIN — HEPARIN SODIUM 5000 UNITS: 5000 INJECTION INTRAVENOUS; SUBCUTANEOUS at 16:09

## 2023-12-06 NOTE — ASSESSMENT & PLAN NOTE
Patient reported acute on chronic thoracic back pain  History of dextroscoliosis, imaging last year. T9/10 paravertebral tenderness  Like scoliosis related.     Lidocaine patch

## 2023-12-06 NOTE — ASSESSMENT & PLAN NOTE
Reported suddenly facial right-sided numbness and upper and lower extremity numbness without weakness. Patient has right-sided weakness which is chronic along with intermittent speech difficulties. Patient reported symptoms subsided at the time he got to the emergency department. During my evaluation patient with right-sided weakness which is his baseline. Normal speech, chronic right nasolabial fold flattening  Patient was a stroke alert. CT head without acute findings  CT head and neck also without significant stenosis or occlusions. Suspect patient might have MS flare. Neurology was consulted, it was recommended not to start steroids at this time as the patient was complaining of abdominal pain.   MRI brain was ordered  PT/OT  Fall precaution

## 2023-12-06 NOTE — ASSESSMENT & PLAN NOTE
Patient with history of multiple sclerosis diagnosed about 2 years ago. He follows with neurology as outpatient. Was started on Ocrevus twice a year.

## 2023-12-06 NOTE — ASSESSMENT & PLAN NOTE
Patient mentioned upper abdominal pain that started today. Denied nausea, vomiting, constipation or diarrhea. Lipase was normal, hepatic panel was also normal.  We will check CT abdomen pelvis without contrast to rule out any acute intra-abdominal pathology.   Tylenol as needed  Check random troponin

## 2023-12-06 NOTE — ASSESSMENT & PLAN NOTE
- Diagnosed in beginning of 2022 and started Nikos Pleva in 4/2022  - Patient continues Ocrevus infusions twice a year with his next infusion scheduled for 12/19/2022  - Patient follows with Dr. Joanne Vick in outpatient neurology  - Additional imaging pending as detailed above

## 2023-12-06 NOTE — CASE MANAGEMENT
Case Management Assessment & Discharge Planning Note    Patient name Abigail Soto  Location ED 08/ED 08 MRN [id number]  : 1975 Date 2023       Current Admission Date: 2023  Current Admission Diagnosis:Right sided numbness   Patient Active Problem List    Diagnosis Date Noted    Right sided numbness 2023    Hypomagnesemia 2023    Intractable headache 2023    Left leg weakness 2022    Obesity (BMI 30-39.9) 2022    Ambulatory dysfunction 03/15/2022    MS (multiple sclerosis) ([address]) 03/15/2022    Dysarthria 2022    Lower facial weakness 2022    CNS demyelinating disease ([address]) 2022    Right hemiparesis ([address]) 2022      LOS (days): 0  Geometric Mean LOS (GMLOS) (days):   Days to GMLOS:     OBJECTIVE:    Risk of Unplanned Readmission Score: 7.96         Current admission status: Inpatient       Preferred Pharmacy:   [facility] DR RONEY WALTER  [place], [place] - [address] [facility] [address]   [place] [address]  Phone: [phone] Fax: [phone]    Primary Care Provider: Flor Brown MD    Primary Insurance: [employer]  Secondary Insurance:     ASSESSMENT:  Active Health Care Proxies       Ed cedillo y [address] - Sister   Primary Phone: [phone] (Home)                 Advance Directives  Does patient have a [address]?: No  Was patient offered paperwork?: Yes (declined)  Does patient currently have a Health Care decision maker?: Yes, please see Health Care Proxy section  Does patient have Advance Directives?: No  Was patient offered paperwork?: Yes (declined)  Primary Contact: Pierre Hooper         Readmission Root Cause  30 Day Readmission: No    Patient Information  Admitted from[de-identified] Home  Mental Status: Alert  During Assessment patient was accompanied by: Not accompanied during assessment  Assessment information provided by[de-identified] Patient  Primary Caregiver: Self  Support Systems: Parent, [address] of Residence: [address] [address] do you live in?: [facility] entry access options. Select all that apply.: Stairs, Ramp  Number of steps to enter home.: 3  Do the steps have railings?: No  Type of Current Residence: [address] home  Upon entering residence, is there a bedroom on the main floor (no further steps)?: Yes  Upon entering residence, is there a bathroom on the main floor (no further steps)?: Yes  Living Arrangements: Lives w/ Parent(s), Lives w/ Daughter  Is patient a ?: No    Activities of Daily Living Prior to Admission  Functional Status: Independent  Completes ADLs independently?: Yes  Ambulates independently?: Yes  Does patient use assisted devices?: Yes  Assisted Devices (DME) used: Electric wheelchair, Walker  Does patient currently own DME?: Yes  What DME does the patient currently own?: Electric Wheelchair, Wheelchair  Does patient have a history of Outpatient Therapy (PT/OT)?: No  Does the patient have a history of Short-Term Rehab?: Yes  Does patient have a history of C?: Yes  Does patient currently have [facility]?: No         Patient Information Continued  Income Source: Unemployed  Does patient have prescription coverage?: Yes  Does patient receive dialysis treatments?: No  Does patient have a history of substance abuse?: No  Does patient have a history of Mental Health Diagnosis?: No         Means of Transportation  Means of Transport to [facility][de-identified] Family transport      Housing Stability: [address] [facility]  (12/6/2023)    Housing Stability Vital Sign     Unable to Pay for Housing in the Last Year: No     Number of [address] in the Last Year: 1     Unstable Housing in the Last Year: No   Food Insecurity: No Food Insecurity (12/6/2023)    Hunger Vital Sign     Worried About Running Out of Food in the Last Year: Never true     Ran Out of Food in the Last Year: Never true   Transportation Needs: No Transportation Needs (12/6/2023)    PRAPARE - Transportation     Lack of Transportation (Medical):  No Lack of Transportation (Non-Medical): No   Utilities: Not At Risk (12/6/2023)    [facility] Utilities     Threatened with loss of utilities: No       DISCHARGE DETAILS:    Discharge planning discussed with[de-identified] adiel at bedside in the ED  Freedom of Choice: Yes  Comments - Freedom of Choice: Discussed dc planning and role fo CM  CM contacted family/caregiver?: No- see comments (pt alert and able to participate in assessment)  Were Treatment Team discharge recommendations reviewed with patient/caregiver?: Yes  Did patient/caregiver verbalize understanding of patient care needs?: Yes       Contacts  Reason/Outcome: Discharge [address]         Is the patient interested in [address] at discharge?: Yes  [facility] requested[de-identified] Occupational Therapy, Physical Therapy  HHA External Referral Reason (only applicable if external HHA name selected): Patient has established relationship with provider  [facility] Provider[de-identified] PCP  Home Health Services Needed[de-identified] Strengthening/Theraputic Exercises to Improve Function, Gait/ADL Training, Evaluate Functional Status and Safety  Homebound Criteria Met[de-identified] Requires the Assistance of Another Person for Safe Ambulation or to Leave the Home, Uses an Assist Device (i.e. cane, walker, etc)  Supporting Clincal Findings[de-identified] Limited Endurance, Fatigues Easliy in [employer]    DME Referral Provided  Referral made for DME?: No    Other Referral/Resources/Interventions Provided:  Interventions: [facility]  Referral Comments: pt in interested in [address]. At this time there is no skilled need for a nurse. He is upset that he krishnamurthy snot have a nurse coming to the home. Discharge Destination Plan[de-identified] Home, Home with [address] at Discharge : Family                                      Additional Comments: Cm met with pt in the ED. Pt with generalized weakness and rt side numbness. Pt also with a hx of MS.  He reports that he lives with his mom, dad and dtr in a [address] wtih 3ste. There is a ramp to get into the home and pt remains of the first floor. Pt has an electric wc and walker at home. He states that he uses the walker in his home. Pt has hx of HHC. He is interested in having [facility] again. He has a hx of STR at a  facility but he was not sure where. Pt has no MH/DA/Op PT/OT hx. Pts sister provides transportation when needs as pt does not drive. He sees a PCP through a private practice and uses [facility] DR RONEY WALTER. MRI and CT pelvis ordered.

## 2023-12-06 NOTE — ED PROVIDER NOTES
History  Chief Complaint   Patient presents with    Weakness - Generalized     Pt reports at 1045 the right side of his body went numb. Pt said he has had this in the past but not this bad, pt says he feels like he is slurring his words     Patient is a 53-year-old male who presents with right-sided numbness and word finding difficulties that started at 10:45 AM.  Patient states that he was just sitting and then noticed that his right side went numb. The right upper and lower extremity numbness went away then came back and is now gone, but the right sided facial numbness has remained the entire time. He also notes that it is tough for him to find his words and he feels like he is slurring his speech. He states that he does have a past medical history significant for MS, but this feels different than prior MS flares. Review of medical records, specifically from neurology note from 10/27/2023 that shows that patient has a history of multiple sclerosis that is stable status post Ocrevus, getting ocrelizumab infusions and consideration for starting solumedrol. It appears that he has chronic right sided weakness based on the note. Prior to Admission Medications   Prescriptions Last Dose Informant Patient Reported? Taking?    MAGNESIUM PO   Yes No   Sig: Take by mouth   Riboflavin ([employer] Vitamin B-2) 100 MG TABS   No No   Sig: Take 2 tablets (200 mg total) by mouth in the morning   SYRINGE-NEEDLE, DISP, 3 ML 25G X 1" 3 ML MISC   No No   Sig: Take with B12 regimen prescribed   acetaminophen (TYLENOL) 325 mg tablet   No No   Sig: Take 2 tablets (650 mg total) by mouth every 6 (six) hours as needed for mild pain   cyanocobalamin 1,000 mcg/mL   No No   Sig: Take B12 1000 mcg IM once a week for 4 weeks, then once a month for 5 months   gabapentin (NEURONTIN) 300 mg capsule   No No   Sig: Take 1 capsule (300 mg total) by mouth 2 (two) times a day   loratadine (CLARITIN) 10 mg tablet   No No   Sig: Take 1 tablet (10 mg total) by mouth daily   ocrelizumab (Ocrevus) 300 MG/10ML SOLN   No No   Sig: Inject 20 mL (600 mg total) into a catheter in a vein every 6 (six) months      Facility-Administered Medications: None       Past Medical History:   Diagnosis Date    MS (multiple sclerosis) ([address])        Past Surgical History:   Procedure Laterality Date    HERNIA REPAIR      3 times    KNEE SURGERY Right        Family History   Problem Relation Age of Onset    Stroke Maternal Grandmother     Multiple sclerosis Other      I have reviewed and agree with the history as documented. E-Cigarette/Vaping    E-Cigarette Use Former User      E-Cigarette/Vaping Substances    Nicotine No     THC No     CBD No     Flavoring No      Social History     Tobacco Use    Smoking status: Former    Smokeless tobacco: Former   Vaping Use    Vaping Use: Former   Substance Use Topics    Alcohol use: Not Currently    Drug use: Never       Review of Systems   Constitutional:  Negative for chills and fever. Eyes:  Negative for photophobia and visual disturbance. Respiratory:  Negative for shortness of breath. Cardiovascular:  Negative for chest pain. Gastrointestinal:  Negative for abdominal pain, nausea and vomiting. Musculoskeletal:  Negative for back pain and neck pain. Neurological:  Positive for weakness and numbness. Negative for dizziness, light-headedness and headaches. Physical Exam  Physical Exam  Vitals and nursing note reviewed. Constitutional:       General: He is not in acute distress. Appearance: Normal appearance. He is not ill-appearing, toxic-appearing or diaphoretic. HENT:      Head: Normocephalic and atraumatic. Mouth/Throat:      Mouth: Mucous membranes are moist.   Eyes:      General: No visual field deficit. Conjunctiva/sclera: Conjunctivae normal.      Pupils: Pupils are equal, round, and reactive to light. Cardiovascular:      Rate and Rhythm: Normal rate and regular rhythm.       Pulses: Normal pulses. Heart sounds: Normal heart sounds. No murmur heard. Pulmonary:      Effort: Pulmonary effort is normal. No respiratory distress. Breath sounds: Normal breath sounds. No stridor. No wheezing, rhonchi or rales. Chest:      Chest wall: No tenderness. Abdominal:      General: Bowel sounds are normal. There is no distension. Palpations: Abdomen is soft. Tenderness: There is no abdominal tenderness. There is no guarding or rebound. Musculoskeletal:      Cervical back: Neck supple. Skin:     General: Skin is warm and dry. Neurological:      General: No focal deficit present. Mental Status: He is alert and oriented to person, place, and time. Mental status is at baseline. GCS: GCS eye subscore is 4. GCS verbal subscore is 5. GCS motor subscore is 6. Cranial Nerves: Dysarthria and facial asymmetry present. Sensory: Sensory deficit present. Motor: Weakness present.       Comments: Alert  Stutters and has difficulty finding words and slurs a few words, but does answer questions appropriately   Generally weak in all 4 extremities- right upper  strength is weaker than LUE, but BL LEs are weak   Reports diminished sensation on the right side of the face, but still has sensation   Equal sensation now of all 4 extremities, though reports diminished earlier   Slight facial droop on right side   Psychiatric:         Mood and Affect: Mood normal.         Behavior: Behavior normal.         Vital Signs  ED Triage Vitals   Temperature Pulse Respirations Blood Pressure SpO2   12/06/23 1145 12/06/23 1141 12/06/23 1141 12/06/23 1141 12/06/23 1141   98 °F (36.7 °C) 79 18 155/87 98 %      Temp Source Heart Rate Source Patient Position - Orthostatic VS BP Location FiO2 (%)   12/06/23 1145 12/06/23 1145 -- -- --   Oral Monitor         Pain Score       12/06/23 1200       7           Vitals:    12/06/23 1230 12/06/23 1300 12/06/23 1311 12/06/23 1330   BP: 151/90 145/85 145/85 138/84   Pulse: 78 75 75 74         Visual Acuity  Visual Acuity      Flowsheet Row Most Recent Value   L Pupil Size (mm) 3   R Pupil Size (mm) 3            ED Medications  Medications   iohexol (OMNIPAQUE) 350 MG/ML injection (MULTI-DOSE) 85 mL (85 mL Intravenous Given 12/6/23 1152)   pantoprazole (PROTONIX) injection 40 mg (40 mg Intravenous Given 12/6/23 1347)       Diagnostic Studies  Results Reviewed       Procedure Component Value Units Date/Time    Hepatic function panel [[id number]]  (Normal) Collected: 12/06/23 1153    Lab Status: Final result Specimen: Blood from Arm, Right Updated: 12/06/23 1334     Total Bilirubin 0.29 mg/dL      Bilirubin, Direct 0.02 mg/dL      Alkaline Phosphatase 73 U/L      AST 16 U/L      ALT 21 U/L      Total Protein 7.9 g/dL      Albumin 4.2 g/dL     Lipase [[id number]]  (Normal) Collected: 12/06/23 1153    Lab Status: Final result Specimen: Blood from Arm, Right Updated: 12/06/23 1334     Lipase 37 u/L     FLU/RSV/COVID - if FLU/RSV clinically relevant [[id number]]  (Normal) Collected: 12/06/23 1153    Lab Status: Final result Specimen: Nares from Nose Updated: 12/06/23 1240     SARS-CoV-2 Negative     INFLUENZA A PCR Negative     INFLUENZA B PCR Negative     RSV PCR Negative    Narrative:      FOR PEDIATRIC PATIENTS - copy/paste COVID Guidelines URL to browser: [email or web address]    SARS-CoV-2 assay is a Nucleic Acid Amplification assay intended for the  qualitative detection of nucleic acid from SARS-CoV-2 in nasopharyngeal  swabs. Results are for the presumptive identification of SARS-CoV-2 RNA. Positive results are indicative of infection with SARS-CoV-2, the virus  causing COVID-19, but do not rule out bacterial infection or co-infection  with other viruses. Laboratories within the [facility] and its  territories are required to report all positive results to the appropriate  public health authorities.  Negative results do not preclude SARS-CoV-2  infection and should not be used as the sole basis for treatment or other  patient management decisions. Negative results must be combined with  clinical observations, patient history, and epidemiological information. This test has not been FDA cleared or approved. This test has been authorized by FDA under an Emergency Use Authorization  (EUA). This test is only authorized for the duration of time the  declaration that circumstances exist justifying the authorization of the  emergency use of an in vitro diagnostic tests for detection of SARS-CoV-2  virus and/or diagnosis of COVID-19 infection under section 564(b)(1) of  the Act, 21 U. S.C. 005POO-1(V)(7), unless the authorization is terminated  or revoked sooner. The test has been validated but independent review by FDA  and CLIA is pending. Test performed using [employer]: This RT-PCR assay targets N2,  a region unique to SARS-CoV-2. A conserved region in the E-gene was chosen  for pan-Sarbecovirus detection which includes SARS-CoV-2. According to CMS-2020-01-R, this platform meets the definition of high-throughput technology.     Basic metabolic panel [[id number]] Collected: 12/06/23 1153    Lab Status: Final result Specimen: Blood from Arm, Right Updated: 12/06/23 1223     Sodium 136 mmol/L      Potassium 4.3 mmol/L      Chloride 101 mmol/L      CO2 29 mmol/L      ANION GAP 6 mmol/L      BUN 17 mg/dL      Creatinine 0.91 mg/dL      Glucose 98 mg/dL      Calcium 10.0 mg/dL      eGFR 97 ml/min/1.73sq m     Narrative:      [place] guidelines for Chronic Kidney Disease (CKD):     Stage 1 with normal or high GFR (GFR > 90 mL/min/1.73 square meters)    Stage 2 Mild CKD (GFR = 60-89 mL/min/1.73 square meters)    Stage 3A Moderate CKD (GFR = 45-59 mL/min/1.73 square meters)    Stage 3B Moderate CKD (GFR = 30-44 mL/min/1.73 square meters)    Stage 4 Severe CKD (GFR = 15-29 mL/min/1.73 square meters)    Stage 5 End Stage CKD (GFR <15 mL/min/1.73 square meters)  Note: GFR calculation is accurate only with a steady state creatinine    Protime-INR [[id number]]  (Normal) Collected: 12/06/23 1153    Lab Status: Final result Specimen: Blood from Arm, Right Updated: 12/06/23 1219     Protime 12.9 seconds      INR 0.94    APTT [[id number]]  (Normal) Collected: 12/06/23 1153    Lab Status: Final result Specimen: Blood from Arm, Right Updated: 12/06/23 1219     PTT 28 seconds     CBC and Platelet [[id number]]  (Abnormal) Collected: 12/06/23 1153    Lab Status: Final result Specimen: Blood from Arm, Right Updated: 12/06/23 1205     WBC 10.27 Thousand/uL      RBC 5.19 Million/uL      Hemoglobin 15.3 g/dL      Hematocrit 47.0 %      MCV 91 fL      MCH 29.5 pg      MCHC 32.6 g/dL      RDW 12.4 %      Platelets 862 Thousands/uL      MPV 10.1 fL     HS Troponin 0hr (reflex protocol) [[id number]] Collected: 12/06/23 1153    Lab Status: No result Specimen: Blood from Arm, Right     Fingerstick Glucose (POCT) [[id number]]  (Normal) Collected: 12/06/23 1143    Lab Status: Final result Updated: 12/06/23 1145     POC Glucose 98 mg/dl                    X-ray chest 1 view portable   ED Interpretation by Trell Sam DO (12/06 1252)   No acute abnormalities as interpreted by me independently       CT stroke alert brain   Final Result by Ashlyn Anne MD (12/06 1201)      No acute intracranial abnormality. Mild white matter disease likely due to underlying multiple sclerosis. Findings were directly discussed with Amber Bliss 12/6/2023 at 11:59 AM.      Workstation performed: [id number]         CTA stroke alert (head/neck)   Final Result by Ashlyn Anne MD (12/06 1212)      Negative CTA head and neck for large vessel occlusion, dissection, aneurysm, or high-grade stenosis. Additional chronic/incidental findings as detailed above.             Findings were directly discussed with Melanie Mart Génesis Westa 12/6/2023 at 12:00 PM.                        Workstation performed: [id number]                    Procedures  ECG 12 Lead Documentation Only    Date/Time: 12/6/2023 11:59 AM    Performed by: Esau Engle DO  Authorized by: Esau Engle DO    Indications / Diagnosis:  Stroke alert  ECG reviewed by me, the ED Provider: yes    Patient location:  ED  Previous ECG:     Previous ECG:  Compared to current    Comparison ECG info:  5/19/2023    Similarity:  No change  Interpretation:     Interpretation: normal    Rate:     ECG rate:  77    ECG rate assessment: normal    Rhythm:     Rhythm: sinus rhythm    Ectopy:     Ectopy: none    QRS:     QRS axis:  Normal    QRS intervals:  Normal  Conduction:     Conduction: normal    ST segments:     ST segments:  Normal  T waves:     T waves: normal    Comments:                 ED Course  ED Course as of 12/06/23 1426   Wed Dec 06, 2023   1145 Neurology, Dr. Brian Jean Baptiste at bedside for stroke alert     [phone] Neurology states that the patient is not a TNK candidate. More likely MS flare. Will require admission for MRI with and without contrast to assess for new MS lesions. Does not need admission via stroke pathway. Recommending holding on steroids at this time as patient is complaining of abdominal pain. Patient did not mention abdominal pain to me on my assessment, so will go back in now for reassessment. 1337 Lipase and LFTs are WNL   1343 Will defer imaging at this time. Patient LFTs and lipase as well as WBCs are normal. Patient already received IV contrast today for the CTA stroke alert. Will discuss with hospitalist and if patient's symptoms not improved or worsening labs, will potentially need CT imaging of the abdomen/pelvis with contrast during hospitalization.                   Stroke Assessment       Row Name 12/06/23 1151             NIH Stroke Scale    Interval Baseline      Level of Consciousness (1a.) 0      LOC Questions (1b.) 0      LOC Commands (1c.) 0      Best Gaze (2.) 0      Visual (3.) 0      Facial Palsy (4.) 1      Motor Arm, Left (5a.) 0      Motor Arm, Right (5b.) 1      Motor Leg, Left (6a.) 0      Motor Leg, Right (6b.) 0      Limb Ataxia (7.) 0      Sensory (8.) 1      Best Language (9.) 1      Dysarthria (10.) 1      Extinction and Inattention (11.) (Formerly Neglect) 0      Total 5                                              Medical Decision Making  Assessment and plan:  Differential includes stroke versus MS flare. Stroke alert called. Patient's NIH 5 on arrival.    Amount and/or Complexity of Data Reviewed  Labs: ordered. Radiology: ordered and independent interpretation performed. Risk  Prescription drug management. Decision regarding hospitalization. Disposition  Final diagnoses:   Acute right-sided weakness   Word finding difficulty   Pain of upper abdomen     Time reflects when diagnosis was documented in both MDM as applicable and the Disposition within this note       Time User Action Codes Description Comment    12/6/2023 11:45 AM [employer] [facility] [R53.1] Acute right-sided weakness     12/6/2023 11:45 AM [employer] [facility] [R47.89] Word finding difficulty     12/6/2023  2:26 PM Margaret Florian Add [R10.10] Pain of upper abdomen           ED Disposition       ED Disposition   Admit    Condition   Stable    Date/Time   Wed Dec 6, 2023 12:30 PM    Comment   Case was discussed with hospitalist and the patient's admission status was agreed to be Admission Status: observation status to the service of Dr. Sunny Herbert . Follow-up Information    None         Patient's Medications   Discharge Prescriptions    No medications on file       No discharge procedures on file.     PDMP Review         Value Time User    PDMP Reviewed  Yes 5/21/2023  4:59 PM Сергей Sanford MD            ED Provider  Electronically Signed by             Margaret Florian DO  12/06/23 9982

## 2023-12-06 NOTE — H&P
[facility]  H&P  Name: Caterina Jones 46 y.o. male I MRN: [id number]  Unit/Bed#: ED 08 I Date of Admission: 12/6/2023   Date of Service: 12/6/2023 I Hospital Day: 0      Assessment/Plan   * Right sided numbness  Assessment & Plan  Reported suddenly facial right-sided numbness and upper and lower extremity numbness without weakness. Patient has right-sided weakness which is chronic along with intermittent speech difficulties. Patient reported symptoms subsided at the time he got to the emergency department. During my evaluation patient with right-sided weakness which is his baseline. Normal speech, chronic right nasolabial fold flattening  Patient was a stroke alert. CT head without acute findings  CT head and neck also without significant stenosis or occlusions. Suspect patient might have MS flare. Neurology was consulted, it was recommended not to start steroids at this time as the patient was complaining of abdominal pain. MRI brain was ordered  PT/OT  Fall precaution    Right-sided thoracic back pain  Assessment & Plan  Patient reported acute on chronic thoracic back pain  History of dextroscoliosis, imaging last year. T9/10 paravertebral tenderness  Like scoliosis related. Lidocaine patch    Pain of upper abdomen  Assessment & Plan  Patient mentioned upper abdominal pain that started today. Denied nausea, vomiting, constipation or diarrhea. Lipase was normal, hepatic panel was also normal.  We will check CT abdomen pelvis without contrast to rule out any acute intra-abdominal pathology. Tylenol as needed  Check random troponin    MS (multiple sclerosis) ([address])  Assessment & Plan  Patient with history of multiple sclerosis diagnosed about 2 years ago. He follows with neurology as outpatient. Was started on Ocrevus twice a year. VTE Pharmacologic Prophylaxis: VTE Score: 4 Moderate Risk (Score 3-4) - Pharmacological DVT Prophylaxis Ordered: heparin.   Code Status: Level 1 - Full Code   Discussion with family: Patient declined call to . Anticipated Length of Stay: Patient will be admitted on an inpatient basis with an anticipated length of stay of greater than 2 midnights secondary to acute right-sided numbness. Total Time Spent on Date of Encounter in care of patient: 55 mins. This time was spent on one or more of the following: performing physical exam; counseling and coordination of care; obtaining or reviewing history; documenting in the medical record; reviewing/ordering tests, medications or procedures; communicating with other healthcare professionals and discussing with patient's family/caregivers. Chief Complaint: Acute right-sided numbness    History of Present Illness:  Jorge Luis Billingsley is a 46 y.o. male with a PMH of multiple sclerosis who presents with acute right-sided numbness. Patient reported he started to have suddenly left sided numbness face, upper and lower extremity on top of his chronic right-sided weakness. Patient denied any other associated symptoms. Came to the hospital thinking he might have a stroke. He was stroke alert, CT head and CT a head and neck was unremarkable. She was consulted, suspicion for MS flare. Patient symptoms currently resolved. Review of Systems:  Review of Systems   Constitutional:  Negative for chills and fever. Eyes:  Negative for visual disturbance. Respiratory:  Negative for cough and shortness of breath. Cardiovascular:  Negative for chest pain and palpitations. Gastrointestinal:  Positive for abdominal pain. Negative for constipation, diarrhea, nausea and vomiting. Genitourinary:  Negative for dysuria and hematuria. Musculoskeletal:  Negative for back pain. Neurological:  Positive for weakness (Chronic) and numbness. Negative for speech difficulty and headaches. All other systems reviewed and are negative.       Past Medical and Surgical History:   Past Medical History: Diagnosis Date    MS (multiple sclerosis) ([address])        Past Surgical History:   Procedure Laterality Date    HERNIA REPAIR      3 times    KNEE SURGERY Right        Meds/Allergies:  Prior to Admission medications    Medication Sig Start Date End Date Taking? Authorizing Provider   cyanocobalamin 1,000 mcg/mL Take B12 1000 mcg IM once a week for 4 weeks, then once a month for 5 months 4/25/23  Yes Dieter Frias MD   gabapentin (NEURONTIN) 300 mg capsule Take 1 capsule (300 mg total) by mouth 2 (two) times a day 9/8/23  Yes Dieter Frias MD   MAGNESIUM PO Take by mouth   Yes Historical Provider, MD   acetaminophen (TYLENOL) 325 mg tablet Take 2 tablets (650 mg total) by mouth every 6 (six) hours as needed for mild pain 5/21/23   Francisco Barnes MD   loratadine (CLARITIN) 10 mg tablet Take 1 tablet (10 mg total) by mouth daily 4/25/23   Dieter Frias MD   ocrelizumab (Ocrevus) 300 MG/10ML SOLN Inject 20 mL (600 mg total) into a catheter in a vein every 6 (six) months 11/14/23   Dieter Frias MD   Riboflavin ([employer] Vitamin B-2) 100 MG TABS Take 2 tablets (200 mg total) by mouth in the morning 5/23/22   Dieter Frias MD   SYRINGE-NEEDLE, DISP, 3 ML 25G X 1" 3 ML MISC Take with B12 regimen prescribed 5/9/23   Dieter Frias MD     I have reviewed home medications with patient personally.     Allergies: No Known Allergies    Social History:  Marital Status: Single   Occupation: Not working  Patient Pre-hospital Living Situation: Home  Patient Pre-hospital Level of Mobility: walks with walker  Patient Pre-hospital Diet Restrictions: none  Substance Use History:   Social History     Substance and Sexual Activity   Alcohol Use Not Currently     Social History     Tobacco Use   Smoking Status Former   Smokeless Tobacco Former     Social History     Substance and Sexual Activity   Drug Use Never       Family History:  Family History   Problem Relation Age of Onset    Stroke Maternal Grandmother     Multiple sclerosis Other        Physical Exam:     Vitals:   Blood Pressure: 149/93 (12/06/23 1530)  Pulse: 79 (12/06/23 1530)  Temperature: 98 °F (36.7 °C) (12/06/23 1145)  Temp Source: Oral (12/06/23 1145)  Respirations: 16 (12/06/23 1530)  Weight - Scale: 104 kg (229 lb 4.5 oz) (12/06/23 1143)  SpO2: 98 % (12/06/23 1530)    Physical Exam  Vitals and nursing note reviewed. Constitutional:       General: He is not in acute distress. Appearance: He is not diaphoretic. HENT:      Head: Normocephalic. Mouth/Throat:      Comments: Right sided nasolabial fold flattening  Eyes:      General:         Right eye: No discharge. Left eye: No discharge. Extraocular Movements: Extraocular movements intact. Conjunctiva/sclera: Conjunctivae normal.      Pupils: Pupils are equal, round, and reactive to light. Cardiovascular:      Rate and Rhythm: Normal rate and regular rhythm. Heart sounds: No murmur heard. Pulmonary:      Effort: Pulmonary effort is normal. No respiratory distress. Breath sounds: Normal breath sounds. No wheezing, rhonchi or rales. Abdominal:      General: There is no distension. Palpations: Abdomen is soft. Tenderness: There is no abdominal tenderness. There is no guarding or rebound. Musculoskeletal:      Cervical back: Normal range of motion. Right lower leg: No edema. Left lower leg: No edema. Skin:     General: Skin is warm. Neurological:      Mental Status: He is alert and oriented to person, place, and time. Motor: Weakness (right Sided, chronic) present. Psychiatric:         Mood and Affect: Mood normal.         Behavior: Behavior normal.         Thought Content:  Thought content normal.         Judgment: Judgment normal.          Additional Data:     Lab Results:  Results from last 7 days   Lab Units 12/06/23  1153   WBC Thousand/uL 10.27*   HEMOGLOBIN g/dL 15.3   HEMATOCRIT % 47.0   PLATELETS Thousands/uL 232     Results from last 7 days   Lab Units 12/06/23  1153   SODIUM mmol/L 136   POTASSIUM mmol/L 4.3   CHLORIDE mmol/L 101   CO2 mmol/L 29   BUN mg/dL 17   CREATININE mg/dL 0.91   ANION GAP mmol/L 6   CALCIUM mg/dL 10.0   ALBUMIN g/dL 4.2   TOTAL BILIRUBIN mg/dL 0.29   ALK PHOS U/L 73   ALT U/L 21   AST U/L 16   GLUCOSE RANDOM mg/dL 98     Results from last 7 days   Lab Units 12/06/23  1153   INR  0.94     Results from last 7 days   Lab Units 12/06/23  1143   POC GLUCOSE mg/dl 98               Lines/Drains:  Invasive Devices       Peripheral Intravenous Line  Duration             Peripheral IV Distal;Left;Upper;Ventral (anterior) Arm -- days                        Imaging: Reviewed radiology reports from this admission including: CT head  X-ray chest 1 view portable   ED Interpretation by Sarah Vásquez DO (12/06 1252)   No acute abnormalities as interpreted by me independently       Final Result by Jeremi Nguyen MD (12/06 1448)      No acute cardiopulmonary disease. Workstation performed: [id number]         CT stroke alert brain   Final Result by Bruce Potter MD (12/06 1201)      No acute intracranial abnormality. Mild white matter disease likely due to underlying multiple sclerosis. Findings were directly discussed with Haily Grimm 12/6/2023 at 11:59 AM.      Workstation performed: [id number]         CTA stroke alert (head/neck)   Final Result by Bruce Potter MD (12/06 1212)      Negative CTA head and neck for large vessel occlusion, dissection, aneurysm, or high-grade stenosis. Additional chronic/incidental findings as detailed above.             Findings were directly discussed with Haily Grimm 12/6/2023 at 12:00 PM.                        Workstation performed: [id number]         MRI Inpatient Order    (Results Pending)   CT abdomen pelvis wo contrast    (Results Pending)       EKG and Other Studies Reviewed on Admission: ** Please Note: This note has been constructed using a voice recognition system.  **

## 2023-12-06 NOTE — ASSESSMENT & PLAN NOTE
46 y.o. right handed male with MS on maintained on Ocrevus who presented to the ED as a stroke alert with reports of new onset R sided numbness superimposed on his chronic stable R sided weakness. Patient notes that he has been around sick contacts with a stomach bug. In addition to the above, patient also reporting headache and upper abdominal pain. NIHSS 3 for chronic flattening of the right nasolabial fold, chronic right lower extremity drift, and decreased sensation on the right. Neuroimaging was unremarkable and patient was deemed not a TNK candidate as symptoms were resolving/clearly nondisabling. Imaging:  - CTH: No acute intracranial abnormality. Mild white matter disease likely due to underlying multiple sclerosis. - CTA H/N negative for large vessel occlusion or flow limiting stenosis. - MRI brain w/wo revealed "Stable chronic demyelinating disease in the brain parenchyma. No new lesions or enhancement to suggest the presence of active demyelination."    Higher suspicion for MS recrudescence in the setting of recent illness, no new lesions to suggest MS flare. No imaging negative for acute ischemia. Plan:  - No need to initiate steroids at this time, especially as patient reports feeling numbness has almost resolved  - Defer management and workup of upper abdominal pain and back pain to primary team.  - PT/OT as needed  - Fall precautions  - Medical management and supportive care per primary team. Correction of any metabolic or infectious disturbances. Plan discussed with Attending Neurologist, please see attestation for further input/recommendations.

## 2023-12-06 NOTE — CONSULTS
Consultation - Stroke   Prisca Dunne 46 y.o. male MRN: [id number]  Unit/Bed#: ED 08 Encounter: [id number]      Assessment/Plan     * Right sided numbness  Assessment & Plan  46 y.o. right handed male with MS on maintained on Ocrevus who presented to the ED as a stroke alert with reports of new onset R sided numbness superimposed on his chronic stable R sided weakness. Patient notes that he has been around sick contacts with a stomach bug. In addition to the above, patient also reporting headache and upper abdominal pain. NIHSS 3 for chronic flattening of the right nasolabial fold, chronic right lower extremity drift, and decreased sensation on the right. Imaging:  CTH: No acute intracranial abnormality. Mild white matter disease likely due to underlying multiple sclerosis. CTA H/N negative for large vessel occlusion or flow limiting stenosis. Thrombolytic Decision: Patient not a candidate. Symptoms resolved/clearly non disabling. and low suspicion for stroke as symptoms are more consistent with recrudescence versus flare of MS. Higher suspicion for MS recrudescence in the setting of recent illness vs MS flare. Low suspicion for stroke. Plan:  Obtain MRI brain w wo  Hold off on initiating steroids at this time, especially in the setting of patient's current dyspepsia  Pending MRI brain results, will determine if steroids are needed. Defer management and workup of upper abdominal pain and back pain to primary team.  PT/OT  Fall precautions  Management of medical issues as per primary team    MS (multiple sclerosis) ([address])  Assessment & Plan  Diagnosed in beginning of 2022 and started Rollo Kalli in 4/2022  Patient continues Ocrevus infusions twice a year with his next infusion scheduled for 12/19/2022  Patient follows with Dr. Willard Calix in outpatient neurology  Additional imaging pending as detailed above      Recommendations for outpatient neurological follow up have yet to be determined.     History of Present Illness     Reason for Consult / Principal Problem: Stroke alert  Hx and PE limited by: NA  Patient last known well: [facility]  Stroke alert called: [id number]  Neurology time of arrival: Prior to alert  HPI: Jesenia Acosta is a 46 y.o. right handed male with MS on maintained on Ocrevus who presented to the ED as a stroke alert with reports of new onset R sided numbness. Patient reported that he resides with people who currently have a GI illness. This morning he notes that when he awoke he didn't feel well. And then later in the morning around 1045 while watching TV he describes acute onset right sided numbness including his right face, right arm and right leg as well as word finding difficulty and slurred and stuttering speech. He reports that the numbness of his right arm and leg came and went on 2 occasions. In the ED however he reports he has entire right hemibody numbness and slowed speech. He reports that his current degree of right sided weakness is unchanged from baseline. In addition to these symptoms, he also reports 7/10 R temple headache without photo/phonophobia nor N/V as well as 7/10 upper abdominal discomfort. He notes that he had a normal bowel movement this morning. /87 on arrival.     Jeanna Dunne was diagnosed with MS in January 2022 at [facility] after presenting with sudden onset right lower facial droop, dysarthria, right upper and lower extremity weakness and feeling of leaning to the right. MRI brain during that admission revealed revealed multiple enhancing and non-enhancing lesions in the WM of the cerebral hemispheres. The largest of these lesions is a nonenhancing 1.6 cm x 1.7 cm lesion at the medial right parietal lobe. Appearance is most suspicious for demyelinating disease/multiple sclerosis. There is no evidence of acute infarction. MRI T-spine at that time was normal.  He was given 5-day course of IV steroids.   Symptoms improved with residual right hand weakness, slight right facial droop and mild dysarthria. Patient had a repeat admission in February 2022 due to [id number] [facility] exacerbation with more profound right hemiparesis. MMRI brain completed demonstrated multiple small supratentorial enhancing foci of acute demyelination consistent with disease progression of multiple sclerosis. MRI c-spine showed acute to early subacute subcentimeter demyelinating plaque at the C5 vertebral body level. Additional smaller nonenhancing chronic appearing demyelinating plaques within the upper and mid cervical cord. He completed 5 days of IV steroids while admitted and discharged on oral steroid taper. Patient started Doloris Mince with his first split dose on 4/29/2022 and 5/13/23 and has been getting an infusion twice a year since that time. He follows with MS specialist Dr. Dia Sauceda and last saw her in the office on 10/27/2023. Recommendations at this appointment were to continue with Ocrevus infusions with patient's next infusion scheduled for 12/19/2023. Additionally she stated " Considering patient has chronic multiple sclerosis with likely transition to second stage of multiple sclerosis, patient will be advised to consider Solu-Medrol 500 mg IV q. 14 days for next 3 months. Patient may consider Solu-Medrol 500 mg monthly afterwards." which patient has begun. Patient reports that he lives with family members. He typically ambulates with a walker though for long distances he uses a wheelchair. He states that ever since his diagnosis he has not been able to drive. Consult to Neurology  Consult performed by: Nia Almodovar PA-C  Consult ordered by: Kassandra Mallory DO        Review of Systems Unable to complete comprehensive review of systems due to urgency of situation however patient is complaining of headache, upper abdominal pain, back pain, chronic right-sided weakness, new right-sided numbness, and slowed speech.   He denies chest pain, bowel or bladder dysfunction. Historical Information   Past Medical History:   Diagnosis Date    MS (multiple sclerosis) ([address])      Past Surgical History:   Procedure Laterality Date    HERNIA REPAIR      3 times    KNEE SURGERY Right      Social History   Social History     Substance and Sexual Activity   Alcohol Use Not Currently     Social History     Substance and Sexual Activity   Drug Use Never     E-Cigarette/Vaping    E-Cigarette Use Former User      E-Cigarette/Vaping Substances    Nicotine No     THC No     CBD No     Flavoring No      Social History     Tobacco Use   Smoking Status Former   Smokeless Tobacco Former     Family History:   Family History   Problem Relation Age of Onset    Stroke Maternal Grandmother     Multiple sclerosis Other        Review of previous medical records was completed. Meds/Allergies   all current active meds have been reviewed    No Known Allergies    Objective   Vitals:Blood pressure 149/93, pulse 79, temperature 98 °F (36.7 °C), temperature source Oral, resp. rate 16, weight 104 kg (229 lb 4.5 oz), SpO2 98 %. ,Body mass index is 31.98 kg/m². No intake or output data in the 24 hours ending 12/06/23 1605    Invasive Devices: Invasive Devices       Peripheral Intravenous Line  Duration             Peripheral IV Distal;Left;Upper;Ventral (anterior) Arm -- days                    Physical Exam  Constitutional:       General: He is not in acute distress. Appearance: Normal appearance. He is well-developed. He is not ill-appearing. HENT:      Head: Normocephalic and atraumatic. Eyes:      General: No scleral icterus. Right eye: No discharge. Left eye: No discharge. Extraocular Movements: Extraocular movements intact. Conjunctiva/sclera: Conjunctivae normal.   Cardiovascular:      Rate and Rhythm: Normal rate and regular rhythm. Pulmonary:      Effort: Pulmonary effort is normal. No respiratory distress. Breath sounds: No stridor.    Musculoskeletal: General: No tenderness or deformity. Cervical back: Normal range of motion and neck supple. Lymphadenopathy:      Cervical: No cervical adenopathy. Skin:     General: Skin is warm and dry. Findings: No erythema or rash. Neurological:      Mental Status: He is alert and oriented to person, place, and time. Coordination: Finger-Nose-Finger Test normal.   Psychiatric:      Comments: frustrated       Neurologic Exam     Mental Status   Oriented to person, place, and time. Follows 2 step commands. Level of consciousness: alert  Knowledge: good. Slow and occasionally stuttering speech     Cranial Nerves     CN II   Visual acuity: normal  Right visual field deficit: none (patient reports blurred upper outer VF in the right eye)  Left visual field deficit: none     CN III, IV, VI   Conjugate gaze: present    CN XII   Tongue deviation: none    Subtle flattening of the right nasolabial fold  Reports of numbness of the right face     Motor Exam RUE: proximally 4, distally 5-  RLE: proximally 4/4+, distally 5-  LUE: full strength with some giveway  LLE: proximally 4+/5-, distally 5     Sensory Exam   Reports of decreased right hemibody sensation (85% compared to L side)     Gait, Coordination, and Reflexes     Coordination   Finger to nose coordination: normal  BUEs 2+ throughout  Bilateral patellars 3+       NIHSS:  1a.Level of Consciousness: 0 = Alert   1b. LOC Questions: 0 = Answers both correctly   1c. LOC Commands: 0 = Obeys both correctly   2. Best Gaze: 0 = Normal   3. Visual: 0 = No visual field loss   4. Facial Palsy: 1=Minor paralysis (flattened nasolabial fold, asymmetric on smiling)   5a. Motor Right Arm: 0=No drift, limb holds 90 (or 45) degrees for full 10 seconds   5b. Motor Left Arm: 0=No drift, limb holds 90 (or 45) degrees for full 10 seconds   6a. Motor Right Le=Drift, limb holds 90 (or 45) degrees but drifts down before full 10 seconds: does not hit bed   6b.  Motor Left Le=No drift, limb holds 90 (or 45) degrees for full 10 seconds   7. Limb Ataxia:  0=Absent   8. Sensory: 1=Mild to moderate sensory loss; patient feels pinprick is less sharp or is dull on the affected side; there is a loss of superficial pain with pinprick but patient is aware He is being touched   9. Best Language:  0=No aphasia, normal   10. Dysarthria: 0=Normal articulation   11. Extinction and Inattention (formerly Neglect): 0=No abnormality   Total Score: 3     Time NIHSS was completed: 1200  Of note, patient has chronic R sided weakness and chronic flattening of the R NL fold based on prior exams. Modified Alysa Score:  2 (Unable to carry out all previous activities, but able to look after own affairs without assistance)    Lab Results: I have personally reviewed pertinent reports. Imaging Studies: I have personally reviewed pertinent films in PACS [facility]. CTA H/N  EKG, Pathology, and Other Studies: I have personally reviewed pertinent reports. VTE Prophylaxis: None at this time as patient in the ED    Code Status: Level 1 - Full Code    Counseling / Coordination of Care  Total Critical Care time spent 60 minutes excluding procedures, teaching and family updates.

## 2023-12-07 ENCOUNTER — APPOINTMENT (INPATIENT)
Dept: CT IMAGING | Facility: HOSPITAL | Age: 48
DRG: 043 | End: 2023-12-07
Payer: COMMERCIAL

## 2023-12-07 LAB
ANION GAP SERPL CALCULATED.3IONS-SCNC: 6 MMOL/L
ATRIAL RATE: 77 BPM
BASOPHILS # BLD AUTO: 0.07 THOUSANDS/ÂΜL (ref 0–0.1)
BASOPHILS NFR BLD AUTO: 1 % (ref 0–1)
BUN SERPL-MCNC: 14 MG/DL (ref 5–25)
CALCIUM SERPL-MCNC: 8.9 MG/DL (ref 8.4–10.2)
CHLORIDE SERPL-SCNC: 107 MMOL/L (ref 96–108)
CO2 SERPL-SCNC: 26 MMOL/L (ref 21–32)
CREAT SERPL-MCNC: 0.74 MG/DL (ref 0.6–1.3)
EOSINOPHIL # BLD AUTO: 0.28 THOUSAND/ÂΜL (ref 0–0.61)
EOSINOPHIL NFR BLD AUTO: 3 % (ref 0–6)
ERYTHROCYTE [DISTWIDTH] IN BLOOD BY AUTOMATED COUNT: 12.6 % (ref 11.6–15.1)
GFR SERPL CREATININE-BSD FRML MDRD: 107 ML/MIN/1.73SQ M
GLUCOSE SERPL-MCNC: 85 MG/DL (ref 65–140)
HCT VFR BLD AUTO: 40.9 % (ref 36.5–49.3)
HGB BLD-MCNC: 13.3 G/DL (ref 12–17)
IMM GRANULOCYTES # BLD AUTO: 0.2 THOUSAND/UL (ref 0–0.2)
IMM GRANULOCYTES NFR BLD AUTO: 2 % (ref 0–2)
LYMPHOCYTES # BLD AUTO: 1.72 THOUSANDS/ÂΜL (ref 0.6–4.47)
LYMPHOCYTES NFR BLD AUTO: 18 % (ref 14–44)
MAGNESIUM SERPL-MCNC: 1.9 MG/DL (ref 1.9–2.7)
MCH RBC QN AUTO: 29.6 PG (ref 26.8–34.3)
MCHC RBC AUTO-ENTMCNC: 32.5 G/DL (ref 31.4–37.4)
MCV RBC AUTO: 91 FL (ref 82–98)
MONOCYTES # BLD AUTO: 0.88 THOUSAND/ÂΜL (ref 0.17–1.22)
MONOCYTES NFR BLD AUTO: 9 % (ref 4–12)
NEUTROPHILS # BLD AUTO: 6.4 THOUSANDS/ÂΜL (ref 1.85–7.62)
NEUTS SEG NFR BLD AUTO: 67 % (ref 43–75)
NRBC BLD AUTO-RTO: 0 /100 WBCS
P AXIS: 47 DEGREES
PLATELET # BLD AUTO: 206 THOUSANDS/UL (ref 149–390)
PMV BLD AUTO: 9.8 FL (ref 8.9–12.7)
POTASSIUM SERPL-SCNC: 4.1 MMOL/L (ref 3.5–5.3)
PR INTERVAL: 188 MS
QRS AXIS: 46 DEGREES
QRSD INTERVAL: 96 MS
QT INTERVAL: 402 MS
QTC INTERVAL: 454 MS
RBC # BLD AUTO: 4.49 MILLION/UL (ref 3.88–5.62)
SODIUM SERPL-SCNC: 139 MMOL/L (ref 135–147)
T WAVE AXIS: 51 DEGREES
VENTRICULAR RATE: 77 BPM
WBC # BLD AUTO: 9.55 THOUSAND/UL (ref 4.31–10.16)

## 2023-12-07 PROCEDURE — 74177 CT ABD & PELVIS W/CONTRAST: CPT

## 2023-12-07 PROCEDURE — G1004 CDSM NDSC: HCPCS

## 2023-12-07 PROCEDURE — 97167 OT EVAL HIGH COMPLEX 60 MIN: CPT

## 2023-12-07 PROCEDURE — 36415 COLL VENOUS BLD VENIPUNCTURE: CPT | Performed by: INTERNAL MEDICINE

## 2023-12-07 PROCEDURE — 83735 ASSAY OF MAGNESIUM: CPT | Performed by: INTERNAL MEDICINE

## 2023-12-07 PROCEDURE — 74176 CT ABD & PELVIS W/O CONTRAST: CPT

## 2023-12-07 PROCEDURE — 80048 BASIC METABOLIC PNL TOTAL CA: CPT | Performed by: INTERNAL MEDICINE

## 2023-12-07 PROCEDURE — 85025 COMPLETE CBC W/AUTO DIFF WBC: CPT | Performed by: INTERNAL MEDICINE

## 2023-12-07 PROCEDURE — 99233 SBSQ HOSP IP/OBS HIGH 50: CPT | Performed by: PHYSICIAN ASSISTANT

## 2023-12-07 PROCEDURE — 97163 PT EVAL HIGH COMPLEX 45 MIN: CPT

## 2023-12-07 RX ADMIN — HEPARIN SODIUM 5000 UNITS: 5000 INJECTION INTRAVENOUS; SUBCUTANEOUS at 21:09

## 2023-12-07 RX ADMIN — ACETAMINOPHEN 650 MG: 325 TABLET, FILM COATED ORAL at 18:26

## 2023-12-07 RX ADMIN — HEPARIN SODIUM 5000 UNITS: 5000 INJECTION INTRAVENOUS; SUBCUTANEOUS at 13:45

## 2023-12-07 RX ADMIN — LIDOCAINE 5% 1 PATCH: 700 PATCH TOPICAL at 10:54

## 2023-12-07 RX ADMIN — HEPARIN SODIUM 5000 UNITS: 5000 INJECTION INTRAVENOUS; SUBCUTANEOUS at 05:27

## 2023-12-07 RX ADMIN — GABAPENTIN 300 MG: 300 CAPSULE ORAL at 10:54

## 2023-12-07 RX ADMIN — ACETAMINOPHEN 650 MG: 325 TABLET, FILM COATED ORAL at 12:25

## 2023-12-07 RX ADMIN — IOHEXOL 100 ML: 350 INJECTION, SOLUTION INTRAVENOUS at 16:35

## 2023-12-07 RX ADMIN — GABAPENTIN 300 MG: 300 CAPSULE ORAL at 18:09

## 2023-12-07 NOTE — ASSESSMENT & PLAN NOTE
Patient mentioned upper abdominal pain that started today. Denied nausea, vomiting, constipation or diarrhea. Lipase was normal, hepatic panel was also normal.  CT A/P- Edema in the fat in the right upper quadrant, adjacent to a diverticulum at the hepatic flexure and in the gallbladder neck   Differential includes cholecystitis versus diverticulitis  Negative Schwarz sign. Favor diverticulitis. Will ask GI to review imaging.   Tylenol as needed

## 2023-12-07 NOTE — PHYSICAL THERAPY NOTE
PT EVALUATION    46 y.o.    [id number]    Acute right-sided weakness [R53.1]  Word finding difficulty [R47.89]  Generalized weakness [R53.1]  Pain of upper abdomen [R10.10]    Past Medical History:   Diagnosis Date    MS (multiple sclerosis) ([address])          Past Surgical History:   Procedure Laterality Date    HERNIA REPAIR      3 times    KNEE SURGERY Right         12/07/23 1036   PT Last Visit   PT Visit Date 12/07/23   Note Type   Note type Evaluation   Pain Assessment   Pain Assessment Tool Wilson-Baker FACES   Wilson-Baker FACES Pain Rating 4   Pain Location/Orientation Location: Head   Pain Onset/Description Onset: Ongoing   Patient's Stated Pain Goal No pain   Restrictions/Precautions   Weight Bearing Precautions Per Order No   Other Precautions Fall Risk;Pain   Home Living   Type of [address] [place] [address] Two level;Ramped entrance;Performs ADLs on one level  (1st floor set-up, + stair glide)   Bathroom Shower/Tub Tub/shower unit   [employer] Grab bars in shower; Shower chair   Home Equipment Walker;Electric scooter;Stair glide; Other (Comment)  (transport chair)   Prior Function   Level of [place] Independent with ADLs; Independent with functional mobility; Needs assistance with IADLS   Lives With Family  (Mother, Father, daughter)   [place] Help From Family   IADLs Family/Friend/Other provides transportation; Family/Friend/Other provides meals; Family/Friend/Other provides medication management   Falls in the last 6 months 0   Comments Pt ambulates with RW for household distances and uses transport chair for the community. Pt reports electric scooter is too heavy to transport.    General   Family/Caregiver Present No   Cognition   Overall Cognitive Status WFL   Arousal/Participation Alert   Attention Attends with cues to redirect   Orientation Level Oriented X4   Memory Within functional limits   Following Commands Follows one step commands without difficulty   Subjective   Subjective Pt agreeable to cough / fever /  body ache evaluation. RLE Assessment   RLE Assessment X   Strength RLE   R Hip Flexion 3+/5   R Knee Flexion 3+/5   R Knee Extension 3+/5   LLE Assessment   LLE Assessment X   Strength LLE   L Hip Flexion 4/5   L Knee Flexion 4/5   L Knee Extension 4/5   Bed Mobility   Supine to Sit 6  Modified independent   Sit to Supine 6  Modified independent   Transfers   Sit to Stand 5  Supervision   Stand to Sit 5  Supervision   Additional Comments Cues for hand placement/ safety   Ambulation/Elevation   Gait pattern Improper Weight shift;Decreased foot clearance; Short stride; Excessively slow   Gait Assistance 5  Supervision   Additional items Verbal cues   Assistive Device Rolling walker   Distance 3' fwd/ bwd   Balance   Static Sitting Good   Dynamic Sitting Fair +   Static Standing Fair -   Dynamic Standing Poor +   Ambulatory Poor +   Endurance Deficit   Endurance Deficit Yes   Endurance Deficit Description fatigue   Activity Tolerance   Activity Tolerance Patient limited by fatigue   Medical Staff Made Aware KATIE Rangel   Nurse Made Aware yes, Mehran Cevallos RN   Assessment   Prognosis Fair   Problem List Decreased strength;Decreased endurance; Impaired balance;Decreased mobility; Decreased safety awareness;Pain   Assessment Pt is an 46 y.o. male admitted to [facility] on 12/6/23 with c/o weakness and numbness right side. PT consulted with eval and treat and activity as tolerated orders. Pt lives with family in a [facility] with 1st floor set-up and ramp entrance. At baseline, pt is independent with ADLs and ambulation with RW for household distances. Upon evaluation, pt was at a supervision level for limited mobility. Pt presents with weakness, impaired balance, gait dysfunction and decreased safety awareness. PT to follow 2-3x/wk during acute stay to address deficits. The patient's AM-PAC Basic Mobility Inpatient Short Form Raw Score is 17. A Raw score of greater than 16 suggests the patient may benefit from discharge to home.  Please also refer to the recommendation of the Physical Therapist for safe discharge planning. Recommend Level 3 rehab services after d/c to maximize functional mobility. Barriers to Discharge None   Goals   Patient Goals to get stronger   STG Expiration Date 12/21/23   Short Term Goal #1 1). Pt will perform all transfers with Arian demonstrating safe and appropriate technique 100% of the time in order to improve ability to negotiate safely in home environment. 2) Amb with least restrictive AD > 30'x1 with mod I in order to demonstrate ability to negotiate in home environment. 3)  Improve overall strength and balance 1/2 grade in order to optimize ability to perform functional tasks and reduce fall risk. 4) Increase activity tolerance to 45 minutes in order to improve endurance to functional tasks. 5) PT for ongoing patient and family/caregiver education, DME needs and d/c planning in order to promote highest level of function in least restrictive environment. PT Treatment Day 0   Plan   Treatment/Interventions Functional transfer training;LE strengthening/ROM; Therapeutic exercise; Endurance training;Patient/family training;Equipment eval/education; Bed mobility;Gait training; Compensatory technique education;Spoke to nursing;OT   PT Frequency 2-3x/wk   Discharge Recommendation   Rehab Resource Intensity Level, PT III (Minimum Resource Intensity)   AM-PAC Basic Mobility Inpatient   Turning in Flat Bed Without Bedrails 4   Lying on Back to Sitting on Edge of Flat Bed Without Bedrails 3   Moving Bed to Chair 3   Standing Up From Chair Using Arms 3   Walk in Room 3   Climb 3-5 Stairs With Railing 1   Basic Mobility Inpatient Raw Score 17   Basic Mobility Standardized Score 39.67   Highest Level Of Mobility   JH-HLM Goal 5: Stand one or more mins   JH-HLM Achieved 5: Stand (1 or more minutes)   End of Consult   Patient Position at End of Consult Supine; All needs within reach     Hx/personal factors: difficulty performing IADLs and fall risk , comorbidities    Examination:decreased strength , decreased balance, decreased activity tolerance, gait deviations, decreased safety awareness, and impaired posture. Clinical: unpredictable Ongoing medical status, Risk for falls, and Safety awareness.      Complexity: high          Theodore Ferrara, PT

## 2023-12-07 NOTE — UTILIZATION REVIEW
Initial Clinical Review    Admission: Date/Time/Statement:   Admission Orders (From admission, onward)       Ordered        12/06/23 1425  INPATIENT ADMISSION  Once                          Orders Placed This Encounter   Procedures    INPATIENT ADMISSION     Standing Status:   Standing     Number of Occurrences:   1     Order Specific Question:   Level of Care     Answer:   Med Surg [16]     Order Specific Question:   Estimated length of stay     Answer:   More than 2 Midnights     Order Specific Question:   Certification     Answer:   I certify that inpatient services are medically necessary for this patient for a duration of greater than two midnights. See H&P and MD Progress Notes for additional information about the patient's course of treatment. ED Arrival Information       Expected   -    Arrival   12/6/2023 11:38    Acuity   Emergent              Means of arrival   Ambulance    Escorted by   [facility]    Service   Hospitalist    Admission type   Emergency              Arrival complaint   -             Chief Complaint   Patient presents with    Weakness - Generalized     Pt reports at 1045 the right side of his body went numb. Pt said he has had this in the past but not this bad, pt says he feels like he is slurring his words       Initial Presentation: 46 y.o. male presents to ed from home via ems on 12-6-23 for evaluation and treatment of right sided numbness and slurring words. Onset 1045. RUE & RLE numbness resolved without intervention. PMHX: MS  Clinical assessment significant for right sided facial numbness, word finding difficulty and slurring of speech remain. Neuro exam: gcs 15, stutters, facial asymmetry, slurred speech, word finding difficulty, right side face diminished sensation, R facial droop. Imaging shows white matter disease likely due to MS. EKG: NSR. Per neurology: not TNK candidate.   Initially treated with iv protonix for abdominal pain, lidoderm patch, tylenol, sq heparin. Admit to inpatient med surg . Hold off on steroids due to abd pain. Obtain MRI     Date: 12-7-23    Day 2: inpatient med surg    Continue neurontin, heparin sq and lidoderm patch. Evaluate abdominal pain which patient did not initially report on arrival yesterday. .  CT abdomen and pelvis shows possible diverticulitis vs acute cholecystectomy. Recommend US to confirm or exclude acute cholecystitis. MRI brain. Continue neuro checks. Continue to hold steroids for now. 12-8-23 inpatient med surg  Patient with continued right middle and lower quadrant palpation. Observing off iv steroids until MRI of brain results are available. MRI reviewed. No new lesions to suggest MS or acute ischemia. No need to initiate steroids as numbness has almost resolved. PT/OT finds minimum rehab needs.      ED Triage Vitals   12/06/23 1145 12/06/23 1141 12/06/23 1141 12/06/23 1141 12/06/23 1141   98 °F (36.7 °C) 79 18 155/87 98 %      Oral Monitor           Pain Score       7          12/06/23 104 kg (229 lb 4.5 oz)     Additional Vital Signs:       Date/Time Temp Pulse Resp BP MAP (mmHg) SpO2 O2 Device   12/07/23 0800 -- 73 16 113/82 -- 98 % None (Room air)   12/07/23 0715 -- 64 15 -- -- 96 % --   12/07/23 0600 -- 72 14 103/67 80 93 % None (Room air)   12/07/23 0430 -- 75 16 100/69 81 95 % None (Room air)   12/07/23 0330 -- 86 18 118/68 98 95 % --   12/07/23 0230 -- 66 17 123/73 93 95 % None (Room air)   12/07/23 0130 -- 77 19 103/68 81 93 % None (Room air)   12/07/23 0030 -- 80 18 107/69 82 96 % None (Room air)   12/06/23 2230 -- 93 20 96/71 -- 94 % None (Room air)   12/06/23 2215 -- 87 20 104/75 85 94 % None (Room air)   12/06/23 2151 -- 94 28   Abnormal  115/66 83 94 % --   12/06/23 2130 -- 91 16 131/63 85 91 % --   12/06/23 2120 -- 93 17 120/70 89 93 % --   12/06/23 2100 -- 93 17 126/74 93 95 % --   12/06/23 2030 -- 93 20 127/91 -- 96 % None (Room air)   12/06/23 1830 -- 86 21 112/75 -- 96 % None (Room air) 12/06/23 1800 -- 89 23   Abnormal  130/87 103 98 % --   12/06/23 1730 -- 94 17 123/90 -- 96 % None (Room air)   12/06/23 1630 -- 81 17 131/69 -- 93 % --   12/06/23 1530 -- 79 16 149/93 -- 98 % --   12/06/23 14:30:15 -- 73 15 135/80 -- 99 % None (Room air)   12/06/23 1330 -- 74 16 138/84 -- 97 % None (Room air)   12/06/23 13:11:17 -- 75 18 145/85 -- 96 % None (Room air)   12/06/23 1300 -- 75 18 145/85 -- 96 % None (Room air)   12/06/23 1230 -- 78 17 151/90 -- 96 % None (Room air)   12/06/23 1215 -- 77 17 147/88 -- 96 % None (Room air)   12/06/23 1200 -- 80 17 150/90 -- 97 % None (Room air)   12/06/23 1145 98 °F (36.7 °C) 70 17 155/87 -- 98 % None (Room air)   12/06/23 1141 -- 79 18 155/87 -- 98 % --     Date and Time Eye Opening Best Verbal Response Best Motor Response [place] Coma Scale Score   12/07/23 0800 4 5 6 15   12/07/23 0600 4 5 6 15   12/07/23 0430 4 5 6 15   12/07/23 0230 4 5 6 15   12/07/23 0030 4 5 6 15   12/06/23 2230 4 5 6 15   12/06/23 2030 4 5 6 15   12/06/23 1830 4 5 6 15   12/06/23 1730 4 5 6 15   12/06/23 1630 4 5 6 15   12/06/23 1530 4 5 6 15   12/06/23 1430 4 5 6 15   12/06/23 1330 4 5 6 15   12/06/23 1311 4 5 6 15   12/06/23 1300 4 5 6 15   12/06/23 1230 4 5 6 15   12/06/23 1215 4 5 6 15   12/06/23 1200 4 5 6 15   12/06/23 1145 4 5 6 15       Pertinent Labs/Diagnostic Test Results:     CT abdomen pelvis wo contrast   Final  (12/07 0854)      Edema in the fat in the right upper quadrant, adjacent to a diverticulum at the hepatic flexure and in the gallbladder neck. Differential diagnosis includes diverticulitis and acute cholecystitis. If clinically indicated, sonography of the right upper quadrant would be helpful to confirm or exclude acute cholecystitis. X-ray chest 1 view portable      Final (12/06 1448)      No acute cardiopulmonary disease. CT stroke alert brain   Final  (12/06 1201)      No acute intracranial abnormality.       Mild white matter disease likely due to underlying multiple sclerosis. CTA stroke alert (head/neck)   Final (12/06 1212)      Negative CTA head and neck for large vessel occlusion, dissection, aneurysm, or high-grade stenosis. Additional chronic/incidental findings as detailed above.         Results from last 7 days   Lab Units 12/06/23  1153   SARS-COV-2  Negative     Results from last 7 days   Lab Units 12/07/23  0421 12/06/23  1153   WBC Thousand/uL 9.55 10.27*   HEMOGLOBIN g/dL 13.3 15.3   HEMATOCRIT % 40.9 47.0   PLATELETS Thousands/uL 206 232   NEUTROS ABS Thousands/µL 6.40  --          Results from last 7 days   Lab Units 12/07/23  0421 12/06/23  1153   SODIUM mmol/L 139 136   POTASSIUM mmol/L 4.1 4.3   CHLORIDE mmol/L 107 101   CO2 mmol/L 26 29   ANION GAP mmol/L 6 6   BUN mg/dL 14 17   CREATININE mg/dL 0.74 0.91   EGFR ml/min/1.73sq m 107 97   CALCIUM mg/dL 8.9 10.0   MAGNESIUM mg/dL 1.9  --      Results from last 7 days   Lab Units 12/06/23  1153   AST U/L 16   ALT U/L 21   ALK PHOS U/L 73   TOTAL PROTEIN g/dL 7.9   ALBUMIN g/dL 4.2   TOTAL BILIRUBIN mg/dL 0.29   BILIRUBIN DIRECT mg/dL 0.02     Results from last 7 days   Lab Units 12/06/23  1143   POC GLUCOSE mg/dl 98     Results from last 7 days   Lab Units 12/07/23  0421 12/06/23  1153   GLUCOSE RANDOM mg/dL 85 98           Results from last 7 days   Lab Units 12/06/23  2030 12/06/23  1557   HS TNI 0HR ng/L  --  <2   HS TNI 2HR ng/L <2  --          Results from last 7 days   Lab Units 12/06/23  1153   PROTIME seconds 12.9   INR  0.94   PTT seconds 28       Results from last 7 days   Lab Units 12/06/23  1153   LIPASE u/L 37       Results from last 7 days   Lab Units 12/06/23  1557   CLARITY UA  Clear   COLOR UA  Yellow   SPEC GRAV UA  <1.005*   PH UA  8.5*   GLUCOSE UA mg/dl Negative   KETONES UA mg/dl Negative   BLOOD UA  Negative   PROTEIN UA mg/dl Trace*   NITRITE UA  Negative   BILIRUBIN UA  Negative   UROBILINOGEN UA (BE) mg/dl <2.0   LEUKOCYTES UA  Negative   WBC UA /hpf 0-1*   RBC UA /hpf None Seen   BACTERIA UA /hpf Occasional   EPITHELIAL CELLS WET PREP /hpf Occasional     Results from last 7 days   Lab Units 12/06/23  1153   INFLUENZA A PCR  Negative   INFLUENZA B PCR  Negative   RSV PCR  Negative         ED Treatment:   Medication Administration from 12/06/2023 1138 to 12/07/2023 8546         Date/Time Order Dose Route Action     12/06/2023 1347 EST pantoprazole (PROTONIX) injection 40 mg 40 mg Intravenous Given     12/06/2023 1558 EST lidocaine (LIDODERM) 5 % patch 1 patch 1 patch Topical Medication Applied     12/06/2023 1800 EST gabapentin (NEURONTIN) capsule 300 mg 300 mg Oral Not Given     12/06/2023 1609 EST acetaminophen (TYLENOL) tablet 650 mg 650 mg Oral Given     12/07/2023 0527 EST heparin (porcine) subcutaneous injection 5,000 Units 5,000 Units Subcutaneous Given     12/06/2023 1609 EST heparin (porcine) subcutaneous injection 5,000 Units 5,000 Units Subcutaneous Given          Past Medical History:   Diagnosis Date    MS (multiple sclerosis) ([address])      Present on Admission:   Right sided numbness   MS (multiple sclerosis) ([address])      Admitting Diagnosis: Generalized weakness [R53.1]    Age/Sex: 46 y.o. male    Scheduled Medications:    gabapentin, 300 mg, Oral, BID  heparin (porcine), 5,000 Units, Subcutaneous, Q8H OFE  lidocaine, 1 patch, Topical, Daily      Continuous IV Infusions:     PRN Meds:  acetaminophen, 650 mg, Oral, Q6H PRN        IP CONSULT TO NEUROLOGY    Network Utilization Review Department  ATTENTION: Please call with any questions or concerns to [phone] and carefully listen to the prompts so that you are directed to the right person. All voicemails are confidential.   For Discharge needs, contact Care Management DC Support Team at [phone] opt.  2  Send all requests for admission clinical reviews, approved or denied determinations and any other requests to dedicated fax number below belonging to the campus where the patient is receiving treatment.  List of dedicated fax numbers for the Facilities:  [place] DENIALS (Administrative/Medical Necessity) [phone]   DISCHARGE SUPPORT TEAM (NETWORK) [address] (Maternity/NICU/Pediatrics) [phone]   [address] 1521 [facility] 1000 [facility] [phone] [address] 5220 [facility] [address] [address] [facility] [address] [phone]

## 2023-12-07 NOTE — ASSESSMENT & PLAN NOTE
Patient reported acute on chronic thoracic back pain  History of dextroscoliosis, imaging last year.   T9/10 paravertebral tenderness  Like scoliosis related vs diverticulitis/cholecystitis  Lidocaine patch

## 2023-12-07 NOTE — PROGRESS NOTES
[address] [facility]  Progress Note  Name: Eddie Henriquez  MRN: [id number]  Unit/Bed#: -74 I Date of Admission: 12/6/2023   Date of Service: 12/7/2023 I Hospital Day: 1    Assessment/Plan   Right-sided thoracic back pain  Assessment & Plan  Patient reported acute on chronic thoracic back pain  History of dextroscoliosis, imaging last year. T9/10 paravertebral tenderness  Like scoliosis related vs diverticulitis/cholecystitis  Lidocaine patch    Pain of upper abdomen  Assessment & Plan  Patient mentioned upper abdominal pain that started today. Denied nausea, vomiting, constipation or diarrhea. Lipase was normal, hepatic panel was also normal.  CT A/P- Edema in the fat in the right upper quadrant, adjacent to a diverticulum at the hepatic flexure and in the gallbladder neck   Differential includes cholecystitis versus diverticulitis  Negative Schwarz sign. Favor diverticulitis. Will ask GI to review imaging. Tylenol as needed      MS (multiple sclerosis) ([address])  Assessment & Plan  Patient with history of multiple sclerosis diagnosed about 2 years ago. He follows with neurology as outpatient. Was started on Ocrevus twice a year. * Right sided numbness  Assessment & Plan  Reported suddenly facial right-sided numbness and upper and lower extremity numbness without weakness. Patient has right-sided weakness which is chronic along with intermittent speech difficulties. Patient reported symptoms subsided at the time he got to the emergency department. During my evaluation patient with right-sided weakness which is his baseline. Normal speech, chronic right nasolabial fold flattening  Patient was a stroke alert. CT head without acute findings  CT head and neck also without significant stenosis or occlusions. Suspect patient might have MS flare. Neurology was consulted, it was recommended not to start steroids at this time as the patient was complaining of abdominal pain.   MRI brain pending  PT/OT  Fall precaution               VTE Pharmacologic Prophylaxis: VTE Score: 4 Moderate Risk (Score 3-4) - Pharmacological DVT Prophylaxis Ordered: heparin. Mobility:   Basic Mobility Inpatient Raw Score: 19  JH-HLM Goal: 6: Walk 10 steps or more  JH-HLM Achieved: 1: Laying in bed  [facility] Goal NOT achieved. Continue with multidisciplinary rounding and encourage appropriate mobility to improve upon [facility] goals. Patient Centered Rounds: I performed bedside rounds with nursing staff today. Discussions with Specialists or Other Care Team Provider: GI, neuro    Education and Discussions with Family / Patient: Patient declined call to . Total Time Spent on Date of Encounter in care of patient: 45 mins. This time was spent on one or more of the following: performing physical exam; counseling and coordination of care; obtaining or reviewing history; documenting in the medical record; reviewing/ordering tests, medications or procedures; communicating with other healthcare professionals and discussing with patient's family/caregivers. Current Length of Stay: 1 day(s)  Current Patient Status: Inpatient   Certification Statement: The patient will continue to require additional inpatient hospital stay due to MRI brain  Discharge Plan: Anticipate discharge in 24-48 hrs to home. Code Status: Level 1 - Full Code    Subjective:   No acute events overnight. Patient is tolerating diet without any nausea or vomiting. He does have tenderness in the right middle to lower quadrant on palpation. Objective:     Vitals:   Temp (24hrs), Av.8 °F (36.6 °C), Min:97.8 °F (36.6 °C), Max:97.8 °F (36.6 °C)    Temp:  [97.8 °F (36.6 °C)] 97.8 °F (36.6 °C)  HR:  [64-94] 76  Resp:  [14-28] 14  BP: ()/(60-93) 119/75  SpO2:  [91 %-99 %] 96 %  Body mass index is 31.98 kg/m².      Input and Output Summary (last 24 hours):   No intake or output data in the 24 hours ending 23 1243    Physical Exam: Physical Exam  Vitals and nursing note reviewed. Constitutional:       Appearance: Normal appearance. HENT:      Head: Normocephalic and atraumatic. Mouth/Throat:      Mouth: Mucous membranes are moist.      Pharynx: Oropharynx is clear. No oropharyngeal exudate. Eyes:      Extraocular Movements: Extraocular movements intact. Cardiovascular:      Rate and Rhythm: Normal rate and regular rhythm. Pulses: Normal pulses. Heart sounds: Normal heart sounds. No murmur heard. No friction rub. No gallop. Pulmonary:      Effort: Pulmonary effort is normal. No respiratory distress. Breath sounds: Normal breath sounds. No stridor. No wheezing or rales. Abdominal:      General: Abdomen is flat. Bowel sounds are normal. There is no distension. Palpations: Abdomen is soft. Tenderness: There is abdominal tenderness (RLQ/ right middle abdominal pain). Musculoskeletal:      Right lower leg: No edema. Left lower leg: No edema. Skin:     General: Skin is warm and dry. Neurological:      General: No focal deficit present. Mental Status: He is alert and oriented to person, place, and time. Motor: Weakness (R sided) present.           Additional Data:     Labs:  Results from last 7 days   Lab Units 12/07/23  0421   WBC Thousand/uL 9.55   HEMOGLOBIN g/dL 13.3   HEMATOCRIT % 40.9   PLATELETS Thousands/uL 206   NEUTROS PCT % 67   LYMPHS PCT % 18   MONOS PCT % 9   EOS PCT % 3     Results from last 7 days   Lab Units 12/07/23  0421 12/06/23  1153   SODIUM mmol/L 139 136   POTASSIUM mmol/L 4.1 4.3   CHLORIDE mmol/L 107 101   CO2 mmol/L 26 29   BUN mg/dL 14 17   CREATININE mg/dL 0.74 0.91   ANION GAP mmol/L 6 6   CALCIUM mg/dL 8.9 10.0   ALBUMIN g/dL  --  4.2   TOTAL BILIRUBIN mg/dL  --  0.29   ALK PHOS U/L  --  73   ALT U/L  --  21   AST U/L  --  16   GLUCOSE RANDOM mg/dL 85 98     Results from last 7 days   Lab Units 12/06/23  1153   INR  0.94     Results from last 7 days Lab Units 12/06/23  1143   POC GLUCOSE mg/dl 98               Lines/Drains:  Invasive Devices       Peripheral Intravenous Line  Duration             Peripheral IV Distal;Left;Upper;Ventral (anterior) Arm -- days                      Telemetry:  Telemetry Orders (From admission, onward)               24 Hour Telemetry Monitoring  (ED Bridging Orders Panel)  Continuous x 24 Hours (Telem)        Question:  Reason for 24 Hour Telemetry  Answer:  TIA/Suspected CVA/ Confirmed CVA                     Telemetry Reviewed: Normal Sinus Rhythm  Indication for Continued Telemetry Use: No indication for continued use. Will discontinue. Imaging: Reviewed radiology reports from this admission including: abdominal/pelvic CT    Recent Cultures (last 7 days):         Last 24 Hours Medication List:   Current Facility-Administered Medications   Medication Dose Route Frequency Provider Last Rate    acetaminophen  650 mg Oral Q6H PRN Marlene Boyd MD      gabapentin  300 mg Oral BID Marlene Boyd MD      heparin (porcine)  5,000 Units Subcutaneous Q8H [address] Marlene Boyd MD      lidocaine  1 patch Topical Daily Marlene Boyd MD          Today, Patient Was Seen By: Ehsan Villanueva PA-C    **Please Note: This note may have been constructed using a voice recognition system. **

## 2023-12-07 NOTE — ASSESSMENT & PLAN NOTE
Reported suddenly facial right-sided numbness and upper and lower extremity numbness without weakness. Patient has right-sided weakness which is chronic along with intermittent speech difficulties. Patient reported symptoms subsided at the time he got to the emergency department. During my evaluation patient with right-sided weakness which is his baseline. Normal speech, chronic right nasolabial fold flattening  Patient was a stroke alert. CT head without acute findings  CT head and neck also without significant stenosis or occlusions. Suspect patient might have MS flare. Neurology was consulted, it was recommended not to start steroids at this time as the patient was complaining of abdominal pain.   MRI brain pending  PT/OT  Fall precaution

## 2023-12-07 NOTE — PLAN OF CARE
Problem: PAIN - ADULT  Goal: Verbalizes/displays adequate comfort level or baseline comfort level  Description: Interventions:  - Encourage patient to monitor pain and request assistance  - Assess pain using appropriate pain scale  - Administer analgesics based on type and severity of pain and evaluate response  - Implement non-pharmacological measures as appropriate and evaluate response  - Consider cultural and social influences on pain and pain management  - Notify physician/advanced practitioner if interventions unsuccessful or patient reports new pain  Outcome: Progressing     Problem: INFECTION - ADULT  Goal: Absence or prevention of progression during hospitalization  Description: INTERVENTIONS:  - Assess and monitor for signs and symptoms of infection  - Monitor lab/diagnostic results  - Monitor all insertion sites, i.e. indwelling lines, tubes, and drains  - Monitor endotracheal if appropriate and nasal secretions for changes in amount and color  - [place] appropriate cooling/warming therapies per order  - Administer medications as ordered  - Instruct and encourage patient and family to use good hand hygiene technique  - Identify and instruct in appropriate isolation precautions for identified infection/condition  Outcome: Progressing  Goal: Absence of fever/infection during neutropenic period  Description: INTERVENTIONS:  - Monitor WBC    Outcome: Progressing     Problem: SAFETY ADULT  Goal: Patient will remain free of falls  Description: INTERVENTIONS:  - Educate patient/family on patient safety including physical limitations  - Instruct patient to call for assistance with activity   - Consult OT/PT to assist with strengthening/mobility   - Keep Call bell within reach  - Keep bed low and locked with side rails adjusted as appropriate  - Keep care items and personal belongings within reach  - Initiate and maintain comfort rounds  - Make Fall Risk Sign visible to staff  - Offer Toileting every x Hours, in advance of need  - Initiate/Maintain xalarm  - Obtain necessary fall risk management equipment: x  - Apply yellow socks and bracelet for high fall risk patients  - Consider moving patient to room near nurses station  Outcome: Progressing  Goal: Maintain or return to baseline ADL function  Description: INTERVENTIONS:  -  Assess patient's ability to carry out ADLs; assess patient's baseline for ADL function and identify physical deficits which impact ability to perform ADLs (bathing, care of mouth/teeth, toileting, grooming, dressing, etc.)  - Assess/evaluate cause of self-care deficits   - Assess range of motion  - Assess patient's mobility; develop plan if impaired  - Assess patient's need for assistive devices and provide as appropriate  - Encourage maximum independence but intervene and supervise when necessary  - Involve family in performance of ADLs  - Assess for home care needs following discharge   - Consider OT consult to assist with ADL evaluation and planning for discharge  - Provide patient education as appropriate  Outcome: Progressing  Goal: Maintains/Returns to pre admission functional level  Description: INTERVENTIONS:  - Perform AM-PAC 6 Click Basic Mobility/ Daily Activity assessment daily.  - Set and communicate daily mobility goal to care team and patient/family/caregiver. - Collaborate with rehabilitation services on mobility goals if consulted  - Perform Range of Motion x times a day. - Reposition patient every x hours.   - Dangle patient x times a day  - Stand patient x times a day  - Ambulate patient x times a day  - Out of bed to chair x times a day   - Out of bed for meals x times a day  - Out of bed for toileting  - Record patient progress and toleration of activity level   Outcome: Progressing     Problem: DISCHARGE PLANNING  Goal: Discharge to home or other facility with appropriate resources  Description: INTERVENTIONS:  - Identify barriers to discharge w/patient and caregiver  - Arrange for needed discharge resources and transportation as appropriate  - Identify discharge learning needs (meds, wound care, etc.)  - Arrange for interpretive services to assist at discharge as needed  - Refer to Case Management Department for coordinating discharge planning if the patient needs post-hospital services based on physician/advanced practitioner order or complex needs related to functional status, cognitive ability, or social support system  Outcome: Progressing     Problem: Knowledge Deficit  Goal: Patient/family/caregiver demonstrates understanding of disease process, treatment plan, medications, and discharge instructions  Description: Complete learning assessment and assess knowledge base.   Interventions:  - Provide teaching at level of understanding  - Provide teaching via preferred learning methods  Outcome: Progressing     Problem: NEUROSENSORY - ADULT  Goal: Achieves stable or improved neurological status  Description: INTERVENTIONS  - Monitor and report changes in neurological status  - Monitor vital signs such as temperature, blood pressure, glucose, and any other labs ordered   - Initiate measures to prevent increased intracranial pressure  - Monitor for seizure activity and implement precautions if appropriate      Outcome: Progressing  Goal: Remains free of injury related to seizures activity  Description: INTERVENTIONS  - Maintain airway, patient safety  and administer oxygen as ordered  - Monitor patient for seizure activity, document and report duration and description of seizure to physician/advanced practitioner  - If seizure occurs,  ensure patient safety during seizure  - Reorient patient post seizure  - Seizure pads on all 4 side rails  - Instruct patient/family to notify RN of any seizure activity including if an aura is experienced  - Instruct patient/family to call for assistance with activity based on nursing assessment  - Administer anti-seizure medications if ordered    Outcome: Progressing  Goal: Achieves maximal functionality and self care  Description: INTERVENTIONS  - Monitor swallowing and airway patency with patient fatigue and changes in neurological status  - Encourage and assist patient to increase activity and self care.    - Encourage visually impaired, hearing impaired and aphasic patients to use assistive/communication devices  Outcome: Progressing     Problem: MUSCULOSKELETAL - ADULT  Goal: Maintain or return mobility to safest level of function  Description: INTERVENTIONS:  - Assess patient's ability to carry out ADLs; assess patient's baseline for ADL function and identify physical deficits which impact ability to perform ADLs (bathing, care of mouth/teeth, toileting, grooming, dressing, etc.)  - Assess/evaluate cause of self-care deficits   - Assess range of motion  - Assess patient's mobility  - Assess patient's need for assistive devices and provide as appropriate  - Encourage maximum independence but intervene and supervise when necessary  - Involve family in performance of ADLs  - Assess for home care needs following discharge   - Consider OT consult to assist with ADL evaluation and planning for discharge  - Provide patient education as appropriate  Outcome: Progressing  Goal: Maintain proper alignment of affected body part  Description: INTERVENTIONS:  - Support, maintain and protect limb and body alignment  - Provide patient/ family with appropriate education  Outcome: Progressing     Problem: Potential for Falls  Goal: Patient will remain free of falls  Description: INTERVENTIONS:  - Educate patient/family on patient safety including physical limitations  - Instruct patient to call for assistance with activity   - Consult OT/PT to assist with strengthening/mobility   - Keep Call bell within reach  - Keep bed low and locked with side rails adjusted as appropriate  - Keep care items and personal belongings within reach  - Initiate and maintain comfort rounds  - Make Fall Risk Sign visible to staff  - Offer Toileting every x Hours, in advance of need  - Initiate/Maintain xalarm  - Obtain necessary fall risk management equipment: x  - Apply yellow socks and bracelet for high fall risk patients  - Consider moving patient to room near nurses station  Outcome: Progressing     Problem: MOBILITY - ADULT  Goal: Maintain or return to baseline ADL function  Description: INTERVENTIONS:  -  Assess patient's ability to carry out ADLs; assess patient's baseline for ADL function and identify physical deficits which impact ability to perform ADLs (bathing, care of mouth/teeth, toileting, grooming, dressing, etc.)  - Assess/evaluate cause of self-care deficits   - Assess range of motion  - Assess patient's mobility; develop plan if impaired  - Assess patient's need for assistive devices and provide as appropriate  - Encourage maximum independence but intervene and supervise when necessary  - Involve family in performance of ADLs  - Assess for home care needs following discharge   - Consider OT consult to assist with ADL evaluation and planning for discharge  - Provide patient education as appropriate  Outcome: Progressing  Goal: Maintains/Returns to pre admission functional level  Description: INTERVENTIONS:  - Perform AM-PAC 6 Click Basic Mobility/ Daily Activity assessment daily.  - Set and communicate daily mobility goal to care team and patient/family/caregiver. - Collaborate with rehabilitation services on mobility goals if consulted  - Perform Range of Motion x times a day. - Reposition patient every x hours.   - Dangle patient x times a day  - Stand patient x times a day  - Ambulate patient x times a day  - Out of bed to chair x times a day   - Out of bed for meals x times a day  - Out of bed for toileting  - Record patient progress and toleration of activity level   Outcome: Progressing     Problem: Nutrition/Hydration-ADULT  Goal: Nutrient/Hydration intake appropriate for improving, restoring or maintaining nutritional needs  Description: Monitor and assess patient's nutrition/hydration status for malnutrition. Collaborate with interdisciplinary team and initiate plan and interventions as ordered. Monitor patient's weight and dietary intake as ordered or per policy. Utilize nutrition screening tool and intervene as necessary. Determine patient's food preferences and provide high-protein, high-caloric foods as appropriate.      INTERVENTIONS:  - Monitor oral intake, urinary output, labs, and treatment plans  - Assess nutrition and hydration status and recommend course of action  - Evaluate amount of meals eaten  - Assist patient with eating if necessary   - Allow adequate time for meals  - Recommend/ encourage appropriate diets, oral nutritional supplements, and vitamin/mineral supplements  - Order, calculate, and assess calorie counts as needed  - Recommend, monitor, and adjust tube feedings and TPN/PPN based on assessed needs  - Assess need for intravenous fluids  - Provide specific nutrition/hydration education as appropriate  - Include patient/family/caregiver in decisions related to nutrition  Outcome: Progressing

## 2023-12-07 NOTE — OCCUPATIONAL THERAPY NOTE
Occupational Therapy Evaluation      Nathaniel Last    12/7/2023    Principal Problem:    Right sided numbness  Active Problems:    MS (multiple sclerosis) (HCC)    Pain of upper abdomen    Right-sided thoracic back pain      Past Medical History:   Diagnosis Date    MS (multiple sclerosis) ([address])        Past Surgical History:   Procedure Laterality Date    HERNIA REPAIR      3 times    KNEE SURGERY Right         12/07/23 1030   OT Last Visit   OT Visit Date 12/07/23   Note Type   Note type Evaluation   Pain Assessment   Pain Assessment Tool Wilson-Baker FACES   Wilson-Baker FACES Pain Rating 4   Pain Location/Orientation Location: Head   Restrictions/Precautions   Weight Bearing Precautions Per Order No   Other Precautions Fall Risk   Home Living   Type of [address] [place] [address] Two level;Ramped entrance;Performs ADLs on one level  (1st fl setup, has stair glide to 2nd floor if needed)   Bathroom Shower/Tub Tub/shower unit  (states they are planning to renovate to a walk in shower soon)   [employer] bars in shower; Shower chair   Home Equipment Walker;Electric scooter;Stair glide  (transport chair)   Prior Function   Level of [place] Independent with ADLs; Independent with functional mobility; Needs assistance with IADLS   Lives With Family;Daughter  (mother, father, & daughter)   Maricel Li Help From Family   IADLs Family/Friend/Other provides transportation; Family/Friend/Other provides meals; Family/Friend/Other provides medication management   Falls in the last 6 months 0   Vocational Other (Comment)  (Speaks fondly of his time as a , prior to worsening MS.  Misses cooking for people.)   Comments Uses RW in the house and family pushes him in transport chair when out of the house   General   Additional Pertinent History Pt has MS with chronic R sided weakness & speech difficulties   Subjective   Subjective Pt states his sensation changes that brought him to the hospital have resolved   ADL   Eating Assistance 7  Independent   Grooming Assistance 6  Modified Independent   UB Bathing Assistance 6  Modified Independent   LB Bathing Assistance 6  Modified Independent   UB Dressing Assistance 6  Modified independent   LB Dressing Assistance 6  Modified independent   Toileting Assistance  6  Modified independent   Bed Mobility   Supine to Sit 6  Modified independent   Sit to Supine 6  Modified independent   Transfers   Sit to Stand 5  Supervision   Stand to Sit 5  Supervision   Stand pivot 5  Supervision   Functional Mobility   Functional Mobility 5  Supervision   Additional items Rolling walker   Activity Tolerance   Activity Tolerance Patient tolerated treatment well   Medical Staff Made Aware PT Claire   Nurse Made Aware RN Daniela Mullins   RUE Assessment   RUE Assessment WFL   LUE Assessment   LUE Assessment WFL   Hand Function   Gross Motor Coordination Functional   Fine Motor Coordination Functional   Cognition   Overall Cognitive Status WFL   Arousal/Participation Alert; Cooperative   Attention Attends with cues to redirect   Orientation Level Oriented X4   Memory Within functional limits   Following Commands Follows one step commands without difficulty   Assessment   Assessment Pt is a 46 y.o. male seen for OT evaluation at [facility], admitted 12/6/2023 w/ Right sided numbness. OT completed extensive review of pt's medical and social history. Comorbidities affecting pt's functional performance at time of assessment include: Multiple Sclerosis (MS), abdominal pain, back pain, dysarthria, CNS demyelinating disease, right hemiparesis, ambulatory dysfunction, L leg weakness, obesity. Prior to admission, pt was living in [address] [place], [place] [address]Th Floor (1st fl setup) with ramped entrance, with father, mother, and daughter, and was independent with ADL and short distance functional mobility using RW. Upon evaluation, pt presents to OT close to functional baseline.  The patient's raw score on the AM-PAC Daily Activity inpatient short form is 24, standardized score is 57.54, greater than 39.4. Patients at this level are likely to benefit from DC to home. Based on findings, pt is of high complexity, due to medical comorbidities. Pt seen as a co-eval with PT due to the patient's co-morbidities, clinically unstable presentation, and present impairments which are a regression from the patient's baseline. At this time, OT recommendations at time of discharge are level 4 (return home and resume family support). No further acute OT needs indicated at this time - Recommend pt continue to be OOB for meals, ambulation to/from toilet, perform self care tasks. D/C from OT caseload with above recommendations.    Plan   OT Frequency Eval only   Discharge Recommendation   Rehab Resource Intensity Level, OT No post-acute rehabilitation needs  (resume family support)   AM-PAC Daily Activity Inpatient   Lower Body Dressing 4   Bathing 4   Toileting 4   Upper Body Dressing 4   Grooming 4   Eating 4   Daily Activity Raw Score 24   Daily Activity Standardized Score (Calc for Raw Score >=11) 57.54   AM-PAC Applied Cognition Inpatient   Following a Speech/Presentation 4   Understanding Ordinary Conversation 4   Taking Medications 4   Remembering Where Things Are Placed or Put Away 4   Remembering List of 4-5 Errands 4   Taking Care of Complicated Tasks 3   Applied Cognition Raw Score 23   Applied Cognition Standardized Score 53.08     [employer], MS, OTR/L

## 2023-12-07 NOTE — PLAN OF CARE
Problem: PAIN - ADULT  Goal: Verbalizes/displays adequate comfort level or baseline comfort level  Description: Interventions:  - Encourage patient to monitor pain and request assistance  - Assess pain using appropriate pain scale  - Administer analgesics based on type and severity of pain and evaluate response  - Implement non-pharmacological measures as appropriate and evaluate response  - Consider cultural and social influences on pain and pain management  - Notify physician/advanced practitioner if interventions unsuccessful or patient reports new pain  Outcome: Progressing     Problem: INFECTION - ADULT  Goal: Absence or prevention of progression during hospitalization  Description: INTERVENTIONS:  - Assess and monitor for signs and symptoms of infection  - Monitor lab/diagnostic results  - Monitor all insertion sites, i.e. indwelling lines, tubes, and drains  - Monitor endotracheal if appropriate and nasal secretions for changes in amount and color  - [place] appropriate cooling/warming therapies per order  - Administer medications as ordered  - Instruct and encourage patient and family to use good hand hygiene technique  - Identify and instruct in appropriate isolation precautions for identified infection/condition  Outcome: Progressing     Problem: SAFETY ADULT  Goal: Patient will remain free of falls  Description: INTERVENTIONS:  - Educate patient/family on patient safety including physical limitations  - Instruct patient to call for assistance with activity   - Consult OT/PT to assist with strengthening/mobility   - Keep Call bell within reach  - Keep bed low and locked with side rails adjusted as appropriate  - Keep care items and personal belongings within reach  - Initiate and maintain comfort rounds  - Make Fall Risk Sign visible to staff  - Offer Toileting every x Hours, in advance of need  - Initiate/Maintain xalarm  - Obtain necessary fall risk management equipment: x  - Apply yellow socks and bracelet for high fall risk patients  - Consider moving patient to room near nurses station  Outcome: Progressing     Problem: DISCHARGE PLANNING  Goal: Discharge to home or other facility with appropriate resources  Description: INTERVENTIONS:  - Identify barriers to discharge w/patient and caregiver  - Arrange for needed discharge resources and transportation as appropriate  - Identify discharge learning needs (meds, wound care, etc.)  - Arrange for interpretive services to assist at discharge as needed  - Refer to Case Management Department for coordinating discharge planning if the patient needs post-hospital services based on physician/advanced practitioner order or complex needs related to functional status, cognitive ability, or social support system  Outcome: Progressing     Problem: Knowledge Deficit  Goal: Patient/family/caregiver demonstrates understanding of disease process, treatment plan, medications, and discharge instructions  Description: Complete learning assessment and assess knowledge base.   Interventions:  - Provide teaching at level of understanding  - Provide teaching via preferred learning methods  Outcome: Progressing     Problem: NEUROSENSORY - ADULT  Goal: Achieves stable or improved neurological status  Description: INTERVENTIONS  - Monitor and report changes in neurological status  - Monitor vital signs such as temperature, blood pressure, glucose, and any other labs ordered   - Initiate measures to prevent increased intracranial pressure  - Monitor for seizure activity and implement precautions if appropriate      Outcome: Progressing     Problem: MUSCULOSKELETAL - ADULT  Goal: Maintain or return mobility to safest level of function  Description: INTERVENTIONS:  - Assess patient's ability to carry out ADLs; assess patient's baseline for ADL function and identify physical deficits which impact ability to perform ADLs (bathing, care of mouth/teeth, toileting, grooming, dressing, etc.)  - Assess/evaluate cause of self-care deficits   - Assess range of motion  - Assess patient's mobility  - Assess patient's need for assistive devices and provide as appropriate  - Encourage maximum independence but intervene and supervise when necessary  - Involve family in performance of ADLs  - Assess for home care needs following discharge   - Consider OT consult to assist with ADL evaluation and planning for discharge  - Provide patient education as appropriate  Outcome: Progressing  Goal: Maintain proper alignment of affected body part  Description: INTERVENTIONS:  - Support, maintain and protect limb and body alignment  - Provide patient/ family with appropriate education  Outcome: Progressing     Problem: Potential for Falls  Goal: Patient will remain free of falls  Description: INTERVENTIONS:  - Educate patient/family on patient safety including physical limitations  - Instruct patient to call for assistance with activity   - Consult OT/PT to assist with strengthening/mobility   - Keep Call bell within reach  - Keep bed low and locked with side rails adjusted as appropriate  - Keep care items and personal belongings within reach  - Initiate and maintain comfort rounds  - Make Fall Risk Sign visible to staff  - Offer Toileting every x Hours, in advance of need  - Initiate/Maintain xalarm  - Obtain necessary fall risk management equipment: x  - Apply yellow socks and bracelet for high fall risk patients  - Consider moving patient to room near nurses station  Outcome: Progressing     Problem: MOBILITY - ADULT  Goal: Maintain or return to baseline ADL function  Description: INTERVENTIONS:  -  Assess patient's ability to carry out ADLs; assess patient's baseline for ADL function and identify physical deficits which impact ability to perform ADLs (bathing, care of mouth/teeth, toileting, grooming, dressing, etc.)  - Assess/evaluate cause of self-care deficits   - Assess range of motion  - Assess patient's mobility; develop plan if impaired  - Assess patient's need for assistive devices and provide as appropriate  - Encourage maximum independence but intervene and supervise when necessary  - Involve family in performance of ADLs  - Assess for home care needs following discharge   - Consider OT consult to assist with ADL evaluation and planning for discharge  - Provide patient education as appropriate  Outcome: Progressing  Goal: Maintains/Returns to pre admission functional level  Description: INTERVENTIONS:  - Perform AM-PAC 6 Click Basic Mobility/ Daily Activity assessment daily.  - Set and communicate daily mobility goal to care team and patient/family/caregiver. - Collaborate with rehabilitation services on mobility goals if consulted  - Perform Range of Motion x times a day. - Reposition patient every x hours. - Dangle patient x times a day  - Stand patient x times a day  - Ambulate patient x times a day  - Out of bed to chair x times a day   - Out of bed for meals x times a day  - Out of bed for toileting  - Record patient progress and toleration of activity level   Outcome: Progressing     Problem: Nutrition/Hydration-ADULT  Goal: Nutrient/Hydration intake appropriate for improving, restoring or maintaining nutritional needs  Description: Monitor and assess patient's nutrition/hydration status for malnutrition. Collaborate with interdisciplinary team and initiate plan and interventions as ordered. Monitor patient's weight and dietary intake as ordered or per policy. Utilize nutrition screening tool and intervene as necessary. Determine patient's food preferences and provide high-protein, high-caloric foods as appropriate.      INTERVENTIONS:  - Monitor oral intake, urinary output, labs, and treatment plans  - Assess nutrition and hydration status and recommend course of action  - Evaluate amount of meals eaten  - Assist patient with eating if necessary   - Allow adequate time for meals  - Recommend/ encourage appropriate diets, oral nutritional supplements, and vitamin/mineral supplements  - Order, calculate, and assess calorie counts as needed  - Recommend, monitor, and adjust tube feedings and TPN/PPN based on assessed needs  - Assess need for intravenous fluids  - Provide specific nutrition/hydration education as appropriate  - Include patient/family/caregiver in decisions related to nutrition  Outcome: Progressing     Problem: CARDIOVASCULAR - ADULT  Goal: Maintains optimal cardiac output and hemodynamic stability  Description: INTERVENTIONS:  - Monitor I/O, vital signs and rhythm  - Monitor for S/S and trends of decreased cardiac output  - Administer and titrate ordered vasoactive medications to optimize hemodynamic stability  - Assess quality of pulses, skin color and temperature  - Assess for signs of decreased coronary artery perfusion  - Instruct patient to report change in severity of symptoms  Outcome: Progressing  Goal: Absence of cardiac dysrhythmias or at baseline rhythm  Description: INTERVENTIONS:  - Continuous cardiac monitoring, vital signs, obtain 12 lead EKG if ordered  - Administer antiarrhythmic and heart rate control medications as ordered  - Monitor electrolytes and administer replacement therapy as ordered  Outcome: Progressing     Problem: Prexisting or High Potential for Compromised Skin Integrity  Goal: Skin integrity is maintained or improved  Description: INTERVENTIONS:  - Identify patients at risk for skin breakdown  - Assess and monitor skin integrity  - Assess and monitor nutrition and hydration status  - Monitor labs   - Assess for incontinence   - Turn and reposition patient  - Assist with mobility/ambulation  - Relieve pressure over bony prominences  - Avoid friction and shearing  - Provide appropriate hygiene as needed including keeping skin clean and dry  - Evaluate need for skin moisturizer/barrier cream  - Collaborate with interdisciplinary team   - Patient/family teaching  - Consider wound care consult   Outcome: Progressing     Problem: Neurological Deficit  Goal: Neurological status is stable or improving  Description: Interventions:  - Monitor and assess patient's level of consciousness, motor function, sensory function, and level of assistance needed for ADLs. - Monitor and report changes from baseline. Collaborate with interdisciplinary team to initiate plan and implement interventions as ordered. - Provide and maintain a safe environment. - Consider seizure precautions. - Consider fall precautions. - Consider aspiration precautions. - Consider bleeding precautions. Outcome: Progressing     Problem: Activity Intolerance/Impaired Mobility  Goal: Mobility/activity is maintained at optimum level for patient  Description: Interventions:  - Assess and monitor patient  barriers to mobility and need for assistive/adaptive devices. - Assess patient's emotional response to limitations. - Collaborate with interdisciplinary team and initiate plans and interventions as ordered. - Encourage independent activity per ability.  - Maintain proper body alignment. - Perform active/passive rom as tolerated/ordered. - Plan activities to conserve energy.  - Turn patient as appropriate  Outcome: Progressing     Problem: Communication Impairment  Goal: Ability to express needs and understand communication  Description: Assess patient's communication skills and ability to understand information. Patient will demonstrate use of effective communication techniques, alternative methods of communication and understanding even if not able to speak. - Encourage communication and provide alternate methods of communication as needed. - Collaborate with case management/ for discharge needs. - Include patient/family/caregiver in decisions related to communication.   Outcome: Progressing     Problem: Potential for Aspiration  Goal: Non-ventilated patient's risk of aspiration is minimized  Description: Assess and monitor vital signs, respiratory status, and labs (WBC). Monitor for signs of aspiration (tachypnea, cough, rales, wheezing, cyanosis, fever). - Assess and monitor patient's ability to swallow. - Place patient up in chair to eat if possible. - HOB up at 90 degrees to eat if unable to get patient up into chair.  - Supervise patient during oral intake. - Instruct patient/ family to take small bites. - Instruct patient/ family to take small single sips when taking liquids. - Follow patient-specific strategies generated by speech pathologist.  Outcome: Progressing     Problem: Nutrition  Goal: Nutrition/Hydration status is improving  Description: Monitor and assess patient's nutrition/hydration status for malnutrition (ex- brittle hair, bruises, dry skin, pale skin and conjunctiva, muscle wasting, smooth red tongue, and disorientation). Collaborate with interdisciplinary team and initiate plan and interventions as ordered. Monitor patient's weight and dietary intake as ordered or per policy. Utilize nutrition screening tool and intervene per policy. Determine patient's food preferences and provide high-protein, high-caloric foods as appropriate. - Assist patient with eating.  - Allow adequate time for meals.  - Encourage patient to take dietary supplement as ordered. - Collaborate with clinical nutritionist.  - Include patient/family/caregiver in decisions related to nutrition.   Outcome: Progressing

## 2023-12-08 ENCOUNTER — APPOINTMENT (INPATIENT)
Dept: MRI IMAGING | Facility: HOSPITAL | Age: 48
DRG: 043 | End: 2023-12-08
Payer: COMMERCIAL

## 2023-12-08 VITALS
WEIGHT: 229.28 LBS | HEIGHT: 71 IN | TEMPERATURE: 97.8 F | OXYGEN SATURATION: 97 % | HEART RATE: 71 BPM | DIASTOLIC BLOOD PRESSURE: 93 MMHG | RESPIRATION RATE: 14 BRPM | BODY MASS INDEX: 32.1 KG/M2 | SYSTOLIC BLOOD PRESSURE: 130 MMHG

## 2023-12-08 LAB
ALBUMIN SERPL BCP-MCNC: 3.5 G/DL (ref 3.5–5)
ALP SERPL-CCNC: 58 U/L (ref 34–104)
ALT SERPL W P-5'-P-CCNC: 15 U/L (ref 7–52)
ANION GAP SERPL CALCULATED.3IONS-SCNC: 7 MMOL/L
AST SERPL W P-5'-P-CCNC: 11 U/L (ref 13–39)
BASOPHILS # BLD AUTO: 0.08 THOUSANDS/ÂΜL (ref 0–0.1)
BASOPHILS NFR BLD AUTO: 1 % (ref 0–1)
BILIRUB SERPL-MCNC: 0.28 MG/DL (ref 0.2–1)
BUN SERPL-MCNC: 15 MG/DL (ref 5–25)
CALCIUM SERPL-MCNC: 9 MG/DL (ref 8.4–10.2)
CHLORIDE SERPL-SCNC: 107 MMOL/L (ref 96–108)
CO2 SERPL-SCNC: 25 MMOL/L (ref 21–32)
CREAT SERPL-MCNC: 0.74 MG/DL (ref 0.6–1.3)
EOSINOPHIL # BLD AUTO: 0.37 THOUSAND/ÂΜL (ref 0–0.61)
EOSINOPHIL NFR BLD AUTO: 5 % (ref 0–6)
ERYTHROCYTE [DISTWIDTH] IN BLOOD BY AUTOMATED COUNT: 12.8 % (ref 11.6–15.1)
GFR SERPL CREATININE-BSD FRML MDRD: 107 ML/MIN/1.73SQ M
GLUCOSE SERPL-MCNC: 87 MG/DL (ref 65–140)
HAV IGM SER QL: NORMAL
HBV CORE IGM SER QL: NORMAL
HBV SURFACE AG SER QL: NORMAL
HCT VFR BLD AUTO: 41.6 % (ref 36.5–49.3)
HCV AB SER QL: NORMAL
HGB BLD-MCNC: 13.6 G/DL (ref 12–17)
HIV 1+2 AB+HIV1 P24 AG SERPL QL IA: NORMAL
HIV 2 AB SERPL QL IA: NORMAL
HIV1 AB SERPL QL IA: NORMAL
HIV1 P24 AG SERPL QL IA: NORMAL
IGA SERPL-MCNC: 244 MG/DL (ref 66–433)
IGG SERPL-MCNC: 879 MG/DL (ref 635–1741)
IGM SERPL-MCNC: 59 MG/DL (ref 45–281)
IMM GRANULOCYTES # BLD AUTO: 0.12 THOUSAND/UL (ref 0–0.2)
IMM GRANULOCYTES NFR BLD AUTO: 2 % (ref 0–2)
LYMPHOCYTES # BLD AUTO: 1.58 THOUSANDS/ÂΜL (ref 0.6–4.47)
LYMPHOCYTES NFR BLD AUTO: 22 % (ref 14–44)
MCH RBC QN AUTO: 29.7 PG (ref 26.8–34.3)
MCHC RBC AUTO-ENTMCNC: 32.7 G/DL (ref 31.4–37.4)
MCV RBC AUTO: 91 FL (ref 82–98)
MONOCYTES # BLD AUTO: 0.82 THOUSAND/ÂΜL (ref 0.17–1.22)
MONOCYTES NFR BLD AUTO: 11 % (ref 4–12)
NEUTROPHILS # BLD AUTO: 4.3 THOUSANDS/ÂΜL (ref 1.85–7.62)
NEUTS SEG NFR BLD AUTO: 59 % (ref 43–75)
NRBC BLD AUTO-RTO: 0 /100 WBCS
PLATELET # BLD AUTO: 204 THOUSANDS/UL (ref 149–390)
PMV BLD AUTO: 10 FL (ref 8.9–12.7)
POTASSIUM SERPL-SCNC: 4 MMOL/L (ref 3.5–5.3)
PROT SERPL-MCNC: 6.5 G/DL (ref 6.4–8.4)
RBC # BLD AUTO: 4.58 MILLION/UL (ref 3.88–5.62)
SODIUM SERPL-SCNC: 139 MMOL/L (ref 135–147)
WBC # BLD AUTO: 7.27 THOUSAND/UL (ref 4.31–10.16)

## 2023-12-08 PROCEDURE — 99239 HOSP IP/OBS DSCHRG MGMT >30: CPT | Performed by: PHYSICIAN ASSISTANT

## 2023-12-08 PROCEDURE — 86480 TB TEST CELL IMMUN MEASURE: CPT

## 2023-12-08 PROCEDURE — 80053 COMPREHEN METABOLIC PANEL: CPT | Performed by: PHYSICIAN ASSISTANT

## 2023-12-08 PROCEDURE — 80074 ACUTE HEPATITIS PANEL: CPT

## 2023-12-08 PROCEDURE — 87389 HIV-1 AG W/HIV-1&-2 AB AG IA: CPT

## 2023-12-08 PROCEDURE — 85025 COMPLETE CBC W/AUTO DIFF WBC: CPT | Performed by: PHYSICIAN ASSISTANT

## 2023-12-08 PROCEDURE — 70553 MRI BRAIN STEM W/O & W/DYE: CPT

## 2023-12-08 PROCEDURE — A9585 GADOBUTROL INJECTION: HCPCS | Performed by: INTERNAL MEDICINE

## 2023-12-08 PROCEDURE — 82784 ASSAY IGA/IGD/IGG/IGM EACH: CPT

## 2023-12-08 RX ORDER — GADOBUTROL 604.72 MG/ML
10 INJECTION INTRAVENOUS
Status: COMPLETED | OUTPATIENT
Start: 2023-12-08 | End: 2023-12-08

## 2023-12-08 RX ADMIN — ACETAMINOPHEN 650 MG: 325 TABLET, FILM COATED ORAL at 06:41

## 2023-12-08 RX ADMIN — GABAPENTIN 300 MG: 300 CAPSULE ORAL at 08:02

## 2023-12-08 RX ADMIN — GADOBUTROL 10 ML: 604.72 INJECTION INTRAVENOUS at 09:47

## 2023-12-08 RX ADMIN — LIDOCAINE 5% 1 PATCH: 700 PATCH TOPICAL at 10:50

## 2023-12-08 RX ADMIN — HEPARIN SODIUM 5000 UNITS: 5000 INJECTION INTRAVENOUS; SUBCUTANEOUS at 14:05

## 2023-12-08 RX ADMIN — HEPARIN SODIUM 5000 UNITS: 5000 INJECTION INTRAVENOUS; SUBCUTANEOUS at 06:41

## 2023-12-08 NOTE — ASSESSMENT & PLAN NOTE
Patient with history of multiple sclerosis diagnosed about 2 years ago. He follows with neurology as outpatient. Was started on Ocrevus twice a year.   MRI does not show any new enhancing lesions

## 2023-12-08 NOTE — PLAN OF CARE
Problem: INFECTION - ADULT  Goal: Absence or prevention of progression during hospitalization  Description: INTERVENTIONS:  - Assess and monitor for signs and symptoms of infection  - Monitor lab/diagnostic results  - Monitor all insertion sites, i.e. indwelling lines, tubes, and drains  - Monitor endotracheal if appropriate and nasal secretions for changes in amount and color  - [place] appropriate cooling/warming therapies per order  - Administer medications as ordered  - Instruct and encourage patient and family to use good hand hygiene technique  - Identify and instruct in appropriate isolation precautions for identified infection/condition  Outcome: Progressing     Problem: DISCHARGE PLANNING  Goal: Discharge to home or other facility with appropriate resources  Description: INTERVENTIONS:  - Identify barriers to discharge w/patient and caregiver  - Arrange for needed discharge resources and transportation as appropriate  - Identify discharge learning needs (meds, wound care, etc.)  - Arrange for interpretive services to assist at discharge as needed  - Refer to Case Management Department for coordinating discharge planning if the patient needs post-hospital services based on physician/advanced practitioner order or complex needs related to functional status, cognitive ability, or social support system  Outcome: Progressing     Problem: Knowledge Deficit  Goal: Patient/family/caregiver demonstrates understanding of disease process, treatment plan, medications, and discharge instructions  Description: Complete learning assessment and assess knowledge base.   Interventions:  - Provide teaching at level of understanding  - Provide teaching via preferred learning methods  Outcome: Progressing

## 2023-12-08 NOTE — CASE MANAGEMENT
Case Management Discharge Planning Note    Patient name Charlie Prasad  Location [address] [facility] [place] 312/-63 MRN [id number]  : 1975 Date 2023       Current Admission Date: 2023  Current Admission Diagnosis:Right sided numbness   Patient Active Problem List    Diagnosis Date Noted    Right sided numbness 2023    Pain of upper abdomen 2023    Right-sided thoracic back pain 2023    Hypomagnesemia 2023    Intractable headache 2023    Left leg weakness 2022    Obesity (BMI 30-39.9) 2022    Ambulatory dysfunction 03/15/2022    MS (multiple sclerosis) ([address]) 03/15/2022    Dysarthria 2022    Lower facial weakness 2022    CNS demyelinating disease ([address]) 2022    Right hemiparesis ([address]) 2022      LOS (days): 2  Geometric Mean LOS (GMLOS) (days):   Days to GMLOS:     OBJECTIVE:  Risk of Unplanned Readmission Score: 8.17         Current admission status: Inpatient   Preferred Pharmacy:   [address] [place] [address] - [address]   [place]   Phone: [phone] Fax: [phone]    Primary Care Provider: Beau Arauz MD    Primary Insurance: [place]  Secondary Insurance:     DISCHARGE DETAILS:    [address]         Is the patient interested in [facility]  [facility] at discharge?: Yes  [facility] requested[de-identified] Nursing, Occupational Therapy, Physical [address] Name[de-identified] [facility] External Referral Reason (only applicable if external [facility]HA name selected): Patient has established relationship with provider  [facility] Provider[de-identified] PCP  Home Health Services Needed[de-identified] Strengthening/Theraputic Exercises to Improve Function, Evaluate Functional Status and Safety, Gait/ADL Training  Homebound Criteria Met[de-identified] Requires the Assistance of Another Person for Safe Ambulation or to Leave the Home  Supporting Clincal Findings[de-identified] Limited Endurance      Other Referral/Resources/Interventions Provided:  Interventions: HHC  Referral Comments: Pt is agreeable to [facility] and prefers [facility]. Treatment Team Recommendation: Home with [address]  Discharge Destination Plan[de-identified] Home with [address] at Discharge : Family     Additional Comments: Met with pt to discuss [facility]. Pt is agreeable and prefers [facility]. AIDIN referral sent to [address] [facility]--accepted. CM added [facility] to pt's dc instructions.

## 2023-12-08 NOTE — PLAN OF CARE
Problem: PAIN - ADULT  Goal: Verbalizes/displays adequate comfort level or baseline comfort level  Description: Interventions:  - Encourage patient to monitor pain and request assistance  - Assess pain using appropriate pain scale  - Administer analgesics based on type and severity of pain and evaluate response  - Implement non-pharmacological measures as appropriate and evaluate response  - Consider cultural and social influences on pain and pain management  - Notify physician/advanced practitioner if interventions unsuccessful or patient reports new pain  Outcome: Progressing     Problem: INFECTION - ADULT  Goal: Absence or prevention of progression during hospitalization  Description: INTERVENTIONS:  - Assess and monitor for signs and symptoms of infection  - Monitor lab/diagnostic results  - Monitor all insertion sites, i.e. indwelling lines, tubes, and drains  - Monitor endotracheal if appropriate and nasal secretions for changes in amount and color  - [place] appropriate cooling/warming therapies per order  - Administer medications as ordered  - Instruct and encourage patient and family to use good hand hygiene technique  - Identify and instruct in appropriate isolation precautions for identified infection/condition  Outcome: Progressing     Problem: SAFETY ADULT  Goal: Patient will remain free of falls  Description: INTERVENTIONS:  - Educate patient/family on patient safety including physical limitations  - Instruct patient to call for assistance with activity   - Consult OT/PT to assist with strengthening/mobility   - Keep Call bell within reach  - Keep bed low and locked with side rails adjusted as appropriate  - Keep care items and personal belongings within reach  - Initiate and maintain comfort rounds  - Make Fall Risk Sign visible to staff  - Offer Toileting every 2 Hours, in advance of need  - Initiate/Maintain call bell alarm  - Obtain necessary fall risk management equipment: call bell usage  - Apply yellow socks and bracelet for high fall risk patients  - Consider moving patient to room near nurses station  Outcome: Progressing

## 2023-12-08 NOTE — ASSESSMENT & PLAN NOTE
Reported suddenly facial right-sided numbness and upper and lower extremity numbness without weakness. Patient has right-sided weakness which is chronic along with intermittent speech difficulties. Patient reported symptoms subsided at the time he got to the emergency department. During my evaluation patient with right-sided weakness which is his baseline. Normal speech, chronic right nasolabial fold flattening  Patient was a stroke alert. CT head without acute findings  CT head and neck also without significant stenosis or occlusions. Neurology was consulted, it was recommended not to start steroids at this time as the patient was complaining of abdominal pain.   MRI brain negative  PT/OT - home PT/OT

## 2023-12-08 NOTE — DISCHARGE SUMMARY
[address]  Discharge- Mary Monroe 1975, 46 y.o. male MRN: [id number]  Unit/Bed#: -01 Encounter: [id number]  Primary Care Provider: Yfn Pickering MD   Date and time admitted to hospital: 12/6/2023 11:40 AM    Right-sided thoracic back pain  Assessment & Plan  Patient reported acute on chronic thoracic back pain  History of dextroscoliosis, imaging last year. T9/10 paravertebral tenderness  Like scoliosis related vs diverticulitis/cholecystitis  Lidocaine patch    Pain of upper abdomen  Assessment & Plan  Patient mentioned upper abdominal pain that started today. Denied nausea, vomiting, constipation or diarrhea. Lipase was normal, hepatic panel was also normal.  CT A/P- Edema in the fat in the right upper quadrant, adjacent to a diverticulum at the hepatic flexure and in the gallbladder neck   Differential includes cholecystitis versus diverticulitis  Repeat CT with contrast again shows signs of cholecystitis, however no cholelithiasis. Did not show evidence of diverticulitis. Has been eating/drinking without any abdominal pain/nausea. LFTs are WNL. No clinical concern for cholecystitis. Recommended outpt colonoscopy with GI to further evaluate for diverticula. Return precautions given for fevers, abdominal pain, nausea. MS (multiple sclerosis) [facility])  Assessment & Plan  Patient with history of multiple sclerosis diagnosed about 2 years ago. He follows with neurology as outpatient. Was started on Ocrevus twice a year. MRI does not show any new enhancing lesions    * Right sided numbness  Assessment & Plan  Reported suddenly facial right-sided numbness and upper and lower extremity numbness without weakness. Patient has right-sided weakness which is chronic along with intermittent speech difficulties. Patient reported symptoms subsided at the time he got to the emergency department.   During my evaluation patient with right-sided weakness which is his baseline. Normal speech, chronic right nasolabial fold flattening  Patient was a stroke alert. CT head without acute findings  CT head and neck also without significant stenosis or occlusions. Neurology was consulted, it was recommended not to start steroids at this time as the patient was complaining of abdominal pain. MRI brain negative  PT/OT - home PT/OT        Medical Problems       Resolved Problems  Date Reviewed: 12/8/2023   None       Discharging Physician / Practitioner: Matt Romeo PA-C  PCP: Benjie Soulier, MD  Admission Date:   Admission Orders (From admission, onward)       Ordered        12/06/23 1425  [address]  Once                          Discharge Date: 12/08/23    Consultations During Hospital Stay:  Neuro    Procedures Performed:   None      Significant Findings / Test Results:   MRI brain MS wo and w contrast  Result Date: 12/8/2023  Impression: Stable chronic demyelinating disease in the brain parenchyma. No new lesions or enhancement to suggest the presence of active demyelination. Resident: Rosita Wayne, the attending radiologist, have reviewed the images and agree with the final report above. Workstation performed: [id number]     CT abdomen pelvis w contrast  Result Date: 12/8/2023  Impression: Pericholecystic inflammation may represent acute cholecystitis, though there are no calcified gallstones and the patient's normal LFTs are noted. Workstation performed: [id number]     CT abdomen pelvis wo contrast  Result Date: 12/7/2023  Impression: Edema in the fat in the right upper quadrant, adjacent to a diverticulum at the hepatic flexure and in the gallbladder neck. Differential diagnosis includes diverticulitis and acute cholecystitis. If clinically indicated, sonography of the right upper quadrant would be helpful to confirm or exclude acute cholecystitis. The study was marked in [facility] for immediate notification.  Workstation performed: [id number]     X-ray chest 1 view portable  Result Date: 12/6/2023  Impression: No acute cardiopulmonary disease. Workstation performed: [id number]     CTA stroke alert (head/neck)  Result Date: 12/6/2023  Impression: Negative CTA head and neck for large vessel occlusion, dissection, aneurysm, or high-grade stenosis. Additional chronic/incidental findings as detailed above. Findings were directly discussed with [employer] 12/6/2023 at 12:00 PM. Workstation performed: [id number]     CT stroke alert brain  Result Date: 12/6/2023  Impression: No acute intracranial abnormality. Mild white matter disease likely due to underlying multiple sclerosis. Findings were directly discussed with [employer] 12/6/2023 at 11:59 AM. Workstation performed: [id number]      Incidental Findings:   None      Test Results Pending at Discharge (will require follow up): None     Outpatient Tests Requested:  None    Complications: None    Reason for Admission: Right-sided numbness    Hospital Course:   Kiki Chen is a 46 y.o. male patient with PMH MS who originally presented to the hospital on 12/6/2023 due to acute right-sided numbness. Stroke alert in the ED T head and CTA head and neck were unremarkable. Neuro evaluated. Possible concern for MS flare initially. Underwent MRI imaging which did not show any new lesions or any evidence of strokes. Patient symptoms improved without the use of steroids. Cleared by neurology for discharge with follow-up with MS clinic. He was evaluated PT/OT who recommended home PT/OT. He complained of some abdominal pain initially which resolved the day after admission, however continued to have some tenderness on deep palpation in the right middle to lower quadrant. Underwent CT imaging which demonstrated cholecystitis versus diverticulitis. Underwent repeat CT with contrast which demonstrated evidence of cholecystitis without any cholelithiasis. Patient's abdominal pain has resolved. Could have Schwarz's. LFTs at baseline. Eating and drinking without any nausea/vomiting. No clinical concern for cholecystitis. Instructed patient to return to the ED if develops fevers, nausea, vomiting, worsening abdominal pain. I have given him a referral for outpatient follow-up with GI for colonoscopy. Please see above list of diagnoses and related plan for additional information. Condition at Discharge: stable    Discharge Day Visit / Exam:   Subjective: No acute events overnight. Improved numbness and weakness. Vitals: Blood Pressure: 130/93 (12/08/23 0754)  Pulse: 71 (12/08/23 0754)  Temperature: 97.8 °F (36.6 °C) (12/08/23 0754)  Temp Source: Oral (12/07/23 2006)  Respirations: 14 (12/08/23 0754)  Height: 5' 11" (180.3 cm) (12/06/23 2151)  Weight - Scale: 104 kg (229 lb 4.5 oz) (12/06/23 1143)  SpO2: 97 % (12/08/23 0754)  Exam:   Physical Exam  Vitals and nursing note reviewed. Constitutional:       Appearance: Normal appearance. HENT:      Head: Normocephalic and atraumatic. Mouth/Throat:      Mouth: Mucous membranes are moist.      Pharynx: Oropharynx is clear. No oropharyngeal exudate. Eyes:      Extraocular Movements: Extraocular movements intact. Cardiovascular:      Rate and Rhythm: Normal rate and regular rhythm. Pulses: Normal pulses. Heart sounds: Normal heart sounds. No murmur heard. No friction rub. No gallop. Pulmonary:      Effort: Pulmonary effort is normal. No respiratory distress. Breath sounds: Normal breath sounds. No stridor. No wheezing or rales. Abdominal:      General: Abdomen is flat. Bowel sounds are normal. There is no distension. Palpations: Abdomen is soft. Tenderness: There is no abdominal tenderness. Musculoskeletal:      Right lower leg: No edema. Left lower leg: No edema. Skin:     General: Skin is warm and dry. Neurological:      General: No focal deficit present.       Mental Status: He is alert and oriented to person, place, and time. Sensory: Sensory deficit (Improved) present. Motor: Weakness (Right-sided -at baseline) present. Discussion with Family: Updated  (father) at bedside. Discharge instructions/Information to patient and family:   See after visit summary for information provided to patient and family. Provisions for Follow-Up Care:  See after visit summary for information related to follow-up care and any pertinent home health orders. Mobility at time of Discharge:   Basic Mobility Inpatient Raw Score: 15  JH-HLM Goal: 4: Move to chair/commode  JH-HLM Achieved: 4: Move to chair/commode  HLM Goal achieved. Continue to encourage appropriate mobility. Disposition:   Home with VNA Services (Reminder: Complete face to face encounter)    Planned Readmission: None     Discharge Statement:  I spent 75 minutes discharging the patient. This time was spent on the day of discharge. I had direct contact with the patient on the day of discharge. Greater than 50% of the total time was spent examining patient, answering all patient questions, arranging and discussing plan of care with patient as well as directly providing post-discharge instructions. Additional time then spent on discharge activities. Discharge Medications:  See after visit summary for reconciled discharge medications provided to patient and/or family.       **Please Note: This note may have been constructed using a voice recognition system**

## 2023-12-08 NOTE — PROGRESS NOTES
Progress Note - Neurology   Caitlyn Raygoza 46 y.o. male [id number]  Unit/Bed#: /-01    Assessment/Plan:  * Right sided numbness  Assessment & Plan  46 y.o. right handed male with MS on maintained on Ocrevus who presented to the ED as a stroke alert with reports of new onset R sided numbness superimposed on his chronic stable R sided weakness. Patient notes that he has been around sick contacts with a stomach bug. In addition to the above, patient also reporting headache and upper abdominal pain. NIHSS 3 for chronic flattening of the right nasolabial fold, chronic right lower extremity drift, and decreased sensation on the right. Neuroimaging was unremarkable and patient was deemed not a TNK candidate as symptoms were resolving/clearly nondisabling. Imaging:  - CTH: No acute intracranial abnormality. Mild white matter disease likely due to underlying multiple sclerosis. - CTA H/N negative for large vessel occlusion or flow limiting stenosis. - MRI brain w/wo revealed "Stable chronic demyelinating disease in the brain parenchyma. No new lesions or enhancement to suggest the presence of active demyelination."    Higher suspicion for MS recrudescence in the setting of recent illness, no new lesions to suggest MS flare. No imaging negative for acute ischemia. Plan:  - No need to initiate steroids at this time, especially as patient reports feeling numbness has almost resolved  - Defer management and workup of upper abdominal pain and back pain to primary team.  - PT/OT as needed  - Fall precautions  - Medical management and supportive care per primary team. Correction of any metabolic or infectious disturbances. Plan discussed with Attending Neurologist, please see attestation for further input/recommendations.      MS (multiple sclerosis) ([address])  Assessment & Plan  - Diagnosed in beginning of 2022 and started Senthil Grayson in 4/2022  - Patient continues Ocrevus infusions twice a year with his next infusion scheduled for 12/19/2022  - Patient follows with Dr. Vasu Hooker in outpatient neurology  - Additional imaging pending as detailed above        Patient has neurology follow-up with Lex Hatfield on March 11, 2024. Patient should plan on keeping this appointment. He will not require any additional outpatient testing at this time. Subjective:   Patient seen and examined at bedside. He reports his numbness has completely resolved and his face and right arm. He reports he continues to get transient numbness in his right leg. He continues to have right facial asymmetry. MRI is pending, all questions/concerns answered. Past Medical History:   Diagnosis Date    MS (multiple sclerosis) ([address])      Past Surgical History:   Procedure Laterality Date    HERNIA REPAIR      3 times    KNEE SURGERY Right      Family History   Problem Relation Age of Onset    Stroke Maternal Grandmother     Multiple sclerosis Other      Social History     Socioeconomic History    Marital status: Single     Spouse name: None    Number of children: None    Years of education: None    Highest education level: None   Occupational History    None   Tobacco Use    Smoking status: Former    Smokeless tobacco: Former   Vaping Use    Vaping Use: Former   Substance and Sexual Activity    Alcohol use: Not Currently    Drug use: Never    Sexual activity: None   Other Topics Concern    None   Social History Narrative    None     Social Determinants of Health     Financial Resource Strain: Not on file   Food Insecurity: No Food Insecurity (12/6/2023)    Hunger Vital Sign     Worried About Running Out of Food in the Last Year: Never true     [address] in the Last Year: Never true   Transportation Needs: No Transportation Needs (12/6/2023)    PRAPARE - Transportation     Lack of Transportation (Medical): No     Lack of Transportation (Non-Medical):  No   Physical Activity: Not on file   Stress: Not on file   Social Connections: Not on file   Intimate Partner Violence: Not on file   Housing Stability: Low Risk  (12/6/2023)    Housing Stability Vital Sign     Unable to Pay for Housing in the Last Year: No     Number of Places Lived in the Last Year: 1     Unstable Housing in the Last Year: No       Medications: Reviewed in detail by me. ROS: Review of Systems   Constitutional:  Negative for fatigue and fever. Eyes:  Negative for photophobia and visual disturbance. Respiratory:  Negative for cough and shortness of breath. Cardiovascular:  Negative for chest pain and palpitations. Musculoskeletal:  Negative for back pain and neck pain. Skin:  Negative for color change and pallor. Neurological:  Positive for facial asymmetry (Right facial asymmetry), speech difficulty (Stuttering), weakness (Chronic) and numbness (Transient numbness in right leg). Negative for dizziness, light-headedness and headaches. Psychiatric/Behavioral:  Negative for confusion. The patient is not nervous/anxious. All other systems reviewed and are negative. Vitals: /93   Pulse 71   Temp 97.8 °F (36.6 °C)   Resp 14   Ht 5' 11" (1.803 m)   Wt 104 kg (229 lb 4.5 oz)   SpO2 97%   BMI 31.98 kg/m²     Physical Exam:   Physical Exam  Vitals reviewed. Constitutional:       General: He is not in acute distress. Appearance: Normal appearance. He is normal weight. He is not ill-appearing. HENT:      Head: Normocephalic and atraumatic. Right Ear: External ear normal.      Left Ear: External ear normal.   Eyes:      Extraocular Movements: Extraocular movements intact and EOM normal.      Conjunctiva/sclera: Conjunctivae normal.      Pupils: Pupils are equal, round, and reactive to light. Cardiovascular:      Rate and Rhythm: Normal rate. Pulmonary:      Effort: Pulmonary effort is normal. No respiratory distress. Musculoskeletal:         General: Normal range of motion. Right lower leg: No edema. Left lower leg: No edema. Skin:     General: Skin is warm and dry. Coloration: Skin is not jaundiced or pale. Neurological:      Mental Status: He is alert and oriented to person, place, and time. Coordination: Finger-Nose-Finger Test normal.      Comments: See below   Psychiatric:         Mood and Affect: Mood normal.         Behavior: Behavior normal.       Neurologic Exam     Mental Status   Oriented to person, place, and time. Follows 2 step commands. Attention: normal. Concentration: normal.   Speech: (Speech is without dysarthria or aphasia, however patient does have stutter)  Level of consciousness: alert  Knowledge: good. Able to name object. Normal comprehension. Cranial Nerves     CN II   Visual fields full to confrontation. CN III, IV, VI   Pupils are equal, round, and reactive to light. Extraocular motions are normal.   Nystagmus: none   Diplopia: none  Conjugate gaze: present    CN V   Facial sensation intact. CN VIII   CN VIII normal.     CN IX, X   CN IX normal.     CN XII   CN XII normal.     Right facial asymmetry noted     Motor Exam   Muscle bulk: normal  Right arm pronator drift: absent  Left arm pronator drift: absent  Right upper extremity: 5/5  strength, 5/5 biceps, 4/5 triceps, 4/5 deltoids    Left upper extremity: 5/5 throughout    Right lower extremity: 5/5 dorsi and plantarflexion, did not offer any resistance to hip extension, 5/5 knee extension, 4+/5 knee flexion (patient reports prior ACL/MCL injury)    Left lower extremity: 5/5 dorsi and plantarflexion, 4+/5 hip extension, 5/5 knee extension, 5/5 knee flexion       Sensory Exam   Light touch normal.   Pinprick normal.     Gait, Coordination, and Reflexes     Coordination   Finger to nose coordination: normal    Tremor   Resting tremor: absent  Intention tremor: absent  Action tremor: absent      Labs: Reviewed in detail by me. Imaging: I have personally reviewed pertinent imaging and PACS reports.      VTE Prophylaxis: Heparin    Total time spent today 30 minutes. Greater than 50% of total time was spent with the patient and/or family counseling and/or coordination of care. A description of the counseling/coordination of care:  Patient was seen and evaluated. Discussed with attending. Chart reviewed thoroughly including laboratory and imaging studies. Discussed medication regimen, imaging reviewed.   Plan of care discussed with patient and primary team.

## 2023-12-08 NOTE — ASSESSMENT & PLAN NOTE
Patient mentioned upper abdominal pain that started today. Denied nausea, vomiting, constipation or diarrhea. Lipase was normal, hepatic panel was also normal.  CT A/P- Edema in the fat in the right upper quadrant, adjacent to a diverticulum at the hepatic flexure and in the gallbladder neck   Differential includes cholecystitis versus diverticulitis  Repeat CT with contrast again shows signs of cholecystitis, however no cholelithiasis. Did not show evidence of diverticulitis. Has been eating/drinking without any abdominal pain/nausea. LFTs are WNL. No clinical concern for cholecystitis. Recommended outpt colonoscopy with GI to further evaluate for diverticula. Return precautions given for fevers, abdominal pain, nausea.

## 2023-12-10 LAB
GAMMA INTERFERON BACKGROUND BLD IA-ACNC: <0 IU/ML
M TB IFN-G BLD-IMP: NEGATIVE
M TB IFN-G CD4+ BCKGRND COR BLD-ACNC: 0 IU/ML
M TB IFN-G CD4+ BCKGRND COR BLD-ACNC: 0 IU/ML
MITOGEN IGNF BCKGRD COR BLD-ACNC: 2.78 IU/ML

## 2023-12-11 RX ORDER — ACETAMINOPHEN 325 MG/1
650 TABLET ORAL ONCE
Status: CANCELLED | OUTPATIENT
Start: 2023-12-19

## 2023-12-11 RX ORDER — SODIUM CHLORIDE 9 MG/ML
20 INJECTION, SOLUTION INTRAVENOUS ONCE
Status: CANCELLED | OUTPATIENT
Start: 2023-12-19

## 2023-12-11 NOTE — UTILIZATION REVIEW
NOTIFICATION OF ADMISSION DISCHARGE   This is a Notification of Discharge from [facility]. Please be advised that this patient has been discharge from our facility. Below you will find the admission and discharge date and time including the patient’s disposition. UTILIZATION REVIEW CONTACT:  Placido Albarran  Utilization   Network Utilization Review Department  Phone: [phone] carefully listen to the prompts. All voicemails are confidential.  Email: [email or web address]     ADMISSION INFORMATION  PRESENTATION DATE: 12/6/2023 11:40 AM  OBERVATION ADMISSION DATE:   INPATIENT ADMISSION DATE: 12/6/23  2:25 PM   DISCHARGE DATE: 12/8/2023  3:23 PM   DISPOSITION:Home with [facility] Utilization Review Department  ATTENTION: Please call with any questions or concerns to [phone] and carefully listen to the prompts so that you are directed to the right person. All voicemails are confidential.   For Discharge needs, contact Care Management DC Support Team at [phone] opt. 2  Send all requests for admission clinical reviews, approved or denied determinations and any other requests to dedicated fax number below belonging to the campus where the patient is receiving treatment.  List of dedicated fax numbers for the Facilities:  [place] DENIALS (Administrative/Medical Necessity) [phone]   DISCHARGE SUPPORT TEAM (Network) [phone] [facility] ([facility]ediatrics) [phone]   [address] [facility] [address] [place] [address] [address] [phone]

## 2023-12-14 ENCOUNTER — TELEPHONE (OUTPATIENT)
Dept: INFUSION CENTER | Facility: HOSPITAL | Age: 48
End: 2023-12-14

## 2023-12-14 DIAGNOSIS — G35 MS (MULTIPLE SCLEROSIS) (HCC): Primary | ICD-10-CM

## 2023-12-14 RX ORDER — ACETAMINOPHEN 325 MG/1
650 TABLET ORAL ONCE
OUTPATIENT
Start: 2024-01-12

## 2023-12-14 RX ORDER — SODIUM CHLORIDE 9 MG/ML
20 INJECTION, SOLUTION INTRAVENOUS ONCE
OUTPATIENT
Start: 2024-01-12

## 2023-12-14 NOTE — TELEPHONE ENCOUNTER
Ocrevus to be rescheduled to 1/9/24 or after. Called [facility] infusion center and spoke with Petra Alan. Scheduled pt for 1/12/24 @ 7:30am.    [employer] message sent back to pt in other encounter.

## 2023-12-14 NOTE — TELEPHONE ENCOUNTER
Phyllistine Ask should be re-scheduled at least 3 weeks from original scheduled time, hoping patient will be COVID 19 negative at that point

## 2023-12-14 NOTE — TELEPHONE ENCOUNTER
Received a phone call from family member today stating pt tested positive for covid on 12/13. They are wondering he should be treated for his Ocrevus on 12/19 or canceled. Family states they won't be able to get him for labswork until 12/18. Family stated they did call the office and leave a message too. Please advise. Thank you.

## 2023-12-14 NOTE — TELEPHONE ENCOUNTER
Shirley Jones minutes ago (9:27 AM)     VG  Received a phone call from family member today stating pt tested positive for covid on 12/13. They are wondering he should be treated for his Ocrevus on 12/19 or canceled. Family states they won't be able to get him for labswork until 12/18. Family stated they did call the office and leave a message too. Please advise. Thank you.

## 2023-12-19 ENCOUNTER — HOSPITAL ENCOUNTER (OUTPATIENT)
Dept: INFUSION CENTER | Facility: HOSPITAL | Age: 48
End: 2023-12-19
Attending: PSYCHIATRY & NEUROLOGY

## 2023-12-19 DIAGNOSIS — G35 MS (MULTIPLE SCLEROSIS) (HCC): Primary | ICD-10-CM

## 2023-12-28 ENCOUNTER — PATIENT MESSAGE (OUTPATIENT)
Dept: NEUROLOGY | Facility: CLINIC | Age: 48
End: 2023-12-28

## 2023-12-31 ENCOUNTER — PATIENT MESSAGE (OUTPATIENT)
Dept: NEUROLOGY | Facility: CLINIC | Age: 48
End: 2023-12-31

## 2024-01-02 ENCOUNTER — TELEPHONE (OUTPATIENT)
Dept: NEUROLOGY | Facility: CLINIC | Age: 49
End: 2024-01-02

## 2024-01-02 NOTE — TELEPHONE ENCOUNTER
Pt is scheduled for Ocrevus on 1/12/24. Pt recently reported feeling as though his left ear is full of water. Also complains of headache on right side of head initially with most recent message headache on left side.     December 28, 2023  Carlos BERRY Neurology [facility] (supporting Terra Hernandez MD)   DJ      12/28/23  4:11 PM  I am having trouble again. My left ear feels like it is full of water again and have a really bad headache on the right side of my head     Carloskalyan BERRY Neurology [facility] (supporting Kathy Gregory PA-C)   DJ      12/31/23  6:41 PM  The last day or so I have had a headache on the left side of my head nothing out of the ordinary eaten or drank. Had my normal caffeine. So it's not a caffeine headache.     Infusion labs completed 12/8/23.    Called and Left a message on pt's answering machine for a call back.     Need to confirm if pt is having further signs of infection/illness as well as triage current headache.

## 2024-01-04 NOTE — TELEPHONE ENCOUNTER
Called and Left a message on pt's answering machine for a call back.    [employer] message also sent to pt.

## 2024-01-05 NOTE — TELEPHONE ENCOUNTER
Pt has denied signs of infection/illness. Declined recommendations for headache at this time.     Ocrevus is scheduled 1/12/24.     Ocrevus is approved with [facility] from 5/18/23 to 5/17/24 for 20mls/180 days. Medication must be obtained from [facility] pharmacy.       See 10/27/23 encounter. Ocrevus already delivered to [facility] infusion center via [facility] on 11/21/23.    CBC, CMP, Immunoglobulins, HIV, Quantiferon TB gold, and acute hepatitis panel all completed on 12/8/23.     Please advise if pt requires repeat labs or if he is cleared for infusion.

## 2024-01-09 ENCOUNTER — APPOINTMENT (OUTPATIENT)
Dept: LAB | Facility: HOSPITAL | Age: 49
End: 2024-01-09
Payer: COMMERCIAL

## 2024-01-09 DIAGNOSIS — Z00.00 ROUTINE MEDICAL EXAM: ICD-10-CM

## 2024-01-09 LAB
ALBUMIN SERPL BCP-MCNC: 4.1 G/DL (ref 3.5–5)
ALP SERPL-CCNC: 59 U/L (ref 34–104)
ALT SERPL W P-5'-P-CCNC: 9 U/L (ref 7–52)
ANION GAP SERPL CALCULATED.3IONS-SCNC: 10 MMOL/L
AST SERPL W P-5'-P-CCNC: 11 U/L (ref 13–39)
BASOPHILS # BLD AUTO: 0.08 THOUSANDS/ÂΜL (ref 0–0.1)
BASOPHILS NFR BLD AUTO: 1 % (ref 0–1)
BILIRUB SERPL-MCNC: 0.53 MG/DL (ref 0.2–1)
BUN SERPL-MCNC: 18 MG/DL (ref 5–25)
CALCIUM SERPL-MCNC: 9.3 MG/DL (ref 8.4–10.2)
CHLORIDE SERPL-SCNC: 107 MMOL/L (ref 96–108)
CO2 SERPL-SCNC: 24 MMOL/L (ref 21–32)
CREAT SERPL-MCNC: 0.72 MG/DL (ref 0.6–1.3)
EOSINOPHIL # BLD AUTO: 0.36 THOUSAND/ÂΜL (ref 0–0.61)
EOSINOPHIL NFR BLD AUTO: 6 % (ref 0–6)
ERYTHROCYTE [DISTWIDTH] IN BLOOD BY AUTOMATED COUNT: 12.9 % (ref 11.6–15.1)
GFR SERPL CREATININE-BSD FRML MDRD: 108 ML/MIN/1.73SQ M
GLUCOSE P FAST SERPL-MCNC: 78 MG/DL (ref 65–99)
HCT VFR BLD AUTO: 45.3 % (ref 36.5–49.3)
HGB BLD-MCNC: 14.6 G/DL (ref 12–17)
IMM GRANULOCYTES # BLD AUTO: 0.04 THOUSAND/UL (ref 0–0.2)
IMM GRANULOCYTES NFR BLD AUTO: 1 % (ref 0–2)
LYMPHOCYTES # BLD AUTO: 1.23 THOUSANDS/ÂΜL (ref 0.6–4.47)
LYMPHOCYTES NFR BLD AUTO: 20 % (ref 14–44)
MCH RBC QN AUTO: 29.3 PG (ref 26.8–34.3)
MCHC RBC AUTO-ENTMCNC: 32.2 G/DL (ref 31.4–37.4)
MCV RBC AUTO: 91 FL (ref 82–98)
MONOCYTES # BLD AUTO: 0.53 THOUSAND/ÂΜL (ref 0.17–1.22)
MONOCYTES NFR BLD AUTO: 9 % (ref 4–12)
NEUTROPHILS # BLD AUTO: 4.01 THOUSANDS/ÂΜL (ref 1.85–7.62)
NEUTS SEG NFR BLD AUTO: 63 % (ref 43–75)
NRBC BLD AUTO-RTO: 0 /100 WBCS
PLATELET # BLD AUTO: 216 THOUSANDS/UL (ref 149–390)
PMV BLD AUTO: 10.6 FL (ref 8.9–12.7)
POTASSIUM SERPL-SCNC: 4.1 MMOL/L (ref 3.5–5.3)
PROT SERPL-MCNC: 7.1 G/DL (ref 6.4–8.4)
RBC # BLD AUTO: 4.99 MILLION/UL (ref 3.88–5.62)
SODIUM SERPL-SCNC: 141 MMOL/L (ref 135–147)
WBC # BLD AUTO: 6.25 THOUSAND/UL (ref 4.31–10.16)

## 2024-01-09 PROCEDURE — 36415 COLL VENOUS BLD VENIPUNCTURE: CPT

## 2024-01-09 PROCEDURE — 85025 COMPLETE CBC W/AUTO DIFF WBC: CPT

## 2024-01-09 PROCEDURE — 80053 COMPREHEN METABOLIC PANEL: CPT

## 2024-01-12 ENCOUNTER — HOSPITAL ENCOUNTER (OUTPATIENT)
Dept: INFUSION CENTER | Facility: HOSPITAL | Age: 49
End: 2024-01-12
Attending: PSYCHIATRY & NEUROLOGY
Payer: COMMERCIAL

## 2024-01-12 VITALS
SYSTOLIC BLOOD PRESSURE: 97 MMHG | RESPIRATION RATE: 18 BRPM | TEMPERATURE: 98 F | HEART RATE: 72 BPM | OXYGEN SATURATION: 96 % | DIASTOLIC BLOOD PRESSURE: 55 MMHG

## 2024-01-12 DIAGNOSIS — G35 MS (MULTIPLE SCLEROSIS) (HCC): Primary | ICD-10-CM

## 2024-01-12 PROCEDURE — 96367 TX/PROPH/DG ADDL SEQ IV INF: CPT

## 2024-01-12 PROCEDURE — 96413 CHEMO IV INFUSION 1 HR: CPT

## 2024-01-12 PROCEDURE — 96415 CHEMO IV INFUSION ADDL HR: CPT

## 2024-01-12 RX ORDER — ACETAMINOPHEN 325 MG/1
650 TABLET ORAL ONCE
Status: COMPLETED | OUTPATIENT
Start: 2024-01-12 | End: 2024-01-12

## 2024-01-12 RX ORDER — SODIUM CHLORIDE 9 MG/ML
20 INJECTION, SOLUTION INTRAVENOUS ONCE
OUTPATIENT
Start: 2024-07-10

## 2024-01-12 RX ORDER — SODIUM CHLORIDE 9 MG/ML
20 INJECTION, SOLUTION INTRAVENOUS ONCE
Status: COMPLETED | OUTPATIENT
Start: 2024-01-12 | End: 2024-01-12

## 2024-01-12 RX ORDER — ACETAMINOPHEN 325 MG/1
650 TABLET ORAL ONCE
OUTPATIENT
Start: 2024-07-10

## 2024-01-12 RX ADMIN — SODIUM CHLORIDE 100 MG: 0.9 INJECTION, SOLUTION INTRAVENOUS at 09:03

## 2024-01-12 RX ADMIN — ACETAMINOPHEN 650 MG: 325 TABLET ORAL at 08:16

## 2024-01-12 RX ADMIN — OCRELIZUMAB 600 MG: 300 INJECTION INTRAVENOUS at 10:13

## 2024-01-12 RX ADMIN — SODIUM CHLORIDE 20 ML/HR: 0.9 INJECTION, SOLUTION INTRAVENOUS at 08:08

## 2024-01-12 RX ADMIN — DIPHENHYDRAMINE HYDROCHLORIDE 50 MG: 50 INJECTION, SOLUTION INTRAMUSCULAR; INTRAVENOUS at 08:09

## 2024-01-12 RX ADMIN — FAMOTIDINE 20 MG: 10 INJECTION INTRAVENOUS at 08:34

## 2024-01-12 NOTE — PROGRESS NOTES
Ocrevus infusion tolerated well. Pt states that he alresdy can feel an improvement in facial and arm mobility during his post treatment observation period which was uneventful. PIV removed. New appt made for July. AVS provided. Pt self transferred to his  and was discharged to his sister's care.

## 2024-01-17 ENCOUNTER — PATIENT MESSAGE (OUTPATIENT)
Dept: NEUROLOGY | Facility: CLINIC | Age: 49
End: 2024-01-17

## 2024-01-18 ENCOUNTER — TELEPHONE (OUTPATIENT)
Dept: NEUROLOGY | Facility: CLINIC | Age: 49
End: 2024-01-18

## 2024-01-18 ENCOUNTER — PATIENT MESSAGE (OUTPATIENT)
Dept: NEUROLOGY | Facility: CLINIC | Age: 49
End: 2024-01-18

## 2024-01-18 DIAGNOSIS — G43.109 MIGRAINE WITH AURA AND WITHOUT STATUS MIGRAINOSUS, NOT INTRACTABLE: ICD-10-CM

## 2024-01-18 DIAGNOSIS — G35 MS (MULTIPLE SCLEROSIS) (HCC): Primary | ICD-10-CM

## 2024-01-18 RX ORDER — DEXAMETHASONE 2 MG/1
2 TABLET ORAL DAILY PRN
Qty: 5 TABLET | Refills: 2 | Status: SHIPPED | OUTPATIENT
Start: 2024-01-18

## 2024-01-18 RX ORDER — DIVALPROEX SODIUM 250 MG/1
TABLET, EXTENDED RELEASE ORAL
Qty: 8 TABLET | Refills: 1 | Status: SHIPPED | OUTPATIENT
Start: 2024-01-18

## 2024-01-18 RX ORDER — ASPIRIN 325 MG
325 TABLET ORAL DAILY
Qty: 10 TABLET | Refills: 0 | Status: SHIPPED | OUTPATIENT
Start: 2024-01-18

## 2024-01-18 NOTE — TELEPHONE ENCOUNTER
Spoke w/pt (and mother). Advised him of note below. Pt and mother verbalized understanding.     Sending directions to [employer] portal at pt's request.

## 2024-01-18 NOTE — TELEPHONE ENCOUNTER
January 18, 2024  Libby Paredes   to Neurology [facility] 3   JR    1/18/24  7:34 AM  Please read and assist if appropriate.  January 17, 2024  Carlos Landis   to  [facility] (supporting Terra Hernandez MD)   DJ      1/17/24  7:52 PM  Last 2 days been feeling dizzy and lightheaded. I don't feel well at all. Please call my moms phone number to talk to me directly. [phone]. My phone people can't hear me   __________________________________________    Spoke w/pt. He reports the left side of his face is numb. Also has a headache - located on right side of head (states they are typically on the left side); describes pain as stabbing.   Denies photophobia phonophobia  No nausea/vomiting  No bowel/urinary changes  No visual changes  No increased fatigue    States he felt okay earlier this AM, but this came on quickly.   Pt took Ibuprofen 400mg - no relief provided      DMT - Ocrevus  last infusion 1/12/24  MRI b - 12/2023    Dr. DUNCAN - please advise.

## 2024-01-18 NOTE — TELEPHONE ENCOUNTER
Patient will be advised Decadron 2 mg for 5 days + Depakote 500 mg taper+ aspirin 325 mg daily for 10 days.    If symptoms persist, I will be offering repeating brain MRI again.    No steroids advised

## 2024-01-22 DIAGNOSIS — G35 MS (MULTIPLE SCLEROSIS) (HCC): Primary | ICD-10-CM

## 2024-01-22 DIAGNOSIS — G43.109 MIGRAINE WITH AURA AND WITHOUT STATUS MIGRAINOSUS, NOT INTRACTABLE: ICD-10-CM

## 2024-01-22 NOTE — TELEPHONE ENCOUNTER
received vm from 1/18 at 3:18pm-Hello, good afternoon. My name is hermelindo calling from [facility] on a recorded line, calling in reference to a medication delivery for our mutual patient, gilbert, mamadou, patient birth date, [birth year]. Please give us a call at [phone]. Option number 2. Again, that's [phone]. It's option number 2. Thank you  --------------------------------------------------  They did not mention medication name in cvm but   Per chart, pt's ocrevus comes from [facility]

## 2024-01-24 ENCOUNTER — TELEPHONE (OUTPATIENT)
Dept: NEUROLOGY | Facility: CLINIC | Age: 49
End: 2024-01-24

## 2024-01-25 NOTE — TELEPHONE ENCOUNTER
January 18, 2024 1/18/24  3:51 PM  Jeannette Boland routed this conversation to Neurology [facility] Team 3  Carlos DESTIN Landis   to  Neurology [facility] (supporting Terra Hernandez MD)   [place]      1/18/24  3:37 PM  I am in need of a script so the state will pay for a walk-in tub cause I am having trouble getting in and out of the tub        SW - are you able to assist w/patient request?

## 2024-01-30 NOTE — TELEPHONE ENCOUNTER
January 27, 2024  Carlos DESTIN Landis   to P Neurology [facility] (supporting Terra Hernandez MD)   DJ      1/27/24 10:59 AM  Right now I can't call because my phone makes it so I can hear you but you can't hear me. Working on getting new phone      1/27/24 10:57 AM  I need the script to say I need the walk in tub. MS makes it hard to step over the edge of the tub

## 2024-01-30 NOTE — TELEPHONE ENCOUNTER
January 26, 2024  Kat Frausto   to Carlos Landis MS    1/26/24 12:30 PM  l/m w/ contact information requesting c/b

## 2024-01-31 ENCOUNTER — PATIENT MESSAGE (OUTPATIENT)
Dept: NEUROLOGY | Facility: CLINIC | Age: 49
End: 2024-01-31

## 2024-02-02 NOTE — TELEPHONE ENCOUNTER
Next Ocrevus infusion not due until 7/10/24.    Pt was also ordered Solumedrol every 14 days x6. It appears pt only received 1 Solumedrol infusion on 11/28/23 and did not schedule further treatments.     Pt sent [employer] message reporting numbness in the lower part of his face as well as a headache. Requesting call at [phone].    Called pt. He reports the following:      New numbness/tingling?: Lower half of face is numb. Started around 1/31/24. Numbness fluctuates day by day. Some days are better, other days has difficulty speaking due to the numbness.     New Weakness/muscle spasms?: Denies.    New or worsening fatigue?: Denies.    Bowel/Bladder changes?: Less frequent bowel movements (every 2 days instead of every day).     Vision changes (blurred/double vision, painful vision, decreased vision)?: Denies.     UTI symptoms (burning, itching, painful urination, retention, urgency etc.)?: Denies.     Recent illness (cough, cold, chills, fever, sinus infection, URI, UTI, etc.)?: COVID 12/13/23.     Increase in stress?: Denies.     Other?: Headaches that come and go since last Ocrevus infusion 1/12/24. No headache when asleep or waking up. Several hours later gets headache. Occurs daily. Reports throbbing pain in the right side of his head. Rates most severe pain 7-8/10. Noticed pain is better when his eyes are closed, though he could not confirm if sensitive to light. Soothing music is helpful. Denies nausea, vomiting, visual changes, or dizziness. Takes ibuprofen sometimes, mostly avoids as he is aware not to take too much of this. Had taken depakote and decadron 1/18/24- this was very helpful and had complete resolution of pain on days he took this.     Taking any disease modifying medication (copaxone, tecifdera, tysabri etc.)?: Ocrevus 1/12/24. Noticed that he seems to feel good 1-2 weeks after each infusion. Then will not feel right (couldn't explain further) for a while, then go back to baseline.      Date/Type of Last MRI (brain/Tspine/Cspine)?:  MRI brain 12/8/23    History of IV steroids?: Yes- see above. Pt did not complete ordered IV steroids. Pt reports they just weren't scheduled. Did tolerate infusions.   Infusion center vs home infusion preference?: Utilizes [facility] infusion center.    Did advise pt to seek evaluation at ED should he have new/worsening neuro symptoms over the weekend.     Please advise on pt's symptoms as well as how to proceed with previously ordered Solumedrol infusions. Thanks!

## 2024-02-04 NOTE — TELEPHONE ENCOUNTER
Please offer VV with Tarsha or Senait PHILLIP for headache evaluation - we may offer Depakote 400 mg IV in addition to Solumedrol 1000 mg IV as a headache treatment provided by headache team

## 2024-02-07 ENCOUNTER — TELEPHONE (OUTPATIENT)
Dept: NEUROLOGY | Facility: CLINIC | Age: 49
End: 2024-02-07

## 2024-02-07 NOTE — TELEPHONE ENCOUNTER
To confirm, should pt resume Solumedrol infusions at this time? (Solumedrol 500mg IV every 14 days x5 more doses)    Should Depakote IV be started now or wait until pt sees headache team?

## 2024-02-07 NOTE — TELEPHONE ENCOUNTER
Dr DUNCAN had recommened he be seen by a headache provider, called and left a message to get him scheduled with  in either [place] or [place] office for a consult. Schedule next available.

## 2024-02-07 NOTE — TELEPHONE ENCOUNTER
Noe DUNCAN- just want to clarify, did headache start before medication administration, or was headache triggered by med Ocrevus?

## 2024-02-07 NOTE — TELEPHONE ENCOUNTER
Please offer a single dose of Solumedrol 500 mg and Depakote 400 mg IV anytime this week     Headache team help appreciated

## 2024-02-08 NOTE — TELEPHONE ENCOUNTER
February 7, 2024  Carlos DESTIN BERRY Neurology [facility] (supporting Terra Hernandez MD)   DJ      2/7/24  4:23 PM  Still don't have a new phone if you could call me on [phone]. I can hear people but people can't hear me

## 2024-02-08 NOTE — TELEPHONE ENCOUNTER
Pt must obtain all infused medications from specialty pharmacy due to insurance.     Unable to locate IV Depakote. IV Depacon (valproate sodium) is available in doses of 250mg or 500mg. Please advise.     Please also advise on frequency and duration of both medication.    Once confirmed will need to send Rx to pharmacy and schedule delivery.

## 2024-02-08 NOTE — TELEPHONE ENCOUNTER
To confirm- Solumedrol 1,000mg IV and Depacon 500mg IV x1 dose?    If correct, please review and sign Rx for Depacon.

## 2024-02-08 NOTE — TELEPHONE ENCOUNTER
Single dose of Depakote 500 mg IV and a single dose of Solumedrol 1000 mg IV    Those regimens are part of Migraine treatment - please help with orders

## 2024-02-09 RX ORDER — VALPROATE SODIUM 100 MG/ML
500 INJECTION, SOLUTION INTRAVENOUS ONCE
Qty: 5 ML | Refills: 0 | Status: SHIPPED | OUTPATIENT
Start: 2024-02-09 | End: 2024-02-09

## 2024-02-12 ENCOUNTER — TELEPHONE (OUTPATIENT)
Dept: NEUROLOGY | Facility: CLINIC | Age: 49
End: 2024-02-12

## 2024-02-12 NOTE — TELEPHONE ENCOUNTER
Patient called in as they are a patient of Dr Hernandez, and was advised to seek medical attention for ongoing headaches with a headache doctor.    Patient was triage, triage was sent for review and is awaiting response to schedule patient accordingly.

## 2024-02-12 NOTE — TELEPHONE ENCOUNTER
Left message on new number given to call back and schedule a consult for headaches with MK in either [place] or [place] at next available.

## 2024-02-15 DIAGNOSIS — G35 MS (MULTIPLE SCLEROSIS) (HCC): Primary | ICD-10-CM

## 2024-02-15 RX ORDER — SODIUM CHLORIDE 9 MG/ML
20 INJECTION, SOLUTION INTRAVENOUS ONCE
OUTPATIENT
Start: 2024-02-28

## 2024-02-15 NOTE — TELEPHONE ENCOUNTER
New therapy plan entered, sent for signature.     Per [facility] website- Solumedrol infusion , 16518, and 31426 do not require PA. Depacon  may require PA. Will need to check with pharmacy. All medications must be obtained from specialty pharmacy.     Pt to take pepcid prior to infusion.     Need to schedule VV with Tarsha or YEE for headache evaluation.

## 2024-02-15 NOTE — TELEPHONE ENCOUNTER
Called [facility] and spoke with Katherine. Solumedrol is ready to schedule for delivery. Laurie needs PA.     Submitted request on [employer] website. Prior Authorization Request [id number]    Awaiting determination.     Pt called and spoke with scheduling. Triage was sent. Scheduling is in process.

## 2024-02-16 NOTE — TELEPHONE ENCOUNTER
Called [facility] and spoke with Cassidy. Made her aware of approval for Depacon. She notified insurance team. States Rx must now be processed and reviewed by a pharmacist. May take 24 hours. Will call back to schedule delivery of both medications early next week.    Update sent to pt via "2,10E+07".

## 2024-02-16 NOTE — TELEPHONE ENCOUNTER
received vm from 2/15 at 9:50am-this is ________, I'm calling from [facility] on a mutual patients. First name gilbert, last name is JIMY,  date of birth is may 25th. 1975. Yes. Is for the medication of valproate 500 milligrams. it needs a prior auth to proceed with the order. We did fax that over to advise. we did fax it over to a number of [phone] as well as we faxed it to [phone]. If you have received that, please forward it to the insurance class. If anything has changed with the medication, please contact the pharmacy to advise [phone], [phone]. Thank you so much. Have a great day  ----------------------------------------  PA already addressed below  Located Approval for valproate in chart.    Valproate approved from 2/15/24-3/15/24

## 2024-02-22 ENCOUNTER — PATIENT MESSAGE (OUTPATIENT)
Dept: NEUROLOGY | Facility: CLINIC | Age: 49
End: 2024-02-22

## 2024-02-28 ENCOUNTER — HOSPITAL ENCOUNTER (OUTPATIENT)
Dept: INFUSION CENTER | Facility: HOSPITAL | Age: 49
Discharge: HOME | End: 2024-02-28
Attending: PSYCHIATRY & NEUROLOGY
Payer: COMMERCIAL

## 2024-02-28 VITALS
OXYGEN SATURATION: 97 % | HEART RATE: 71 BPM | RESPIRATION RATE: 16 BRPM | DIASTOLIC BLOOD PRESSURE: 70 MMHG | SYSTOLIC BLOOD PRESSURE: 117 MMHG | TEMPERATURE: 98.1 F

## 2024-02-28 DIAGNOSIS — G35 MS (MULTIPLE SCLEROSIS) (HCC): Primary | ICD-10-CM

## 2024-02-28 PROCEDURE — 96367 TX/PROPH/DG ADDL SEQ IV INF: CPT

## 2024-02-28 PROCEDURE — 96365 THER/PROPH/DIAG IV INF INIT: CPT

## 2024-02-28 RX ORDER — SODIUM CHLORIDE 9 MG/ML
20 INJECTION, SOLUTION INTRAVENOUS ONCE
Status: COMPLETED | OUTPATIENT
Start: 2024-02-28 | End: 2024-02-28

## 2024-02-28 RX ORDER — SODIUM CHLORIDE 9 MG/ML
20 INJECTION, SOLUTION INTRAVENOUS ONCE
Status: CANCELLED | OUTPATIENT
Start: 2024-02-28

## 2024-02-28 RX ADMIN — SODIUM CHLORIDE 500 MG: 9 INJECTION, SOLUTION INTRAVENOUS at 15:05

## 2024-02-28 RX ADMIN — SODIUM CHLORIDE 20 ML/HR: 9 INJECTION, SOLUTION INTRAVENOUS at 15:04

## 2024-02-28 RX ADMIN — METHYLPREDNISOLONE SODIUM SUCCINATE 1000 MG: 125 INJECTION, POWDER, LYOPHILIZED, FOR SOLUTION INTRAMUSCULAR; INTRAVENOUS at 15:43

## 2024-02-28 NOTE — PROGRESS NOTES
Patient Depacon and Solumedrol infusion completed without complication. Patient given AVS at this time and confirmed next appointment with the office. Patient discharged in stable condition to home via ambulation.

## 2024-03-06 ENCOUNTER — PATIENT MESSAGE (OUTPATIENT)
Dept: NEUROLOGY | Facility: CLINIC | Age: 49
End: 2024-03-06

## 2024-03-11 ENCOUNTER — OFFICE VISIT (OUTPATIENT)
Dept: NEUROLOGY | Facility: CLINIC | Age: 49
End: 2024-03-11
Payer: COMMERCIAL

## 2024-03-11 VITALS
WEIGHT: 232 LBS | OXYGEN SATURATION: 98 % | RESPIRATION RATE: 18 BRPM | HEART RATE: 82 BPM | SYSTOLIC BLOOD PRESSURE: 123 MMHG | BODY MASS INDEX: 32.48 KG/M2 | TEMPERATURE: 98.1 F | HEIGHT: 71 IN | DIASTOLIC BLOOD PRESSURE: 80 MMHG

## 2024-03-11 DIAGNOSIS — G44.219 EPISODIC TENSION-TYPE HEADACHE, NOT INTRACTABLE: ICD-10-CM

## 2024-03-11 DIAGNOSIS — G35 MS (MULTIPLE SCLEROSIS) (HCC): Primary | ICD-10-CM

## 2024-03-11 DIAGNOSIS — G47.33 OSA (OBSTRUCTIVE SLEEP APNEA): ICD-10-CM

## 2024-03-11 PROCEDURE — 99214 OFFICE O/P EST MOD 30 MIN: CPT | Performed by: PHYSICIAN ASSISTANT

## 2024-03-11 NOTE — PATIENT INSTRUCTIONS
Continue with Ocrevus   Will order additional blood work (thyroid, vit D, B12)  Referral to sleep medicine to discuss sleep apnea treatment  Referrals to PT and OT  Ask PCP about dermatology referral   Follow up in 4-6 months

## 2024-03-11 NOTE — PROGRESS NOTES
"Patient ID: Carlos Landis is a 48 y.o. male.    Assessment/Plan:    MS (multiple sclerosis) ([facility])  Patient with MS formally diagnosed in 2022.  He was started on Ocrevus following his diagnosis and has remained on this medication.  Last infusion 11/12/2024.  He will continue with Ocrevus every 6 months.    Following his last visit he presented to the hospital in early December with \"new onset\" right sided face, arm and leg numbness (in the setting of baseline right-sided weakness from his MS).  It was noted that this was at least the third time he was stroke alerted for similar symptoms.  MRI brain was updated and there was no acute stroke and no progression of his MS.  There was concern for a functional component to his complaints.  He had been around sick contacts and then tested positive for COVID shortly after his admission.    We discussed the difference between true exacerbations (new lesions causing new symptoms) and pseudo exacerbations in the setting of illness/infection.    Reviewed multiple MRIs which have been stable since initiating Ocrevus.  No change in his neurologic exam.    I am going to update some labs including thyroid, B12, vitamin D.    We discussed the importance of healthy diet, routine exercise as tolerated.    He showed me a large plaque/rash on his left forearm/elbow which appears to be psoriasis.  He has not had this evaluated by his PCP or a dermatologist.  I suggested he see a dermatologist.  He is going to discuss with his PCP to get a referral.    Episodic tension-type headache, not intractable  Patient with ongoing headaches, many on awakening in the morning.  He has a history of sleep apnea and not currently using a CPAP.  I am referring him back to sleep medicine.    He should continue on magnesium and B2 supplements.    We discussed the importance of maintaining adequate hydration, eating regularly and not skipping meals, adequate sleep.    DAVID (obstructive sleep apnea)  As " above, history of DAVID previously treated with CPAP, not on treatment for several years.  Having headaches upon awakening in the morning, fatigue.  Referring back to sleep medicine for evaluation and treatment of sleep apnea.       Diagnoses and all orders for this visit:    MS (multiple sclerosis) (HCC)  -     TSH, 3rd generation with Free T4 reflex; Future  -     Vitamin B12; Future  -     Vitamin D 25 hydroxy; Future  -     TSH, 3rd generation with Free T4 reflex  -     Vitamin B12  -     Vitamin D 25 hydroxy  -     Ambulatory Referral to Physical Therapy; Future  -     Ambulatory Referral to Occupational Therapy; Future    Episodic tension-type headache, not intractable    DAVID (obstructive sleep apnea)  -     Ambulatory Referral to Sleep Medicine; Future           Subjective:    HPI    Carlos Landis is a 48 year old male with PMH of scoliosis with chronic gait dysfunction, migraines, pre-diabetes and “post-concussion syndrome” since 1999, who presents today for follow up regarding newly diagnosed MS in 2022.     Patient was seen for initial consult in February 2022. He was referred to neurology to evaluate for MS as a possible cause of his stroke-like symptoms he experienced in early January 2022. He was hospitalized at [facility] at the time. The symptoms included sudden onset right lower face droop, dysarthria, right upper and lower extremity weakness, and a feeling of leaning to the right. The symptoms occurred suddenly while he was driving to work one morning, and he immediately presented to the ED. CTH revealed subtle left subcortical hypodensity, and CTA head and neck did not show any significant stenosis. Later in the hospital course, it was determined that his symptoms may not have been from stroke but from some other cause. MRI brain demonstrated multiple enhancing and non-enhancing lesions in the WM of the cerebral hemispheres. The largest of these lesions is a nonenhancing 1.6 cm x 1.7 cm lesion at the medial  right parietal lobe. Appearance is most suspicious for demyelinating disease/multiple sclerosis. There is no evidence of acute infarction. MRI t-spine with normal cord signal. He was given a 5 day course of IV steroids, followed by 300 mg gabapentin TID due to acute pain “everywhere” following his steroid course. At time of outpatient neurology consult, he reported his symptoms had been improving, although he still had weakness in his right hand, slight right facial droop, and mild dysarthria. He initially presented in a wheelchair because he felt unsteady that morning, though was usually able to ambulate with a cane.      Less than a week after his initial consult, he ended up at [facility] on 2/13/22 due to more profound right hemiparesis described initially as flaccid. Stroke alert called, and tPA entertained, but given his history and imaging, tPA was stopped after a few seconds. His head CT showed hypodense lesions, most prominently noted in the region of left basal ganglia, interpreted by Radiology as possible subacute stroke. MRI brain completed demonstrated multiple small supratentorial enhancing foci of acute demyelination consistent with disease progression of multiple sclerosis. MRI c-spine showed acute to early subacute subcentimeter demyelinating plaque at the C5 vertebral body level. Additional smaller nonenhancing chronic appearing demyelinating plaques within the upper and mid cervical cord. He completed 5 days of IV steroids while admitted and discharged on oral steroid taper.     He was then seen by Dr. Hernandez on 3/15/22 and formal diagnosis of MS discussed. DMT advised. Initial DMT suggested was Ocrevus given the aggressive nature.   He had first split dose of Ocrevus on 4/29/22 and 5/13/22, tolerated well.     He presented to the hospital on 9/27/22 due to acute onset of LLE weakness. Initially a stroke alert was called, but this was felt to be more likely related to his MS. His symptoms improved  within 8 hours however, which was felt to be odd for MS presentation, but also lower suspicion for stroke, per inpatient neurology. His imaging was updated while he was admitted.   -MRI brain with redemonstration of multiple white matter lesions compatible with chronic demyelinating disease. Several lesions are decreased in conspicuity and no longer enhance. New or more conspicuous FLAIR signal extending inferiorly from previously seen lesion in the posterior limb of the left internal capsule along the cortical spinal tract that 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. No other new signal abnormality. No abnormal enhancement to indicate acute demyelination.  -MRI c-spine showed demyelinating lesions best appreciated at C5 are decreased in conspicuity. No definite new demyelinating lesions. No abnormal enhancement  -MRI t-spine no definite cord signal abnormality  -MRI l-spine with no distal cord signal abnormality. Mild degenerative changes without high-grade canal or foraminal stenosis or nerve root compression.  He was given 1 dose of IV steroids after inpatient neurology's discussion with Dr. Hernandez.  He received his first full dose infusion of Ocrevus on 11/17/22, tolerated well.     Patient also with complaints of significant headaches over the last year or so.  MRI brain and MRA head updated 2/14/23 were stable, normal MRA.    In May 2023 he presented to the hospital with new left-sided weakness/numbness involving hand/face/leg which he noticed upon waking from a nap.  CTH/CTA unremarkable.    He was admitted and seen by neurology.  MRI brain completed and unchanged, no MS progression, no acute stroke.  He was given 1 dose of IV steroids after inpatient team discussed with Dr. DUNCAN.     Today, patient presents with his sister.  He was last seen in October 2023 and Dr. Hernandez suggested 1 dose of IV steroids every 2 weeks x 3 months.  However, he has not had this consistently.  It appears he may have had an  "infusion on 11/28/2023 and then more recently on 2/28/2024.  He had his last Ocrevus infusion on 1/15/2024.  Ocrevus was pushed back due to a hospitalization and then being COVID-positive in mid December.  He had presented to the ED on 12/6/2023 with new onset right face, arm and leg numbness (although in the setting of baseline right-sided weakness secondary to his MS).  It was noted that this is least the third presentation as a stroke alert with similar symptoms.  There was concern for functional component to his symptoms.  MRI brain was updated 12/8/2023 and this was stable with no new lesions and no acute stroke.  He was not given IV steroids.  He then tested positive for COVID shortly after this admission.  He tells me today that the right facial numbness has persisted.  We discussed that he has previously had right-sided symptoms.  He notes that he is getting headaches 2-3 times a week upon awakening in the morning.  He has a prior diagnosis of sleep apnea and previously used a CPAP, but has not in several years.  He has not been reevaluated for sleep apnea.  He shows me what looks like psoriasis on his left forearm and elbow.  He has not had this evaluated by his PCP or dermatology yet.    The following portions of the patient's history were reviewed and updated as appropriate: current medications, past family history, past medical history, past social history, past surgical history, and problem list.         Objective:    Blood pressure 123/80, pulse 82, temperature 98.1 °F (36.7 °C), temperature source Temporal, resp. rate 18, height 5' 11\" (1.803 m), weight 105 kg (232 lb), SpO2 98%.    Physical Exam  Constitutional:       Appearance: Normal appearance.   Eyes:      Extraocular Movements: EOM normal.      Pupils: Pupils are equal, round, and reactive to light.   Neurological:      Mental Status: He is alert.      Deep Tendon Reflexes:      Reflex Scores:       Bicep reflexes are 2+ on the right side and " 2+ on the left side.       Brachioradialis reflexes are 3+ on the right side and 3+ on the left side.       Patellar reflexes are 3+ on the right side and 3+ on the left side.       Achilles reflexes are 2+ on the right side and 2+ on the left side.  Psychiatric:         Mood and Affect: Mood normal.         Speech: Speech normal.         Behavior: Behavior normal.         Neurological Exam  Mental Status  Alert. Oriented to person, place, time and situation. Speech is normal. Language is fluent with no aphasia. Attention and concentration are normal.    Cranial Nerves  CN II: Visual fields full to confrontation.  CN III, IV, VI: Extraocular movements intact bilaterally. Pupils equal round and reactive to light bilaterally.  CN V:  Right: Diminished sensation of the entire right side of the face.  CN VII: Full and symmetric facial movement.  CN VIII: Hearing is normal.  CN IX, X: Palate elevates symmetrically  CN XI: Shoulder shrug strength is normal.  CN XII: Tongue midline without atrophy or fasciculations.    Motor   Normal muscle tone.                                               Right                     Left   Shoulder abduction               4+                          5   Shoulder internal rotation      4+                          5  Shoulder external rotation     4+                          5  Hip flexion                              5-                          5-  Dorsiflexion                            4                          5    Sensory  Ports diminished light touch over the right side.    Reflexes                                            Right                      Left  Brachioradialis                    3+                         3+  Biceps                                 2+                         2+  Patellar                                3+                         3+  Achilles                                2+                         2+    Coordination  Left: Finger-to-nose normal. Rapid  alternating movement normal.  Incoordination of finger taps on the right, incoordination of toe taps on the right  Slight dysmetria on right FTN  .    Gait    Deferred, in a wheelchair.        ROS:    Review of Systems   Constitutional: Negative.    HENT: Negative.     Eyes: Negative.    Respiratory: Negative.     Cardiovascular: Negative.    Gastrointestinal: Negative.    Endocrine: Negative.    Genitourinary: Negative.    Musculoskeletal:  Positive for gait problem (uses a walker).   Skin: Negative.    Allergic/Immunologic: Negative.    Neurological:  Positive for weakness, numbness (right face) and headaches. Negative for dizziness, tremors and facial asymmetry.   Hematological: Negative.    Psychiatric/Behavioral: Negative.       I personally reviewed and updated the ROS as appropriate

## 2024-03-14 PROBLEM — G47.33 OSA (OBSTRUCTIVE SLEEP APNEA): Status: ACTIVE | Noted: 2024-03-14

## 2024-03-14 PROBLEM — G44.219 EPISODIC TENSION-TYPE HEADACHE, NOT INTRACTABLE: Status: ACTIVE | Noted: 2023-01-23

## 2024-03-14 NOTE — ASSESSMENT & PLAN NOTE
"Patient with MS formally diagnosed in 2022.  He was started on Ocrevus following his diagnosis and has remained on this medication.  Last infusion 11/12/2024.  He will continue with Ocrevus every 6 months.    Following his last visit he presented to the hospital in early December with \"new onset\" right sided face, arm and leg numbness (in the setting of baseline right-sided weakness from his MS).  It was noted that this was at least the third time he was stroke alerted for similar symptoms.  MRI brain was updated and there was no acute stroke and no progression of his MS.  There was concern for a functional component to his complaints.  He had been around sick contacts and then tested positive for COVID shortly after his admission.    We discussed the difference between true exacerbations (new lesions causing new symptoms) and pseudo exacerbations in the setting of illness/infection.    Reviewed multiple MRIs which have been stable since initiating Ocrevus.  No change in his neurologic exam.    I am going to update some labs including thyroid, B12, vitamin D.    We discussed the importance of healthy diet, routine exercise as tolerated.    He showed me a large plaque/rash on his left forearm/elbow which appears to be psoriasis.  He has not had this evaluated by his PCP or a dermatologist.  I suggested he see a dermatologist.  He is going to discuss with his PCP to get a referral.  "

## 2024-03-14 NOTE — ASSESSMENT & PLAN NOTE
Patient with ongoing headaches, many on awakening in the morning.  He has a history of sleep apnea and not currently using a CPAP.  I am referring him back to sleep medicine.    He should continue on magnesium and B2 supplements.    We discussed the importance of maintaining adequate hydration, eating regularly and not skipping meals, adequate sleep.

## 2024-03-14 NOTE — ASSESSMENT & PLAN NOTE
As above, history of DAVID previously treated with CPAP, not on treatment for several years.  Having headaches upon awakening in the morning, fatigue.  Referring back to sleep medicine for evaluation and treatment of sleep apnea.

## 2024-03-19 ENCOUNTER — PATIENT MESSAGE (OUTPATIENT)
Dept: NEUROLOGY | Facility: CLINIC | Age: 49
End: 2024-03-19

## 2024-03-25 ENCOUNTER — TELEPHONE (OUTPATIENT)
Dept: NEUROLOGY | Facility: CLINIC | Age: 49
End: 2024-03-25

## 2024-03-25 DIAGNOSIS — G43.109 MIGRAINE WITH AURA AND WITHOUT STATUS MIGRAINOSUS, NOT INTRACTABLE: ICD-10-CM

## 2024-03-25 DIAGNOSIS — G35 MS (MULTIPLE SCLEROSIS) (HCC): ICD-10-CM

## 2024-03-25 RX ORDER — DIVALPROEX SODIUM 250 MG/1
TABLET, EXTENDED RELEASE ORAL
Qty: 8 TABLET | Refills: 0 | Status: SHIPPED | OUTPATIENT
Start: 2024-03-25

## 2024-03-25 RX ORDER — DEXAMETHASONE 2 MG/1
TABLET ORAL
Qty: 5 TABLET | Refills: 0 | Status: SHIPPED | OUTPATIENT
Start: 2024-03-25

## 2024-03-25 RX ORDER — ASPIRIN 325 MG
325 TABLET ORAL DAILY
Qty: 10 TABLET | Refills: 0 | Status: SHIPPED | OUTPATIENT
Start: 2024-03-25

## 2024-03-25 NOTE — TELEPHONE ENCOUNTER
March 21, 2024  Carlos Landis   to  [facility] (supporting Terra Hernandez MD)   DJ      3/21/24  5:18 AM  THE LEFT SIDE OF MY FACE IS COMPLETELY NUMB. I AM VERY WORRIED. THIS SIDE OF MY FACE HAS NEVER GONE NUMB BEFORE     March 19, 2024  Carlos Landis   to  [facility] (supporting Kathy Gregory PA-C)   DJ      3/19/24  5:21 PM  My headaches are not good. Any time I get up I have a headache.  Even going to bathroom hurts my head. Should I go to ER

## 2024-03-25 NOTE — TELEPHONE ENCOUNTER
He has had left facial numbness before (presented to the hospital with this in May 2023--imaging updated with no acute stroke and no new MS lesions).    Likely this is associated with his headache and unrelated to MS (including the HA).    I can send in dexamethasone and depakote for him (I do not usually do both of these together--usually one or the other, but seems Dr. DUNCAN prescribed this regimen for him about 2 months ago in January).  I also do not typically Rx ASA for this, but again, if he felt this worked in combination when Rx'd by Dr. DUNCAN, I can send it in.    Sent all 3 meds to his pharmacy

## 2024-03-25 NOTE — TELEPHONE ENCOUNTER
Spoke with pt and he says facial numbness on left side started on Thursday of last week. Says numbness on this side of face is new.   HA more intense. Pt says pain located where the lesions are. Sharp pain.    New numbness/tingling?  Yes, left side of face    New Weakness/muscle spasms? No    Bowel/Bladder Changes? No    UTI Sx?  No    Vision changes?  No    Recent illness? No, except psoriasis     Taking any disease modifying medication?   Yes, Ocrevus q 6 months.    Missed doses? No    New or worsening fatigue? No    Date/Type of Last MRI (brain/Tspine/Cspine)?   Brain - 12/8/23  C-spine - 9/28/22  T-spine - 9/29/22    History of IV steroids? Yes  Once a month, March has not been scheduled yet.  Infusion center.    Diabetes or psych Hx? No    Increase in stress? No     Any drug use? No     Re: HA. Pt reports constant pain, can't really describe it, right now just hurts. Pt usually takes Ibuprofen, takes edge off but doesn't take pain away. Right now talking with eyes closed. No associated symptoms.    Last time this occurred, pt was prescribed:    Patient will be advised Decadron 2 mg for 5 days + Depakote 500 mg taper+ aspirin 325 mg daily for 10 days.     Pt confirms that this helped.    Kathy - Please advise.    Best cb [phone], ok to leave detailed message.

## 2024-03-25 NOTE — TELEPHONE ENCOUNTER
Spoke with pt and advised him of Kathy's response. Pt verbalizes understanding. Informed him of scripts sent. Pt appreciative.

## 2024-03-27 ENCOUNTER — EVALUATION (OUTPATIENT)
Facility: CLINIC | Age: 49
End: 2024-03-27
Payer: COMMERCIAL

## 2024-03-27 DIAGNOSIS — G35 MS (MULTIPLE SCLEROSIS) (HCC): Primary | ICD-10-CM

## 2024-03-27 DIAGNOSIS — R26.81 UNSTEADINESS ON FEET: ICD-10-CM

## 2024-03-27 PROCEDURE — 97162 PT EVAL MOD COMPLEX 30 MIN: CPT

## 2024-03-27 NOTE — PROGRESS NOTES
PT Evaluation     Today's date: 3/27/2024  Patient name: Carlos Landis  : 1975  MRN: [id number]  Referring provider: Kathy Gregory PA-C  Dx:   Encounter Diagnosis     ICD-10-CM    1. MS (multiple sclerosis) (HCC)  G35 Ambulatory Referral to Physical Therapy      2. Unsteadiness on feet  R26.81                      Assessment  Assessment details: Pt is a 48 y.o. male presenting to physical therapy with chief complaint of reduced endurance, poor activity tolerance, impaired transfers, and reduced functional participation secondary to multiple sclerosis Assessment today reflects globally reduced muscle strength, most noteably in the hip, core, and LE musculature, which impacts his ability to perform transfers and ambulation tasks. R sided deficits are greater than L sided deficits. Gait speed assessment places him at the level of a household ambulator. 5xSTS was unable to be completed without UE support. ABC indicates that pt is at risk of falls and has low balance confidence. Pt will benefit from skilled physical therapy intervention addressing muscle strength, endurance, activity tolerance, and functional deficits so that he/she may be able to navigate their environment with increased independence and safety.   Impairments: abnormal gait, abnormal movement, activity intolerance, difficulty understanding, impaired balance, impaired physical strength, lacks appropriate home exercise program, safety issue and poor posture   Functional limitations: deconditioning, gait, and transfersUnderstanding of Dx/Px/POC: good   Prognosis: good    Goals  STG's: to be achieved in 4 weeks  -Pt will exhibit independence with HEP within 2 weeks.   -Pt will be able to complete a chair to mat transfer with supervision assistance  -2MWT to be performed and subsequent goal to be set.   -ABC to improve by MCID of 14%  -Pt will improve gait speed by MCID of .13m/s    LTG's: to be achieved in 8 weeks  -pt will be able to ambulate for  6 minutes without rest break due to fatigue so that he may be able to walk around his house without difficulty  -pt will improve gait speed to between 0.4-0.8m/s to reflect status as a limited community ambulator  -Pt will improve TUG score with RW to >13.5 s to reflect rduced risk of falls  -Pt will be able to complete an STS transfer without UE support so that he can rise from chairs without arms.       Plan  Plan details: TE: for strengthening, stretching, and flexibility  TA: for functional participation  NR: for balance, coordination, reaction time  MT: for STM, IASTM, joint mobilizations  Gait Training: to improve gait mechanics.    Patient would benefit from: skilled physical therapy  Planned modality interventions: electrical stimulation/Russian stimulation, thermotherapy: hydrocollator packs, biofeedback and cryotherapy  Planned therapy interventions: balance, ADL training, abdominal trunk stabilization, activity modification, balance/weight bearing training, behavior modification, body mechanics training, breathing training, canalith repositioning, neuromuscular re-education, patient education, therapeutic activities, therapeutic exercise, strengthening, sensory integrative techniques, gait training, home exercise program, postural training, graded exercise, graded activity and stretching  Frequency: 2x week  Duration in visits: 16  Duration in weeks: 8  Plan of Care beginning date: 3/27/2024  Plan of Care expiration date: 5/22/2024  Treatment plan discussed with: patient      Subjective Evaluation    History of Present Illness  Mechanism of injury: Pt states he is coming in for treatment for his MS. He notes that he cannot move his R arm at times. He presents in a travel chair, which is his primary method of mobility, and he uses an RW at home. HE notes that he is very tired and sleeps most of the day. They have purchased a stair lift for his home as he has difficulty navigating stairs. He lives with his  brother. He is in the process of getting a walk in tub & had a shower chair. He also experiences a lot of headaches that result in facial numbness on his right, recently it has been on his left too. Ibuprofen gels and closing his eyes reduces his pain and opening his eyes when he has headaches makes it worse. His physicians are aware of this and are managing it. He has right sided facial droop associated with his MS that has present since his diagnosis. His goals are to improve his stamina so that he can walk further in his home.           Recurrent probem    Patient Goals  Patient goals for therapy: improved balance  Patient goal: improve his stamina  Pain  Current pain ratin  At best pain ratin  At worst pain rating: 10  Location: let & right side of his head.    Social Support  Lives in: multiple-level home  Lives with: parents (siblings)    Employment status: not working  Treatments  Previous treatment: physical therapy and occupational therapy        Objective     Strength/Myotome Testing     Left Shoulder     Planes of Motion   Flexion: 3+   Abduction: 3     Right Shoulder     Planes of Motion   Flexion: 3   Abduction: 2     Left Elbow   Flexion: 4  Extension: 4    Right Elbow   Flexion: 3+  Extension: 4    Left Hip   Planes of Motion   Flexion: 4    Right Hip   Planes of Motion   Flexion: 3+    Left Knee   Flexion: 4  Extension: 4    Right Knee   Flexion: 4  Extension: 3    Left Ankle/Foot   Dorsiflexion: 3    Right Ankle/Foot   Dorsiflexion: 2+  Neuro Exam:     Transfers   Sit to stand: independent   Wheelchair to mat: Wheelchair to mat transfer: contact guard.  Mat to wheelchair: Mat to wheelchair transfer: contact guad.             Precautions: MS, r sided weakness, frequent headaches, history or R ACLR, falls risk  POC expires: 2024      Tests and measures 3/27            ABC 33.13%            5xSTS 34.54 w UE support            10mWT 0.33m/s w RW            TUG 38.80s w RW            Neuro  Re-Ed             Victorino navigation             Compliant surface                                                                              Ther Ex             STS             Standing hip abduction             Standing march             Mini Squats             Step ups             LAQ                                       Ther Activity                                       Gait Training             With RW                          Modalities

## 2024-04-02 NOTE — TELEPHONE ENCOUNTER
March 31, 2024  Carlos Landis   to  [facility] [place] [facility] (supporting Kathy Gregory PA-C)   DJ      3/31/24  5:34 PM  Am I allowed to take Nurtec ODT for my headaches   April 1, 2024  Lucie Landry   to Neurology [facility] 3   NK    4/1/24  7:10 AM  Note     Please review and assist        ____________________________________    Called and Left a message on pt's answering machine for a call back      No consent to leave detailed msg.       MyChart msg sent.

## 2024-04-03 ENCOUNTER — OFFICE VISIT (OUTPATIENT)
Facility: CLINIC | Age: 49
End: 2024-04-03
Payer: COMMERCIAL

## 2024-04-03 ENCOUNTER — EVALUATION (OUTPATIENT)
Facility: CLINIC | Age: 49
End: 2024-04-03
Payer: COMMERCIAL

## 2024-04-03 DIAGNOSIS — R26.81 UNSTEADINESS ON FEET: ICD-10-CM

## 2024-04-03 DIAGNOSIS — G35 MS (MULTIPLE SCLEROSIS) (HCC): Primary | ICD-10-CM

## 2024-04-03 PROCEDURE — 97167 OT EVAL HIGH COMPLEX 60 MIN: CPT

## 2024-04-03 PROCEDURE — 97110 THERAPEUTIC EXERCISES: CPT

## 2024-04-03 PROCEDURE — 97112 NEUROMUSCULAR REEDUCATION: CPT

## 2024-04-03 NOTE — PROGRESS NOTES
OCCUPATIONAL THERAPY INITIAL EVALUATION        4/3/24  Carlos Landis  1975  [id number]  Kathy Gregory PA-C   Diagnosis ICD-10-CM Associated Orders   1. MS (multiple sclerosis) ([facility])  G35 Ambulatory Referral to Occupational Therapy            Assessment/Plan      SKILLED ANALYSIS:    Pt is a 48 y.o. male referred to Occupational Therapy s/p MS (multiple sclerosis) ([facility]) [G35].  Pt participated in skilled OT evaluation and, following formalized testing as well as clinical observation, pt presents with the following areas of deficit: FMC/FMS, GMC/GMS, hand to target accuracy, in hand manipulation/dexterity, binocular teaming, pursuits, saccades, and convergence impacting ability to independently and safely complete ADL/IADL and leisure tasks.  Pt does demo the need for skilled Occupational Therapy services 2x/week for 12 weeks with focus on ADL re-training, IADL re-training, UE NMR, visual perceptual training, visual scanning, UE strengthening, UE endurance, FMC/GMC, DME training/education, leisure pursuits, and community re-integration to address the goals as listed below. Pt in agreement with POC, POC to  24.          Short Term Goals (to be achieved within 4 weeks):  Pt will increase oculomotor control for improved saccades, pursuits, con/divergent tasks for improved reading, board to table tasks x 80% accuracy  with minimal increase in symptoms  Pt will increase R UE rate of manipulation for all FM tests for improved functional performance/independence with functional tasks x 25%   Pt will increase R UE strength to 4+/5,  strength by 5 lbs and pinch strength by 2 pounds, through the use of strengthening exercises and home program for eventual return to IADLs and life roles.       Long Term Goals (to be achieved within 12 weeks):  Pt will increase oculomotor control for improved dynamic activities with head turns, board/screen to table tasks symptom free with 90% accuracy for improved life roles  "  Pt will increase RUE prehension pattens for improved ability to manipulate all ADL items with min to no difficulty   Pt will increase R UE strength to 5/5,  strength by 7 lbs and pinch strength by 3-5 lbs, through the use of strengthening exercises and home program  Pt will improve handwriting legibility to G+ for print and G script, so as to improve written communications.   Pt will be I with HEP for improved functional use of RUE                SUBJECTIVE      SUBJECTIVE: \"I'm not doing what I used to, I used to be a \"   \"it is constantly getting harder\"     PATIENT GOAL: \"I want to get the use of my arm back\"        HISTORY OF PRESENT ILLNESS:     Pt is a 48 y.o. male who was referred to Occupational Therapy s/p  MS (multiple sclerosis) ([facility]) [G35]. Pt reporting he was diagnosed with MS about 3 years ago. Pt was having HHS until about a year ago. Pt now presents to OPOT for evaluation due to continued functional decline at home.           PMH:   Past Medical History:   Diagnosis Date    Arthritis     Diabetes mellitus ([facility])     prediabetes    MS (multiple sclerosis) ([facility])     Scoliosis     Visual impairment    Psoriasis     Past Surgical Hx:   Past Surgical History:   Procedure Laterality Date    HERNIA REPAIR      3 times    KNEE SURGERY Right         HOME SETUP/ CLOF: lives with mother, father, and his daughter (25 yo); 2 SH, 2-3 ALEXEI; stair lift to 2nd floor; but has been staying 1st floor; mostly sleeps in recliner; full bath on 1st floor; tub shower combo    ADLs: I with ADLs     IADLs: pt's father does laundry, cooking, cleaning  Manages own medications  (-) ; has not driven in 2 years     Functional Mobility: I with RW     Work: was working as a ; not currently working; on disability     Physical activity/ leisure engagement: currently only leaving the house for doctor appts    DME: transfer tub bench; in process of getting approval from state for coverage to get tub converted into " "shower; grab bars in shower and toilet; transfer chair; w/c; RW (has 2, one on each level)    Falls: no falls in the last year; last fall was 2 years ago    Pain Levels:   \"It's normal stuff\"; no level stated            OBJECTIVE         PHYSICAL ASSESSMENT    RUE ataxic movement       RUE LUE Comments                 UPPER EXTREMITY FXN Impaired Intact Dominant Hand: Right                 UE ROM LUE AROM  RUE AROM COMMENTS   Shoulder Flex WNL  WFL    Shoulder Ext WNL  WNL    Shoulder ABD WNL  WFL    Horizontal ABD WNL  WNL    Horizontal ADD WNL  WNL    Shoulder IR  WNL  WNL    Shoulder ER WNL WNL    Elbow Flex WNL WNL    Elbow Ext  WNL WNL    Supination  WNL WNL    Pronation  WNL WNL    Wrist Flex WNL WNL    Wrist Ext WNL WNL    Finger Flex WNL WNL    Finger Ext WNL WNL    Opposition  WNL WNL                               MMT  LUE RUE   Comments   Shoulder Flex/Ext 5/5 4/5    Shoulder Abd 5/5 4/5    Shoulder Add 5/5 4/5    Shoulder ER 5/5 4/5    Shoulder IR 5/5 4/5    Elbow Flex 5/5 4+/5    Elbow Ext 5/5 4+/5    Wrist Flex 5/5 4+/5    Wrist Ext 5/5 4+/5    Gross Grasp 5/5 4+/5                      /Pinch Strength  LUE  RUE    Comments   Dynamometer      - Gross Grasp 52 lbs 60 lbs    Pinch Meter       - PINCER 8 lbs 6 lbs     - 3 JAW DEANDRE 15 lbs 10 lbs     - LATERAL 13 lbs  15 lbs      SENSATION LUE RUE Comments   Sharp Dull  Intact Intact    Proprioception Intact Intact    Pain Intact Intact    Hot/Cold Temp Intact Intact      COORDINATION LUE RUE    9 Hole Peg Test 28 seconds 49 seconds    Keyhole Peg Test  TBA TBA    O'Gagandeep Finger Dexterity Test  TBA TBA    Handwriting (Print)  G- legibility     Handwriting (script)   F legibility             COGNITIVE ASSESSMENT      Cognitive Checklist:     Memory: pt reports mild memory deficits     Attention: WFL     Processing: WFL    Executive Functions: WFL     Communication: Intact     Visual: no deficits               VISUAL ASSESSMENT      Comments: (+) glasses, " wears only when watching tv     Vision Screen       ROM Monocular assessment: intact Binocular Assessment: Intact   Tracking Monocular assessment: Intact Binocular Assessment: Intact    Saccades Min difficulty with target location; undershooting noted into periphery and at midline; delay noted before saccadic eye movement     Convergence/ Divergence Impaired; B eyes not teaming with L eye impaired convergence     Observations  L eye exotropia noted            PLANNED THERAPY INTERVENTIONS:  Therapeutic activity  Therapeutic exercise  Neuromuscular re-education   Visual re-training   ADL re-training   IADL re-training  Oculomotor control:  saccades, con/divergence  FMC/prehension  Timed Trials  Manual tx  Hand to target  WBearing strategies   Closed chain activities  Open chain activities       INTERVENTION COMMENTS:  Diagnosis: MS (multiple sclerosis) ([facility]) [G35]  Precautions: R side weakness  Insurance: Payor: [employer] / Plan: [employer] / Product Type: Medicaid HMO /   1 of 24 visits

## 2024-04-03 NOTE — PROGRESS NOTES
Daily Note     Today's date: 4/3/2024  Patient name: Carlos Landis  : 1975  MRN: [id number]  Referring provider: Kathy Gregory PA-C  Dx:   Encounter Diagnosis     ICD-10-CM    1. MS (multiple sclerosis) (HCC)  G35       2. Unsteadiness on feet  R26.81           Start Time: 1115  Stop Time: 1155  Total time in clinic (min): 40 minutes    Subjective: Reports that he is dong okay. Rainy weather, usually gives some symptoms and pain in the r knee due to the history of ACLr.       Objective: See treatment diary below    6MWT: 3 mins 32 seconds - 190 feet   MARIN/56    BP end session: 108/66 mmHg manual LUE, 77bpm, 96% Spo2     Assessment: Tolerated treatment well. Initiation of session emphasis on finishing outcome measures from last session. Largely evident decreased endurance as per 6MWT results, below age related normals. With foam pad on chair, able to progress to unilateral UE assist for sit <> stands, should continue to progress. Overall, has slower movements, decreased gait speed and endurance. MARIN reveals high fall risk, well below fall risk cut off score. A little dizziness end of session, transient once having seated rest. Patient demonstrated fatigue post treatment, exhibited good technique with therapeutic exercises, and would benefit from continued PT. Circuit training next visit, 1 min on:2 min off ratio for further strength and endurance development as per recent research.       Plan: Continue per plan of care.  Progress treatment as tolerated.       Precautions: MS, r sided weakness, frequent headaches, history or R ACLR, falls risk  POC expires: 2024      Tests and measures 3/27 4/3            ABC 33.13%            MARIN              6MWT   190 feet 3 mins 32 secs           5xSTS 34.54 w UE support            10mWT 0.33m/s w RW            TUG 38.80s w RW            Neuro Re-Ed             Victorino navigation             Compliant surface                                                 "                              Ther Ex             STS  2x10 foam on chair           Standing hip abduction             Standing march             Mini Squats             Step ups  //bar up and over 6\" 10x            LAQ                                       Ther Activity                                       Gait Training             With RW             Unilateral UE parallel bar   4x laps            Modalities                                            "

## 2024-04-03 NOTE — TELEPHONE ENCOUNTER
Me   to Carlos Landis CB      4/2/24  3:29 PM  Thank you for contacting [facility],     Hi Carlos,      [facility] Kathy sent prescriptions (Depakote, Decadron, Aspirin) to the pharmacy on 3/25/24 due to severe headaches you were experiencing. Did your headaches resolve with those medications or are you still having pain? If you are currently having a headache, please answer the following questions, providing as much detail as possible:     When did migraine start (how many days ago?)  Explain how it feels (throbbing, sharp, stabbing, dull, etc.) and location (back of head, temples, etc.)  Pain scale (0 -10)  Associated symptoms (nausea, vomiting, sensitivity to light and/or sound, visual changes, dizziness, etc).  What abortive medications have you tried for this migraine (triptans, OTC meds, steroids, etc.)  Are you taking any preventative medications?  Have you ever tried Decadron, Depakote or Olanzapine? Did you tolerate them well?        Also, as previously discussed, please also call the office at [phone] Option 1 to schedule an appointmennt with one of the providers on the Headache Team.      Thank you,     Ivana BUCK RN       Last read by Carlos Landis at  9:20 AM on 4/3/2024.

## 2024-04-03 NOTE — TELEPHONE ENCOUNTER
April 3, 2024  Carlos DESTIN Landis   to [facility] (supporting Kathy Gregory PA-C)   DJ      4/3/24  9:30 AM  When did migraine start (how many days ago?) I have the pain almost every day      Explain how it feels (throbbing, sharp, stabbing, dull, etc.) and location (back of head, temples, etc.) The pain is always on the same side as the leasons.   Pain scale (0 -10) 7  Associated symptoms (nausea, vomiting, sensitivity to light and/or sound, visual changes, dizziness, etc). Not being hungry   What abortive medications have you tried for this migraine (triptans, OTC meds, steroids, etc.) Advil is the only OTC I take   Are you taking any preventative medications?  Have you ever tried Decadron, Depakote or Olanzapine? Did you tolerate them well? Anything you gave me I never had a negative effect from.     See encounter from 1/22/24.  Dr. DUNCAN advised follow up visit with Senait Gamlbe or Lidya Garland to address headache management.  Since he continues to have such trouble with headaches, I would agree he should see Lidya Garland or YEE for headache evaluation     Scheduling - please reach out to pt to schedule him w/YEE or Tarsha per note above. Thank you!    Kathy andersen

## 2024-04-05 ENCOUNTER — OFFICE VISIT (OUTPATIENT)
Facility: CLINIC | Age: 49
End: 2024-04-05
Payer: COMMERCIAL

## 2024-04-05 DIAGNOSIS — G35 MS (MULTIPLE SCLEROSIS) (HCC): Primary | ICD-10-CM

## 2024-04-05 DIAGNOSIS — R26.81 UNSTEADINESS ON FEET: ICD-10-CM

## 2024-04-05 PROCEDURE — 97112 NEUROMUSCULAR REEDUCATION: CPT

## 2024-04-05 PROCEDURE — 97110 THERAPEUTIC EXERCISES: CPT

## 2024-04-05 PROCEDURE — 97116 GAIT TRAINING THERAPY: CPT

## 2024-04-05 NOTE — PROGRESS NOTES
"Daily Note     Today's date: 2024  Patient name: Carlos Landis  : 1975  MRN: [id number]  Referring provider: Kathy Gregory PA-C  Dx:   Encounter Diagnosis     ICD-10-CM    1. MS (multiple sclerosis) (HCC)  G35       2. Unsteadiness on feet  R26.81             Start Time: 0800  Stop Time: 0844  Total time in clinic (min): 44 minutes    Subjective: Doing okay this morning. Nothing new to report.       Objective: See treatment diary below      Assessment: Generally slow movements, but able to complete program. With different directional movements or navigating obstacles requires BUE support due to instability. Pt reports of fatigue and \"hollowness\" of head used as guidance for overall exertion. Patient demonstrated fatigue post treatment, exhibited good technique with therapeutic exercises, and would benefit from continued PT. Circuit training next visit, 1 min on:2 min off ratio for further strength and endurance development as per recent research.       Plan: Continue per plan of care.  Progress treatment as tolerated.       Precautions: MS, r sided weakness, frequent headaches, history or R ACLR, falls risk  POC expires: 2024      Tests and measures 3/27 4/3  4/          ABC 33.13%            MARIN   /56           6MWT   190 feet 3 mins 32 secs           5xSTS 34.54 w UE support            10mWT 0.33m/s w RW            TUG 38.80s w RW            Neuro Re-Ed             Victorino navigation   Fwd BUE step to 10 laps          Compliant surface                                                                              Ther Ex             STS  2x10 foam on chair X8 foam on chair          Standing hip abduction             Standing march             Mini Squats             Step ups  //bar up and over 6\" 10x  // bar up/back 6' step           LAQ                                       Ther Activity                                       Gait Training             With RW             Unilateral UE parallel " bar   4x laps  2 laps fwd  2 laps bwd  2 laps lat step          Modalities

## 2024-04-05 NOTE — TELEPHONE ENCOUNTER
No further questions or needs at this time.  SW will be available for future social needs as requested. Patient has contact information should further needs arise.  Will close task at this time.

## 2024-04-09 ENCOUNTER — OFFICE VISIT (OUTPATIENT)
Facility: CLINIC | Age: 49
End: 2024-04-09
Payer: COMMERCIAL

## 2024-04-09 DIAGNOSIS — R26.81 UNSTEADINESS ON FEET: ICD-10-CM

## 2024-04-09 DIAGNOSIS — G35 MS (MULTIPLE SCLEROSIS) (HCC): Primary | ICD-10-CM

## 2024-04-09 PROCEDURE — 97116 GAIT TRAINING THERAPY: CPT

## 2024-04-09 PROCEDURE — 97110 THERAPEUTIC EXERCISES: CPT

## 2024-04-09 NOTE — PROGRESS NOTES
"Daily Note     Today's date: 2024  Patient name: Carlos Landis  : 1975  MRN: [id number]  Referring provider: Kathy Gregory PA-C  Dx:   Encounter Diagnosis     ICD-10-CM    1. MS (multiple sclerosis) (HCC)  G35       2. Unsteadiness on feet  R26.81               Start Time: 1020  Stop Time: 1100  Total time in clinic (min): 40 minutes    Subjective: Pt states that he is doing okay. He was 5 minutes late to his appointment today.        Objective: See treatment diary below      Assessment: Reduced reports of symptomatology in today's session but pt reported that he felt a headache coming on. Gait training performed to challenge pt's endurance and improve his ability to navigate in his home. Step up height progressed today to 8 inch step and pt able to complete with B UE support. Cueing needed for STS without UE support but georgette to complete with foam on chair. Pt will continue to benefit from skilled PT so that he can navigate his environment safely and with increased stability. .       Plan: Continue per plan of care.  Progress treatment as tolerated.       Precautions: MS, r sided weakness, frequent headaches, history or R ACLR, falls risk  POC expires: 2024      Tests and measures 3/27 4/3  4/5 4/9         ABC 33.13%            MARIN              6MWT   190 feet 3 mins 32 secs           5xSTS 34.54 w UE support            10mWT 0.33m/s w RW            TUG 38.80s w RW            Neuro Re-Ed             Victorino navigation   Fwd BUE step to 10 laps          Compliant surface                                                                              Ther Ex             STS  2x10 foam on chair X8 foam on chair 2x10 foam on chair no UE support         Standing hip abduction             Standing march             Mini Squats             Step ups  //bar up and over 6\" 10x  // bar up/back 6' step  // Bar up/back 6' step         LAQ                                       Ther Activity                     "                   Gait Training             With RW    2 x 144ft with RW CS         Unilateral UE parallel bar   4x laps  2 laps fwd  2 laps bwd  2 laps lat step          Modalities

## 2024-04-11 ENCOUNTER — OFFICE VISIT (OUTPATIENT)
Facility: CLINIC | Age: 49
End: 2024-04-11
Payer: COMMERCIAL

## 2024-04-11 DIAGNOSIS — G35 MS (MULTIPLE SCLEROSIS) (HCC): Primary | ICD-10-CM

## 2024-04-11 DIAGNOSIS — R26.81 UNSTEADINESS ON FEET: ICD-10-CM

## 2024-04-11 PROCEDURE — 97110 THERAPEUTIC EXERCISES: CPT

## 2024-04-11 PROCEDURE — 97112 NEUROMUSCULAR REEDUCATION: CPT

## 2024-04-11 PROCEDURE — 97116 GAIT TRAINING THERAPY: CPT

## 2024-04-11 NOTE — PROGRESS NOTES
"Daily Note     Today's date: 2024  Patient name: Carlos Landis  : 1975  MRN: [id number]  Referring provider: Kathy Gregory PA-C  Dx:   Encounter Diagnosis     ICD-10-CM    1. MS (multiple sclerosis) (HCC)  G35       2. Unsteadiness on feet  R26.81                            Subjective: Pt states that he is doing okay. He got one of the headaches where his face went numb since he was last here.       Objective: See treatment diary below      Assessment: 2 laps around clinic performed today with RW prior to pt experiencing soreness in his Ue's due to gripping the RW. Mini squats were performed and pt reported significant challenge as well as onset of \"hollowness\" in his head. Step ups were performed with minimal difficulty. Pt will continue to benefit from skilled PT so that he can navigate his environment safely and with increased stability. .       Plan: Continue per plan of care.  Progress treatment as tolerated.       Precautions: MS, r sided weakness, frequent headaches, history or R ACLR, falls risk  POC expires: 2024      Tests and measures 3/27 4/3  4/5 4/9 4/11        ABC 33.13%            MARIN              6MWT   190 feet 3 mins 32 secs           5xSTS 34.54 w UE support            10mWT 0.33m/s w RW            TUG 38.80s w RW            Neuro Re-Ed             Victorino navigation   Fwd BUE step to 10 laps          Compliant surface                                                                              Ther Ex             STS  2x10 foam on chair X8 foam on chair 2x10 foam on chair no UE support 2x10 foam on chair on UE support        Standing hip abduction             Standing march             Mini Squats     8x with UE support before experiencing hollow feeling.           Step ups  //bar up and over 6\" 10x  // bar up/back 6' step  // Bar up/back 6' step // Bar up/ back 8\" step        LAQ                                       Ther Activity                                     "   Gait Training             With RW    2 x 144ft with RW CS [id number] with RW CS        Unilateral UE parallel bar   4x laps  2 laps fwd  2 laps bwd  2 laps lat step          Modalities

## 2024-04-11 NOTE — PROGRESS NOTES
"Occupational Therapy Daily Note:    Today's date: 2024  Patient name: Carlos Landis  : 1975  MRN: [id number]  Referring provider: Kathy Gregory PA-C  Dx:   Encounter Diagnosis   Name Primary?    MS (multiple sclerosis) ([facility]) Yes       Subjective: \"it's being uncooperative\" (referring to his RUE)    Objective: Pt engaged in skilled OT treatment session with focus on UE NMR, UE strengthening, UE endurance, and FMC/GMC to increase engagement, tolerance, and independence with daily ADL/ IADL and leisure activities.     CPT Code Minutes                                           Task Details        Therapeutic Activity               Neuro Re-Ed  NMRE to BUE to improve functional reach and dexterity:    -pt able to use RUE to reach into large mouthed container to retrieve cylindrical pegs and place into foam pegboard. Pt with G functional reach, G target accuracy,  G- prehension patterns; did report fatigue and pain in RUE upon completion.     -pt able to use B hands to thread string thru small holes in each of the cylindrical pegs; G- prehension patterns to manipulate string; G- eye-hand coordination; completed with min difficulty; pt reporting pain and fatigue at R shoulder upon completion of task             Therapeutic Exercise                 Testing   O'Gagandeep Finger Dexterity Test (top 3 rows)   L: 2 min 37 sec   R: 4 min 13 sec       Keyhole Peg Test:  L: 2 min 2 sec   R: 41 seconds to place 1 peg; unable to complete further         HEP           Assessment: Tolerated treatment well. Pt with fatigue/ pain in R shoulder with functional tasks, reports pain/ fatigue does not diminish with rest. Pt able to complete tasks with G functional reaching and G- prehension patterns. Patient would benefit from continued skilled OT.    Plan: Continued skilled OT per POC    INTERVENTION COMMENTS:  Diagnosis: MS (multiple sclerosis) ([facility]) [G35]  Precautions: R side weakness  Insurance: Payor: [employer] / Plan: " [employer] / Product Type: Medicaid HMO /   2 of 24 visits

## 2024-04-12 ENCOUNTER — OFFICE VISIT (OUTPATIENT)
Facility: CLINIC | Age: 49
End: 2024-04-12
Payer: COMMERCIAL

## 2024-04-12 DIAGNOSIS — G35 MS (MULTIPLE SCLEROSIS) (HCC): Primary | ICD-10-CM

## 2024-04-12 PROCEDURE — 97112 NEUROMUSCULAR REEDUCATION: CPT

## 2024-04-12 NOTE — PROGRESS NOTES
Occupational Therapy Daily Note:    Today's date: 2024  Patient name: Carlos Landis  : 1975  MRN: [id number]  Referring provider: Kathy Gregory PA-C  Dx:   Encounter Diagnosis   Name Primary?    MS (multiple sclerosis) ([facility]) Yes         Subjective: 'I am having an eczema flare up today, and my lotion does not come in until '    Objective: Pt engaged in skilled OT treatment session with focus on UE NMR, UE strengthening, UE endurance, and FMC/GMC to increase engagement, tolerance, and independence with daily ADL/ IADL and leisure activities.     CPT Code Minutes                                           Task Details        Therapeutic Activity               Neuro Re-Ed  NMRE to BUE to improve functional reach and dexterity:    Pt completed item retrieval task to focus on FMC/FMS, fxnl reaching, and hand to target accuracy skills. Pt utilized mod resist (green) clip to p/u pegs positioned across the table, and target into pegboard positioned at midline. Pt completed with brief rest breaks between sets due to fatigue, and droppage 5x throughout task. Once board was completed, req to p/u pom poms and place on top of pegs. Demo droppage 50% of the time. Pt then removed items from pegboard and placed into target positioned on L side of body at shoulder height for fxnl reaching demand. Pt reported slight pain and fatigue at R shoulder at completion of reaching task.     Pt concluded session with b/l threading task, req to p/u small spools with RUE and thread through lace, with G- prehension patterns and hand to target accuracy. Pt completed with mild difficulty.                Therapeutic Exercise                 Testing   O'Gagandeep Finger Dexterity Test (top 3 rows)   L: 2 min 37 sec   R: 4 min 13 sec       Keyhole Peg Test:  L: 2 min 2 sec   R: 41 seconds to place 1 peg; unable to complete further         HEP           Assessment: Tolerated treatment well. Pt with fatigue/ pain in R shoulder with fxnl  reaching tasks, benefiting from brief rest breaks to continue throughout task. Pt demo fatigue with fine motor strengthening tasks as well, benefiting from additional rest breaks between sets. Pt demo difficulty with prehension patterns and coordination skills, with droppage 50% of the time during precision tasks. Patient would benefit from continued skilled OT.    Plan: Continued skilled OT per POC    INTERVENTION COMMENTS:  Diagnosis: MS (multiple sclerosis) ([facility]) [G35]  Precautions: R side weakness  Insurance: Payor: [employer] / Plan: [employer] / Product Type: Medicaid HMO /   3 of 24 visits

## 2024-04-15 ENCOUNTER — HOSPITAL ENCOUNTER (OUTPATIENT)
Facility: HOSPITAL | Age: 49
Setting detail: OBSERVATION
Discharge: HOME | End: 2024-04-17
Attending: EMERGENCY MEDICINE | Admitting: STUDENT IN AN ORGANIZED HEALTH CARE EDUCATION/TRAINING PROGRAM
Payer: COMMERCIAL

## 2024-04-15 ENCOUNTER — APPOINTMENT (EMERGENCY)
Dept: CT IMAGING | Facility: HOSPITAL | Age: 49
End: 2024-04-15
Payer: COMMERCIAL

## 2024-04-15 DIAGNOSIS — G35 MS (MULTIPLE SCLEROSIS) (HCC): ICD-10-CM

## 2024-04-15 DIAGNOSIS — G81.91 RIGHT HEMIPARESIS (HCC): Primary | ICD-10-CM

## 2024-04-15 DIAGNOSIS — R20.0 RIGHT SIDED NUMBNESS: ICD-10-CM

## 2024-04-15 LAB
ALBUMIN SERPL BCP-MCNC: 4.5 G/DL (ref 3.5–5)
ALP SERPL-CCNC: 57 U/L (ref 34–104)
ALT SERPL W P-5'-P-CCNC: 12 U/L (ref 7–52)
ANION GAP SERPL CALCULATED.3IONS-SCNC: 12 MMOL/L (ref 4–13)
AST SERPL W P-5'-P-CCNC: 13 U/L (ref 13–39)
ATRIAL RATE: 72 BPM
BACTERIA UR QL AUTO: ABNORMAL /HPF
BASOPHILS # BLD AUTO: 0.1 THOUSANDS/ÂΜL (ref 0–0.1)
BASOPHILS NFR BLD AUTO: 1 % (ref 0–1)
BILIRUB SERPL-MCNC: 0.51 MG/DL (ref 0.2–1)
BILIRUB UR QL STRIP: NEGATIVE
BUN SERPL-MCNC: 21 MG/DL (ref 5–25)
CALCIUM SERPL-MCNC: 9.6 MG/DL (ref 8.4–10.2)
CHLORIDE SERPL-SCNC: 105 MMOL/L (ref 96–108)
CLARITY UR: CLEAR
CO2 SERPL-SCNC: 23 MMOL/L (ref 21–32)
COLOR UR: YELLOW
CREAT SERPL-MCNC: 0.84 MG/DL (ref 0.6–1.3)
EOSINOPHIL # BLD AUTO: 0.29 THOUSAND/ÂΜL (ref 0–0.61)
EOSINOPHIL NFR BLD AUTO: 3 % (ref 0–6)
ERYTHROCYTE [DISTWIDTH] IN BLOOD BY AUTOMATED COUNT: 12.4 % (ref 11.6–15.1)
GFR SERPL CREATININE-BSD FRML MDRD: 102 ML/MIN/1.73SQ M
GLUCOSE SERPL-MCNC: 88 MG/DL (ref 65–140)
GLUCOSE UR STRIP-MCNC: NEGATIVE MG/DL
HCT VFR BLD AUTO: 48.4 % (ref 36.5–49.3)
HGB BLD-MCNC: 15.4 G/DL (ref 12–17)
HGB UR QL STRIP.AUTO: NEGATIVE
IMM GRANULOCYTES # BLD AUTO: 0.08 THOUSAND/UL (ref 0–0.2)
IMM GRANULOCYTES NFR BLD AUTO: 1 % (ref 0–2)
KETONES UR STRIP-MCNC: NEGATIVE MG/DL
LEUKOCYTE ESTERASE UR QL STRIP: NEGATIVE
LYMPHOCYTES # BLD AUTO: 1.18 THOUSANDS/ÂΜL (ref 0.6–4.47)
LYMPHOCYTES NFR BLD AUTO: 11 % (ref 14–44)
MCH RBC QN AUTO: 28.6 PG (ref 26.8–34.3)
MCHC RBC AUTO-ENTMCNC: 31.8 G/DL (ref 31.4–37.4)
MCV RBC AUTO: 90 FL (ref 82–98)
MONOCYTES # BLD AUTO: 0.56 THOUSAND/ÂΜL (ref 0.17–1.22)
MONOCYTES NFR BLD AUTO: 5 % (ref 4–12)
MUCOUS THREADS UR QL AUTO: ABNORMAL
NEUTROPHILS # BLD AUTO: 8.2 THOUSANDS/ÂΜL (ref 1.85–7.62)
NEUTS SEG NFR BLD AUTO: 79 % (ref 43–75)
NITRITE UR QL STRIP: NEGATIVE
NON-SQ EPI CELLS URNS QL MICRO: ABNORMAL /HPF
NRBC BLD AUTO-RTO: 0 /100 WBCS
P AXIS: 61 DEGREES
PH UR STRIP.AUTO: 7 [PH]
PLATELET # BLD AUTO: 236 THOUSANDS/UL (ref 149–390)
PMV BLD AUTO: 10.7 FL (ref 8.9–12.7)
POTASSIUM SERPL-SCNC: 3.7 MMOL/L (ref 3.5–5.3)
PR INTERVAL: 188 MS
PROT SERPL-MCNC: 7.9 G/DL (ref 6.4–8.4)
PROT UR STRIP-MCNC: ABNORMAL MG/DL
QRS AXIS: 74 DEGREES
QRSD INTERVAL: 98 MS
QT INTERVAL: 424 MS
QTC INTERVAL: 464 MS
RBC # BLD AUTO: 5.39 MILLION/UL (ref 3.88–5.62)
RBC #/AREA URNS AUTO: ABNORMAL /HPF
SODIUM SERPL-SCNC: 140 MMOL/L (ref 135–147)
SP GR UR STRIP.AUTO: 1.02 (ref 1–1.03)
T WAVE AXIS: 65 DEGREES
UROBILINOGEN UR STRIP-ACNC: 2 MG/DL
VENTRICULAR RATE: 72 BPM
WBC # BLD AUTO: 10.41 THOUSAND/UL (ref 4.31–10.16)
WBC #/AREA URNS AUTO: ABNORMAL /HPF

## 2024-04-15 PROCEDURE — 99285 EMERGENCY DEPT VISIT HI MDM: CPT | Performed by: EMERGENCY MEDICINE

## 2024-04-15 PROCEDURE — 36415 COLL VENOUS BLD VENIPUNCTURE: CPT | Performed by: EMERGENCY MEDICINE

## 2024-04-15 PROCEDURE — 93005 ELECTROCARDIOGRAM TRACING: CPT

## 2024-04-15 PROCEDURE — 93010 ELECTROCARDIOGRAM REPORT: CPT | Performed by: INTERNAL MEDICINE

## 2024-04-15 PROCEDURE — 99285 EMERGENCY DEPT VISIT HI MDM: CPT

## 2024-04-15 PROCEDURE — 81001 URINALYSIS AUTO W/SCOPE: CPT | Performed by: EMERGENCY MEDICINE

## 2024-04-15 PROCEDURE — 85025 COMPLETE CBC W/AUTO DIFF WBC: CPT | Performed by: EMERGENCY MEDICINE

## 2024-04-15 PROCEDURE — 99223 1ST HOSP IP/OBS HIGH 75: CPT | Performed by: INTERNAL MEDICINE

## 2024-04-15 PROCEDURE — 70450 CT HEAD/BRAIN W/O DYE: CPT

## 2024-04-15 PROCEDURE — 80053 COMPREHEN METABOLIC PANEL: CPT | Performed by: EMERGENCY MEDICINE

## 2024-04-15 RX ORDER — LANOLIN ALCOHOL/MO/W.PET/CERES
400 CREAM (GRAM) TOPICAL DAILY
Status: DISCONTINUED | OUTPATIENT
Start: 2024-04-16 | End: 2024-04-17 | Stop reason: HOSPADM

## 2024-04-15 RX ORDER — HEPARIN SODIUM 5000 [USP'U]/ML
5000 INJECTION, SOLUTION INTRAVENOUS; SUBCUTANEOUS EVERY 8 HOURS SCHEDULED
Status: DISCONTINUED | OUTPATIENT
Start: 2024-04-16 | End: 2024-04-17 | Stop reason: HOSPADM

## 2024-04-15 RX ORDER — ASPIRIN 325 MG
325 TABLET ORAL DAILY
Status: DISCONTINUED | OUTPATIENT
Start: 2024-04-16 | End: 2024-04-17 | Stop reason: HOSPADM

## 2024-04-15 RX ORDER — NIACIN 100 MG
200 TABLET ORAL DAILY
Status: DISCONTINUED | OUTPATIENT
Start: 2024-04-16 | End: 2024-04-17 | Stop reason: HOSPADM

## 2024-04-15 RX ORDER — IBUPROFEN 600 MG/1
600 TABLET ORAL EVERY 6 HOURS PRN
Status: DISCONTINUED | OUTPATIENT
Start: 2024-04-15 | End: 2024-04-17 | Stop reason: HOSPADM

## 2024-04-15 NOTE — ASSESSMENT & PLAN NOTE
Presented due to numbness and weakness of the right side of his body ongoing for about 3 days.  History of MS.  Patient reports it feels like his prior flares.   Admission from 12/6/2023 through 12/8/2023 also with worsening numbness and weakness of his right side.  At that time neurology had recommended not starting steroids.  MRI brain was negative for new enhancing lesions.   Denies recent illness  CT head  No acute intracranial abnormality. Findings of demyelination are similar to prior CT. However, contrast-enhanced MR is more sensitive for detection and assessment of potential acute demyelination in this patient with known multiple sclerosis.   Neuro checks  Hold off on Steroids at this time.   ED reached out to neurology who recommended MRI brain and neck with and without contrast  Consult neurology

## 2024-04-15 NOTE — H&P
[facility]  H&P  Name: Carlos DESTIN Landis 48 y.o. male I MRN: [id number]  Unit/Bed#: ED 12 I Date of Admission: 4/15/2024   Date of Service: 4/15/2024 I Hospital Day: 0      Assessment/Plan   * Right hemiparesis (HCC)  Assessment & Plan  Presented due to numbness and weakness of the right side of his body ongoing for about 3 days.  History of MS.  Patient reports it feels like his prior flares.   Admission from 12/6/2023 through 12/8/2023 also with worsening numbness and weakness of his right side.  At that time neurology had recommended not starting steroids.  MRI brain was negative for new enhancing lesions.   Denies recent illness  CT head  No acute intracranial abnormality. Findings of demyelination are similar to prior CT. However, contrast-enhanced MR is more sensitive for detection and assessment of potential acute demyelination in this patient with known multiple sclerosis.   Neuro checks  Hold off on Steroids at this time.   ED reached out to neurology who recommended MRI brain and neck with and without contrast  Consult neurology    MS (multiple sclerosis) (HCC)  Assessment & Plan  Diagnosed in 2022.  Chronic right-sided weakness.  Uses a walker to ambulate.  Following with PT/OT outpatient  Home regimen:   Ocrevus every 6 months (last given 1/15/2024)  Follows with [facility] neurology    DAVID (obstructive sleep apnea)  Assessment & Plan  History of DAVID.  Has not used a CPAP in years.  During recent neurology appointment it was discussed importance of patient being evaluated and treated by sleep medicine  Patient has not yet done so.  Encouraged to make appointment.     Episodic tension-type headache, not intractable  Assessment & Plan  Patient with frequent headaches upon waking.   Has been treating with ibuprofen and aspirin  Follows with neurology outpatient  Continue magnesium and B2         VTE Pharmacologic Prophylaxis: VTE Score: 4 Moderate Risk (Score 3-4) - Pharmacological  DVT Prophylaxis Ordered: heparin.  Code Status: Level 1 - Full Code   Discussion with family: Patient declined call to .     Anticipated Length of Stay: Patient will be admitted on an observation basis with an anticipated length of stay of less than 2 midnights secondary to right sided hemiparesis.    Total Time Spent on Date of Encounter in care of patient: 60 mins. This time was spent on one or more of the following: performing physical exam; counseling and coordination of care; obtaining or reviewing history; documenting in the medical record; reviewing/ordering tests, medications or procedures; communicating with other healthcare professionals and discussing with patient's family/caregivers.    Chief Complaint: Weakness     History of Present Illness:  Carlos Landis is a 48 y.o. male with a PMH of MS, tension type headaches, DAVID who presents from home due to worsening numbness and weakness of the right side of his body ongoing for about 3 days.  Chronic weakness on the right side due to MS.  Uses a walker to ambulate.  Had OT on 4/12/2024.  Does not feel it was anymore exhausting/stressful as usual.  Compliant with his home medications.  Denies illness.  Scheduled for his next Ocrevus in June.  Continues to have almost daily tension type headaches.  Has been treating with ibuprofen and/or aspirin.    Review of Systems:  Review of Systems   Constitutional:  Positive for fatigue. Negative for fever.   HENT:  Negative for sore throat.    Respiratory:  Negative for cough, chest tightness and shortness of breath.    Cardiovascular:  Negative for chest pain.   Gastrointestinal:  Negative for abdominal distention, abdominal pain, diarrhea, nausea and vomiting.   Genitourinary:  Negative for difficulty urinating.   Musculoskeletal:  Negative for arthralgias.   Neurological:  Positive for weakness and numbness. Negative for headaches.   Psychiatric/Behavioral:  Negative for agitation and behavioral  "problems.    All other systems reviewed and are negative.      Past Medical and Surgical History:   Past Medical History:   Diagnosis Date    Arthritis     Diabetes mellitus (HCC)     prediabetes    MS (multiple sclerosis) (HCC)     Scoliosis     Visual impairment        Past Surgical History:   Procedure Laterality Date    HERNIA REPAIR      3 times    KNEE SURGERY Right        Meds/Allergies:  Prior to Admission medications    Medication Sig Start Date End Date Taking? Authorizing Provider   acetaminophen (TYLENOL) 325 mg tablet Take 2 tablets (650 mg total) by mouth every 6 (six) hours as needed for mild pain 5/21/23   Jacki Pinto MD   aspirin 325 mg tablet Take 1 tablet (325 mg total) by mouth daily 3/25/24   Kathy Gregory PA-C   cyanocobalamin 1,000 mcg/mL Take B12 1000 mcg IM once a week for 4 weeks, then once a month for 5 months 4/25/23   Terra Hernandez MD   dexamethasone (DECADRON) 2 mg tablet Take 1 po daily with food x 5 days 3/25/24   Kathy Gregory PA-C   divalproex sodium (Depakote ER) 250 mg 24 hr tablet Take 2 tabs for 3 days, then 1 tab for 2 days 3/25/24   Kathy Gregory PA-C   gabapentin (NEURONTIN) 300 mg capsule Take 1 capsule (300 mg total) by mouth 2 (two) times a day  Patient not taking: Reported on 3/11/2024 9/8/23   Terra Hernandez MD   loratadine (CLARITIN) 10 mg tablet Take 1 tablet (10 mg total) by mouth daily 4/25/23   Terra Hernandez MD   MAGNESIUM PO Take by mouth    Historical Provider, MD   ocrelizumab (Ocrevus) 300 MG/10ML SOLN Inject 20 mL (600 mg total) into a catheter in a vein every 6 (six) months 11/14/23   Terra Hernandez MD   Riboflavin ([employer] Vitamin B-2) 100 MG TABS Take 2 tablets (200 mg total) by mouth in the morning 5/23/22   Terra Hernandez MD   SYRINGE-NEEDLE, DISP, 3 ML 25G X 1\" 3 ML MISC Take with B12 regimen prescribed 5/9/23   Terra Hernandez MD     I have reviewed home medications with patient personally.    Allergies: No " Known Allergies    Social History:  Marital Status: Single   Occupation: Known  Patient Pre-hospital Living Situation: Home  Patient Pre-hospital Level of Mobility: walks  Patient Pre-hospital Diet Restrictions: Regular  Substance Use History:   Social History     Substance and Sexual Activity   Alcohol Use Not Currently     Social History     Tobacco Use   Smoking Status Former   Smokeless Tobacco Former     Social History     Substance and Sexual Activity   Drug Use Never       Family History:  Family History   Problem Relation Age of Onset    Stroke Maternal Grandmother     Multiple sclerosis Other        Physical Exam:     Vitals:   Blood Pressure: 116/76 (04/15/24 1830)  Pulse: 63 (04/15/24 1830)  Temperature: 98.3 °F (36.8 °C) (04/15/24 1554)  Temp Source: Oral (04/15/24 1554)  Respirations: 16 (04/15/24 1830)  Weight - Scale: 107 kg (235 lb 3.7 oz) (04/15/24 1550)  SpO2: 95 % (04/15/24 1830)    Physical Exam  Vitals and nursing note reviewed.   Constitutional:       Appearance: Normal appearance.   HENT:      Head: Normocephalic.   Eyes:      Extraocular Movements: Extraocular movements intact.      Pupils: Pupils are equal, round, and reactive to light.   Cardiovascular:      Rate and Rhythm: Normal rate and regular rhythm.      Heart sounds: No murmur heard.     No gallop.   Pulmonary:      Effort: No respiratory distress.      Breath sounds: Normal breath sounds. No wheezing.   Abdominal:      General: Bowel sounds are normal. There is no distension.      Tenderness: There is no abdominal tenderness.   Musculoskeletal:         General: Normal range of motion.      Cervical back: Normal range of motion.      Right lower leg: No edema.      Left lower leg: No edema.   Skin:     General: Skin is warm.   Neurological:      General: No focal deficit present.      Mental Status: He is alert and oriented to person, place, and time. Mental status is at baseline.      Motor: Weakness (Right sided weakness 2/5, left  sided weakness 4/5) present.   Psychiatric:         Mood and Affect: Mood normal.         Behavior: Behavior normal.         Thought Content: Thought content normal.          Additional Data:     Lab Results:  Results from last 7 days   Lab Units 04/15/24  1645   WBC Thousand/uL 10.41*   HEMOGLOBIN g/dL 15.4   HEMATOCRIT % 48.4   PLATELETS Thousands/uL 236   SEGS PCT % 79*   LYMPHO PCT % 11*   MONO PCT % 5   EOS PCT % 3     Results from last 7 days   Lab Units 04/15/24  1645   SODIUM mmol/L 140   POTASSIUM mmol/L 3.7   CHLORIDE mmol/L 105   CO2 mmol/L 23   BUN mg/dL 21   CREATININE mg/dL 0.84   ANION GAP mmol/L 12   CALCIUM mg/dL 9.6   ALBUMIN g/dL 4.5   TOTAL BILIRUBIN mg/dL 0.51   ALK PHOS U/L 57   ALT U/L 12   AST U/L 13   GLUCOSE RANDOM mg/dL 88                       Lines/Drains:  Invasive Devices       Peripheral Intravenous Line  Duration             Peripheral IV 04/15/24 Left Antecubital <1 day                        Imaging: Reviewed radiology reports from this admission including: CT head  CT head without contrast   Final Result by Cornelio Fuller MD (04/15 1849)      No acute intracranial abnormality. Findings of demyelination are similar to prior CT. However, contrast-enhanced MR is more sensitive for detection and assessment of potential acute demyelination in this patient with known multiple sclerosis.                  Workstation performed: [id number]         MRI Inpatient Order    (Results Pending)       EKG and Other Studies Reviewed on Admission:   EKG: NSR. HR 72.    ** Please Note: This note has been constructed using a voice recognition system. **

## 2024-04-15 NOTE — ASSESSMENT & PLAN NOTE
Patient with frequent headaches upon waking.   Has been treating with ibuprofen and aspirin  Follows with neurology outpatient  Continue magnesium and B2

## 2024-04-15 NOTE — Clinical Note
Case was discussed with MONET RUIZ and the patient's admission status was agreed to be Admission Status: observation status to the service of Dr. Alvarado .

## 2024-04-15 NOTE — ASSESSMENT & PLAN NOTE
History of DAVID.  Has not used a CPAP in years.  During recent neurology appointment it was discussed importance of patient being evaluated and treated by sleep medicine  Patient has not yet done so.  Encouraged to make appointment.

## 2024-04-15 NOTE — ED PROVIDER NOTES
"History  Chief Complaint   Patient presents with    Numbness     Pt reports numbness to right side of body. Pt reports typical of MS flare. Pt reports increase in weakness. Pt reports symptoms starting 3 days PTA     56-year-old male with history of multiple sclerosis, sleep apnea complains of right facial numbness and weakness of right arm and leg with headache.  The symptoms started 3 days ago.  Leg is back to baseline.  He states these symptoms are similar to prior MS flares but they never lasted this long.  Not getting relief with over-the-counter headache medicines.  According to records on March 25 he was ordered Depakote, Decadron and aspirin for his worsening headaches.  Patient had multiple prior admissions for these symptoms.  Patient denies recent illness and injury.  He states he lives at home with his mother and father.        Prior to Admission Medications   Prescriptions Last Dose Informant Patient Reported? Taking?   MAGNESIUM PO   Yes No   Sig: Take by mouth   Riboflavin ([employer] Vitamin B-2) 100 MG TABS   No No   Sig: Take 2 tablets (200 mg total) by mouth in the morning   SYRINGE-NEEDLE, DISP, 3 ML 25G X 1\" 3 ML MISC   No No   Sig: Take with B12 regimen prescribed   aspirin 325 mg tablet   No No   Sig: Take 1 tablet (325 mg total) by mouth daily   cyanocobalamin 1,000 mcg/mL   No No   Sig: Take B12 1000 mcg IM once a week for 4 weeks, then once a month for 5 months   ocrelizumab (Ocrevus) 300 MG/10ML SOLN   No No   Sig: Inject 20 mL (600 mg total) into a catheter in a vein every 6 (six) months      Facility-Administered Medications: None       Past Medical History:   Diagnosis Date    Arthritis     Diabetes mellitus (HCC)     prediabetes    MS (multiple sclerosis) (HCC)     Scoliosis     Visual impairment        Past Surgical History:   Procedure Laterality Date    HERNIA REPAIR      3 times    KNEE SURGERY Right        Family History   Problem Relation Age of Onset    Stroke Maternal Grandmother     " Multiple sclerosis Other      I have reviewed and agree with the history as documented.    E-Cigarette/Vaping    E-Cigarette Use Former User      E-Cigarette/Vaping Substances    Nicotine No     THC No     CBD No     Flavoring No      Social History     Tobacco Use    Smoking status: Former    Smokeless tobacco: Former   Vaping Use    Vaping status: Former   Substance Use Topics    Alcohol use: Not Currently    Drug use: Never       Review of Systems   Constitutional:  Positive for activity change. Negative for fever.   HENT:  Negative for congestion and sore throat.    Eyes:  Negative for visual disturbance.   Respiratory:  Negative for cough and shortness of breath.    Cardiovascular:  Negative for chest pain and palpitations.   Gastrointestinal:  Negative for abdominal pain, diarrhea and vomiting.   Genitourinary:  Negative for dysuria and flank pain.   Skin:  Negative for rash and wound.   Neurological:  Positive for weakness, numbness and headaches. Negative for seizures.       Physical Exam  Physical Exam  Vitals and nursing note reviewed.   Constitutional:       General: He is not in acute distress.     Appearance: He is well-developed and normal weight. He is not ill-appearing or diaphoretic.   HENT:      Head: Normocephalic and atraumatic.      Right Ear: External ear normal.      Left Ear: External ear normal.   Eyes:      General: No scleral icterus.     Conjunctiva/sclera: Conjunctivae normal.   Neck:      Vascular: No JVD.   Cardiovascular:      Rate and Rhythm: Normal rate and regular rhythm.      Pulses: Normal pulses.      Heart sounds: Normal heart sounds.   Pulmonary:      Effort: Pulmonary effort is normal. No respiratory distress.      Breath sounds: Normal breath sounds.   Abdominal:      General: Bowel sounds are normal.      Palpations: Abdomen is soft.      Tenderness: There is no abdominal tenderness.   Musculoskeletal:         General: No tenderness. Normal range of motion.      Cervical  back: Neck supple.      Right lower leg: No edema.      Left lower leg: No edema.   Skin:     General: Skin is warm and dry.      Findings: No rash.   Neurological:      Mental Status: He is alert and oriented to person, place, and time. Mental status is at baseline.      Cranial Nerves: No cranial nerve deficit.      Sensory: No sensory deficit.      Motor: Weakness (right arm) present.      Coordination: Coordination normal.      Gait: Gait abnormal.      Deep Tendon Reflexes: Reflexes are normal and symmetric.   Psychiatric:         Mood and Affect: Mood normal.         Behavior: Behavior normal.         Vital Signs  ED Triage Vitals   Temperature Pulse Respirations Blood Pressure SpO2   04/15/24 1554 04/15/24 1550 04/15/24 1550 04/15/24 1550 04/15/24 1550   98.3 °F (36.8 °C) 75 18 125/88 94 %      Temp Source Heart Rate Source Patient Position - Orthostatic VS BP Location FiO2 (%)   04/15/24 1554 04/15/24 1550 04/15/24 1550 04/15/24 1550 --   Oral Monitor Lying Left arm       Pain Score       04/16/24 0700       No Pain           Vitals:    04/16/24 1443 04/16/24 1531 04/16/24 2235 04/17/24 0801   BP: 124/91 124/91 103/71 110/72   Pulse: 82 72 82 76   Patient Position - Orthostatic VS:             Visual Acuity  Visual Acuity      Flowsheet Row Most Recent Value   L Pupil Size (mm) 3   R Pupil Size (mm) 3   L Pupil Shape Round   R Pupil Shape Round            ED Medications  Medications   aspirin tablet 325 mg (325 mg Oral Given 4/16/24 0817)   magnesium Oxide (MAG-OX) tablet 400 mg (400 mg Oral Given 4/16/24 0817)   niacin tablet 200 mg (200 mg Oral Given 4/16/24 0817)   heparin (porcine) subcutaneous injection 5,000 Units (5,000 Units Subcutaneous Given 4/17/24 6086)   ibuprofen (MOTRIN) tablet 600 mg (600 mg Oral Given 4/17/24 0144)   methylPREDNISolone sodium succinate (Solu-MEDROL) 1,000 mg in sodium chloride 0.9 % 250 mL IVPB (0 mg Intravenous Stopped 4/16/24 0408)   Gadobutrol injection (SINGLE-DOSE) SOLN  10 mL (10 mL Intravenous Given 4/16/24 1438)       Diagnostic Studies  Results Reviewed       Procedure Component Value Units Date/Time    Hemoglobin A1c w/EAG Estimation [[id number]] Collected: 04/16/24 0549    Lab Status: Final result Specimen: Blood from Arm, Left Updated: 04/16/24 1238     Hemoglobin A1C 5.1 %       mg/dl     Lipid Panel with Direct LDL reflex [[id number]]  (Abnormal) Collected: 04/16/24 0549    Lab Status: Final result Specimen: Blood from Arm, Left Updated: 04/16/24 0623     Cholesterol 113 mg/dL      Triglycerides 110 mg/dL      HDL, Direct 32 mg/dL      LDL Calculated 59 mg/dL     Basic metabolic panel [[id number]] Collected: 04/16/24 0549    Lab Status: Final result Specimen: Blood from Arm, Left Updated: 04/16/24 0623     Sodium 139 mmol/L      Potassium 4.1 mmol/L      Chloride 107 mmol/L      CO2 25 mmol/L      ANION GAP 7 mmol/L      BUN 19 mg/dL      Creatinine 0.75 mg/dL      Glucose 74 mg/dL      Glucose, Fasting 74 mg/dL      Calcium 8.9 mg/dL      eGFR 106 ml/min/1.73sq m     Narrative:      National Kidney Disease Foundation guidelines for Chronic Kidney Disease (CKD):     Stage 1 with normal or high GFR (GFR > 90 mL/min/1.73 square meters)    Stage 2 Mild CKD (GFR = 60-89 mL/min/1.73 square meters)    Stage 3A Moderate CKD (GFR = 45-59 mL/min/1.73 square meters)    Stage 3B Moderate CKD (GFR = 30-44 mL/min/1.73 square meters)    Stage 4 Severe CKD (GFR = 15-29 mL/min/1.73 square meters)    Stage 5 End Stage CKD (GFR <15 mL/min/1.73 square meters)  Note: GFR calculation is accurate only with a steady state creatinine    CBC and differential [[id number]] Collected: 04/16/24 0549    Lab Status: Final result Specimen: Blood from Arm, Left Updated: 04/16/24 0601     WBC 7.33 Thousand/uL      RBC 4.66 Million/uL      Hemoglobin 13.5 g/dL      Hematocrit 41.1 %      MCV 88 fL      MCH 29.0 pg      MCHC 32.8 g/dL      RDW 12.5 %      MPV 10.0 fL      Platelets 202 Thousands/uL       nRBC 0 /100 WBCs      Segmented % 69 %      Immature Grans % 1 %      Lymphocytes % 17 %      Monocytes % 8 %      Eosinophils Relative 4 %      Basophils Relative 1 %      Absolute Neutrophils 5.07 Thousands/µL      Absolute Immature Grans 0.06 Thousand/uL      Absolute Lymphocytes 1.26 Thousands/µL      Absolute Monocytes 0.60 Thousand/µL      Eosinophils Absolute 0.28 Thousand/µL      Basophils Absolute 0.06 Thousands/µL     Urine Microscopic [[id number]]  (Abnormal) Collected: 04/15/24 1849    Lab Status: Final result Specimen: Urine, Clean Catch Updated: 04/15/24 1925     RBC, UA 0-1 /hpf      WBC, UA 0-1 /hpf      Epithelial Cells Occasional /hpf      Bacteria, UA Occasional /hpf      MUCUS THREADS Moderate    Narrative:      Microscopic performed by Karen Cook.     UA (URINE) with reflex to Scope [[id number]]  (Abnormal) Collected: 04/15/24 1849    Lab Status: Final result Specimen: Urine, Clean Catch Updated: 04/15/24 1907     Color, UA Yellow     Clarity, UA Clear     Specific Gravity, UA 1.025     pH, UA 7.0     Leukocytes, UA Negative     Nitrite, UA Negative     Protein, UA Trace mg/dl      Glucose, UA Negative mg/dl      Ketones, UA Negative mg/dl      Urobilinogen, UA 2.0 mg/dl      Bilirubin, UA Negative     Occult Blood, UA Negative    Comprehensive metabolic panel [[id number]] Collected: 04/15/24 1645    Lab Status: Final result Specimen: Blood from Arm, Left Updated: 04/15/24 1704     Sodium 140 mmol/L      Potassium 3.7 mmol/L      Chloride 105 mmol/L      CO2 23 mmol/L      ANION GAP 12 mmol/L      BUN 21 mg/dL      Creatinine 0.84 mg/dL      Glucose 88 mg/dL      Calcium 9.6 mg/dL      AST 13 U/L      ALT 12 U/L      Alkaline Phosphatase 57 U/L      Total Protein 7.9 g/dL      Albumin 4.5 g/dL      Total Bilirubin 0.51 mg/dL      eGFR 102 ml/min/1.73sq m     Narrative:      National Kidney Disease Foundation guidelines for Chronic Kidney Disease (CKD):     Stage 1 with normal or high GFR (GFR >  90 mL/min/1.73 square meters)    Stage 2 Mild CKD (GFR = 60-89 mL/min/1.73 square meters)    Stage 3A Moderate CKD (GFR = 45-59 mL/min/1.73 square meters)    Stage 3B Moderate CKD (GFR = 30-44 mL/min/1.73 square meters)    Stage 4 Severe CKD (GFR = 15-29 mL/min/1.73 square meters)    Stage 5 End Stage CKD (GFR <15 mL/min/1.73 square meters)  Note: GFR calculation is accurate only with a steady state creatinine    CBC and differential [[id number]]  (Abnormal) Collected: 04/15/24 1645    Lab Status: Final result Specimen: Blood from Arm, Left Updated: 04/15/24 1650     WBC 10.41 Thousand/uL      RBC 5.39 Million/uL      Hemoglobin 15.4 g/dL      Hematocrit 48.4 %      MCV 90 fL      MCH 28.6 pg      MCHC 31.8 g/dL      RDW 12.4 %      MPV 10.7 fL      Platelets 236 Thousands/uL      nRBC 0 /100 WBCs      Segmented % 79 %      Immature Grans % 1 %      Lymphocytes % 11 %      Monocytes % 5 %      Eosinophils Relative 3 %      Basophils Relative 1 %      Absolute Neutrophils 8.20 Thousands/µL      Absolute Immature Grans 0.08 Thousand/uL      Absolute Lymphocytes 1.18 Thousands/µL      Absolute Monocytes 0.56 Thousand/µL      Eosinophils Absolute 0.29 Thousand/µL      Basophils Absolute 0.10 Thousands/µL                    MRI cervical spine w wo contrast   Final Result by Ricky Berry MD (04/16 1620)      Unchanged demyelinating lesions in cervical spine. No new or enhancing lesion.      Mild multilevel degenerative changes of cervical spine with mild foraminal narrowing left C7-T1, as detailed above. No significant canal stenosis.      Please see same day MRI brain MS with and without contrast for further evaluation.                     Workstation performed: [id number]         MRI brain MS wo and w contrast   Final Result by Ricky Berry MD (04/16 1607)      Unchanged multiple white matter lesions in a distribution compatible with multiple sclerosis. No new or enhancing lesions.      No acute  intracranial abnormality.      Please see same day MRI cervical spine with and without contrast for further evaluation.            Workstation performed: [id number]         CT head without contrast   Final Result by Cornelio Fuller MD (04/15 1849)      No acute intracranial abnormality. Findings of demyelination are similar to prior CT. However, contrast-enhanced MR is more sensitive for detection and assessment of potential acute demyelination in this patient with known multiple sclerosis.                  Workstation performed: [id number]                    Procedures  ECG 12 Lead Documentation Only    Date/Time: 4/15/2024 5:50 PM    Performed by: Price Dick DO  Authorized by: Price Dick DO    ECG reviewed by me, the ED Provider: yes    Patient location:  ED  Previous ECG:     Previous ECG:  Compared to current    Similarity:  No change  Interpretation:     Interpretation: normal             ED Course  ED Course as of 04/17/24 0841   Mon Apr 15, 2024   1602 Texted neurology    1658 Discussed with neurology on-call, . Neuro consult ordered.   1844 My wet reading of head CT shows no acute abnormality.  I texted neurology, Dr. Ant anderson regarding need for any further workup prior to admit to [facility] service.                               SBIRT 22yo+      Flowsheet Row Most Recent Value   Initial Alcohol Screen: US AUDIT-C     1. How often do you have a drink containing alcohol? 0 Filed at: 04/15/2024 1900   2. How many drinks containing alcohol do you have on a typical day you are drinking?  0 Filed at: 04/15/2024 1900   3a. Male UNDER 65: How often do you have five or more drinks on one occasion? 0 Filed at: 04/15/2024 1900   Audit-C Score 0 Filed at: 04/15/2024 1900   UNA: How many times in the past year have you...    Used an illegal drug or used a prescription medication for non-medical reasons? Never Filed at: 04/15/2024 1900                      Medical Decision Making  48-year-old male  with history of multiple sclerosis complains of 3 days of headache, paresthesia of the right side of face, weakness of right arm and leg.  The leg weakness has resolved.  The symptoms are typical of his MS flares.  He has been seen several times for these and has never had a stroke diagnosed.  Concern for MS flare, ischemic or hemorrhagic stroke, electrode abnormality, worsening cervical cord lesion.  Patient does not appear to have signs of ACS, infection, although infection may cause flares.  Will check labs and imaging.    Amount and/or Complexity of Data Reviewed  Labs: ordered.  Radiology: ordered.             Disposition  Final diagnoses:   Right hemiparesis (HCC)   MS (multiple sclerosis) (HCC)   Right sided numbness     Time reflects when diagnosis was documented in both MDM as applicable and the Disposition within this note       Time User Action Codes Description Comment    4/15/2024  4:07 PM Price Dick [G81.91] Right hemiparesis (HCC)     4/15/2024  4:07 PM Price Dick [G35] MS (multiple sclerosis) (HCC)     4/15/2024  4:07 PM Price Dick [R20.0] Right sided numbness           ED Disposition       ED Disposition   No Disposition Selected    Condition   --    Date/Time   Mon Apr 15, 2024 1904    Comment   Case was discussed with MONET RUIZ and the patient's admission status was agreed to be Admission Status: observation status to the service of Dr. Alvarado .               Follow-up Information    None         Current Discharge Medication List        CONTINUE these medications which have NOT CHANGED    Details   aspirin 325 mg tablet Take 1 tablet (325 mg total) by mouth daily  Qty: 10 tablet, Refills: 0    Associated Diagnoses: MS (multiple sclerosis) (HCC); Migraine with aura and without status migrainosus, not intractable      cyanocobalamin 1,000 mcg/mL Take B12 1000 mcg IM once a week for 4 weeks, then once a month for 5 months  Qty: 9 mL, Refills: 0    Associated  "Diagnoses: MS (multiple sclerosis) (HCC)      MAGNESIUM PO Take by mouth      ocrelizumab (Ocrevus) 300 MG/10ML SOLN Inject 20 mL (600 mg total) into a catheter in a vein every 6 (six) months  Qty: 20 mL, Refills: 1    Associated Diagnoses: MS (multiple sclerosis) (HCC)      Riboflavin ([employer] Vitamin B-2) 100 MG TABS Take 2 tablets (200 mg total) by mouth in the morning  Qty: 360 tablet, Refills: 2    Associated Diagnoses: Right hemiparesis (HCC); MS (multiple sclerosis) (HCC)      SYRINGE-NEEDLE, DISP, 3 ML 25G X 1\" 3 ML MISC Take with B12 regimen prescribed  Qty: 9 each, Refills: 0    Associated Diagnoses: MS (multiple sclerosis) (HCC)             No discharge procedures on file.    PDMP Review         Value Time User    PDMP Reviewed  Yes 5/21/2023  4:59 PM Jacki Pinto MD            ED Provider  Electronically Signed by             Price Dick DO  04/17/24 0842    "

## 2024-04-15 NOTE — ASSESSMENT & PLAN NOTE
Diagnosed in 2022.  Chronic right-sided weakness.  Uses a walker to ambulate.  Following with PT/OT outpatient  Home regimen:   Ocrevus every 6 months (last given 1/15/2024)  Follows with [facility] neurology

## 2024-04-16 ENCOUNTER — APPOINTMENT (OUTPATIENT)
Dept: NON INVASIVE DIAGNOSTICS | Facility: HOSPITAL | Age: 49
End: 2024-04-16
Payer: COMMERCIAL

## 2024-04-16 ENCOUNTER — APPOINTMENT (OUTPATIENT)
Dept: MRI IMAGING | Facility: HOSPITAL | Age: 49
End: 2024-04-16
Payer: COMMERCIAL

## 2024-04-16 LAB
ANION GAP SERPL CALCULATED.3IONS-SCNC: 7 MMOL/L (ref 4–13)
AORTIC ROOT: 4.3 CM
AORTIC VALVE MEAN VELOCITY: 8.8 M/S
APICAL FOUR CHAMBER EJECTION FRACTION: 66 %
AV LVOT MEAN GRADIENT: 3 MMHG
AV LVOT PEAK GRADIENT: 5 MMHG
AV MEAN GRADIENT: 3 MMHG
AV PEAK GRADIENT: 6 MMHG
AV VELOCITY RATIO: 0.92
BASOPHILS # BLD AUTO: 0.06 THOUSANDS/ÂΜL (ref 0–0.1)
BASOPHILS NFR BLD AUTO: 1 % (ref 0–1)
BSA FOR ECHO PROCEDURE: 2.26 M2
BUN SERPL-MCNC: 19 MG/DL (ref 5–25)
CALCIUM SERPL-MCNC: 8.9 MG/DL (ref 8.4–10.2)
CHLORIDE SERPL-SCNC: 107 MMOL/L (ref 96–108)
CHOLEST SERPL-MCNC: 113 MG/DL
CO2 SERPL-SCNC: 25 MMOL/L (ref 21–32)
CREAT SERPL-MCNC: 0.75 MG/DL (ref 0.6–1.3)
DOP CALC AO PEAK VEL: 1.22 M/S
DOP CALC AO VTI: 26.84 CM
DOP CALC LVOT PEAK VEL VTI: 20.57 CM
DOP CALC LVOT PEAK VEL: 1.12 M/S
E WAVE DECELERATION TIME: 186 MS
E/A RATIO: 1.04
EOSINOPHIL # BLD AUTO: 0.28 THOUSAND/ÂΜL (ref 0–0.61)
EOSINOPHIL NFR BLD AUTO: 4 % (ref 0–6)
ERYTHROCYTE [DISTWIDTH] IN BLOOD BY AUTOMATED COUNT: 12.5 % (ref 11.6–15.1)
EST. AVERAGE GLUCOSE BLD GHB EST-MCNC: 100 MG/DL
FRACTIONAL SHORTENING: 33 (ref 28–44)
GFR SERPL CREATININE-BSD FRML MDRD: 106 ML/MIN/1.73SQ M
GLUCOSE P FAST SERPL-MCNC: 74 MG/DL (ref 65–99)
GLUCOSE SERPL-MCNC: 74 MG/DL (ref 65–140)
HBA1C MFR BLD: 5.1 %
HCT VFR BLD AUTO: 41.1 % (ref 36.5–49.3)
HDLC SERPL-MCNC: 32 MG/DL
HGB BLD-MCNC: 13.5 G/DL (ref 12–17)
IMM GRANULOCYTES # BLD AUTO: 0.06 THOUSAND/UL (ref 0–0.2)
IMM GRANULOCYTES NFR BLD AUTO: 1 % (ref 0–2)
INTERVENTRICULAR SEPTUM IN DIASTOLE (PARASTERNAL SHORT AXIS VIEW): 0.9 CM
INTERVENTRICULAR SEPTUM: 0.9 CM (ref 0.6–1.1)
LDLC SERPL CALC-MCNC: 59 MG/DL (ref 0–100)
LEFT ATRIUM SIZE: 3.9 CM
LEFT INTERNAL DIMENSION IN SYSTOLE: 3 CM (ref 2.1–4)
LEFT VENTRICLE DIASTOLIC VOLUME (MOD BIPLANE): 84 ML
LEFT VENTRICLE DIASTOLIC VOLUME INDEX (MOD BIPLANE): 37.2 ML/M2
LEFT VENTRICLE SYSTOLIC VOLUME (MOD BIPLANE): 26 ML
LEFT VENTRICLE SYSTOLIC VOLUME INDEX (MOD BIPLANE): 11.5 ML/M2
LEFT VENTRICULAR INTERNAL DIMENSION IN DIASTOLE: 4.5 CM (ref 3.5–6)
LEFT VENTRICULAR POSTERIOR WALL IN END DIASTOLE: 0.9 CM
LEFT VENTRICULAR STROKE VOLUME: 58 ML
LV EF: 69 %
LVSV (TEICH): 58 ML
LYMPHOCYTES # BLD AUTO: 1.26 THOUSANDS/ÂΜL (ref 0.6–4.47)
LYMPHOCYTES NFR BLD AUTO: 17 % (ref 14–44)
MCH RBC QN AUTO: 29 PG (ref 26.8–34.3)
MCHC RBC AUTO-ENTMCNC: 32.8 G/DL (ref 31.4–37.4)
MCV RBC AUTO: 88 FL (ref 82–98)
MONOCYTES # BLD AUTO: 0.6 THOUSAND/ÂΜL (ref 0.17–1.22)
MONOCYTES NFR BLD AUTO: 8 % (ref 4–12)
MV E'TISSUE VEL-LAT: 11 CM/S
MV E'TISSUE VEL-SEP: 7 CM/S
MV PEAK A VEL: 0.54 M/S
MV PEAK E VEL: 56 CM/S
MV STENOSIS PRESSURE HALF TIME: 54 MS
MV VALVE AREA P 1/2 METHOD: 4.07
NEUTROPHILS # BLD AUTO: 5.07 THOUSANDS/ÂΜL (ref 1.85–7.62)
NEUTS SEG NFR BLD AUTO: 69 % (ref 43–75)
NRBC BLD AUTO-RTO: 0 /100 WBCS
PLATELET # BLD AUTO: 202 THOUSANDS/UL (ref 149–390)
PMV BLD AUTO: 10 FL (ref 8.9–12.7)
POTASSIUM SERPL-SCNC: 4.1 MMOL/L (ref 3.5–5.3)
RBC # BLD AUTO: 4.66 MILLION/UL (ref 3.88–5.62)
SL CV LV EF: 70
SL CV PED ECHO LEFT VENTRICLE DIASTOLIC VOLUME (MOD BIPLANE) 2D: 94 ML
SL CV PED ECHO LEFT VENTRICLE SYSTOLIC VOLUME (MOD BIPLANE) 2D: 36 ML
SODIUM SERPL-SCNC: 139 MMOL/L (ref 135–147)
TRIGL SERPL-MCNC: 110 MG/DL
WBC # BLD AUTO: 7.33 THOUSAND/UL (ref 4.31–10.16)

## 2024-04-16 PROCEDURE — 80061 LIPID PANEL: CPT | Performed by: INTERNAL MEDICINE

## 2024-04-16 PROCEDURE — 97163 PT EVAL HIGH COMPLEX 45 MIN: CPT

## 2024-04-16 PROCEDURE — 70553 MRI BRAIN STEM W/O & W/DYE: CPT

## 2024-04-16 PROCEDURE — 97116 GAIT TRAINING THERAPY: CPT

## 2024-04-16 PROCEDURE — 97167 OT EVAL HIGH COMPLEX 60 MIN: CPT

## 2024-04-16 PROCEDURE — 93306 TTE W/DOPPLER COMPLETE: CPT | Performed by: INTERNAL MEDICINE

## 2024-04-16 PROCEDURE — 99215 OFFICE O/P EST HI 40 MIN: CPT | Performed by: PSYCHIATRY & NEUROLOGY

## 2024-04-16 PROCEDURE — 36415 COLL VENOUS BLD VENIPUNCTURE: CPT | Performed by: INTERNAL MEDICINE

## 2024-04-16 PROCEDURE — 72156 MRI NECK SPINE W/O & W/DYE: CPT

## 2024-04-16 PROCEDURE — 80048 BASIC METABOLIC PNL TOTAL CA: CPT | Performed by: INTERNAL MEDICINE

## 2024-04-16 PROCEDURE — 99232 SBSQ HOSP IP/OBS MODERATE 35: CPT | Performed by: INTERNAL MEDICINE

## 2024-04-16 PROCEDURE — 85025 COMPLETE CBC W/AUTO DIFF WBC: CPT | Performed by: INTERNAL MEDICINE

## 2024-04-16 PROCEDURE — A9585 GADOBUTROL INJECTION: HCPCS | Performed by: INTERNAL MEDICINE

## 2024-04-16 PROCEDURE — 93306 TTE W/DOPPLER COMPLETE: CPT

## 2024-04-16 PROCEDURE — 92610 EVALUATE SWALLOWING FUNCTION: CPT

## 2024-04-16 PROCEDURE — 83036 HEMOGLOBIN GLYCOSYLATED A1C: CPT | Performed by: INTERNAL MEDICINE

## 2024-04-16 RX ORDER — GADOBUTROL 604.72 MG/ML
10 INJECTION INTRAVENOUS
Status: COMPLETED | OUTPATIENT
Start: 2024-04-16 | End: 2024-04-16

## 2024-04-16 RX ADMIN — GADOBUTROL 10 ML: 604.72 INJECTION INTRAVENOUS at 14:38

## 2024-04-16 RX ADMIN — HEPARIN SODIUM 5000 UNITS: 5000 INJECTION, SOLUTION INTRAVENOUS; SUBCUTANEOUS at 21:07

## 2024-04-16 RX ADMIN — ASPIRIN 325 MG ORAL TABLET 325 MG: 325 PILL ORAL at 08:17

## 2024-04-16 RX ADMIN — Medication 400 MG: at 08:17

## 2024-04-16 RX ADMIN — SODIUM CHLORIDE 1000 MG: 0.9 INJECTION, SOLUTION INTRAVENOUS at 10:37

## 2024-04-16 RX ADMIN — HEPARIN SODIUM 5000 UNITS: 5000 INJECTION, SOLUTION INTRAVENOUS; SUBCUTANEOUS at 13:04

## 2024-04-16 RX ADMIN — HEPARIN SODIUM 5000 UNITS: 5000 INJECTION, SOLUTION INTRAVENOUS; SUBCUTANEOUS at 05:51

## 2024-04-16 RX ADMIN — Medication 200 MG: at 08:17

## 2024-04-16 NOTE — PLAN OF CARE
Problem: PHYSICAL THERAPY ADULT  Goal: Performs mobility at highest level of function for planned discharge setting.  See evaluation for individualized goals.  Description: Treatment/Interventions: Functional transfer training, LE strengthening/ROM, Therapeutic exercise, Patient/family training, Endurance training, Equipment eval/education, Bed mobility, Gait training          See flowsheet documentation for full assessment, interventions and recommendations.  4/16/2024 1553 by Sepideh Diggs, PT  Note:    Problem List: Decreased strength, Decreased endurance, Impaired balance, Decreased mobility, Impaired sensation, Decreased coordination  Assessment: Carlos Landis is a 48 y.o. Male who presents [facility] on [facility]/15/2024 from home w/ c/o worsening R sided weakness and diagnosis of R hemiparesis. Orders for PT eval and treat received. Pt presents w/ comorbidities of MS, DAVID. At baseline, pt mobilizes modified I w/ RW, and reports 0 falls in the last 6 months. Upon evaluation, pt presents w/ the following deficits: weakness, altered sensation, impaired coordination, impaired balance, and decreased endurance. Upon eval, pt requires supervision for bed mobility, supervision for transfers, and supervision for gait. Based on this PT evaluation today, patient's discharge recommendation is for Level III. During this admission, pt would benefit from continued skilled inpatient PT in the acute care setting in order to address the abovementioned deficits to maximize function and mobility before DC from acute care.        Rehab Resource Intensity Level, PT: III (Minimum Resource Intensity) (resumption of OPPT)    See flowsheet documentation for full assessment.

## 2024-04-16 NOTE — CASE MANAGEMENT
Case Management Assessment & Discharge Planning Note    Patient name Carlos Landis  Location /-01 MRN [id number]  : 1975 Date 2024       Current Admission Date: 4/15/2024  Current Admission Diagnosis:Right hemiparesis (HCC)   Patient Active Problem List    Diagnosis Date Noted    DAVID (obstructive sleep apnea) 2024    Right sided numbness 2023    Pain of upper abdomen 2023    Right-sided thoracic back pain 2023    Hypomagnesemia 2023    Episodic tension-type headache, not intractable 2023    Left leg weakness 2022    Obesity (BMI 30-39.9) 2022    Ambulatory dysfunction 03/15/2022    MS (multiple sclerosis) (HCC) 03/15/2022    Dysarthria 2022    Lower facial weakness 2022    CNS demyelinating disease (HCC) 2022    Right hemiparesis (HCC) 2022      LOS (days): 0  Geometric Mean LOS (GMLOS) (days):   Days to GMLOS:     OBJECTIVE:              Current admission status: Observation  Referral Reason: Stroke    Preferred Pharmacy:   [facility] - [place] - [address]  [address]  [place] [address]  Phone: [phone] Fax: [phone]    Primary Care Provider: Ha Acosta MD    Primary Insurance: [employer]  Secondary Insurance:     ASSESSMENT:  Active Health Care Proxies       mamadou kimi [facility] Care Representative - [facility]   Primary Phone: [phone] (Home)                 Advance Directives  Does patient have a Health Care POA?: No  Was patient offered paperwork?: Yes (declined)  Does patient currently have a Health Care decision maker?: Yes, please see Health Care Proxy section  Does patient have Advance Directives?: No  Was patient offered paperwork?: Yes (declined)  Primary Contact: Kimi - [facility]         Readmission Root Cause  30 Day Readmission: No    Patient Information  Admitted from:: Home  Mental Status: Alert  During Assessment patient was accompanied by: Not  accompanied during assessment  Assessment information provided by:: Patient  Primary Caregiver: Self  Support Systems: Children, Family members  County of Residence: [place]  What city do you live in?: [place]  Home entry access options. Select all that apply.: Ramp  Type of Current Residence: 2 story home  Upon entering residence, is there a bedroom on the main floor (no further steps)?: Yes  Upon entering residence, is there a bathroom on the main floor (no further steps)?: Yes  Living Arrangements: Lives w/ Parent(s), Lives w/ Daughter  Is patient a ?: No    Activities of Daily Living Prior to Admission  Functional Status: Independent  Completes ADLs independently?: Yes  Ambulates independently?: Yes  Does patient use assisted devices?: Yes  Assisted Devices (DME) used: Wheelchair, Electric wheelchair, Walker  Does patient currently own DME?: Yes  What DME does the patient currently own?: Electric Wheelchair, Wheelchair, Walker  Does patient have a history of Outpatient Therapy (PT/OT)?: Yes  Does the patient have a history of Short-Term Rehab?: Yes  Does patient have a history of HHC?: Yes  Does patient currently have HHC?: No         Patient Information Continued  Income Source: Unemployed  Does patient have prescription coverage?: Yes  Does patient receive dialysis treatments?: No  Does patient have a history of substance abuse?: No         Means of Transportation  Means of Transport to Appts:: Family transport      Social Determinants of Health (SDOH)      Flowsheet Row Most Recent Value   Housing Stability    In the last 12 months, was there a time when you were not able to pay the mortgage or rent on time? N   In the last 12 months, how many places have you lived? 1   In the last 12 months, was there a time when you did not have a steady place to sleep or slept in a shelter (including now)? N   Transportation Needs    In the past 12 months, has lack of transportation kept you from medical  appointments or from getting medications? no   In the past 12 months, has lack of transportation kept you from meetings, work, or from getting things needed for daily living? No   Food Insecurity    Within the past 12 months, you worried that your food would run out before you got the money to buy more. Never true   Within the past 12 months, the food you bought just didn't last and you didn't have money to get more. Never true   Utilities    In the past 12 months has the electric, gas, oil, or water company threatened to shut off services in your home? No            DISCHARGE DETAILS:    Discharge planning discussed with:: patient  Freedom of Choice: Yes  Comments - Freedom of Choice: discussed dc planning and role of CM  CM contacted family/caregiver?: No- see comments (pt alert and able to particpate)  Were Treatment Team discharge recommendations reviewed with patient/caregiver?: Yes  Did patient/caregiver verbalize understanding of patient care needs?: Yes       Contacts  Reason/Outcome: Discharge Planning    Requested Home Health Care         Is the patient interested in HHC at discharge?: No    DME Referral Provided  Referral made for DME?: No    Other Referral/Resources/Interventions Provided:  Interventions: None Indicated  Referral Comments: PT/OT saw pt in ED. REcommending he continue his current OP PT/OT . No other needs at this time. CM following for medical needs if any            Discharge Destination Plan:: Home  Transport at Discharge : Family                                      Additional Comments: Cm met with pt in the ED. PT here for stroke like symptoms. Pt has been here at [facility] before but he is not a readmission. He reports he still lives with his parents and dtr in a [facility] with ramp entrance. He resides on the 1st floor of the home only. Pt with hx of MS. He uses an electric wc, wc and walker. Pt is current with OP PT/OT . Therapies evaluated in the ED and pt is recommended to continue his OP  PT/OT on Dc once cleared. Pt has a hx of [facility] with [facility]. He has been to [facility] with  before. His sister provides his transportation and he uses [facility]. Neuro is consulted and pt also follows with [facility] neuro OP.   MRI head and neck w/o contrast ordered.  CM following for DC.

## 2024-04-16 NOTE — OCCUPATIONAL THERAPY NOTE
Occupational Therapy Evaluation      Carlos Landis    4/16/2024    Principal Problem:    Right hemiparesis (HCC)  Active Problems:    MS (multiple sclerosis) (HCC)    Episodic tension-type headache, not intractable    DAVID (obstructive sleep apnea)      Past Medical History:   Diagnosis Date    Arthritis     Diabetes mellitus (HCC)     prediabetes    MS (multiple sclerosis) (HCC)     Scoliosis     Visual impairment        Past Surgical History:   Procedure Laterality Date    HERNIA REPAIR      3 times    KNEE SURGERY Right         04/16/24 1002   OT Last Visit   OT Visit Date 04/16/24   Note Type   Note type Evaluation   Pain Assessment   Pain Assessment Tool 0-10   Pain Score No Pain   Restrictions/Precautions   Weight Bearing Precautions Per Order No   Other Precautions Chair Alarm;Bed Alarm;Fall Risk;Cognitive   Home Living   Type of Home House   Home Layout Two level;Ramped entrance;Stairs to enter with rails  (3 ALEXEI)   Bathroom Shower/Tub Tub/shower unit   Bathroom Equipment Grab bars in shower;Shower chair   Home Equipment Walker;Electric scooter  (transport chair)   Prior Function   [place] Independent with ADLs;Independent with functional mobility;Needs assistance with IADLS   Lives With Family  (Mom & Dad, 25 y/o dtr)   Receives Help From Family   IADLs Independent with meal prep;Independent with medication management;Family/Friend/Other provides transportation   Falls in the last 6 months 0   ADL   Eating Assistance 5  Supervision/Setup   Grooming Assistance 5  Supervision/Setup   UB Bathing Assistance 5  Supervision/Setup   LB Bathing Assistance 4  Minimal Assistance   UB Dressing Assistance 5  Supervision/Setup   LB Dressing Assistance 4  Minimal Assistance   Toileting Assistance  5  Supervision/Setup   Bed Mobility   Supine to Sit 5  Supervision   Sit to Supine 5  Supervision   Transfers   Sit to Stand 5  Supervision   Stand to Sit 5  Supervision   Stand pivot 5  Supervision  (RW)    Functional Mobility   Functional Mobility 5  Supervision   Additional items Rolling walker   Activity Tolerance   Activity Tolerance Patient limited by fatigue   Medical Staff Made Aware PT Sepideh   Nurse Made Aware LADI SCHMIDT Assessment   RUE Assessment WFL   LUE Assessment   LUE Assessment WFL   Cognition   Overall Cognitive Status WFL   Arousal/Participation Alert;Cooperative   Attention Attends with cues to redirect   Orientation Level Oriented X4   Memory Within functional limits   Following Commands Follows one step commands without difficulty   Comments Requires redirection at times   Assessment   Limitation Decreased ADL status;Decreased endurance;Decreased self-care trans   Prognosis Good   Assessment Pt is a 48 y.o. male seen for OT evaluation at [facility], admitted 4/15/2024 w/ Right hemiparesis (HCC).  OT completed extensive review of pt's medical and social history. Comorbidities affecting pt's functional performance at time of assessment include: MS, dysarthria, CNS demyelinating disease, ambulatory dysfunction, obesity. Personal factors affecting pt at time of IE include:difficulty performing ADLS and health management . Prior to admission, pt was living [facility], with his parents & 23yo dtr, and was independent with ADL using RW, assisted for IADL. Upon evaluation, pt presents to OT below baseline due to the following performance deficits: weakness, decreased strength, decreased balance, decreased tolerance, and impulsivity. Pt to benefit from continued skilled OT tx while in the hospital to address deficits as defined above and maximize level of functional independence w ADL's and functional mobility. Occupational Performance areas to address include: grooming, bathing/shower, toilet hygiene, dressing, functional mobility, and functional transfers, bed mobility . The patient's raw score on the AM-PAC Daily Activity inpatient short form is 21, standardized score is 44.27,  greater than 39.4. Patients at this level are likely to benefit from DC to home. Based on findings, pt is of high complexity, due to medical comorbidities. Pt seen as a co-eval with PT due to the patient's co-morbidities, clinically unstable presentation, and present impairments which are a regression from the patient's baseline. At this time, OT recommendations at time of discharge are level 4.   Goals   Patient Goals feel better   Plan   Treatment Interventions ADL retraining;Functional transfer training;UE strengthening/ROM;Endurance training;Cognitive reorientation;Patient/family training;Equipment evaluation/education;Compensatory technique education;Continued evaluation;Energy conservation   Goal Expiration Date 04/26/24   OT Frequency 1-2x/wk   Discharge Recommendation   Rehab Resource Intensity Level, OT No post-acute rehabilitation needs   AM-PAC Daily Activity Inpatient   Lower Body Dressing 3   Bathing 3   Toileting 3   Upper Body Dressing 4   Grooming 4   Eating 4   Daily Activity Raw Score 21   Daily Activity Standardized Score (Calc for Raw Score >=11) 44.27     Pt will achieve the following goals within 10 days.    *Pt will complete grooming with independence.    *Pt will complete UB bathing and dressing with independence.    *Pt will complete LB bathing and dressing with modified independence .    *Pt will complete toileting (hygiene and clothing management) with modified independence.    *Pt will complete bed mobility with modified independence, with bed flat and no side rail to prep for purposeful tasks    *Pt will perform functional transfers with modified independence in order to complete ADL routine.    *Pt will increase standing tolerance to 3 minutes in order to complete ADL routine.    *Pt will complete item retrieval and light home management with supervision while demonstrating good safety.    *Pt will demonstrate increased activity tolerance in order to complete ADL routine.    *Pt will  participate in cognitive assessment to determine level of safety for returning home    *Pt will participate in UE therapeutic exercise in order to maximize strength for ADL transfers.    *Pt will sit on EOB for 10+ minutes for increased safety with seated activity tolerance during ADL tasks.    *Pt will identify 3-5 fall risks to ensure safety upon discharge.    Krys Lindsay MS, OTR/L

## 2024-04-16 NOTE — PLAN OF CARE
Problem: NEUROSENSORY - ADULT  Goal: Achieves stable or improved neurological status  Description: INTERVENTIONS  - Monitor and report changes in neurological status  - Monitor vital signs such as temperature, blood pressure, glucose, and any other labs ordered   - Initiate measures to prevent increased intracranial pressure  - Monitor for seizure activity and implement precautions if appropriate      Outcome: Progressing  Goal: Remains free of injury related to seizures activity  Description: INTERVENTIONS  - Maintain airway, patient safety  and administer oxygen as ordered  - Monitor patient for seizure activity, document and report duration and description of seizure to physician/advanced practitioner  - If seizure occurs,  ensure patient safety during seizure  - Reorient patient post seizure  - Seizure pads on all 4 side rails  - Instruct patient/family to notify RN of any seizure activity including if an aura is experienced  - Instruct patient/family to call for assistance with activity based on nursing assessment  - Administer anti-seizure medications if ordered    Outcome: Progressing  Goal: Achieves maximal functionality and self care  Description: INTERVENTIONS  - Monitor swallowing and airway patency with patient fatigue and changes in neurological status  - Encourage and assist patient to increase activity and self care.   - Encourage visually impaired, hearing impaired and aphasic patients to use assistive/communication devices  Outcome: Progressing     Problem: GASTROINTESTINAL - ADULT  Goal: Minimal or absence of nausea and/or vomiting  Description: INTERVENTIONS:  - Administer IV fluids if ordered to ensure adequate hydration  - Maintain NPO status until nausea and vomiting are resolved  - Nasogastric tube if ordered  - Administer ordered antiemetic medications as needed  - Provide nonpharmacologic comfort measures as appropriate  - Advance diet as tolerated, if ordered  - Consider nutrition  services referral to assist patient with adequate nutrition and appropriate food choices  Outcome: Progressing  Goal: Maintains or returns to baseline bowel function  Description: INTERVENTIONS:  - Assess bowel function  - Encourage oral fluids to ensure adequate hydration  - Administer IV fluids if ordered to ensure adequate hydration  - Administer ordered medications as needed  - Encourage mobilization and activity  - Consider nutritional services referral to assist patient with adequate nutrition and appropriate food choices  Outcome: Progressing  Goal: Maintains adequate nutritional intake  Description: INTERVENTIONS:  - Monitor percentage of each meal consumed  - Identify factors contributing to decreased intake, treat as appropriate  - Assist with meals as needed  - Monitor I&O, weight, and lab values if indicated  - Obtain nutrition services referral as needed  Outcome: Progressing  Goal: Oral mucous membranes remain intact  Description: INTERVENTIONS  - Assess oral mucosa and hygiene practices  - Implement preventative oral hygiene regimen  - Implement oral medicated treatments as ordered  - Initiate Nutrition services referral as needed  Outcome: Progressing     Problem: METABOLIC, FLUID AND ELECTROLYTES - ADULT  Goal: Electrolytes maintained within normal limits  Description: INTERVENTIONS:  - Monitor labs and assess patient for signs and symptoms of electrolyte imbalances  - Administer electrolyte replacement as ordered  - Monitor response to electrolyte replacements, including repeat lab results as appropriate  - Instruct patient on fluid and nutrition as appropriate  Outcome: Progressing  Goal: Fluid balance maintained  Description: INTERVENTIONS:  - Monitor labs   - Monitor I/O and WT  - Instruct patient on fluid and nutrition as appropriate  - Assess for signs & symptoms of volume excess or deficit  Outcome: Progressing  Goal: Glucose maintained within target range  Description: INTERVENTIONS:  -  Monitor Blood Glucose as ordered  - Assess for signs and symptoms of hyperglycemia and hypoglycemia  - Administer ordered medications to maintain glucose within target range  - Assess nutritional intake and initiate nutrition service referral as needed  Outcome: Progressing     Problem: HEMATOLOGIC - ADULT  Goal: Maintains hematologic stability  Description: INTERVENTIONS  - Assess for signs and symptoms of bleeding or hemorrhage  - Monitor labs  - Administer supportive blood products/factors as ordered and appropriate  Outcome: Progressing     Problem: MUSCULOSKELETAL - ADULT  Goal: Maintain or return mobility to safest level of function  Description: INTERVENTIONS:  - Assess patient's ability to carry out ADLs; assess patient's baseline for ADL function and identify physical deficits which impact ability to perform ADLs (bathing, care of mouth/teeth, toileting, grooming, dressing, etc.)  - Assess/evaluate cause of self-care deficits   - Assess range of motion  - Assess patient's mobility  - Assess patient's need for assistive devices and provide as appropriate  - Encourage maximum independence but intervene and supervise when necessary  - Involve family in performance of ADLs  - Assess for home care needs following discharge   - Consider OT consult to assist with ADL evaluation and planning for discharge  - Provide patient education as appropriate  Outcome: Progressing  Goal: Maintain proper alignment of affected body part  Description: INTERVENTIONS:  - Support, maintain and protect limb and body alignment  - Provide patient/ family with appropriate education  Outcome: Progressing

## 2024-04-16 NOTE — SPEECH THERAPY NOTE
Speech Language/Pathology    Speech-Language Pathology Bedside Swallow Evaluation      Patient Name: Carlos Landis    Today's Date: 4/16/2024     Problem List  Principal Problem:    Right hemiparesis (HCC)  Active Problems:    MS (multiple sclerosis) (HCC)    Episodic tension-type headache, not intractable    DAVID (obstructive sleep apnea)      Past Medical History  Past Medical History:   Diagnosis Date    Arthritis     Diabetes mellitus (HCC)     prediabetes    MS (multiple sclerosis) (HCC)     Scoliosis     Visual impairment        Past Surgical History  Past Surgical History:   Procedure Laterality Date    HERNIA REPAIR      3 times    KNEE SURGERY Right        Summary   Pt presented with functional appearing oral and pharyngeal stage swallowing skills with materials administered today. Functional mastication, oral organization, and complete transfers. No significant oral residue. Swallows suspected fairly prompt. R sided facial weakness does not appear to be impacting oral swallow skill at this time. No overt s/s aspiration.     Risk/s for Aspiration: Mild risk, MS, r/o CVA     Recommended Diet: regular diet and thin liquids   Recommended Form of Meds: whole with liquid   Aspiration precautions and swallowing strategies: upright posture, only feed when fully alert, and slow rate of feeding  Other Recommendations: Continue frequent oral care        Current Medical Status  Pt is a 48 y.o. male who presented to  [facility]  with history of multiple sclerosis, sleep apnea complains of right facial numbness and weakness of right arm and leg with headache. The symptoms started 3 days ago. Leg is back to baseline. He states is similar to prior MS flares but they never lasted this long. Not getting relief with over-the-counter headache medicines. According to records on March 25 he was ordered Depakote, Decadron and aspirin for his worsening headaches. Patient had multiple prior admissions for these  symptoms. Patient denies recent illness and injury. He states he lives at home with his mother and father. .    Current Precautions:      Allergies:  No known food allergies    Past medical history:  Please see H&P for details    Special Studies:  CT head 4/15: No acute intracranial abnormality. Findings of demyelination are similar to prior CT. However, contrast-enhanced MR is more sensitive for detection and assessment of potential acute demyelination in this patient with known multiple sclerosis.       Social/Education/Vocational Hx:  Pt lives  home    Swallow Information   Current Risks for Dysphagia & Aspiration:  MS, r/o CVA  Current Symptoms/Concerns:  facial numbness/weakness, droop   Current Diet: regular diet and thin liquids   Baseline Diet: regular diet and thin liquids      Baseline Assessment   Behavior/Cognition: alert  Speech/Language Status: able to participate in conversation, able to follow commands, and mild dysarthria which pt reports has continually declining   Patient Positioning: upright in bed  Pain Status/Interventions/Response to Interventions:  No report of or nonverbal indications of pain.       Swallow Mechanism Exam  Facial:  slight R facial droop   Labial: decreased ROM right side  Lingual: WFL  Velum: symmetrical  Mandible: adequate ROM  Dentition: adequate  Vocal quality:clear/adequate   Volitional Cough: strong/productive   Respiratory Status: on RA     Consistencies Assessed and Performance   Consistencies Administered: thin liquids, puree, soft solids, and hard solids      Oral Stage: WFL  Mastication was adequate with the materials administered today.  Bolus formation and transfer were functional with no significant oral residue noted.  No overt s/s reduced oral control.    Pharyngeal Stage: WFL  Swallow Mechanics:  Swallowing initiation appeared prompt.  Laryngeal rise was palpated and judged to be within functional limits.  No coughing, throat clearing, change in vocal quality  or respiratory status noted today.     Esophageal Concerns: none reported    Strategies and Efficacy: -    Summary and Recommendations (see above)    Results Reviewed with: patient and RN     Treatment Recommended: Not at this time, evaluation only. Consider OP ST for ongoing speech/language difficulties.

## 2024-04-16 NOTE — PHYSICAL THERAPY NOTE
PHYSICAL THERAPY EVALUATION NOTE    Patient Name: Carlos Landis  Today's Date: 2024    AGE:   48 y.o.  Mrn:   [id number]  ADMIT DX:  Weakness [R53.1]  MS (multiple sclerosis) ([facility]) [G35]  Right hemiparesis (HCC) [G81.91]  Right sided numbness [R20.0]    Past Medical History:   Diagnosis Date    Arthritis     Diabetes mellitus (HCC)     prediabetes    MS (multiple sclerosis) ([facility])     Scoliosis     Visual impairment      Length Of Stay: 0  PHYSICAL THERAPY EVALUATION :   Patient's identity confirmed via 2 patient identifiers (full name and ) at start of session       24 1000   PT Last Visit   PT Visit Date 24   Note Type   Note type Evaluation   Pain Assessment   Pain Assessment Tool 0-10   Pain Score No Pain   Restrictions/Precautions   Weight Bearing Precautions Per Order No   Other Precautions Bed Alarm;Fall Risk   Home Living   Type of Home House   Home Layout Two level;Ramped entrance;Stairs to enter with rails  (3STE)   Bathroom Shower/Tub Tub/shower unit   Bathroom Equipment Grab bars in shower;Shower chair   Home Equipment Walker;Wheelchair-electric  (transport chair)   Additional Comments Pt reports ambulating w/ RW PTA   Prior Function   Level of Page Independent with ADLs;Independent with functional mobility   Lives With Family  (Mom & Dad, 25 y/o dtr)   Receives Help From Family;Outpatient therapy  (OPPT/OT)   IADLs Independent with meal prep;Independent with medication management   Falls in the last 6 months 0   Vocational On disability  ()   General   Family/Caregiver Present No   Cognition   Overall Cognitive Status WFL   Arousal/Participation Alert   Attention Within functional limits   Orientation Level Oriented X4   Memory Within functional limits   Following Commands Follows multistep commands with increased time or repetition   Comments Pt pleasant and agreeable to participate in PT  "evaluation. Paused speech at times but appears at baseline d/t MS   Subjective   Subjective \"I think I'm 80% back to normal. But I usually don't hit that 100%\"   RLE Assessment   RLE Assessment WFL   Strength RLE   RLE Overall Strength 3+/5  (baseline weakness)   LLE Assessment   LLE Assessment WFL   Strength LLE   LLE Overall Strength 4/5   Coordination   Movements are Fluid and Coordinated 0   Coordination and Movement Description 1 episode of RLE spasticity; segmental movements of R side   Light Touch   RLE Light Touch Impaired   LLE Light Touch Grossly intact   Bed Mobility   Supine to Sit 5  Supervision   Transfers   Sit to Stand 5  Supervision   Stand to Sit 5  Supervision   Ambulation/Elevation   Gait pattern Decreased foot clearance;Short stride   Gait Assistance 5  Supervision   Additional items Assist x 1   Assistive Device Rolling walker   Distance 80   Ambulation/Elevation Additional Comments Pt ambulates 120 ft and then reports sudden onset L hip pain.   Balance   Static Sitting Good   Static Standing Fair -   Ambulatory Fair -   Activity Tolerance   Activity Tolerance Patient limited by fatigue;Patient limited by pain   Medical Staff Made Aware OT Claire   Nurse Made Aware ED LADI Bailey   Assessment   Problem List Decreased strength;Decreased endurance;Impaired balance;Decreased mobility;Impaired sensation;Decreased coordination   Assessment Carlos Landis is a 48 y.o. Male who presents to [facility] on 4/15/2024 from home w/ c/o worsening R sided weakness and diagnosis of R hemiparesis. Orders for PT eval and treat received. Pt presents w/ comorbidities of MS, DAVID. At baseline, pt mobilizes modified I w/ RW, and reports 0 falls in the last 6 months. Upon evaluation, pt presents w/ the following deficits: weakness, altered sensation, impaired coordination, impaired balance, and decreased endurance. Upon eval, pt requires supervision for bed mobility, supervision for transfers, and supervision for gait. Based on " this PT evaluation today, patient's discharge recommendation is for Level III. During this admission, pt would benefit from continued skilled inpatient PT in the acute care setting in order to address the abovementioned deficits to maximize function and mobility before DC from acute care.   Goals   Patient Goals to feel better, go home   STG Expiration Date 04/26/24   Short Term Goal #1 Patient will: Perform all bed mobility tasks modified independent to improve pt's independence w/ repositioning for decrease risk of skin breakdown, Perform all transfers modified independent consistently from various height surfaces in order to improve I w/ engagement w/ real-world environments/situations, and Ambulate at least 150 ft. with roller walker modified independent w/o LOB to facilitate return and engagement w/ previous living environment   PT Treatment Day 0   Plan   Treatment/Interventions Functional transfer training;LE strengthening/ROM;Therapeutic exercise;Patient/family training;Endurance training;Equipment eval/education;Bed mobility;Gait training   PT Frequency 1-2x/wk   Discharge Recommendation   Rehab Resource Intensity Level, PT III (Minimum Resource Intensity)  (resumption of OPPT)   AM-PAC Basic Mobility Inpatient   Turning in Flat Bed Without Bedrails 4   Lying on Back to Sitting on Edge of Flat Bed Without Bedrails 3   Moving Bed to Chair 3   Standing Up From Chair Using Arms 3   Walk in Room 3   Climb 3-5 Stairs With Railing 2  (not performing stairs PTA)   Basic Mobility Inpatient Raw Score 18   Basic Mobility Standardized Score 41.05   [facility] Highest Level Of Mobility   JH-HLM Goal 6: Walk 10 steps or more   JH-HLM Achieved 7: Walk 25 feet or more   Additional Treatment Session   Start Time 1015   End Time 1023   Treatment Assessment Patient seated OOB in recliner chair, agreeable to participate in PT intervention. He is able to perform STS from recliner chair w/ supervision. He is then able to  ambulate 40 ft w/ RW w/ supervision. He has no buckling or instabilty noted. He does report some ongoing L hip pain but doesn't impact function. Pt seated OOB in recliner chair at end of session   Equipment Use RW   Additional Treatment Day 1   End of Consult   Patient Position at End of Consult Bedside chair;All needs within reach         The patient's AM-PAC Basic Mobility Inpatient Short Form Raw Score is 18, Standardized Score is 41.05. A standardized score greater than 38.32 (raw score of 16) suggests the patient may benefit from discharge to home which may not coincide with above PT recommendations. However please refer to therapist recommendation for discharge planning given other factors that may influence destination.    Pt would benefit from skilled inpatient PT during this admission in order to facilitate progress towards goals to maximize functional independence    Sepideh Diggs PT, DPT

## 2024-04-16 NOTE — CONSULTS
Consultation - Neurology   Carlos Landis 48 y.o. male MRN: [id number]  Unit/Bed#: ED 12 Encounter: [id number]      Assessment/Plan   48 year old male with history of MS (diagnosed 2022; on Ocrevus).  Prior encounters/admissions for right-sided hemiparesis/numbness.     Presents on 4/15 with recurrence of left facial and extremity numbness/weakness.  Was also receiving recent Depakote/Solu-Medrol infusions (appears for headache?)    Plan:  -will give one dose of Solumedrol 1G for now; but will hold on any additional doses until MRI reviewed  -Await MRI brain and cervical spine with/without contrast  -CT head 4/15: No acute intracranial pathology  -Screen for any metabolic/infectious derangements which could serve as pseudo-flare MS source  -Afebrile   -CMP normal  -CBC this morning normal  -UA negative for infection  -Supportive care  -Therapy evaluations (patient was receiving OP therapy prior to admission  -Ongoing outpatient MS/neurology team follow up  -On Ocrevus, next dose scheduled for June    Discussed plan of care with attending neurologist.     Next scheduled MS team follow-up appointment is scheduled for July 12th; will attempt to expedite.     History of Present Illness     Reason for Consult / Principal Problem: History of MS, acute R sided numbness/weakness  Hx and PE limited by: Patient with staggered/broken speech pattern (patient states is chronic from MS history and prior learning disability)  HPI: Carlso Landis is a 48 y.o. male with history as mentioned above in assessment who neurology is asked to evaluate in regards to the above.    History obtained via discussion with patient this morning and chart review: At baseline patient is ambulatory at home with a walker, however he sleeps in a recliner; terms of ADLs, patient does have family living with him for supervision but does not require their particular assistance with tasks at home.  Beginning over the past few days, patient began and noticed  recurrence of acute weakness and numbness to the right side of his face as well as his right upper and lower extremity.  In reviewing notes, patient has had prior bouts of this acute right-sided weakness in the past.     Otherwise of lately he denies any changes to bowel/bladder function, denies any recent urinary frequency nor dysuria to suggest UTI, no other recent infectious symptoms.    Is known to the outpatient MS team, seen last formally in the office in mid March 2024: Reviewed that note in detail, he is due for next Ocrevus dosing over the next few months.  He is also being managed for her headaches, which are primarily occurring in the morning and may be related to his sleep apnea and lack of CPAP at home (patient states he often ends up throwing his machine off of his face while sleeping);     In reviewing recent notes recommendations were for 1 dose of Solu-Medrol every 14 days for several months duration.  He was also being treated recently with Depacon infusion for his ongoing headaches.    Inpatient consult to Neurology  Consult performed by: Gary Becker PA-C  Consult ordered by: Suze Singh PA-C        Review of Systems   Constitutional:  Negative for chills, diaphoresis, fatigue and fever.   HENT: Negative.  Negative for tinnitus, trouble swallowing and voice change.    Eyes:  Negative for photophobia and visual disturbance.   Respiratory: Negative.     Cardiovascular: Negative.    Musculoskeletal:  Positive for gait problem.   Skin: Negative.    Neurological:  Positive for facial asymmetry, weakness, numbness and headaches. Negative for dizziness, tremors, seizures, syncope, speech difficulty and light-headedness.       Historical Information   Past Medical History:   Diagnosis Date    Arthritis     Diabetes mellitus (HCC)     prediabetes    MS (multiple sclerosis) (HCC)     Scoliosis     Visual impairment      Past Surgical History:   Procedure Laterality Date    HERNIA REPAIR      3 times     "KNEE SURGERY Right      Social History   Social History     Substance and Sexual Activity   Alcohol Use Not Currently     Social History     Substance and Sexual Activity   Drug Use Never     E-Cigarette/Vaping    E-Cigarette Use Former User      E-Cigarette/Vaping Substances    Nicotine No     THC No     CBD No     Flavoring No      Social History     Tobacco Use   Smoking Status Former   Smokeless Tobacco Former     Family History:   Family History   Problem Relation Age of Onset    Stroke Maternal Grandmother     Multiple sclerosis Other        Review of previous medical records was completed.    Meds/Allergies   current meds:   Current Facility-Administered Medications   Medication Dose Route Frequency    aspirin tablet 325 mg  325 mg Oral Daily    heparin (porcine) subcutaneous injection 5,000 Units  5,000 Units Subcutaneous Q8H OFE    ibuprofen (MOTRIN) tablet 600 mg  600 mg Oral Q6H PRN    magnesium Oxide (MAG-OX) tablet 400 mg  400 mg Oral Daily    niacin tablet 200 mg  200 mg Oral Daily    and PTA meds:   Prior to Admission Medications   Prescriptions Last Dose Informant Patient Reported? Taking?   MAGNESIUM PO   Yes No   Sig: Take by mouth   Riboflavin ([employer] Vitamin B-2) 100 MG TABS   No No   Sig: Take 2 tablets (200 mg total) by mouth in the morning   SYRINGE-NEEDLE, DISP, 3 ML 25G X 1\" 3 ML MISC   No No   Sig: Take with B12 regimen prescribed   aspirin 325 mg tablet   No No   Sig: Take 1 tablet (325 mg total) by mouth daily   cyanocobalamin 1,000 mcg/mL   No No   Sig: Take B12 1000 mcg IM once a week for 4 weeks, then once a month for 5 months   ocrelizumab (Ocrevus) 300 MG/10ML SOLN   No No   Sig: Inject 20 mL (600 mg total) into a catheter in a vein every 6 (six) months      Facility-Administered Medications: None       No Known Allergies    Objective   Vitals:Blood pressure 113/66, pulse 72, temperature 99.1 °F (37.3 °C), resp. rate 22, weight 107 kg (235 lb 3.7 oz), SpO2 94%.,Body mass index is 32.81 " kg/m².  No intake or output data in the 24 hours ending 04/16/24 0719    Invasive Devices:   Invasive Devices       Peripheral Intravenous Line  Duration             Peripheral IV 04/15/24 Left Antecubital <1 day                  Examined alongside Dr. Goff.    Physical Exam  Constitutional:       Appearance: Normal appearance.   HENT:      Head: Normocephalic and atraumatic.   Eyes:      Extraocular Movements: Extraocular movements intact and EOM normal.      Conjunctiva/sclera: Conjunctivae normal.      Pupils: Pupils are equal, round, and reactive to light.   Cardiovascular:      Rate and Rhythm: Normal rate.   Pulmonary:      Effort: Pulmonary effort is normal.   Abdominal:      General: There is no distension.   Musculoskeletal:      Cervical back: Normal range of motion and neck supple.   Neurological:      Mental Status: He is alert.      Coordination: Finger-Nose-Finger Test and Heel to Shin Test normal.      Deep Tendon Reflexes:      Reflex Scores:       Patellar reflexes are 3+ on the right side and 3+ on the left side.       Achilles reflexes are 2+ on the right side and 2+ on the left side.      Neurologic Exam     Mental Status   Broken/staggered speech pattern (notes he has degree of this at baseline due to his MS/learning disability; he does state it is currently slightly worse than normal).    Awake and alert, fully oriented.  No aphasia/dysarthria.  Following all commands accurately.     Cranial Nerves     CN II   Visual fields full to confrontation.     CN III, IV, VI   Pupils are equal, round, and reactive to light.  Extraocular motions are normal.     CN VII   Facial expression full, symmetric.     CN VIII   CN VIII normal.     CN IX, X   CN IX normal.   CN X normal.     Diminished pinprick in R V2 compared to L, right V1 and V3 are intact/sharp.     Motor Exam   Muscle bulk: normal  Overall muscle tone: normalSlight R updrift with pronator testing.  -4/5 R deltoid/R bicep strength.   -5-/5  R wrist extension strength.   -Mild R interossei finger weakness.  -4/5 R hip flexion, full strength distally throughout R LE.     5/5 throughout L UE and LE.        Sensory Exam   Light touch normal.     Diminished PP in R LE compared to L.      Gait, Coordination, and Reflexes     Coordination   Finger to nose coordination: normal  Heel to shin coordination: normal    Tremor   Resting tremor: absent  Intention tremor: absent    Reflexes   Right patellar: 3+  Left patellar: 3+  Right achilles: 2+  Left achilles: 2+  Finger to nose accurate on R, but less fluent/more deliberate effort when compared to L finger to nose.     Sustained R ankle clonus.      Seen ambulating with roller walker with therapies this morning in the ED hallway.  Decreased stride/leg clearance with right leg as compared to left.  Ambulated approximately 10 to 15 feet before stopping due to left hip discomfort.       Lab Results: CBC:   Results from last 7 days   Lab Units 04/16/24  0549 04/15/24  1645   WBC Thousand/uL 7.33 10.41*   RBC Million/uL 4.66 5.39   HEMOGLOBIN g/dL 13.5 15.4   HEMATOCRIT % 41.1 48.4   MCV fL 88 90   PLATELETS Thousands/uL 202 236   , BMP/CMP:   Results from last 7 days   Lab Units 04/16/24  0549 04/15/24  1645   SODIUM mmol/L 139 140   POTASSIUM mmol/L 4.1 3.7   CHLORIDE mmol/L 107 105   CO2 mmol/L 25 23   BUN mg/dL 19 21   CREATININE mg/dL 0.75 0.84   CALCIUM mg/dL 8.9 9.6   AST U/L  --  13   ALT U/L  --  12   ALK PHOS U/L  --  57   EGFR ml/min/1.73sq m 106 102   , Vitamin B12:   , HgBA1C:   , TSH:   , Coagulation:   , Lipid Profile:   Results from last 7 days   Lab Units 04/16/24  0549   HDL mg/dL 32*   LDL CALC mg/dL 59   TRIGLYCERIDES mg/dL 110   , Ammonia:   , Urinalysis:   Results from last 7 days   Lab Units 04/15/24  1849   COLOR UA  Yellow   CLARITY UA  Clear   SPEC GRAV UA  1.025   PH UA  7.0   LEUKOCYTES UA  Negative   NITRITE UA  Negative   GLUCOSE UA mg/dl Negative   KETONES UA mg/dl Negative   BILIRUBIN  UA  Negative   BLOOD UA  Negative     Imaging Studies: I have personally reviewed pertinent films in PACS  CT head without contrast   Final Result by Cornelio Fuller MD (04/15 1849)      No acute intracranial abnormality. Findings of demyelination are similar to prior CT. However, contrast-enhanced MR is more sensitive for detection and assessment of potential acute demyelination in this patient with known multiple sclerosis.                  Workstation performed: [id number]         MRI Inpatient Order    (Results Pending)       EKG, Pathology, and Other Studies: I have personally reviewed pertinent reports.      VTE Prophylaxis: Sequential compression device (Venodyne)  and Heparin    Code Status: Level 1 - Full Code    Please see attending's attestation for total time spent/billing. Discussed plan of care with patient and primary team: Await MRI brain and cervical spine, give one-time dose of Solu-Medrol for now, hold on additional doses, screen for any metabolic/infectious derangements that could produce pseudo flare of MS symptoms.    Please note dictation software was used in the formulation of this note. Please keep that in mind in light of any grammatical errors.

## 2024-04-16 NOTE — UTILIZATION REVIEW
Initial Clinical Review    Admission: Date/Time/Statement:  4/15/24 1905 observation   Admission Orders (From admission, onward)       Ordered        04/15/24 1905  Place in Observation  Once                          Orders Placed This Encounter   Procedures    Place in Observation     Standing Status:   Standing     Number of Occurrences:   1     Order Specific Question:   Level of Care     Answer:   Med Surg [16]     ED Arrival Information       Expected   -    Arrival   4/15/2024 15:49    Acuity   Urgent              Means of arrival   Ambulance    Escorted by   [facility] ([facility])    Service   Hospitalist    Admission type   Emergency              Arrival complaint   Weakness             Chief Complaint   Patient presents with    Numbness     Pt reports numbness to right side of body. Pt reports typical of MS flare. Pt reports increase in weakness. Pt reports symptoms starting 3 days PTA       Initial Presentation: 48 y.o. male  male to ED via EMS from home.    Admitted to observation with Dx: Right hemiparesis/MS.  Presented to ED with  numbness and weakness of right side starting about 3 days prior to arrival and worsening.  Feels like prior MA flares. PMHx:  MS on Ocrevus every 6 months, tension type headaches, DAVID . On exam: weakness - right sided 2/5, left sided 4/5.    Wbc 10.41. Imaging shows no acute findings on ct brain,  demyelination similar to previous. ED treatment:  asa 325 mg.    Plan includes:  neuro checks.   Hold steroids.   MRI brain and neck.   Consult neurology .     4/16/24 observation:   + facial asymmetry, weakness, numbness and headaches   On exam: mucous membranes dry.  Weakness -   4/5 R deltoid/R bicep strength.   5-/5 R wrist extension strength.  Mild R interossei finger weakness.   4/5 R hip flexion, full strength distally throughout R LE. Broken/staggered speech pattern (notes he has degree of this at baseline due to his MS/learning disability; he does state it is currently slightly  worse than normal).   Diminished pinprick in R V2 compared to L, right V1 and V3 are intact/sharp.  MRI brain and neck pending.    One time dose of Solu medrol.       4/16/24 per neurology:  Recurrence of left facial and extremity numbness/weakness.  Has been on Depakote and solu medrol infusions for headache.    On Ocrevus for MS and next due in June.  Recommend MRI brain.  1 time dose of Solumedrol.  PT    4/17/24 observation    ED Triage Vitals   Temperature Pulse Respirations Blood Pressure SpO2   04/15/24 1554 04/15/24 1550 04/15/24 1550 04/15/24 1550 04/15/24 1550   98.3 °F (36.8 °C) 75 18 125/88 94 %      Temp Source Heart Rate Source Patient Position - Orthostatic VS BP Location FiO2 (%)   04/15/24 1554 04/15/24 1550 04/15/24 1550 04/15/24 1550 --   Oral Monitor Lying Left arm       Pain Score       04/16/24 0700       No Pain          Wt Readings from Last 1 Encounters:   04/16/24 107 kg (236 lb)     Additional Vital Signs:   04/17/24 08:01:04 -- 76 18 110/72 85 97 % -- --   04/16/24 22:35:28 97.7 °F (36.5 °C) 82 -- 103/71 82 95 % -- --   04/16/24 1700 -- -- -- -- -- -- None (Room air) --   04/16/24 1531 -- 72 -- 124/91 -- -- -- --   04/16/24 14:43:04 97.7 °F (36.5 °C) 82 16 124/91 102 95 % -- --   04/16/24 1306 -- -- -- 121/82 -- -- -- --   04/16/24 1015 -- 90 18 143/102 Abnormal  118 95 % None (Room air)      04/16/24 0800 -- 73 21 117/76 90 94 % -- --   04/16/24 0700 99.1 °F (37.3 °C) 72 22 113/66 84 94 % None (Room air) --   04/16/24 0500 -- 71 20 112/66 85 96 % None (Room air) Lying   04/16/24 0300 -- 65 20 114/76 90 95 % -- --   04/16/24 0100 -- 67 17 104/71 82 96 % None (Room air) Lying   04/16/24 0030 -- 69 18 101/67 79 94 % None (Room air) Lying   04/16/24 0000 -- 66 18 115/83 95 96 % None (Room air) Lying   04/15/24 1830 -- 63 16 116/76 92 95 % None (Room air) --   04/15/24 1730 -- 63 15 116/72 89 95 % None (Room air) Lying   04/15/24 1630 -- 71 13 124/83 99 94 % None (Room air)      Pertinent  Labs/Diagnostic Test Results:   MRI cervical spine w wo contrast   Final Result by Ricky Berry MD (04/16 1620)      Unchanged demyelinating lesions in cervical spine. No new or enhancing lesion.      Mild multilevel degenerative changes of cervical spine with mild foraminal narrowing left C7-T1, as detailed above. No significant canal stenosis.      Please see same day MRI brain MS with and without contrast for further evaluation.                     Workstation performed: [id number]         MRI brain MS wo and w contrast   Final Result by Ricky Berry MD (04/16 1607)      Unchanged multiple white matter lesions in a distribution compatible with multiple sclerosis. No new or enhancing lesions.      No acute intracranial abnormality.      Please see same day MRI cervical spine with and without contrast for further evaluation.            Workstation performed: [id number]         CT head without contrast   Final Result by Cornelio Fuller MD (04/15 1849)      No acute intracranial abnormality. Findings of demyelination are similar to prior CT. However, contrast-enhanced MR is more sensitive for detection and assessment of potential acute demyelination in this patient with known multiple sclerosis.                  Workstation performed: [id number]           4/15/24 ecg Normal sinus rhythm  Normal ECG  When compared with ECG of 06-DEC-2023 11:43,  No significant change was found    Results from last 7 days   Lab Units 04/17/24 0411 04/16/24  0549 04/15/24  1645   WBC Thousand/uL 7.02 7.33 10.41*   HEMOGLOBIN g/dL 13.7 13.5 15.4   HEMATOCRIT % 42.4 41.1 48.4   PLATELETS Thousands/uL 214 202 236   TOTAL NEUT ABS Thousands/µL 6.46 5.07 8.20*     Results from last 7 days   Lab Units 04/17/24  0411 04/16/24  0549 04/15/24  1645   SODIUM mmol/L 140 139 140   POTASSIUM mmol/L 4.4 4.1 3.7   CHLORIDE mmol/L 108 107 105   CO2 mmol/L 23 25 23   ANION GAP mmol/L 9 7 12   BUN mg/dL 25 19 21   CREATININE  mg/dL 0.80 0.75 0.84   EGFR ml/min/1.73sq m 104 106 102   CALCIUM mg/dL 9.3 8.9 9.6   MAGNESIUM mg/dL 2.1  --   --      Results from last 7 days   Lab Units 04/15/24  1645   AST U/L 13   ALT U/L 12   ALK PHOS U/L 57   TOTAL PROTEIN g/dL 7.9   ALBUMIN g/dL 4.5   TOTAL BILIRUBIN mg/dL 0.51     Results from last 7 days   Lab Units 04/17/24  0411 04/16/24  0549 04/15/24  1645   GLUCOSE RANDOM mg/dL 128 74 88     Results from last 7 days   Lab Units 04/15/24  1849   CLARITY UA  Clear   COLOR UA  Yellow   SPEC GRAV UA  1.025   PH UA  7.0   GLUCOSE UA mg/dl Negative   KETONES UA mg/dl Negative   BLOOD UA  Negative   PROTEIN UA mg/dl Trace*   NITRITE UA  Negative   BILIRUBIN UA  Negative   UROBILINOGEN UA (BE) mg/dl 2.0*   LEUKOCYTES UA  Negative   WBC UA /hpf 0-1   RBC UA /hpf 0-1   BACTERIA UA /hpf Occasional   EPITHELIAL CELLS WET PREP /hpf Occasional   MUCUS THREADS  Moderate*         ED Treatment:   Medication Administration from 04/15/2024 1548 to 04/16/2024 0833         Date/Time Order Dose Route Action Comments     04/16/2024 0817 EDT aspirin tablet 325 mg 325 mg Oral Given --     04/16/2024 0817 EDT magnesium Oxide (MAG-OX) tablet 400 mg 400 mg Oral Given --     04/16/2024 0817 EDT niacin tablet 200 mg 200 mg Oral Given --     04/16/2024 0551 EDT heparin (porcine) subcutaneous injection 5,000 Units 5,000 Units Subcutaneous Given --          Past Medical History:   Diagnosis Date    Arthritis     Diabetes mellitus (HCC)     prediabetes    MS (multiple sclerosis) (HCC)     Scoliosis     Visual impairment      Present on Admission:   MS (multiple sclerosis) (HCC)   Right hemiparesis (HCC)   DAVID (obstructive sleep apnea)   Episodic tension-type headache, not intractable      Admitting Diagnosis: Weakness [R53.1]  MS (multiple sclerosis) (HCC) [G35]  Right hemiparesis (HCC) [G81.91]  Right sided numbness [R20.0]  Age/Sex: 48 y.o. male  Admission Orders:  Scheduled Medications:  aspirin, 325 mg, Oral, Daily  heparin  (porcine), 5,000 Units, Subcutaneous, Q8H OFE  magnesium Oxide, 400 mg, Oral, Daily  niacin, 200 mg, Oral, Daily    methylPREDNISolone sodium succinate (Solu-MEDROL) 1,000 mg in sodium chloride 0.9 % 250 mL IVPB  Dose: 1,000 mg  Freq: Once Route: IV  Last Dose: Stopped (04/16/24 1349)  Start: 04/16/24 1000 End: 04/16/24 1349     Continuous IV Infusions: none      PRN Meds:   ibuprofen, 600 mg, Oral, Q6H PRN - x 1 4/17/24     Neuro checks  Every 1 hour x 4 hours, then every 2 hours x 8 hours, then every 4 hours x 72 hours     IP CONSULT TO NEUROLOGY      Network Utilization Review Department  ATTENTION: Please call with any questions or concerns to [phone] and carefully listen to the prompts so that you are directed to the right person. All voicemails are confidential.   For Discharge needs, contact Care Management DC Support Team at [phone] opt. 2  Send all requests for admission clinical reviews, approved or denied determinations and any other requests to dedicated fax number below belonging to the campus where the patient is receiving treatment. List of dedicated fax numbers for the Facilities:  FACILITY NAME UR FAX NUMBER   ADMISSION DENIALS (Administrative/Medical Necessity) [phone]   DISCHARGE SUPPORT TEAM (NETWORK) [phone]   PARENT CHILD HEALTH (Maternity/NICU/Pediatrics) [phone]   [facility] [phone]   [facility] [phone]   [facility] [phone]   [facility] [phone]   [facility] [phone]   [facility] [phone]   [facility] [phone]   [facility] [phone]   [facility] [phone]   [facility] [phone]   [facility] [phone]   [facility]  [facility] [phone]

## 2024-04-16 NOTE — PROGRESS NOTES
[facility]  Progress Note  Name: Carlos DESTIN Landis I  MRN: [id number]  Unit/Bed#: ED 12 I Date of Admission: 4/15/2024   Date of Service: 4/16/2024 I Hospital Day: 0    Assessment/Plan   DAVID (obstructive sleep apnea)  Assessment & Plan  History of DAVID.  Has not used a CPAP in years.  During recent neurology appointment it was discussed importance of patient being evaluated and treated by sleep medicine  Patient has not yet done so.  Encouraged to make appointment.     Episodic tension-type headache, not intractable  Assessment & Plan  Patient with frequent headaches upon waking.   Has been treating with ibuprofen and aspirin  Follows with neurology outpatient  Continue magnesium and B2    MS (multiple sclerosis) (HCC)  Assessment & Plan  Diagnosed in 2022.  Chronic right-sided weakness.  Uses a walker to ambulate.  Following with PT/OT outpatient  Home regimen:   Ocrevus every 6 months (last given 1/15/2024)  Follows with [facility] neurology    * Right hemiparesis (HCC)  Assessment & Plan  Presented due to numbness and weakness of the right side of his body ongoing for about 3 days.  History of MS.  Patient reports it feels like his prior flares.   Admission from 12/6/2023 through 12/8/2023 also with worsening numbness and weakness of his right side.  At that time neurology had recommended not starting steroids.  MRI brain was negative for new enhancing lesions.   Denies recent illness  CT head  No acute intracranial abnormality. Findings of demyelination are similar to prior CT. However, contrast-enhanced MR is more sensitive for detection and assessment of potential acute demyelination in this patient with known multiple sclerosis.   Neuro checks  Status post 1 dose of IV steroid by neuro  MRI brain and neck with and without contrast ordered and pending  Neurology following, appreciate recommendations  Infectious workup so far negative               VTE Pharmacologic Prophylaxis: VTE  Score: 4 Moderate Risk (Score 3-4) - Pharmacological DVT Prophylaxis Ordered: heparin.    Mobility:      -[facility] Goal achieved. Continue to encourage appropriate mobility.    Patient Centered Rounds: I performed bedside rounds with nursing staff today.   Discussions with Specialists or Other Care Team Provider: yes    Education and Discussions with Family / Patient:  yes.     Total Time Spent on Date of Encounter in care of patient: 38 mins. This time was spent on one or more of the following: performing physical exam; counseling and coordination of care; obtaining or reviewing history; documenting in the medical record; reviewing/ordering tests, medications or procedures; communicating with other healthcare professionals and discussing with patient's family/caregivers.    Current Length of Stay: 0 day(s)  Current Patient Status: Observation   Certification Statement: The patient will continue to require additional inpatient hospital stay due to pending maurizio  Discharge Plan: Anticipate discharge in 24-48 hrs to home.    Code Status: Level 1 - Full Code    Subjective:   Seen and examined at bedside  Feels ok  No new complaints  Pending MRI     Objective:     Vitals:   Temp (24hrs), Av.7 °F (37.1 °C), Min:98.3 °F (36.8 °C), Max:99.1 °F (37.3 °C)    Temp:  [98.3 °F (36.8 °C)-99.1 °F (37.3 °C)] 99.1 °F (37.3 °C)  HR:  [63-90] 90  Resp:  [13-22] 18  BP: (101-143)/() 143/102  SpO2:  [92 %-97 %] 95 %  Body mass index is 32.96 kg/m².     Input and Output Summary (last 24 hours):     Intake/Output Summary (Last 24 hours) at 2024 1250  Last data filed at 2024 0941  Gross per 24 hour   Intake 120 ml   Output 500 ml   Net -380 ml       Physical Exam:   Physical Exam  Vitals reviewed.   Constitutional:       Appearance: Normal appearance.   HENT:      Head: Atraumatic.      Mouth/Throat:      Mouth: Mucous membranes are dry.   Eyes:      General: No scleral icterus.  Cardiovascular:      Rate and Rhythm: Normal  rate and regular rhythm.      Heart sounds: Normal heart sounds.   Pulmonary:      Effort: No respiratory distress.   Abdominal:      General: Bowel sounds are normal.   Musculoskeletal:      Cervical back: Neck supple.      Right lower leg: No edema.      Left lower leg: No edema.   Skin:     General: Skin is dry.      Capillary Refill: Capillary refill takes less than 2 seconds.   Neurological:      Mental Status: He is alert and oriented to person, place, and time.      Motor: Weakness present.   Psychiatric:         Mood and Affect: Mood normal.          Additional Data:     Labs:  Results from last 7 days   Lab Units 04/16/24  0549   WBC Thousand/uL 7.33   HEMOGLOBIN g/dL 13.5   HEMATOCRIT % 41.1   PLATELETS Thousands/uL 202   SEGS PCT % 69   LYMPHO PCT % 17   MONO PCT % 8   EOS PCT % 4     Results from last 7 days   Lab Units 04/16/24  0549 04/15/24  1645   SODIUM mmol/L 139 140   POTASSIUM mmol/L 4.1 3.7   CHLORIDE mmol/L 107 105   CO2 mmol/L 25 23   BUN mg/dL 19 21   CREATININE mg/dL 0.75 0.84   ANION GAP mmol/L 7 12   CALCIUM mg/dL 8.9 9.6   ALBUMIN g/dL  --  4.5   TOTAL BILIRUBIN mg/dL  --  0.51   ALK PHOS U/L  --  57   ALT U/L  --  12   AST U/L  --  13   GLUCOSE RANDOM mg/dL 74 88             Results from last 7 days   Lab Units 04/16/24  0549   HEMOGLOBIN A1C % 5.1           Lines/Drains:  Invasive Devices       Peripheral Intravenous Line  Duration             Peripheral IV 04/15/24 Left Antecubital <1 day                          Imaging: Reviewed radiology reports from this admission including: CT head    Recent Cultures (last 7 days):         Last 24 Hours Medication List:   Current Facility-Administered Medications   Medication Dose Route Frequency Provider Last Rate    aspirin  325 mg Oral Daily Suze Singh PA-C      heparin (porcine)  5,000 Units Subcutaneous Q8H OFE Suze Singh PA-C      ibuprofen  600 mg Oral Q6H PRN Suze Singh PA-C      magnesium Oxide  400 mg Oral Daily Suze Singh PA-C       niacin  200 mg Oral Daily Suze Singh PA-C          Today, Patient Was Seen By: Dionne Burks MD    **Please Note: This note may have been constructed using a voice recognition system.**

## 2024-04-16 NOTE — ASSESSMENT & PLAN NOTE
Diagnosed in 2022.  Chronic right-sided weakness.  Uses a walker to ambulate.  Following with PT/OT outpatient  Home regimen:   Ocrevus every 6 months (last given 1/15/2024)  Follows with [facility] neurology

## 2024-04-16 NOTE — ASSESSMENT & PLAN NOTE
Presented due to numbness and weakness of the right side of his body ongoing for about 3 days.  History of MS.  Patient reports it feels like his prior flares.   Admission from 12/6/2023 through 12/8/2023 also with worsening numbness and weakness of his right side.  At that time neurology had recommended not starting steroids.  MRI brain was negative for new enhancing lesions.   Denies recent illness  CT head  No acute intracranial abnormality. Findings of demyelination are similar to prior CT. However, contrast-enhanced MR is more sensitive for detection and assessment of potential acute demyelination in this patient with known multiple sclerosis.   Neuro checks  Status post 1 dose of IV steroid by neuro  MRI brain and neck with and without contrast ordered and pending  Neurology following, appreciate recommendations  Infectious workup so far negative

## 2024-04-16 NOTE — PLAN OF CARE
Problem: OCCUPATIONAL THERAPY ADULT  Goal: Performs self-care activities at highest level of function for planned discharge setting.  See evaluation for individualized goals.  Description:   Outcome: Progressing  Note: Limitation: Decreased ADL status, Decreased endurance, Decreased self-care trans  Prognosis: Good  Assessment: Pt is a 48 y.o. male seen for OT evaluation at [facility], admitted 4/15/2024 w/ Right hemiparesis (HCC).  OT completed extensive review of pt's medical and social history. Comorbidities affecting pt's functional performance at time of assessment include: MS, dysarthria, CNS demyelinating disease, ambulatory dysfunction, obesity. Personal factors affecting pt at time of IE include:difficulty performing ADLS and health management . Prior to admission, pt was living in 2SH, ramped entrance, with his parents & 23yo dtr, and was independent with ADL using RW, assisted for IADL. Upon evaluation, pt presents to OT below baseline due to the following performance deficits: weakness, decreased strength, decreased balance, decreased tolerance, and impulsivity. Pt to benefit from continued skilled OT tx while in the hospital to address deficits as defined above and maximize level of functional independence w ADL's and functional mobility. Occupational Performance areas to address include: grooming, bathing/shower, toilet hygiene, dressing, functional mobility, and functional transfers, bed mobility . The patient's raw score on the AM-PAC Daily Activity inpatient short form is 21, standardized score is 44.27, greater than 39.4. Patients at this level are likely to benefit from DC to home. Based on findings, pt is of high complexity, due to medical comorbidities. Pt seen as a co-eval with PT due to the patient's co-morbidities, clinically unstable presentation, and present impairments which are a regression from the patient's baseline. At this time, OT recommendations at time of discharge are  level 4.     Rehab Resource Intensity Level, OT: No post-acute rehabilitation needs

## 2024-04-17 ENCOUNTER — APPOINTMENT (OUTPATIENT)
Facility: CLINIC | Age: 49
End: 2024-04-17
Payer: COMMERCIAL

## 2024-04-17 VITALS
OXYGEN SATURATION: 95 % | WEIGHT: 236 LBS | DIASTOLIC BLOOD PRESSURE: 72 MMHG | RESPIRATION RATE: 16 BRPM | SYSTOLIC BLOOD PRESSURE: 110 MMHG | TEMPERATURE: 98.2 F | HEART RATE: 84 BPM | HEIGHT: 71 IN | BODY MASS INDEX: 33.04 KG/M2

## 2024-04-17 LAB
ANION GAP SERPL CALCULATED.3IONS-SCNC: 9 MMOL/L (ref 4–13)
BASOPHILS # BLD AUTO: 0.01 THOUSANDS/ÂΜL (ref 0–0.1)
BASOPHILS NFR BLD AUTO: 0 % (ref 0–1)
BUN SERPL-MCNC: 25 MG/DL (ref 5–25)
CALCIUM SERPL-MCNC: 9.3 MG/DL (ref 8.4–10.2)
CHLORIDE SERPL-SCNC: 108 MMOL/L (ref 96–108)
CO2 SERPL-SCNC: 23 MMOL/L (ref 21–32)
CREAT SERPL-MCNC: 0.8 MG/DL (ref 0.6–1.3)
EOSINOPHIL # BLD AUTO: 0 THOUSAND/ÂΜL (ref 0–0.61)
EOSINOPHIL NFR BLD AUTO: 0 % (ref 0–6)
ERYTHROCYTE [DISTWIDTH] IN BLOOD BY AUTOMATED COUNT: 12.3 % (ref 11.6–15.1)
GFR SERPL CREATININE-BSD FRML MDRD: 104 ML/MIN/1.73SQ M
GLUCOSE SERPL-MCNC: 128 MG/DL (ref 65–140)
HCT VFR BLD AUTO: 42.4 % (ref 36.5–49.3)
HGB BLD-MCNC: 13.7 G/DL (ref 12–17)
IMM GRANULOCYTES # BLD AUTO: 0.06 THOUSAND/UL (ref 0–0.2)
IMM GRANULOCYTES NFR BLD AUTO: 1 % (ref 0–2)
LYMPHOCYTES # BLD AUTO: 0.39 THOUSANDS/ÂΜL (ref 0.6–4.47)
LYMPHOCYTES NFR BLD AUTO: 6 % (ref 14–44)
MAGNESIUM SERPL-MCNC: 2.1 MG/DL (ref 1.9–2.7)
MCH RBC QN AUTO: 28.6 PG (ref 26.8–34.3)
MCHC RBC AUTO-ENTMCNC: 32.3 G/DL (ref 31.4–37.4)
MCV RBC AUTO: 89 FL (ref 82–98)
MONOCYTES # BLD AUTO: 0.1 THOUSAND/ÂΜL (ref 0.17–1.22)
MONOCYTES NFR BLD AUTO: 1 % (ref 4–12)
NEUTROPHILS # BLD AUTO: 6.46 THOUSANDS/ÂΜL (ref 1.85–7.62)
NEUTS SEG NFR BLD AUTO: 92 % (ref 43–75)
NRBC BLD AUTO-RTO: 0 /100 WBCS
PLATELET # BLD AUTO: 214 THOUSANDS/UL (ref 149–390)
PLATELET BLD QL SMEAR: ADEQUATE
PMV BLD AUTO: 10.9 FL (ref 8.9–12.7)
POTASSIUM SERPL-SCNC: 4.4 MMOL/L (ref 3.5–5.3)
RBC # BLD AUTO: 4.79 MILLION/UL (ref 3.88–5.62)
RBC MORPH BLD: NORMAL
SODIUM SERPL-SCNC: 140 MMOL/L (ref 135–147)
WBC # BLD AUTO: 7.02 THOUSAND/UL (ref 4.31–10.16)

## 2024-04-17 PROCEDURE — 99232 SBSQ HOSP IP/OBS MODERATE 35: CPT | Performed by: PSYCHIATRY & NEUROLOGY

## 2024-04-17 PROCEDURE — 83735 ASSAY OF MAGNESIUM: CPT | Performed by: STUDENT IN AN ORGANIZED HEALTH CARE EDUCATION/TRAINING PROGRAM

## 2024-04-17 PROCEDURE — 80048 BASIC METABOLIC PNL TOTAL CA: CPT | Performed by: STUDENT IN AN ORGANIZED HEALTH CARE EDUCATION/TRAINING PROGRAM

## 2024-04-17 PROCEDURE — 85025 COMPLETE CBC W/AUTO DIFF WBC: CPT | Performed by: STUDENT IN AN ORGANIZED HEALTH CARE EDUCATION/TRAINING PROGRAM

## 2024-04-17 PROCEDURE — 99239 HOSP IP/OBS DSCHRG MGMT >30: CPT | Performed by: STUDENT IN AN ORGANIZED HEALTH CARE EDUCATION/TRAINING PROGRAM

## 2024-04-17 RX ADMIN — Medication 200 MG: at 10:11

## 2024-04-17 RX ADMIN — ASPIRIN 325 MG ORAL TABLET 325 MG: 325 PILL ORAL at 10:11

## 2024-04-17 RX ADMIN — Medication 400 MG: at 10:11

## 2024-04-17 RX ADMIN — HEPARIN SODIUM 5000 UNITS: 5000 INJECTION, SOLUTION INTRAVENOUS; SUBCUTANEOUS at 13:46

## 2024-04-17 RX ADMIN — HEPARIN SODIUM 5000 UNITS: 5000 INJECTION, SOLUTION INTRAVENOUS; SUBCUTANEOUS at 04:17

## 2024-04-17 RX ADMIN — IBUPROFEN 600 MG: 600 TABLET, FILM COATED ORAL at 12:08

## 2024-04-17 RX ADMIN — IBUPROFEN 600 MG: 600 TABLET, FILM COATED ORAL at 01:44

## 2024-04-17 NOTE — ASSESSMENT & PLAN NOTE
Presented due to numbness and weakness of the right side of his body ongoing for about 3 days.  History of MS.  Patient reports it feels like his prior flares.   Admission from 12/6/2023 through 12/8/2023 also with worsening numbness and weakness of his right side.  At that time neurology had recommended not starting steroids.  MRI brain was negative for new enhancing lesions.   Denies recent illness  CT head  No acute intracranial abnormality. Findings of demyelination are similar to prior CT. However, contrast-enhanced MR is more sensitive for detection and assessment of potential acute demyelination in this patient with known multiple sclerosis.   Neuro checks  Status post 1 dose of IV steroid by neuro  MRI brain and neck with and without contrast showing no acute abnormalities  Neurology following, appreciate recommendations  Infectious workup so far negative

## 2024-04-17 NOTE — DISCHARGE SUMMARY
[facility]  Discharge- Carlos DESTIN Landis 1975, 48 y.o. male MRN: [id number]  Unit/Bed#: -01 Encounter: [id number]  Primary Care Provider: Ha Acosta MD   Date and time admitted to hospital: 4/15/2024  3:49 PM    * Right hemiparesis (HCC)  Assessment & Plan  Presented due to numbness and weakness of the right side of his body ongoing for about 3 days.  History of MS.  Patient reports it feels like his prior flares.   Admission from 12/6/2023 through 12/8/2023 also with worsening numbness and weakness of his right side.  At that time neurology had recommended not starting steroids.  MRI brain was negative for new enhancing lesions.   Denies recent illness  CT head  No acute intracranial abnormality. Findings of demyelination are similar to prior CT. However, contrast-enhanced MR is more sensitive for detection and assessment of potential acute demyelination in this patient with known multiple sclerosis.   Neuro checks  Status post 1 dose of IV steroid by neuro  MRI brain and neck with and without contrast showing no acute abnormalities  Neurology following, appreciate recommendations  Infectious workup so far negative    DAVID (obstructive sleep apnea)  Assessment & Plan  History of DAVID.  Has not used a CPAP in years.  During recent neurology appointment it was discussed importance of patient being evaluated and treated by sleep medicine  Patient has not yet done so.  Encouraged to make appointment.     Episodic tension-type headache, not intractable  Assessment & Plan  Patient with frequent headaches upon waking.   Has been treating with ibuprofen and aspirin  Follows with neurology outpatient  Continue magnesium and B2    MS (multiple sclerosis) (HCC)  Assessment & Plan  Diagnosed in 2022.  Chronic right-sided weakness.  Uses a walker to ambulate.  Following with PT/OT outpatient  Home regimen:   Ocrevus every 6 months (last given 1/15/2024)  Follows with [facility]  neurology        Medical Problems       Resolved Problems  Date Reviewed: 4/16/2024   None       Discharging Physician / Practitioner: Vandana Alvarado MD  PCP: Ha Acosta MD  Admission Date:   Admission Orders (From admission, onward)       Ordered        04/15/24 1905  Place in Observation  Once                          Discharge Date: 04/17/24    Consultations During Hospital Stay:  Neuro  CM  PT  OT  ST    Procedures Performed:   MRI cervical spine w wo contrast   Final Result      Unchanged demyelinating lesions in cervical spine. No new or enhancing lesion.      Mild multilevel degenerative changes of cervical spine with mild foraminal narrowing left C7-T1, as detailed above. No significant canal stenosis.      Please see same day MRI brain MS with and without contrast for further evaluation.                     Workstation performed: [id number]         MRI brain MS wo and w contrast   Final Result      Unchanged multiple white matter lesions in a distribution compatible with multiple sclerosis. No new or enhancing lesions.      No acute intracranial abnormality.      Please see same day MRI cervical spine with and without contrast for further evaluation.            Workstation performed: [id number]         CT head without contrast   Final Result      No acute intracranial abnormality. Findings of demyelination are similar to prior CT. However, contrast-enhanced MR is more sensitive for detection and assessment of potential acute demyelination in this patient with known multiple sclerosis.                  Workstation performed: [id number]           Echo 4/16:    Left Ventricle: Left ventricular cavity size is normal. Wall thickness is normal. The left ventricular ejection fraction is 70% by visual estimation. Systolic function is vigorous. Although no diagnostic regional wall motion abnormality was identified, this possibility cannot be completely excluded on the basis of this study. Diastolic function is  "normal.    Right Ventricle: Right ventricle is not well visualized. Right ventricular cavity size is normal. Systolic function is normal.    Aorta: The aortic root is mildly dilated. The aortic root is 4.30 cm.    Significant Findings / Test Results:   none    Incidental Findings:   none       Test Results Pending at Discharge (will require follow up):   none     Outpatient Tests Requested:  none    Complications:  none known at this time    Reason for Admission: right sided hemiparesis    Hospital Course:   Carlos Landis is a 48 y.o. male patient who originally presented to the hospital on 4/15/2024 due to numbness and weakness on the right side for about 3 days.  CT head showing no acute abnormalities.  Neurology was consulted as patient has a history of MS.  MRI with and without contrast were ordered.  Patient was given a one-time dose of steroids.  MRI showed no acute abnormality.  Patient has otherwise remained hemodynamically stable afebrile in no acute respiratory distress.  Patient has been medically cleared for discharge by internal medicine and neurology.  He is to follow-up with his PCP and neurology team outpatient.  PT OT evaluated the patient and recommended outpatient PT and OT        Please see above list of diagnoses and related plan for additional information.     Condition at Discharge: stable    Discharge Day Visit / Exam:   Subjective:  Carlos was seen and examined at bedside.  No acute events overnight.  Discussed plan of care.  All questions and concerns were answered and addressed.  Has no acute complaints at this time.  Is eager to leave and go home.    Vitals: Blood Pressure: 110/72 (04/17/24 0801)  Pulse: 76 (04/17/24 0801)  Temperature: 97.7 °F (36.5 °C) (04/16/24 2235)  Temp Source: Oral (04/15/24 1554)  Respirations: 18 (04/17/24 0801)  Height: 5' 11\" (180.3 cm) (04/16/24 1531)  Weight - Scale: 107 kg (236 lb) (04/16/24 1531)  SpO2: 97 % (04/17/24 0801)  Exam:   Physical Exam  Vitals and " nursing note reviewed.   Constitutional:       General: He is not in acute distress.     Appearance: He is not ill-appearing.   HENT:      Head: Normocephalic and atraumatic.   Cardiovascular:      Rate and Rhythm: Normal rate and regular rhythm.      Pulses: Normal pulses.      Heart sounds: Normal heart sounds.   Pulmonary:      Effort: Pulmonary effort is normal.      Breath sounds: Normal breath sounds.   Abdominal:      General: Abdomen is flat. Bowel sounds are normal.      Palpations: Abdomen is soft.   Musculoskeletal:      Right lower leg: No edema.      Left lower leg: No edema.   Skin:     General: Skin is warm.   Neurological:      General: No focal deficit present.      Mental Status: He is alert and oriented to person, place, and time.      Motor: Weakness present.          Discussion with Family: Patient declined call to .     Discharge instructions/Information to patient and family:   See after visit summary for information provided to patient and family.      Provisions for Follow-Up Care:  See after visit summary for information related to follow-up care and any pertinent home health orders.      Mobility at time of Discharge:   Basic Mobility Inpatient Raw Score: 19  JH-HLM Goal: 6: Walk 10 steps or more  JH-HLM Achieved: 4: Move to chair/commode  HLM Goal NOT achieved. Continue to encourage mobility in post discharge setting.     Disposition:   Home    Planned Readmission: no     Discharge Statement:  I spent 40 minutes discharging the patient. This time was spent on the day of discharge. I had direct contact with the patient on the day of discharge. Greater than 50% of the total time was spent examining patient, answering all patient questions, arranging and discussing plan of care with patient as well as directly providing post-discharge instructions.  Additional time then spent on discharge activities.    Discharge Medications:  See after visit summary for reconciled discharge  medications provided to patient and/or family.      **Please Note: This note may have been constructed using a voice recognition system**

## 2024-04-17 NOTE — PLAN OF CARE
Problem: NEUROSENSORY - ADULT  Goal: Achieves stable or improved neurological status  Description: INTERVENTIONS  - Monitor and report changes in neurological status  - Monitor vital signs such as temperature, blood pressure, glucose, and any other labs ordered   - Initiate measures to prevent increased intracranial pressure  - Monitor for seizure activity and implement precautions if appropriate      Outcome: Progressing  Goal: Remains free of injury related to seizures activity  Description: INTERVENTIONS  - Maintain airway, patient safety  and administer oxygen as ordered  - Monitor patient for seizure activity, document and report duration and description of seizure to physician/advanced practitioner  - If seizure occurs,  ensure patient safety during seizure  - Reorient patient post seizure  - Seizure pads on all 4 side rails  - Instruct patient/family to notify RN of any seizure activity including if an aura is experienced  - Instruct patient/family to call for assistance with activity based on nursing assessment  - Administer anti-seizure medications if ordered    Outcome: Progressing  Goal: Achieves maximal functionality and self care  Description: INTERVENTIONS  - Monitor swallowing and airway patency with patient fatigue and changes in neurological status  - Encourage and assist patient to increase activity and self care.   - Encourage visually impaired, hearing impaired and aphasic patients to use assistive/communication devices  Outcome: Progressing     Problem: GASTROINTESTINAL - ADULT  Goal: Minimal or absence of nausea and/or vomiting  Description: INTERVENTIONS:  - Administer IV fluids if ordered to ensure adequate hydration  - Maintain NPO status until nausea and vomiting are resolved  - Nasogastric tube if ordered  - Administer ordered antiemetic medications as needed  - Provide nonpharmacologic comfort measures as appropriate  - Advance diet as tolerated, if ordered  - Consider nutrition  services referral to assist patient with adequate nutrition and appropriate food choices  Outcome: Progressing  Goal: Maintains or returns to baseline bowel function  Description: INTERVENTIONS:  - Assess bowel function  - Encourage oral fluids to ensure adequate hydration  - Administer IV fluids if ordered to ensure adequate hydration  - Administer ordered medications as needed  - Encourage mobilization and activity  - Consider nutritional services referral to assist patient with adequate nutrition and appropriate food choices  Outcome: Progressing  Goal: Maintains adequate nutritional intake  Description: INTERVENTIONS:  - Monitor percentage of each meal consumed  - Identify factors contributing to decreased intake, treat as appropriate  - Assist with meals as needed  - Monitor I&O, weight, and lab values if indicated  - Obtain nutrition services referral as needed  Outcome: Progressing  Goal: Oral mucous membranes remain intact  Description: INTERVENTIONS  - Assess oral mucosa and hygiene practices  - Implement preventative oral hygiene regimen  - Implement oral medicated treatments as ordered  - Initiate Nutrition services referral as needed  Outcome: Progressing

## 2024-04-17 NOTE — DISCHARGE INSTR - AVS FIRST PAGE
You are to follow-up with your PCP in 1 week    You are to follow-up with neurology    You will have a referral for outpatient physical therapy and Occupational Therapy.

## 2024-04-17 NOTE — PROGRESS NOTES
Progress Note - Neurology   Carlos DESTIN Sabiha 48 y.o. male MRN: [id number]  Unit/Bed#: -01 Encounter: [id number]      Assessment/Plan     MS (multiple sclerosis) (HCC)  Assessment & Plan  48 year old male with history of MS (diagnosed 2022; on Ocrevus).  Prior encounters/admissions for right-sided hemiparesis/numbness.      Presents on 4/15 with recurrence of right facial and extremity numbness/weakness.  Was also receiving recent Depakote/Solu-Medrol infusions.     Neuroimaging overnight negative for acute lesions/no evidence of active demyelination (suspect similar to previous episodes, this represents pseudo-exacerbation of MS symptoms).  Patient subjectively feels improved this morning following 1 dose of Solu-Medrol yesterday.     Neuroimaging:  -MRI brain: unchanged white matter lesions consistent with MS history; no new/enhancing lesions noted  -MRI cervical spine: mild multilevel degenerative disease, no central canal stenosis; stable white matter lesions (C5-C6 in particular); no new/enhancing lesions noted    Plan:  -status post 1 dose of Solumedrol 1G for now; no need for additional dosing.  Patient should contact office to discuss any further medication infusion recommendations for headache (previously given Solumedrol/Depacon x1 few months ago)  -MRI's as mentioned above; no new pathology/demyelinating disease noted  -CT head 4/15: No acute intracranial pathology  -Screen for any metabolic/infectious derangements which could serve as pseudo-flare MS source  -Afebrile   -CMP normal  -CBC this morning normal  -UA negative for infection  -Supportive care  -Therapy evaluations (patient was receiving OP therapy prior to admission  -Ongoing outpatient MS/neurology team follow up  -On Ocrevus, next dose scheduled for June/July      No further inpatient neurologic workup. Ok from neurology standpoint for discharge. Discussed plan of care with attending neurologist.     Next scheduled MS team follow-up  "appointment is scheduled for July 12th; will attempt to expedite.     Subjective:   Patient resting in bed, notes his speech and facial droopiness feels improved.     Vitals: Blood pressure 103/71, pulse 82, temperature 97.7 °F (36.5 °C), resp. rate 16, height 5' 11\" (1.803 m), weight 107 kg (236 lb), SpO2 95%.,Body mass index is 32.92 kg/m².    Examined alongside Dr. Goff.     Physical Exam:  Physical Exam  Constitutional:       Appearance: Normal appearance.   HENT:      Head: Normocephalic and atraumatic.   Eyes:      Extraocular Movements: Extraocular movements intact.      Conjunctiva/sclera: Conjunctivae normal.      Pupils: Pupils are equal, round, and reactive to light.   Cardiovascular:      Rate and Rhythm: Normal rate.   Pulmonary:      Effort: Pulmonary effort is normal.   Abdominal:      General: There is no distension.   Musculoskeletal:         General: Normal range of motion.      Cervical back: Normal range of motion and neck supple.   Skin:     General: Skin is warm and dry.      Comments: Notable L forearm psoriasis   Neurological:      Mental Status: He is alert and oriented to person, place, and time.        Neurologic Exam     Mental Status   Oriented to person, place, and time.   Follows 2 step commands.   Attention: normal. Concentration: normal.   Normal comprehension.   Oriented to day of week, month,     Speech still slightly broken in pattern     Cranial Nerves     CN III, IV, VI   Pupils are equal, round, and reactive to light.    Motor Exam   Improved proximal R UE and LE strength today, just minimal giveaway weakness; remains full strength throughout L UE and LE.      Sensory Exam   Light touch normal.     Gait, Coordination, and Reflexes     Tremor   Resting tremor: absent  Intention tremor: absent       Lab, Imaging and other studies:   MRI cervical spine w wo contrast   Final Result by Ricky Berry MD (04/16 5420)      Unchanged demyelinating lesions in cervical " spine. No new or enhancing lesion.      Mild multilevel degenerative changes of cervical spine with mild foraminal narrowing left C7-T1, as detailed above. No significant canal stenosis.      Please see same day MRI brain MS with and without contrast for further evaluation.                     Workstation performed: [id number]         MRI brain MS wo and w contrast   Final Result by Ricky Berry MD (04/16 1607)      Unchanged multiple white matter lesions in a distribution compatible with multiple sclerosis. No new or enhancing lesions.      No acute intracranial abnormality.      Please see same day MRI cervical spine with and without contrast for further evaluation.            Workstation performed: [id number]         CT head without contrast   Final Result by Cornelio Fuller MD (04/15 1849)      No acute intracranial abnormality. Findings of demyelination are similar to prior CT. However, contrast-enhanced MR is more sensitive for detection and assessment of potential acute demyelination in this patient with known multiple sclerosis.                  Workstation performed: [id number]            CBC:   Results from last 7 days   Lab Units 04/17/24  0411 04/16/24  0549 04/15/24  1645   WBC Thousand/uL 7.02 7.33 10.41*   RBC Million/uL 4.79 4.66 5.39   HEMOGLOBIN g/dL 13.7 13.5 15.4   HEMATOCRIT % 42.4 41.1 48.4   MCV fL 89 88 90   PLATELETS Thousands/uL 214 202 236   , BMP/CMP:   Results from last 7 days   Lab Units 04/17/24  0411 04/16/24  0549 04/15/24  1645   SODIUM mmol/L 140 139 140   POTASSIUM mmol/L 4.4 4.1 3.7   CHLORIDE mmol/L 108 107 105   CO2 mmol/L 23 25 23   BUN mg/dL 25 19 21   CREATININE mg/dL 0.80 0.75 0.84   CALCIUM mg/dL 9.3 8.9 9.6   AST U/L  --   --  13   ALT U/L  --   --  12   ALK PHOS U/L  --   --  57   EGFR ml/min/1.73sq m 104 106 102   , Vitamin B12:   , HgBA1C:   Results from last 7 days   Lab Units 04/16/24  0549   HEMOGLOBIN A1C % 5.1   , TSH:   , Coagulation:   , Lipid  Profile:   Results from last 7 days   Lab Units 04/16/24  0549   HDL mg/dL 32*   LDL CALC mg/dL 59   TRIGLYCERIDES mg/dL 110   , Ammonia:   , Urinalysis:   Results from last 7 days   Lab Units 04/15/24  1849   COLOR UA  Yellow   CLARITY UA  Clear   SPEC GRAV UA  1.025   PH UA  7.0   LEUKOCYTES UA  Negative   NITRITE UA  Negative   GLUCOSE UA mg/dl Negative   KETONES UA mg/dl Negative   BILIRUBIN UA  Negative   BLOOD UA  Negative       VTE Prophylaxis: Sequential compression device (Venodyne)  and Heparin    Please see attending's attestation for total time spent/billing.  Discussed plan of care with patient and primary team: MRI's negative for new MS disease, no active lesions, can discharge today with therapy recommendations, ongoing MS team follow-up as outpatient in the coming months.    Please note dictation software was used in the formulation of this note.  Please keep that in mind in light of any grammatical errors.

## 2024-04-17 NOTE — ASSESSMENT & PLAN NOTE
48 year old male with history of MS (diagnosed 2022; on Ocrevus).  Prior encounters/admissions for right-sided hemiparesis/numbness.      Presents on 4/15 with recurrence of right facial and extremity numbness/weakness.  Was also receiving recent Depakote/Solu-Medrol infusions.     Neuroimaging overnight negative for acute lesions/no evidence of active demyelination (suspect similar to previous episodes, this represents pseudo-exacerbation of MS symptoms).  Patient subjectively feels improved this morning following 1 dose of Solu-Medrol yesterday.     Neuroimaging:  -MRI brain: unchanged white matter lesions consistent with MS history; no new/enhancing lesions noted  -MRI cervical spine: mild multilevel degenerative disease, no central canal stenosis; stable white matter lesions (C5-C6 in particular); no new/enhancing lesions noted    Plan:  -status post 1 dose of Solumedrol 1G for now; no need for additional dosing.  Patient should contact office to discuss any further medication infusion recommendations for headache (previously given Solumedrol/Depacon x1 few months ago)  -MRI's as mentioned above; no new pathology/demyelinating disease noted  -CT head 4/15: No acute intracranial pathology  -Screen for any metabolic/infectious derangements which could serve as pseudo-flare MS source  -Afebrile   -CMP normal  -CBC this morning normal  -UA negative for infection  -Supportive care  -Therapy evaluations (patient was receiving OP therapy prior to admission  -Ongoing outpatient MS/neurology team follow up  -On Ocrevus, next dose scheduled for June/July

## 2024-04-17 NOTE — ASSESSMENT & PLAN NOTE
Diagnosed in 2022.  Chronic right-sided weakness.  Uses a walker to ambulate.  Following with PT/OT outpatient  Home regimen:   Ocrevus every 6 months (last given 1/15/2024)  Follows with [facility] neurology

## 2024-04-19 ENCOUNTER — EVALUATION (OUTPATIENT)
Facility: CLINIC | Age: 49
End: 2024-04-19
Payer: COMMERCIAL

## 2024-04-19 ENCOUNTER — OFFICE VISIT (OUTPATIENT)
Facility: CLINIC | Age: 49
End: 2024-04-19
Payer: COMMERCIAL

## 2024-04-19 DIAGNOSIS — R26.81 UNSTEADINESS ON FEET: ICD-10-CM

## 2024-04-19 DIAGNOSIS — G35 MS (MULTIPLE SCLEROSIS) (HCC): Primary | ICD-10-CM

## 2024-04-19 PROCEDURE — 97112 NEUROMUSCULAR REEDUCATION: CPT

## 2024-04-19 PROCEDURE — 97168 OT RE-EVAL EST PLAN CARE: CPT

## 2024-04-19 NOTE — PROGRESS NOTES
OCCUPATIONAL THERAPY RE-EVALUATION        24  Carlos Landis  1975  [id number]  Kathy Gregory PA-C   Diagnosis ICD-10-CM Associated Orders   1. MS (multiple sclerosis) ([facility])  G35             Assessment/Plan      SKILLED ANALYSIS:    Pt is a 48 y.o. male referred to Occupational Therapy s/p MS (multiple sclerosis) ([facility]) [G35].  Pt participated in 4 sessions of skilled OT, focusing on improving strength and coordination of RUE. Re-evaluation being completed as pt was in the hospital from 4/15- with MS flare. Pt with decline in strength of RUE, including  and pinch strength. Pt with a decline in performance on the 9 hole peg test, but improved performance on the O'Gagandeep finger dexterity test and keyhole peg test, see grid below for details.  Pt cont to present with the following areas of deficit: FMC/FMS, GMC/GMS, hand to target accuracy, in hand manipulation/dexterity, binocular teaming, pursuits, saccades, and convergence impacting ability to independently and safely complete ADL/IADL and leisure tasks.  Pt does demo the need for cont skilled Occupational Therapy services 2x/week for 12 weeks with focus on ADL re-training, IADL re-training, UE NMR, visual perceptual training, visual scanning, UE strengthening, UE endurance, FMC/GMC, DME training/education, leisure pursuits, and community re-integration to address the goals as listed below. Pt in agreement with POC, POC to  24.          Short Term Goals (to be achieved within 4 weeks):  Pt will increase oculomotor control for improved saccades, pursuits, con/divergent tasks for improved reading, board to table tasks x 80% accuracy  with minimal increase in symptoms PROGRESSING   Pt will increase R UE rate of manipulation for all FM tests for improved functional performance/independence with functional tasks x 25% PROGRESSING    Pt will increase R UE strength to 4+/5,  strength by 5 lbs and pinch strength by 2 pounds, through the use  "of strengthening exercises and home program for eventual return to IADLs and life roles. PROGRESSING        Long Term Goals (to be achieved within 12 weeks):  Pt will increase oculomotor control for improved dynamic activities with head turns, board/screen to table tasks symptom free with 90% accuracy for improved life roles PROGRESSING    Pt will increase RUE prehension pattens for improved ability to manipulate all ADL items with min to no difficulty PROGRESSING    Pt will increase R UE strength to 5/5,  strength by 7 lbs and pinch strength by 3-5 lbs, through the use of strengthening exercises and home program PROGRESSING   Pt will improve handwriting legibility to G+ for print and G script, so as to improve written communications. PROGRESSING    Pt will be I with HEP for improved functional use of RUE  PROGRESSING                SUBJECTIVE      SUBJECTIVE: \"I am going to try going up to my room and sleep in my bed today\"      PATIENT GOAL: \"I want to get the use of my arm back\"        HISTORY OF PRESENT ILLNESS:     Pt is a 48 y.o. male who was referred to Occupational Therapy s/p  MS (multiple sclerosis) ([facility]) [G35]. Pt reporting he was diagnosed with MS about 3 years ago. Pt was having HHS until about a year ago. Pt now presents to OPOT for evaluation due to continued functional decline at home.     Pt with recent hospitalization from 4/15-4/17 with R side weakness for 3 days, consistent with MS flare. MRI (-) for acute changes.       PMH:   Past Medical History:   Diagnosis Date    Arthritis     Diabetes mellitus ([facility])     prediabetes    MS (multiple sclerosis) ([facility])     Scoliosis     Visual impairment    Psoriasis     Past Surgical Hx:   Past Surgical History:   Procedure Laterality Date    HERNIA REPAIR      3 times    KNEE SURGERY Right         HOME SETUP/ CLOF: lives with mother, father, and his daughter (23 yo); 2 SH, 2-3 ALEXEI; stair lift to 2nd floor; but has been staying 1st floor; mostly sleeps in " recliner; full bath on 1st floor; tub shower combo    ADLs: I with ADLs     IADLs: pt's father does laundry, cooking, cleaning  Manages own medications  (-) ; has not driven in 2 years     Functional Mobility: I with RW     Work: was working as a ; not currently working; on disability     Physical activity/ leisure engagement: currently only leaving the house for doctor appts    DME: transfer tub bench; in process of getting approval from state for coverage to get tub converted into shower; grab bars in shower and toilet; transfer chair; w/c; RW (has 2, one on each level)    Falls: no falls in the last year; last fall was 2 years ago    Pain Levels: none            OBJECTIVE         PHYSICAL ASSESSMENT    RUE ataxic movement       RUE LUE Comments                 UPPER EXTREMITY FXN Impaired Intact Dominant Hand: Right                 UE ROM LUE AROM  RUE AROM COMMENTS   Shoulder Flex WNL  WFL    Shoulder Ext WNL  WNL    Shoulder ABD WNL  WFL    Horizontal ABD WNL  WNL    Horizontal ADD WNL  WNL    Shoulder IR  WNL  WNL    Shoulder ER WNL WNL    Elbow Flex WNL WNL    Elbow Ext  WNL WNL    Supination  WNL WNL    Pronation  WNL WNL    Wrist Flex WNL WNL    Wrist Ext WNL WNL    Finger Flex WNL WNL    Finger Ext WNL WNL    Opposition  WNL WNL                               MMT  LUE RUE   Comments   Shoulder Flex/Ext 5/5 4-/5 (was 4/5)    Shoulder Abd 5/5 3+/5 (was 4/5)    Shoulder Add 5/5 3+/5 (was 4/5)    Shoulder ER 5/5 4-5 (was 4/5)    Shoulder IR 5/5 4-5 (was 4/5)     Elbow Flex 5/5 4/5 (was 4+/5)    Elbow Ext 5/5 4/5 (was 4+/5)    Wrist Flex 5/5 4/5 (was 4+/5)    Wrist Ext 5/5 4/5 (was 4+/5)    Gross Grasp 5/5 4/5 (was 4+/5)                       /Pinch Strength  LUE  RUE    Comments   Dynamometer      - Gross Grasp 48 (was 52 lbs) 45 (was 60 lbs)    Pinch Meter       - PINCER 7 (was 8 lbs) 8 (was 6 lbs)     - 3 JAW DEANDRE 13 (was 15 lbs) 9 (was 10 lbs)     - LATERAL 14 (was 13 lbs)  14 (was 15 lbs)       SENSATION LUE RUE Comments   Sharp Dull  Intact Intact    Proprioception Intact Intact    Pain Intact Intact    Hot/Cold Temp Intact Intact      COORDINATION LUE RUE    9 Hole Peg Test 29 sec (was 28 seconds) 1 min 3 sec (was 49 seconds)    Keyhole Peg Test  1 min 41 sec (entire board)  3 min 26 sec (entire board) 4/11:   L: 2 min 2 sec   R: 41 seconds to place 1 peg   O'Gagandeep Finger Dexterity Test  1 min 25 sec (top 3 rows) 2 min 55 sec (top 3 rows) 4/11: (top 3 rows)  L: 2 min 37 sec   R: 4 min 13 sec    Handwriting (Print)  G- legibility     Handwriting (script)   F legibility             COGNITIVE ASSESSMENT      Cognitive Checklist:     Memory: pt reports mild memory deficits     Attention: WFL     Processing: WFL    Executive Functions: WFL     Communication: Intact     Visual: no deficits               VISUAL ASSESSMENT      Comments: (+) glasses, wears only when watching tv     Vision Screen       ROM Monocular assessment: intact Binocular Assessment: Intact   Tracking Monocular assessment: Intact Binocular Assessment: Intact    Saccades Min difficulty with target location; undershooting noted into periphery and at midline; delay noted before saccadic eye movement     Convergence/ Divergence Impaired; B eyes not teaming with L eye impaired convergence     Observations  L eye exotropia noted            PLANNED THERAPY INTERVENTIONS:  Therapeutic activity  Therapeutic exercise  Neuromuscular re-education   Visual re-training   ADL re-training   IADL re-training  Oculomotor control:  saccades, con/divergence  FMC/prehension  Timed Trials  Manual tx  Hand to target  WBearing strategies   Closed chain activities  Open chain activities       INTERVENTION COMMENTS:  Diagnosis: MS (multiple sclerosis) ([facility]) [G35]  Precautions: R side weakness  Insurance: Payor: [employer] / Plan: [employer] / Product Type: Medicaid HMO /   4 of 24 visits

## 2024-04-19 NOTE — PROGRESS NOTES
PT Re-Evaluation     Today's date: 2024  Patient name: Carlos Landis  : 1975  MRN: [id number]  Referring provider: Kathy Gregory PA-C  Dx:   Encounter Diagnosis     ICD-10-CM    1. MS (multiple sclerosis) ([facility])  G35       2. Unsteadiness on feet  R26.81                        Assessment  Assessment details: 2024: Pt is a 48 y.o. male returning to pt after admission to hospital with MS flare up. Reassessment was performed today to obtain more orders to continue therapy. Despite recent flare up, pt's 6MWT increased by >200ft, and 5xSTS time increased as well. LAQ were added to address quadricep strength defiicts with education on pacing Pt will continue to benefit form skilled PT so that he can benefit form skilled PT so that he can navigate his environment with increased safety and stability.      At IE: Pt is a 48 y.o. male presenting to physical therapy with chief complaint of reduced endurance, poor activity tolerance, impaired transfers, and reduced functional participation secondary to multiple sclerosis Assessment today reflects globally reduced muscle strength, most noteably in the hip, core, and LE musculature, which impacts his ability to perform transfers and ambulation tasks. R sided deficits are greater than L sided deficits. Gait speed assessment places him at the level of a household ambulator. 5xSTS was unable to be completed without UE support. ABC indicates that pt is at risk of falls and has low balance confidence. Pt will benefit from skilled physical therapy intervention addressing muscle strength, endurance, activity tolerance, and functional deficits so that he/she may be able to navigate their environment with increased independence and safety.   Impairments: abnormal gait, abnormal movement, activity intolerance, difficulty understanding, impaired balance, impaired physical strength, lacks appropriate home exercise program, safety issue and poor posture   Functional  limitations: deconditioning, gait, and transfersUnderstanding of Dx/Px/POC: good   Prognosis: good    Goals  STG's: to be achieved in 4 weeks  -Pt will exhibit independence with HEP within 2 weeks. ONGOING  -Pt will be able to complete a chair to mat transfer with supervision assistance ONGOING  -2MWT to be performed and subsequent goal to be set.  Discontinued, pt able to complete 6MWT  -ABC to improve by MCID of 14% ONGOING  -Pt will improve gait speed by MCID of .13m/s ONGOING     LTG's: to be achieved in 8 weeks  -pt will be able to ambulate for 6 minutes without rest break due to fatigue so that he may be able to walk around his house without difficulty MET  -pt will improve gait speed to between 0.4-0.8m/s to reflect status as a limited community ambulator ONGOING  -Pt will improve TUG score with RW to >13.5 s to reflect rduced risk of falls ONGOING  -Pt will be able to complete an STS transfer without UE support so that he can rise from chairs without arms.  ONGOING      Plan  Plan details: TE: for strengthening, stretching, and flexibility  TA: for functional participation  NR: for balance, coordination, reaction time  MT: for STM, IASTM, joint mobilizations  Gait Training: to improve gait mechanics.    Patient would benefit from: skilled physical therapy  Planned modality interventions: electrical stimulation/Russian stimulation, thermotherapy: hydrocollator packs, biofeedback and cryotherapy  Planned therapy interventions: balance, ADL training, abdominal trunk stabilization, activity modification, balance/weight bearing training, behavior modification, body mechanics training, breathing training, canalith repositioning, neuromuscular re-education, patient education, therapeutic activities, therapeutic exercise, strengthening, sensory integrative techniques, gait training, home exercise program, postural training, graded exercise, graded activity and stretching  Frequency: 2x week  Duration in visits:  16  Duration in weeks: 8  Plan of Care beginning date: 3/27/2024  Plan of Care expiration date: 2024  Treatment plan discussed with: patient    Subjective Evaluation    History of Present Illness  Mechanism of injury: 2024: Pt states that he had significant right sided weakness and went to the ER. He was admitted. He lost his speech. He states that it was a bad flare up. He is looking forward to going to his doctor to get his R elbow psoriasis looked at. He states that he is supposed to be getting steroids every two weeks but no one ever scheduled him. He states that he feels great. He states that the swimmer's ear is still there but that takes a lot for it to go away.     At IE: Pt states he is coming in for treatment for his MS. He notes that he cannot move his R arm at times. He presents in a travel chair, which is his primary method of mobility, and he uses an RW at home. HE notes that he is very tired and sleeps most of the day. They have purchased a stair lift for his home as he has difficulty navigating stairs. He lives with his brother. He is in the process of getting a walk in tub & had a shower chair. He also experiences a lot of headaches that result in facial numbness on his right, recently it has been on his left too. Ibuprofen gels and closing his eyes reduces his pain and opening his eyes when he has headaches makes it worse. His physicians are aware of this and are managing it. He has right sided facial droop associated with his MS that has present since his diagnosis. His goals are to improve his stamina so that he can walk further in his home.           Recurrent probem    Patient Goals  Patient goals for therapy: improved balance  Patient goal: improve his stamina  Pain  Current pain ratin  At best pain ratin  At worst pain rating: 10  Location: let & right side of his head.    Social Support  Lives in: multiple-level home  Lives with: parents (siblings)    Employment status: not  "working  Treatments  Previous treatment: physical therapy and occupational therapy      Objective     Strength/Myotome Testing     Left Shoulder     Planes of Motion   Flexion: 3+   Abduction: 3     Right Shoulder     Planes of Motion   Flexion: 3   Abduction: 2     Left Elbow   Flexion: 4  Extension: 4    Right Elbow   Flexion: 3+  Extension: 4    Left Hip   Planes of Motion   Flexion: 4    Right Hip   Planes of Motion   Flexion: 3+    Left Knee   Flexion: 4  Extension: 4    Right Knee   Flexion: 4  Extension: 3    Left Ankle/Foot   Dorsiflexion: 3    Right Ankle/Foot   Dorsiflexion: 2+  Neuro Exam:     Transfers   Sit to stand: independent   Wheelchair to mat: Wheelchair to mat transfer: contact guard.  Mat to wheelchair: Mat to wheelchair transfer: contact guad.             Precautions: MS, r sided weakness, frequent headaches, history or R ACLR, falls risk  POC expires: 05/22/2024      Tests and measures 3/27 4/3  4/5 4/9 4/11 4/17 4/19      ABC 33.13%     39.38%       MARIN   27/56           6MWT   190 feet 3 mins 32 secs    407ft        5xSTS 34.54 w UE support     30.91       10mWT 0.33m/s w RW            TUG 38.80s w RW            Neuro Re-Ed             Victorino navigation   Fwd BUE step to 10 laps          Compliant surface             Tests and measures      ABC, MARIN, 6MWT, 5xSTS                                                           Ther Ex             STS  2x10 foam on chair X8 foam on chair 2x10 foam on chair no UE support 2x10 foam on chair on UE support        Standing hip abduction             Standing march             Mini Squats     8x with UE support before experiencing hollow feeling.           Step ups  //bar up and over 6\" 10x  // bar up/back 6' step  // Bar up/back 6' step // Bar up/ back 8\" step        LAQ      3x5 + pacing 15lb AW                                 Ther Activity                                       Gait Training             With RW    2 x 144ft with RW CS 2x523ho with RW CS "        Unilateral UE parallel bar   4x laps  2 laps fwd  2 laps bwd  2 laps lat step          Modalities

## 2024-04-24 ENCOUNTER — OFFICE VISIT (OUTPATIENT)
Facility: CLINIC | Age: 49
End: 2024-04-24
Payer: COMMERCIAL

## 2024-04-24 DIAGNOSIS — G35 MS (MULTIPLE SCLEROSIS) (HCC): Primary | ICD-10-CM

## 2024-04-24 DIAGNOSIS — R26.81 UNSTEADINESS ON FEET: ICD-10-CM

## 2024-04-24 PROCEDURE — 97112 NEUROMUSCULAR REEDUCATION: CPT

## 2024-04-24 PROCEDURE — 97116 GAIT TRAINING THERAPY: CPT

## 2024-04-24 NOTE — PROGRESS NOTES
"Daily Note     Today's date: 2024  Patient name: Carlos Landis  : 1975  MRN: [id number]  Referring provider: Kathy Gregory PA-C  Dx:   Encounter Diagnosis     ICD-10-CM    1. MS (multiple sclerosis) ([facility])  G35       2. Unsteadiness on feet  R26.81                      Subjective: Pt states that he is feeling okay today. He is a little worn out from the hospital treatments wearing off.       Objective: See treatment diary below      Assessment: Pt participated in a skilled PT session focused on balance, gait, and strengthening. Attempted to trial 3 laps around clinic today however pt became lightheaded and feeling \"like the ocean\" so only performed two rounds. Weighted vest added to challenge LE strength with STS. Educated pt on role of higher weight and intensity volume to maximize benefit for MS. Pt will continue to benefit from skilled PT so that he can navigate his environment with increased safety and stability.       Plan: Continue per plan of care.      Precautions: MS, r sided weakness, frequent headaches, history or R ACLR, falls risk  POC expires: 2024      Tests and measures 3/27 4/3  4     ABC 33.13%     39.38%       MARIN              6MWT   190 feet 3 mins 32 secs    407ft        5xSTS 34.54 w UE support     30.91       10mWT 0.33m/s w RW            TUG 38.80s w RW            Neuro Re-Ed             Victorino navigation   Fwd BUE step to 10 laps          Compliant surface             Tests and measures      ABC, MARIN, 6MWT, 5xSTS                                                           Ther Ex             STS  2x10 foam on chair X8 foam on chair 2x10 foam on chair no UE support 2x10 foam on chair on UE support   4x5 foam on chair + 10lb weighted vest with UE support     Standing hip abduction             Seated marching        3x5 3\" weight      Mini Squats     8x with UE support before experiencing hollow feeling.           Step ups  //bar up and over 6\" " "10x  // bar up/back 6' step  // Bar up/back 6' step // Bar up/ back 8\" step        LAQ      3x5 + pacing 15lb AW 3x5 + 15lb pacing AW 3x15lb + pacing with AW                               Ther Activity                                       Gait Training             With RW    2 x 144ft with RW CS [id number] with RW CS   2x144 ft with RW cs     Unilateral UE parallel bar   4x laps  2 laps fwd  2 laps bwd  2 laps lat step          Modalities                                            "

## 2024-04-26 ENCOUNTER — OFFICE VISIT (OUTPATIENT)
Facility: CLINIC | Age: 49
End: 2024-04-26
Payer: COMMERCIAL

## 2024-04-26 DIAGNOSIS — G35 MS (MULTIPLE SCLEROSIS) (HCC): Primary | ICD-10-CM

## 2024-04-26 DIAGNOSIS — R26.81 UNSTEADINESS ON FEET: ICD-10-CM

## 2024-04-26 PROCEDURE — 97112 NEUROMUSCULAR REEDUCATION: CPT

## 2024-04-26 PROCEDURE — 97110 THERAPEUTIC EXERCISES: CPT

## 2024-04-26 NOTE — PROGRESS NOTES
Occupational Therapy Daily Note:    Today's date: 2024  Patient name: Carlos Landis  : 1975  MRN: [id number]  Referring provider: Kathy Gregory PA-C  Dx:   Encounter Diagnosis   Name Primary?    MS (multiple sclerosis) ([facility]) Yes         Subjective: no new complaints     Objective: Pt engaged in skilled OT treatment session with focus on UE NMR, UE strengthening, UE endurance, and FMC/GMC to increase engagement, tolerance, and independence with daily ADL/ IADL and leisure activities.     CPT Code Minutes                                           Task Details        Therapeutic Activity                     Neuro Re-Ed  NMRE to BUE to improve functional reach and dexterity:    -pt able to use R hand to open resistive pop tops and transfer to R side sh height shelf; min difficulty with in-hand manipulations with medium sized objects; G functional reaching to transfer items to R sided shelf     -pt able to  golf tees from center table and transfer to medium sized target on R sided sh height shelf; G functional reaching; G target accuracy; min diffiuclty with dexterity when orienting tees to place at target.     -Item transfer from chest level shelf to tabletop surface; G functional reaching across table to reach shelf; G item retrieval and transfer to table; G- manipulation of items during transfer.     -able to use R hand to place golf tees upside on tabletop surface; able to place small rings over golf tees with min difficulty with motor control while sliding rings over tees.                      Therapeutic Exercise                 Testing   O'Gagandeep Finger Dexterity Test (top 3 rows)   L: 2 min 37 sec   R: 4 min 13 sec       Keyhole Peg Test:  L: 2 min 2 sec   R: 41 seconds to place 1 peg; unable to complete further         HEP           Assessment: Tolerated treatment well. Pt demo G functional reaching, responds well to pacing to minimize fatigue. Patient would benefit from continued skilled  OT.    Plan: Continued skilled OT per POC    INTERVENTION COMMENTS:  Diagnosis: MS (multiple sclerosis) ([facility]) [G35]  Precautions: R side weakness  Insurance: Payor: [employer] / Plan: [employer] / Product Type: Medicaid HMO /   5 of 24 visits

## 2024-04-26 NOTE — PROGRESS NOTES
"Daily Note     Today's date: 2024  Patient name: Carlos Landis  : 1975  MRN: [id number]  Referring provider: Kathy Gregory PA-C  Dx:   Encounter Diagnosis     ICD-10-CM    1. MS (multiple sclerosis) ([facility])  G35       2. Unsteadiness on feet  R26.81                        Subjective: Pt states his energy levels are pretty good today. His psoriasis in his ears is bothering him.     Objective: See treatment diary below      Assessment: Pt participated in a skilled PT session focused on balance, gait, and strengthening. Interval training implemented and pt responded well to challenge, stating that he felt a good challenge from task. STS performed and pt still required UE support. LAQ performed with improved height. Pt given homework of getting up from his chair and perform STS 5x whenever he gets up from his chair and adding 1 more lap around his kitchen per day.  Pt will continue to benefit from skilled PT so that he can navigate his environment with increased safety and stability.       Plan: Continue per plan of care.      Precautions: MS, r sided weakness, frequent headaches, history or R ACLR, falls risk  POC expires: 2024      Tests and measures 3/27 4/3  4/5 4/9 4/11 4/17 4/19 4/24 4/26    ABC 33.13%     39.38%       MARIN              6MWT   190 feet 3 mins 32 secs    407ft        5xSTS 34.54 w UE support     30.91       10mWT 0.33m/s w RW            TUG 38.80s w RW            Neuro Re-Ed             Victorino navigation   Fwd BUE step to 10 laps          Compliant surface             Tests and measures      ABC, MARIN, 6MWT, 5xSTS                                                           Ther Ex             STS  2x10 foam on chair X8 foam on chair 2x10 foam on chair no UE support 2x10 foam on chair on UE support   4x5 foam on chair + 10lb weighted vest with UE support 4x5 foam on chair + 10lb weighted vest with UE support    Standing hip abduction             Seated marching        3x5 3\" " "weight  3x5    Mini Squats     8x with UE support before experiencing hollow feeling.           Step ups  //bar up and over 6\" 10x  // bar up/back 6' step  // Bar up/back 6' step // Bar up/ back 8\" step        LAQ      3x5 + pacing 15lb AW 3x5 + 15lb pacing AW 3x5 15lb + pacing with AW X 5 15lb + pacing with AW                              Ther Activity                                       Gait Training             With RW    2 x 144ft with RW CS [id number] with RW CS    3 ounds 1 interval training 1 min fast 1 min normal pace walk 6 min total    Unilateral UE parallel bar   4x laps  2 laps fwd  2 laps bwd  2 laps lat step          Modalities                                            "

## 2024-04-30 ENCOUNTER — OFFICE VISIT (OUTPATIENT)
Facility: CLINIC | Age: 49
End: 2024-04-30
Payer: COMMERCIAL

## 2024-04-30 DIAGNOSIS — G35 MS (MULTIPLE SCLEROSIS) (HCC): Primary | ICD-10-CM

## 2024-04-30 DIAGNOSIS — R26.81 UNSTEADINESS ON FEET: ICD-10-CM

## 2024-04-30 PROCEDURE — 97110 THERAPEUTIC EXERCISES: CPT

## 2024-04-30 PROCEDURE — 97112 NEUROMUSCULAR REEDUCATION: CPT

## 2024-04-30 PROCEDURE — 97530 THERAPEUTIC ACTIVITIES: CPT

## 2024-04-30 NOTE — PROGRESS NOTES
Occupational Therapy Daily Note:    Today's date: 2024  Patient name: Carlos Landis  : 1975  MRN: [id number]  Referring provider: Kathy Gregory PA-C  Dx:   Encounter Diagnosis   Name Primary?    MS (multiple sclerosis) ([facility]) Yes         Subjective: pt reporting fluctuating numbness L side of face     Objective: Pt engaged in skilled OT treatment session with focus on UE NMR, UE strengthening, UE endurance, and FMC/GMC to increase engagement, tolerance, and independence with daily ADL/ IADL and leisure activities.     CPT Code Minutes                                           Task Details        Therapeutic Activity        Pt with large area on L forearm, covering 3/4 lateral surface starting at elbow, plaque psoriasis, with a few small open areas. Non-adhesive dressing applied and covered with stockinet.                  Neuro Re-Ed  NMRE to RUE to improve functional reach and dexterity:      -pt able use R hand to open resistive pop tops with min difficulty and min increased time.     -Strengthening/ motor control: able to use R hand 3 jaw kishore to manipulate green resistive clip to transfer items from tabletop to R side shoulder height shelf. G functional reaching, min tremors noted, increased time    -dexterity task of manipulating small items and transferring to medium sized target on chest level shelf on R side; completed with mod/max difficulty, increased tremors.                    Therapeutic Exercise                 Testing           HEP           Assessment: Tolerated treatment well. L forearm covered with dressing to prevent infection and maintain skin integrity. Pt with G participation, increased difficulty with RUE motor control today, with noted increased tremors with FM tasks.  Patient would benefit from continued skilled OT.    Plan: Continued skilled OT per POC    INTERVENTION COMMENTS:  Diagnosis: MS (multiple sclerosis) ([facility]) [G35]  Precautions: R side weakness  Insurance: Payor:  [employer] / Plan: [employer] / Product Type: Medicaid HMO /   6 of 24 visits

## 2024-04-30 NOTE — PROGRESS NOTES
Daily Note     Today's date: 2024  Patient name: Carlos Landis  : 1975  MRN: [id number]  Referring provider: Kathy Gregory PA-C  Dx:   Encounter Diagnosis     ICD-10-CM    1. MS (multiple sclerosis) (HCC)  G35       2. Unsteadiness on feet  R26.81                          Subjective: Pt states his energy levels are pretty good today. His psoriasis in his ears is bothering him.     Objective: See treatment diary below      Assessment: Pt participated in a skilled PT session focused on balance, gait, and strengthening. Step ups performed with interval training. Reduced variation in gait speed observed during interval training today, however this improved with cueing. STS progressed to no UE support + weighted vest on increased elevation to increase intensity. Reported increased fatigue compared to typical visit, so increased pacing today. Pt will continue to benefit from skilled PT so that he can navigate his environment with increased safety and stability.       Plan: Continue per plan of care.      Precautions: MS, r sided weakness, frequent headaches, history or R ACLR, falls risk  POC expires: 2024      Tests and measures 3/27 4/3  4/5 4/9 4/11 4/17 4/19 4/24 4/26 4/30   ABC 33.13%     39.38%       MARIN              6MWT   190 feet 3 mins 32 secs    407ft        5xSTS 34.54 w UE support     30.91       10mWT 0.33m/s w RW            TUG 38.80s w RW            Neuro Re-Ed             Victorino navigation   Fwd BUE step to 10 laps          Compliant surface             Tests and measures      ABC, MARIN, 6MWT, 5xSTS                                                           Ther Ex             STS  2x10 foam on chair X8 foam on chair 2x10 foam on chair no UE support 2x10 foam on chair on UE support   4x5 foam on chair + 10lb weighted vest with UE support 4x5 foam on chair + 10lb weighted vest with UE support 4x5; 2 foam on chair + 10lb weighted vest without UE support   Standing hip abduction       "       Seated marching        3x5 3\" weight  3x5    Mini Squats     8x with UE support before experiencing hollow feeling.           Step ups  //bar up and over 6\" 10x  // bar up/back 6' step  // Bar up/back 6' step // Bar up/ back 8\" step     // Bar 8inch step up 2x5 reps per LE.    LAQ      3x5 + pacing 15lb AW 3x5 + 15lb pacing AW 3x5 15lb + pacing with AW X 5 15lb + pacing with AW                              Ther Activity                                       Gait Training             With RW    2 x 144ft with RW CS [id number] with RW CS    3 ounds 1 interval training 1 min fast 1 min normal pace walk 6 min total 3 rounds interval training 1 min fast 30s walking pace    Unilateral UE parallel bar   4x laps  2 laps fwd  2 laps bwd  2 laps lat step          Modalities                                            "

## 2024-05-01 ENCOUNTER — PATIENT MESSAGE (OUTPATIENT)
Dept: NEUROLOGY | Facility: CLINIC | Age: 49
End: 2024-05-01

## 2024-05-02 ENCOUNTER — TELEPHONE (OUTPATIENT)
Dept: NEUROLOGY | Facility: CLINIC | Age: 49
End: 2024-05-02

## 2024-05-02 ENCOUNTER — OFFICE VISIT (OUTPATIENT)
Facility: CLINIC | Age: 49
End: 2024-05-02
Payer: COMMERCIAL

## 2024-05-02 DIAGNOSIS — G35 MS (MULTIPLE SCLEROSIS) (HCC): Primary | ICD-10-CM

## 2024-05-02 PROCEDURE — 97112 NEUROMUSCULAR REEDUCATION: CPT

## 2024-05-02 NOTE — PROGRESS NOTES
Occupational Therapy Daily Note:    Today's date: 2024  Patient name: Carlos Landis  : 1975  MRN: [id number]  Referring provider: Kathy Gregory PA-C  Dx:   Encounter Diagnosis   Name Primary?    MS (multiple sclerosis) ([facility]) Yes       Subjective: pt reporting no further numbness on L side of face; reports numbness on R side of face       Objective: Pt engaged in skilled OT treatment session with focus on UE NMR, UE strengthening, UE endurance, and FMC/GMC to increase engagement, tolerance, and independence with daily ADL/ IADL and leisure activities.     CPT Code Minutes                                           Task Details        Therapeutic Activity                     Neuro Re-Ed  NMRE to RUE to improve functional reach and dexterity:      Reach and retrieval: RUE retrieval of small items from chest high shelf on R side and transfer to various tabletop targets.   Pt able to use alternating pincer to  small beads with min difficulty, min droppage; G functional reaching to R side and forward reaching to various targets; G+ target accuracy with placement into small mouthed containers; increased time to complete; pacing to minimize muscle fatigue                      Therapeutic Exercise                 Testing           HEP           Assessment: Tolerated treatment well. Pt with G participation in therapy, responds well to pacing for fatigue management. Patient would benefit from continued skilled OT.    Plan: Continued skilled OT per POC    INTERVENTION COMMENTS:  Diagnosis: MS (multiple sclerosis) ([facility]) [G35]  Precautions: R side weakness  Insurance: Payor: [employer] / Plan: [employer] / Product Type: Medicaid HMO /   7 of 24 visits

## 2024-05-02 NOTE — TELEPHONE ENCOUNTER
Shari  4/29 12:56 PM  [facility]. In regards to a mutual patient, gilbert cedillo,  date of birth, 1975, calling to set up a delivery for the patients methylprednisolone. If you could just give us a call back at your earliest convenience, our number is [phone]. Option 2.

## 2024-05-03 ENCOUNTER — APPOINTMENT (OUTPATIENT)
Facility: CLINIC | Age: 49
End: 2024-05-03
Payer: COMMERCIAL

## 2024-05-07 ENCOUNTER — OFFICE VISIT (OUTPATIENT)
Facility: CLINIC | Age: 49
End: 2024-05-07
Payer: COMMERCIAL

## 2024-05-07 DIAGNOSIS — G35 MS (MULTIPLE SCLEROSIS) (HCC): Primary | ICD-10-CM

## 2024-05-07 DIAGNOSIS — R26.81 UNSTEADINESS ON FEET: ICD-10-CM

## 2024-05-07 PROCEDURE — 97110 THERAPEUTIC EXERCISES: CPT

## 2024-05-07 PROCEDURE — 97112 NEUROMUSCULAR REEDUCATION: CPT

## 2024-05-07 PROCEDURE — 97116 GAIT TRAINING THERAPY: CPT

## 2024-05-07 NOTE — PROGRESS NOTES
"Daily Note     Today's date: 2024  Patient name: Carlos Landis  : 1975  MRN: [id number]  Referring provider: Kathy Gregory PA-C  Dx:   Encounter Diagnosis     ICD-10-CM    1. MS (multiple sclerosis) (HCC)  G35       2. Unsteadiness on feet  R26.81                            Subjective: Pt states that his face tingling has improved a bit. He was feeling better and then he got the headache on the left the face tingling came back. The headache tends to make the face go numb.  He said it took them three visits to get back to him.     Objective: See treatment diary below      Assessment: Pt participated in a skilled PT session focused on balance, gait, and strengthening. Additional repetition performed with interval training today and pt reported increased fatigue.. Additional repetition of interval walking performed today. Pt reported increased fatigue with step ups today, stating \"today is not a good day,\" so while only 1 set was performed, he reported increased difficulty with 10lb weighted vest. Pt will continue to benefit from skilled PT so that he can navigate his environment with increased safety and stability.       Plan: Continue per plan of care.      Precautions: MS, r sided weakness, frequent headaches, history or R ACLR, falls risk  POC expires: 2024      Tests and measures 3/27 4/3  4/5 4/9 4/11 4/17 4/19 4/24 4/26 4/30 5/7   ABC 33.13%     39.38%        MARIN   27/56            6MWT   190 feet 3 mins 32 secs    407ft         5xSTS 34.54 w UE support     30.91        10mWT 0.33m/s w RW             TUG 38.80s w RW             Neuro Re-Ed              Victorino navigation   Fwd BUE step to 10 laps           Compliant surface              Tests and measures      ABC, MARIN, 6MWT, 5xSTS                                                                Ther Ex              STS  2x10 foam on chair X8 foam on chair 2x10 foam on chair no UE support 2x10 foam on chair on UE support   4x5 foam on chair + " "10lb weighted vest with UE support 4x5 foam on chair + 10lb weighted vest with UE support 4x5; 2 foam on chair + 10lb weighted vest without UE support 4x5; 2 foam on chair + 10lb weighted vest without UE support   Standing hip abduction              Seated marching        3x5 3\" weight  3x5     Mini Squats     8x with UE support before experiencing hollow feeling.            Step ups  //bar up and over 6\" 10x  // bar up/back 6' step  // Bar up/back 6' step // Bar up/ back 8\" step     // Bar 8inch step up 2x5 reps per LE.  // bar 8 inch step 1x5 with 10lb vest per LE.    LAQ      3x5 + pacing 15lb AW 3x5 + 15lb pacing AW 3x5 15lb + pacing with AW X 5 15lb + pacing with AW                                 Ther Activity                                          Gait Training              With RW    2 x 144ft with RW CS [id number] with RW CS    3 ounds 1 interval training 1 min fast 1 min normal pace walk 6 min total 3 rounds interval training 1 min fast 30s walking pace  4 rounds interval training 1 min fast 30s walking pace.   Unilateral UE parallel bar   4x laps  2 laps fwd  2 laps bwd  2 laps lat step           Modalities                                               "

## 2024-05-07 NOTE — PROGRESS NOTES
"Occupational Therapy Daily Note:    Today's date: 2024  Patient name: Carlos Landis  : 1975  MRN: [id number]  Referring provider: Kathy Gregory PA-C  Dx:   Encounter Diagnosis   Name Primary?    MS (multiple sclerosis) ([facility]) Yes       Subjective: \"it is still there but it's much improved\" (referring to facial numbness)       Objective: Pt engaged in skilled OT treatment session with focus on UE NMR, UE strengthening, UE endurance, and FMC/GMC to increase engagement, tolerance, and independence with daily ADL/ IADL and leisure activities.     CPT Code Minutes                                           Task Details        Therapeutic Activity                     Neuro Re-Ed  NMRE to RUE to improve functional reach and dexterity:    -RUE retrieval of small items from chest height shelf across the table and transfer to medium sized table top targets.    -Dexterity: RUE picking up egg shaped objects, re-orienting in fingertips to flip over and place upside at medium sized target on R side shelf.    -RUE manipulating green resistive clip to  medium sized objects from tabletop surface and transfer to chest height shelf across the table                              Therapeutic Exercise                 Testing           HEP           Assessment: Tolerated treatment well. Pt with G functional reaching to target, mod difficulty with prehension patterns. Pt with G participation in therapy, reporting muscle fatigue and discomfort by end of session. Patient would benefit from continued skilled OT.    Plan: Continued skilled OT per POC    INTERVENTION COMMENTS:  Diagnosis: MS (multiple sclerosis) ([facility]) [G35]  Precautions: R side weakness  [facility] / Plan: [employer] / Product Type: Medicaid HMO /   8 of 24 visits   "

## 2024-05-08 ENCOUNTER — TELEPHONE (OUTPATIENT)
Dept: NEUROLOGY | Facility: CLINIC | Age: 49
End: 2024-05-08

## 2024-05-08 NOTE — TELEPHONE ENCOUNTER
Received VM transcription from 5/3/24, 4:23 PM:    Good afternoon. This is [facility] calling in regards to patient Carlos Sabiha. YOB: 1975. Calling in regards to prescriptions we have here for the patient's Ocrevus 300 mg and methylprednisolone 125 mg. They are both requiring a prior authorization from the patient's plan and we had sent a fax to the doctor's office and had done a follow up call. And we were following up again to check on the status, and once the office has a status update, if they could kindly call us back at [phone]. We would greatly appreciate it. Thank you and have a great weekend.  -------------------------    Brooklynn PA expires 5/17/24

## 2024-05-10 ENCOUNTER — OFFICE VISIT (OUTPATIENT)
Facility: CLINIC | Age: 49
End: 2024-05-10
Payer: COMMERCIAL

## 2024-05-10 DIAGNOSIS — G35 MS (MULTIPLE SCLEROSIS) (HCC): Primary | ICD-10-CM

## 2024-05-10 DIAGNOSIS — R26.81 UNSTEADINESS ON FEET: ICD-10-CM

## 2024-05-10 PROCEDURE — 97116 GAIT TRAINING THERAPY: CPT

## 2024-05-10 PROCEDURE — 97110 THERAPEUTIC EXERCISES: CPT

## 2024-05-10 PROCEDURE — 97112 NEUROMUSCULAR REEDUCATION: CPT

## 2024-05-10 NOTE — PROGRESS NOTES
Daily Note     Today's date: 5/10/2024  Patient name: Carlos Landis  : 1975  MRN: [id number]  Referring provider: Kathy Gregory PA-C  Dx:   Encounter Diagnosis     ICD-10-CM    1. MS (multiple sclerosis) (HCC)  G35       2. Unsteadiness on feet  R26.81                              Subjective: Pt states that his face tingling has improved a bit. He was feeling better and then he got the headache on the left the face tingling came back. The headache tends to make the face go numb.  He said it took them three visits to get back to him.     Objective: See treatment diary below      Assessment: Pt participated in a skilled PT session focused on balance, gait, and strengthening. STS progressed today to lower chair height and pt was able to perform without UE support, which is an improvement from previous visits. Additional lap also added to interval training interventions with improved tolerance. Hurdles performed with 7.5lb AW with occasional reduced foot clearance. Pt will continue to benefit from skilled PT so that he can navigate his environment with increased safety and stability.       Plan: Continue per plan of care.      Precautions: MS, r sided weakness, frequent headaches, history or R ACLR, falls risk  POC expires: 2024      Tests and measures 3/27 4/3  4/5 4/9 4/11 4/17 4/19 4/24 4/26 4/30 5/7 5/10   ABC 33.13%     39.38%         MARIN   27/56             6MWT   190 feet 3 mins 32 secs    407ft          5xSTS 34.54 w UE support     30.91         10mWT 0.33m/s w RW              TUG 38.80s w RW              Neuro Re-Ed               Victorino navigation   Fwd BUE step to 10 laps         Fwd middle setting in // 7.5lb AW 4 laps   Compliant surface               Tests and measures      ABC, MARIN, 6MWT, 5xSTS                                                                     Ther Ex               STS  2x10 foam on chair X8 foam on chair 2x10 foam on chair no UE support 2x10 foam on chair on UE support    "4x5 foam on chair + 10lb weighted vest with UE support 4x5 foam on chair + 10lb weighted vest with UE support 4x5; 2 foam on chair + 10lb weighted vest without UE support 4x5; 2 foam on chair + 10lb weighted vest without UE support 4x6 1 foam on chair + 10lb weighted vest without UE support.    Standing hip abduction               Seated marching        3x5 3\" weight  3x5      Mini Squats     8x with UE support before experiencing hollow feeling.             Step ups  //bar up and over 6\" 10x  // bar up/back 6' step  // Bar up/back 6' step // Bar up/ back 8\" step     // Bar 8inch step up 2x5 reps per LE.  // bar 8 inch step 1x5 with 10lb vest per LE.     LAQ      3x5 + pacing 15lb AW 3x5 + 15lb pacing AW 3x5 15lb + pacing with AW X 5 15lb + pacing with AW                                    Ther Activity                                             Gait Training               With RW    2 x 144ft with RW CS [id number] with RW CS    3 ounds 1 interval training 1 min fast 1 min normal pace walk 6 min total 3 rounds interval training 1 min fast 30s walking pace  4 rounds interval training 1 min fast 30s walking pace. 5 rounds interval training 1 min fast walking 30s normal walking pace   Unilateral UE parallel bar   4x laps  2 laps fwd  2 laps bwd  2 laps lat step            Modalities                                                  "

## 2024-05-10 NOTE — PROGRESS NOTES
Occupational Therapy Daily Note:    Today's date: 5/10/2024  Patient name: Carlos Landis  : 1975  MRN: [id number]  Referring provider: Kathy Gregory PA-C  Dx:   Encounter Diagnosis   Name Primary?    MS (multiple sclerosis) ([facility]) Yes       Subjective: pt without complaints       Objective: Pt engaged in skilled OT treatment session with focus on UE NMR, UE strengthening, UE endurance, and FMC/GMC to increase engagement, tolerance, and independence with daily ADL/ IADL and leisure activities.     CPT Code Minutes                                           Task Details        Therapeutic Activity                     Neuro Re-Ed  NMRE to RUE to improve functional reach and dexterity:      Reach and retrieval   -RUE reach into R lateral plane to retrieve red, green and blue resistive clips and transfer to vertical pole across table. Pt with G- functional reach into R lateral and forward planes to retrieve items; G prehension patterns to manipulate resistive clips      -RUE stopping a rolling and bouncing nerf golf ball, picking up with gross grasp, translating into pincer and transferring to wide mouthed container on R side. Completed with G reaction speed, G dexterity and G functional reaching.    -RUE retrieval of nerf golf balls from R side container, transfer to tabletop and using D1 and D2 to flick across table to wide mouthed container; G regulation of force of movement to hit target.                             Therapeutic Exercise                 Testing           HEP           Assessment: Tolerated treatment well. Pt with G motor control during functional reaching; G target accuracy, minimal droppage; improving prehension patterns when manipulating medium and small sized items. Patient would benefit from continued skilled OT.    Plan: Continued skilled OT per POC    INTERVENTION COMMENTS:  Diagnosis: MS (multiple sclerosis) ([facility]) [G35]  Precautions: R side weakness  Insurance: Payor: [employer] /  Plan: [employer] / Product Type: Medicaid HMO /   9 of 24 visits

## 2024-05-10 NOTE — TELEPHONE ENCOUNTER
Pt is no longer receiving Solumedrol infusions. Called [facility] and spoke with Ning. Made her aware.

## 2024-05-10 NOTE — TELEPHONE ENCOUNTER
Pt is no longer receiving Solumedrol infusions.    Ocrevus PA expires 5/17/24.     PA submitted on [facility], Key: [id number]. Awaiting determination.

## 2024-05-13 ENCOUNTER — TELEPHONE (OUTPATIENT)
Dept: NEUROLOGY | Facility: CLINIC | Age: 49
End: 2024-05-13

## 2024-05-13 NOTE — TELEPHONE ENCOUNTER
Received VM transcription from 5/8/24, 3:24 PM:    Hello, this is [facility] calling for a patient Carlos Nolan. Date of birth May 25, 1975. This is for the Ocrevus 300 mg single dose vial. Prior auth is still needed. Please get in contact with the patients insurance at your earliest convenience to initiate a prior auth. Their number is [phone], [phone]. Thank you.  ------------------    PA approved from 5/10/2024 - 5/10/2025.    According to approval letter, auth was faxed to [facility] pharmacy.   Nataly Muniz

## 2024-05-13 NOTE — TELEPHONE ENCOUNTER
Received vm from 5/8 at 3:27pm-  Hello, this is [facility], calling for patients and date of birth, of May 25th, 1975. First name, gilbert, last name, mamadou. And medication is for ocrevus 300 milligrams. prior auth is needed and  pended by the doctors office to be submitted to the  insurance. Please give them a call at [phone], [phone]. Thank you and have a great day.   ------------------------------  Already addressed

## 2024-05-13 NOTE — TELEPHONE ENCOUNTER
1ST ATTEMPT,     Called pt no answer, LMOM.    LETTER SENT     Thank you,     Beatriz       Called to offer 5/22/2024, Joann, 10 am 60min. Opening.        HFU/ [facility] / MS    DC-  HOME- 4/17/2024    Next scheduled MS team follow-up appointment is scheduled for July 12th; will attempt to expedite.

## 2024-05-14 ENCOUNTER — OFFICE VISIT (OUTPATIENT)
Facility: CLINIC | Age: 49
End: 2024-05-14
Payer: COMMERCIAL

## 2024-05-14 DIAGNOSIS — G35 MS (MULTIPLE SCLEROSIS) (HCC): Primary | ICD-10-CM

## 2024-05-14 DIAGNOSIS — R26.81 UNSTEADINESS ON FEET: ICD-10-CM

## 2024-05-14 PROCEDURE — 97116 GAIT TRAINING THERAPY: CPT

## 2024-05-14 PROCEDURE — 97110 THERAPEUTIC EXERCISES: CPT

## 2024-05-14 PROCEDURE — 97530 THERAPEUTIC ACTIVITIES: CPT

## 2024-05-14 PROCEDURE — 97112 NEUROMUSCULAR REEDUCATION: CPT

## 2024-05-14 NOTE — TELEPHONE ENCOUNTER
VM 5/10 at 11:09 am:    Cassidy called from [facility]. She was requesting a call at the office's earliest convenience. See other notes. Prior auth was obtained for the Ocrevus and a copy of the approval letter was faxed to the pharmacy.

## 2024-05-14 NOTE — PROGRESS NOTES
Occupational Therapy Daily Note:    Today's date: 2024  Patient name: Carlos Landis  : 1975  MRN: [id number]  Referring provider: Kathy Gregory PA-C  Dx:   Encounter Diagnosis   Name Primary?    MS (multiple sclerosis) ([facility]) Yes       Subjective: pt without complaints       Objective: Pt engaged in skilled OT treatment session with focus on UE NMR, UE strengthening, UE endurance, and FMC/GMC to increase engagement, tolerance, and independence with daily ADL/ IADL and leisure activities.     CPT Code Minutes                                           Task Details        Therapeutic Activity                     Neuro Re-Ed  NMRE to RUE to improve functional reach and dexterity:      Reach and retrieval   -RUE reach into R lateral plane to retrieve red, green and blue resistive clips and transfer to horizontal target on tabletop. Pt with G functional reach to retrieve clips and G forward reach to place clips at target; G prehension patterns; min difficulty with strength to open clips.       -RUE functional pincer and 3 jaw kishore to  small pegs and transfer to small pegboard; min difficulty manipulating small pegs; G target accuracy                    Therapeutic Exercise                 Testing           HEP           Assessment: Tolerated treatment well. Pt with G functional reaching, does fatigue quickly, responds well to pacing. Patient would benefit from continued skilled OT.    Plan: Continued skilled OT per POC    INTERVENTION COMMENTS:  Diagnosis: MS (multiple sclerosis) ([facility]) [G35]  Precautions: R side weakness  Insurance: Payor: [employer] / Plan: [employer] / Product Type: Medicaid HMO /   10 of 24 visits

## 2024-05-14 NOTE — PROGRESS NOTES
Daily Note     Today's date: 2024  Patient name: Carlos Landis  : 1975  MRN: [id number]  Referring provider: Kathy Gregory PA-C  Dx:   Encounter Diagnosis     ICD-10-CM    1. MS (multiple sclerosis) (HCC)  G35       2. Unsteadiness on feet  R26.81                     Start Time: 1105  Stop Time: 1145  Total time in clinic (min): 40 minutes    Subjective: Pt states that he has an appointment on the  with Dr. DUNCAN.  He has his psoriasis ointment on it but he states his psoriasis is getting bad again.     Objective: See treatment diary below      Assessment: Pt participated in a skilled PT session focused on balance, gait, and strengthening. STS progressed today to lower chair height and pt was able to perform without UE support, which is an improvement from previous visits. Additional lap also added to interval training interventions with improved tolerance. Hurdles performed with 7.5lb AW with occasional reduced foot clearance. Time in session taken for application of stockinette due to weeping of the psoriasis. Pt will continue to benefit from skilled PT so that he can navigate his environment with increased safety and stability.       Plan: Continue per plan of care.      Precautions: MS, r sided weakness, frequent headaches, history or R ACLR, falls risk  POC expires: 2024      Tests and measures 3/27 4/3  4/5 4/9 4/11 4/17 4/19 4/24 4/26 4/30 5/7 5/10 5/14   ABC 33.13%     39.38%          MARIN   2756              6MWT   190 feet 3 mins 32 secs    407ft           5xSTS 34.54 w UE support     30.91          10mWT 0.33m/s w RW               TUG 38.80s w RW               Neuro Re-Ed                Victorino navigation   Fwd BUE step to 10 laps         Fwd middle setting in // 7.5lb AW 4 laps    Compliant surface                Tests and measures      ABC, MARIN, 6MWT, 5xSTS                                                                          Ther Ex                STS  2x10 foam on chair X8 foam  "on chair 2x10 foam on chair no UE support 2x10 foam on chair on UE support   4x5 foam on chair + 10lb weighted vest with UE support 4x5 foam on chair + 10lb weighted vest with UE support 4x5; 2 foam on chair + 10lb weighted vest without UE support 4x5; 2 foam on chair + 10lb weighted vest without UE support 4x6 1 foam on chair + 10lb weighted vest without UE support.  4x6 1 foam on chair + 10lb weighted vest without UE support   Standing hip abduction                Seated marching        3x5 3\" weight  3x5       Mini Squats     8x with UE support before experiencing hollow feeling.              Step ups  //bar up and over 6\" 10x  // bar up/back 6' step  // Bar up/back 6' step // Bar up/ back 8\" step     // Bar 8inch step up 2x5 reps per LE.  // bar 8 inch step 1x5 with 10lb vest per LE.      LAQ      3x5 + pacing 15lb AW 3x5 + 15lb pacing AW 3x5 15lb + pacing with AW X 5 15lb + pacing with AW                                       Ther Activity                Psoriasis--wound care             Application of stockinette to protect wound.                    Gait Training                With RW    2 x 144ft with RW CS [id number] with RW CS    3 ounds 1 interval training 1 min fast 1 min normal pace walk 6 min total 3 rounds interval training 1 min fast 30s walking pace  4 rounds interval training 1 min fast 30s walking pace. 5 rounds interval training 1 min fast walking 30s normal walking pace 5 rounds interval training 1 min fast walking 30s normal walking pace   Unilateral UE parallel bar   4x laps  2 laps fwd  2 laps bwd  2 laps lat step             Modalities                                                     "

## 2024-05-20 NOTE — TELEPHONE ENCOUNTER
5/20/24, 5:00    [facility] left a vm to set up Ocrevus delivery.  [phone] opt 2

## 2024-05-21 ENCOUNTER — OFFICE VISIT (OUTPATIENT)
Facility: CLINIC | Age: 49
End: 2024-05-21
Payer: COMMERCIAL

## 2024-05-21 DIAGNOSIS — G35 MS (MULTIPLE SCLEROSIS) (HCC): Primary | ICD-10-CM

## 2024-05-21 DIAGNOSIS — R26.81 UNSTEADINESS ON FEET: ICD-10-CM

## 2024-05-21 PROCEDURE — 97116 GAIT TRAINING THERAPY: CPT

## 2024-05-21 PROCEDURE — 97110 THERAPEUTIC EXERCISES: CPT

## 2024-05-21 PROCEDURE — 97112 NEUROMUSCULAR REEDUCATION: CPT

## 2024-05-21 NOTE — PROGRESS NOTES
Daily Note     Today's date: 2024  Patient name: Carlos Landis  : 1975  MRN: [id number]  Referring provider: Kathy Gregory PA-C  Dx:   Encounter Diagnosis     ICD-10-CM    1. MS (multiple sclerosis) (HCC)  G35       2. Unsteadiness on feet  R26.81                                  Subjective: Pt states that Friday was a really bad day. His Mom is struggling with abdominal issues and was admitted to the hospital.  He could not get here Friday but he was feeling okay. He presents with just his RW    Objective: See treatment diary below      Assessment: Pt participated in a skilled PT session focused on balance, gait, and strengthening. Resistance was performed with maxim navigation and pt exhibited initial. Reduced fatigue noted after gait intervals today as compared to previous visits. STS progressed to 3x10.  Pt will continue to benefit from skilled PT so that he can navigate his environment with increased safety and stability.       Plan: Continue per plan of care.      Precautions: MS, r sided weakness, frequent headaches, history or R ACLR, falls risk  POC expires: 2024      Tests and measures 3/27 4/3  4/5 4/9 4/11 4/17 4/19 4/24 4/26 4/30 5/7 5/10 5/14 5/21   ABC 33.13%     39.38%           MARIN                  6MWT   190 feet 3 mins 32 secs    407ft            5xSTS 34.54 w UE support     30.91           10mWT 0.33m/s w RW                TUG 38.80s w RW                Neuro Re-Ed                 Maxim navigation   Fwd BUE step to 10 laps         Fwd middle setting in // 7.5lb AW 4 laps  Fwd middle setting in 7.5lb AW 4 laps   Compliant surface                 Tests and measures      ABC, MARIN, 6MWT, 5xSTS                                                                               Ther Ex                 STS  2x10 foam on chair X8 foam on chair 2x10 foam on chair no UE support 2x10 foam on chair on UE support   4x5 foam on chair + 10lb weighted vest with UE support 4x5 foam on chair  "+ 10lb weighted vest with UE support 4x5; 2 foam on chair + 10lb weighted vest without UE support 4x5; 2 foam on chair + 10lb weighted vest without UE support 4x6 1 foam on chair + 10lb weighted vest without UE support.  4x6 1 foam on chair + 10lb weighted vest without UE support 3x10 1 foam on chair + 10lb weighted vest without UE support   Standing hip abduction                 Seated marching        3x5 3\" weight  3x5        Mini Squats     8x with UE support before experiencing hollow feeling.            3x5 with UE support in // with 10lb weighted vest   Step ups  //bar up and over 6\" 10x  // bar up/back 6' step  // Bar up/back 6' step // Bar up/ back 8\" step     // Bar 8inch step up 2x5 reps per LE.  // bar 8 inch step 1x5 with 10lb vest per LE.       LAQ      3x5 + pacing 15lb AW 3x5 + 15lb pacing AW 3x5 15lb + pacing with AW X 5 15lb + pacing with AW                                          Ther Activity                 Psoriasis--wound care             Application of stockinette to protect wound.                      Gait Training                 With RW    2 x 144ft with RW CS [id number] with RW CS    3 ounds 1 interval training 1 min fast 1 min normal pace walk 6 min total 3 rounds interval training 1 min fast 30s walking pace  4 rounds interval training 1 min fast 30s walking pace. 5 rounds interval training 1 min fast walking 30s normal walking pace 5 rounds interval training 1 min fast walking 30s normal walking pace 5 rounds interval training 1 min fast walking 30s normal walking pace.    Unilateral UE parallel bar   4x laps  2 laps fwd  2 laps bwd  2 laps lat step              Modalities                                                        "

## 2024-05-21 NOTE — PROGRESS NOTES
Occupational Therapy Daily Note:    Today's date: 2024  Patient name: Carlos Landis  : 1975  MRN: [id number]  Referring provider: Kathy Gregory PA-C  Dx:   Encounter Diagnosis   Name Primary?    MS (multiple sclerosis) ([facility]) Yes       Subjective: pt without complaints       Objective: Pt engaged in skilled OT treatment session with focus on UE NMR, UE strengthening, UE endurance, and FMC/GMC to increase engagement, tolerance, and independence with daily ADL/ IADL and leisure activities.     CPT Code Minutes                                           Task Details        Therapeutic Activity                     Neuro Re-Ed  NMRE to RUE to improve functional reach and dexterity:      -RUE manipulating green resistive clip to retrieve small items from center table and transfer to chest height shelf on R side.  Completed with G functional reaching and target accuracy; min difficulty with resistive clip, increased time, reporting sh fatigue at end of task       -RUE functional pincer and 3 jaw kishore to  and manipulate small items and place into small target. Completed with min difficulty with prehension patterns, G target accuracy.                    Therapeutic Exercise                 Testing           HEP           Assessment: Tolerated treatment well. Pt with G participation, required increased time to complete, fatigued quickly. Patient would benefit from continued skilled OT.    Plan: Continued skilled OT per POC    INTERVENTION COMMENTS:  Diagnosis: MS (multiple sclerosis) ([facility]) [G35]  Precautions: R side weakness  Insurance: Payor: [employer] / Plan: [employer] / Product Type: Medicaid HMO /   11 of 24 visits

## 2024-05-22 ENCOUNTER — OFFICE VISIT (OUTPATIENT)
Dept: NEUROLOGY | Facility: CLINIC | Age: 49
End: 2024-05-22
Payer: COMMERCIAL

## 2024-05-22 ENCOUNTER — TELEPHONE (OUTPATIENT)
Dept: NEUROLOGY | Facility: CLINIC | Age: 49
End: 2024-05-22

## 2024-05-22 VITALS
HEART RATE: 72 BPM | SYSTOLIC BLOOD PRESSURE: 118 MMHG | TEMPERATURE: 98.7 F | HEIGHT: 71 IN | DIASTOLIC BLOOD PRESSURE: 84 MMHG | BODY MASS INDEX: 32.92 KG/M2 | OXYGEN SATURATION: 97 %

## 2024-05-22 DIAGNOSIS — G81.91 RIGHT HEMIPARESIS (HCC): ICD-10-CM

## 2024-05-22 DIAGNOSIS — G35 MS (MULTIPLE SCLEROSIS) (HCC): Primary | ICD-10-CM

## 2024-05-22 DIAGNOSIS — R29.898 LEFT LEG WEAKNESS: ICD-10-CM

## 2024-05-22 DIAGNOSIS — R26.2 AMBULATORY DYSFUNCTION: ICD-10-CM

## 2024-05-22 DIAGNOSIS — R29.810 LOWER FACIAL WEAKNESS: ICD-10-CM

## 2024-05-22 PROCEDURE — 99215 OFFICE O/P EST HI 40 MIN: CPT | Performed by: PSYCHIATRY & NEUROLOGY

## 2024-05-22 NOTE — TELEPHONE ENCOUNTER
Pt has coverage with [place] [employer]. Requires medication to be shipped to infusion center prior to each infusion. This resulted in delay of infusions and difficulty with pharmacy previously. Will see if pt would be interested in home infusion. Used [facility] previously.     Called and Left a message on pt's answering machine for a call back. My direct line was provided.     [employer] message sent.

## 2024-05-22 NOTE — PROGRESS NOTES
Patient ID: Carlos Landis is a 48 y.o. male.    Assessment/Plan:           Problem List Items Addressed This Visit          Nervous and Auditory    Right hemiparesis (HCC)    MS (multiple sclerosis) (HCC) - Primary    Left leg weakness       Care Coordination    Ambulatory dysfunction        Mr. Landis has presented to [facility] multiple sclerosis center for follow-up of multiple sclerosis and related concerns.  Since last office visit, patient was hospitalized for generalized body weakness as well as right sided facial weakness.  Patient has been taking high efficacy immunosuppressive regimen with last dose of ocrelizumab was offered in January 2024.  While in the office, patient completed imaging brain and cervical spine with no disease progression been advised, no signs of acute or active demyelinating plaques been noted.  Patient did not have any ischemic or hemorrhagic changes either.  Patient received Solu-Medrol 1 g and oral steroids with some improvement in his capacity to ambulate been noted.  Patient has been working with physical therapy and Occupational Therapy in outpatient settings as he has been improving by 55% and now is comparable to walk with a walker.  Patient has more proximal rather than distal weakness in his lower extremities.    December 2023 - HIV/TB/hepatitis panel/immunoglobulins has been completed and within normal range, patient can safely continue ocrelizumab on July 10, 2024.  Considering good response to Solu-Medrol infusions in addition to cytotoxic regimen, patient will be advised to continue Solu-Medrol 1000 mcg on monthly basis for the next 6 months with exception of July 2024.  Patient may start his next Solu-Medrol infusion first or second week of June 2024.  No significant side effects to steroids been advised.    Patient has great support from his sister and her father.    Patient is to continue following with [facility] neurology within 4-5 months and follow-up will be  scheduled in [place] with [facility].    I have spent a total time of 50 minutes on 05/23/24 in caring for this patient including Diagnostic results, Prognosis, Risks and benefits of tx options, Instructions for management, Patient and family education, Importance of tx compliance, Impressions, Counseling / Coordination of care, Documenting in the medical record, Reviewing / ordering tests, medicine, procedures  , Obtaining or reviewing history  , and Communicating with other healthcare professionals .       Subjective:MS and ambulatory dysfunction     HPI  Mr. Landis has presented to [facility] multiple sclerosis center for follow-up on multiple sclerosis and related issues.     Recent hospitalization on 4/15/2024 for MS related hemiparesis with numbness; patient started PT/OT after discharge.  Patient presented today with his sister.  Patient has significant improvement in his function as he started physical therapy and Occupational Therapy.  Patient is capable to ambulate with a walker.  Patient has significant improvement in right-sided facial droop as he could not walk with whole body weakness being improved.  Patient had suffered COVID infection several months back as he had made great recovery.  Patient believes he is 55 to 70% back to baseline.  Patient also agreed that adding Solu-Medrol infusion between his ocrelizumab infusion treatments likely causing better outcomes including strength and endurance;   Patient is more motivated and he is engaged in conversation hoping to continue improving.  MRI B/C spine did not show any new or active demyelination in the setting of high efficacy cytotoxic regimen Ocrelizumab; He felt that the single dose of methylprednisolone has helped with his speech fluency and subjective facial drooping. 1 dose of Solu-Medrol provided;     Patient had Ocrevus as his 1st DMT, split dose given 04/29/2022 and 05/13/2022.  No significant infusion reaction or side effects reported to  ocrelizumab considering last infusion was provided on June 19, 2023.  Patient is up-to-date on serological work-up including TB/HIV/hepatitis panel.  Imaging of the brain were completed in May 2023 and we agreed patient has stable radiographic findings with no disease progression has been advised.       The following portions of the patient's history were reviewed and updated as appropriate: He  has a past medical history of Arthritis, Diabetes mellitus (HCC), MS (multiple sclerosis) ([facility]), Scoliosis, and Visual impairment.  He   Patient Active Problem List    Diagnosis Date Noted    DAVID (obstructive sleep apnea) 03/14/2024    Right sided numbness 12/06/2023    Pain of upper abdomen 12/06/2023    Right-sided thoracic back pain 12/06/2023    Hypomagnesemia 05/19/2023    Episodic tension-type headache, not intractable 01/23/2023    Left leg weakness 09/27/2022    Obesity (BMI 30-39.9) 09/27/2022    Ambulatory dysfunction 03/15/2022    MS (multiple sclerosis) ([facility]) 03/15/2022    Dysarthria 02/07/2022    Lower facial weakness 02/07/2022    CNS demyelinating disease ([facility]) 02/07/2022    Right hemiparesis ([facility]) 02/07/2022     He  has a past surgical history that includes Hernia repair and Knee surgery (Right).  His family history includes Multiple sclerosis in his other; Stroke in his maternal grandmother.  He  reports that he has quit smoking. He has quit using smokeless tobacco. He reports that he does not currently use alcohol. He reports that he does not use drugs.  Current Outpatient Medications   Medication Sig Dispense Refill    aspirin 325 mg tablet Take 1 tablet (325 mg total) by mouth daily 10 tablet 0    cyanocobalamin 1,000 mcg/mL Take B12 1000 mcg IM once a week for 4 weeks, then once a month for 5 months 9 mL 0    MAGNESIUM PO Take by mouth      ocrelizumab (Ocrevus) 300 MG/10ML SOLN Inject 20 mL (600 mg total) into a catheter in a vein every 6 (six) months 20 mL 1    Riboflavin ([facility] Vitamin B-2) 100 MG TABS  "Take 2 tablets (200 mg total) by mouth in the morning 360 tablet 2    SYRINGE-NEEDLE, DISP, 3 ML 25G X 1\" 3 ML MISC Take with B12 regimen prescribed 9 each 0     No current facility-administered medications for this visit.     Current Outpatient Medications on File Prior to Visit   Medication Sig    aspirin 325 mg tablet Take 1 tablet (325 mg total) by mouth daily    cyanocobalamin 1,000 mcg/mL Take B12 1000 mcg IM once a week for 4 weeks, then once a month for 5 months    MAGNESIUM PO Take by mouth    ocrelizumab (Ocrevus) 300 MG/10ML SOLN Inject 20 mL (600 mg total) into a catheter in a vein every 6 (six) months    Riboflavin ([facility] Vitamin B-2) 100 MG TABS Take 2 tablets (200 mg total) by mouth in the morning    SYRINGE-NEEDLE, DISP, 3 ML 25G X 1\" 3 ML MISC Take with B12 regimen prescribed     No current facility-administered medications on file prior to visit.     He has No Known Allergies..         Objective:    Blood pressure 118/84, pulse 72, temperature 98.7 °F (37.1 °C), temperature source Temporal, height 5' 11\" (1.803 m), SpO2 97%.    Physical Exam    Neurological Exam  CONSTITUTIONAL: NAD, pleasant. NECK: supple, no lymphadenopathy, no thyromegaly, no JVD. CARDIOVASCULAR: RRR, normal S1S2, no murmurs, no rubs. RESP: clear to auscultation bilaterally, no wheezes/rhonchi/rales. ABDOMEN: soft, non tender, non distended. SKIN: no rash or skin lesions. EXTREMITIES: no edema, pulses 2+bilaterally. PSYCH: appropriate mood and affect  NEUROLOGIC COMPREHENSIVE EXAM: Patient is oriented to person, place and time, NAD; appropriate affect. CN II, III, IV, V, VI, VII,VIII,IX,X,XI-XII intact with EOMI, PERRLA, OKN intact, VF grossly intact, fundi poorly visualized secondary to pupillary constriction; symmetric face noted. Motor: 4/5 shoulder abduction , with 3-/5 finger extension b/l; 2/5 right hip flexion and 4/5 knee flexion and 3/ 5 dorsiflexion;  4-/5 left hip flexion, 3/5 knee flexion and dorsiflexion; Sensory: " decreased to light touch and pinprick bilaterally; normal vibration sensation feet bilaterally; Coordination within normal limits on FTN and TEJINDER testing; DTR: 2++/4 through, no Babinski, no clonus. Wheelchair bound.     ROS:    Review of Systems

## 2024-05-22 NOTE — TELEPHONE ENCOUNTER
Pt agreeable to home infusion. Will send to [facility] to see if they can service pt.     Infusion order written. Uploaded to chart.     Please have Dr. Hernandez sign. Please fax to both #s on order with last office note, face sheet, and insurance cards.     Dr. Hernandez- DEENA regarding infusions as well as pt's message (name of ointment being bacitracin).

## 2024-05-22 NOTE — TELEPHONE ENCOUNTER
Please help with Solumedrol 1000 mg IV infusion for the next 6 month starting 1 st-2nd week in June 2024; we will skip Solumedrol in July 2024.

## 2024-05-22 NOTE — TELEPHONE ENCOUNTER
Called [facility] at [phone] opt 2 and spoke with Tova. Cannot schedule medication delivery- too early. Will call back 2 weeks prior to 7/10/24 as directed.

## 2024-05-24 ENCOUNTER — EVALUATION (OUTPATIENT)
Facility: CLINIC | Age: 49
End: 2024-05-24
Payer: COMMERCIAL

## 2024-05-24 ENCOUNTER — OFFICE VISIT (OUTPATIENT)
Facility: CLINIC | Age: 49
End: 2024-05-24
Payer: COMMERCIAL

## 2024-05-24 DIAGNOSIS — G35 MS (MULTIPLE SCLEROSIS) (HCC): Primary | ICD-10-CM

## 2024-05-24 DIAGNOSIS — R26.81 UNSTEADINESS ON FEET: ICD-10-CM

## 2024-05-24 PROCEDURE — 97112 NEUROMUSCULAR REEDUCATION: CPT

## 2024-05-24 NOTE — PROGRESS NOTES
PT Re-Evaluation     Today's date: 2024  Patient name: Carlos Landis  : 1975  MRN: [id number]  Referring provider: Kathy Gregory PA-C  Dx:   Encounter Diagnosis     ICD-10-CM    1. MS (multiple sclerosis) (HCC)  G35       2. Unsteadiness on feet  R26.81                          Assessment  Impairments: abnormal gait, abnormal movement, activity intolerance, difficulty understanding, impaired balance, impaired physical strength, lacks appropriate home exercise program, safety issue and poor posture   Functional limitations: deconditioning, gait, and transfers    Assessment details: 2024: Pt is a 48 y.o. male who has received 14 visits of skilled PT intervention to address balance, strength, and gait deficits secondary to MS. Today's assessment reflects improvement from previous visits in 6MWT, gait speed, TUG assessments, and balance confidence as compared to IE. Despite these improvements, pt is still unable to rise from a chair without UE support, which makes getting out of armless chairs at doctors appointments difficult. 6MWT continues to express significant CV deficits that impact his ability to navigate his environment. MARIN not able to be assessed today due to time constraints. Education was reinforced on the importance of performing HEP to maximize gains and pt expressed understanding. Pt will therefore continue to benefit from skilled PT intervention addressing balance, gait, and strengthening so that he can maximize his safety in navigating his environment.     2024: Pt is a 48 y.o. male returning to pt after admission to hospital with MS flare up. Reassessment was performed today to obtain more orders to continue therapy. Despite recent flare up, pt's 6MWT increased by >200ft, and 5xSTS time increased as well. LAQ were added to address quadricep strength defiicts with education on pacing Pt will continue to benefit form skilled PT so that he can benefit form skilled PT so that he  can navigate his environment with increased safety and stability.      At IE: Pt is a 48 y.o. male presenting to physical therapy with chief complaint of reduced endurance, poor activity tolerance, impaired transfers, and reduced functional participation secondary to multiple sclerosis Assessment today reflects globally reduced muscle strength, most noteably in the hip, core, and LE musculature, which impacts his ability to perform transfers and ambulation tasks. R sided deficits are greater than L sided deficits. Gait speed assessment places him at the level of a household ambulator. 5xSTS was unable to be completed without UE support. ABC indicates that pt is at risk of falls and has low balance confidence. Pt will benefit from skilled physical therapy intervention addressing muscle strength, endurance, activity tolerance, and functional deficits so that he/she may be able to navigate their environment with increased independence and safety.   Understanding of Dx/Px/POC: good     Prognosis: good    Goals  STG's: to be achieved in 4 weeks  -Pt will exhibit independence with HEP within 2 weeks. ONGOING  -Pt will be able to complete a chair to mat transfer with supervision assistance MET  -2MWT to be performed and subsequent goal to be set.  Discontinued, pt able to complete 6MWT  -ABC to improve by MCID of 14% ONGOING  -Pt will improve gait speed by MCID of .13m/s MET     LTG's: to be achieved in 8 weeks  -pt will be able to ambulate for 6 minutes without rest break due to fatigue so that he may be able to walk around his house without difficulty MET  -pt will improve gait speed to between 0.4-0.8m/s to reflect status as a limited community ambulator MET  -Pt will improve TUG score with RW to <13.5 s to reflect reduced risk of falls ONGOING  -Pt will be able to complete an STS transfer without UE support so that he can rise from chairs without arms.  ONGOING      Plan  Patient would benefit from: skilled physical  therapy  Planned modality interventions: electrical stimulation/Russian stimulation, thermotherapy: hydrocollator packs, biofeedback and cryotherapy    Planned therapy interventions: balance, ADL training, abdominal trunk stabilization, activity modification, balance/weight bearing training, behavior modification, body mechanics training, breathing training, canalith repositioning, neuromuscular re-education, patient education, therapeutic activities, therapeutic exercise, strengthening, sensory integrative techniques, gait training, home exercise program, postural training, graded exercise, graded activity and stretching    Frequency: 2x week  Duration in visits: 16  Duration in weeks: 8  Plan of Care beginning date: 5/24/2024  Plan of Care expiration date: 7/19/2024  Treatment plan discussed with: patient  Plan details: TE: for strengthening, stretching, and flexibility  TA: for functional participation  NR: for balance, coordination, reaction time  MT: for STM, IASTM, joint mobilizations  Gait Training: to improve gait mechanics.      Subjective Evaluation    History of Present Illness  Mechanism of injury: 5/24/2024: Pt states that he had an appointment with Dr. DUNCAN since he was last here that when well. He was able to walk into the session today with his walker and also walked into the hospital to visit his mom instead of taking his transport chair. He states that he feels 40-50% of the way to where he would like to be by the time he is done with PT. He states he feels his progress is limited by his own brain, he has a hard time pushing himself to do the exercises at home, but knows he is supposed to. He is currently focusing his exercises on walking to the kitchen table. He also states that he has not tried to get up the stairs in his stair lift, but the upstairs is very warm, which may not be good for his MS.     4/19/2024: Pt states that he had significant right sided weakness and went to the ER. He was  admitted. He lost his speech. He states that it was a bad flare up. He is looking forward to going to his doctor to get his R elbow psoriasis looked at. He states that he is supposed to be getting steroids every two weeks but no one ever scheduled him. He states that he feels great. He states that the swimmer's ear is still there but that takes a lot for it to go away.     At IE: Pt states he is coming in for treatment for his MS. He notes that he cannot move his R arm at times. He presents in a travel chair, which is his primary method of mobility, and he uses an RW at home. HE notes that he is very tired and sleeps most of the day. They have purchased a stair lift for his home as he has difficulty navigating stairs. He lives with his brother. He is in the process of getting a walk in tub & had a shower chair. He also experiences a lot of headaches that result in facial numbness on his right, recently it has been on his left too. Ibuprofen gels and closing his eyes reduces his pain and opening his eyes when he has headaches makes it worse. His physicians are aware of this and are managing it. He has right sided facial droop associated with his MS that has present since his diagnosis. His goals are to improve his stamina so that he can walk further in his home.           Recurrent probem    Patient Goals  Patient goals for therapy: improved balance  Patient goal: improve his stamina  Pain  Current pain ratin  At best pain ratin  At worst pain rating: 10  Location: let & right side of his head.    Social Support  Lives in: multiple-level home  Lives with: parents (siblings)    Employment status: not working  Treatments  Previous treatment: physical therapy and occupational therapy      Objective     Strength/Myotome Testing     Left Shoulder     Planes of Motion   Flexion: 3+   Abduction: 3     Right Shoulder     Planes of Motion   Flexion: 3   Abduction: 2     Left Elbow   Flexion: 4  Extension: 4    Right  "Elbow   Flexion: 3+  Extension: 4    Left Hip   Planes of Motion   Flexion: 4    Right Hip   Planes of Motion   Flexion: 3+    Left Knee   Flexion: 4  Extension: 4    Right Knee   Flexion: 4  Extension: 3    Left Ankle/Foot   Dorsiflexion: 3    Right Ankle/Foot   Dorsiflexion: 2+  Neuro Exam:     Transfers   Sit to stand: independent   Wheelchair to mat: Wheelchair to mat transfer: contact guard.  Mat to wheelchair: Mat to wheelchair transfer: contact guad.             Precautions: MS, r sided weakness, frequent headaches, history or R ACLR, falls risk  POC expires: 07/19/2024      Tests and measures 3/27 4/3  4/5 4/9 4/11 4/17 4/19 4/24 4/26 4/30 5/7 5/10 5/14 5/21 5/24    ABC 33.13%     39.38%         44.38%   MARIN   27/56                6MWT   190 feet 3 mins 32 secs    407ft          504.4ft   5xSTS 34.54 w UE support     30.91         36.60s   10mWT 0.33m/s w RW              0.61s w RW   TUG 38.80s w RW              35.15s   Neuro Re-Ed                  Victorino navigation   Fwd BUE step to 10 laps         Fwd middle setting in // 7.5lb AW 4 laps  Fwd middle setting in 7.5lb AW 4 laps    Compliant surface                  Tests and measures      ABC, MARIN, 6MWT, 5xSTS         ABC, 6MWT, 5xSTS, TUG, 10mWT                                                                           Ther Ex                  STS  2x10 foam on chair X8 foam on chair 2x10 foam on chair no UE support 2x10 foam on chair on UE support   4x5 foam on chair + 10lb weighted vest with UE support 4x5 foam on chair + 10lb weighted vest with UE support 4x5; 2 foam on chair + 10lb weighted vest without UE support 4x5; 2 foam on chair + 10lb weighted vest without UE support 4x6 1 foam on chair + 10lb weighted vest without UE support.  4x6 1 foam on chair + 10lb weighted vest without UE support 3x10 1 foam on chair + 10lb weighted vest without UE support    Standing hip abduction                  Seated marching        3x5 3\" weight  3x5         Mini " "Squats     8x with UE support before experiencing hollow feeling.            3x5 with UE support in // with 10lb weighted vest    Step ups  //bar up and over 6\" 10x  // bar up/back 6' step  // Bar up/back 6' step // Bar up/ back 8\" step     // Bar 8inch step up 2x5 reps per LE.  // bar 8 inch step 1x5 with 10lb vest per LE.        LAQ      3x5 + pacing 15lb AW 3x5 + 15lb pacing AW 3x5 15lb + pacing with AW X 5 15lb + pacing with AW                                             Ther Activity                  Psoriasis--wound care             Application of stockinette to protect wound.                        Gait Training                  With RW    2 x 144ft with RW CS [id number] with RW CS    3 ounds 1 interval training 1 min fast 1 min normal pace walk 6 min total 3 rounds interval training 1 min fast 30s walking pace  4 rounds interval training 1 min fast 30s walking pace. 5 rounds interval training 1 min fast walking 30s normal walking pace 5 rounds interval training 1 min fast walking 30s normal walking pace 5 rounds interval training 1 min fast walking 30s normal walking pace.     Unilateral UE parallel bar   4x laps  2 laps fwd  2 laps bwd  2 laps lat step               Modalities                                                           "

## 2024-05-24 NOTE — PROGRESS NOTES
Occupational Therapy Daily Note:    Today's date: 2024  Patient name: Carlos Landis  : 1975  MRN: [id number]  Referring provider: Kathy Gregory PA-C  Dx:   Encounter Diagnosis   Name Primary?    MS (multiple sclerosis) ([facility]) Yes       Subjective: pt without complaints       Objective: Pt engaged in skilled OT treatment session with focus on UE NMR, UE strengthening, UE endurance, and FMC/GMC to increase engagement, tolerance, and independence with daily ADL/ IADL and leisure activities.     CPT Code Minutes                                           Task Details        Therapeutic Activity                     Neuro Re-Ed  NMRE to RUE to improve functional reach and dexterity:    -RUE reach to chest level shelf on R side to retrieve medium sized objects and transfer to unstable surface on center table. Pt with G functional reach; G prehension patterns of pincer and 3 jaw kishore; G target accuracy.       -RUE manipulating red resistive clip to retreive small items from R side shelf and transfer to small target on center table. Min difficulty with regulating grasp on resistive clip during release of object at target; G- target accuracy                      Therapeutic Exercise                 Testing           HEP           Assessment: Tolerated treatment well. Pt with improving functional reach and dexterity of RUE, G lacho to exercises, fatigue noted in R sh by end of session.  Patient would benefit from continued skilled OT.    Plan: Continued skilled OT per POC    INTERVENTION COMMENTS:  Diagnosis: MS (multiple sclerosis) ([facility]) [G35]  Precautions: R side weakness  Insurance: Payor: [employer] / Plan: [employer] / Product Type: Medicaid HMO /   12 of 24 visits

## 2024-05-28 ENCOUNTER — OFFICE VISIT (OUTPATIENT)
Facility: CLINIC | Age: 49
End: 2024-05-28
Payer: COMMERCIAL

## 2024-05-28 DIAGNOSIS — G35 MS (MULTIPLE SCLEROSIS) (HCC): Primary | ICD-10-CM

## 2024-05-28 DIAGNOSIS — R26.81 UNSTEADINESS ON FEET: ICD-10-CM

## 2024-05-28 PROCEDURE — 97110 THERAPEUTIC EXERCISES: CPT

## 2024-05-28 PROCEDURE — 97112 NEUROMUSCULAR REEDUCATION: CPT

## 2024-05-28 NOTE — PROGRESS NOTES
Daily Note     Today's date: 2024  Patient name: Carlos Landis  : 1975  MRN: [id number]  Referring provider: Kathy Gregory PA-C  Dx:   Encounter Diagnosis     ICD-10-CM    1. MS (multiple sclerosis) (HCC)  G35       2. Unsteadiness on feet  R26.81                      Subjective: Pt states that he is doing okay, it is very hot today. Session began 10 minutes late due to lack of availability of restrooms.       Objective: See treatment diary below      Assessment: Pt participated in a skilled pt session focused on balance, gait, and strengthening. Resistance added to HIIT training today and pt reported increased fatigue, increased stride length observed with new sneakers today. Victorino navigation was performed with B UE support. Pt had 1 instance of dizziness during HIIT training and therefore required a seated rest break. Sensation resolved with rest. Pt will continue to benefit from skilled PT so that she cn navigat eher environment with increased safety and stability.       Plan: Continue per plan of care.      Precautions: MS, r sided weakness, frequent headaches, history or R ACLR, falls risk  POC expires: 2024      Tests and measures 3/27 4/3  4/5 4/9 4/11 4/17 4/19 4/24 4/26 4/30 5/7 5/10 5/14 5/21 5/24  5/28   ABC 33.13%     39.38%         44.38%    MARIN                    6MWT   190 feet 3 mins 32 secs    407ft          504.4ft    5xSTS 34.54 w UE support     30.91         36.60s    10mWT 0.33m/s w RW              0.61s w RW    TUG 38.80s w RW              35.15s    Neuro Re-Ed                   Victorino navigation   Fwd BUE step to 10 laps         Fwd middle setting in // 7.5lb AW 4 laps  Fwd middle setting in 7.5lb AW 4 laps  Fwd middle setting in 7.5lb AW 4 laps     Compliant surface                   Tests and measures      ABC, MARIN, 6MWT, 5xSTS         ABC, 6MWT, 5xSTS, TUG, 10mWT                                                                                Ther Ex                 "   STS  2x10 foam on chair X8 foam on chair 2x10 foam on chair no UE support 2x10 foam on chair on UE support   4x5 foam on chair + 10lb weighted vest with UE support 4x5 foam on chair + 10lb weighted vest with UE support 4x5; 2 foam on chair + 10lb weighted vest without UE support 4x5; 2 foam on chair + 10lb weighted vest without UE support 4x6 1 foam on chair + 10lb weighted vest without UE support.  4x6 1 foam on chair + 10lb weighted vest without UE support 3x10 1 foam on chair + 10lb weighted vest without UE support     Standing hip abduction                   Seated marching        3x5 3\" weight  3x5          Mini Squats     8x with UE support before experiencing hollow feeling.            3x5 with UE support in // with 10lb weighted vest  3x5 with UE support in with 10lb weighted vest.    Step ups  //bar up and over 6\" 10x  // bar up/back 6' step  // Bar up/back 6' step // Bar up/ back 8\" step     // Bar 8inch step up 2x5 reps per LE.  // bar 8 inch step 1x5 with 10lb vest per LE.         LAQ      3x5 + pacing 15lb AW 3x5 + 15lb pacing AW 3x5 15lb + pacing with AW X 5 15lb + pacing with AW          Heel raises                 In // 7.5lb AW 2x10                      Ther Activity                   Psoriasis--wound care             Application of stockinette to protect wound.                          Gait Training                   With RW    2 x 144ft with RW CS [id number] with RW CS    3 ounds 1 interval training 1 min fast 1 min normal pace walk 6 min total 3 rounds interval training 1 min fast 30s walking pace  4 rounds interval training 1 min fast 30s walking pace. 5 rounds interval training 1 min fast walking 30s normal walking pace 5 rounds interval training 1 min fast walking 30s normal walking pace 5 rounds interval training 1 min fast walking 30s normal walking pace.   5 rounds interval training 1 min fast walking 30s normal pace 2lb weights    Unilateral UE parallel bar   4x laps  2 laps fwd  2 laps " bwd  2 laps lat step                Modalities

## 2024-05-28 NOTE — TELEPHONE ENCOUNTER
[facility]ed [facility] at [phone]. Staff reports referral for Solumedrol infusions was received. In process with insurance and pharmacy team.

## 2024-05-28 NOTE — PROGRESS NOTES
Occupational Therapy Daily Note:    Today's date: 2024  Patient name: Carlos Landis  : 1975  MRN: [id number]  Referring provider: Kathy Gregory PA-C  Dx:   Encounter Diagnosis   Name Primary?    MS (multiple sclerosis) ([facility]) Yes       Subjective: pt without complaints       Objective: Pt engaged in skilled OT treatment session with focus on UE NMR, UE strengthening, UE endurance, and FMC/GMC to increase engagement, tolerance, and independence with daily ADL/ IADL and leisure activities.     CPT Code Minutes                                           Task Details        Therapeutic Activity                     Neuro Re-Ed  NMRE to RUE to improve functional reach and dexterity:    Item Transfer:  -RUE manipulating green resistive clip to retreive medium sized items from R side shelf and transfer to medium target on center table.  G functional reaching for item retrieval; G manipulation of resistive clip and G target accuracy      -RUE use of pincer to  small items and stack on top of medium sized objects; G prehension patterns and G accuracy    -RUE retrieval of small dowels from tabletop surface, flip around in fingertips to orient for placement and then thread thru small holes in stacked objects. G functional reach and G eye-hand coordination     -RUE manipulating green resistive clip to retrieve small dowels and transfer to wide mouthed container; G manipulation of resistive clip and G functional reach     -RUE dexterity: able to  3 items in succession, hold in the palm of R hand to transfer to wide mouthed container; G in-hand manipulations                            Therapeutic Exercise                 Testing           HEP           Assessment: Tolerated treatment well. Pt with G tolerance to increased difficulty of resistive clips; improving prehension patterns with manipulating small items.  Patient would benefit from continued skilled OT.    Plan: Continued skilled OT per  POC    INTERVENTION COMMENTS:  Diagnosis: MS (multiple sclerosis) ([facility]) [G35]  Precautions: R side weakness  Insurance: Payor: [employer] / Plan: [employer] / Product Type: Medicaid HMO /   13 of 24 visits

## 2024-05-30 NOTE — PROGRESS NOTES
Daily Note     Today's date: 2024  Patient name: Carlos Landis  : 1975  MRN: [id number]  Referring provider: Kathy Gregory PA-C  Dx:   Encounter Diagnosis     ICD-10-CM    1. MS (multiple sclerosis) (HCC)  G35       2. Unsteadiness on feet  R26.81                        Subjective: Pt states that he is doing okay, it is very hot today. Session began 10 minutes late due to lack of availability of restrooms.       Objective: See treatment diary below      Assessment: Pt participated in a skilled pt session focused on balance, gait, and strengthening. Progressed HIIT ambulation outdoors today and pt tolerated with minimal discomfort. Reduced foot clearance observed with hurdles today as noted by frequent errors, likely due to increased fatigue from additional round of ambulation.  Difficulty observed with side stepping tasks today. Pt will continue to benefit from skilled PT so that she cn navigat eher environment with increased safety and stability.       Plan: Continue per plan of care.      Precautions: MS, r sided weakness, frequent headaches, history or R ACLR, falls risk  POC expires: 2024      Tests and measures 3/27 4/3  4/5 4/9 4/11 4/17 4/19 4/24 4/26 4/30 5/7 5/10 5/14 5/21 5/24  5/28 5/31   ABC 33.13%     39.38%         44.38%     MARIN                     6MWT   190 feet 3 mins 32 secs    407ft          504.4ft     5xSTS 34.54 w UE support     30.91         36.60s     10mWT 0.33m/s w RW              0.61s w RW     TUG 38.80s w RW              35.15s     Neuro Re-Ed                    Victorino navigation   Fwd BUE step to 10 laps         Fwd middle setting in // 7.5lb AW 4 laps  Fwd middle setting in 7.5lb AW 4 laps  Fwd middle setting in 7.5lb AW 4 laps   Fwd middle setting in 7.5lb AW 4 laps   Compliant surface                    Tests and measures      ABC, MARIN, 6MWT, 5xSTS         ABC, 6MWT, 5xSTS, TUG, 10mWT     Side stepping                 In // 7.5 lb AW 4x down and back  "  marching                 In// 7.5lb AW 4x down and back                                           Ther Ex                    STS  2x10 foam on chair X8 foam on chair 2x10 foam on chair no UE support 2x10 foam on chair on UE support   4x5 foam on chair + 10lb weighted vest with UE support 4x5 foam on chair + 10lb weighted vest with UE support 4x5; 2 foam on chair + 10lb weighted vest without UE support 4x5; 2 foam on chair + 10lb weighted vest without UE support 4x6 1 foam on chair + 10lb weighted vest without UE support.  4x6 1 foam on chair + 10lb weighted vest without UE support 3x10 1 foam on chair + 10lb weighted vest without UE support      Standing hip abduction                    Seated marching        3x5 3\" weight  3x5           Mini Squats     8x with UE support before experiencing hollow feeling.            3x5 with UE support in // with 10lb weighted vest  3x5 with UE support in with 10lb weighted vest.  1x10 with UE support with 10lb weighted vest   Step ups  //bar up and over 6\" 10x  // bar up/back 6' step  // Bar up/back 6' step // Bar up/ back 8\" step     // Bar 8inch step up 2x5 reps per LE.  // bar 8 inch step 1x5 with 10lb vest per LE.          LAQ      3x5 + pacing 15lb AW 3x5 + 15lb pacing AW 3x5 15lb + pacing with AW X 5 15lb + pacing with AW           Heel raises                 In // 7.5lb AW 2x10                        Ther Activity                    Psoriasis--wound care             Application of stockinette to protect wound.                            Gait Training                    With RW    2 x 144ft with RW CS [id number] with RW CS    3 ounds 1 interval training 1 min fast 1 min normal pace walk 6 min total 3 rounds interval training 1 min fast 30s walking pace  4 rounds interval training 1 min fast 30s walking pace. 5 rounds interval training 1 min fast walking 30s normal walking pace 5 rounds interval training 1 min fast walking 30s normal walking pace 5 rounds interval training 1 " min fast walking 30s normal walking pace.   5 rounds interval training 1 min fast walking 30s normal pace 2lb weights  6 rounds interval training 1 min fast walking 30s normal pace 3lb AW antonieta   Unilateral UE parallel bar   4x laps  2 laps fwd  2 laps bwd  2 laps lat step                 Modalities

## 2024-05-30 NOTE — TELEPHONE ENCOUNTER
Called [facility] at [phone] and spoke with Lupe. She reports they left 2 voicemails for pt, waiting for call back. States they are ready to get pt scheduled for Solumedrol infusions. Pt is covered 100% with $1 copay for the drug itself. Pt should call [phone] ext [phone], or call main # and ask for Lupe.     Called and Left a message on pt's answering machine for a call back.     [employer] message sent to pt.

## 2024-05-31 ENCOUNTER — TELEPHONE (OUTPATIENT)
Dept: NEUROLOGY | Facility: CLINIC | Age: 49
End: 2024-05-31

## 2024-05-31 ENCOUNTER — OFFICE VISIT (OUTPATIENT)
Facility: CLINIC | Age: 49
End: 2024-05-31
Payer: COMMERCIAL

## 2024-05-31 DIAGNOSIS — R26.81 UNSTEADINESS ON FEET: ICD-10-CM

## 2024-05-31 DIAGNOSIS — G35 MS (MULTIPLE SCLEROSIS) (HCC): Primary | ICD-10-CM

## 2024-05-31 PROCEDURE — 97112 NEUROMUSCULAR REEDUCATION: CPT

## 2024-05-31 PROCEDURE — 97116 GAIT TRAINING THERAPY: CPT

## 2024-05-31 NOTE — TELEPHONE ENCOUNTER
Received fax from [facility] [employer] requesting Physician Repair Order to be signed. Dr DUNCAN Reviewed and signed. Fax to fax# [phone]. Placed in scanning bin at [facility].     Received fax from [place]. Order signed by Dr DUNCAN. Faxed to fax#[phone]. Faxed and placed in scanning bin at [facility].

## 2024-05-31 NOTE — PROGRESS NOTES
Occupational Therapy Daily Note:    Today's date: 2024  Patient name: Carlos Landis  : 1975  MRN: [id number]  Referring provider: Kathy Gregory PA-C  Dx:   Encounter Diagnosis   Name Primary?    MS (multiple sclerosis) ([facility]) Yes       Subjective: pt without complaints       Objective: Pt engaged in skilled OT treatment session with focus on UE NMR, UE strengthening, UE endurance, and FMC/GMC to increase engagement, tolerance, and independence with daily ADL/ IADL and leisure activities.     CPT Code Minutes                                           Task Details        Therapeutic Activity                     Neuro Re-Ed  NMRE to RUE to improve functional reach and dexterity:      -RUE able to manipulate small resistive pop tops to open and close, min to no difficulty     Item Transfer:  -RUE manipulating green resistive clip to transfer medium sized objects center table <> sh height shelf on R side;  G functional reaching; G manipulation of resistive clip     -RUE stopping a rolling nerf golf ball, picking up with gross grasp, translating from palm to fingertips and placing on medium sized target. Completed with G reaction speed, G+ target accuracy and G in-hand manipulations.     -RUE bouncing small ball into wide mouthed container across the table. Completed with G- eye-hand coordination, G prehension patterns to  and manipulate ball in fingertips, G- target accuracy.                    Therapeutic Exercise                 Testing           HEP           Assessment: Tolerated treatment well. Pt cont to demo improving lacho to task, decreased reports of fatigue at end of session; improving functional reach and dexterity with RUE.       Pt with G tolerance to increased difficulty of resistive clips; improving prehension patterns with manipulating small items.  Patient would benefit from continued skilled OT.    Plan: Continued skilled OT per POC    INTERVENTION COMMENTS:  Diagnosis: MS (multiple  sclerosis) ([facility]) [G35]  Precautions: R side weakness  Insurance: Payor: [employer] / Plan: [employer] / Product Type: Medicaid HMO /   14 of 24 visits

## 2024-06-04 ENCOUNTER — OFFICE VISIT (OUTPATIENT)
Facility: CLINIC | Age: 49
End: 2024-06-04
Payer: COMMERCIAL

## 2024-06-04 DIAGNOSIS — G35 MS (MULTIPLE SCLEROSIS) (HCC): Primary | ICD-10-CM

## 2024-06-04 DIAGNOSIS — R26.81 UNSTEADINESS ON FEET: ICD-10-CM

## 2024-06-04 PROCEDURE — 97112 NEUROMUSCULAR REEDUCATION: CPT

## 2024-06-04 PROCEDURE — 97116 GAIT TRAINING THERAPY: CPT

## 2024-06-04 NOTE — PROGRESS NOTES
Daily Note     Today's date: 2024  Patient name: Carlos Landis  : 1975  MRN: [id number]  Referring provider: aKthy Gregory PA-C  Dx:   Encounter Diagnosis     ICD-10-CM    1. MS (multiple sclerosis) (HCC)  G35       2. Unsteadiness on feet  R26.81               Start Time: 0930  Stop Time: 1015  Total time in clinic (min): 45 minutes    Subjective: Pt states it's warm today.  No new complaints.  No falls noted.      Objective: See treatment diary below      Assessment:   Focused on external rotation of left LE due to tendency to internally rotate in gait.  Sidestepping and sit to stand with greater ease and less UE assist.  Ambulation around obstacles with nice stability and control.  Encouraged increased trunk extension to assist with postural position.    Plan: Continue per plan of care.  NO complaints post treatment.       Precautions: MS, r sided weakness, frequent headaches, history or R ACLR, falls risk  POC expires: 2024      Tests and measures 3/27 4/3  4/17 5/14 5/21 5/24  5/28 5/31 6/4   ABC 33.13%  39.38%   44.38%      MARIN             6MWT   190 feet 3 mins 32 secs 407ft    504.4ft      5xSTS 34.54 w UE support  30.91   36.60s      10mWT 0.33m/s w RW     0.61s w RW      TUG 38.80s w RW     35.15s      Neuro Re-Ed            Victorino navigation     Fwd middle setting in 7.5lb AW 4 laps  Fwd middle setting in 7.5lb AW 4 laps   Fwd middle setting in 7.5lb AW 4 laps Distance walking negotiating around obstacles, 140' x2, focus on Left LE ER   Compliant surface            Tests and measures   ABC, MARIN, 6MWT, 5xSTS   ABC, 6MWT, 5xSTS, TUG, 10mWT      Side stepping        In // 7.5 lb AW 4x down and back In ll bars light UE 4 laps x2   marching        In// 7.5lb AW 4x down and back In ll bars focused on LE facing forward 4 laps x2                           Ther Ex            STS  2x10 foam on chair  4x6 1 foam on chair + 10lb weighted vest without UE support 3x10 1 foam on chair + 10lb  "weighted vest without UE support    Off raised step 5 without UE   Standing hip abduction            Seated marching            Mini Squats     3x5 with UE support in // with 10lb weighted vest  3x5 with UE support in with 10lb weighted vest.  1x10 with UE support with 10lb weighted vest    Step ups  //bar up and over 6\" 10x           LAQ   3x5 + pacing 15lb AW         Heel raises        In // 7.5lb AW 2x10                 Ther Activity            Psoriasis--wound care    Application of stockinette to protect wound.                     Gait Training            With RW    5 rounds interval training 1 min fast walking 30s normal walking pace 5 rounds interval training 1 min fast walking 30s normal walking pace.   5 rounds interval training 1 min fast walking 30s normal pace 2lb weights  6 rounds interval training 1 min fast walking 30s normal pace 3lb AW antonieta 2 laps with increased pacing   Unilateral UE parallel bar   4x laps           Modalities                                         "

## 2024-06-04 NOTE — PROGRESS NOTES
Occupational Therapy Daily Note:    Today's date: 2024  Patient name: Carlos Landis  : 1975  MRN: [id number]  Referring provider: Kathy Gregory PA-C  Dx:   Encounter Diagnosis   Name Primary?    MS (multiple sclerosis) ([facility]) Yes       Subjective: pt without complaints       Objective: Pt engaged in skilled OT treatment session with focus on UE NMR, UE strengthening, UE endurance, and FMC/GMC to increase engagement, tolerance, and independence with daily ADL/ IADL and leisure activities.     CPT Code Minutes                                           Task Details        Therapeutic Activity                     Neuro Re-Ed  NMRE to RUE to improve functional reach and dexterity:      -RUE in-hand manipulation of small items: able to translate from palm into pincer with min difficulty and then place on same sized target with G+ target accuracy and G functional reaching       Item Transfer:  -RUE manipulating green resistive clip to transfer small objects from center table to medium sized target on sh height shelf on R side; G functional reaching; G manipulation of resistive clip; G+ target accuracy     -RUE manipulating blue resistive clip to transfer small objects between targets, mod/max difficulty with manipulating clip;  G target accuracy                        Therapeutic Exercise                 Testing           HEP           Assessment: Tolerated treatment well. Pt with improving R hand strength, now able to manipulate blue resistive clip with difficulty. Improving RUE dexterity and tolerance.  Patient would benefit from continued skilled OT.    Plan: Continued skilled OT per POC    INTERVENTION COMMENTS:  Diagnosis: MS (multiple sclerosis) ([facility]) [G35]  Precautions: R side weakness  Insurance: Payor: [employer] / Plan: [employer] / Product Type: Medicaid HMO /   15 of 24 visits

## 2024-06-06 NOTE — PROGRESS NOTES
Daily Note     Today's date: 2024  Patient name: Carlos Landis  : 1975  MRN: [id number]  Referring provider: Kathy Gregory PA-C  Dx:   Encounter Diagnosis     ICD-10-CM    1. MS (multiple sclerosis) (HCC)  G35       2. Unsteadiness on feet  R26.81                      Subjective: Pt states that he is very fatigued after OT. It has been a tough week mentally with his mother's situation and his daughter going to the ED from falling down the stairs. The heat is not helping his MS.       Objective: See treatment diary below      Assessment: Pt participated in a skilled PT session focused on balance, gait, and strengthening. Bridging performed with reduced hip clearance reflecting significant challenge. STS performed from mat table with foam and pt required UE support. Interval training performed with improved success, however, pt did not tolerate the full session today due to life stressors, heat in the clinic, and fatigue from exercises. Pt will continue to benefit from skilled PT so that he can navigate his environment with increased safety and stability.       Plan: Continue per plan of care.      Precautions: MS, r sided weakness, frequent headaches, history or R ACLR, falls risk  POC expires: 2024      Tests and measures 3/27 4/3  4/17 5/14 5/21 5/24  5/28 5/31 6/4 6/7   ABC 33.13%  39.38%   44.38%       MARIN              6MWT   190 feet 3 mins 32 secs 407ft    504.4ft       5xSTS 34.54 w UE support  30.91   36.60s       10mWT 0.33m/s w RW     0.61s w RW       TUG 38.80s w RW     35.15s       Neuro Re-Ed             Victorino navigation     Fwd middle setting in 7.5lb AW 4 laps  Fwd middle setting in 7.5lb AW 4 laps   Fwd middle setting in 7.5lb AW 4 laps Distance walking negotiating around obstacles, 140' x2, focus on Left LE ER    Compliant surface             Tests and measures   ABC, MARIN, 6MWT, 5xSTS   ABC, 6MWT, 5xSTS, TUG, 10mWT       Side stepping        In // 7.5 lb AW 4x down and back  "In ll bars light UE 4 laps x2    marching        In// 7.5lb AW 4x down and back In ll bars focused on LE facing forward 4 laps x2                              Ther Ex             STS  2x10 foam on chair  4x6 1 foam on chair + 10lb weighted vest without UE support 3x10 1 foam on chair + 10lb weighted vest without UE support    Off raised step 5 without UE Off low mat + foam square 10x each     Standing hip abduction             Seated marching             Mini Squats     3x5 with UE support in // with 10lb weighted vest  3x5 with UE support in with 10lb weighted vest.  1x10 with UE support with 10lb weighted vest     Step ups  //bar up and over 6\" 10x            LAQ   3x5 + pacing 15lb AW          bridge          2x8   Heel raises        In // 7.5lb AW 2x10                   Ther Activity             Psoriasis--wound care    Application of stockinette to protect wound.                       Gait Training             With RW    5 rounds interval training 1 min fast walking 30s normal walking pace 5 rounds interval training 1 min fast walking 30s normal walking pace.   5 rounds interval training 1 min fast walking 30s normal pace 2lb weights  6 rounds interval training 1 min fast walking 30s normal pace 3lb AW antonieta 2 laps with increased pacing 6 rounds interval training 1 min fast walking 30s normal pace 3lb AW antonieta   Unilateral UE parallel bar   4x laps            Modalities                                              "

## 2024-06-07 ENCOUNTER — OFFICE VISIT (OUTPATIENT)
Facility: CLINIC | Age: 49
End: 2024-06-07
Payer: COMMERCIAL

## 2024-06-07 DIAGNOSIS — G35 MS (MULTIPLE SCLEROSIS) (HCC): Primary | ICD-10-CM

## 2024-06-07 DIAGNOSIS — R26.81 UNSTEADINESS ON FEET: ICD-10-CM

## 2024-06-07 PROCEDURE — 97112 NEUROMUSCULAR REEDUCATION: CPT

## 2024-06-07 PROCEDURE — 97110 THERAPEUTIC EXERCISES: CPT

## 2024-06-07 PROCEDURE — 97116 GAIT TRAINING THERAPY: CPT

## 2024-06-07 NOTE — PROGRESS NOTES
Occupational Therapy Daily Note:    Today's date: 2024  Patient name: Carlos Landis  : 1975  MRN: [id number]  Referring provider: Kathy Gregory PA-C  Dx:   Encounter Diagnosis   Name Primary?    MS (multiple sclerosis) ([facility]) Yes       Subjective: pt without complaints       Objective: Pt engaged in skilled OT treatment session with focus on UE NMR, UE strengthening, UE endurance, and FMC/GMC to increase engagement, tolerance, and independence with daily ADL/ IADL and leisure activities.     CPT Code Minutes                                           Task Details        Therapeutic Activity                     Neuro Re-Ed  NMRE to RUE to improve functional reach and dexterity:      Multi-matrix for in-hand manipulations with functional reaching. Able to reach to chest height shelf on R side to retrieve cubes, rotate in fingertips to place number side facing forward.     -retrieval of small cubes from center table, manipulating in fingertips to place right side up on top of cubes on R sided shelf; G+ functional reaching and item placement; G+ eye-hand coordination to stack into 2 height assembly; G dexterity.     -RUE manipulating green resistive clip to retreive small item from center table and place on small target on R sided shelf, stacking into a 3 height assembly. Pt with G- control over resistive clip, G+ motor control and precision during placement onto target; dropped 8 out of 32 total pieces                      Therapeutic Exercise                 Testing           HEP           Assessment: Tolerated treatment well. Cont to demo improving functional reach and use of RUE, as well as improved dexterity and precision of small movements.  Patient would benefit from continued skilled OT.    Plan: Continued skilled OT per POC    INTERVENTION COMMENTS:  Diagnosis: MS (multiple sclerosis) ([facility]) [G35]  Precautions: R side weakness  Insurance: Payor: [employer] / Plan: [employer] / Product Type:  Medicaid HMO /   16 of 24 visits

## 2024-06-09 NOTE — PROGRESS NOTES
Daily Note     Today's date: 2024  Patient name: Carlos Landis  : 1975  MRN: [id number]  Referring provider: Kathy Gregory PA-C  Dx:   Encounter Diagnosis     ICD-10-CM    1. MS (multiple sclerosis) (HCC)  G35       2. Unsteadiness on feet  R26.81                        Subjective: Pt states that he has been arguing with his headache doctor. He is doing okay today.       Objective: See treatment diary below      Assessment: Pt participated in a skilled PT session focused on balance, gait, and strengthening. High intensity gait performed today with improved tolerance compared to previous session. Victorino navigation performed and pt noted challenge advancing with R LE leading as compared to L LE leading. Session concluded 5 minutes early due to reduced activity tolerance. Pt will continue to benefit from skilled PT so that he can navigate his environment with increased safety and stability.       Plan: Continue per plan of care.      Precautions: MS, r sided weakness, frequent headaches, history or R ACLR, falls risk  POC expires: 2024      Tests and measures 3/27 4/3  4/17 5/14 5/21 5/24  5/28 5/31 6/4 6/7 6/11   ABC 33.13%  39.38%   44.38%        MARIN               6MWT   190 feet 3 mins 32 secs 407ft    504.4ft        5xSTS 34.54 w UE support  30.91   36.60s        10mWT 0.33m/s w RW     0.61s w RW        TUG 38.80s w RW     35.15s        Neuro Re-Ed              Victorino navigation     Fwd middle setting in 7.5lb AW 4 laps  Fwd middle setting in 7.5lb AW 4 laps   Fwd middle setting in 7.5lb AW 4 laps Distance walking negotiating around obstacles, 140' x2, focus on Left LE ER  Fwd highest setting 7.5lb AW 4 laps   Compliant surface              Tests and measures   ABC, MARIN, 6MWT, 5xSTS   ABC, 6MWT, 5xSTS, TUG, 10mWT        Side stepping        In // 7.5 lb AW 4x down and back In ll bars light UE 4 laps x2  In // 7.5lb AW 4x down and back   marching        In// 7.5lb AW 4x down and back In ll  "bars focused on LE facing forward 4 laps x2                                 Ther Ex              STS  2x10 foam on chair  4x6 1 foam on chair + 10lb weighted vest without UE support 3x10 1 foam on chair + 10lb weighted vest without UE support    Off raised step 5 without UE Off low mat + foam square 10x each      Standing hip abduction              Seated marching              Mini Squats     3x5 with UE support in // with 10lb weighted vest  3x5 with UE support in with 10lb weighted vest.  1x10 with UE support with 10lb weighted vest   1x10 with UE support with in //    Step ups  //bar up and over 6\" 10x             LAQ   3x5 + pacing 15lb AW           bridge          2x8    Heel raises        In // 7.5lb AW 2x10                     Ther Activity              Psoriasis--wound care    Application of stockinette to protect wound.                         Gait Training              With RW    5 rounds interval training 1 min fast walking 30s normal walking pace 5 rounds interval training 1 min fast walking 30s normal walking pace.   5 rounds interval training 1 min fast walking 30s normal pace 2lb weights  6 rounds interval training 1 min fast walking 30s normal pace 3lb AW antonieta 2 laps with increased pacing 6 rounds interval training 1 min fast walking 30s normal pace 3lb AW antonieta 6 rounds interval training 1 min fast walking 30s normal pace 5lb AW antonieta   Unilateral UE parallel bar   4x laps             Modalities                                                 "

## 2024-06-11 ENCOUNTER — OFFICE VISIT (OUTPATIENT)
Facility: CLINIC | Age: 49
End: 2024-06-11
Payer: COMMERCIAL

## 2024-06-11 DIAGNOSIS — G35 MS (MULTIPLE SCLEROSIS) (HCC): Primary | ICD-10-CM

## 2024-06-11 DIAGNOSIS — R26.81 UNSTEADINESS ON FEET: ICD-10-CM

## 2024-06-11 PROCEDURE — 97112 NEUROMUSCULAR REEDUCATION: CPT

## 2024-06-11 PROCEDURE — 97116 GAIT TRAINING THERAPY: CPT

## 2024-06-11 NOTE — PROGRESS NOTES
Occupational Therapy Daily Note:    Today's date: 2024  Patient name: Carlos Landis  : 1975  MRN: [id number]  Referring provider: Kathy Gregory PA-C  Dx:   Encounter Diagnosis   Name Primary?    MS (multiple sclerosis) ([facility]) Yes       Subjective: pt without complaints       Objective: Pt engaged in skilled OT treatment session with focus on UE NMR, UE strengthening, UE endurance, and FMC/GMC to increase engagement, tolerance, and independence with daily ADL/ IADL and leisure activities.     CPT Code Minutes                                           Task Details        Therapeutic Activity                     Neuro Re-Ed  NMRE to RUE to improve functional reach and dexterity:      -RUE manipulating green resistive clip to retreive medium and small sized objects from R sided table at sh height and transfer to center table target; pt with G/G- control over resistive clip; G functional reaching; G precision of movement during target placement     -RUE manipulating green resistive clip to retrieve small items scattered over tabletop and stack onto medium and small sized objects; G functional reach; G target accuracy; G motor control and precision; dropped <10%                       Therapeutic Exercise                 Testing           HEP           Assessment: Tolerated treatment well. Improving functional reach and hand strength; improving dexterity and prehension patterns.  Patient would benefit from continued skilled OT.    Plan: Continued skilled OT per POC    INTERVENTION COMMENTS:  Diagnosis: MS (multiple sclerosis) ([facility]) [G35]  Precautions: R side weakness  Insurance: Payor: [employer] / Plan: [employer] / Product Type: Medicaid HMO /   17 of 24 visits

## 2024-06-14 ENCOUNTER — OFFICE VISIT (OUTPATIENT)
Facility: CLINIC | Age: 49
End: 2024-06-14
Payer: COMMERCIAL

## 2024-06-14 DIAGNOSIS — R26.81 UNSTEADINESS ON FEET: ICD-10-CM

## 2024-06-14 DIAGNOSIS — G35 MS (MULTIPLE SCLEROSIS) (HCC): Primary | ICD-10-CM

## 2024-06-14 PROCEDURE — 97112 NEUROMUSCULAR REEDUCATION: CPT

## 2024-06-14 PROCEDURE — 97110 THERAPEUTIC EXERCISES: CPT

## 2024-06-14 NOTE — PROGRESS NOTES
Daily Note     Today's date: 2024  Patient name: Carlos Landis  : 1975  MRN: [id number]  Referring provider: Kathy Gregory PA-C  Dx:   Encounter Diagnosis     ICD-10-CM    1. MS (multiple sclerosis) (HCC)  G35       2. Unsteadiness on feet  R26.81           Start Time: 0930  Stop Time: 1010  Total time in clinic (min): 40 minutes    Subjective: Reports that he is doing okay. Had a not so great day yesterday, noting that he thinks he did a little bit too much.       Objective: See treatment diary below      Assessment: Tolerated treatment well. Trialed some intervals at the parallel bars today for various exercises due to the temperature in the clinic being high, did not want to increase RPE/fatigue as rapidly early in session. Reported 8-8.5 RPE during marching and side stepping intervals. Continues to have some troubles with overall decreased clearance. Continues to have slow overall movements, better movement quality during this session compared to last time treating therapist worked with patient. Patient demonstrated fatigue post treatment, exhibited good technique with therapeutic exercises, and would benefit from continued PT      Plan: Continue per plan of care.  Progress treatment as tolerated.       Precautions: MS, r sided weakness, frequent headaches, history or R ACLR, falls risk  POC expires: 2024      Tests and measures 3/27 4/3  4/17 5/14 5/21 5/24  5/28 5/31 6/4 6 6    ABC 33.13%  39.38%   44.38%         MARIN                6MWT   190 feet 3 mins 32 secs 407ft    504.4ft         5xSTS 34.54 w UE support  30.91   36.60s         10mWT 0.33m/s w RW     0.61s w RW         TUG 38.80s w RW     35.15s         Neuro Re-Ed               Victorino navigation     Fwd middle setting in 7.5lb AW 4 laps  Fwd middle setting in 7.5lb AW 4 laps   Fwd middle setting in 7.5lb AW 4 laps Distance walking negotiating around obstacles, 140' x2, focus on Left LE ER  Fwd highest setting 7.5lb  "AW 4 laps    Sit <> stands             1 min on:2 min off 2 rounds   Compliant surface               Tests and measures   ABC, MARIN, 6MWT, 5xSTS   ABC, 6MWT, 5xSTS, TUG, 10mWT         Side stepping        In // 7.5 lb AW 4x down and back In ll bars light UE 4 laps x2  In // 7.5lb AW 4x down and back //bar #5 BAW, 1 min on: 2 off, 3 rounds   marching        In// 7.5lb AW 4x down and back In ll bars focused on LE facing forward 4 laps x2   //bar #5 BAW    3 rounds 1 min on: 2 min off,                                  Ther Ex               STS  2x10 foam on chair  4x6 1 foam on chair + 10lb weighted vest without UE support 3x10 1 foam on chair + 10lb weighted vest without UE support    Off raised step 5 without UE Off low mat + foam square 10x each       Standing hip abduction               Seated marching                              Mini Squats     3x5 with UE support in // with 10lb weighted vest  3x5 with UE support in with 10lb weighted vest.  1x10 with UE support with 10lb weighted vest   1x10 with UE support with in //  2x10 with UE support //bar    Step ups  //bar up and over 6\" 10x              LAQ   3x5 + pacing 15lb AW            bridge          2x8     Heel raises        In // 7.5lb AW 2x10                       Ther Activity               Psoriasis--wound care    Application of stockinette to protect wound.                           Gait Training               With RW    5 rounds interval training 1 min fast walking 30s normal walking pace 5 rounds interval training 1 min fast walking 30s normal walking pace.   5 rounds interval training 1 min fast walking 30s normal pace 2lb weights  6 rounds interval training 1 min fast walking 30s normal pace 3lb AW antonieta 2 laps with increased pacing 6 rounds interval training 1 min fast walking 30s normal pace 3lb AW antonieta 6 rounds interval training 1 min fast walking 30s normal pace 5lb AW antonieta    Unilateral UE parallel bar   4x laps              Modalities                 "

## 2024-06-14 NOTE — PROGRESS NOTES
"Occupational Therapy Daily Note:    Today's date: 2024  Patient name: Carlos Landis  : 1975  MRN: [id number]  Referring provider: Kathy Gregory PA-C  Dx:   Encounter Diagnosis   Name Primary?    MS (multiple sclerosis) ([facility]) Yes         Subjective: \"Now I know why I have to use the walker to get around\"       Objective: Pt engaged in skilled OT treatment session with focus on UE NMR, UE strengthening, UE endurance, and FMC/GMC to increase engagement, tolerance, and independence with daily ADL/ IADL and leisure activities.     CPT Code Minutes                                           Task Details        Therapeutic Activity                     Neuro Re-Ed  NMRE to RUE to improve functional reach and dexterity:    First task:  - RUE manipulating green resistive clip to retrieve small sized objects from R sided table at waist height and transfer to center table targets  - G/G- 3 jaw kishore pinch using resistive clip   - G- precision of movement during target placement  - pt demo good ability to orient clip for target placement  - pt required min cues for pacing    Second task:   - RUE manipulating green resistive clip to retrieve medium sized objects on center table and place into medium sized opening of cone on waist height table on R side   - G 3 jaw kishore pinch using resistive clip  - G motor control and precision during target placement; G functional reaching   - dropped <5% of objects during target placement  - pt reporting pain and fatigue in R shoulder during the task;  - Required min cues for pacing for fatigue and pain management                  Therapeutic Exercise                 Testing           HEP           Assessment:   Pt had G participation in therapy. Pt improving functional reach, R hand strength, and RUE dexterity. Pt reporting pain and fatigue in R shoulder during the task. Pt responded well to pacing and increased time for task completion. Patient would benefit from continued " skilled OT.      Plan: Continued skilled OT per POC    INTERVENTION COMMENTS:  Diagnosis: MS (multiple sclerosis) ([facility]) [G35]  Precautions: R side weakness  Insurance: Payor: [employer] / Plan: [employer] / Product Type: Medicaid HMO /   18 of 24 visits

## 2024-06-17 ENCOUNTER — OFFICE VISIT (OUTPATIENT)
Facility: CLINIC | Age: 49
End: 2024-06-17
Payer: COMMERCIAL

## 2024-06-17 DIAGNOSIS — G35 MS (MULTIPLE SCLEROSIS) (HCC): Primary | ICD-10-CM

## 2024-06-17 PROCEDURE — 97112 NEUROMUSCULAR REEDUCATION: CPT

## 2024-06-17 NOTE — PROGRESS NOTES
"Occupational Therapy Daily Note:    Today's date: 2024  Patient name: Carlos Landis  : 1975  MRN: [id number]  Referring provider: Kathy Gregory PA-C  Dx:   Encounter Diagnosis   Name Primary?    MS (multiple sclerosis) ([facility]) Yes         Subjective: \"the last 2 days were not good days\"       Objective: Pt engaged in skilled OT treatment session with focus on UE NMR, UE strengthening, UE endurance, and FMC/GMC to increase engagement, tolerance, and independence with daily ADL/ IADL and leisure activities.     CPT Code Minutes                                           Task Details        Therapeutic Activity                     Neuro Re-Ed  NMRE to RUE to improve functional reach and dexterity:    -RUE manipulation of green resistive clip to retrieve small items from sh height shelf on R side and transfer to tabletop surface. Attempted use of blue resistive clip, however pt unable to manipulate. Pt with G prehension patterns of 3 jaw kishore and modified lateral grasp to operate clip; G strength to manipulate clip and G regulation of grasp on clip when picking up and releasing items    -RUE dexterity: use of alternating pincer to  small items and transfer to narrow mouthed container. Pt with min difficulty with functional pincer; G retrieval and release of small items; G+ target accuracy.                  Therapeutic Exercise                 Testing           HEP           Assessment: pt with G participation, lacho session well; increased fatigue by end of session; G use of pacing for management of fatigue t/o session. Cont to progress towards goals. Patient would benefit from continued skilled OT.      Plan: Continued skilled OT per POC    INTERVENTION COMMENTS:  Diagnosis: MS (multiple sclerosis) ([facility]) [G35]  Precautions: R side weakness  Insurance: Payor: [employer] / Plan: [employer] / Product Type: Medicaid HMO /   19 of 24 visits   "

## 2024-06-20 ENCOUNTER — OFFICE VISIT (OUTPATIENT)
Facility: CLINIC | Age: 49
End: 2024-06-20
Payer: COMMERCIAL

## 2024-06-20 DIAGNOSIS — G35 MS (MULTIPLE SCLEROSIS) (HCC): Primary | ICD-10-CM

## 2024-06-20 DIAGNOSIS — R26.81 UNSTEADINESS ON FEET: ICD-10-CM

## 2024-06-20 PROCEDURE — 97112 NEUROMUSCULAR REEDUCATION: CPT

## 2024-06-20 PROCEDURE — 97110 THERAPEUTIC EXERCISES: CPT

## 2024-06-20 NOTE — PROGRESS NOTES
Occupational Therapy Daily Note:    Today's date: 2024  Patient name: Carlos Landis  : 1975  MRN: [id number]  Referring provider: Kathy Gregory PA-C  Dx:   Encounter Diagnosis   Name Primary?    MS (multiple sclerosis) ([facility]) Yes         Subjective: no complaints       Objective: Pt engaged in skilled OT treatment session with focus on UE NMR, UE strengthening, UE endurance, and FMC/GMC to increase engagement, tolerance, and independence with daily ADL/ IADL and leisure activities.     CPT Code Minutes                                           Task Details        Therapeutic Activity                     Neuro Re-Ed  NMRE to RUE to improve functional reach and dexterity:      -RUE manipulation of medium sized blocks; able to retrieve from center table and transfer to chest height shelf on R side, then assemble into a 3 block configuration. Completed with G+ functional reaching; G accuracy with 3 block assembly; G prehension patterns when manipulating blocks.       -RUE manipulation of green resistive clip to retrieve small items from tabletop surface and transfer to small target on R sided shelf; G prehension patterns when manipulating clip; G target accuracy and item transfer; dropped <5%      -RUE use of functional pincer and 3 jaw kishore to retrieve small items from center table and stack at small target on R side shelf; completed with G+ functional reach to target; G prehension patterns to maintain grasp during item transfer; max difficulty with precision of movement when attempting to stack                       Therapeutic Exercise                 Testing           HEP           Assessment: pt with G participation; noted G+ functional reaching and G+ prehension patterns when manipulating medium sized objects. Cont to progress towards goals. Patient would benefit from continued skilled OT.      Plan: Continued skilled OT per POC    INTERVENTION COMMENTS:  Diagnosis: MS (multiple sclerosis) ([facility])  [G35]  Precautions: R side weakness  Insurance: Payor: [employer] / Plan: [employer] / Product Type: Medicaid HMO /   20 of 24 visits

## 2024-06-20 NOTE — PROGRESS NOTES
Daily Note     Today's date: 2024  Patient name: Carlos Landis  : 1975  MRN: [id number]  Referring provider: Kathy Gregory PA-C  Dx:   Encounter Diagnosis     ICD-10-CM    1. MS (multiple sclerosis) (HCC)  G35       2. Unsteadiness on feet  R26.81                        Subjective: Pt states that the got the steroids today and he didn't sleep well because of it. He feels off today. He also states that they finally fixed his chair.       Objective: See treatment diary below      Assessment:  Pt noted increased hip pain during ambulation tasks today. Marching performed with 7.5lb weights with mod challenge. Pt required cueing during side stepping to keep feet facing forwards.  Pt will continue to benefit from skilled PT so that he can navigate his environment with increased safety and stability.       Plan: Continue per plan of care.  Progress treatment as tolerated.       Precautions: MS, r sided weakness, frequent headaches, history or R ACLR, falls risk  POC expires: 2024      Tests and measures 3/27 4/3  4/17 5/14 5/21 5/24  5/28 5/31 6/4 6/7 6/11 6/18  6/20   ABC 33.13%  39.38%   44.38%          MARIN                 6MWT   190 feet 3 mins 32 secs 407ft    504.4ft          5xSTS 34.54 w UE support  30.91   36.60s          10mWT 0.33m/s w RW     0.61s w RW          TUG 38.80s w RW     35.15s          Neuro Re-Ed                Victorino navigation     Fwd middle setting in 7.5lb AW 4 laps  Fwd middle setting in 7.5lb AW 4 laps   Fwd middle setting in 7.5lb AW 4 laps Distance walking negotiating around obstacles, 140' x2, focus on Left LE ER  Fwd highest setting 7.5lb AW 4 laps     Sit <> stands             1 min on:2 min off 2 rounds    Compliant surface                Tests and measures   ABC, MARIN, 6MWT, 5xSTS   ABC, 6MWT, 5xSTS, TUG, 10mWT          Side stepping        In // 7.5 lb AW 4x down and back In ll bars light UE 4 laps x2  In // 7.5lb AW 4x down and back //bar #5 BAW, 1 min on: 2  "off, 3 rounds // bar #5 BAW 8x down and back   marching        In// 7.5lb AW 4x down and back In ll bars focused on LE facing forward 4 laps x2   //bar #5 BAW    3 rounds 1 min on: 2 min off,  // bar #5 BAW, 10x.                                    Ther Ex                STS  2x10 foam on chair  4x6 1 foam on chair + 10lb weighted vest without UE support 3x10 1 foam on chair + 10lb weighted vest without UE support    Off raised step 5 without UE Off low mat + foam square 10x each        Standing hip abduction                Seated marching                                Mini Squats     3x5 with UE support in // with 10lb weighted vest  3x5 with UE support in with 10lb weighted vest.  1x10 with UE support with 10lb weighted vest   1x10 with UE support with in //  2x10 with UE support //bar  2x10 with UE support // bar   Step ups  //bar up and over 6\" 10x               LAQ   3x5 + pacing 15lb AW          3x5 + pacing 15lb AW   bridge          2x8      Heel raises        In // 7.5lb AW 2x10                         Ther Activity                Psoriasis--wound care    Application of stockinette to protect wound.                             Gait Training                With RW    5 rounds interval training 1 min fast walking 30s normal walking pace 5 rounds interval training 1 min fast walking 30s normal walking pace.   5 rounds interval training 1 min fast walking 30s normal pace 2lb weights  6 rounds interval training 1 min fast walking 30s normal pace 3lb AW antonieta 2 laps with increased pacing 6 rounds interval training 1 min fast walking 30s normal pace 3lb AW antonieta 6 rounds interval training 1 min fast walking 30s normal pace 5lb AW antonieta  6 rounds interval training 1 min fast walking 30s normal pace 5lb AW antonieta    Unilateral UE parallel bar   4x laps               Modalities                                                         "

## 2024-06-21 ENCOUNTER — OFFICE VISIT (OUTPATIENT)
Facility: CLINIC | Age: 49
End: 2024-06-21
Payer: COMMERCIAL

## 2024-06-21 DIAGNOSIS — G35 MS (MULTIPLE SCLEROSIS) (HCC): Primary | ICD-10-CM

## 2024-06-21 DIAGNOSIS — R26.81 UNSTEADINESS ON FEET: ICD-10-CM

## 2024-06-21 PROCEDURE — 97116 GAIT TRAINING THERAPY: CPT

## 2024-06-21 PROCEDURE — 97110 THERAPEUTIC EXERCISES: CPT

## 2024-06-21 NOTE — PROGRESS NOTES
Daily Note     Today's date: 2024  Patient name: Carlos Landis  : 1975  MRN: [id number]  Referring provider: Kathy Gregory PA-C  Dx:   Encounter Diagnosis     ICD-10-CM    1. MS (multiple sclerosis) (HCC)  G35       2. Unsteadiness on feet  R26.81                          Subjective: Pt states that he is mentally tired today. He is doing alright today despite it being two days in a row.     Objective: See treatment diary below      Assessment:  STS performed with cushion today, pt reported feeling as though the weighted vest was heavier today, likely due to duration of time since intervention was last performed. Step ups performed with B U support, ongoing limitation when stepping up with the R LE.  Pt will continue to benefit from skilled PT so that he can navigate his environment with increased safety and stability.       Plan: Continue per plan of care.  Progress treatment as tolerated.       Precautions: MS, r sided weakness, frequent headaches, history or R ACLR, falls risk  POC expires: 2024      Tests and measures 3/27 4/3  4/17 5/14 5/21 5/24  5/28 5/31 6/4 6/7 6/11 6/18  6/20 6/21   ABC 33.13%  39.38%   44.38%           MARIN                  6MWT   190 feet 3 mins 32 secs 407ft    504.4ft           5xSTS 34.54 w UE support  30.91   36.60s           10mWT 0.33m/s w RW     0.61s w RW           TUG 38.80s w RW     35.15s           Neuro Re-Ed                 Victorino navigation     Fwd middle setting in 7.5lb AW 4 laps  Fwd middle setting in 7.5lb AW 4 laps   Fwd middle setting in 7.5lb AW 4 laps Distance walking negotiating around obstacles, 140' x2, focus on Left LE ER  Fwd highest setting 7.5lb AW 4 laps      Sit <> stands             1 min on:2 min off 2 rounds     Compliant surface                 Tests and measures   ABC, MARIN, 6MWT, 5xSTS   ABC, 6MWT, 5xSTS, TUG, 10mWT           Side stepping        In // 7.5 lb AW 4x down and back In ll bars light UE 4 laps x2  In // 7.5lb AW 4x  "down and back //bar #5 BAW, 1 min on: 2 off, 3 rounds // bar #5 BAW 8x down and back    marching        In// 7.5lb AW 4x down and back In ll bars focused on LE facing forward 4 laps x2   //bar #5 BAW    3 rounds 1 min on: 2 min off,  // bar #5 BAW, 10x.                                       Ther Ex                 STS  2x10 foam on chair  4x6 1 foam on chair + 10lb weighted vest without UE support 3x10 1 foam on chair + 10lb weighted vest without UE support    Off raised step 5 without UE Off low mat + foam square 10x each      3x10 foam weighted vest without UE support 1 foam on chair    Standing hip abduction                 Seated marching                                  Mini Squats     3x5 with UE support in // with 10lb weighted vest  3x5 with UE support in with 10lb weighted vest.  1x10 with UE support with 10lb weighted vest   1x10 with UE support with in //  2x10 with UE support //bar  2x10 with UE support // bar    Step ups  //bar up and over 6\" 10x             // bar up and over 8' step 10x B UE support   LAQ   3x5 + pacing 15lb AW          3x5 + pacing 15lb AW    bridge          2x8       Heel raises        In // 7.5lb AW 2x10                           Ther Activity                 Psoriasis--wound care    Application of stockinette to protect wound.                               Gait Training                 With RW    5 rounds interval training 1 min fast walking 30s normal walking pace 5 rounds interval training 1 min fast walking 30s normal walking pace.   5 rounds interval training 1 min fast walking 30s normal pace 2lb weights  6 rounds interval training 1 min fast walking 30s normal pace 3lb AW antonieta 2 laps with increased pacing 6 rounds interval training 1 min fast walking 30s normal pace 3lb AW antonieta 6 rounds interval training 1 min fast walking 30s normal pace 5lb AW antonieta  6 rounds interval training 1 min fast walking 30s normal pace 5lb AW antonieta  6 rounds interval training 1 min fast walking 30s " normal pace 5lb AW antonieta   Unilateral UE parallel bar   4x laps                Modalities

## 2024-06-22 ENCOUNTER — PATIENT MESSAGE (OUTPATIENT)
Dept: NEUROLOGY | Facility: CLINIC | Age: 49
End: 2024-06-22

## 2024-06-24 ENCOUNTER — OFFICE VISIT (OUTPATIENT)
Facility: CLINIC | Age: 49
End: 2024-06-24
Payer: COMMERCIAL

## 2024-06-24 ENCOUNTER — TELEPHONE (OUTPATIENT)
Dept: NEUROLOGY | Facility: CLINIC | Age: 49
End: 2024-06-24

## 2024-06-24 DIAGNOSIS — R26.81 UNSTEADINESS ON FEET: ICD-10-CM

## 2024-06-24 DIAGNOSIS — G35 MS (MULTIPLE SCLEROSIS) (HCC): Primary | ICD-10-CM

## 2024-06-24 PROCEDURE — 97110 THERAPEUTIC EXERCISES: CPT

## 2024-06-24 PROCEDURE — 97112 NEUROMUSCULAR REEDUCATION: CPT

## 2024-06-24 PROCEDURE — 97116 GAIT TRAINING THERAPY: CPT

## 2024-06-24 NOTE — PROGRESS NOTES
"Occupational Therapy Daily Note:    Today's date: 2024  Patient name: Carlos Landis  : 1975  MRN: [id number]  Referring provider: Kathy Gregory PA-C  Dx:   Encounter Diagnosis   Name Primary?    MS (multiple sclerosis) ([facility]) Yes         Subjective: pt reporting his LUE was \"not cooperating\" yesterday but has gotten better since then      Objective: Pt engaged in skilled OT treatment session with focus on UE NMR, UE strengthening, UE endurance, and FMC/GMC to increase engagement, tolerance, and independence with daily ADL/ IADL and leisure activities.     CPT Code Minutes                                           Task Details        Therapeutic Activity                     Neuro Re-Ed  NMRE to RUE to improve functional reach and dexterity:      -RUE manipulation of blue resistive clip to transfer small rings from center tabletop surface to sh height shelf on R side. Pt with G prehension patterns to manipulate clip and G functional reaching during item transfer       -RUE manipulation of small items from palm into pincer, then translate along fingertips from D2>D4 and then back into pincer, then transfer to small ring targets on sh height shelf on R side. Completed with min/mod difficulty with dexterity with manipulating items along fingertips, unable to translate to D5; G functional reach during item transfer; increased time to complete                             Therapeutic Exercise                 Testing           HEP           Assessment: pt with G participation; G lacho to increased resistance of clip, G functional reaching and prehension patterns. Reporting fatigue in R shoulder with functional reaching. Cont to progress towards goals. Patient would benefit from continued skilled OT.      Plan: Continued skilled OT per POC; RE to be completed next session     INTERVENTION COMMENTS:  Diagnosis: MS (multiple sclerosis) ([facility]) [G35]  Precautions: R side weakness  Insurance: Payor: [employer] / " Plan: [employer] / Product Type: Medicaid HMO /   21 of 24 visits

## 2024-06-24 NOTE — TELEPHONE ENCOUNTER
LOV: 5/22/24 Dr. DUNCAN   NOV: 10/24/24 Mallory Claire    [facility] sent on 6/22/24 at 8:11 pm with regards to experiencing facial numbness on his left side of face and lack of use of right arm.    Pt's sister answered the phone and stated that patient was not home, that he was at PT and he is there for about 2 hours. Thanked he for her time.     Will send my chart message to patient and will call back later to discuss

## 2024-06-24 NOTE — PROGRESS NOTES
Daily Note     Today's date: 2024  Patient name: Carlos Landis  : 1975  MRN: [id number]  Referring provider: Kathy Gregory PA-C  Dx:   Encounter Diagnosis     ICD-10-CM    1. MS (multiple sclerosis) (HCC)  G35       2. Unsteadiness on feet  R26.81                      Subjective: Pt states that he had some numbness to the other side in his face this weekend and had some right sided arm weakness. He messaged his doctor about going to the ER, but they didn't respond. His symptoms have resolved at this time. He has experienced this before and does happen occasionally with his MS presentation.       Objective: See treatment diary below      Assessment: Pt participated in a skilled PT session focused on balance, gait, and strengthening. Pt exhibited minimal change in gait speed with fast walking vs regular walking pace during interval walking pace. STS performed with challenge today. He noted a head rush upon completion of step ups and session concluded 3 minutes early due to fatigue, head rush resolved with rest. Pt will continue to benefit from skilled PT so that he can navigate with increased safety and stability.       Plan: Continue per plan of care.      Precautions: MS, r sided weakness, frequent headaches, history or R ACLR, falls risk  POC expires: 2024      Tests and measures 3/27 4/3  4/17 5/14 5/21 5/24  5/28 5/31 6/4 6/7 6/11 6/18  6/20 6/21 6/24   ABC 33.13%  39.38%   44.38%            MARIN                   6MWT   190 feet 3 mins 32 secs 407ft    504.4ft            5xSTS 34.54 w UE support  30.91   36.60s            10mWT 0.33m/s w RW     0.61s w RW            TUG 38.80s w RW     35.15s            Neuro Re-Ed                  Victorino navigation     Fwd middle setting in 7.5lb AW 4 laps  Fwd middle setting in 7.5lb AW 4 laps   Fwd middle setting in 7.5lb AW 4 laps Distance walking negotiating around obstacles, 140' x2, focus on Left LE ER  Fwd highest setting 7.5lb AW 4 laps      "  Sit <> stands             1 min on:2 min off 2 rounds      Compliant surface                  Tests and measures   ABC, MARIN, 6MWT, 5xSTS   ABC, 6MWT, 5xSTS, TUG, 10mWT            Side stepping        In // 7.5 lb AW 4x down and back In ll bars light UE 4 laps x2  In // 7.5lb AW 4x down and back //bar #5 BAW, 1 min on: 2 off, 3 rounds // bar #5 BAW 8x down and back     marching        In// 7.5lb AW 4x down and back In ll bars focused on LE facing forward 4 laps x2   //bar #5 BAW    3 rounds 1 min on: 2 min off,  // bar #5 BAW, 10x.                                          Ther Ex                  STS  2x10 foam on chair  4x6 1 foam on chair + 10lb weighted vest without UE support 3x10 1 foam on chair + 10lb weighted vest without UE support    Off raised step 5 without UE Off low mat + foam square 10x each      3x10 foam weighted vest without UE support 1 foam on chair  3x10 foam weighted vest without UE support   Standing hip abduction                  Seated marching                                    Mini Squats     3x5 with UE support in // with 10lb weighted vest  3x5 with UE support in with 10lb weighted vest.  1x10 with UE support with 10lb weighted vest   1x10 with UE support with in //  2x10 with UE support //bar  2x10 with UE support // bar  2x10 with UE support // bar   Step ups  //bar up and over 6\" 10x             // bar up and over 8' step 10x B UE support // bar up and over 8' step 10x B UE support   LAQ   3x5 + pacing 15lb AW          3x5 + pacing 15lb AW     bridge          2x8        Heel raises        In // 7.5lb AW 2x10                             Ther Activity                  Psoriasis--wound care    Application of stockinette to protect wound.                                 Gait Training                  With RW    5 rounds interval training 1 min fast walking 30s normal walking pace 5 rounds interval training 1 min fast walking 30s normal walking pace.   5 rounds interval training 1 min " fast walking 30s normal pace 2lb weights  6 rounds interval training 1 min fast walking 30s normal pace 3lb AW antonieta 2 laps with increased pacing 6 rounds interval training 1 min fast walking 30s normal pace 3lb AW antonieta 6 rounds interval training 1 min fast walking 30s normal pace 5lb AW antonieta  6 rounds interval training 1 min fast walking 30s normal pace 5lb AW antonieta  6 rounds interval training 1 min fast walking 30s normal pace 5lb AW antonieta 6 rounds interval training 1 min fast walking 30s normal pace 5lb AW antonieta   Unilateral UE parallel bar   4x laps                 Modalities

## 2024-06-25 NOTE — TELEPHONE ENCOUNTER
This patient has never seen Lidya Garland.  Patient sees myself and Dr. DUNCAN, last visit with Dr. DUNCAN 5/22/24.    His left facial numbness is a recurrent symptom.  He has baseline right sided weakness as well.      These are not new symptoms, therefore low suspicion for new lesions/true exacerbation.  He has had updated imaging recently which has all been stable and is on high potency therapy, so unlikely disease progression causing this.  I believe he is also getting a monthly dose of IV steroids.    Could be pseudo-exacerbation due to the heat potentially.  I really do not feel any imaging is needed.  Likely just a fluctuation of his known MS symptoms.

## 2024-06-25 NOTE — TELEPHONE ENCOUNTER
Mallory: Please see patient's responses below      Last MRI:  Brain: 4/16/24  C-Spine: 4/16/24  T-Spine: 9/29/22

## 2024-06-27 ENCOUNTER — APPOINTMENT (OUTPATIENT)
Facility: CLINIC | Age: 49
End: 2024-06-27
Payer: COMMERCIAL

## 2024-06-27 ENCOUNTER — TELEPHONE (OUTPATIENT)
Dept: NEUROLOGY | Facility: CLINIC | Age: 49
End: 2024-06-27

## 2024-06-27 DIAGNOSIS — Z79.899 IMMUNODEFICIENCY DUE TO TREATMENT WITH IMMUNOSUPPRESSIVE MEDICATION  (HCC): ICD-10-CM

## 2024-06-27 DIAGNOSIS — G35 MS (MULTIPLE SCLEROSIS) (HCC): Primary | ICD-10-CM

## 2024-06-27 DIAGNOSIS — Z11.1 TUBERCULOSIS SCREENING: ICD-10-CM

## 2024-06-27 DIAGNOSIS — Z11.59 ENCOUNTER FOR SCREENING FOR VIRAL DISEASE: ICD-10-CM

## 2024-06-27 DIAGNOSIS — D84.821 IMMUNODEFICIENCY DUE TO TREATMENT WITH IMMUNOSUPPRESSIVE MEDICATION  (HCC): ICD-10-CM

## 2024-06-27 NOTE — TELEPHONE ENCOUNTER
Called [facility] at [phone] opt 2 and spoke with Criss. Scheduled Ocrevus medication delivery for 7/2/24. [facility] infusion center address confirmed.

## 2024-06-27 NOTE — PROGRESS NOTES
PT Re-evaluation    Today's date: 2024  Patient name: Carlos Landis  : 1975  MRN: [id number]  Referring provider: Kathy Gregory PA-C  Dx:   Encounter Diagnosis     ICD-10-CM    1. MS (multiple sclerosis) (HCC)  G35       2. Unsteadiness on feet  R26.81           Start Time: 1350  Stop Time: 1435  Total time in clinic (min): 45 minutes    Assessment  Impairments: abnormal gait, abnormal movement, activity intolerance, difficulty understanding, impaired balance, impaired physical strength, lacks appropriate home exercise program, safety issue and poor posture   Functional limitations: deconditioning, gait, and transfers    Update 24: Carlos is a 48 year old male who has attended 24 visits of skilled PT interventions to improve his impaired balance, strength, endurance, and gait deficits secondary to his MS diagnosis and progression of the disease. Compared to last progress note, Carlos has improved nicely in 5x STS, greater than MCID - indicative of meaningful improvement in power generation, although he continues to require bilateral UE support to complete this transfer. TUG score improved just below 4 seconds, indicative of improved short distance functional mobility and reduction in fall risk. Endurance and gait speed seemed to have regression compared to last progress note - patient noting that his endurance usually struggles just before he is due tor an infusion which is In two weeks. Despite improvements noted during today's session, patient still classified as fall risk per TUG, 5x STS, gait speed with overall decreased functional mobility and endurance evident. Patient requires continued skilled PT services in order to improve functional mobility and endurance to improve quality of life, reduce fall risk.       Assessment details: 2024: Pt is a 48 y.o. male who has received 14 visits of skilled PT intervention to address balance, strength, and gait deficits secondary to MS. Today's  assessment reflects improvement from previous visits in 6MWT, gait speed, TUG assessments, and balance confidence as compared to IE. Despite these improvements, pt is still unable to rise from a chair without UE support, which makes getting out of armless chairs at doctors appointments difficult. 6MWT continues to express significant CV deficits that impact his ability to navigate his environment. MARIN not able to be assessed today due to time constraints. Education was reinforced on the importance of performing HEP to maximize gains and pt expressed understanding. Pt will therefore continue to benefit from skilled PT intervention addressing balance, gait, and strengthening so that he can maximize his safety in navigating his environment.      4/19/2024: Pt is a 48 y.o. male returning to pt after admission to hospital with MS flare up. Reassessment was performed today to obtain more orders to continue therapy. Despite recent flare up, pt's 6MWT increased by >200ft, and 5xSTS time increased as well. LAQ were added to address quadricep strength defiicts with education on pacing Pt will continue to benefit form skilled PT so that he can benefit form skilled PT so that he can navigate his environment with increased safety and stability.       At IE: Pt is a 48 y.o. male presenting to physical therapy with chief complaint of reduced endurance, poor activity tolerance, impaired transfers, and reduced functional participation secondary to multiple sclerosis Assessment today reflects globally reduced muscle strength, most noteably in the hip, core, and LE musculature, which impacts his ability to perform transfers and ambulation tasks. R sided deficits are greater than L sided deficits. Gait speed assessment places him at the level of a household ambulator. 5xSTS was unable to be completed without UE support. ABC indicates that pt is at risk of falls and has low balance confidence. Pt will benefit from skilled physical  therapy intervention addressing muscle strength, endurance, activity tolerance, and functional deficits so that he/she may be able to navigate their environment with increased independence and safety.   Understanding of Dx/Px/POC: good     Prognosis: good     Goals  STG's: to be achieved in 4 weeks  -Pt will exhibit independence with HEP within 2 weeks. ONGOING  -Pt will be able to complete a chair to mat transfer with supervision assistance MET  -2MWT to be performed and subsequent goal to be set.  Discontinued, pt able to complete 6MWT  -ABC to improve by MCID of 14% ONGOING  -Pt will improve gait speed by MCID of .13m/s MET     LTG's: to be achieved in 8 weeks  -pt will be able to ambulate for 6 minutes without rest break due to fatigue so that he may be able to walk around his house without difficulty MET  -pt will improve gait speed to between 0.4-0.8m/s to reflect status as a limited community ambulator MET  -Pt will improve TUG score with RW to <13.5 s to reflect reduced risk of falls ONGOING  -Pt will be able to complete an STS transfer without UE support so that he can rise from chairs without arms.  ONGOING    NEW GOALS (as of 6/28)   Improve gait speed to > 0.8 m/s with RW to classify as unlimited community ambulator   Will improve MARIN by 8 points         Plan  Patient would benefit from: skilled physical therapy  Planned modality interventions: electrical stimulation/Russian stimulation, thermotherapy: hydrocollator packs, biofeedback and cryotherapy     Planned therapy interventions: balance, ADL training, abdominal trunk stabilization, activity modification, balance/weight bearing training, behavior modification, body mechanics training, breathing training, canalith repositioning, neuromuscular re-education, patient education, therapeutic activities, therapeutic exercise, strengthening, sensory integrative techniques, gait training, home exercise program, postural training, graded exercise, graded  "activity and stretching     Frequency: 2x week  Duration in visits: 16  Duration in weeks: 8  Plan of Care beginning date: 6/28/2024  Plan of Care expiration date: 8/28/2024  Treatment plan discussed with: patient  Plan details: TE: for strengthening, stretching, and flexibility  TA: for functional participation  NR: for balance, coordination, reaction time  MT: for STM, IASTM, joint mobilizations  Gait Training: to improve gait mechanics.       Subjective Evaluation     History of Present Illness  Mechanism of injury:    Update 6/28/24: Therapy has been helping a lot. Especially when he is at home. Gets tired when he gets home, but for the next couple days he is feeling good and fine. Sees some mild improvements in his improvements, balance, and endurance. Knows that Dr. DUNCAN \"will never take me off the walker\". Scheduled for his next infusion next week, has to get bloodwork done on the 7th by the 7th for the infusion. Likes his caffeine. States that the only time he is walking is when he is at PT. At home, doesn't seem to walk or move around as much.      5/24/2024: Pt states that he had an appointment with Dr. DUNCAN since he was last here that when well. He was able to walk into the session today with his walker and also walked into the hospital to visit his mom instead of taking his transport chair. He states that he feels 40-50% of the way to where he would like to be by the time he is done with PT. He states he feels his progress is limited by his own brain, he has a hard time pushing himself to do the exercises at home, but knows he is supposed to. He is currently focusing his exercises on walking to the kitchen table. He also states that he has not tried to get up the stairs in his stair lift, but the upstairs is very warm, which may not be good for his MS.      4/19/2024: Pt states that he had significant right sided weakness and went to the ER. He was admitted. He lost his speech. He states that it was a bad " flare up. He is looking forward to going to his doctor to get his R elbow psoriasis looked at. He states that he is supposed to be getting steroids every two weeks but no one ever scheduled him. He states that he feels great. He states that the swimmer's ear is still there but that takes a lot for it to go away.      At IE: Pt states he is coming in for treatment for his MS. He notes that he cannot move his R arm at times. He presents in a travel chair, which is his primary method of mobility, and he uses an RW at home. HE notes that he is very tired and sleeps most of the day. They have purchased a stair lift for his home as he has difficulty navigating stairs. He lives with his brother. He is in the process of getting a walk in tub & had a shower chair. He also experiences a lot of headaches that result in facial numbness on his right, recently it has been on his left too. Ibuprofen gels and closing his eyes reduces his pain and opening his eyes when he has headaches makes it worse. His physicians are aware of this and are managing it. He has right sided facial droop associated with his MS that has present since his diagnosis. His goals are to improve his stamina so that he can walk further in his home.          Recurrent probem     Patient Goals  Patient goals for therapy: improved balance  Patient goal: improve his stamina  Pain  Current pain ratin  At best pain ratin  At worst pain rating: 10  Location: let & right side of his head.     Social Support  Lives in: multiple-level home  Lives with: parents (siblings)     Employment status: not working  Treatments  Previous treatment: physical therapy and occupational therapy        Objective      Strength/Myotome Testing      Left Shoulder      Planes of Motion   Flexion: 3+   Abduction: 3      Right Shoulder      Planes of Motion   Flexion: 3   Abduction: 2      Left Elbow   Flexion: 4  Extension: 4     Right Elbow   Flexion: 3+  Extension: 4     Left Hip    Planes of Motion   Flexion: 4     Right Hip   Planes of Motion   Flexion: 3+     Left Knee   Flexion: 4  Extension: 4     Right Knee   Flexion: 4  Extension: 3     Left Ankle/Foot   Dorsiflexion: 3     Right Ankle/Foot   Dorsiflexion: 2+  Neuro Exam:      Transfers   Sit to stand: independent   Wheelchair to mat: Wheelchair to mat transfer: contact guard.  Mat to wheelchair: Mat to wheelchair transfer: contact guad.    /86 end session LUE auto         Precautions: MS, r sided weakness, frequent headaches, history or R ACLR, falls risk  POC expires: 07/19/2024      Tests and measures 3/27 4/3  4/17 5/14 5/21 5/24  5/28 5/31 6/4 6/7 6/11 6/18  6/20 6/21 6/24 6/27   ABC 33.13%  39.38%   44.38%          48.13%    MARIN   27/56 30/56   6MWT   190 feet 3 mins 32 secs 407ft    504.4ft          382 feet in 5 mins    5xSTS 34.54 w UE support  30.91   36.60s          27.93 seconds with BUE    10mWT 0.33m/s w RW     0.61s w RW          0.58 m/s with RW    TUG 38.80s w RW     35.15s          32.1 second   Neuro Re-Ed                   Victorino navigation     Fwd middle setting in 7.5lb AW 4 laps  Fwd middle setting in 7.5lb AW 4 laps   Fwd middle setting in 7.5lb AW 4 laps Distance walking negotiating around obstacles, 140' x2, focus on Left LE ER  Fwd highest setting 7.5lb AW 4 laps        Sit <> stands             1 min on:2 min off 2 rounds       Compliant surface                   Tests and measures   ABC, MARIN, 6MWT, 5xSTS   ABC, 6MWT, 5xSTS, TUG, 10mWT             Side stepping        In // 7.5 lb AW 4x down and back In ll bars light UE 4 laps x2  In // 7.5lb AW 4x down and back //bar #5 BAW, 1 min on: 2 off, 3 rounds // bar #5 BAW 8x down and back   //bar no AW 1 min on, 2 min off 2 rounds   marching        In// 7.5lb AW 4x down and back In ll bars focused on LE facing forward 4 laps x2   //bar #5 BAW    3 rounds 1 min on: 2 min off,  // bar #5 BAW, 10x.    //bar no AW, 2 rounds 1 min on, 2 min off       "                                   Ther Ex                   STS  2x10 foam on chair  4x6 1 foam on chair + 10lb weighted vest without UE support 3x10 1 foam on chair + 10lb weighted vest without UE support    Off raised step 5 without UE Off low mat + foam square 10x each      3x10 foam weighted vest without UE support 1 foam on chair  3x10 foam weighted vest without UE support    Standing hip abduction                   Seated marching                                      Mini Squats     3x5 with UE support in // with 10lb weighted vest  3x5 with UE support in with 10lb weighted vest.  1x10 with UE support with 10lb weighted vest   1x10 with UE support with in //  2x10 with UE support //bar  2x10 with UE support // bar  2x10 with UE support // bar 2x10 //bar UE support    Step ups  //bar up and over 6\" 10x             // bar up and over 8' step 10x B UE support // bar up and over 8' step 10x B UE support    LAQ   3x5 + pacing 15lb AW          3x5 + pacing 15lb AW      bridge          2x8         Heel raises        In // 7.5lb AW 2x10                               Ther Activity                   Psoriasis--wound care    Application of stockinette to protect wound.                                   Gait Training                   With RW    5 rounds interval training 1 min fast walking 30s normal walking pace 5 rounds interval training 1 min fast walking 30s normal walking pace.   5 rounds interval training 1 min fast walking 30s normal pace 2lb weights  6 rounds interval training 1 min fast walking 30s normal pace 3lb AW natonieta 2 laps with increased pacing 6 rounds interval training 1 min fast walking 30s normal pace 3lb AW antonieta 6 rounds interval training 1 min fast walking 30s normal pace 5lb AW antonieta  6 rounds interval training 1 min fast walking 30s normal pace 5lb AW antonieta  6 rounds interval training 1 min fast walking 30s normal pace 5lb AW antonieta 6 rounds interval training 1 min fast walking 30s normal pace 5lb AW antonieta  "   Unilateral UE parallel bar   4x laps                  Modalities

## 2024-06-27 NOTE — TELEPHONE ENCOUNTER
Pt is scheduled for Ocrevus on 7/10/24.     Ocrevus is approved with [employer] from 5/10/24 to 5/10/25. Medication must be obtained from [facility].     Medication delivery is scheduled for 7/2/24.     The following labs are required and have been entered as appropriate per protocol:    CBC  CMP  Immunoglobulins  HIV  Quantiferon TB gold  Acute hepatitis panel    MyChart message sent to pt reminding them of needed labs.    Awaiting labs.

## 2024-06-28 ENCOUNTER — EVALUATION (OUTPATIENT)
Facility: CLINIC | Age: 49
End: 2024-06-28
Payer: COMMERCIAL

## 2024-06-28 DIAGNOSIS — R26.81 UNSTEADINESS ON FEET: ICD-10-CM

## 2024-06-28 DIAGNOSIS — G35 MS (MULTIPLE SCLEROSIS) (HCC): Primary | ICD-10-CM

## 2024-06-28 PROCEDURE — 97164 PT RE-EVAL EST PLAN CARE: CPT

## 2024-06-28 PROCEDURE — 97112 NEUROMUSCULAR REEDUCATION: CPT

## 2024-06-28 PROCEDURE — 97110 THERAPEUTIC EXERCISES: CPT

## 2024-07-02 ENCOUNTER — APPOINTMENT (OUTPATIENT)
Dept: MRI IMAGING | Facility: HOSPITAL | Age: 49
End: 2024-07-02
Payer: MEDICARE

## 2024-07-02 ENCOUNTER — OFFICE VISIT (OUTPATIENT)
Facility: CLINIC | Age: 49
End: 2024-07-02
Payer: MEDICARE

## 2024-07-02 ENCOUNTER — HOSPITAL ENCOUNTER (OUTPATIENT)
Facility: HOSPITAL | Age: 49
Setting detail: OBSERVATION
Discharge: HOME | End: 2024-07-03
Attending: EMERGENCY MEDICINE | Admitting: HOSPITALIST
Payer: MEDICARE

## 2024-07-02 ENCOUNTER — APPOINTMENT (OUTPATIENT)
Dept: RADIOLOGY | Facility: HOSPITAL | Age: 49
End: 2024-07-02
Payer: MEDICARE

## 2024-07-02 DIAGNOSIS — G35 MS (MULTIPLE SCLEROSIS) (HCC): Primary | ICD-10-CM

## 2024-07-02 DIAGNOSIS — R26.81 UNSTEADINESS ON FEET: ICD-10-CM

## 2024-07-02 LAB
ALBUMIN SERPL BCG-MCNC: 4.1 G/DL (ref 3.5–5)
ALP SERPL-CCNC: 57 U/L (ref 34–104)
ALT SERPL W P-5'-P-CCNC: 9 U/L (ref 7–52)
ANION GAP SERPL CALCULATED.3IONS-SCNC: 8 MMOL/L (ref 4–13)
AST SERPL W P-5'-P-CCNC: 13 U/L (ref 13–39)
BASOPHILS # BLD AUTO: 0.1 THOUSANDS/ÂΜL (ref 0–0.1)
BASOPHILS NFR BLD AUTO: 1 % (ref 0–1)
BILIRUB SERPL-MCNC: 0.45 MG/DL (ref 0.2–1)
BILIRUB UR QL STRIP: NEGATIVE
BUN SERPL-MCNC: 24 MG/DL (ref 5–25)
CALCIUM SERPL-MCNC: 9.2 MG/DL (ref 8.4–10.2)
CARDIAC TROPONIN I PNL SERPL HS: <2 NG/L
CHLORIDE SERPL-SCNC: 106 MMOL/L (ref 96–108)
CLARITY UR: CLEAR
CO2 SERPL-SCNC: 25 MMOL/L (ref 21–32)
COLOR UR: YELLOW
CREAT SERPL-MCNC: 0.85 MG/DL (ref 0.6–1.3)
EOSINOPHIL # BLD AUTO: 0.57 THOUSAND/ÂΜL (ref 0–0.61)
EOSINOPHIL NFR BLD AUTO: 7 % (ref 0–6)
ERYTHROCYTE [DISTWIDTH] IN BLOOD BY AUTOMATED COUNT: 13.1 % (ref 11.6–15.1)
GFR SERPL CREATININE-BSD FRML MDRD: 100 ML/MIN/1.73SQ M
GLUCOSE SERPL-MCNC: 82 MG/DL (ref 65–140)
GLUCOSE SERPL-MCNC: 85 MG/DL (ref 65–140)
GLUCOSE UR STRIP-MCNC: NEGATIVE MG/DL
HCT VFR BLD AUTO: 43.5 % (ref 36.5–49.3)
HGB BLD-MCNC: 13.8 G/DL (ref 12–17)
HGB UR QL STRIP.AUTO: NEGATIVE
IMM GRANULOCYTES # BLD AUTO: 0.07 THOUSAND/UL (ref 0–0.2)
IMM GRANULOCYTES NFR BLD AUTO: 1 % (ref 0–2)
KETONES UR STRIP-MCNC: NEGATIVE MG/DL
LEUKOCYTE ESTERASE UR QL STRIP: NEGATIVE
LYMPHOCYTES # BLD AUTO: 1.3 THOUSANDS/ÂΜL (ref 0.6–4.47)
LYMPHOCYTES NFR BLD AUTO: 16 % (ref 14–44)
MCH RBC QN AUTO: 28.8 PG (ref 26.8–34.3)
MCHC RBC AUTO-ENTMCNC: 31.7 G/DL (ref 31.4–37.4)
MCV RBC AUTO: 91 FL (ref 82–98)
MONOCYTES # BLD AUTO: 0.65 THOUSAND/ÂΜL (ref 0.17–1.22)
MONOCYTES NFR BLD AUTO: 8 % (ref 4–12)
NEUTROPHILS # BLD AUTO: 5.68 THOUSANDS/ÂΜL (ref 1.85–7.62)
NEUTS SEG NFR BLD AUTO: 67 % (ref 43–75)
NITRITE UR QL STRIP: NEGATIVE
NRBC BLD AUTO-RTO: 0 /100 WBCS
PH UR STRIP.AUTO: 6 [PH]
PLATELET # BLD AUTO: 200 THOUSANDS/UL (ref 149–390)
PMV BLD AUTO: 10 FL (ref 8.9–12.7)
POTASSIUM SERPL-SCNC: 4 MMOL/L (ref 3.5–5.3)
PROT SERPL-MCNC: 7.2 G/DL (ref 6.4–8.4)
PROT UR STRIP-MCNC: NEGATIVE MG/DL
RBC # BLD AUTO: 4.79 MILLION/UL (ref 3.88–5.62)
SODIUM SERPL-SCNC: 139 MMOL/L (ref 135–147)
SP GR UR STRIP.AUTO: 1.02 (ref 1–1.03)
UROBILINOGEN UR STRIP-ACNC: <2 MG/DL
WBC # BLD AUTO: 8.37 THOUSAND/UL (ref 4.31–10.16)

## 2024-07-02 PROCEDURE — 97168 OT RE-EVAL EST PLAN CARE: CPT

## 2024-07-02 PROCEDURE — 36415 COLL VENOUS BLD VENIPUNCTURE: CPT | Performed by: EMERGENCY MEDICINE

## 2024-07-02 PROCEDURE — 93005 ELECTROCARDIOGRAM TRACING: CPT

## 2024-07-02 PROCEDURE — 84484 ASSAY OF TROPONIN QUANT: CPT | Performed by: EMERGENCY MEDICINE

## 2024-07-02 PROCEDURE — 71045 X-RAY EXAM CHEST 1 VIEW: CPT

## 2024-07-02 PROCEDURE — 94664 DEMO&/EVAL PT USE INHALER: CPT

## 2024-07-02 PROCEDURE — 99215 OFFICE O/P EST HI 40 MIN: CPT | Performed by: PSYCHIATRY & NEUROLOGY

## 2024-07-02 PROCEDURE — 80053 COMPREHEN METABOLIC PANEL: CPT | Performed by: EMERGENCY MEDICINE

## 2024-07-02 PROCEDURE — A9585 GADOBUTROL INJECTION: HCPCS | Performed by: HOSPITALIST

## 2024-07-02 PROCEDURE — 99223 1ST HOSP IP/OBS HIGH 75: CPT | Performed by: INTERNAL MEDICINE

## 2024-07-02 PROCEDURE — 81003 URINALYSIS AUTO W/O SCOPE: CPT | Performed by: EMERGENCY MEDICINE

## 2024-07-02 PROCEDURE — 97530 THERAPEUTIC ACTIVITIES: CPT

## 2024-07-02 PROCEDURE — 97112 NEUROMUSCULAR REEDUCATION: CPT

## 2024-07-02 PROCEDURE — 97116 GAIT TRAINING THERAPY: CPT

## 2024-07-02 PROCEDURE — 99285 EMERGENCY DEPT VISIT HI MDM: CPT | Performed by: EMERGENCY MEDICINE

## 2024-07-02 PROCEDURE — 70553 MRI BRAIN STEM W/O & W/DYE: CPT

## 2024-07-02 PROCEDURE — 85025 COMPLETE CBC W/AUTO DIFF WBC: CPT | Performed by: EMERGENCY MEDICINE

## 2024-07-02 PROCEDURE — 82948 REAGENT STRIP/BLOOD GLUCOSE: CPT

## 2024-07-02 RX ORDER — ENOXAPARIN SODIUM 100 MG/ML
40 INJECTION SUBCUTANEOUS DAILY
Status: DISCONTINUED | OUTPATIENT
Start: 2024-07-02 | End: 2024-07-03 | Stop reason: HOSPADM

## 2024-07-02 RX ORDER — GADOBUTROL 604.72 MG/ML
10 INJECTION INTRAVENOUS
Status: COMPLETED | OUTPATIENT
Start: 2024-07-02 | End: 2024-07-02

## 2024-07-02 RX ORDER — ONDANSETRON 2 MG/ML
4 INJECTION INTRAMUSCULAR; INTRAVENOUS EVERY 6 HOURS PRN
Status: DISCONTINUED | OUTPATIENT
Start: 2024-07-02 | End: 2024-07-03 | Stop reason: HOSPADM

## 2024-07-02 RX ORDER — SODIUM CHLORIDE 9 MG/ML
75 INJECTION, SOLUTION INTRAVENOUS CONTINUOUS
Status: DISCONTINUED | OUTPATIENT
Start: 2024-07-02 | End: 2024-07-03 | Stop reason: HOSPADM

## 2024-07-02 RX ORDER — ACETAMINOPHEN 325 MG/1
650 TABLET ORAL EVERY 6 HOURS PRN
Status: DISCONTINUED | OUTPATIENT
Start: 2024-07-02 | End: 2024-07-03 | Stop reason: HOSPADM

## 2024-07-02 RX ORDER — ASPIRIN 325 MG
325 TABLET ORAL DAILY
Status: DISCONTINUED | OUTPATIENT
Start: 2024-07-03 | End: 2024-07-03

## 2024-07-02 RX ADMIN — GADOBUTROL 10 ML: 604.72 INJECTION INTRAVENOUS at 17:19

## 2024-07-02 RX ADMIN — ENOXAPARIN SODIUM 40 MG: 40 INJECTION SUBCUTANEOUS at 17:51

## 2024-07-02 RX ADMIN — SODIUM CHLORIDE 75 ML/HR: 0.9 INJECTION, SOLUTION INTRAVENOUS at 17:51

## 2024-07-02 NOTE — ED PROVIDER NOTES
"History  Chief Complaint   Patient presents with    Numbness     LWK @1100 pt was at PT and started to feel Left sided facial numbness. Hx of MS. Pt reports \"I think it is my MS flare up.\" Pt dad called ems.No blood thinners.      50 yo M with h/o MS manifested as right hemiparesis and right-sided paresthesia as well as DAVID complains of flare of his typical MS symptoms.  This began with paresthesias of the left side of face 3 days ago.  Today in the heat at physical therapy he developed more numbness of the left side of face and weakness of the right arm with headache and some word finding difficulties.  Symptoms are typically involve the right side but he has had this paresthesia on the left side of face in the past.  At that time workup was negative for stroke.  Patient received Solu-Medrol infusions monthly and Ocrevus infusions every 6 months.  His last steroid infusion was held because his scheduled Ocrevus infusion will be next week.  Patient denies recent fever, cough, dyspnea, chest pain, palpitations, trauma, GI and  symptoms.        Prior to Admission Medications   Prescriptions Last Dose Informant Patient Reported? Taking?   MAGNESIUM PO  Self Yes No   Sig: Take by mouth   Riboflavin ([employer] Vitamin B-2) 100 MG TABS  Self No No   Sig: Take 2 tablets (200 mg total) by mouth in the morning   SYRINGE-NEEDLE, DISP, 3 ML 25G X 1\" 3 ML MISC  Self No No   Sig: Take with B12 regimen prescribed   aspirin 325 mg tablet  Self No No   Sig: Take 1 tablet (325 mg total) by mouth daily   cyanocobalamin 1,000 mcg/mL  Self No No   Sig: Take B12 1000 mcg IM once a week for 4 weeks, then once a month for 5 months   ocrelizumab (Ocrevus) 300 MG/10ML SOLN  Self No No   Sig: Inject 20 mL (600 mg total) into a catheter in a vein every 6 (six) months      Facility-Administered Medications: None       Past Medical History:   Diagnosis Date    Arthritis     Diabetes mellitus (HCC)     prediabetes    MS (multiple sclerosis) (HCC)  "    Scoliosis     Visual impairment        Past Surgical History:   Procedure Laterality Date    HERNIA REPAIR      3 times    KNEE SURGERY Right        Family History   Problem Relation Age of Onset    Stroke Maternal Grandmother     Multiple sclerosis Other      I have reviewed and agree with the history as documented.    E-Cigarette/Vaping    E-Cigarette Use Former User      E-Cigarette/Vaping Substances    Nicotine No     THC No     CBD No     Flavoring No     Other No     Unknown No      Social History     Tobacco Use    Smoking status: Former    Smokeless tobacco: Former   Vaping Use    Vaping status: Former   Substance Use Topics    Alcohol use: Not Currently    Drug use: Never       Review of Systems   Constitutional:  Negative for fever.   HENT:  Negative for congestion, rhinorrhea and sore throat.    Respiratory:  Negative for cough and shortness of breath.    Cardiovascular:  Negative for chest pain, palpitations and leg swelling.   Gastrointestinal:  Negative for abdominal pain, diarrhea and vomiting.   Neurological:  Positive for speech difficulty, weakness, numbness and headaches.       Physical Exam  Physical Exam  Vitals and nursing note reviewed.   Constitutional:       General: He is not in acute distress.     Appearance: He is well-developed. He is obese. He is not ill-appearing or diaphoretic.   HENT:      Head: Normocephalic and atraumatic.      Right Ear: External ear normal.      Left Ear: External ear normal.      Mouth/Throat:      Mouth: Mucous membranes are moist.      Pharynx: Oropharynx is clear.   Eyes:      General: No scleral icterus.     Conjunctiva/sclera: Conjunctivae normal.   Neck:      Vascular: No JVD.   Cardiovascular:      Rate and Rhythm: Normal rate and regular rhythm.      Pulses: Normal pulses.      Heart sounds: Normal heart sounds.   Pulmonary:      Effort: Pulmonary effort is normal. No respiratory distress.      Breath sounds: Normal breath sounds.   Abdominal:       General: Bowel sounds are normal.      Palpations: Abdomen is soft.      Tenderness: There is no abdominal tenderness.   Musculoskeletal:         General: No tenderness. Normal range of motion.      Cervical back: Normal range of motion and neck supple. No tenderness.      Right lower leg: No edema.      Left lower leg: No edema.   Skin:     General: Skin is warm and dry.      Capillary Refill: Capillary refill takes less than 2 seconds.      Findings: No rash.   Neurological:      Mental Status: He is alert and oriented to person, place, and time. Mental status is at baseline.      Cranial Nerves: No cranial nerve deficit.      Coordination: Coordination abnormal.      Deep Tendon Reflexes: Reflexes are normal and symmetric.   Psychiatric:         Mood and Affect: Mood normal.         Behavior: Behavior normal.         Vital Signs  ED Triage Vitals [07/02/24 1133]   Temperature Pulse Respirations Blood Pressure SpO2   98.1 °F (36.7 °C) 69 18 127/73 97 %      Temp Source Heart Rate Source Patient Position - Orthostatic VS BP Location FiO2 (%)   Oral Monitor Sitting Left arm --      Pain Score       --           Vitals:    07/02/24 1246 07/02/24 1442 07/02/24 1445 07/02/24 1530   BP: 121/79 131/83 131/83 131/87   Pulse: 59 73 71 70   Patient Position - Orthostatic VS:   Sitting          Visual Acuity  Visual Acuity      Flowsheet Row Most Recent Value   L Pupil Size (mm) 3   R Pupil Size (mm) 3            ED Medications  Medications   aspirin tablet 325 mg (has no administration in time range)   sodium chloride 0.9 % infusion (has no administration in time range)   acetaminophen (TYLENOL) tablet 650 mg (has no administration in time range)   ondansetron (ZOFRAN) injection 4 mg (has no administration in time range)   enoxaparin (LOVENOX) subcutaneous injection 40 mg (has no administration in time range)       Diagnostic Studies  Results Reviewed       Procedure Component Value Units Date/Time    UA (URINE) with reflex  to Scope [[id number]] Collected: 07/02/24 1437    Lab Status: Final result Specimen: Urine, Clean Catch Updated: 07/02/24 1449     Color, UA Yellow     Clarity, UA Clear     Specific Gravity, UA 1.020     pH, UA 6.0     Leukocytes, UA Negative     Nitrite, UA Negative     Protein, UA Negative mg/dl      Glucose, UA Negative mg/dl      Ketones, UA Negative mg/dl      Urobilinogen, UA <2.0 mg/dl      Bilirubin, UA Negative     Occult Blood, UA Negative    HS Troponin 0hr (reflex protocol) [[id number]]  (Normal) Collected: 07/02/24 1142    Lab Status: Final result Specimen: Blood from Arm, Right Updated: 07/02/24 1209     hs TnI 0hr <2 ng/L     Comprehensive metabolic panel [[id number]] Collected: 07/02/24 1142    Lab Status: Final result Specimen: Blood from Arm, Right Updated: 07/02/24 1204     Sodium 139 mmol/L      Potassium 4.0 mmol/L      Chloride 106 mmol/L      CO2 25 mmol/L      ANION GAP 8 mmol/L      BUN 24 mg/dL      Creatinine 0.85 mg/dL      Glucose 85 mg/dL      Calcium 9.2 mg/dL      AST 13 U/L      ALT 9 U/L      Alkaline Phosphatase 57 U/L      Total Protein 7.2 g/dL      Albumin 4.1 g/dL      Total Bilirubin 0.45 mg/dL      eGFR 100 ml/min/1.73sq m     Narrative:      National Kidney Disease Foundation guidelines for Chronic Kidney Disease (CKD):     Stage 1 with normal or high GFR (GFR > 90 mL/min/1.73 square meters)    Stage 2 Mild CKD (GFR = 60-89 mL/min/1.73 square meters)    Stage 3A Moderate CKD (GFR = 45-59 mL/min/1.73 square meters)    Stage 3B Moderate CKD (GFR = 30-44 mL/min/1.73 square meters)    Stage 4 Severe CKD (GFR = 15-29 mL/min/1.73 square meters)    Stage 5 End Stage CKD (GFR <15 mL/min/1.73 square meters)  Note: GFR calculation is accurate only with a steady state creatinine    CBC and differential [[id number]]  (Abnormal) Collected: 07/02/24 1142    Lab Status: Final result Specimen: Blood from Arm, Right Updated: 07/02/24 1148     WBC 8.37 Thousand/uL      RBC 4.79 Million/uL       Hemoglobin 13.8 g/dL      Hematocrit 43.5 %      MCV 91 fL      MCH 28.8 pg      MCHC 31.7 g/dL      RDW 13.1 %      MPV 10.0 fL      Platelets 200 Thousands/uL      nRBC 0 /100 WBCs      Segmented % 67 %      Immature Grans % 1 %      Lymphocytes % 16 %      Monocytes % 8 %      Eosinophils Relative 7 %      Basophils Relative 1 %      Absolute Neutrophils 5.68 Thousands/µL      Absolute Immature Grans 0.07 Thousand/uL      Absolute Lymphocytes 1.30 Thousands/µL      Absolute Monocytes 0.65 Thousand/µL      Eosinophils Absolute 0.57 Thousand/µL      Basophils Absolute 0.10 Thousands/µL     Fingerstick Glucose (POCT) [[id number]]  (Normal) Collected: 07/02/24 1133    Lab Status: Final result Specimen: Blood Updated: 07/02/24 1134     POC Glucose 82 mg/dl                    XR chest portable    (Results Pending)              Procedures  ECG 12 Lead Documentation Only    Date/Time: 7/2/2024 11:44 AM    Performed by: Price Dick DO  Authorized by: Price Dick DO    ECG reviewed by me, the ED Provider: yes    Patient location:  ED  Previous ECG:     Previous ECG:  Compared to current    Similarity:  No change  Rate:     ECG rate assessment: normal    Rhythm:     Rhythm: sinus rhythm    Ectopy:     Ectopy: none    QRS:     QRS axis:  Normal    QRS intervals:  Normal  Conduction:     Conduction: normal    Comments:      Sinus arrhythmia           ED Course  ED Course as of 07/02/24 1552   Tue Jul 02, 2024   1312 Contacted neurology regarding this patient's at approximately 12:50 PM waiting for follow-up.   1313 Patient lying in bed.  Father in the room.  Patient's holding cell phone with his right hand reading from it.  Says he still has numbness and weakness in the right side of the body.  Left facial paresthesia has resolved.  Similar to prior MS flareups.                               SBIRT 22yo+      Flowsheet Row Most Recent Value   Initial Alcohol Screen: US AUDIT-C     1. How often do you have a drink  containing alcohol? 0 Filed at: 07/02/2024 1131   2. How many drinks containing alcohol do you have on a typical day you are drinking?  0 Filed at: 07/02/2024 1131   3a. Male UNDER 65: How often do you have five or more drinks on one occasion? 0 Filed at: 07/02/2024 1131   3b. FEMALE Any Age, or MALE 65+: How often do you have 4 or more drinks on one occassion? 0 Filed at: 07/02/2024 1131   Audit-C Score 0 Filed at: 07/02/2024 1131   UNA: How many times in the past year have you...    Used an illegal drug or used a prescription medication for non-medical reasons? Never Filed at: 07/02/2024 1131                      Medical Decision Making  49-year-old male complains of symptoms of MS flare.  No injury or illness.  Says he has been well-hydrated.  In addition to his typical right-sided symptoms he had paresthesias of the left side of his face.  In the past CVA was ruled out and this was felt to be part of his MS pattern.  His most recent SoluMedrol infusion was held in anticipation of his next Ocrevus infusion scheduled next week.  Concern for MS flare rule out infection, electrolyte abnormality, dehydration.  Very low suspicion for CVA, TIA, ICH.  Will check labs and imaging and discussed with neurology.    Amount and/or Complexity of Data Reviewed  Independent Historian: parent  External Data Reviewed: labs, ECG and notes.  Labs: ordered.    Risk  Decision regarding hospitalization.             Disposition  Final diagnoses:   MS (multiple sclerosis) (HCC)     Time reflects when diagnosis was documented in both MDM as applicable and the Disposition within this note       Time User Action Codes Description Comment    7/2/2024  1:31 PM Price Dick Add [G35] MS (multiple sclerosis) (HCC)           ED Disposition       ED Disposition   Admit    Condition   Stable    Date/Time   Tue Jul 2, 2024 1445    Comment   Case was discussed with Dr. Levine and the patient's admission status was agreed to be Admission Status:  observation status to the service of Dr. Levine .               Follow-up Information    None         Patient's Medications   Discharge Prescriptions    No medications on file       No discharge procedures on file.    PDMP Review         Value Time User    PDMP Reviewed  Yes 7/2/2024  3:18 PM Tae Killian MD            ED Provider  Electronically Signed by             Price Dick DO  07/02/24 0476

## 2024-07-02 NOTE — PLAN OF CARE
Problem: PAIN - ADULT  Goal: Verbalizes/displays adequate comfort level or baseline comfort level  Description: Interventions:  - Encourage patient to monitor pain and request assistance  - Assess pain using appropriate pain scale  - Administer analgesics based on type and severity of pain and evaluate response  - Implement non-pharmacological measures as appropriate and evaluate response  - Consider cultural and social influences on pain and pain management  - Notify physician/advanced practitioner if interventions unsuccessful or patient reports new pain  Outcome: Progressing     Problem: SAFETY ADULT  Goal: Patient will remain free of falls  Description: INTERVENTIONS:  - Educate patient/family on patient safety including physical limitations  - Instruct patient to call for assistance with activity   - Consult OT/PT to assist with strengthening/mobility   - Keep Call bell within reach  - Keep bed low and locked with side rails adjusted as appropriate  - Keep care items and personal belongings within reach  - Initiate and maintain comfort rounds  - Make Fall Risk Sign visible to staff  - Offer Toileting every 2 Hours, in advance of need  - Initiate/Maintain 2alarm  - Obtain necessary fall risk management equipment: 2  - Apply yellow socks and bracelet for high fall risk patients  - Consider moving patient to room near nurses station  Outcome: Progressing

## 2024-07-02 NOTE — ASSESSMENT & PLAN NOTE
History of DAVID.  Has not used a CPAP in years.  During recent neurology appointment it was discussed importance of patient being evaluated and treated by sleep medicine  Patient is encouraged to make an appointment as outpatient

## 2024-07-02 NOTE — PROGRESS NOTES
OCCUPATIONAL THERAPY DISCHARGE        7/2/24  Carlos Landis  1975  [id number]  Kathy Gregory PA-C       Diagnosis ICD-10-CM Associated Orders   1. MS (multiple sclerosis) ([facility])  G35               Assessment/Plan      SKILLED ANALYSIS:    Pt is a 49 y.o. male referred to Occupational Therapy s/p MS (multiple sclerosis) ([facility]) [G35]. Pt participated in 22 sessions of skilled OT, focusing on improving strength and coordination of RUE. Pt displayed increased RUE muscle strength, previously ranging from 3+ to 4/5 and currently ranging from 4- to 4+/5. Pt's  strength in his R hand increased from 45 lbs to 65 lbs. Formal testing of pt's RUE pinch strength did not show an increase, but pt has demonstrated improved ability to progress from red resistive clip to blue resistive clip. Pt showed FMC improvements in his R hand AEB decreased time for completion of 9 Hole Peg Test, Keyhole Peg Test, and O'Gagandeep Dexterity Test, see grid below for details. Pt's handwriting improved from F/G- legibility to G-/G legibility using R dominant hand. No evidence of visual deficits, pt completes tasks with G environmental and tabletop scanning and no reports of diplopia. Formal HEP not issued, but encouraged pt to increase engagement in IADL and leisure tasks at home to promote functional use of RUE and independence. Pt has met all of his goals for OT. Pt without further need of skilled OT services at this time. Pt in agreement with dc from OT. DC OT services.       Short Term Goals (to be achieved within 4 weeks):  Pt will increase oculomotor control for improved saccades, pursuits, con/divergent tasks for improved reading, board to table tasks x 80% accuracy with minimal increase in symptoms MET   Pt will increase R UE rate of manipulation for all FM tests for improved functional performance/independence with functional tasks x 25% MET    Pt will increase R UE strength to 4+/5,  strength by 5 lbs and pinch strength by 2  "pounds, through the use of strengthening exercises and home program for eventual return to IADLs and life roles. MET        Long Term Goals (to be achieved within 12 weeks):  Pt will increase oculomotor control for improved dynamic activities with head turns, board/screen to table tasks symptom free with 90% accuracy for improved life roles MET    Pt will increase RUE prehension pattens for improved ability to manipulate all ADL items with min to no difficulty MET    Pt will increase R UE strength to 5/5,  strength by 7 lbs and pinch strength by 3-5 lbs, through the use of strengthening exercises and home program PARTIALLY MET; Formal assessment showed improved  strength, but did not show improvement of pinch strength; pt does demo increased strength during OT sessions  Pt will improve handwriting legibility to G+ for print and G script, so as to improve written communications. PARTIALLY MET; Improved handwriting legibility to G for print and G- for script  Pt will be I with HEP for improved functional use of RUE. MET        SUBJECTIVE      SUBJECTIVE: Pt reported having L side facial numbness on Friday, but not present during session    PATIENT GOAL: \"I want to get the use of my arm back\"   When asked today if pt had any other goals from OT, pt reported \"No, nothing functional\"        HISTORY OF PRESENT ILLNESS:     Pt is a 49 y.o. male who was referred to Occupational Therapy s/p  MS (multiple sclerosis) ([facility]) [G35]. Pt reporting he was diagnosed with MS about 3 years ago. Pt was having HHS until about a year ago. Pt now presents to OPOT for evaluation due to continued functional decline at home.     Pt with hospitalization from 4/15-4/17 with R side weakness for 3 days, consistent with MS flare. MRI (-) for acute changes.       PMH:   Past Medical History:   Diagnosis Date    Arthritis     Diabetes mellitus ([facility])     prediabetes    MS (multiple sclerosis) ([facility])     Scoliosis     Visual impairment  "   Psoriasis     Past Surgical Hx:   Past Surgical History:   Procedure Laterality Date    HERNIA REPAIR      3 times    KNEE SURGERY Right         HOME SETUP/ CLOF: lives with mother, father, and his daughter (23 yo); 2 SH, 2-3 ALEXEI; stair lift to 2nd floor; but has been staying 1st floor; mostly sleeps in recliner; full bath on 1st floor; tub shower combo    ADLs: I with ADLs     IADLs: pt's father does laundry, cooking, cleaning  Manages own medications  (-) ; has not driven in 2 years     Functional Mobility: I with RW     Work: was working as a ; not currently working; on disability     Physical activity/ leisure engagement: currently only leaving the house for doctor appts    DME: transfer tub bench; in process of getting approval from state for coverage to get tub converted into shower; grab bars in shower and toilet; transfer chair; w/c; RW (has 2, one on each level)    Falls: no falls in the last year; last fall was 2 years ago    Pain Levels: no pain; update: 4/10 pain; pt reports achy L shoulder pain          OBJECTIVE         PHYSICAL ASSESSMENT      RUE LUE Comments                 UPPER EXTREMITY FXN Impaired Intact Dominant Hand: Right                 UE ROM LUE AROM RUE AROM   Shoulder Flex WFL WFL   Shoulder Ext WNL WNL   Shoulder ABD WFL WFL   Horizontal ABD WNL WNL   Horizontal ADD WNL WNL   Shoulder IR  WNL WNL   Shoulder ER WNL WNL   Elbow Flex WNL WNL   Elbow Ext  WNL WNL   Supination  WNL WNL   Pronation  WFL WFL   Wrist Flex WNL WNL   Wrist Ext WNL WNL   Finger Flex WNL WNL   Finger Ext WNL WNL   Opposition  WNL WNL                              MMT  LUE   RUE Comments   Shoulder Flex/Ext 5/5 4/5 (was 4-5) LUE remained the same from re-evaluation on 4/19   Shoulder Abd 5/5 4-/5 (was 3+/5)    Shoulder Add 5/5  4/5 (was 3+/5)    Shoulder ER 5/5 4/5 (was 4-/5)    Shoulder IR 5/5 4/5 (was 4-/5)    Elbow Flex 5/5 4+/5 (was 4/5)    Elbow Ext 5/5  4+/5 (was 4/5)    Wrist Flex 5/5 4+/5 (was  4/5)    Wrist Ext 5/5 4+/5 (was 4/5)    Gross Grasp 5/5       4+/5 (was 4/5)                       /Pinch Strength MILENA SCHMIDT   Dynamometer     - Gross Grasp 47 lbs (was 48 lbs) 65 lbs (was 45 lbs)   Pinch Meter      - PINCER 12 lbs (was 7 lbs) 8 lbs (was 8 lbs)    - 3 JAW DEANDRE 10 lbs (was 13 lb) 9 lbs (was 9 lb)    - LATERAL 11 lbs (was 14 lbs) 13 lbs (was 14 lbs)       SENSATION LUE RUE Comments   Sharp Dull  Intact Intact    Proprioception Intact Intact    Pain Intact Intact    Hot/Cold Temp Intact Intact      Comments: Pt reported recent numbness on L side of face but not present during session        COORDINATION MILENA SCHMIDT   9 Hole Peg Test 28 sec (was 29 seconds) 52 sec (was 1 min 3 seconds)   Keyhole Peg Test  1 min 26 seconds (was 1 min 41 seconds) (Entire board) 2 min 50 sec (was 3 min 26 seconds)  (Entire board)   O'Gagandeep Finger Dexterity Test  1 min 19 sec (was 1 min 25 seconds)  (Top 3 rows) 2 min 36 sec (was 2 min 55 seconds)  (Top 3 rows)   Handwriting (Print)  G legibility (was G- legibility)   Handwriting (script)   G- legibility (was F legibility)     Comments: Noticed pt was internally distracted, paused to talk, and looked around the room during completion of assessments          COGNITIVE ASSESSMENT      Cognitive Checklist:     Memory: pt reports mild memory deficits     Attention: WFL     Processing: WFL    Executive Functions: WFL     Communication: Intact     Visual: no deficits         VISUAL ASSESSMENT      Comments: (+) glasses, wears only when watching tv     Vision Screen        ROM Monocular assessment: intact Binocular Assessment: Intact Vision not formally re-assessed due to time constraints; observed good tracking; no complaints of diplopia; pt reported no functional deficits   Tracking Monocular assessment: Intact Binocular Assessment: Intact     Saccades Min difficulty with target location; undershooting noted into periphery and at midline; delay noted before saccadic eye  movement      Convergence/ Divergence Impaired; B eyes not teaming with L eye impaired convergence      Observations  L eye exotropia noted             PLANNED THERAPY INTERVENTIONS:  Therapeutic activity  Therapeutic exercise  Neuromuscular re-education   Visual re-training   ADL re-training   IADL re-training  Oculomotor control: saccades, con/divergence  FMC/prehension  Timed Trials  Manual tx  Hand to target  WBearing strategies   Closed chain activities  Open chain activities       INTERVENTION COMMENTS:  Diagnosis: MS (multiple sclerosis) ([facility]) [G35]  Precautions: R side weakness  Insurance: Payor: MEDICARE / Plan: MEDICARE A AND B / Product Type: Medicare A & B Fee for Service /   22 of 24 visits

## 2024-07-02 NOTE — H&P
"Formerly [facility], later [facility]  H&P  Name: Carlos Landis 49 y.o. male I MRN: [id number]  Unit/Bed#: TR13A I Date of Admission: 7/2/2024   Date of Service: 7/2/2024  Hospital Day: 0      Assessment & Plan   * Lower facial weakness  Assessment & Plan  Presented due to numbness of left lower face and slurred speech  History of MS. Patient states he feels like he had a flareup of MS  Denies recent illness  Neurology was consulted by ER  MRI brain with and without contrast is pending  As per neurology hold off on steroids  Neurochecks  UA is negative  Will order chest x-ray.  Trend WBC and fever curve  PT/OT consult    MS (multiple sclerosis) (HCC)  Assessment & Plan  Diagnosed in 2022.  Chronic right-sided weakness.  Uses a walker to ambulate.  Following with PT/OT outpatient  Home regimen: Ocrevus every 6 months.  Patient also normally receives monthly Solu-Medrol infusions  Follows with [facility] neurology    DAVID (obstructive sleep apnea)  Assessment & Plan  History of DAVID.  Has not used a CPAP in years.  During recent neurology appointment it was discussed importance of patient being evaluated and treated by sleep medicine  Patient is encouraged to make an appointment as outpatient        Chief Complaint   Patient presents with    Numbness     LWK @1100 pt was at PT and started to feel Left sided facial numbness. Hx of MS. Pt reports \"I think it is my MS flare up.\" Pt dad called ems.No blood thinners.         HPI:  Carlos Landis is a 49 y.o. male with past medical history of MS on Ocrevus infusions and monthly Solu-Medrol infusions, DAVID not on CPAP who presented to the emergency department from physical therapy with complaint of numbness on left lower face, headache and slurred speech.  Patient states his left numbness has improved but his speech is still garbled.  Patient at baseline uses a walker.  Patient also complained of headache and some generalized shakiness while ambulating today.  Patient follows " "up with MS specialist as outpatient.  He normally receives monthly Solu-Medrol infusions.  ER consulted neurology who recommended MRI brain with and without contrast and hold off on steroids until MRI shows a new/enhancing lesion.    Historical Information   Past Medical History:   Diagnosis Date    Arthritis     Diabetes mellitus (HCC)     prediabetes    MS (multiple sclerosis) (HCC)     Scoliosis     Visual impairment      Past Surgical History:   Procedure Laterality Date    HERNIA REPAIR      3 times    KNEE SURGERY Right      Social History   Social History     Substance and Sexual Activity   Alcohol Use Not Currently     Social History     Substance and Sexual Activity   Drug Use Never     Social History     Tobacco Use   Smoking Status Former   Smokeless Tobacco Former     Family History   Problem Relation Age of Onset    Stroke Maternal Grandmother     Multiple sclerosis Other        Meds/Allergies   No Known Allergies    Meds:  No current facility-administered medications for this encounter.    Current Outpatient Medications:     aspirin 325 mg tablet, Take 1 tablet (325 mg total) by mouth daily, Disp: 10 tablet, Rfl: 0    cyanocobalamin 1,000 mcg/mL, Take B12 1000 mcg IM once a week for 4 weeks, then once a month for 5 months, Disp: 9 mL, Rfl: 0    MAGNESIUM PO, Take by mouth, Disp: , Rfl:     ocrelizumab (Ocrevus) 300 MG/10ML SOLN, Inject 20 mL (600 mg total) into a catheter in a vein every 6 (six) months, Disp: 20 mL, Rfl: 1    Riboflavin ([employer] Vitamin B-2) 100 MG TABS, Take 2 tablets (200 mg total) by mouth in the morning, Disp: 360 tablet, Rfl: 2    SYRINGE-NEEDLE, DISP, 3 ML 25G X 1\" 3 ML MISC, Take with B12 regimen prescribed, Disp: 9 each, Rfl: 0    Not in a hospital admission.      Review of Systems   Constitutional:  Positive for activity change.   HENT: Negative.     Eyes: Negative.    Respiratory: Negative.     Cardiovascular: Negative.    Gastrointestinal: Negative.    Endocrine: Negative.  "   Genitourinary: Negative.    Musculoskeletal: Negative.    Skin: Negative.    Allergic/Immunologic: Negative.    Neurological:  Positive for speech difficulty, numbness and headaches.   Hematological: Negative.    Psychiatric/Behavioral: Negative.         Current Vitals:   Blood Pressure: 131/83 (07/02/24 1445)  Pulse: 71 (07/02/24 1445)  Temperature: 98.1 °F (36.7 °C) (07/02/24 1133)  Temp Source: Oral (07/02/24 1133)  Respirations: 16 (07/02/24 1445)  Weight - Scale: 107 kg (236 lb 1.8 oz) (07/02/24 1133)  SpO2: 98 % (07/02/24 1445)  SPO2 RA Rest      Flowsheet Row ED from 7/2/2024 in  [facility]   SpO2 98 %   SpO2 Activity At Rest   O2 Device None (Room air)   O2 Flow Rate --          No intake or output data in the 24 hours ending 07/02/24 1526  Body mass index is 32.93 kg/m².     Physical Exam  Vitals and nursing note reviewed.   Constitutional:       General: He is not in acute distress.     Appearance: He is well-developed.   HENT:      Head: Normocephalic and atraumatic.   Eyes:      General: No scleral icterus.     Conjunctiva/sclera: Conjunctivae normal.      Pupils: Pupils are equal, round, and reactive to light.   Neck:      Thyroid: No thyromegaly.      Vascular: No JVD.   Cardiovascular:      Rate and Rhythm: Normal rate and regular rhythm.      Heart sounds: Normal heart sounds.   Pulmonary:      Effort: Pulmonary effort is normal. No respiratory distress.      Breath sounds: Normal breath sounds. No wheezing or rales.   Chest:      Chest wall: No tenderness.   Abdominal:      General: Bowel sounds are normal. There is no distension.      Palpations: Abdomen is soft. There is no mass.      Tenderness: There is no abdominal tenderness. There is no guarding or rebound.   Musculoskeletal:         General: No tenderness or deformity. Normal range of motion.      Cervical back: Normal range of motion and neck supple.   Lymphadenopathy:      Cervical: No cervical  "adenopathy.   Skin:     General: Skin is warm.      Coloration: Skin is not pale.      Findings: No erythema or rash.   Neurological:      General: No focal deficit present.      Mental Status: He is alert and oriented to person, place, and time. Mental status is at baseline.      Cranial Nerves: No cranial nerve deficit.      Coordination: Coordination normal.   Psychiatric:         Behavior: Behavior normal.         Judgment: Judgment normal.         Lab Results:   CBC:   Lab Results   Component Value Date    WBC 8.37 07/02/2024    HGB 13.8 07/02/2024    HCT 43.5 07/02/2024    MCV 91 07/02/2024     07/02/2024    RBC 4.79 07/02/2024    MCH 28.8 07/02/2024    MCHC 31.7 07/02/2024    RDW 13.1 07/02/2024    MPV 10.0 07/02/2024    NRBC 0 07/02/2024     CMP:  Lab Results   Component Value Date     07/02/2024    CO2 25 07/02/2024    BUN 24 07/02/2024    CREATININE 0.85 07/02/2024    CALCIUM 9.2 07/02/2024    AST 13 07/02/2024    ALT 9 07/02/2024    ALKPHOS 57 07/02/2024    EGFR 100 07/02/2024     No results found for: \"TROPONINI\", \"CKMB\", \"CKTOTAL\"  Coagulation:   Lab Results   Component Value Date    INR 0.94 12/06/2023    Urinalysis:  Lab Results   Component Value Date    COLORU Yellow 07/02/2024    CLARITYU Clear 07/02/2024    SPECGRAV 1.020 07/02/2024    PHUR 6.0 07/02/2024    LEUKOCYTESUR Negative 07/02/2024    NITRITE Negative 07/02/2024    GLUCOSEU Negative 07/02/2024    KETONESU Negative 07/02/2024    BILIRUBINUR Negative 07/02/2024    BLOODU Negative 07/02/2024      Amylase: No results found for: \"AMYLASE\"  Lipase:   Lab Results   Component Value Date    LIPASE 37 12/06/2023        Imaging: No results found.  EKG, Pathology, and Other Studies: I have personally reviewed the results.  VTE Pharmacologic Prophylaxis: Enoxaparin (Lovenox)  VTE Mechanical Prophylaxis: sequential compression device    Code Status: Prior    Anticipated Length of Stay:  Patient will be admitted on an Observation basis with " "an anticipated length of stay of less than 2 midnights.     Counseling / Coordination of Care  Total floor / unit time spent today 75 minutes.  Greater than 50% of total time was spent with the patient and / or family counseling and / or coordination of care.     \"This note has been constructed using a voice recognition system\"      Tae Killian MD  7/2/2024, 3:26 PM            "

## 2024-07-02 NOTE — ASSESSMENT & PLAN NOTE
Presented due to numbness of left lower face and slurred speech  History of MS. Patient states he feels like he had a flareup of MS  Denies recent illness  Neurology was consulted by ER  MRI brain with and without contrast is pending  As per neurology hold off on steroids  Neurochecks  UA is negative  Will order chest x-ray.  Trend WBC and fever curve  PT/OT consult

## 2024-07-02 NOTE — PROGRESS NOTES
Daily Note     Today's date: 2024  Patient name: Carlos Landis  : 1975  MRN: [id number]  Referring provider: Kathy Gregory PA-C  Dx:   Encounter Diagnosis     ICD-10-CM    1. MS (multiple sclerosis) (HCC)  G35       2. Unsteadiness on feet  R26.81           Start Time: 1015  Stop Time: 1115  Total time in clinic (min): 60 minutes    Subjective: Pt states that he had a flare up on Friday after PT. The heat, stress about his daughter, and testing triggered it. He says he feels off today. He was just discharged from OT.       Objective: See treatment diary below    Pressures:   At beginning of flare up, seated, R UE due to psoriasis on L: 151/94mmHg HR: 94bpm SPO2: 96%  After 5 min seated: 145/87mmHg R UE: 87bpm SPO2: 97%  After 5 more min seated: 134/81bmmHg, R UE, SPO2: 95%  After transitioning to supine on the table: 134/81mmHg R RE, HR 62bpm SPO2: 97%  After resting for 10 min in supine on the table: 129/74mmHg HR: 66bpm SPO2: 97%      Assessment: Pt will continue to benefit from skilled PT so he may be able to navigate his environment with increased safety and stability. Pt was feeling very worn out and became lightheaded during ambulation, so returned to chair to rest. Gave pt water and he stated he felt improved. Trialed STS but pt stated he did not feel up to it. Pt's BP was assessed and was found to be elevated, so sat and rested. Pt began to experience left facial tingling, which is how his MS flares start. Assessed BP and was found to be hypertensive. Pt stated he wanted to conclude session at the time due to feeling like he was having an MS attack. Pt did not visually exhibit left facial drooped although the patient was concerned this was happening, which is typical of his MS attacks. Pt became frustrated and began crying, which exacerbated symptoms. He began to struggle forming words and was subsequently transferred to a wheel chair and then to a mat table to lie supine. The ambulance was  called for safety and was pt was taken to  ED. Pt's father was present half way through session and validated that this is what his MS flares look like and that this is typically how they progress. Gave pt's father instruction to call the clinic with an update when possible.     Plan: Continue per plan of care.      Precautions: MS, r sided weakness, frequent headaches, history or R ACLR, falls risk  POC expires: 08/28/2024      Tests and measures 3/27 4/3  4/17 5/14 5/21 5/24  5/28 5/31 6/4 6/7 6/11 6/18 6/20 6/21 6/24 7/2   ABC 33.13%  39.38%   44.38%             MARIN   27/56                 6MWT   190 feet 3 mins 32 secs 407ft    504.4ft             5xSTS 34.54 w UE support  30.91   36.60s             10mWT 0.33m/s w RW     0.61s w RW             BP                151/94mmHg HR: 94bpm   TUG 38.80s w RW     35.15s             Neuro Re-Ed                   Victorino navigation     Fwd middle setting in 7.5lb AW 4 laps  Fwd middle setting in 7.5lb AW 4 laps   Fwd middle setting in 7.5lb AW 4 laps Distance walking negotiating around obstacles, 140' x2, focus on Left LE ER  Fwd highest setting 7.5lb AW 4 laps        Sit <> stands             1 min on:2 min off 2 rounds       Compliant surface                   Tests and measures   ABC, MARIN, 6MWT, 5xSTS   ABC, 6MWT, 5xSTS, TUG, 10mWT             Side stepping        In // 7.5 lb AW 4x down and back In ll bars light UE 4 laps x2  In // 7.5lb AW 4x down and back //bar #5 BAW, 1 min on: 2 off, 3 rounds // bar #5 BAW 8x down and back      marching        In// 7.5lb AW 4x down and back In ll bars focused on LE facing forward 4 laps x2   //bar #5 BAW    3 rounds 1 min on: 2 min off,  // bar #5 BAW, 10x.                                             Ther Ex                   STS  2x10 foam on chair  4x6 1 foam on chair + 10lb weighted vest without UE support 3x10 1 foam on chair + 10lb weighted vest without UE support    Off raised step 5 without UE Off low mat + foam square  "10x each      3x10 foam weighted vest without UE support 1 foam on chair  3x10 foam weighted vest without UE support 1x5 foam + weighted vest   Standing hip abduction                   Seated marching                                      Mini Squats     3x5 with UE support in // with 10lb weighted vest  3x5 with UE support in with 10lb weighted vest.  1x10 with UE support with 10lb weighted vest   1x10 with UE support with in //  2x10 with UE support //bar  2x10 with UE support // bar  2x10 with UE support // bar    Step ups  //bar up and over 6\" 10x             // bar up and over 8' step 10x B UE support // bar up and over 8' step 10x B UE support    LAQ   3x5 + pacing 15lb AW          3x5 + pacing 15lb AW      bridge          2x8         Heel raises        In // 7.5lb AW 2x10                               Ther Activity                   Stand pivot transfer                With eddie leblanc   Chair to bed transfer                With eddie leblanc   Psoriasis--wound care    Application of stockinette to protect wound.                monitoring                Patient educatio, vitals ensuring stable vital signs.    Gait Training                   With RW    5 rounds interval training 1 min fast walking 30s normal walking pace 5 rounds interval training 1 min fast walking 30s normal walking pace.   5 rounds interval training 1 min fast walking 30s normal pace 2lb weights  6 rounds interval training 1 min fast walking 30s normal pace 3lb AW antonieta 2 laps with increased pacing 6 rounds interval training 1 min fast walking 30s normal pace 3lb AW antonieta 6 rounds interval training 1 min fast walking 30s normal pace 5lb AW antonieta  6 rounds interval training 1 min fast walking 30s normal pace 5lb AW antonieta  6 rounds interval training 1 min fast walking 30s normal pace 5lb AW antonieta 6 rounds interval training 1 min fast walking 30s normal pace 5lb AW antonieta 1 rounds of walking at fast speed however pt felt lightheaded upon completion " of task.    Unilateral UE parallel bar   4x laps                  Modalities

## 2024-07-02 NOTE — ASSESSMENT & PLAN NOTE
49-year-old male with MS on Ocrevus infusions and monthly Solumedrol infusions, scoliosis, DAVID not on CPAP, presents with fluctuating numbness of the left V3 distribution, headache while ambulating, increased RUE incoordination, and increased speech difficulty.    Patient is scheduled to have Ocrevus infusion concurrently with Solu-Medrol infusion on 7/10/2024.  He normally receives monthly Solu-Medrol infusions.  MRI brain with/without contrast MS protocol on 7/2/24 demonstrated no new lesions or any enhancing lesions.  Per review of available imaging, patient has not had any new brain or spinal cord lesions since February 2024.    No new or enhancing lesions noted on MRI brain.  Workup negative for infection/metabolic derangement.  Feel presenting symptoms are likely pseudoexacerbation/fluctuation of patient's MS symptoms, perhaps related to his increased life stressors.    Plan:  -Will defer steroids at this time given no new/enhancing lesion on MRI.  Patient also notes spontaneous improvement in his presenting symptoms.  -As discussed with patient, recommend he obtain a psychotherapist to assist with his stressors as these are likely playing a role with his MS symptom fluctuation.  -Will have labs drawn today in preparation for his upcoming Ocrevus/steroid infusion next week.  -Check B12 as patient has history of B12 deficiency and has not had recent level checked.  -Appreciate PT/OT evaluation.  -No further inpatient neurologic recommendations.  Will have office call to arrange follow-up post Ocrevus infusion.

## 2024-07-02 NOTE — RESPIRATORY THERAPY NOTE
RT Protocol Note  Carlos Landis 49 y.o. male MRN: [id number]  Unit/Bed#: -01 Encounter: [id number]    Assessment    Principal Problem:    Lower facial weakness  Active Problems:    MS (multiple sclerosis) (HCC)    DAVID (obstructive sleep apnea)      Home Pulmonary Medications:  none       Past Medical History:   Diagnosis Date    Arthritis     Diabetes mellitus (HCC)     prediabetes    MS (multiple sclerosis) (HCC)     Scoliosis     Visual impairment      Social History     Socioeconomic History    Marital status: Single     Spouse name: None    Number of children: None    Years of education: None    Highest education level: None   Occupational History    None   Tobacco Use    Smoking status: Former    Smokeless tobacco: Former   Vaping Use    Vaping status: Former   Substance and Sexual Activity    Alcohol use: Not Currently    Drug use: Never    Sexual activity: Not Currently   Other Topics Concern    None   Social History Narrative    None     Social Determinants of Health     Financial Resource Strain: Not on file   Food Insecurity: No Food Insecurity (4/16/2024)    Hunger Vital Sign     Worried About Running Out of Food in the Last Year: Never true     Ran Out of Food in the Last Year: Never true   Transportation Needs: No Transportation Needs (4/16/2024)    PRAPARE - Transportation     Lack of Transportation (Medical): No     Lack of Transportation (Non-Medical): No   Physical Activity: Not on file   Stress: Not on file   Social Connections: Not on file   Intimate Partner Violence: Not on file   Housing Stability: Low Risk  (4/16/2024)    Housing Stability Vital Sign     Unable to Pay for Housing in the Last Year: No     Number of Times Moved in the Last Year: 1     Homeless in the Last Year: No       Subjective         Objective    Physical Exam:   Assessment Type: (P) Assess only  General Appearance: (P) Awake, Alert  Respiratory Pattern: (P) Normal  Chest Assessment: (P) Chest expansion  symmetrical  Bilateral Breath Sounds: (P) Clear  Cough: (P) None  O2 Device: (P) RA    Vitals:  Blood pressure 139/87, pulse 76, temperature 97.7 °F (36.5 °C), resp. rate 18, weight 107 kg (236 lb 1.8 oz), SpO2 97%.          Imaging and other studies: I have personally reviewed pertinent reports.      O2 Device: (P) RA     Plan    Respiratory Plan: (P) Discontinue Protocol        Resp Comments: (P) plan to DC protocol at this time/pt takes no resp. meds/ no pulm hx

## 2024-07-02 NOTE — CONSULTS
"Consultation - Neurology   Carlos Landis 49 y.o. male MRN: [id number]  Unit/Bed#: TR13A Encounter: [id number]      Assessment & Plan     MS (multiple sclerosis) ([facility])  Assessment & Plan  49-year-old male with MS on Ocrevus infusions and monthly Solumedrol infusions, scoliosis, DAVID not on CPAP, presents with fluctuating numbness of the left V3 distribution, headache while ambulating, and increased speech difficulty.  Left V3 numbness has resolved, but patient still feels his speech is more labored/choppy than baseline and he knows something is \"not right.\"  He denies any new symptoms in his extremities.  Walking is at baseline with use of his walker but he noted a headache and some generalized shakiness while ambulating today.    Patient is scheduled to have Ocrevus infusion concurrently with Solu-Medrol infusion on 7/10/2024.  He normally receives monthly Solu-Medrol infusions.    Unclear whether patient has new MS lesion versus pseudo exacerbation.  He is agreeable to obtaining new MRI brain and treating with steroid infusion if new/enhancing lesion is noted on imaging.    Plan:  -MRI brain with/without contrast, MS protocol.  -Infectious workup as per primary team.  -PT/OT evaluation.  -Further recommendations pending above.          Carlos Landis will need follow up in in 6 weeks with multiple sclerosis attending. He will not require outpatient neurological testing.    History of Present Illness     Reason for Consult / Principal Problem: Transient left V3 numbness, increased speech difficulty  Hx and PE limited by: N/A  HPI: Carlos Landis is a 49 y.o. right handed male with MS on Ocrevus infusions and monthly Solumedrol infusions, scoliosis, DAVID not on CPAP, presents with fluctuating numbness of the left V3 distribution, headache while ambulating, and increased speech difficulty.    Patient states that on 6/22/2024 while eating dinner, he noticed numbness in the left V3 distribution of his face.  This became " "concerning to him as typically the numbness presents in the right side of his face.  Consuming caffeine seemed to help make the numbness go away, but he notes resurgence of the numbness while working with PT today.  His walking was also \"worse\" while in PT today.  He uses a walker at baseline for ambulation but when asked specifically how his walking was worse than usual today, patient states he developed a headache while walking and had generalized tremulousness.  He denies increased weakness in any limb but notes chronic right hemiparesis from his MS.  No new visual complaints.  He is unsure if the environment at PT was warmer than usual.  He states he normally feels an MS flare when he is close to being due for his Ocrevus infusion.    Currently, he feels the left facial numbness has again abated but his speech remains intermittently choppy/labored.  He states he is very frustrated and would like to know what is going on.    He denies any sick contacts, URI or GI symptoms, or dysuria.    He has been receiving monthly Solu-Medrol infusions but per Dr. Hernandez is to skip his July infusion as he will have the steroid infusion concurrently with his Ocrevus which is scheduled for next week.    Per record review, he has complained of both right and left facial numbness in the past, increased right-sided weakness and speech difficulties, but it does not appear he has had any new or enhancing lesions since February 2022.    Neurologic exam significant for intermittently stuttering/labored speech interspersed with more fluent speech, right upper extremity dysmetria, mild right upper extremity weakness, and hyperreflexia.  Exam appears grossly stable as compared to previously documented neurologic exams.    Inpatient consult to Neurology  Consult performed by: Christina Bojorquez PA-C  Consult ordered by: Price Dick DO          Review of Systems   Constitutional: Negative.    HENT: Negative.     Eyes: Negative.  " "  Respiratory: Negative.     Cardiovascular: Negative.    Gastrointestinal: Negative.    Endocrine: Negative.    Genitourinary: Negative.    Musculoskeletal: Negative.    Skin:  Positive for rash (Posterior left forearm erythematous patch).   Allergic/Immunologic: Negative.    Neurological:  Positive for speech difficulty, weakness and numbness.        As above.   Hematological: Negative.    Psychiatric/Behavioral: Negative.         Historical Information   Past Medical History:   Diagnosis Date    Arthritis     Diabetes mellitus (HCC)     prediabetes    MS (multiple sclerosis) (HCC)     Scoliosis     Visual impairment      Past Surgical History:   Procedure Laterality Date    HERNIA REPAIR      3 times    KNEE SURGERY Right      Social History   Social History     Substance and Sexual Activity   Alcohol Use Not Currently     Social History     Substance and Sexual Activity   Drug Use Never     E-Cigarette/Vaping    E-Cigarette Use Former User      E-Cigarette/Vaping Substances    Nicotine No     THC No     CBD No     Flavoring No     Other No     Unknown No      Social History     Tobacco Use   Smoking Status Former   Smokeless Tobacco Former     Family History:   Family History   Problem Relation Age of Onset    Stroke Maternal Grandmother     Multiple sclerosis Other        Review of previous medical records was completed.     Meds/Allergies   PTA meds:   Prior to Admission Medications   Prescriptions Last Dose Informant Patient Reported? Taking?   MAGNESIUM PO  Self Yes No   Sig: Take by mouth   Riboflavin ([employer] Vitamin B-2) 100 MG TABS  Self No No   Sig: Take 2 tablets (200 mg total) by mouth in the morning   SYRINGE-NEEDLE, DISP, 3 ML 25G X 1\" 3 ML MISC  Self No No   Sig: Take with B12 regimen prescribed   aspirin 325 mg tablet  Self No No   Sig: Take 1 tablet (325 mg total) by mouth daily   cyanocobalamin 1,000 mcg/mL  Self No No   Sig: Take B12 1000 mcg IM once a week for 4 weeks, then once a month for 5 " months   ocrelizumab (Ocrevus) 300 MG/10ML SOLN  Self No No   Sig: Inject 20 mL (600 mg total) into a catheter in a vein every 6 (six) months      Facility-Administered Medications: None       No Known Allergies    Objective   Vitals:Blood pressure 131/83, pulse 71, temperature 98.1 °F (36.7 °C), temperature source Oral, resp. rate 16, weight 107 kg (236 lb 1.8 oz), SpO2 98%.,Body mass index is 32.93 kg/m².  No intake or output data in the 24 hours ending 07/02/24 1523    Invasive Devices:   Invasive Devices       None                   Physical Exam  General:  Patient is well-developed, obese BMI 32.93, and in no acute distress.  HEENT:  Head normocephalic.  Eyes anicteric.    Cardiovascular:  With regular rhythm.  Lungs:  Normal effort. Nonlabored breathing.  Extremities:  With no significant edema.    Skin: Erythematous excoriated patch noted posterior left forearm.    Neurologic Exam  Neurologic:  Patient is alert, pleasantly interactive, and appropriately conversational.  Speech is intermittently choppy/stammering and at other times more fluent.  Not dysarthric.  Fully oriented except to year, which he initially states is 2027, and then corrects to 2024.    Gait deferred for safety.    Cranial Nerves:   II: Visual fields full to confrontation. Pupils equal, round, reactive to light with normal accomodation.  Cannot visualize optic fundi.  III,IV,VI: extraocular movements intact with no nystagmus.   V: Sensation in the V1 through V3 distributions intact to light touch bilaterally.   VII: Face is symmetric with no weakness noted.   XII: Tongue midline with no atrophy or fasciculations with appropriate movement.     Coordination: Dysmetria with right finger-nose maneuver but accurate on left; subtle dysmetria with right heel-to-shin but accurate on left.    Motor testing with 4/5 right upper extremity strength, 5/5 left upper extremity strength, 4/5 bilateral hip flexion, 5/5 with knee flexion and extension,  dorsiflexion and plantarflexion bilaterally.    Sensory testing grossly intact to light touch throughout.     Diffusely and symmetrically hyperreflexic throughout.  No clonus.  Ching's bilaterally negative.    Toe responses are downgoing bilaterally.    Lab Results: CBC:   Results from last 7 days   Lab Units 07/02/24  1142   WBC Thousand/uL 8.37   RBC Million/uL 4.79   HEMOGLOBIN g/dL 13.8   HEMATOCRIT % 43.5   MCV fL 91   PLATELETS Thousands/uL 200   , BMP/CMP:   Results from last 7 days   Lab Units 07/02/24  1142   SODIUM mmol/L 139   POTASSIUM mmol/L 4.0   CHLORIDE mmol/L 106   CO2 mmol/L 25   BUN mg/dL 24   CREATININE mg/dL 0.85   CALCIUM mg/dL 9.2   AST U/L 13   ALT U/L 9   ALK PHOS U/L 57   EGFR ml/min/1.73sq m 100     Imaging Studies: I have personally reviewed pertinent reports.   and I have personally reviewed pertinent films in PACS MRI brain  EKG, Pathology, and Other Studies: I have personally reviewed pertinent reports.    VTE Prophylaxis: Sequential compression device (Venodyne)     Code Status: Prior  Advance Directive and Living Will:      Power of :    POLST:

## 2024-07-02 NOTE — ASSESSMENT & PLAN NOTE
Diagnosed in 2022.  Chronic right-sided weakness.  Uses a walker to ambulate.  Following with PT/OT outpatient  Home regimen: Ocrevus every 6 months.  Patient also normally receives monthly Solu-Medrol infusions  Follows with [facility] neurology

## 2024-07-03 VITALS
TEMPERATURE: 97.9 F | WEIGHT: 236.11 LBS | SYSTOLIC BLOOD PRESSURE: 131 MMHG | HEART RATE: 59 BPM | OXYGEN SATURATION: 97 % | BODY MASS INDEX: 33.06 KG/M2 | DIASTOLIC BLOOD PRESSURE: 87 MMHG | HEIGHT: 71 IN | RESPIRATION RATE: 20 BRPM

## 2024-07-03 PROBLEM — R29.810 LOWER FACIAL WEAKNESS: Status: RESOLVED | Noted: 2022-02-07 | Resolved: 2024-07-03

## 2024-07-03 LAB
ALBUMIN SERPL BCG-MCNC: 3.4 G/DL (ref 3.5–5)
ALP SERPL-CCNC: 48 U/L (ref 34–104)
ALT SERPL W P-5'-P-CCNC: 7 U/L (ref 7–52)
ANION GAP SERPL CALCULATED.3IONS-SCNC: 5 MMOL/L (ref 4–13)
AST SERPL W P-5'-P-CCNC: 10 U/L (ref 13–39)
ATRIAL RATE: 61 BPM
BASOPHILS # BLD AUTO: 0.07 THOUSANDS/ÂΜL (ref 0–0.1)
BASOPHILS NFR BLD AUTO: 1 % (ref 0–1)
BILIRUB SERPL-MCNC: 0.32 MG/DL (ref 0.2–1)
BUN SERPL-MCNC: 17 MG/DL (ref 5–25)
CALCIUM ALBUM COR SERPL-MCNC: 8.8 MG/DL (ref 8.3–10.1)
CALCIUM SERPL-MCNC: 8.3 MG/DL (ref 8.4–10.2)
CHLORIDE SERPL-SCNC: 111 MMOL/L (ref 96–108)
CO2 SERPL-SCNC: 24 MMOL/L (ref 21–32)
CREAT SERPL-MCNC: 0.79 MG/DL (ref 0.6–1.3)
EOSINOPHIL # BLD AUTO: 0.62 THOUSAND/ÂΜL (ref 0–0.61)
EOSINOPHIL NFR BLD AUTO: 8 % (ref 0–6)
ERYTHROCYTE [DISTWIDTH] IN BLOOD BY AUTOMATED COUNT: 13.2 % (ref 11.6–15.1)
GFR SERPL CREATININE-BSD FRML MDRD: 103 ML/MIN/1.73SQ M
GLUCOSE P FAST SERPL-MCNC: 85 MG/DL (ref 65–99)
GLUCOSE SERPL-MCNC: 85 MG/DL (ref 65–140)
HAV IGM SER QL: NORMAL
HBV CORE IGM SER QL: NORMAL
HBV SURFACE AG SER QL: NORMAL
HCT VFR BLD AUTO: 37 % (ref 36.5–49.3)
HCV AB SER QL: NORMAL
HGB BLD-MCNC: 12.1 G/DL (ref 12–17)
HIV 1+2 AB+HIV1 P24 AG SERPL QL IA: NORMAL
HIV 2 AB SERPL QL IA: NORMAL
HIV1 AB SERPL QL IA: NORMAL
HIV1 P24 AG SERPL QL IA: NORMAL
IGA SERPL-MCNC: 235 MG/DL (ref 66–433)
IGG SERPL-MCNC: 790 MG/DL (ref 635–1741)
IGM SERPL-MCNC: 52 MG/DL (ref 45–281)
IMM GRANULOCYTES # BLD AUTO: 0.08 THOUSAND/UL (ref 0–0.2)
IMM GRANULOCYTES NFR BLD AUTO: 1 % (ref 0–2)
LYMPHOCYTES # BLD AUTO: 1.56 THOUSANDS/ÂΜL (ref 0.6–4.47)
LYMPHOCYTES NFR BLD AUTO: 19 % (ref 14–44)
MAGNESIUM SERPL-MCNC: 1.9 MG/DL (ref 1.9–2.7)
MCH RBC QN AUTO: 29.1 PG (ref 26.8–34.3)
MCHC RBC AUTO-ENTMCNC: 32.7 G/DL (ref 31.4–37.4)
MCV RBC AUTO: 89 FL (ref 82–98)
MONOCYTES # BLD AUTO: 0.59 THOUSAND/ÂΜL (ref 0.17–1.22)
MONOCYTES NFR BLD AUTO: 7 % (ref 4–12)
NEUTROPHILS # BLD AUTO: 5.26 THOUSANDS/ÂΜL (ref 1.85–7.62)
NEUTS SEG NFR BLD AUTO: 64 % (ref 43–75)
NRBC BLD AUTO-RTO: 0 /100 WBCS
P AXIS: 55 DEGREES
PHOSPHATE SERPL-MCNC: 2.8 MG/DL (ref 2.7–4.5)
PLATELET # BLD AUTO: 176 THOUSANDS/UL (ref 149–390)
PMV BLD AUTO: 10.3 FL (ref 8.9–12.7)
POTASSIUM SERPL-SCNC: 3.8 MMOL/L (ref 3.5–5.3)
PR INTERVAL: 176 MS
PROT SERPL-MCNC: 5.9 G/DL (ref 6.4–8.4)
QRS AXIS: 62 DEGREES
QRSD INTERVAL: 100 MS
QT INTERVAL: 440 MS
QTC INTERVAL: 442 MS
RBC # BLD AUTO: 4.16 MILLION/UL (ref 3.88–5.62)
SODIUM SERPL-SCNC: 140 MMOL/L (ref 135–147)
T WAVE AXIS: 62 DEGREES
VENTRICULAR RATE: 61 BPM
VIT B12 SERPL-MCNC: 434 PG/ML (ref 180–914)
WBC # BLD AUTO: 8.18 THOUSAND/UL (ref 4.31–10.16)

## 2024-07-03 PROCEDURE — 99233 SBSQ HOSP IP/OBS HIGH 50: CPT | Performed by: PSYCHIATRY & NEUROLOGY

## 2024-07-03 PROCEDURE — 80074 ACUTE HEPATITIS PANEL: CPT | Performed by: PHYSICIAN ASSISTANT

## 2024-07-03 PROCEDURE — 99239 HOSP IP/OBS DSCHRG MGMT >30: CPT | Performed by: PHYSICIAN ASSISTANT

## 2024-07-03 PROCEDURE — 82784 ASSAY IGA/IGD/IGG/IGM EACH: CPT | Performed by: PHYSICIAN ASSISTANT

## 2024-07-03 PROCEDURE — 93010 ELECTROCARDIOGRAM REPORT: CPT | Performed by: INTERNAL MEDICINE

## 2024-07-03 PROCEDURE — 86480 TB TEST CELL IMMUN MEASURE: CPT | Performed by: PHYSICIAN ASSISTANT

## 2024-07-03 PROCEDURE — 82607 VITAMIN B-12: CPT | Performed by: PHYSICIAN ASSISTANT

## 2024-07-03 PROCEDURE — 84100 ASSAY OF PHOSPHORUS: CPT | Performed by: HOSPITALIST

## 2024-07-03 PROCEDURE — 85025 COMPLETE CBC W/AUTO DIFF WBC: CPT | Performed by: HOSPITALIST

## 2024-07-03 PROCEDURE — 80053 COMPREHEN METABOLIC PANEL: CPT | Performed by: HOSPITALIST

## 2024-07-03 PROCEDURE — 87389 HIV-1 AG W/HIV-1&-2 AB AG IA: CPT | Performed by: PHYSICIAN ASSISTANT

## 2024-07-03 PROCEDURE — 83735 ASSAY OF MAGNESIUM: CPT | Performed by: HOSPITALIST

## 2024-07-03 RX ORDER — ASPIRIN 325 MG
325 TABLET ORAL DAILY
Status: DISCONTINUED | OUTPATIENT
Start: 2024-07-03 | End: 2024-07-03

## 2024-07-03 RX ADMIN — SODIUM CHLORIDE 75 ML/HR: 0.9 INJECTION, SOLUTION INTRAVENOUS at 07:29

## 2024-07-03 RX ADMIN — ASPIRIN 325 MG ORAL TABLET 325 MG: 325 PILL ORAL at 08:33

## 2024-07-03 RX ADMIN — ENOXAPARIN SODIUM 40 MG: 40 INJECTION SUBCUTANEOUS at 08:33

## 2024-07-03 RX ADMIN — ACETAMINOPHEN 650 MG: 325 TABLET, FILM COATED ORAL at 07:38

## 2024-07-03 NOTE — PHYSICAL THERAPY NOTE
Physical Therapy Screen    Patient Name: Carlos Landis    Today's Date: 7/3/2024     Problem List  Principal Problem:    Lower facial weakness  Active Problems:    MS (multiple sclerosis) (HCC)    DAVID (obstructive sleep apnea)       Past Medical History  Past Medical History:   Diagnosis Date    Arthritis     Diabetes mellitus (HCC)     prediabetes    MS (multiple sclerosis) (HCC)     Scoliosis     Visual impairment         Past Surgical History  Past Surgical History:   Procedure Laterality Date    HERNIA REPAIR      3 times    KNEE SURGERY Right            07/03/24 0927   PT Last Visit   PT Visit Date 07/03/24   Note Type   Note type Screen   Additional Comments Chart review completed. Patient documented as a 22 on the [facility]. Spoke with RN, Khadra, who reports that patient has been ambulatory in the room with a RW. He appears to be at his functional baseline. No inpatient P.T. needs. Please re-consult if status changes.     Karrie Barragan

## 2024-07-03 NOTE — PROGRESS NOTES
Progress Note - Neurology   Carlos DESTIN Sabiha 49 y.o. male MRN: [id number]  Unit/Bed#: -01 Encounter: [id number]      Assessment & Plan     MS (multiple sclerosis) (HCC)  Assessment & Plan  49-year-old male with MS on Ocrevus infusions and monthly Solumedrol infusions, scoliosis, DAVID not on CPAP, presents with fluctuating numbness of the left V3 distribution, headache while ambulating, increased RUE incoordination, and increased speech difficulty.    Patient is scheduled to have Ocrevus infusion concurrently with Solu-Medrol infusion on 7/10/2024.  He normally receives monthly Solu-Medrol infusions.  MRI brain with/without contrast MS protocol on 7/2/24 demonstrated no new lesions or any enhancing lesions.  Per review of available imaging, patient has not had any new brain or spinal cord lesions since February 2024.    No new or enhancing lesions noted on MRI brain.  Workup negative for infection/metabolic derangement.  Feel presenting symptoms are likely pseudoexacerbation/fluctuation of patient's MS symptoms, perhaps related to his increased life stressors.    Plan:  -Will defer steroids at this time given no new/enhancing lesion on MRI.  Patient also notes spontaneous improvement in his presenting symptoms.  -As discussed with patient, recommend he obtain a psychotherapist to assist with his stressors as these are likely playing a role with his MS symptom fluctuation.  -Will have labs drawn today in preparation for his upcoming Ocrevus/steroid infusion next week.  -Check B12 as patient has history of B12 deficiency and has not had recent level checked.  -Appreciate PT/OT evaluation.  -No further inpatient neurologic recommendations.  Will have office call to arrange follow-up post Ocrevus infusion.    Episodic tension-type headache, not intractable  Assessment & Plan  Patient states he is prescribed ASA 325mg daily for headache prophylaxis. He will discuss this regimen further with his outpatient Neurology  "team.          Carlos DESTIN Landis will need follow up in in 4 weeks with multiple sclerosis attending. He will not require outpatient neurological testing.  Patient would like an appt with Dr. Hernandez.    Subjective:   Patient reports fluctuating/intermittent left facial numbness.  He also states that he had significant right upper extremity incoordination yesterday, but it is now back at baseline.  He feels caffeine helps with his MS symptoms significantly.  Speech is choppy at times but this is his baseline.  Today he states his father was told that he had had a seizure while at PT yesterday.  I discussed this personally with his physical therapist and reviewed the EMS form, and there is no mention of any seizure-like activity.  His physical therapist states that she did not mention any seizure-like activity to anyone.  Patient also states he is under a significant amount of stress recently due to his mother's illness.  He does not have a talk therapist but prefers to talk to his PT and OT.    ROS: 12 point review of systems performed and negative except as indicated above.    Vitals: Blood pressure 131/87, pulse 59, temperature 97.9 °F (36.6 °C), temperature source Oral, resp. rate 20, height 5' 11\" (1.803 m), weight 107 kg (236 lb 1.8 oz), SpO2 97%.,Body mass index is 32.93 kg/m².    Physical Exam:   General:  Patient is well-developed, obese BMI 32.93, and in no acute distress.  HEENT:  Head normocephalic.  Eyes anicteric.    Cardiovascular:  With regular rhythm.  Lungs:  Normal effort. Nonlabored breathing.  Extremities:  With no significant edema.    Skin: No rashes.  Neurologic:  Patient is alert, pleasantly interactive, and appropriately conversational. Speech is choppy/nonfluent, improved from yesterday and now back at his baseline.    Gait deferred for safety.    Cranial Nerves:   II: Visual fields full to confrontation. Pupils equal, round, reactive to light with normal accomodation.  Cannot visualize " optic fundi.  III,IV,VI: extraocular movements intact with no nystagmus.   V: Sensation in the V1 through V3 distributions intact to light touch bilaterally.   VII: Subtle right facial asymmetry.  XII: Tongue midline with no atrophy or fasciculations with appropriate movement.     Coordination:  RUE and RLE ataxia noted. Accurate on left.    Motor testing with 4-4+/5 RUE and RLE strength. 5/5 on left.    Sensory testing grossly intact to light touch throughout.     Diffusely hyperreflexic throughout, moreso on right. No clonus.    Toe responses are downgoing bilaterally.    Lab, Imaging and other studies: CBC:   Results from last 7 days   Lab Units 07/03/24  0207 07/02/24  1142   WBC Thousand/uL 8.18 8.37   RBC Million/uL 4.16 4.79   HEMOGLOBIN g/dL 12.1 13.8   HEMATOCRIT % 37.0 43.5   MCV fL 89 91   PLATELETS Thousands/uL 176 200   , BMP/CMP:   Results from last 7 days   Lab Units 07/03/24  0207 07/02/24  1142   SODIUM mmol/L 140 139   POTASSIUM mmol/L 3.8 4.0   CHLORIDE mmol/L 111* 106   CO2 mmol/L 24 25   BUN mg/dL 17 24   CREATININE mg/dL 0.79 0.85   CALCIUM mg/dL 8.3* 9.2   AST U/L 10* 13   ALT U/L 7 9   ALK PHOS U/L 48 57   EGFR ml/min/1.73sq m 103 100   , Urinalysis:   Results from last 7 days   Lab Units 07/02/24  1437   COLOR UA  Yellow   CLARITY UA  Clear   SPEC GRAV UA  1.020   PH UA  6.0   LEUKOCYTES UA  Negative   NITRITE UA  Negative   GLUCOSE UA mg/dl Negative   KETONES UA mg/dl Negative   BILIRUBIN UA  Negative   BLOOD UA  Negative     VTE Prophylaxis: Enoxaparin (Lovenox)    Counseling / Coordination of Care  I have spent a total time of 55 minutes in caring for this patient on the day of the visit/encounter including Diagnostic results, Prognosis, Risks and benefits of tx options, Instructions for management, Patient and family education, Importance of tx compliance, Risk factor reductions, Impressions, Counseling / Coordination of care, Documenting in the medical record, Reviewing / ordering  tests, medicine, procedures  , Obtaining or reviewing history  , and Communicating with other healthcare professionals .

## 2024-07-03 NOTE — ASSESSMENT & PLAN NOTE
Presented due to numbness of left lower face and slurred speech now resolved  History of MS  Neurology was consulted by ER - recommended admission for MRI  MRI brain without new enhancing lesions identified  Due next week for Ocrevus/steroid infusion - pre-infusion labs ordered per neurology and will be followed up as outpt  At baseline ambulatory status  Stable for discharge home

## 2024-07-03 NOTE — ASSESSMENT & PLAN NOTE
Patient states he is prescribed ASA 325mg daily for headache prophylaxis. He will discuss this regimen further with his outpatient Neurology team.

## 2024-07-03 NOTE — CASE MANAGEMENT
Case Management Assessment & Discharge Planning Note    Patient name Carlos Landis  Location /-01 MRN [id number]  : 1975 Date 7/3/2024       Current Admission Date: 2024  Current Admission Diagnosis:MS (multiple sclerosis) (HCC)   Patient Active Problem List    Diagnosis Date Noted Date Diagnosed    DAVID (obstructive sleep apnea) 2024     Right sided numbness 2023     Pain of upper abdomen 2023     Right-sided thoracic back pain 2023     Hypomagnesemia 2023     Episodic tension-type headache, not intractable 2023     Left leg weakness 2022     Obesity (BMI 30-39.9) 2022     Ambulatory dysfunction 03/15/2022     MS (multiple sclerosis) (HCC) 03/15/2022     Dysarthria 2022     CNS demyelinating disease (HCC) 2022     Right hemiparesis (HCC) 2022       LOS (days): 0  Geometric Mean LOS (GMLOS) (days):   Days to GMLOS:     OBJECTIVE:              Current admission status: Observation       Preferred Pharmacy:   [id number]  [place] - [address]  [address]  [place] [address]  Phone: [phone] Fax: [phone]    Primary Care Provider: Ha Acosta MD    Primary Insurance: MEDICARE  [place] [facility] [place]    ASSESSMENT:  Active Health Care Proxies       [facility] Phone: [phone] (Home)                 Advance Directives  Does patient have a Health Care POA?: No  Was patient offered paperwork?: Yes  Does patient currently have a Health Care decision maker?: Yes, please see Health Care Proxy section  Does patient have Advance Directives?: No  Was patient offered paperwork?: Yes    Obs Notice Signed: 24    Readmission Root Cause  30 Day Readmission: No    Patient Information  Admitted from:: Home  Mental Status: Alert  During Assessment patient was accompanied by: Not accompanied during  assessment  Assessment information provided by:: Patient  Primary Caregiver: Self  Support Systems: Self, Daughter, Parent, Family members  County of Residence: [place]  What [facility] do you live in?: [place]  Home entry access options. Select all that apply.: Ramp  Type of Current Residence: 2 story home  Upon entering residence, is there a bedroom on the main floor (no further steps)?: Yes  Upon entering residence, is there a bathroom on the main floor (no further steps)?: Yes  Living Arrangements: Lives w/ Daughter, Lives w/ Parent(s)  Is patient a ?: No    Activities of Daily Living Prior to Admission  Functional Status: Assistance  Completes ADLs independently?: Yes  Ambulates independently?: No  Level of ambulatory dependence: Assistance  Does patient use assisted devices?: Yes  Assisted Devices (DME) used: Electric wheelchair, Stair Chair/Glide, Walker  Does patient currently own DME?: Yes  What DME does the patient currently own?: Electric Wheelchair, Stair Chair/Glide, Walker  Does patient have a history of Outpatient Therapy (PT/OT)?: Yes  Does the patient have a history of Short-Term Rehab?: Yes  Does patient have a history of HHC?: Yes  Does patient currently have HHC?: No         Patient Information Continued  Does patient have prescription coverage?: Yes  Does patient receive dialysis treatments?: No  Does patient have a history of substance abuse?: No  Does patient have a history of Mental Health Diagnosis?: No         Means of Transportation  Means of Transport to Appts:: Family transport      Social Determinants of Health (SDOH)      Flowsheet Row Most Recent Value   Housing Stability    In the last 12 months, was there a time when you were not able to pay the mortgage or rent on time? N   In the past 12 months, how many times have you moved where you were living? 0   At any time in the past 12 months, were you homeless or living in a shelter (including now)? N   Transportation Needs    In the  past 12 months, has lack of transportation kept you from medical appointments or from getting medications? no   In the past 12 months, has lack of transportation kept you from meetings, work, or from getting things needed for daily living? No   Food Insecurity    Within the past 12 months, you worried that your food would run out before you got the money to buy more. Never true   Within the past 12 months, the food you bought just didn't last and you didn't have money to get more. Never true   Utilities    In the past 12 months has the electric, gas, oil, or water company threatened to shut off services in your home? No            DISCHARGE DETAILS:    Discharge planning discussed with:: pt  Freedom of Choice: Yes     CM contacted family/caregiver?: No- see comments (Pt is in contact with family. Pt asked CM not to contact)  Were Treatment Team discharge recommendations reviewed with patient/caregiver?: Yes  Did patient/caregiver verbalize understanding of patient care needs?: Yes  Were patient/caregiver advised of the risks associated with not following Treatment Team discharge recommendations?: Yes                             Treatment Team Recommendation: Home  Discharge Destination Plan:: Home                                         Additional Comments: Pt lives with parents and dtr in 2s, [facility]. Pt was independent with ADLs PTA ,but needed assistance with ADLs. Stair glide, walker, and mobility scooter in the home. Reports no SDOH. mental health, or D&A needs. Sister can provide tranport.MOON form signed.

## 2024-07-03 NOTE — DISCHARGE SUMMARY
[facility]  Discharge- Carlos DESTIN Landis 1975, 49 y.o. male MRN: [id number]  Unit/Bed#: -01 Encounter: [id number]  Primary Care Provider: Ha Acosta MD   Date and time admitted to hospital: 7/2/2024 11:30 AM    * Lower facial weakness-resolved as of 7/3/2024  Assessment & Plan  Presented due to numbness of left lower face and slurred speech now resolved  History of MS  Neurology was consulted by ER - recommended admission for MRI  MRI brain without new enhancing lesions identified  Due next week for Ocrevus/steroid infusion - pre-infusion labs ordered per neurology and will be followed up as outpt  At baseline ambulatory status  Stable for discharge home    DAVID (obstructive sleep apnea)  Assessment & Plan  History of DAVID.  Has not used a CPAP in years.  During recent neurology appointment it was discussed importance of patient being evaluated and treated by sleep medicine  Patient is encouraged to make an appointment as outpatient    MS (multiple sclerosis) ([facility])  Assessment & Plan  Diagnosed in 2022.  Chronic right-sided weakness.  Uses a walker to ambulate.  Following with PT/OT outpatient  Home regimen: Ocrevus every 6 months- due next week.  Patient also normally receives monthly Solu-Medrol infusions  Follows with [facility] neurology      Medical Problems       Resolved Problems  Date Reviewed: 7/3/2024            Resolved    * (Principal) Lower facial weakness 7/3/2024     Resolved by  Rashmi Avalos PA-C        Discharging Physician / Practitioner: Rashmi vAalos PA-C  PCP: Ha Acosta MD  Admission Date:   Admission Orders (From admission, onward)       Ordered        07/02/24 1502  Place in Observation  Once                          Discharge Date: 07/03/24    Consultations During Hospital Stay:  Neurology    Procedures Performed:   None    Significant Findings / Test Results:   MRI brain: Mild to moderate chronic demyelinating plaque is stable in this  "patient with a history of multiple sclerosis. No evidence of progressive or active demyelination.  No acute infarction, intracranial hemorrhage or enhancing mass lesion.    Incidental Findings:   None     Test Results Pending at Discharge (will require follow up):   Pre-infusion labs ordered by neurology     Outpatient Tests Requested:  None    Complications:  None    Reason for Admission: Slurred speech/facial weakness    Hospital Course:   Carlos Landis is a 49 y.o. male patient who originally presented to the hospital on 7/2/2024 due to left facial numbness, slurred speech.    Past medical history significant for multiple sclerosis, DAVID.  Patient presented to the emergency department with left lower face numbness, slurred speech.  Neurology was consulted and recommended MRI brain but instructed to hold off on IV steroids.  MRI brain did not show any new enhancing lesions related to patient's MS therefore neurology felt presentation likely pseudo flare and no indication for steroids at this time.  Patient is due for his outpatient infusion next week of Ocrevus/steroid.  At baseline ambulatory status.  On day of discharge patient was hemodynamically stable and verbalized understanding for requested outpatient follow up.    Please see above list of diagnoses and related plan for additional information.     Condition at Discharge: stable    Discharge Day Visit / Exam:   Subjective:  Feels well, no complaints.  Vitals: Blood Pressure: 131/87 (07/03/24 0724)  Pulse: 59 (07/03/24 0724)  Temperature: 97.9 °F (36.6 °C) (07/03/24 0724)  Temp Source: Oral (07/03/24 0724)  Respirations: 20 (07/03/24 0724)  Height: 5' 11\" (180.3 cm) (07/03/24 0135)  Weight - Scale: 107 kg (236 lb 1.8 oz) (07/03/24 0135)  SpO2: 97 % (07/03/24 0724)  Exam:   Physical Exam  Vitals and nursing note reviewed.   Constitutional:       Comments: No acute distress   HENT:      Head: Normocephalic.   Eyes:      General: No scleral icterus.     " Extraocular Movements: Extraocular movements intact.      Conjunctiva/sclera: Conjunctivae normal.   Cardiovascular:      Rate and Rhythm: Normal rate and regular rhythm.      Heart sounds: Normal heart sounds. No murmur heard.  Pulmonary:      Effort: Pulmonary effort is normal. No respiratory distress.      Breath sounds: Normal breath sounds. No wheezing, rhonchi or rales.   Abdominal:      General: Bowel sounds are normal.      Palpations: Abdomen is soft.      Tenderness: There is no abdominal tenderness.   Musculoskeletal:      Cervical back: Normal range of motion.      Comments: Able to move upper/lower ext bilaterally, no edema   Skin:     General: Skin is warm and dry.   Neurological:      Mental Status: He is alert and oriented to person, place, and time. Mental status is at baseline.   Psychiatric:         Mood and Affect: Mood normal.         Speech: Speech normal.         Behavior: Behavior normal.          Discussion with Family: Patient declined call to .     Discharge instructions/Information to patient and family:   See after visit summary for information provided to patient and family.      Provisions for Follow-Up Care:  See after visit summary for information related to follow-up care and any pertinent home health orders.      Mobility at time of Discharge:   Basic Mobility Inpatient Raw Score: 22  JH-HLM Goal: 7: Walk 25 feet or more  JH-HLM Achieved: 3: Sit at edge of bed  HLM Goal NOT achieved. Continue to encourage mobility in post discharge setting.     Disposition:   Home    Planned Readmission: None     Discharge Statement:  I spent 50 minutes discharging the patient. This time was spent on the day of discharge. I had direct contact with the patient on the day of discharge. Greater than 50% of the total time was spent examining patient, answering all patient questions, arranging and discussing plan of care with patient as well as directly providing post-discharge  instructions.  Additional time then spent on discharge activities.    Discharge Medications:  See after visit summary for reconciled discharge medications provided to patient and/or family.      **Please Note: This note may have been constructed using a voice recognition system**

## 2024-07-03 NOTE — OCCUPATIONAL THERAPY NOTE
Occupational Therapy Screen    Patient Name: Carlos Landis  Today's Date: 7/3/2024     07/03/24 1028   OT Last Visit   OT Visit Date 07/03/24   Note Type   Note type Screen   Additional Comments Chart review completed. Patient documented as a 22 on the [facility]. Spoke with RN, Khadra, who reports that patient has been ambulatory in the room with a RW. He appears to be at his functional baseline. No inpatient OT needs. Please re-consult if status changes.     Krys Lindsay MS, OTR/L

## 2024-07-03 NOTE — UTILIZATION REVIEW
"Initial Clinical Review    Admission: Date/Time/Statement:   Admission Orders (From admission, onward)       Ordered        07/02/24 1502  Place in Observation  Once                          Orders Placed This Encounter   Procedures    Place in Observation     Standing Status:   Standing     Number of Occurrences:   1     Order Specific Question:   Level of Care     Answer:   Med Surg [16]     ED Arrival Information       Expected   -    Arrival   7/2/2024 11:29    Acuity   Urgent              Means of arrival   Ambulance    Escorted by   MAURICIO (Elena)    Service   Hospitalist    Admission type   Emergency              Arrival complaint   MS flare up             Chief Complaint   Patient presents with    Numbness     LWK @1100 pt was at PT and started to feel Left sided facial numbness. Hx of MS. Pt reports \"I think it is my MS flare up.\" Pt dad called ems.No blood thinners.        Initial Presentation: 49 y.o. male who presented by EMS to [facility] ED. Admitted as Observation for evaluation and treatment of Lower facial weakness     PMHx:  has a past medical history of Arthritis, Diabetes mellitus ([facility]), MS (multiple sclerosis) ([facility]), Scoliosis, and Visual impairment.      Presented w/ L sided facial numbness and pt concerned for MS flare up. On exam, patient with abnormal coordination, speech difficulty, numbness and headaches    Plan: MRI brain. Neuro checks, Trend WBC and fever curve.  Neurology consulted.       ED Triage Vitals   Temperature Pulse Respirations Blood Pressure SpO2 Pain Score   07/02/24 1133 07/02/24 1133 07/02/24 1133 07/02/24 1133 07/02/24 1133 07/02/24 1606   98.1 °F (36.7 °C) 69 18 127/73 97 % No Pain     Weight (last 2 days)       Date/Time Weight    07/03/24 0135 107 (236.11)    07/02/24 1133 107 (236.11)            Vital Signs (last 3 days)       Date/Time Temp Pulse Resp BP MAP (mmHg) SpO2 O2 Device Patient Position - Orthostatic VS [place] Coma Scale Score Pain    " 07/03/24 0738 -- -- -- -- -- -- -- -- -- 6    07/03/24 07:24:22 97.9 °F (36.6 °C) 59 20 131/87 102 97 % None (Room air) Lying -- --    07/03/24 0135 97.7 °F (36.5 °C) 69 18 134/84 -- -- -- -- -- --    07/02/24 21:08:58 97.7 °F (36.5 °C) 69 18 134/84 101 97 % None (Room air) Lying 15 No Pain    07/02/24 1606 -- -- -- -- -- -- None (Room air) -- 15 No Pain    07/02/24 15:59:34 97.7 °F (36.5 °C) 76 18 139/87 104 97 % -- -- -- --    07/02/24 1530 -- 70 -- 131/87 105 96 % -- -- -- --    07/02/24 1445 -- 71 16 131/83 103 98 % None (Room air) Sitting -- --    07/02/24 1442 -- 73 -- 131/83 103 95 % -- -- -- --    07/02/24 1246 -- 59 16 121/79 -- 98 % None (Room air) -- -- --    07/02/24 1144 -- -- -- -- -- -- -- -- 15 --    07/02/24 1133 98.1 °F (36.7 °C) 69 18 127/73 95 97 % None (Room air) Sitting -- --              Pertinent Labs/Diagnostic Test Results:   Radiology:  MRI brain w wo contrast   Final Interpretation by Gary Everett MD (07/02 1845)         1. Mild to moderate chronic demyelinating plaque is stable in this patient with a history of multiple sclerosis. No evidence of progressive or active demyelination.   2. No acute infarction, intracranial hemorrhage or enhancing mass lesion.      Workstation performed: [id number]         XR chest portable    (Results Pending)     Cardiology:  No orders to display     GI:  No orders to display           Results from last 7 days   Lab Units 07/03/24  0207 07/02/24  1142   WBC Thousand/uL 8.18 8.37   HEMOGLOBIN g/dL 12.1 13.8   HEMATOCRIT % 37.0 43.5   PLATELETS Thousands/uL 176 200   TOTAL NEUT ABS Thousands/µL 5.26 5.68         Results from last 7 days   Lab Units 07/03/24  0207 07/02/24  1142   SODIUM mmol/L 140 139   POTASSIUM mmol/L 3.8 4.0   CHLORIDE mmol/L 111* 106   CO2 mmol/L 24 25   ANION GAP mmol/L 5 8   BUN mg/dL 17 24   CREATININE mg/dL 0.79 0.85   EGFR ml/min/1.73sq m 103 100   CALCIUM mg/dL 8.3* 9.2   MAGNESIUM mg/dL 1.9  --    PHOSPHORUS mg/dL 2.8  --   "    Results from last 7 days   Lab Units 07/03/24  0207 07/02/24  1142   AST U/L 10* 13   ALT U/L 7 9   ALK PHOS U/L 48 57   TOTAL PROTEIN g/dL 5.9* 7.2   ALBUMIN g/dL 3.4* 4.1   TOTAL BILIRUBIN mg/dL 0.32 0.45     Results from last 7 days   Lab Units 07/02/24  1133   POC GLUCOSE mg/dl 82     Results from last 7 days   Lab Units 07/03/24  0207 07/02/24  1142   GLUCOSE RANDOM mg/dL 85 85             No results found for: \"BETA-HYDROXYBUTYRATE\"                   Results from last 7 days   Lab Units 07/02/24  1142   HS TNI 0HR ng/L <2             Results from last 7 days   Lab Units 07/02/24  1437   CLARITY UA  Clear   COLOR UA  Yellow   SPEC GRAV UA  1.020   PH UA  6.0   GLUCOSE UA mg/dl Negative   KETONES UA mg/dl Negative   BLOOD UA  Negative   PROTEIN UA mg/dl Negative   NITRITE UA  Negative   BILIRUBIN UA  Negative   UROBILINOGEN UA (BE) mg/dl <2.0   LEUKOCYTES UA  Negative                   ED Treatment-Medication Administration from 07/02/2024 1129 to 07/02/2024 1555       None            Past Medical History:   Diagnosis Date    Arthritis     Diabetes mellitus (HCC)     prediabetes    MS (multiple sclerosis) ([facility])     Scoliosis     Visual impairment      Present on Admission:   (Resolved) Lower facial weakness   MS (multiple sclerosis) ([facility])   DAVID (obstructive sleep apnea)   Episodic tension-type headache, not intractable      Admitting Diagnosis: Numbness [R20.0]  MS (multiple sclerosis) ([facility]) [G35]  Age/Sex: 49 y.o. male  Admission Orders:  Scheduled Medications:  enoxaparin, 40 mg, Subcutaneous, Daily      Continuous IV Infusions:  sodium chloride, 75 mL/hr, Intravenous, Continuous      PRN Meds:  acetaminophen, 650 mg, Oral, Q6H PRN  ondansetron, 4 mg, Intravenous, Q6H PRN        IP CONSULT TO NEUROLOGY  IP CONSULT TO CASE MANAGEMENT    Network Utilization Review Department  ATTENTION: Please call with any questions or concerns to [phone] and carefully listen to the prompts so that you are directed to " the right person. All voicemails are confidential.   For Discharge needs, contact Care Management DC Support Team at [phone] opt. 2  Send all requests for admission clinical reviews, approved or denied determinations and any other requests to dedicated fax number below belonging to the campus where the patient is receiving treatment. List of dedicated fax numbers for the Facilities:  FACILITY NAME UR FAX NUMBER   ADMISSION DENIALS (Administrative/Medical Necessity) [phone]   DISCHARGE SUPPORT TEAM (NETWORK) [phone]   PARENT CHILD HEALTH (Maternity/NICU/Pediatrics) [phone]   [facility] [phone]   [facility] [phone]   [facility] [phone]   [facility] [phone]   [facility] [phone]   [facility] [phone]   [facility] [phone]   [facility] [phone]   [facility] [phone]   [facility] [phone]   [facility] [phone]   [facility] [phone]

## 2024-07-03 NOTE — ASSESSMENT & PLAN NOTE
Diagnosed in 2022.  Chronic right-sided weakness.  Uses a walker to ambulate.  Following with PT/OT outpatient  Home regimen: Ocrevus every 6 months- due next week.  Patient also normally receives monthly Solu-Medrol infusions  Follows with [facility] neurology

## 2024-07-03 NOTE — DISCHARGE INSTR - AVS FIRST PAGE
Follow up with PCP within 1 week for post hospitalization follow up  Follow up with neurology/infusions as previously scheduled    Return to the emergency department for further evaluation with any chest pain/palpitations, shortness of breath, nausea/vomiting, abdominal pain, fever/chills, new weakness or numbness/tingling.

## 2024-07-04 LAB
GAMMA INTERFERON BACKGROUND BLD IA-ACNC: 0 IU/ML
M TB IFN-G BLD-IMP: NEGATIVE
M TB IFN-G CD4+ BCKGRND COR BLD-ACNC: 0 IU/ML
M TB IFN-G CD4+ BCKGRND COR BLD-ACNC: 0 IU/ML
MITOGEN IGNF BCKGRD COR BLD-ACNC: 10 IU/ML

## 2024-07-05 ENCOUNTER — APPOINTMENT (OUTPATIENT)
Facility: CLINIC | Age: 49
End: 2024-07-05
Payer: MEDICARE

## 2024-07-05 ENCOUNTER — OFFICE VISIT (OUTPATIENT)
Facility: CLINIC | Age: 49
End: 2024-07-05
Payer: MEDICARE

## 2024-07-05 DIAGNOSIS — R26.81 UNSTEADINESS ON FEET: ICD-10-CM

## 2024-07-05 DIAGNOSIS — G35 MS (MULTIPLE SCLEROSIS) (HCC): Primary | ICD-10-CM

## 2024-07-05 PROCEDURE — 97112 NEUROMUSCULAR REEDUCATION: CPT

## 2024-07-05 NOTE — TELEPHONE ENCOUNTER
Lab results in chart.     Since 5/22/24 neuro OV:  -Solumedrol home infusions started  -Pt contacted office with symptoms on 6/24/24  -Admitted to [facility] on 7/2/24 for facial weakness/numbness/slurred speech. Per neuro note, suspected pseudoexacerbation of MS in relation to stress    Okay to proceed with Ocrevus infusion on 7/10/24?

## 2024-07-05 NOTE — PROGRESS NOTES
Daily Note / Progress Note    Today's date: 2024  Patient name: Carlos Landis  : 1975  MRN: [id number]  Referring provider: Kathy Gregory PA-C  Dx:   Encounter Diagnosis     ICD-10-CM    1. MS (multiple sclerosis) (HCC)  G35       2. Unsteadiness on feet  R26.81                        Subjective: Pt states that he is feeling okay today. He is doing good today. He feels much better. He noticed that his attacks happen 1 week before his infusion, so he is trying to get them moved up in frequencies. He was admitted to the hospital after his last attack.  He states that the EMT's told them he had a seizure.       Objective: See treatment diary below      Assessment: Pt participated in a skilled PT session with additional measures taken due to recent hospital admission for MS Pseudoexacerbation. Today's assessment reflects significant improvement in measures since last re-assessment despite hospitalization, reflecting improved dynamic balance and strength. Claification on events surrounding hospitalization was discussed as pt expressed concern about the  who called Lesia Garcia, stating that he was having a stroke. I (this PT) witnessed the conversation and therefore was able to clarify that this was not the case and that she was informing EMS that it was most likely not a stroke per my instructions. Furthermore, pt' expressed that he was distressed that she would like allow his father to come back right away, however, explained to the PT that it was because she did not know who he was and was trying to maintain patient confidentiality. Victorino navigation performed in // without resistance due to residual fatigue.  Pt will continue to benefit from skilled PT so that he can navigate his environment with increased safety and stability.     Plan: Continue per plan of care.      Precautions: MS, r sided weakness, frequent headaches, history or R ACLR, falls risk  POC expires: 2024      Tests and  measures 3/27 4/3  4/17 5/14 5/21 5/24  5/28 5/31 6/4 6/7 6/11 6/18 6/20 6/21 6/24 7/2 7/5   ABC 33.13%  39.38%   44.38%           51.25%   MARIN   27/56                  6MWT   190 feet 3 mins 32 secs 407ft    504.4ft           544.44ft   5xSTS 34.54 w UE support  30.91   36.60s           26.23s   10mWT 0.33m/s w RW     0.61s w RW              BP                151/94mmHg HR: 94bpm    TUG 38.80s w RW     35.15s              Neuro Re-Ed                    Victorino navigation     Fwd middle setting in 7.5lb AW 4 laps  Fwd middle setting in 7.5lb AW 4 laps   Fwd middle setting in 7.5lb AW 4 laps Distance walking negotiating around obstacles, 140' x2, focus on Left LE ER  Fwd highest setting 7.5lb AW 4 laps      Fwd highest setting no resistance 4 laps in //    Sit <> stands             1 min on:2 min off 2 rounds        Compliant surface                    Tests and measures   ABC, MARIN, 6MWT, 5xSTS   ABC, 6MWT, 5xSTS, TUG, 10mWT           ABC, 6MWT, 5xSTS, TUG, 10mWT   Side stepping        In // 7.5 lb AW 4x down and back In ll bars light UE 4 laps x2  In // 7.5lb AW 4x down and back //bar #5 BAW, 1 min on: 2 off, 3 rounds // bar #5 BAW 8x down and back       marching        In// 7.5lb AW 4x down and back In ll bars focused on LE facing forward 4 laps x2   //bar #5 BAW    3 rounds 1 min on: 2 min off,  // bar #5 BAW, 10x.                                                Ther Ex                    STS  2x10 foam on chair  4x6 1 foam on chair + 10lb weighted vest without UE support 3x10 1 foam on chair + 10lb weighted vest without UE support    Off raised step 5 without UE Off low mat + foam square 10x each      3x10 foam weighted vest without UE support 1 foam on chair  3x10 foam weighted vest without UE support 1x5 foam + weighted vest    Standing hip abduction                    Seated marching                                        Mini Squats     3x5 with UE support in // with 10lb weighted vest  3x5 with UE support  "in with 10lb weighted vest.  1x10 with UE support with 10lb weighted vest   1x10 with UE support with in //  2x10 with UE support //bar  2x10 with UE support // bar  2x10 with UE support // bar     Step ups  //bar up and over 6\" 10x             // bar up and over 8' step 10x B UE support // bar up and over 8' step 10x B UE support     LAQ   3x5 + pacing 15lb AW          3x5 + pacing 15lb AW       bridge          2x8          Heel raises        In // 7.5lb AW 2x10                                 Ther Activity                    Stand pivot transfer                With eddie leblanc    Chair to bed transfer                With eddie leblanc    Psoriasis--wound care    Application of stockinette to protect wound.                 monitoring                Patient educatio, vitals ensuring stable vital signs.     Gait Training                    With RW    5 rounds interval training 1 min fast walking 30s normal walking pace 5 rounds interval training 1 min fast walking 30s normal walking pace.   5 rounds interval training 1 min fast walking 30s normal pace 2lb weights  6 rounds interval training 1 min fast walking 30s normal pace 3lb AW antonieta 2 laps with increased pacing 6 rounds interval training 1 min fast walking 30s normal pace 3lb AW antonieta 6 rounds interval training 1 min fast walking 30s normal pace 5lb AW antonieta  6 rounds interval training 1 min fast walking 30s normal pace 5lb AW antonieta  6 rounds interval training 1 min fast walking 30s normal pace 5lb AW antonieta 6 rounds interval training 1 min fast walking 30s normal pace 5lb AW antonieta 1 rounds of walking at fast speed however pt felt lightheaded upon completion of task.     Unilateral UE parallel bar   4x laps                   Modalities                                                                         "

## 2024-07-09 ENCOUNTER — OFFICE VISIT (OUTPATIENT)
Facility: CLINIC | Age: 49
End: 2024-07-09
Payer: MEDICARE

## 2024-07-09 ENCOUNTER — APPOINTMENT (OUTPATIENT)
Facility: CLINIC | Age: 49
End: 2024-07-09
Payer: MEDICARE

## 2024-07-09 DIAGNOSIS — R26.81 UNSTEADINESS ON FEET: ICD-10-CM

## 2024-07-09 DIAGNOSIS — G35 MS (MULTIPLE SCLEROSIS) (HCC): Primary | ICD-10-CM

## 2024-07-09 PROCEDURE — 97110 THERAPEUTIC EXERCISES: CPT

## 2024-07-09 NOTE — PROGRESS NOTES
Daily Note     Today's date: 2024  Patient name: Carlos Landis  : 1975  MRN: [id number]  Referring provider: Kathy Gregory PA-C  Dx:   Encounter Diagnosis     ICD-10-CM    1. MS (multiple sclerosis) (HCC)  G35       2. Unsteadiness on feet  R26.81                          Subjective: Pt states that he thinks he is getting his infusions a week too late because he often has flare ups in that amount of time. He states that he is having a harder time talking today with his speech. He notes he is having some difficulty with the left side of his jaw. He states he can feel his two new lesions.       Objective: See treatment diary below      Assessment: Pt participated in a skilled PT session addressing balance & gait deficits.  Fewer exercises performed today due to increased fatigue due to upcoming infusion. Pacing required today throughout to reduce fatigue and mitigate symptoms. Pt  will continue to benefit from skilled PT so that he can navigate his environment with increased safety and stability.     Plan: Continue per plan of care.      Precautions: MS, r sided weakness, frequent headaches, history or R ACLR, falls risk  POC expires: 2024      Tests and measures 3/27 4/3  4/17 5/14 5/21 5/24  5/28 5/31 6/4 6   ABC 33.13%  39.38%   44.38%           51.25%    MARIN                      6MWT   190 feet 3 mins 32 secs 407ft    504.4ft           544.44ft    5xSTS 34.54 w UE support  30.91   36.60s           26.23s    10mWT 0.33m/s w RW     0.61s w RW               BP                151/94mmHg HR: 94bpm     TUG 38.80s w RW     35.15s               Neuro Re-Ed                     Victorino navigation     Fwd middle setting in 7.5lb AW 4 laps  Fwd middle setting in 7.5lb AW 4 laps   Fwd middle setting in 7.5lb AW 4 laps Distance walking negotiating around obstacles, 140' x2, focus on Left LE ER  Fwd highest setting 7.5lb AW 4 laps      Fwd highest setting no  "resistance 4 laps in //  Fwd highest setting no resistance 4 laps in //    Sit <> stands             1 min on:2 min off 2 rounds         Compliant surface                     Tests and measures   ABC, MARIN, 6MWT, 5xSTS   ABC, 6MWT, 5xSTS, TUG, 10mWT           ABC, 6MWT, 5xSTS, TUG, 10mWT    Side stepping        In // 7.5 lb AW 4x down and back In ll bars light UE 4 laps x2  In // 7.5lb AW 4x down and back //bar #5 BAW, 1 min on: 2 off, 3 rounds // bar #5 BAW 8x down and back        marching        In// 7.5lb AW 4x down and back In ll bars focused on LE facing forward 4 laps x2   //bar #5 BAW    3 rounds 1 min on: 2 min off,  // bar #5 BAW, 10x.                                                   Ther Ex                     STS  2x10 foam on chair  4x6 1 foam on chair + 10lb weighted vest without UE support 3x10 1 foam on chair + 10lb weighted vest without UE support    Off raised step 5 without UE Off low mat + foam square 10x each      3x10 foam weighted vest without UE support 1 foam on chair  3x10 foam weighted vest without UE support 1x5 foam + weighted vest     Standing hip abduction                     Seated marching                                          Mini Squats     3x5 with UE support in // with 10lb weighted vest  3x5 with UE support in with 10lb weighted vest.  1x10 with UE support with 10lb weighted vest   1x10 with UE support with in //  2x10 with UE support //bar  2x10 with UE support // bar  2x10 with UE support // bar   2x10 with UE support in //    Step ups  //bar up and over 6\" 10x             // bar up and over 8' step 10x B UE support // bar up and over 8' step 10x B UE support      LAQ   3x5 + pacing 15lb AW          3x5 + pacing 15lb AW     3x5 + pacing 15lb AW   bridge          2x8           Heel raises        In // 7.5lb AW 2x10           In // 7.5lb AW 2x10                        Ther Activity                     [facility]                [facility]     [facility] " transfer                With eddiearetha roperkorin     Psoriasis--wound care    Application of stockinette to protect wound.                  monitoring                Patient educatio, vitals ensuring stable vital signs.      Gait Training                     With RW    5 rounds interval training 1 min fast walking 30s normal walking pace 5 rounds interval training 1 min fast walking 30s normal walking pace.   5 rounds interval training 1 min fast walking 30s normal pace 2lb weights  6 rounds interval training 1 min fast walking 30s normal pace 3lb AW antonieta 2 laps with increased pacing 6 rounds interval training 1 min fast walking 30s normal pace 3lb AW antonieta 6 rounds interval training 1 min fast walking 30s normal pace 5lb AW antonieta  6 rounds interval training 1 min fast walking 30s normal pace 5lb AW antonieta  6 rounds interval training 1 min fast walking 30s normal pace 5lb AW antonieta 6 rounds interval training 1 min fast walking 30s normal pace 5lb AW antonieta 1 rounds of walking at fast speed however pt felt lightheaded upon completion of task.      Unilateral UE parallel bar   4x laps                    Modalities

## 2024-07-10 ENCOUNTER — HOSPITAL ENCOUNTER (OUTPATIENT)
Dept: INFUSION CENTER | Facility: HOSPITAL | Age: 49
Discharge: HOME | End: 2024-07-10
Attending: PSYCHIATRY & NEUROLOGY
Payer: MEDICARE

## 2024-07-10 VITALS
SYSTOLIC BLOOD PRESSURE: 142 MMHG | TEMPERATURE: 97 F | OXYGEN SATURATION: 97 % | DIASTOLIC BLOOD PRESSURE: 90 MMHG | HEART RATE: 77 BPM | RESPIRATION RATE: 16 BRPM

## 2024-07-10 DIAGNOSIS — G35 MS (MULTIPLE SCLEROSIS) (HCC): Primary | ICD-10-CM

## 2024-07-10 RX ORDER — SODIUM CHLORIDE 9 MG/ML
20 INJECTION, SOLUTION INTRAVENOUS ONCE
Status: COMPLETED | OUTPATIENT
Start: 2024-07-10 | End: 2024-07-10

## 2024-07-10 RX ORDER — ACETAMINOPHEN 325 MG/1
650 TABLET ORAL ONCE
Status: COMPLETED | OUTPATIENT
Start: 2024-07-10 | End: 2024-07-10

## 2024-07-10 RX ORDER — ACETAMINOPHEN 325 MG/1
650 TABLET ORAL ONCE
OUTPATIENT
Start: 2024-12-30

## 2024-07-10 RX ORDER — SODIUM CHLORIDE 9 MG/ML
20 INJECTION, SOLUTION INTRAVENOUS ONCE
OUTPATIENT
Start: 2024-12-30

## 2024-07-10 RX ADMIN — OCRELIZUMAB 600 MG: 300 INJECTION INTRAVENOUS at 10:19

## 2024-07-10 RX ADMIN — FAMOTIDINE 20 MG: 10 INJECTION INTRAVENOUS at 08:29

## 2024-07-10 RX ADMIN — SODIUM CHLORIDE 100 MG: 0.9 INJECTION, SOLUTION INTRAVENOUS at 09:21

## 2024-07-10 RX ADMIN — ACETAMINOPHEN 650 MG: 325 TABLET ORAL at 08:26

## 2024-07-10 RX ADMIN — SODIUM CHLORIDE 20 ML/HR: 0.9 INJECTION, SOLUTION INTRAVENOUS at 08:26

## 2024-07-10 RX ADMIN — DIPHENHYDRAMINE HYDROCHLORIDE 50 MG: 50 INJECTION, SOLUTION INTRAMUSCULAR; INTRAVENOUS at 08:53

## 2024-07-10 NOTE — PROGRESS NOTES
Carlos Landis  tolerated treatment well with no complications.      Carlos Landis is aware of future appt on 1/6/2024 at 0800.     AVS printed and given to Carlos Landis:  No (Declined by Carlos Landis)

## 2024-07-11 NOTE — PROGRESS NOTES
Daily Note     Today's date: 2024  Patient name: Carlos Landis  : 1975  MRN: [id number]  Referring provider: Kathy Gregory PA-C  Dx:   Encounter Diagnosis     ICD-10-CM    1. MS (multiple sclerosis) (HCC)  G35       2. Unsteadiness on feet  R26.81                 Start Time: 1150  Stop Time: 1230  Total time in clinic (min): 40 minutes    Subjective: Pt states that he is feeling good today since he got his infusion yesterday.        Objective: See treatment diary below      Assessment: Pt participated in a skilled PT session addressing balance & gait deficits.  Increased exercises today due to increased energy from infusion. Improved success with side stepping today however still required cueing for foot positioning. Tolerated full STS today.  Pt  will continue to benefit from skilled PT so that he can navigate his environment with increased safety and stability.     Plan: Continue per plan of care.      Precautions: MS, r sided weakness, frequent headaches, history or R ACLR, falls risk  POC expires: 2024      Tests and measures 3/27 4/3  4/17 5/14 5/21 5/24  5/28 5/31 6 6   ABC 33.13%  39.38%   44.38%           51.25%     MARIN                       6MWT   190 feet 3 mins 32 secs 407ft    504.4ft           544.44ft     5xSTS 34.54 w UE support  30.91   36.60s           26.23s     10mWT 0.33m/s w RW     0.61s w RW                BP                151/94mmHg HR: 94bpm      TUG 38.80s w RW     35.15s                Neuro Re-Ed                      Victorino navigation     Fwd middle setting in 7.5lb AW 4 laps  Fwd middle setting in 7.5lb AW 4 laps   Fwd middle setting in 7.5lb AW 4 laps Distance walking negotiating around obstacles, 140' x2, focus on Left LE ER  Fwd highest setting 7.5lb AW 4 laps      Fwd highest setting no resistance 4 laps in //  Fwd highest setting no resistance 4 laps in //  Fwd highest setting no resistance 4 laps in //    Sit  "<> stands             1 min on:2 min off 2 rounds          Compliant surface                      Tests and measures   ABC, MARIN, 6MWT, 5xSTS   ABC, 6MWT, 5xSTS, TUG, 10mWT           ABC, 6MWT, 5xSTS, TUG, 10mWT     Side stepping        In // 7.5 lb AW 4x down and back In ll bars light UE 4 laps x2  In // 7.5lb AW 4x down and back //bar #5 BAW, 1 min on: 2 off, 3 rounds // bar #5 BAW 8x down and back      // bar #5 AW 8x down and back   marching        In// 7.5lb AW 4x down and back In ll bars focused on LE facing forward 4 laps x2   //bar #5 BAW    3 rounds 1 min on: 2 min off,  // bar #5 BAW, 10x.                                                      Ther Ex                      STS  2x10 foam on chair  4x6 1 foam on chair + 10lb weighted vest without UE support 3x10 1 foam on chair + 10lb weighted vest without UE support    Off raised step 5 without UE Off low mat + foam square 10x each      3x10 foam weighted vest without UE support 1 foam on chair  3x10 foam weighted vest without UE support 1x5 foam + weighted vest   2x10 foam + weighted vest   Standing hip abduction                      Seated marching                                            Mini Squats     3x5 with UE support in // with 10lb weighted vest  3x5 with UE support in with 10lb weighted vest.  1x10 with UE support with 10lb weighted vest   1x10 with UE support with in //  2x10 with UE support //bar  2x10 with UE support // bar  2x10 with UE support // bar   2x10 with UE support in //     Step ups  //bar up and over 6\" 10x             // bar up and over 8' step 10x B UE support // bar up and over 8' step 10x B UE support       LAQ   3x5 + pacing 15lb AW          3x5 + pacing 15lb AW     3x5 + pacing 15lb AW    bridge          2x8            Heel raises        In // 7.5lb AW 2x10           In // 7.5lb AW 2x10                          Ther Activity                      Stand pivot transfer                With [facility]      Chair to bed " transfer                With eddiearetha roperkorin      Psoriasis--wound care    Application of stockinette to protect wound.                   monitoring                Patient educatio, vitals ensuring stable vital signs.       Gait Training                      With RW    5 rounds interval training 1 min fast walking 30s normal walking pace 5 rounds interval training 1 min fast walking 30s normal walking pace.   5 rounds interval training 1 min fast walking 30s normal pace 2lb weights  6 rounds interval training 1 min fast walking 30s normal pace 3lb AW antonieta 2 laps with increased pacing 6 rounds interval training 1 min fast walking 30s normal pace 3lb AW antonieta 6 rounds interval training 1 min fast walking 30s normal pace 5lb AW antonieta  6 rounds interval training 1 min fast walking 30s normal pace 5lb AW antonieta  6 rounds interval training 1 min fast walking 30s normal pace 5lb AW antonieta 6 rounds interval training 1 min fast walking 30s normal pace 5lb AW antonieta 1 rounds of walking at fast speed however pt felt lightheaded upon completion of task.    6 rounds of walking at fast speedfor 1 min, 30s slow, 5lb AW antonieta. RW   Unilateral UE parallel bar   4x laps                     Modalities

## 2024-07-12 ENCOUNTER — APPOINTMENT (OUTPATIENT)
Facility: CLINIC | Age: 49
End: 2024-07-12
Payer: MEDICARE

## 2024-07-12 ENCOUNTER — OFFICE VISIT (OUTPATIENT)
Facility: CLINIC | Age: 49
End: 2024-07-12
Payer: MEDICARE

## 2024-07-12 DIAGNOSIS — R26.81 UNSTEADINESS ON FEET: ICD-10-CM

## 2024-07-12 DIAGNOSIS — G35 MS (MULTIPLE SCLEROSIS) (HCC): Primary | ICD-10-CM

## 2024-07-12 PROCEDURE — 97116 GAIT TRAINING THERAPY: CPT

## 2024-07-12 PROCEDURE — 97112 NEUROMUSCULAR REEDUCATION: CPT

## 2024-07-15 ENCOUNTER — APPOINTMENT (OUTPATIENT)
Dept: MRI IMAGING | Facility: HOSPITAL | Age: 49
End: 2024-07-15
Payer: MEDICARE

## 2024-07-15 ENCOUNTER — HOSPITAL ENCOUNTER (OUTPATIENT)
Facility: HOSPITAL | Age: 49
Setting detail: OBSERVATION
Discharge: HOME | End: 2024-07-16
Attending: EMERGENCY MEDICINE | Admitting: HOSPITALIST
Payer: MEDICARE

## 2024-07-15 ENCOUNTER — APPOINTMENT (EMERGENCY)
Dept: CT IMAGING | Facility: HOSPITAL | Age: 49
End: 2024-07-15
Payer: MEDICARE

## 2024-07-15 DIAGNOSIS — G35 MS (MULTIPLE SCLEROSIS) (HCC): Primary | ICD-10-CM

## 2024-07-15 DIAGNOSIS — G45.9 TIA (TRANSIENT ISCHEMIC ATTACK): ICD-10-CM

## 2024-07-15 PROBLEM — R29.90 STROKE-LIKE SYMPTOMS: Status: ACTIVE | Noted: 2023-12-06

## 2024-07-15 LAB
ANION GAP SERPL CALCULATED.3IONS-SCNC: 8 MMOL/L (ref 4–13)
ATRIAL RATE: 74 BPM
BASOPHILS # BLD AUTO: 0.08 THOUSANDS/ÂΜL (ref 0–0.1)
BASOPHILS NFR BLD AUTO: 1 % (ref 0–1)
BUN SERPL-MCNC: 15 MG/DL (ref 5–25)
CALCIUM SERPL-MCNC: 9.2 MG/DL (ref 8.4–10.2)
CARDIAC TROPONIN I PNL SERPL HS: <2 NG/L
CARDIAC TROPONIN I PNL SERPL HS: <2 NG/L
CHLORIDE SERPL-SCNC: 105 MMOL/L (ref 96–108)
CO2 SERPL-SCNC: 26 MMOL/L (ref 21–32)
CREAT SERPL-MCNC: 0.86 MG/DL (ref 0.6–1.3)
EOSINOPHIL # BLD AUTO: 0.35 THOUSAND/ÂΜL (ref 0–0.61)
EOSINOPHIL NFR BLD AUTO: 5 % (ref 0–6)
ERYTHROCYTE [DISTWIDTH] IN BLOOD BY AUTOMATED COUNT: 13 % (ref 11.6–15.1)
FLUAV RNA RESP QL NAA+PROBE: NEGATIVE
FLUBV RNA RESP QL NAA+PROBE: NEGATIVE
GFR SERPL CREATININE-BSD FRML MDRD: 100 ML/MIN/1.73SQ M
GLUCOSE SERPL-MCNC: 86 MG/DL (ref 65–140)
GLUCOSE SERPL-MCNC: 94 MG/DL (ref 65–140)
HCT VFR BLD AUTO: 42.8 % (ref 36.5–49.3)
HGB BLD-MCNC: 14.1 G/DL (ref 12–17)
IMM GRANULOCYTES # BLD AUTO: 0.1 THOUSAND/UL (ref 0–0.2)
IMM GRANULOCYTES NFR BLD AUTO: 1 % (ref 0–2)
LYMPHOCYTES # BLD AUTO: 1.18 THOUSANDS/ÂΜL (ref 0.6–4.47)
LYMPHOCYTES NFR BLD AUTO: 15 % (ref 14–44)
MCH RBC QN AUTO: 29.2 PG (ref 26.8–34.3)
MCHC RBC AUTO-ENTMCNC: 32.9 G/DL (ref 31.4–37.4)
MCV RBC AUTO: 89 FL (ref 82–98)
MONOCYTES # BLD AUTO: 0.6 THOUSAND/ÂΜL (ref 0.17–1.22)
MONOCYTES NFR BLD AUTO: 8 % (ref 4–12)
NEUTROPHILS # BLD AUTO: 5.35 THOUSANDS/ÂΜL (ref 1.85–7.62)
NEUTS SEG NFR BLD AUTO: 70 % (ref 43–75)
NRBC BLD AUTO-RTO: 0 /100 WBCS
P AXIS: 53 DEGREES
PLATELET # BLD AUTO: 215 THOUSANDS/UL (ref 149–390)
PMV BLD AUTO: 10 FL (ref 8.9–12.7)
POTASSIUM SERPL-SCNC: 4 MMOL/L (ref 3.5–5.3)
PR INTERVAL: 180 MS
QRS AXIS: 47 DEGREES
QRSD INTERVAL: 94 MS
QT INTERVAL: 414 MS
QTC INTERVAL: 459 MS
RBC # BLD AUTO: 4.83 MILLION/UL (ref 3.88–5.62)
RSV RNA RESP QL NAA+PROBE: NEGATIVE
SARS-COV-2 RNA RESP QL NAA+PROBE: NEGATIVE
SODIUM SERPL-SCNC: 139 MMOL/L (ref 135–147)
T WAVE AXIS: 55 DEGREES
VENTRICULAR RATE: 74 BPM
WBC # BLD AUTO: 7.66 THOUSAND/UL (ref 4.31–10.16)

## 2024-07-15 PROCEDURE — 70498 CT ANGIOGRAPHY NECK: CPT

## 2024-07-15 PROCEDURE — 96360 HYDRATION IV INFUSION INIT: CPT

## 2024-07-15 PROCEDURE — 70496 CT ANGIOGRAPHY HEAD: CPT

## 2024-07-15 PROCEDURE — 80048 BASIC METABOLIC PNL TOTAL CA: CPT | Performed by: EMERGENCY MEDICINE

## 2024-07-15 PROCEDURE — 99285 EMERGENCY DEPT VISIT HI MDM: CPT | Performed by: EMERGENCY MEDICINE

## 2024-07-15 PROCEDURE — 36415 COLL VENOUS BLD VENIPUNCTURE: CPT | Performed by: EMERGENCY MEDICINE

## 2024-07-15 PROCEDURE — 70553 MRI BRAIN STEM W/O & W/DYE: CPT

## 2024-07-15 PROCEDURE — A9585 GADOBUTROL INJECTION: HCPCS | Performed by: RADIOLOGY

## 2024-07-15 PROCEDURE — 82948 REAGENT STRIP/BLOOD GLUCOSE: CPT

## 2024-07-15 PROCEDURE — 99285 EMERGENCY DEPT VISIT HI MDM: CPT

## 2024-07-15 PROCEDURE — 93005 ELECTROCARDIOGRAM TRACING: CPT

## 2024-07-15 PROCEDURE — 84484 ASSAY OF TROPONIN QUANT: CPT | Performed by: EMERGENCY MEDICINE

## 2024-07-15 PROCEDURE — 85025 COMPLETE CBC W/AUTO DIFF WBC: CPT | Performed by: EMERGENCY MEDICINE

## 2024-07-15 PROCEDURE — 93010 ELECTROCARDIOGRAM REPORT: CPT | Performed by: INTERNAL MEDICINE

## 2024-07-15 PROCEDURE — 99223 1ST HOSP IP/OBS HIGH 75: CPT | Performed by: HOSPITALIST

## 2024-07-15 PROCEDURE — 99215 OFFICE O/P EST HI 40 MIN: CPT | Performed by: PSYCHIATRY & NEUROLOGY

## 2024-07-15 PROCEDURE — 0241U HB NFCT DS VIR RESP RNA 4 TRGT: CPT | Performed by: PSYCHIATRY & NEUROLOGY

## 2024-07-15 RX ORDER — ATORVASTATIN CALCIUM 40 MG/1
40 TABLET, FILM COATED ORAL EVERY EVENING
Status: DISCONTINUED | OUTPATIENT
Start: 2024-07-15 | End: 2024-07-16 | Stop reason: HOSPADM

## 2024-07-15 RX ORDER — ASPIRIN 81 MG/1
81 TABLET, CHEWABLE ORAL DAILY
Status: DISCONTINUED | OUTPATIENT
Start: 2024-07-16 | End: 2024-07-16 | Stop reason: HOSPADM

## 2024-07-15 RX ORDER — ENOXAPARIN SODIUM 100 MG/ML
40 INJECTION SUBCUTANEOUS DAILY
Status: DISCONTINUED | OUTPATIENT
Start: 2024-07-15 | End: 2024-07-16 | Stop reason: HOSPADM

## 2024-07-15 RX ORDER — GADOBUTROL 604.72 MG/ML
10 INJECTION INTRAVENOUS
Status: COMPLETED | OUTPATIENT
Start: 2024-07-15 | End: 2024-07-15

## 2024-07-15 RX ORDER — ACETAMINOPHEN 325 MG/1
650 TABLET ORAL EVERY 4 HOURS PRN
Status: DISCONTINUED | OUTPATIENT
Start: 2024-07-15 | End: 2024-07-16 | Stop reason: HOSPADM

## 2024-07-15 RX ORDER — ASPIRIN 325 MG
325 TABLET ORAL ONCE
Status: COMPLETED | OUTPATIENT
Start: 2024-07-15 | End: 2024-07-15

## 2024-07-15 RX ADMIN — ASPIRIN 325 MG ORAL TABLET 325 MG: 325 PILL ORAL at 14:28

## 2024-07-15 RX ADMIN — ATORVASTATIN CALCIUM 40 MG: 40 TABLET, FILM COATED ORAL at 18:03

## 2024-07-15 RX ADMIN — SODIUM CHLORIDE 500 ML: 0.9 INJECTION, SOLUTION INTRAVENOUS at 11:21

## 2024-07-15 RX ADMIN — ENOXAPARIN SODIUM 40 MG: 40 INJECTION SUBCUTANEOUS at 15:48

## 2024-07-15 RX ADMIN — IOHEXOL 85 ML: 350 INJECTION, SOLUTION INTRAVENOUS at 11:51

## 2024-07-15 RX ADMIN — GADOBUTROL 10 ML: 604.72 INJECTION INTRAVENOUS at 17:37

## 2024-07-15 RX ADMIN — ACETAMINOPHEN 650 MG: 325 TABLET, FILM COATED ORAL at 18:04

## 2024-07-15 NOTE — ASSESSMENT & PLAN NOTE
Carlos Landis is a 49 y.o. male with MS on Ocrevus infusions and monthly Solumedrol infusions, scoliosis, DAVID not on CPAP.    Recently seen by neuro-hospitalist team:  July 2nd-3rd: seen for fluctuating left facial numbness, headache, right upper extremity coordination difficulty and increased speech difficulty.  MRI brain during that time did not demonstrate any new lesions/enhancement.  Was felt to be pseudo-exacerbation vs fluctuation of MS symptoms in the setting of ongoing life stressors.     July 10th: Received Ocrevus infusion (concurrently with Solu-Medrol infusion).    July 15th: Presents with right frontal headache, speech difficulty, left facial numbness and left sided weakness since last night.  Also lost taste before symptoms began. Left facial symptoms subsequently spread to the R side of his face.    Workup:  -CTA head/neck: no acute intracranial pathology, no vascular abnormalities (no LVO nor critical stenosis/flow restrictive disease)  - MRI brain w wo: Unremarkable for acute intracranial pathology or evidence of new enhancing active demyelinating lesions  - Hemoglobin A1c: 5.1  - Lipid panel: Total cholesterol 127, triglycerides elevated 164, HDL 29, LDL 65    Unlikely true MS flare given negative MRI brain and recent Ocrevus/steroid treatment.  Lower suspicion for TIA given similar clinical presentation during recent admission, and return of symptoms today 7/16.  Concern for anxiety component/MS pseudo flare    Plan:  - No need for echocardiogram from a neurology standpoint given negative MRI brain  - S/p aspirin 325 mg x1  - Continue aspirin 81 mg daily on discharge  - Lipitor 40 mg daily   - Telemetry monitoring  - No need for IV pulse steroids at this juncture  - Supportive care  - Therapy evaluations  - Monitoring for any metabolic/infectious derangements  - Will continue to follow up with MS team as OP (with monthly Ocrevus/Solumedrol)    No further inpatient recommendations at this time,  call with questions

## 2024-07-15 NOTE — CONSULTS
Consultation - Neurology   Carlos DESTIN Parkercori 49 y.o. male MRN: [id number]  Unit/Bed#: Z2 H1 Encounter: [id number]      Assessment & Plan     * Stroke-like symptoms  Assessment & Plan  49-year-old male with MS on Ocrevus infusions and monthly Solumedrol infusions, scoliosis, DAVID not on CPAP.    Recently seen by neuro-hospitalist team:  July 2nd-3rd: seen for fluctuating left facial numbness, headache, right upper extremity coordination difficulty and increased speech difficulty.  MRI brain during that time did not demonstrate any new lesions/enhancement.  Was felt to be pseudo-exacerbation vs fluctuation of MS symptoms in the setting of ongoing life stressors.     July 10th: Received Ocrevus infusion (concurrently with Solu-Medrol infusion).    Today, July 15th: Presents with right frontal headache, speech difficulty, left facial numbness and left sided weakness since last night.  Also lost taste before symptoms began. Left facial symptoms subsequently spread to the R side of his face.    Given semiology of symptoms does not match usual/prior MS symptoms (and given patient just received Ocrevus/steroid treatment 5 days ago), do not suspect new MS lesion/active demyelination, but rather will rule out CVA (was on aspirin 325 mg up until 2 weeks ago before self-discontinuing).     Neuroimaging:  -CTA head/neck: no acute intracranial pathology, no vascular abnormalities (no LVO nor critical stenosis/flow restrictive disease)    Plan:  -Will recommend admission under observation with stroke pathway orders:  -MRI brain with/without contrast  -2D echocardiogram  -Load with aspirin 325 mg x1, continue aspirin beginning tomorrow  -Lipitor 40 mg daily   -Check hemoglobin A1C and lipid panel  -Neuro checks  -Telemetry monitoring  -Provide stroke education  -No need for IV pulse steroids at this juncture  -Supportive care  -Therapy evaluations  -Monitoring for any metabolic/infectious derangements  -Will continue to follow up with MS  team as OP (with monthly Ocrevus/Solumedrol)      Discussed plan of care with attending neurologist.     Follows with  MS team, next follow up scheduled for October 24th.    History of Present Illness     Reason for Consult / Principal Problem: History of MS, recurrence of symptoms     HPI: Carlos Landis is a 49 y.o. male with history as mentioned above in assessment who neurology is asked to evaluate in regards to the above.     See above, patient follows with Dr. Hernandez/MS team, seen last in office in May 2024; at that time was advised on continue on monthly Solumderol in addition to Ocrevus.     Was just seen by neuro-hospitalist team two weeks ago for the above symptoms (L facial numbness, R arm coordination trouble, slurred speech, headache. MRI brain was negative for acute pathology/lesions/enhancement, was felt to be pseudoexacerbation vs ongoing fluctuation of symptoms.     Discussed with patient today in ED: underwent his usual Solumedrol/Ocrevus infusion last week without issue; then developed L sided weakness last night around dinner, and L sided facial numbness (which has since spread today to the R side of his face). Notes worsened slurred speech and word finding trouble compared to normal. Noted headache earlier during onset of symptoms. Tolerated infusion well last week.     Consult to neurology  Consult performed by: Gary Becker PA-C  Consult ordered by: Price Dick DO          Review of Systems   Constitutional:  Positive for fatigue. Negative for diaphoresis, fever and unexpected weight change.   HENT: Negative.     Eyes:  Negative for photophobia and visual disturbance.   Respiratory: Negative.     Cardiovascular: Negative.    Gastrointestinal: Negative.    Musculoskeletal: Negative.    Skin: Negative.    Neurological:  Positive for dizziness, speech difficulty, weakness and headaches. Negative for tremors, seizures, syncope, facial asymmetry, light-headedness and numbness.  "      Historical Information   Past Medical History:   Diagnosis Date    Arthritis     Diabetes mellitus (HCC)     prediabetes    MS (multiple sclerosis) (HCC)     Scoliosis     Visual impairment      Past Surgical History:   Procedure Laterality Date    HERNIA REPAIR      3 times    KNEE SURGERY Right      Social History   Social History     Substance and Sexual Activity   Alcohol Use Not Currently     Social History     Substance and Sexual Activity   Drug Use Never     E-Cigarette/Vaping    E-Cigarette Use Former User      E-Cigarette/Vaping Substances    Nicotine No     THC No     CBD No     Flavoring No     Other No     Unknown No      Social History     Tobacco Use   Smoking Status Former   Smokeless Tobacco Former     Family History:   Family History   Problem Relation Age of Onset    Stroke Maternal Grandmother     Multiple sclerosis Other        Review of previous medical records was completed.    Meds/Allergies   current meds:   No current facility-administered medications for this encounter.    and PTA meds:   Prior to Admission Medications   Prescriptions Last Dose Informant Patient Reported? Taking?   MAGNESIUM PO  Self Yes No   Sig: Take by mouth   Riboflavin ([employer] Vitamin B-2) 100 MG TABS  Self No No   Sig: Take 2 tablets (200 mg total) by mouth in the morning   SYRINGE-NEEDLE, DISP, 3 ML 25G X 1\" 3 ML MISC  Self No No   Sig: Take with B12 regimen prescribed   aspirin 325 mg tablet  Self No No   Sig: Take 1 tablet (325 mg total) by mouth daily   cyanocobalamin 1,000 mcg/mL  Self No No   Sig: Take B12 1000 mcg IM once a week for 4 weeks, then once a month for 5 months   ocrelizumab (Ocrevus) 300 MG/10ML SOLN  Self No No   Sig: Inject 20 mL (600 mg total) into a catheter in a vein every 6 (six) months      Facility-Administered Medications: None       No Known Allergies    Objective   Vitals:Blood pressure (!) 167/104, pulse 69, temperature 98.7 °F (37.1 °C), temperature source Temporal, resp. rate 17, " SpO2 94%.,There is no height or weight on file to calculate BMI.  No intake or output data in the 24 hours ending 07/15/24 1147    Invasive Devices:   Invasive Devices       Peripheral Intravenous Line  Duration             Peripheral IV 07/15/24 Left Antecubital <1 day                    Physical Exam  Constitutional:       Appearance: Normal appearance.   HENT:      Head: Normocephalic and atraumatic.   Eyes:      Extraocular Movements: Extraocular movements intact and EOM normal.      Conjunctiva/sclera: Conjunctivae normal.      Pupils: Pupils are equal, round, and reactive to light.   Cardiovascular:      Rate and Rhythm: Normal rate.   Musculoskeletal:      Cervical back: Normal range of motion and neck supple.   Skin:     General: Skin is warm and dry.   Neurological:      Mental Status: He is alert and oriented to person, place, and time.      Coordination: Finger-Nose-Finger Test and Heel to Shin Test normal.   Psychiatric:         Speech: Speech normal.       Neurologic Exam     Mental Status   Oriented to person, place, and time.   Attention: normal. Concentration: normal.   Speech: speech is normal   Few bouts of word finding trouble during conversation, but largely without aphasia/dysarthria, following commands. Oriented.      Cranial Nerves     CN II   Visual fields full to confrontation.     CN III, IV, VI   Pupils are equal, round, and reactive to light.  Extraocular motions are normal.     CN V   Right facial sensation deficit: Subjective diminished R facial sensation to light touch.    CN VII   Right facial weakness: Minimal R facial asymmetry.    CN VIII   CN VIII normal.     CN IX, X   CN IX normal.   CN X normal.     CN XI   CN XI normal.     CN XII   CN XII normal.     Motor Exam   Muscle bulk: normal  Overall muscle tone: normalJust mild weakness in R UE and LE (approx. 4+/5) compared to L UE and LE.      Sensory Exam   Subjectively sharper R pinprick sensation compared to L.      Gait,  Coordination, and Reflexes     Coordination   Finger to nose coordination: normal  Heel to shin coordination: normal    Tremor   Resting tremor: absent  Intention tremor: absent  Brisk DTR's, no ankle clonus. Gait deferred.        Lab Results: CBC:   Results from last 7 days   Lab Units 07/15/24  1119   WBC Thousand/uL 7.66   RBC Million/uL 4.83   HEMOGLOBIN g/dL 14.1   HEMATOCRIT % 42.8   MCV fL 89   PLATELETS Thousands/uL 215   , BMP/CMP:   Results from last 7 days   Lab Units 07/15/24  1119   SODIUM mmol/L 139   POTASSIUM mmol/L 4.0   CHLORIDE mmol/L 105   CO2 mmol/L 26   BUN mg/dL 15   CREATININE mg/dL 0.86   CALCIUM mg/dL 9.2   EGFR ml/min/1.73sq m 100     Imaging Studies: I have personally reviewed pertinent films in PACS  CTA head and neck with and without contrast   Final Result by Gilberto Gallagher MD (07/15 1349)   Addendum (preliminary) 1 of 1 by Gilberto Gallagher MD (07/15 1349)   ADDENDUM:      The finding noted in the body however not included in the impression:   -Asymmetric effacement of the left vallecula without definite underlying    enhancing lesion. Recommend correlation with direct visualization.      I personally communicated the findings via telephone with Price Kapil    at 1:49 p.m. on 7/15/2024.      Final      CT head:   -No acute vascular territory infarct, intracranial hemorrhage or mass    effect.   -Redemonstrated white matter disease, likely due to underlying multiple    sclerosis.      CTA head:   -No large vessel occlusion, high-grade stenosis or aneurysm.      CTA neck:   -No high-grade stenosis, dissection or aneurysm.            Workstation performed: [id number]             EKG, Pathology, and Other Studies: I have personally reviewed pertinent reports.      VTE Prophylaxis: None yet, in ED    Code Status: Prior    Please see attending's attestation for total time spent/billing. Discussed plan of care with patient and ED provider: admit under obs for MRI/stroke workup, resume aspirin, no  need for pulse steroids as he received infusion last week, supportive care, therapy evaluations.     Please note dictation software was used in the formulation of this note. Please keep that in mind in light of any grammatical errors.

## 2024-07-15 NOTE — ASSESSMENT & PLAN NOTE
Follows with neurology as outpatient  Receives Solu-Medrol/Ocrevus infusions most recently on July 10th  Hold off on IV steroids at this time due to low suspicion for new MS lesions/active demyelination per neurology

## 2024-07-15 NOTE — ED PROVIDER NOTES
"History  Chief Complaint   Patient presents with    Weakness - Generalized     Pt coming from home for MS flare. Pt reports feeling weaker than normal and HA     50 yo M with h/o MS on ocrevus/solumedrol. His MS flares are manifested as right hemiparesis and right-sided paresthesia. He complains of right frontal headache, slurred speech, numbness of left side of face and weakness of left arm since around dinnertime last night.  He also noted loss of taste just before the symptoms began.  He says this is not the same as his normal MS symptoms although occasionally he gets numbness of left side of face with his right-sided symptoms.  He states his right frontal headache is also worse than usual and did not resolve with ibuprofen as he normally does.  Denies recent injury, illness, fever, chest pain, vomiting or diarrhea.  Does not feel dehydrated.  Was admitted July 2, 2024 for his typical right-sided symptoms as well is left-sided facial numbness.  MRI brain showed no new lesions.        Prior to Admission Medications   Prescriptions Last Dose Informant Patient Reported? Taking?   MAGNESIUM PO  Self Yes No   Sig: Take by mouth   Riboflavin ([employer] Vitamin B-2) 100 MG TABS  Self No No   Sig: Take 2 tablets (200 mg total) by mouth in the morning   SYRINGE-NEEDLE, DISP, 3 ML 25G X 1\" 3 ML MISC  Self No No   Sig: Take with B12 regimen prescribed   aspirin 325 mg tablet  Self No No   Sig: Take 1 tablet (325 mg total) by mouth daily   cyanocobalamin 1,000 mcg/mL  Self No No   Sig: Take B12 1000 mcg IM once a week for 4 weeks, then once a month for 5 months   ocrelizumab (Ocrevus) 300 MG/10ML SOLN  Self No No   Sig: Inject 20 mL (600 mg total) into a catheter in a vein every 6 (six) months      Facility-Administered Medications: None       Past Medical History:   Diagnosis Date    Arthritis     Diabetes mellitus (HCC)     prediabetes    MS (multiple sclerosis) (HCC)     Scoliosis     Visual impairment        Past Surgical " History:   Procedure Laterality Date    HERNIA REPAIR      3 times    KNEE SURGERY Right        Family History   Problem Relation Age of Onset    Stroke Maternal Grandmother     Multiple sclerosis Other      I have reviewed and agree with the history as documented.    E-Cigarette/Vaping    E-Cigarette Use Former User      E-Cigarette/Vaping Substances    Nicotine No     THC No     CBD No     Flavoring No     Other No     Unknown No      Social History     Tobacco Use    Smoking status: Former    Smokeless tobacco: Former   Vaping Use    Vaping status: Former   Substance Use Topics    Alcohol use: Not Currently    Drug use: Never       Review of Systems   Constitutional:  Negative for fever.   Respiratory:  Negative for cough and shortness of breath.    Cardiovascular:  Negative for chest pain and palpitations.   Neurological:  Positive for dizziness, speech difficulty, weakness, numbness and headaches. Negative for seizures and syncope.       Physical Exam  Physical Exam  Vitals and nursing note reviewed.   Constitutional:       General: He is not in acute distress.     Appearance: He is well-developed and normal weight. He is ill-appearing. He is not diaphoretic.   HENT:      Head: Normocephalic and atraumatic.      Right Ear: External ear normal.      Left Ear: External ear normal.      Mouth/Throat:      Mouth: Mucous membranes are moist.      Pharynx: Oropharynx is clear.      Comments: Unable to visualize tongue lesion noted on CT  Eyes:      General: No scleral icterus.     Conjunctiva/sclera: Conjunctivae normal.   Neck:      Vascular: No carotid bruit or JVD.   Cardiovascular:      Rate and Rhythm: Normal rate and regular rhythm.      Pulses: Normal pulses.      Heart sounds: Normal heart sounds.   Pulmonary:      Effort: Pulmonary effort is normal. No respiratory distress.      Breath sounds: Normal breath sounds.   Abdominal:      Palpations: Abdomen is soft. There is no mass.      Tenderness: There is no  abdominal tenderness.   Musculoskeletal:         General: No tenderness. Normal range of motion.      Cervical back: Neck supple.      Right lower leg: No edema.      Left lower leg: No edema.   Skin:     General: Skin is warm and dry.      Capillary Refill: Capillary refill takes less than 2 seconds.      Findings: No rash.   Neurological:      Mental Status: He is alert and oriented to person, place, and time.      Cranial Nerves: No cranial nerve deficit.      Deep Tendon Reflexes: Reflexes are normal and symmetric.      Comments: Alert and oriented x 3.    Slight ptosis at rest but otherwise facial nerve strength is equal.  Sensation to pinprick is equal.  Pupils equal and reactive, extraocular muscles intact.  Does have a little bit of drift with the right leg.  Very weak with the left upper extremity.  Sensation equal on extremities.  No visual field cut.   Psychiatric:         Mood and Affect: Mood normal.         Behavior: Behavior normal.         Vital Signs  ED Triage Vitals   Temperature Pulse Respirations Blood Pressure SpO2   07/15/24 1052 07/15/24 1100 07/15/24 1100 07/15/24 1100 07/15/24 1100   98.7 °F (37.1 °C) 71 17 (!) 155/104 94 %      Temp Source Heart Rate Source Patient Position - Orthostatic VS BP Location FiO2 (%)   07/15/24 1052 07/15/24 1100 07/15/24 1100 07/15/24 1100 --   Temporal Monitor Lying Right arm       Pain Score       07/15/24 1115       No Pain           Vitals:    07/15/24 1623 07/15/24 2112 07/16/24 0527 07/16/24 0944   BP: 135/87 128/81 120/82 120/87   Pulse: 68  85 104   Patient Position - Orthostatic VS:   Lying          Visual Acuity  Visual Acuity      Flowsheet Row Most Recent Value   L Pupil Size (mm) 3   R Pupil Size (mm) 3   L Pupil Shape Round   R Pupil Shape Round            ED Medications  Medications   atorvastatin (LIPITOR) tablet 40 mg (40 mg Oral Given 7/15/24 1803)   aspirin chewable tablet 81 mg (81 mg Oral Given 7/16/24 0851)   acetaminophen (TYLENOL) tablet  650 mg (650 mg Oral Given 7/16/24 0911)   enoxaparin (LOVENOX) subcutaneous injection 40 mg (40 mg Subcutaneous Given 7/16/24 0851)   sodium chloride 0.9 % bolus 500 mL (0 mL Intravenous Stopped 7/15/24 1221)   iohexol (OMNIPAQUE) 350 MG/ML injection (MULTI-DOSE) 85 mL (85 mL Intravenous Given 7/15/24 1151)   aspirin tablet 325 mg (325 mg Oral Given 7/15/24 1428)   Gadobutrol injection (SINGLE-DOSE) SOLN 10 mL (10 mL Intravenous Given 7/15/24 1737)       Diagnostic Studies  Results Reviewed       Procedure Component Value Units Date/Time    COVID/FLU/RSV [[id number]]  (Normal) Collected: 07/15/24 1337    Lab Status: Final result Specimen: Nares from Nose Updated: 07/15/24 1443     SARS-CoV-2 Negative     INFLUENZA A PCR Negative     INFLUENZA B PCR Negative     RSV PCR Negative    Narrative:      FOR PEDIATRIC PATIENTS - copy/paste COVID Guidelines URL to browser: [email or web address]    SARS-CoV-2 assay is a Nucleic Acid Amplification assay intended for the  qualitative detection of nucleic acid from SARS-CoV-2 in nasopharyngeal  swabs. Results are for the presumptive identification of SARS-CoV-2 RNA.    Positive results are indicative of infection with SARS-CoV-2, the virus  causing COVID-19, but do not rule out bacterial infection or co-infection  with other viruses. Laboratories within the [place] and its  territories are required to report all positive results to the appropriate  public health authorities. Negative results do not preclude SARS-CoV-2  infection and should not be used as the sole basis for treatment or other  patient management decisions. Negative results must be combined with  clinical observations, patient history, and epidemiological information.  This test has not been FDA cleared or approved.    This test has been authorized by FDA under an Emergency Use Authorization  (EUA). This test is only authorized for the duration of time  the  declaration that circumstances exist justifying the authorization of the  emergency use of an in vitro diagnostic tests for detection of SARS-CoV-2  virus and/or diagnosis of COVID-19 infection under section 564(b)(1) of  the Act, 21 U.S.C. 360bbb-3(b)(1), unless the authorization is terminated  or revoked sooner. The test has been validated but independent review by FDA  and CLIA is pending.    Test performed using [employer]: This RT-PCR assay targets N2,  a region unique to SARS-CoV-2. A conserved region in the E-gene was chosen  for pan-Sarbecovirus detection which includes SARS-CoV-2.    According to CMS-2020-01-R, this platform meets the definition of high-throughput technology.    HS Troponin I 2hr [[id number]] Collected: 07/15/24 1337    Lab Status: Final result Specimen: Blood from Arm, Left Updated: 07/15/24 1423     hs TnI 2hr <2 ng/L      Delta 2hr hsTnI --    HS Troponin I 4hr [[id number]]     Lab Status: No result Specimen: Blood     HS Troponin 0hr (reflex protocol) [[id number]]  (Normal) Collected: 07/15/24 1119    Lab Status: Final result Specimen: Blood from Arm, Left Updated: 07/15/24 1148     hs TnI 0hr <2 ng/L     Basic metabolic panel [[id number]] Collected: 07/15/24 1119    Lab Status: Final result Specimen: Blood from Arm, Left Updated: 07/15/24 1139     Sodium 139 mmol/L      Potassium 4.0 mmol/L      Chloride 105 mmol/L      CO2 26 mmol/L      ANION GAP 8 mmol/L      BUN 15 mg/dL      Creatinine 0.86 mg/dL      Glucose 86 mg/dL      Calcium 9.2 mg/dL      eGFR 100 ml/min/1.73sq m     Narrative:      National Kidney Disease Foundation guidelines for Chronic Kidney Disease (CKD):     Stage 1 with normal or high GFR (GFR > 90 mL/min/1.73 square meters)    Stage 2 Mild CKD (GFR = 60-89 mL/min/1.73 square meters)    Stage 3A Moderate CKD (GFR = 45-59 mL/min/1.73 square meters)    Stage 3B Moderate CKD (GFR = 30-44 mL/min/1.73 square meters)    Stage 4 Severe CKD (GFR = 15-29 mL/min/1.73  No square meters)    Stage 5 End Stage CKD (GFR <15 mL/min/1.73 square meters)  Note: GFR calculation is accurate only with a steady state creatinine    CBC and differential [[id number]] Collected: 07/15/24 1119    Lab Status: Final result Specimen: Blood from Arm, Left Updated: 07/15/24 1124     WBC 7.66 Thousand/uL      RBC 4.83 Million/uL      Hemoglobin 14.1 g/dL      Hematocrit 42.8 %      MCV 89 fL      MCH 29.2 pg      MCHC 32.9 g/dL      RDW 13.0 %      MPV 10.0 fL      Platelets 215 Thousands/uL      nRBC 0 /100 WBCs      Segmented % 70 %      Immature Grans % 1 %      Lymphocytes % 15 %      Monocytes % 8 %      Eosinophils Relative 5 %      Basophils Relative 1 %      Absolute Neutrophils 5.35 Thousands/µL      Absolute Immature Grans 0.10 Thousand/uL      Absolute Lymphocytes 1.18 Thousands/µL      Absolute Monocytes 0.60 Thousand/µL      Eosinophils Absolute 0.35 Thousand/µL      Basophils Absolute 0.08 Thousands/µL     Fingerstick Glucose (POCT) [[id number]]  (Normal) Collected: 07/15/24 1057    Lab Status: Final result Specimen: Blood Updated: 07/15/24 1058     POC Glucose 94 mg/dl                    MRI brain w wo contrast   Final Result by E. Alec Schoenberger, MD (07/16 0815)      No acute intracranial pathology.   Stable white matter changes in keeping with chronic demyelinating disease.      Workstation performed: [id number]         CTA head and neck with and without contrast   Final Result by Gilberto Gallagher MD (07/15 1349)   Addendum (preliminary) 1 of 1 by Gilberto Gallagher MD (07/15 1349)   ADDENDUM:      The finding noted in the body however not included in the impression:   -Asymmetric effacement of the left vallecula without definite underlying    enhancing lesion. Recommend correlation with direct visualization.      I personally communicated the findings via telephone with Price Dick    at 1:49 p.m. on 7/15/2024.      Final      CT head:   -No acute vascular territory infarct, intracranial  hemorrhage or mass    effect.   -Redemonstrated white matter disease, likely due to underlying multiple    sclerosis.      CTA head:   -No large vessel occlusion, high-grade stenosis or aneurysm.      CTA neck:   -No high-grade stenosis, dissection or aneurysm.            Workstation performed: [id number]                    Procedures  ECG 12 Lead Documentation Only    Date/Time: 7/15/2024 11:35 PM    Performed by: Price Dick DO  Authorized by: Price Dick DO    ECG reviewed by me, the ED Provider: yes    Patient location:  ED  Previous ECG:     Previous ECG:  Unavailable    Comparison to cardiac monitor: No    Interpretation:     Interpretation: normal             ED Course  ED Course as of 07/16/24 1535   Mon Jul 15, 2024   1124 Texted neurology.   1333 Patient now tells me the left arm strength is back to baseline.  He is moving his arm well.  He now says that the tingling it was in the left side of his face is now on the right side of his face.  Pinprick sensation is symmetric in both cheeks and along the mandible but he states there is diminished sharpness of pain on the right forehead compared to the left.  No other new complaints.  Texted neurology once again further input.  They have not seen him yet.                                 SBIRT 22yo+      Flowsheet Row Most Recent Value   Initial Alcohol Screen: US AUDIT-C     1. How often do you have a drink containing alcohol? 0 Filed at: 07/15/2024 1121   2. How many drinks containing alcohol do you have on a typical day you are drinking?  0 Filed at: 07/15/2024 1121   3a. Male UNDER 65: How often do you have five or more drinks on one occasion? 0 Filed at: 07/15/2024 1121   3b. FEMALE Any Age, or MALE 65+: How often do you have 4 or more drinks on one occassion? 0 Filed at: 07/15/2024 1121   Audit-C Score 0 Filed at: 07/15/2024 1121   UNA: How many times in the past year have you...    Used an illegal drug or used a prescription medication for  non-medical reasons? Never Filed at: 07/15/2024 1121                      Medical Decision Making  50 yo M with h/o MS complains of new neurologic deficits, different than his typical MS flares.  Outside window for thrombolysis. Stable at this time.  Labs, imaging ordered.  Concern for MS flare/new lesion (?) v. Subacute CVA.    D/W neurology, who advises admission, ASA and Plavix.    Amount and/or Complexity of Data Reviewed  Independent Historian: parent  External Data Reviewed: labs and notes.  Labs: ordered.  Radiology: ordered.  ECG/medicine tests: ordered and independent interpretation performed.  Discussion of management or test interpretation with external provider(s): Neurologist, Dr. Hernandez    Risk  OTC drugs.  Prescription drug management.  Decision regarding hospitalization.                 Disposition  Final diagnoses:   MS (multiple sclerosis) (HCC)   TIA (transient ischemic attack)     Time reflects when diagnosis was documented in both MDM as applicable and the Disposition within this note       Time User Action Codes Description Comment    7/15/2024 11:32 AM Price Dick Add [G35] MS (multiple sclerosis) (HCC)     7/15/2024  2:18 PM Price Dick Add [G45.9] TIA (transient ischemic attack)           ED Disposition       ED Disposition   Admit    Condition   Stable    Date/Time   Mon Jul 15, 2024 1417    Comment   Case was discussed with Dr. Levine and the patient's admission status was agreed to be Admission Status: observation status to the service of Dr. Levine .               Follow-up Information       Follow up With Specialties Details Why Contact Info    Ha Acosta MD Family Medicine Follow up  [address] [facility] [id number]  [phone]      Ha Acosta MD Family Medicine   [address] [facility] [address]  [phone]      Ha Acosta MD Family Medicine   [address] [place] [id number]  [phone]              Discharge Medication List as of 7/16/2024 12:59  "PM        START taking these medications    Details   aspirin 81 mg chewable tablet Chew 1 tablet (81 mg total) daily, Starting Wed 7/17/2024, No Print           CONTINUE these medications which have NOT CHANGED    Details   cyanocobalamin 1,000 mcg/mL Take B12 1000 mcg IM once a week for 4 weeks, then once a month for 5 months, Normal      MAGNESIUM PO Take by mouth, Historical Med      ocrelizumab (Ocrevus) 300 MG/10ML SOLN Inject 20 mL (600 mg total) into a catheter in a vein every 6 (six) months, Starting Tue 11/14/2023, Print      Riboflavin ([employer] Vitamin B-2) 100 MG TABS Take 2 tablets (200 mg total) by mouth in the morning, Starting Mon 5/23/2022, Normal      SYRINGE-NEEDLE, DISP, 3 ML 25G X 1\" 3 ML MISC Take with B12 regimen prescribed, Normal           STOP taking these medications       aspirin 325 mg tablet Comments:   Reason for Stopping:               No discharge procedures on file.    PDMP Review         Value Time User    PDMP Reviewed  Yes 7/15/2024  2:32 PM Rashmi Avalos PA-C            ED Provider  Electronically Signed by             Price Dick DO  07/16/24 1535    "

## 2024-07-15 NOTE — ASSESSMENT & PLAN NOTE
Presented to the emergency department due to headache, speech difficulty, left facial numbness and left-sided weakness that began last night.  Transient right-sided facial symptoms in the ED  Patient reports discontinuing aspirin approximately 2 weeks ago  Neurology evaluated patient in the ED, recommending admission to stroke pathway  Aspirin loaded in the ED, continue 81 mg daily tomorrow  Start Lipitor 40 mg daily  Hemoglobin A1c, lipid panel  MRI brain with and without contrast, echocardiogram  Monitor on telemetry  PT/OT/speech eval  Neurology following

## 2024-07-15 NOTE — H&P
[facility]  H&P  Name: Carlos WEIR Sabiha 49 y.o. male I MRN: [id number]  Unit/Bed#: -01 I Date of Admission: 7/15/2024   Date of Service: 7/15/2024 I Hospital Day: 0      Assessment & Plan   * Stroke-like symptoms  Assessment & Plan  Presented to the emergency department due to headache, speech difficulty, left facial numbness and left-sided weakness that began last night.  Transient right-sided facial symptoms in the ED  Patient reports discontinuing aspirin approximately 2 weeks ago  Neurology evaluated patient in the ED, recommending admission to stroke pathway  Aspirin loaded in the ED, continue 81 mg daily tomorrow  Start Lipitor 40 mg daily  Hemoglobin A1c, lipid panel  MRI brain with and without contrast, echocardiogram  Monitor on telemetry  PT/OT/speech eval  Neurology following    DAVID (obstructive sleep apnea)  Assessment & Plan  History of DAVID noncompliant with CPAP  Recommend outpatient follow-up with sleep medicine    MS (multiple sclerosis) ([facility])  Assessment & Plan  Follows with neurology as outpatient  Receives Solu-Medrol/Ocrevus infusions most recently on July 10th  Hold off on IV steroids at this time due to low suspicion for new MS lesions/active demyelination per neurology       VTE Pharmacologic Prophylaxis: VTE Score: 7 High Risk (Score >/= 5) - Pharmacological DVT Prophylaxis Ordered: enoxaparin (Lovenox). Sequential Compression Devices Ordered.  Code Status: Level 1 - Full Code   Discussion with family: Patient declined call to .     Anticipated Length of Stay: Patient will be admitted on an observation basis with an anticipated length of stay of less than 2 midnights secondary to stroke rule out.    Total Time Spent on Date of Encounter in care of patient: 75 mins. This time was spent on one or more of the following: performing physical exam; counseling and coordination of care; obtaining or reviewing history; documenting in the medical record;  reviewing/ordering tests, medications or procedures; communicating with other healthcare professionals and discussing with patient's family/caregivers.    Chief Complaint: New facial numbness/weakness    History of Present Illness:  Carlos Landis is a 49 y.o. male with a PMH of multiple sclerosis who presents with left facial numbness/weakness, transient right facial numbness/weakness.    Patient presents to the emergency department with new onset right frontal headache, speech difficulty and left facial numbness/weakness since last night.  While in the emergency department had transiently also noted similar symptoms on the right side of his face.  Recent hospitalization for pseudo exacerbation of MS.  Patient was evaluated by neurology in the ED, do not suspect new MS lesions/active demyelination.  Patient reports that he stopped his aspirin about 2 weeks ago as a provider had told him to discontinue at that time?  Had undergone his typical Ocrevus/Solu-Medrol and on July 10 without incident.  Denies chest pain/palpitations, shortness of breath, nausea/vomiting, abdominal pain, fever/chills.    Review of Systems:  Review of Systems   Constitutional:  Negative for chills, fatigue, fever and unexpected weight change.   HENT:  Negative for congestion, sore throat and trouble swallowing.    Eyes:  Negative for photophobia, pain and visual disturbance.   Respiratory:  Negative for cough, shortness of breath and wheezing.    Cardiovascular:  Negative for chest pain, palpitations and leg swelling.   Gastrointestinal:  Negative for abdominal pain, constipation, diarrhea, nausea and vomiting.   Endocrine: Negative for polyuria.   Genitourinary:  Negative for difficulty urinating, dysuria, flank pain, hematuria and urgency.   Musculoskeletal:  Negative for back pain, myalgias, neck pain and neck stiffness.   Skin:  Negative for pallor and rash.   Neurological:  Positive for weakness and numbness. Negative for dizziness,  "tremors, syncope, speech difficulty, light-headedness and headaches.   Hematological:  Does not bruise/bleed easily.   Psychiatric/Behavioral:  Negative for agitation and confusion.        Past Medical and Surgical History:   Past Medical History:   Diagnosis Date    Arthritis     Diabetes mellitus (HCC)     prediabetes    MS (multiple sclerosis) (HCC)     Scoliosis     Visual impairment        Past Surgical History:   Procedure Laterality Date    HERNIA REPAIR      3 times    KNEE SURGERY Right        Meds/Allergies:  Prior to Admission medications    Medication Sig Start Date End Date Taking? Authorizing Provider   aspirin 325 mg tablet Take 1 tablet (325 mg total) by mouth daily 3/25/24   Kathy Gregory PA-C   cyanocobalamin 1,000 mcg/mL Take B12 1000 mcg IM once a week for 4 weeks, then once a month for 5 months 4/25/23   Terra Hernandez MD   MAGNESIUM PO Take by mouth    Historical Provider, MD   ocrelizumab (Ocrevus) 300 MG/10ML SOLN Inject 20 mL (600 mg total) into a catheter in a vein every 6 (six) months 11/14/23   Terra Hernandez MD   Riboflavin ([employer] Vitamin B-2) 100 MG TABS Take 2 tablets (200 mg total) by mouth in the morning 5/23/22   Terra Hernandez MD   SYRINGE-NEEDLE, DISP, 3 ML 25G X 1\" 3 ML MISC Take with B12 regimen prescribed 5/9/23   Terra Hernandez MD     I have reviewed home medications with patient personally.    Allergies: No Known Allergies    Social History:  Marital Status: Single   Occupation:   Patient Pre-hospital Living Situation: Home  Patient Pre-hospital Level of Mobility: unable to be assessed at time of evaluation  Patient Pre-hospital Diet Restrictions:   Substance Use History:   Social History     Substance and Sexual Activity   Alcohol Use Not Currently     Social History     Tobacco Use   Smoking Status Former   Smokeless Tobacco Former     Social History     Substance and Sexual Activity   Drug Use Never       Family History:  Family History   Problem " Relation Age of Onset    Stroke Maternal Grandmother     Multiple sclerosis Other        Physical Exam:     Vitals:   Blood Pressure: 141/92 (07/15/24 1507)  Pulse: 67 (07/15/24 1507)  Temperature: 98.1 °F (36.7 °C) (07/15/24 1507)  Temp Source: Temporal (07/15/24 1052)  Respirations: 15 (07/15/24 1330)  SpO2: 95 % (07/15/24 1507)    Physical Exam  Vitals and nursing note reviewed.   Constitutional:       Appearance: Normal appearance.      Comments: No acute distress   HENT:      Head: Normocephalic.   Eyes:      General: No scleral icterus.     Extraocular Movements: Extraocular movements intact.      Conjunctiva/sclera: Conjunctivae normal.   Cardiovascular:      Rate and Rhythm: Normal rate and regular rhythm.      Heart sounds: Normal heart sounds. No murmur heard.  Pulmonary:      Effort: Pulmonary effort is normal. No respiratory distress.      Breath sounds: Normal breath sounds. No wheezing, rhonchi or rales.   Abdominal:      General: Bowel sounds are normal.      Palpations: Abdomen is soft.      Tenderness: There is no abdominal tenderness. There is no guarding or rebound.   Musculoskeletal:      Cervical back: Normal range of motion.      Comments: Move upper/lower extremities bilaterally, mild right upper/lower extremity weakness 4/5 when compared to the left.   Skin:     General: Skin is warm and dry.   Neurological:      Mental Status: He is alert and oriented to person, place, and time.      Motor: Weakness present.      Comments: Occasional word finding difficulty   Psychiatric:         Mood and Affect: Mood normal.         Speech: Speech normal.         Behavior: Behavior normal.          Additional Data:     Lab Results:  Results from last 7 days   Lab Units 07/15/24  1119   WBC Thousand/uL 7.66   HEMOGLOBIN g/dL 14.1   HEMATOCRIT % 42.8   PLATELETS Thousands/uL 215   SEGS PCT % 70   LYMPHO PCT % 15   MONO PCT % 8   EOS PCT % 5     Results from last 7 days   Lab Units 07/15/24  1119   SODIUM  mmol/L 139   POTASSIUM mmol/L 4.0   CHLORIDE mmol/L 105   CO2 mmol/L 26   BUN mg/dL 15   CREATININE mg/dL 0.86   ANION GAP mmol/L 8   CALCIUM mg/dL 9.2   GLUCOSE RANDOM mg/dL 86         Results from last 7 days   Lab Units 07/15/24  1057   POC GLUCOSE mg/dl 94     Lab Results   Component Value Date    HGBA1C 5.1 04/16/2024    HGBA1C 5.1 09/27/2022    HGBA1C 5.1 02/14/2022           Lines/Drains:  Invasive Devices       Peripheral Intravenous Line  Duration             Peripheral IV 07/15/24 Left Antecubital <1 day                        Imaging: Reviewed radiology reports from this admission including: CT head  CTA head and neck with and without contrast   Final Result by Gilberto Gallagher MD (07/15 1349)   Addendum (preliminary) 1 of 1 by Gilberto Gallagher MD (07/15 1349)   ADDENDUM:      The finding noted in the body however not included in the impression:   -Asymmetric effacement of the left vallecula without definite underlying    enhancing lesion. Recommend correlation with direct visualization.      I personally communicated the findings via telephone with Price Kapil    at 1:49 p.m. on 7/15/2024.      Final      CT head:   -No acute vascular territory infarct, intracranial hemorrhage or mass    effect.   -Redemonstrated white matter disease, likely due to underlying multiple    sclerosis.      CTA head:   -No large vessel occlusion, high-grade stenosis or aneurysm.      CTA neck:   -No high-grade stenosis, dissection or aneurysm.            Workstation performed: [id number]         MRI Inpatient Order    (Results Pending)       EKG and Other Studies Reviewed on Admission:   EKG: NSR. HR 74.    ** Please Note: This note has been constructed using a voice recognition system. **

## 2024-07-16 ENCOUNTER — APPOINTMENT (OUTPATIENT)
Facility: CLINIC | Age: 49
End: 2024-07-16
Payer: MEDICARE

## 2024-07-16 ENCOUNTER — APPOINTMENT (OUTPATIENT)
Dept: NON INVASIVE DIAGNOSTICS | Facility: HOSPITAL | Age: 49
End: 2024-07-16
Payer: MEDICARE

## 2024-07-16 VITALS
OXYGEN SATURATION: 95 % | DIASTOLIC BLOOD PRESSURE: 87 MMHG | HEART RATE: 104 BPM | SYSTOLIC BLOOD PRESSURE: 120 MMHG | TEMPERATURE: 97.5 F | RESPIRATION RATE: 18 BRPM

## 2024-07-16 LAB
ANION GAP SERPL CALCULATED.3IONS-SCNC: 7 MMOL/L (ref 4–13)
BASOPHILS # BLD AUTO: 0.08 THOUSANDS/ÂΜL (ref 0–0.1)
BASOPHILS NFR BLD AUTO: 1 % (ref 0–1)
BUN SERPL-MCNC: 16 MG/DL (ref 5–25)
CALCIUM SERPL-MCNC: 8.9 MG/DL (ref 8.4–10.2)
CHLORIDE SERPL-SCNC: 107 MMOL/L (ref 96–108)
CHOLEST SERPL-MCNC: 127 MG/DL
CO2 SERPL-SCNC: 24 MMOL/L (ref 21–32)
CREAT SERPL-MCNC: 0.78 MG/DL (ref 0.6–1.3)
EOSINOPHIL # BLD AUTO: 0.45 THOUSAND/ÂΜL (ref 0–0.61)
EOSINOPHIL NFR BLD AUTO: 3 % (ref 0–6)
ERYTHROCYTE [DISTWIDTH] IN BLOOD BY AUTOMATED COUNT: 12.9 % (ref 11.6–15.1)
EST. AVERAGE GLUCOSE BLD GHB EST-MCNC: 100 MG/DL
GFR SERPL CREATININE-BSD FRML MDRD: 104 ML/MIN/1.73SQ M
GLUCOSE SERPL-MCNC: 113 MG/DL (ref 65–140)
HBA1C MFR BLD: 5.1 %
HCT VFR BLD AUTO: 42.8 % (ref 36.5–49.3)
HDLC SERPL-MCNC: 29 MG/DL
HGB BLD-MCNC: 14 G/DL (ref 12–17)
IMM GRANULOCYTES # BLD AUTO: 0.15 THOUSAND/UL (ref 0–0.2)
IMM GRANULOCYTES NFR BLD AUTO: 1 % (ref 0–2)
LDLC SERPL CALC-MCNC: 65 MG/DL (ref 0–100)
LYMPHOCYTES # BLD AUTO: 1.07 THOUSANDS/ÂΜL (ref 0.6–4.47)
LYMPHOCYTES NFR BLD AUTO: 7 % (ref 14–44)
MAGNESIUM SERPL-MCNC: 2 MG/DL (ref 1.9–2.7)
MCH RBC QN AUTO: 29 PG (ref 26.8–34.3)
MCHC RBC AUTO-ENTMCNC: 32.7 G/DL (ref 31.4–37.4)
MCV RBC AUTO: 89 FL (ref 82–98)
MONOCYTES # BLD AUTO: 1.09 THOUSAND/ÂΜL (ref 0.17–1.22)
MONOCYTES NFR BLD AUTO: 8 % (ref 4–12)
NEUTROPHILS # BLD AUTO: 11.67 THOUSANDS/ÂΜL (ref 1.85–7.62)
NEUTS SEG NFR BLD AUTO: 80 % (ref 43–75)
NRBC BLD AUTO-RTO: 0 /100 WBCS
PLATELET # BLD AUTO: 229 THOUSANDS/UL (ref 149–390)
PMV BLD AUTO: 9.9 FL (ref 8.9–12.7)
POTASSIUM SERPL-SCNC: 3.8 MMOL/L (ref 3.5–5.3)
RBC # BLD AUTO: 4.83 MILLION/UL (ref 3.88–5.62)
SODIUM SERPL-SCNC: 138 MMOL/L (ref 135–147)
TRIGL SERPL-MCNC: 164 MG/DL
WBC # BLD AUTO: 14.51 THOUSAND/UL (ref 4.31–10.16)

## 2024-07-16 PROCEDURE — 80048 BASIC METABOLIC PNL TOTAL CA: CPT | Performed by: HOSPITALIST

## 2024-07-16 PROCEDURE — 92610 EVALUATE SWALLOWING FUNCTION: CPT

## 2024-07-16 PROCEDURE — 85025 COMPLETE CBC W/AUTO DIFF WBC: CPT | Performed by: HOSPITALIST

## 2024-07-16 PROCEDURE — 83036 HEMOGLOBIN GLYCOSYLATED A1C: CPT | Performed by: HOSPITALIST

## 2024-07-16 PROCEDURE — 80061 LIPID PANEL: CPT | Performed by: HOSPITALIST

## 2024-07-16 PROCEDURE — 99239 HOSP IP/OBS DSCHRG MGMT >30: CPT | Performed by: HOSPITALIST

## 2024-07-16 PROCEDURE — 83735 ASSAY OF MAGNESIUM: CPT | Performed by: HOSPITALIST

## 2024-07-16 PROCEDURE — 99232 SBSQ HOSP IP/OBS MODERATE 35: CPT | Performed by: PSYCHIATRY & NEUROLOGY

## 2024-07-16 RX ORDER — ASPIRIN 81 MG/1
81 TABLET, CHEWABLE ORAL DAILY
Start: 2024-07-17

## 2024-07-16 RX ADMIN — ENOXAPARIN SODIUM 40 MG: 40 INJECTION SUBCUTANEOUS at 08:51

## 2024-07-16 RX ADMIN — ACETAMINOPHEN 650 MG: 325 TABLET, FILM COATED ORAL at 09:11

## 2024-07-16 RX ADMIN — ASPIRIN 81 MG CHEWABLE TABLET 81 MG: 81 TABLET CHEWABLE at 08:51

## 2024-07-16 NOTE — OCCUPATIONAL THERAPY NOTE
Occupational Therapy Screen     Patient Name: Carlos Landis  Today's Date: 7/16/2024  Problem List  Principal Problem:    Stroke-like symptoms  Active Problems:    MS (multiple sclerosis) (HCC)    DAVID (obstructive sleep apnea)    Past Medical History  Past Medical History:   Diagnosis Date    Arthritis     Diabetes mellitus (HCC)     prediabetes    MS (multiple sclerosis) (HCC)     Scoliosis     Visual impairment      Past Surgical History  Past Surgical History:   Procedure Laterality Date    HERNIA REPAIR      3 times    KNEE SURGERY Right            07/16/24 1133   OT Last Visit   OT Visit Date 07/16/24   Note Type   Note type Screen   Additional Comments OT orders received, chart reviewed. Pt's [facility] is currently 24. Discussed w/ LADI Rose and she confirmed pt is at baseline. No futher acute OT needs indicated. DC OT orders.     Erica Walters OTS

## 2024-07-16 NOTE — PROGRESS NOTES
Progress Note - Neurology   Carlos DESTIN Landis 49 y.o. male MRN: [id number]  Unit/Bed#: -01 Encounter: [id number]      Assessment & Plan   * Stroke-like symptoms  Assessment & Plan  Carlos DESTIN Landis is a 49 y.o. male with MS on Ocrevus infusions and monthly Solumedrol infusions, scoliosis, DAVID not on CPAP.    Recently seen by neuro-hospitalist team:  July 2nd-3rd: seen for fluctuating left facial numbness, headache, right upper extremity coordination difficulty and increased speech difficulty.  MRI brain during that time did not demonstrate any new lesions/enhancement.  Was felt to be pseudo-exacerbation vs fluctuation of MS symptoms in the setting of ongoing life stressors.     July 10th: Received Ocrevus infusion (concurrently with Solu-Medrol infusion).    July 15th: Presents with right frontal headache, speech difficulty, left facial numbness and left sided weakness since last night.  Also lost taste before symptoms began. Left facial symptoms subsequently spread to the R side of his face.    Workup:  -CTA head/neck: no acute intracranial pathology, no vascular abnormalities (no LVO nor critical stenosis/flow restrictive disease)  - MRI brain w wo: Unremarkable for acute intracranial pathology or evidence of new enhancing active demyelinating lesions  - Hemoglobin A1c: 5.1  - Lipid panel: Total cholesterol 127, triglycerides elevated 164, HDL 29, LDL 65    Unlikely true MS flare given negative MRI brain and recent Ocrevus/steroid treatment.  Lower suspicion for TIA given similar clinical presentation during recent admission, and return of symptoms today 7/16.  Concern for anxiety component/MS pseudo flare    Plan:  - No need for echocardiogram from a neurology standpoint given negative MRI brain  - S/p aspirin 325 mg x1  - Continue aspirin 81 mg daily on discharge  - Lipitor 40 mg daily   - Telemetry monitoring  - No need for IV pulse steroids at this juncture  - Supportive care  - Therapy evaluations  - Monitoring  for any metabolic/infectious derangements  - Will continue to follow up with MS team as OP (with monthly Ocrevus/Solumedrol)    No further inpatient recommendations at this time, call with questions      Carlos DESTIN Landis will need follow up in 8 weeks with multiple sclerosis attending or advance practitioner. He will not require outpatient neurological testing.    Subjective:   Today patient continues to have a left sided headache. He states the left sided facial numbness resolved yesterday 7/15, however did return today. Continues to experience right facial numbness as well which is reportedly his baseline. Also continues to experience right upper extremity weakness which he states is also his baseline.     Vitals: Blood pressure 120/87, pulse 104, temperature 97.5 °F (36.4 °C), resp. rate 18, SpO2 95%.,There is no height or weight on file to calculate BMI.    Physical Exam  Vitals and nursing note reviewed.   Constitutional:       General: He is not in acute distress.     Appearance: He is not ill-appearing, toxic-appearing or diaphoretic.   HENT:      Head: Normocephalic and atraumatic.   Eyes:      General: No scleral icterus.        Right eye: No discharge.         Left eye: No discharge.      Extraocular Movements: Extraocular movements intact.      Conjunctiva/sclera: Conjunctivae normal.   Musculoskeletal:         General: Normal range of motion.   Skin:     General: Skin is warm and dry.      Coloration: Skin is not jaundiced or pale.   Neurological:      Mental Status: He is alert.   Psychiatric:         Mood and Affect: Mood normal.         Behavior: Behavior normal.         Thought Content: Thought content normal.         Judgment: Judgment normal.       Neurologic Exam     Mental Status   Patient is alert, lying in bed. Orientedx 3. No dysarthria or aphasia noted. Stuttering intermittently throughout exam. Able to name all objects provided, follows central and appendicular commands, and answers all  "questions appropriately      Cranial Nerves   Primary gaze midline, conjugate gaze noted  EOMs intact  No obvious visual field deficits  Diminished sensation to light touch in bilateral V1-V3  Mild right lower facial weakness noted at rest  Hearing intact  Tongue midline     Motor Exam   Muscle bulk: normal  Overall muscle tone: normal  Bilateral  5/5  Bilateral biceps, triceps, deltoid strength 5/5  Bilateral hip flexion, dorsiflexion, plantarflexion strength 5/5     Sensory Exam   Light touch normal.   No evidence of extinction with bilateral simultaneous stimulation     Gait, Coordination, and Reflexes     Tremor   Resting tremor: absent  No ataxia or dysmetria bilateral upper extremity finger-nose testing    No involuntary movements or rhythmic seizure-like activity noted throughout exam     Lab Results: I have personally reviewed pertinent reports.  Recent Results (from the past 24 hour(s))   HS Troponin I 2hr    Collection Time: 07/15/24  1:37 PM   Result Value Ref Range    hs TnI 2hr <2 \"Refer to ACS Flowchart\"- see link ng/L    Delta 2hr hsTnI     COVID/FLU/RSV    Collection Time: 07/15/24  1:37 PM    Specimen: Nose; Nares   Result Value Ref Range    SARS-CoV-2 Negative Negative    INFLUENZA A PCR Negative Negative    INFLUENZA B PCR Negative Negative    RSV PCR Negative Negative   Basic metabolic panel    Collection Time: 07/16/24  5:37 AM   Result Value Ref Range    Sodium 138 135 - 147 mmol/L    Potassium 3.8 3.5 - 5.3 mmol/L    Chloride 107 96 - 108 mmol/L    CO2 24 21 - 32 mmol/L    ANION GAP 7 4 - 13 mmol/L    BUN 16 5 - 25 mg/dL    Creatinine 0.78 0.60 - 1.30 mg/dL    Glucose 113 65 - 140 mg/dL    Calcium 8.9 8.4 - 10.2 mg/dL    eGFR 104 ml/min/1.73sq m   CBC and differential    Collection Time: 07/16/24  5:37 AM   Result Value Ref Range    WBC 14.51 (H) 4.31 - 10.16 Thousand/uL    RBC 4.83 3.88 - 5.62 Million/uL    Hemoglobin 14.0 12.0 - 17.0 g/dL    Hematocrit 42.8 36.5 - 49.3 %    MCV 89 82 - " 98 fL    MCH 29.0 26.8 - 34.3 pg    MCHC 32.7 31.4 - 37.4 g/dL    RDW 12.9 11.6 - 15.1 %    MPV 9.9 8.9 - 12.7 fL    Platelets 229 149 - 390 Thousands/uL    nRBC 0 /100 WBCs    Segmented % 80 (H) 43 - 75 %    Immature Grans % 1 0 - 2 %    Lymphocytes % 7 (L) 14 - 44 %    Monocytes % 8 4 - 12 %    Eosinophils Relative 3 0 - 6 %    Basophils Relative 1 0 - 1 %    Absolute Neutrophils 11.67 (H) 1.85 - 7.62 Thousands/µL    Absolute Immature Grans 0.15 0.00 - 0.20 Thousand/uL    Absolute Lymphocytes 1.07 0.60 - 4.47 Thousands/µL    Absolute Monocytes 1.09 0.17 - 1.22 Thousand/µL    Eosinophils Absolute 0.45 0.00 - 0.61 Thousand/µL    Basophils Absolute 0.08 0.00 - 0.10 Thousands/µL   Lipid Panel with Direct LDL reflex    Collection Time: 07/16/24  5:37 AM   Result Value Ref Range    Cholesterol 127 See Comment mg/dL    Triglycerides 164 (H) See Comment mg/dL    HDL, Direct 29 (L) >=40 mg/dL    LDL Calculated 65 0 - 100 mg/dL   Magnesium    Collection Time: 07/16/24  5:37 AM   Result Value Ref Range    Magnesium 2.0 1.9 - 2.7 mg/dL   Hemoglobin A1c w/EAG Estimation    Collection Time: 07/16/24  5:37 AM   Result Value Ref Range    Hemoglobin A1C 5.1 Normal 4.0-5.6%; PreDiabetic 5.7-6.4%; Diabetic >=6.5%; Glycemic control for adults with diabetes <7.0% %     mg/dl     Imaging Studies: I have personally reviewed pertinent reports and I have personally reviewed pertinent films in PACS.    EKG, Pathology, and Other Studies: I have personally reviewed pertinent reports.    VTE Prophylaxis: Enoxaparin (Lovenox)    Dictation voice to text software has been used in the creation of this document.  Please consider this in light of any contextual or grammatical errors.

## 2024-07-16 NOTE — SPEECH THERAPY NOTE
Speech Language/Pathology  Speech-Language Pathology Bedside Swallow Evaluation        Patient Name: Carlos Landis    Today's Date: 7/16/2024     Problem List  Principal Problem:    Stroke-like symptoms  Active Problems:    MS (multiple sclerosis) (HCC)    DAVID (obstructive sleep apnea)         Past Medical History  Past Medical History:   Diagnosis Date    Arthritis     Diabetes mellitus (HCC)     prediabetes    MS (multiple sclerosis) (HCC)     Scoliosis     Visual impairment        Past Surgical History  Past Surgical History:   Procedure Laterality Date    HERNIA REPAIR      3 times    KNEE SURGERY Right        Summary    Pt presents with oropharyngeal swallow that appears WNL. Mastication was prompt. Pharyngeal swallows appeared prompt. There were no overt s/s of aspiration. PO intake was somewhat limited during assessment, due to reduced attention and distractibility. Pt would benefit from brief dysphagia tx to ensure diet toleration/further assess with larger amount and variety of food.     Speech appears min-mildly dysarthric, and word finding difficulty was observed in conversation. These are chronic issues for pt; he may benefit from OP speech tx to address.      Recommendations:   Diet: regular diet and thin liquids   Meds: whole with liquid   Frequent Oral care  Independent for feeding  Aspiration precautions and compensatory swallowing strategies: upright posture  Other Recommendations/ considerations: ST to see for dysphagia tx, likely brief, 1-2 sessions.       Current Medical Status  Copied from admission/physician notes:  Carlos Landis is a 49 y.o. male with a PMH of multiple sclerosis who presents with left facial numbness/weakness, transient right facial numbness/weakness.     Patient presents to the emergency department with new onset right frontal headache, speech difficulty and left facial numbness/weakness since last night.  While in the emergency department had transiently also noted similar  symptoms on the right side of his face.  Recent hospitalization for pseudo exacerbation of MS.  Patient was evaluated by neurology in the ED, do not suspect new MS lesions/active demyelination.  Patient reports that he stopped his aspirin about 2 weeks ago as a provider had told him to discontinue at that time?  Had undergone his typical Ocrevus/Solu-Medrol and on July 10 without incident.  Denies chest pain/palpitations, shortness of breath, nausea/vomiting, abdominal pain, fever/chills.      Order received as part of stroke pathway. Pt also has MS. He is familiar to ST department from previous admissions. He was last seen by ST at [facility] in April 2024; at that time a regular diet/thin liquids were recommended. Pt reports intermittent dysarthria and word finding problems (chronic) and denies dysphagia.       Past medical history:   Please see H&P for details    Special Studies:  CT head:  -No acute vascular territory infarct, intracranial hemorrhage or mass effect.  -Redemonstrated white matter disease, likely due to underlying multiple sclerosis.     CTA head:  -No large vessel occlusion, high-grade stenosis or aneurysm.     CTA neck:  -No high-grade stenosis, dissection or aneurysm.      MRI brain-  No acute intracranial pathology.  Stable white matter changes in keeping with chronic demyelinating disease.      CXR 7/2-  No acute cardiopulmonary disease.       Social/Education/Vocational Hx:  Pt lives with family    Swallow Information   Current Risks for Dysphagia & Aspiration:  MS  Current Symptoms/Concerns:  weakness 2* MS , mild R facial droop  Current Diet: regular diet and thin liquids   Baseline Diet: regular diet and thin liquids    Baseline Assessment   Behavior/Cognition: alert, decreased attention, and distractible  Speech/Language Status: able to participate in basic conversation and able to follow commands  Patient Positioning: upright in bed     Swallow Mechanism Exam   Facial: right facial  droop  Labial: decreased ROM right side  Lingual: WFL  Velum: symmetrical  Mandible: adequate ROM  Dentition: adequate  Vocal quality:clear/adequate   Volitional Cough: strong/productive   Respiratory: RA    Consistencies Assessed and Performance   Consistencies Administered: thin liquids, puree, soft solids, and hard solids    Oral Stage: Adequate bolus retrieval and draw from straw. Prompt mastication, bolus formation and transfer. No pocketing or residue. Adequate lip seal.    Pharyngeal Stage: Hyolaryngeal elevation observed and palpated. Swallows appeared prompt. There were no overt s/s of aspiration.       Esophageal Concerns: none reported      Results Reviewed with: patient and RN   Dysphagia Goals:  1. Pt will tolerate regular diet and thin liquids with prompt oropharyngeal swallows and no overt s/s of aspiration. 2. Pt will tolerate LRD without s/s of aspiration across all meals and snacks.   Discharge recommendation: likely no follow up needed for swallowing    Speech Therapy Prognosis   Prognosis: good    Prognosis Considerations: cognitive status and therapeutic potential

## 2024-07-16 NOTE — PHYSICAL THERAPY NOTE
Physical Therapy Screen    Patient Name: Carlos Landis    Today's Date: 7/16/2024     Problem List  Principal Problem:    Stroke-like symptoms  Active Problems:    MS (multiple sclerosis) (HCC)    DAVID (obstructive sleep apnea)       Past Medical History  Past Medical History:   Diagnosis Date    Arthritis     Diabetes mellitus (HCC)     prediabetes    MS (multiple sclerosis) (HCC)     Scoliosis     Visual impairment         Past Surgical History  Past Surgical History:   Procedure Laterality Date    HERNIA REPAIR      3 times    KNEE SURGERY Right         07/16/24 1134   PT Last Visit   PT Visit Date 07/16/24   Note Type   Note type Screen   Additional Comments PT orders received, chart reviewed. Pt's [facility] is currently 24. Discussed w/ LADI Rose and she confirmed pt is at baseline. No acute P.T. needs. DC PT orders.     Karrie Barragan

## 2024-07-16 NOTE — CASE MANAGEMENT
cecil.   Case Management Progress Note    Patient name Carlos Landis  Location /-01 MRN [id number]  : 1975 Date 2024       LOS (days): 0  Geometric Mean LOS (GMLOS) (days):   Days to GMLOS:        OBJECTIVE:        Current admission status: Observation  Preferred Pharmacy:   [facility]  [place] - [address]  [address] [place] [address]  Phone: [phone] Fax: [phone]    Primary Care Provider: Ha Acosta MD    Primary Insurance: MEDICARE  [place] [facility] [place]    PROGRESS NOTE:    Patient screened and cleared by PT/OT, no discharge needs. Patinet screened by CM. Cleared for discharge  by attending.

## 2024-07-16 NOTE — PLAN OF CARE
Problem: Potential for Falls  Goal: Patient will remain free of falls  Description: INTERVENTIONS:  - Educate patient/family on patient safety including physical limitations  - Instruct patient to call for assistance with activity   - Consult OT/PT to assist with strengthening/mobility   - Keep Call bell within reach  - Keep bed low and locked with side rails adjusted as appropriate  - Keep care items and personal belongings within reach  - Initiate and maintain comfort rounds  - Make Fall Risk Sign visible to staff  Problem: SAFETY ADULT  Goal: Patient will remain free of falls  Description: INTERVENTIONS:  - Educate patient/family on patient safety including physical limitations  - Instruct patient to call for assistance with activity   - Consult OT/PT to assist with strengthening/mobility   - Keep Call bell within reach  - Keep bed low and locked with side rails adjusted as appropriate  - Keep care items and personal belongings within reach  - Initiate and maintain comfort rounds  - Make Fall Risk Sign visible to staff    Problem: INFECTION - ADULT  Goal: Absence or prevention of progression during hospitalization  Description: INTERVENTIONS:  - Assess and monitor for signs and symptoms of infection  - Monitor lab/diagnostic results  - Monitor all insertion sites, i.e. indwelling lines, tubes, and drains  - Monitor endotracheal if appropriate and nasal secretions for changes in amount and color  - Thiells appropriate cooling/warming therapies per order  - Administer medications as ordered  - Instruct and encourage patient and family to use good hand hygiene technique  - Identify and instruct in appropriate isolation precautions for identified infection/condition  7/15/2024 2056 by Alexsandra Carrero RN  Outcome: Progressing  7/15/2024 2056 by Alexsandra Carrero RN  Outcome: Progressing  7/15/2024 2056 by Alexsandra Carrero RN  Outcome: Progressing     - Apply yellow socks and bracelet for high fall risk patients  -  Consider moving patient to room near nurses station  7/15/2024 2056 by Alexsandra Carrero RN  Outcome: Progressing  7/15/2024 2056 by Alexsandra Carrero RN  Outcome: Progressing  7/15/2024 2056 by Alexsandra Carrero RN  Outcome: Progressing     - Apply yellow socks and bracelet for high fall risk patients  - Consider moving patient to room near nurses station  7/15/2024 2056 by Alexsandra Carrero RN  Outcome: Progressing  7/15/2024 2056 by Alexsandra Carrero RN  Outcome: Progressing  7/15/2024 2056 by Alexsandra Carrero RN  Outcome: Progressing

## 2024-07-16 NOTE — DISCHARGE SUMMARY
[facility]  Discharge- Carlos Landis 1975, 49 y.o. male MRN: [id number]  Unit/Bed#: -01 Encounter: [id number]  Primary Care Provider: Ha Acosta MD   Date and time admitted to hospital: 7/15/2024 10:52 AM    DAVID (obstructive sleep apnea)  Assessment & Plan  History of DAVID noncompliant with CPAP  Recommend outpatient follow-up with sleep medicine    MS (multiple sclerosis) ([facility])  Assessment & Plan  Follows with neurology as outpatient  Receives Solu-Medrol/Ocrevus infusions most recently on July 10th  No ms flare sx on imaging.    * Stroke-like symptoms  Assessment & Plan  Presented to the emergency department due to headache, speech difficulty, left facial numbness and left-sided weakness that began last night.  Transient right-sided facial symptoms in the ED  Fortunately sx resolved.  Patient reports discontinuing aspirin approximately 2 weeks ago  Neurology evaluated patient in the ED, recommending admission to stroke pathway  Aspirin loaded in the ED, continue 81 mg daily    MRI brain No acute intracranial pathology.   Stable white matter changes in keeping with chronic demyelinating disease.    Monitor on telemetry  Neurology following       Hospital Course:     Carlos Landis is a 49 y.o. male patient who originally presented to the hospital on   Admission Orders (From admission, onward)       Ordered        07/15/24 1418  Place in Observation  Once                         due to   Headache, speech difficulty, left facial numbness.  Fortunately symptoms resolved.  Patient had MRI of the brain that did not demonstrate any new MS lesion or stroke.    Please see above list of diagnoses and related plan for additional information.     Physical Exam:    GEN: No acute distress, comfortable  HEEENT: No JVD, PERRLA, no scleral icterus  RESP: Lungs clear to auscultation bilaterally  CV: RRR, +s1/s2   ABD: SOFT NON TENDER, POSITIVE BOWEL SOUNDS, NO DISTENTION  PSYCH: CALM  NEURO:  Mentation baseline, NO FOCAL DEFICITS  SKIN: NO RASH  EXTREM: NO EDEMA    CONSULTING PROVIDERS   IP CONSULT TO NEUROLOGY  IP CONSULT TO CASE MANAGEMENT  IP CONSULT TO NUTRITION SERVICES    PROCEDURES PERFORMED  * No surgery found *    RADIOLOGY RESULTS  MRI brain w wo contrast    Result Date: 7/16/2024  Narrative: MRI BRAIN WITH AND WITHOUT CONTRAST INDICATION: MS. COMPARISON: CT/CTA performed earlier the same date. MRI brain dated 7/2/2024. TECHNIQUE: Multiplanar, multisequence imaging of the brain was performed before and after gadolinium administration. IV Contrast:  10 mL of Gadobutrol injection (SINGLE-DOSE) IMAGE QUALITY:   Diagnostic. FINDINGS: BRAIN PARENCHYMA: Stable T2/FLAIR hyperintensities in the periventricular and subcortical white matter, posterior left internal capsule and posterior fossa keeping with diagnosis of chronic demyelinating disease. Numerous supratentorial lesions with associated T1 hypointensity. No new or enhancing active demyelinating lesions No restricted diffusion to indicate an acute infarct. No acute hemorrhage, mass, mass effect, shift or herniation. VENTRICLES:  Normal for the patient's age. SELLA AND PITUITARY GLAND:  Normal. ORBITS:  Normal. PARANASAL SINUSES: Right maxillary sinus retention cyst. VASCULATURE:  Evaluation of the major intracranial vasculature demonstrates appropriate flow voids. CALVARIUM AND SKULL BASE:  Normal. EXTRACRANIAL SOFT TISSUES:  Normal.     Impression: No acute intracranial pathology. Stable white matter changes in keeping with chronic demyelinating disease. Workstation performed: [id number]     CTA head and neck with and without contrast    Addendum Date: 7/15/2024 Addendum:   ADDENDUM: The finding noted in the body however not included in the impression: -Asymmetric effacement of the left vallecula without definite underlying enhancing lesion. Recommend correlation with direct visualization. I personally communicated the findings via telephone  with Price Dick at 1:49 p.m. on 7/15/2024.    Result Date: 7/15/2024  Narrative: CTA NECK AND BRAIN WITH AND WITHOUT CONTRAST INDICATION: New left sided weakness COMPARISON:   MRI of the brain from 7/2/2024, CT head and CTA head and neck from 12/6/2023 TECHNIQUE:  Routine CT imaging of the Brain without contrast.Post contrast imaging was performed after administration of iodinated contrast through the neck and brain. Post contrast axial 0.625 mm images timed to opacify the arterial system.  3D rendering was performed on an independent workstation.   MIP reconstructions performed. Coronal and sagittal reconstructions were performed of the non contrast portion of the brain. Radiation dose length product (DLP) for this visit:  1197.23 mGy-cm .  This examination, like all CT scans performed in the [facility], was performed utilizing techniques to minimize radiation dose exposure, including the use of iterative reconstruction and automated exposure control. IV Contrast:  85 mL of iohexol (OMNIPAQUE) IMAGE QUALITY:   Diagnostic FINDINGS: NONCONTRAST BRAIN PARENCHYMA: Decreased attenuation is noted in periventricular and subcortical white matter, stable compared to prior No CT signs of acute vascular territory infarction.  No intracranial mass, mass effect or midline shift.  No acute parenchymal hemorrhage. VENTRICLES AND EXTRA-AXIAL SPACES:Normal for the patient's age. VISUALIZED ORBITS: Normal. PARANASAL SINUSES: Right maxillary polyp versus retention cyst. CTA NECK ARCH AND GREAT VESSELS: Visualized arch and great vessels are normal. VERTEBRAL ARTERIES: Patent extracranial segments. RIGHT CAROTID: No stenosis.    No dissection. LEFT CAROTID: No stenosis.    No dissection. NASCET criteria was used to determine the degree of internal carotid artery diameter stenosis. CTA BRAIN: INTERNAL CAROTID ARTERIES: No stenosis or occlusion. ANTERIOR CEREBRAL ARTERY CIRCULATION:  No stenosis or occlusion. MIDDLE  CEREBRAL ARTERY CIRCULATION:  No stenosis or occlusion. DISTAL VERTEBRAL ARTERIES:  No stenosis or occlusion. BASILAR ARTERY:  No stenosis or occlusion. POSTERIOR CEREBRAL ARTERIES: No stenosis or occlusion. VENOUS STRUCTURES:  Normal. NON VASCULAR ANATOMY BONY STRUCTURES:  No acute osseous abnormality. Mild degenerative changes of the cervical spine most prominent at C6-C7. SOFT TISSUES OF THE NECK: Asymmetric effacement of the left vallecula without definite underlying enhancing lesion. Odontogenic disease. THORACIC INLET:  Unremarkable.     Impression: CT head: -No acute vascular territory infarct, intracranial hemorrhage or mass effect. -Redemonstrated white matter disease, likely due to underlying multiple sclerosis. CTA head: -No large vessel occlusion, high-grade stenosis or aneurysm. CTA neck: -No high-grade stenosis, dissection or aneurysm. Workstation performed: [id number]     XR chest portable    Result Date: 7/4/2024  Narrative: XR CHEST PORTABLE INDICATION: WEakness. COMPARISON: Compared to 12/6/2023 FINDINGS: Clear lungs. No pneumothorax or pleural effusion. Normal cardiomediastinal silhouette. Scoliosis. Normal upper abdomen.     Impression: No acute cardiopulmonary disease. Workstation performed: [id number]     MRI brain w wo contrast    Result Date: 7/2/2024  Narrative: MRI BRAIN WITH AND WITHOUT CONTRAST INDICATION: MS.   right hemiparesis and right-sided paresthesias. COMPARISON: 4/16/2024 TECHNIQUE: Multiplanar, multisequence imaging of the brain was performed before and after gadolinium administration. IV Contrast:  10 mL of Gadobutrol injection (SINGLE-DOSE) IMAGE QUALITY:   Diagnostic. FINDINGS: BRAIN PARENCHYMA:  There is no discrete mass, mass effect or midline shift. There is no intracranial hemorrhage.  Normal posterior fossa.  Diffusion imaging is unremarkable. The appearance of the brain is stable. Scattered predominantly periventricular white matter lesions in the supratentorial brain are  stable. A few cerebellar lesions are also unchanged including a right superior vermian lesion series 4 image 93 and the more lateral lesion noted in the right cerebellar hemisphere series 4 image 51. No new or progressive white matter lesions. Stable T1 hypointense black holes especially adjacent to the posterior bodies of both lateral ventricle stable. Postcontrast imaging of the brain demonstrates no abnormal enhancement. VENTRICLES: Mild ventricular prominence is stable likely on the basis of volume loss. SELLA AND PITUITARY GLAND: Partially empty sella turcica redemonstrated. ORBITS:  Normal. PARANASAL SINUSES:  Normal. VASCULATURE:  Evaluation of the major intracranial vasculature demonstrates appropriate flow voids. CALVARIUM AND SKULL BASE:  Normal. EXTRACRANIAL SOFT TISSUES:  Normal.     Impression: 1. Mild to moderate chronic demyelinating plaque is stable in this patient with a history of multiple sclerosis. No evidence of progressive or active demyelination. 2. No acute infarction, intracranial hemorrhage or enhancing mass lesion. Workstation performed: [id number]       LABS  Results from last 7 days   Lab Units 07/16/24  0537 07/15/24  1119   WBC Thousand/uL 14.51* 7.66   HEMOGLOBIN g/dL 14.0 14.1   HEMATOCRIT % 42.8 42.8   MCV fL 89 89   PLATELETS Thousands/uL 229 215     Results from last 7 days   Lab Units 07/16/24  0537 07/15/24  1119   SODIUM mmol/L 138 139   POTASSIUM mmol/L 3.8 4.0   CHLORIDE mmol/L 107 105   CO2 mmol/L 24 26   BUN mg/dL 16 15   CREATININE mg/dL 0.78 0.86   CALCIUM mg/dL 8.9 9.2   EGFR ml/min/1.73sq m 104 100   GLUCOSE RANDOM mg/dL 113 86     Results from last 7 days   Lab Units 07/15/24  1337 07/15/24  1119   HS TNI 0HR ng/L  --  <2   HS TNI 2HR ng/L <2  --               Results from last 7 days   Lab Units 07/15/24  1057   POC GLUCOSE mg/dl 94     Results from last 7 days   Lab Units 07/16/24  0537   HEMOGLOBIN A1C % 5.1             Results from last 7 days   Lab Units  "07/16/24  0537   TRIGLYCERIDES mg/dL 164*   CHOLESTEROL mg/dL 127   LDL CALC mg/dL 65   HDL mg/dL 29*       Cultures:         Invalid input(s): \"URIBILINOGEN\"        Results from last 7 days   Lab Units 07/15/24  1337   INFLUENZA A PCR  Negative         Condition at Discharge:  good      Discharge instructions/Information to patient and family:   See after visit summary for information provided to patient and family.  The above hospital course, results, incidental findings, need for follow up was discussed in detail with the patient and/or family.    Provisions for Follow-Up Care:  See after visit summary for information related to follow-up care and any pertinent home health orders.      Disposition:     Home       Discharge Statement:  I spent 36 minutes discharging the patient. This time was spent on the day of discharge. I had direct contact with the patient on the day of discharge. Greater than 50% of the total time was spent examining patient, answering all patient questions, arranging and discussing plan of care with patient as well as directly providing post-discharge instructions.  Additional time then spent on discharge activities.    Discharge Medications:  See after visit summary for reconciled discharge medications provided to patient and family.      ** Please Note: This note has been constructed using a voice recognition system **      "

## 2024-07-16 NOTE — ASSESSMENT & PLAN NOTE
Presented to the emergency department due to headache, speech difficulty, left facial numbness and left-sided weakness that began last night.  Transient right-sided facial symptoms in the ED  Fortunately sx resolved.  Patient reports discontinuing aspirin approximately 2 weeks ago  Neurology evaluated patient in the ED, recommending admission to stroke pathway  Aspirin loaded in the ED, continue 81 mg daily    MRI brain No acute intracranial pathology.   Stable white matter changes in keeping with chronic demyelinating disease.    Monitor on telemetry  Neurology following

## 2024-07-16 NOTE — ASSESSMENT & PLAN NOTE
Follows with neurology as outpatient  Receives Solu-Medrol/Ocrevus infusions most recently on July 10th  No ms flare sx on imaging.

## 2024-07-18 ENCOUNTER — OFFICE VISIT (OUTPATIENT)
Facility: CLINIC | Age: 49
End: 2024-07-18
Payer: MEDICARE

## 2024-07-18 ENCOUNTER — APPOINTMENT (OUTPATIENT)
Facility: CLINIC | Age: 49
End: 2024-07-18
Payer: MEDICARE

## 2024-07-18 DIAGNOSIS — G43.109 MIGRAINE WITH AURA AND WITHOUT STATUS MIGRAINOSUS, NOT INTRACTABLE: ICD-10-CM

## 2024-07-18 DIAGNOSIS — G35 MS (MULTIPLE SCLEROSIS) (HCC): Primary | ICD-10-CM

## 2024-07-18 DIAGNOSIS — R26.81 UNSTEADINESS ON FEET: ICD-10-CM

## 2024-07-18 PROCEDURE — 97112 NEUROMUSCULAR REEDUCATION: CPT

## 2024-07-18 RX ORDER — KETOROLAC TROMETHAMINE 10 MG/1
10 TABLET, FILM COATED ORAL EVERY 6 HOURS PRN
Qty: 10 TABLET | Refills: 0 | Status: SHIPPED | OUTPATIENT
Start: 2024-07-18

## 2024-07-18 RX ORDER — NORTRIPTYLINE HYDROCHLORIDE 25 MG/1
25 CAPSULE ORAL
Qty: 30 CAPSULE | Refills: 5 | Status: SHIPPED | OUTPATIENT
Start: 2024-07-18

## 2024-07-18 NOTE — PROGRESS NOTES
PT Discharge    Today's date: 2024  Patient name: Carlos Landis  : 1975  MRN: [id number]  Referring provider: Kathy Gregory PA-C  Dx:   Encounter Diagnosis     ICD-10-CM    1. MS (multiple sclerosis) (HCC)  G35       2. Unsteadiness on feet  R26.81             Start Time: 1423  Stop Time: 1500  Total time in clinic (min): 37 minutes    Assessment  Impairments: abnormal gait, abnormal movement, activity intolerance, difficulty understanding, impaired balance, impaired physical strength, lacks appropriate home exercise program, safety issue and poor posture   Functional limitations: deconditioning, gait, and transfers    Update 2024: Carlos is a 48 y.o male who has received 29 visits of skilled PT to address balance, strength, endurance and gait deficits secondary to his MS diagnosis. Today's assessment reflects improvement in some measures including ABC & 5xSTS; and stagnation/decline in others such as 6MWT, TUG, and 10mWT. Pt had another pseudo exacerbation since last visit and was hospitalized. Due to high temperatures and plateau in progress, pt is to be discharged at this time. Pt was strongly encouraged to pursue psychotherapy services to address anxiety component of pseudo exacerbation, which is in accordance with recommendations made by physicians from his last two hospitalizations due to pseudo exacerbations. Provided pt with local recommendation [facility] as or to check with his insurance company to find clinicians in Network.      24: Carlos is a 48 year old male who has attended 24 visits of skilled PT interventions to improve his impaired balance, strength, endurance, and gait deficits secondary to his MS diagnosis and progression of the disease. Compared to last progress note, Carlos has improved nicely in 5x STS, greater than MCID - indicative of meaningful improvement in power generation, although he continues to require bilateral UE support to complete this transfer.  TUG score improved just below 4 seconds, indicative of improved short distance functional mobility and reduction in fall risk. Endurance and gait speed seemed to have regression compared to last progress note - patient noting that his endurance usually struggles just before he is due tor an infusion which is In two weeks. Despite improvements noted during today's session, patient still classified as fall risk per TUG, 5x STS, gait speed with overall decreased functional mobility and endurance evident. Patient requires continued skilled PT services in order to improve functional mobility and endurance to improve quality of life, reduce fall risk.       Assessment details: /18/2024:     5/24/2024: Pt is a 48 y.o. male who has received 14 visits of skilled PT intervention to address balance, strength, and gait deficits secondary to MS. Today's assessment reflects improvement from previous visits in 6MWT, gait speed, TUG assessments, and balance confidence as compared to IE. Despite these improvements, pt is still unable to rise from a chair without UE support, which makes getting out of armless chairs at doctors appointments difficult. 6MWT continues to express significant CV deficits that impact his ability to navigate his environment. MARIN not able to be assessed today due to time constraints. Education was reinforced on the importance of performing HEP to maximize gains and pt expressed understanding. Pt will therefore continue to benefit from skilled PT intervention addressing balance, gait, and strengthening so that he can maximize his safety in navigating his environment.      4/19/2024: Pt is a 48 y.o. male returning to pt after admission to hospital with MS flare up. Reassessment was performed today to obtain more orders to continue therapy. Despite recent flare up, pt's 6MWT increased by >200ft, and 5xSTS time increased as well. LAQ were added to address quadricep strength defiicts with education on pacing Pt  will continue to benefit form skilled PT so that he can benefit form skilled PT so that he can navigate his environment with increased safety and stability.       At IE: Pt is a 48 y.o. male presenting to physical therapy with chief complaint of reduced endurance, poor activity tolerance, impaired transfers, and reduced functional participation secondary to multiple sclerosis Assessment today reflects globally reduced muscle strength, most noteably in the hip, core, and LE musculature, which impacts his ability to perform transfers and ambulation tasks. R sided deficits are greater than L sided deficits. Gait speed assessment places him at the level of a household ambulator. 5xSTS was unable to be completed without UE support. ABC indicates that pt is at risk of falls and has low balance confidence. Pt will benefit from skilled physical therapy intervention addressing muscle strength, endurance, activity tolerance, and functional deficits so that he/she may be able to navigate their environment with increased independence and safety.   Understanding of Dx/Px/POC: good     Prognosis: good     Goals  STG's: to be achieved in 4 weeks  -Pt will exhibit independence with HEP within 2 weeks. MET  -Pt will be able to complete a chair to mat transfer with supervision assistance MET  -2MWT to be performed and subsequent goal to be set.  Discontinued, pt able to complete 6MWT  -ABC to improve by MCID of 14% MET as compared toIE  -Pt will improve gait speed by MCID of .13m/s MET     LTG's: to be achieved in 8 weeks  -pt will be able to ambulate for 6 minutes without rest break due to fatigue so that he may be able to walk around his house without difficulty MET  -pt will improve gait speed to between 0.4-0.8m/s to reflect status as a limited community ambulator MET  -Pt will improve TUG score with RW to <13.5 s to reflect reduced risk of falls MET  -Pt will be able to complete an STS transfer without UE support so that he can  "rise from chairs without arms.  NOT MET    NEW GOALS (as of 6/28)   Improve gait speed to > 0.8 m/s with RW to classify as unlimited community ambulator NOT MET  Will improve MARIN by 8 points  NOT MET        Plan: Discharge from PT     Planned therapy interventions: Discharge to [facility].      Frequency: 2x week  Duration in visits: 16  Duration in weeks: 8  Plan of Care beginning date: 6/28/2024  Plan of Care expiration date: 8/28/2024  Treatment plan discussed with: patient  Plan details: TE: for strengthening, stretching, and flexibility  TA: for functional participation  NR: for balance, coordination, reaction time  MT: for STM, IASTM, joint mobilizations  Gait Training: to improve gait mechanics.       Subjective Evaluation     History of Present Illness  Mechanism of injury:    Update 6/28/24: Therapy has been helping a lot. Especially when he is at home. Gets tired when he gets home, but for the next couple days he is feeling good and fine. Sees some mild improvements in his improvements, balance, and endurance. Knows that Dr. DUNCAN \"will never take me off the walker\". Scheduled for his next infusion next week, has to get bloodwork done on the 7th by the 7th for the infusion. Likes his caffeine. States that the only time he is walking is when he is at PT. At home, doesn't seem to walk or move around as much.      5/24/2024: Pt states that he had an appointment with Dr. DUNCAN since he was last here that when well. He was able to walk into the session today with his walker and also walked into the hospital to visit his mom instead of taking his transport chair. He states that he feels 40-50% of the way to where he would like to be by the time he is done with PT. He states he feels his progress is limited by his own brain, he has a hard time pushing himself to do the exercises at home, but knows he is supposed to. He is currently focusing his exercises on walking to the kitchen table. He also states that he has not tried to " get up the stairs in his stair lift, but the upstairs is very warm, which may not be good for his MS.      2024: Pt states that he had significant right sided weakness and went to the ER. He was admitted. He lost his speech. He states that it was a bad flare up. He is looking forward to going to his doctor to get his R elbow psoriasis looked at. He states that he is supposed to be getting steroids every two weeks but no one ever scheduled him. He states that he feels great. He states that the swimmer's ear is still there but that takes a lot for it to go away.      At IE: Pt states he is coming in for treatment for his MS. He notes that he cannot move his R arm at times. He presents in a travel chair, which is his primary method of mobility, and he uses an RW at home. HE notes that he is very tired and sleeps most of the day. They have purchased a stair lift for his home as he has difficulty navigating stairs. He lives with his brother. He is in the process of getting a walk in tub & had a shower chair. He also experiences a lot of headaches that result in facial numbness on his right, recently it has been on his left too. Ibuprofen gels and closing his eyes reduces his pain and opening his eyes when he has headaches makes it worse. His physicians are aware of this and are managing it. He has right sided facial droop associated with his MS that has present since his diagnosis. His goals are to improve his stamina so that he can walk further in his home.          Recurrent probem     Patient Goals  Patient goals for therapy: improved balance  Patient goal: improve his stamina  Pain  Current pain ratin  At best pain ratin  At worst pain rating: 10  Location: let & right side of his head.     Social Support  Lives in: multiple-level home  Lives with: parents (siblings)     Employment status: not working  Treatments  Previous treatment: physical therapy and occupational therapy        Objective       Strength/Myotome Testing      Left Shoulder      Planes of Motion   Flexion: 3+   Abduction: 3      Right Shoulder      Planes of Motion   Flexion: 3   Abduction: 2      Left Elbow   Flexion: 4  Extension: 4     Right Elbow   Flexion: 3+  Extension: 4     Left Hip   Planes of Motion   Flexion: 4     Right Hip   Planes of Motion   Flexion: 3+     Left Knee   Flexion: 4  Extension: 4     Right Knee   Flexion: 4  Extension: 3     Left Ankle/Foot   Dorsiflexion: 3     Right Ankle/Foot   Dorsiflexion: 2+  Neuro Exam:      Transfers   Sit to stand: independent   Wheelchair to mat: Wheelchair to mat transfer: contact guard.  Mat to wheelchair: Mat to wheelchair transfer: contact guad.    /86 end session LUE auto         Precautions: MS, r sided weakness, frequent headaches, history or R ACLR, falls risk  POC expires: 07/19/2024      Tests and measures 3/27 4/3  4/17 5/14 5/21 5/24  5/28 5/31 6/4 6/7 6/11 6/18 6/20 6/21 6/24 7/2 7/5 7/9 7/12 7/18 D/C   ABC 33.13%  39.38%   44.38%           51.25%   53.25%   MARIN   27/56                  31/56.   6MWT   190 feet 3 mins 32 secs 407ft    504.4ft           544.44ft   576ft   5xSTS 34.54 w UE support  30.91   36.60s           26.23s   23.74s   10mWT 0.33m/s w RW     0.61s w RW              0.46m/s w RW   BP                151/94mmHg HR: 94bpm       TUG 38.80s w RW     35.15s              36.59s   Neuro Re-Ed                       Victorino navigation     Fwd middle setting in 7.5lb AW 4 laps  Fwd middle setting in 7.5lb AW 4 laps   Fwd middle setting in 7.5lb AW 4 laps Distance walking negotiating around obstacles, 140' x2, focus on Left LE ER  Fwd highest setting 7.5lb AW 4 laps      Fwd highest setting no resistance 4 laps in //  Fwd highest setting no resistance 4 laps in //  Fwd highest setting no resistance 4 laps in //     Sit <> stands             1 min on:2 min off 2 rounds           Compliant surface                       Tests and measures   ABC, MARIN, 6MWT,  "5xSTS   ABC, 6MWT, 5xSTS, TUG, 10mWT           ABC, 6MWT, 5xSTS, TUG, 10mWT   ABC, 6MWT, 5xSTS, TUG, 10mWT   Side stepping        In // 7.5 lb AW 4x down and back In ll bars light UE 4 laps x2  In // 7.5lb AW 4x down and back //bar #5 BAW, 1 min on: 2 off, 3 rounds // bar #5 BAW 8x down and back      // bar #5 AW 8x down and back    marching        In// 7.5lb AW 4x down and back In ll bars focused on LE facing forward 4 laps x2   //bar #5 BAW    3 rounds 1 min on: 2 min off,  // bar #5 BAW, 10x.                                                         Ther Ex                       STS  2x10 foam on chair  4x6 1 foam on chair + 10lb weighted vest without UE support 3x10 1 foam on chair + 10lb weighted vest without UE support    Off raised step 5 without UE Off low mat + foam square 10x each      3x10 foam weighted vest without UE support 1 foam on chair  3x10 foam weighted vest without UE support 1x5 foam + weighted vest   2x10 foam + weighted vest    Standing hip abduction                       Seated marching                                              Mini Squats     3x5 with UE support in // with 10lb weighted vest  3x5 with UE support in with 10lb weighted vest.  1x10 with UE support with 10lb weighted vest   1x10 with UE support with in //  2x10 with UE support //bar  2x10 with UE support // bar  2x10 with UE support // bar   2x10 with UE support in //      Step ups  //bar up and over 6\" 10x             // bar up and over 8' step 10x B UE support // bar up and over 8' step 10x B UE support        LAQ   3x5 + pacing 15lb AW          3x5 + pacing 15lb AW     3x5 + pacing 15lb AW     bridge          2x8             Heel raises        In // 7.5lb AW 2x10           In // 7.5lb AW 2x10                            Ther Activity                       Stand pivot transfer                With eddie leblanc       Chair to bed transfer                With eddie leblanc       Psoriasis--wound care    Application of " stockinette to protect wound.                    monitoring                Patient educatio, vitals ensuring stable vital signs.        Gait Training                       With RW    5 rounds interval training 1 min fast walking 30s normal walking pace 5 rounds interval training 1 min fast walking 30s normal walking pace.   5 rounds interval training 1 min fast walking 30s normal pace 2lb weights  6 rounds interval training 1 min fast walking 30s normal pace 3lb AW antonieta 2 laps with increased pacing 6 rounds interval training 1 min fast walking 30s normal pace 3lb AW antonieta 6 rounds interval training 1 min fast walking 30s normal pace 5lb AW antonieta  6 rounds interval training 1 min fast walking 30s normal pace 5lb AW antonieta  6 rounds interval training 1 min fast walking 30s normal pace 5lb AW antonieta 6 rounds interval training 1 min fast walking 30s normal pace 5lb AW antonieta 1 rounds of walking at fast speed however pt felt lightheaded upon completion of task.    6 rounds of walking at fast speedfor 1 min, 30s slow, 5lb AW antonieta. RW    Unilateral UE parallel bar   4x laps                      Modalities

## 2024-08-02 ENCOUNTER — NURSE TRIAGE (OUTPATIENT)
Age: 49
End: 2024-08-02

## 2024-08-02 DIAGNOSIS — G43.109 MIGRAINE WITH AURA AND WITHOUT STATUS MIGRAINOSUS, NOT INTRACTABLE: Primary | ICD-10-CM

## 2024-08-02 DIAGNOSIS — G35 MS (MULTIPLE SCLEROSIS) (HCC): ICD-10-CM

## 2024-08-02 RX ORDER — DEXAMETHASONE 2 MG/1
2 TABLET ORAL DAILY PRN
Qty: 5 TABLET | Refills: 2 | Status: SHIPPED | OUTPATIENT
Start: 2024-08-02

## 2024-08-02 RX ORDER — DIVALPROEX SODIUM 250 MG/1
TABLET, EXTENDED RELEASE ORAL
Qty: 8 TABLET | Refills: 1 | Status: SHIPPED | OUTPATIENT
Start: 2024-08-02

## 2024-08-02 RX ORDER — PROCHLORPERAZINE MALEATE 10 MG
10 TABLET ORAL EVERY 12 HOURS PRN
Qty: 30 TABLET | Refills: 0 | Status: SHIPPED | OUTPATIENT
Start: 2024-08-02

## 2024-08-02 NOTE — TELEPHONE ENCOUNTER
"Inbound call received from patient. Pt stated that he has been having headaches but today he woke up with a really painful headache and pressure.    Pt stated he started taking Nortriptyline 25 mg at night and Ketorolac (onset of HA) on 7/19. Pt stated that he has had no relief from either medications.    --Pt reported that he did not take any OTC medications such as Naproxen or Aleve due to not knowing if pt was able to take the medication with all of the other medication.     --Pt denies any other symptoms and denies using any other interventions such as ice/heat, laying in a dark room, or sleeping.     --Pt is asking if provider can prescribe another mediation that can help break the migraine cycle. Stated that he has tried steroids in the past that were effective.    --Pt stated he is agreeable to come into the office if provider would recommend an injection or agreeable to try another medication.     --Pt aware to go to the nearest ED if symptoms worsen or new ones arise.        Reason for Disposition   Similar to previously diagnosed muscle-tension headaches    Answer Assessment - Initial Assessment Questions  When did migraine start?  --few days ago, but really intense today.    Location/Description:   --unilateral in the left frontal area, stated a constant \"pressure\"    Associated symptoms:  --denies    Precipitating factors:   --unsure    Alleviating factors:   --denies    What medications have you tried for this migraine headache? prescription medications: Nortriptyline, Toradol tablets, Magnesium, Riboflavin     --Pt stated he did not take any OTC Naproxen nor Aleve. Stated he was not sure if he was able to with all of the other medications.     Current migraine medications are confirmed as:  --Ketorolac 10 mg tablet: stated took one this AM with no relief. Pt started medication on 7/19/24 and has 1/10 pills left. Ineffective  --Nortriptyline 25 mg capsules (started on 7/19/24) Ineffective   --Riboflavin " daily  --Vitamin B 2 100 mg daily     Medications tried in the past?  -- Steroid in the past-effective  -- Infusion 7/10/24, [facility]    Protocols used: Headache-ADULT-[place]

## 2024-08-02 NOTE — TELEPHONE ENCOUNTER
Decadron 2 mg +Compazine 10 mg + Depakote 500 mg taper was sent to [facility] pharmacy.    Patient still can take Ibuprofen 600 mg + Tylenol 1000 mg PO if headache persists

## 2024-08-02 NOTE — TELEPHONE ENCOUNTER
Called pt, no answer. Left detailed message (ok per consent on file) advising pt of Dr. DUNCAN's response and recommendation. Advised pt to call back if any questions.

## 2024-08-07 ENCOUNTER — HOSPITAL ENCOUNTER (INPATIENT)
Facility: HOSPITAL | Age: 49
LOS: 1 days | Discharge: HOME HEALTH | DRG: 881 | End: 2024-08-09
Attending: EMERGENCY MEDICINE | Admitting: INTERNAL MEDICINE
Payer: COMMERCIAL

## 2024-08-07 ENCOUNTER — TELEPHONE (OUTPATIENT)
Age: 49
End: 2024-08-07

## 2024-08-07 ENCOUNTER — APPOINTMENT (EMERGENCY)
Dept: CT IMAGING | Facility: HOSPITAL | Age: 49
DRG: 881 | End: 2024-08-07
Payer: COMMERCIAL

## 2024-08-07 ENCOUNTER — APPOINTMENT (EMERGENCY)
Dept: RADIOLOGY | Facility: HOSPITAL | Age: 49
DRG: 881 | End: 2024-08-07
Payer: COMMERCIAL

## 2024-08-07 DIAGNOSIS — R29.90 STROKE-LIKE EPISODE: Primary | ICD-10-CM

## 2024-08-07 DIAGNOSIS — I47.29 NON-SUSTAINED VENTRICULAR TACHYCARDIA (HCC): ICD-10-CM

## 2024-08-07 DIAGNOSIS — R53.1 WEAKNESS: ICD-10-CM

## 2024-08-07 DIAGNOSIS — F41.9 ANXIETY: ICD-10-CM

## 2024-08-07 LAB
ALBUMIN SERPL BCG-MCNC: 4.1 G/DL (ref 3.5–5)
ALP SERPL-CCNC: 60 U/L (ref 34–104)
ALT SERPL W P-5'-P-CCNC: 11 U/L (ref 7–52)
ANION GAP SERPL CALCULATED.3IONS-SCNC: 7 MMOL/L (ref 4–13)
AST SERPL W P-5'-P-CCNC: 14 U/L (ref 13–39)
BACTERIA UR QL AUTO: ABNORMAL /HPF
BASOPHILS # BLD AUTO: 0.11 THOUSANDS/ÂΜL (ref 0–0.1)
BASOPHILS NFR BLD AUTO: 1 % (ref 0–1)
BILIRUB SERPL-MCNC: 0.46 MG/DL (ref 0.2–1)
BILIRUB UR QL STRIP: NEGATIVE
BUN SERPL-MCNC: 26 MG/DL (ref 5–25)
CALCIUM SERPL-MCNC: 9 MG/DL (ref 8.4–10.2)
CARDIAC TROPONIN I PNL SERPL HS: <2 NG/L
CHLORIDE SERPL-SCNC: 109 MMOL/L (ref 96–108)
CLARITY UR: CLEAR
CO2 SERPL-SCNC: 25 MMOL/L (ref 21–32)
COLOR UR: ABNORMAL
CREAT SERPL-MCNC: 0.91 MG/DL (ref 0.6–1.3)
EOSINOPHIL # BLD AUTO: 0.46 THOUSAND/ÂΜL (ref 0–0.61)
EOSINOPHIL NFR BLD AUTO: 5 % (ref 0–6)
ERYTHROCYTE [DISTWIDTH] IN BLOOD BY AUTOMATED COUNT: 12.8 % (ref 11.6–15.1)
GFR SERPL CREATININE-BSD FRML MDRD: 97 ML/MIN/1.73SQ M
GLUCOSE SERPL-MCNC: 103 MG/DL (ref 65–140)
GLUCOSE SERPL-MCNC: 86 MG/DL (ref 65–140)
GLUCOSE UR STRIP-MCNC: NEGATIVE MG/DL
HCT VFR BLD AUTO: 45.3 % (ref 36.5–49.3)
HGB BLD-MCNC: 15.1 G/DL (ref 12–17)
HGB UR QL STRIP.AUTO: NEGATIVE
HYALINE CASTS #/AREA URNS LPF: ABNORMAL /LPF
IMM GRANULOCYTES # BLD AUTO: 0.07 THOUSAND/UL (ref 0–0.2)
IMM GRANULOCYTES NFR BLD AUTO: 1 % (ref 0–2)
KETONES UR STRIP-MCNC: ABNORMAL MG/DL
LEUKOCYTE ESTERASE UR QL STRIP: NEGATIVE
LYMPHOCYTES # BLD AUTO: 1.34 THOUSANDS/ÂΜL (ref 0.6–4.47)
LYMPHOCYTES NFR BLD AUTO: 16 % (ref 14–44)
MAGNESIUM SERPL-MCNC: 2.2 MG/DL (ref 1.9–2.7)
MCH RBC QN AUTO: 29.8 PG (ref 26.8–34.3)
MCHC RBC AUTO-ENTMCNC: 33.3 G/DL (ref 31.4–37.4)
MCV RBC AUTO: 90 FL (ref 82–98)
MONOCYTES # BLD AUTO: 0.73 THOUSAND/ÂΜL (ref 0.17–1.22)
MONOCYTES NFR BLD AUTO: 9 % (ref 4–12)
MUCOUS THREADS UR QL AUTO: ABNORMAL
NEUTROPHILS # BLD AUTO: 5.78 THOUSANDS/ÂΜL (ref 1.85–7.62)
NEUTS SEG NFR BLD AUTO: 68 % (ref 43–75)
NITRITE UR QL STRIP: NEGATIVE
NON-SQ EPI CELLS URNS QL MICRO: ABNORMAL /HPF
NRBC BLD AUTO-RTO: 0 /100 WBCS
PH UR STRIP.AUTO: 6 [PH]
PLATELET # BLD AUTO: 230 THOUSANDS/UL (ref 149–390)
PMV BLD AUTO: 10.4 FL (ref 8.9–12.7)
POTASSIUM SERPL-SCNC: 3.5 MMOL/L (ref 3.5–5.3)
PROT SERPL-MCNC: 7.3 G/DL (ref 6.4–8.4)
PROT UR STRIP-MCNC: ABNORMAL MG/DL
RBC # BLD AUTO: 5.06 MILLION/UL (ref 3.88–5.62)
RBC #/AREA URNS AUTO: ABNORMAL /HPF
SODIUM SERPL-SCNC: 141 MMOL/L (ref 135–147)
SP GR UR STRIP.AUTO: >=1.03 (ref 1–1.03)
TSH SERPL DL<=0.05 MIU/L-ACNC: 1.71 UIU/ML (ref 0.45–4.5)
UROBILINOGEN UR STRIP-ACNC: 2 MG/DL
WBC # BLD AUTO: 8.49 THOUSAND/UL (ref 4.31–10.16)
WBC #/AREA URNS AUTO: ABNORMAL /HPF

## 2024-08-07 PROCEDURE — 96361 HYDRATE IV INFUSION ADD-ON: CPT

## 2024-08-07 PROCEDURE — 84443 ASSAY THYROID STIM HORMONE: CPT

## 2024-08-07 PROCEDURE — 99285 EMERGENCY DEPT VISIT HI MDM: CPT

## 2024-08-07 PROCEDURE — 84484 ASSAY OF TROPONIN QUANT: CPT

## 2024-08-07 PROCEDURE — 71045 X-RAY EXAM CHEST 1 VIEW: CPT

## 2024-08-07 PROCEDURE — 84100 ASSAY OF PHOSPHORUS: CPT | Performed by: PHYSICIAN ASSISTANT

## 2024-08-07 PROCEDURE — 70496 CT ANGIOGRAPHY HEAD: CPT

## 2024-08-07 PROCEDURE — 99284 EMERGENCY DEPT VISIT MOD MDM: CPT

## 2024-08-07 PROCEDURE — 70498 CT ANGIOGRAPHY NECK: CPT

## 2024-08-07 PROCEDURE — 85025 COMPLETE CBC W/AUTO DIFF WBC: CPT

## 2024-08-07 PROCEDURE — 82948 REAGENT STRIP/BLOOD GLUCOSE: CPT

## 2024-08-07 PROCEDURE — 80053 COMPREHEN METABOLIC PANEL: CPT

## 2024-08-07 PROCEDURE — 83735 ASSAY OF MAGNESIUM: CPT

## 2024-08-07 PROCEDURE — 81001 URINALYSIS AUTO W/SCOPE: CPT

## 2024-08-07 PROCEDURE — 36415 COLL VENOUS BLD VENIPUNCTURE: CPT

## 2024-08-07 PROCEDURE — 96374 THER/PROPH/DIAG INJ IV PUSH: CPT

## 2024-08-07 PROCEDURE — 93005 ELECTROCARDIOGRAM TRACING: CPT

## 2024-08-07 RX ORDER — POTASSIUM CHLORIDE 1500 MG/1
40 TABLET, EXTENDED RELEASE ORAL ONCE
Status: COMPLETED | OUTPATIENT
Start: 2024-08-08 | End: 2024-08-08

## 2024-08-07 RX ORDER — KETOROLAC TROMETHAMINE 30 MG/ML
15 INJECTION, SOLUTION INTRAMUSCULAR; INTRAVENOUS ONCE
Status: COMPLETED | OUTPATIENT
Start: 2024-08-07 | End: 2024-08-07

## 2024-08-07 RX ADMIN — IOHEXOL 85 ML: 350 INJECTION, SOLUTION INTRAVENOUS at 20:49

## 2024-08-07 RX ADMIN — SODIUM CHLORIDE 1000 ML: 0.9 INJECTION, SOLUTION INTRAVENOUS at 19:06

## 2024-08-07 RX ADMIN — KETOROLAC TROMETHAMINE 15 MG: 30 INJECTION, SOLUTION INTRAMUSCULAR; INTRAVENOUS at 22:49

## 2024-08-07 NOTE — ED PROVIDER NOTES
History  Chief Complaint   Patient presents with    Medical Problem     Pt states he's having an MS flare up. Increased weakness on the left side since this morning. Patient states he's also having facial weakness on the left. Was seen here last week for the same thing      The patient is a 49-year-old male with PMH of prediabetes, scoliosis, and MS presenting for evaluation of left-sided facial numbness, left hand weakness, expressive aphasia, and dysarthria.  The patient reports a recent change in his insurance which has led to difficulty obtaining outpatient infusions including IV steroids for his MS.  He notes having a stressful day as he found out he was not going to be able to receive the medications that he needs to manage his MS.  At approximately 4 PM he started with left-sided facial numbness and left hand weakness.  He also started with word finding difficulty and stumbling over his speech described as slurred speech by family.  He was brought in by EMS after family called the neuro triage line and they instructed to come here for further evaluation.  The patient's symptoms have since rapidly improved and he currently denies left-sided facial numbness or left hand weakness.  He notes his speech sounds better as well.  On reviewing his chart, he is prescribed 81 mg aspirin for stroke prevention.  He reports taking 324 mg daily as he ran out of baby aspirin.  He notes compliance with all of his other medications.  He denies recent illnesses, fevers, and chills.  He denies CP/SOB, palpitations, abdominal pain, vomiting, diarrhea, and urinary complaints.      History provided by:  Patient   used: No        Prior to Admission Medications   Prescriptions Last Dose Informant Patient Reported? Taking?   MAGNESIUM PO  Self Yes No   Sig: Take by mouth   Riboflavin ([employer] Vitamin B-2) 100 MG TABS  Self No No   Sig: Take 2 tablets (200 mg total) by mouth in the morning   SYRINGE-NEEDLE, DISP, 3 ML  "25G X 1\" 3 ML MISC  Self No No   Sig: Take with B12 regimen prescribed   aspirin 81 mg chewable tablet   No No   Sig: Chew 1 tablet (81 mg total) daily   cyanocobalamin 1,000 mcg/mL  Self No No   Sig: Take B12 1000 mcg IM once a week for 4 weeks, then once a month for 5 months   dexamethasone (DECADRON) 2 mg tablet   No No   Sig: Take 1 tablet (2 mg total) by mouth daily as needed (headache)   divalproex sodium (Depakote ER) 250 mg 24 hr tablet   No No   Sig: Take 2 tabs for 3 days, then 1 tab for 2 days   ketorolac (TORADOL) 10 mg tablet   No No   Sig: Take 1 tablet (10 mg total) by mouth every 6 (six) hours as needed for moderate pain (headache)   nortriptyline (PAMELOR) 25 mg capsule   No No   Sig: Take 1 capsule (25 mg total) by mouth daily at bedtime   ocrelizumab (Ocrevus) 300 MG/10ML SOLN  Self No No   Sig: Inject 20 mL (600 mg total) into a catheter in a vein every 6 (six) months   prochlorperazine (COMPAZINE) 10 mg tablet   No No   Sig: Take 1 tablet (10 mg total) by mouth every 12 (twelve) hours as needed (HEADACHE) With Decadron in the morning      Facility-Administered Medications: None       Past Medical History:   Diagnosis Date    Arthritis     Diabetes mellitus (HCC)     prediabetes    MS (multiple sclerosis) (HCC)     Scoliosis     Visual impairment        Past Surgical History:   Procedure Laterality Date    HERNIA REPAIR      3 times    KNEE SURGERY Right        Family History   Problem Relation Age of Onset    Stroke Maternal Grandmother     Multiple sclerosis Other      I have reviewed and agree with the history as documented.    E-Cigarette/Vaping    E-Cigarette Use Former User      E-Cigarette/Vaping Substances    Nicotine No     THC No     CBD No     Flavoring No     Other No     Unknown No      Social History     Tobacco Use    Smoking status: Former    Smokeless tobacco: Former   Vaping Use    Vaping status: Former   Substance Use Topics    Alcohol use: Not Currently    Drug use: Never "       Review of Systems   Constitutional:  Negative for chills and fever.   Respiratory:  Negative for cough and shortness of breath.    Cardiovascular:  Negative for chest pain.   Gastrointestinal:  Negative for abdominal pain, diarrhea, nausea and vomiting.   Neurological:  Positive for facial asymmetry (No confirmed facial droop but patient feels like his left side of face was not working the same as the right), speech difficulty, weakness (Left upper extremity weakness; decreased left  strength), numbness (Left side of face) and headaches (Intermittent, currently denies). Negative for dizziness, tremors, seizures, syncope and light-headedness.   All other systems reviewed and are negative.      Physical Exam  Physical Exam  Vitals and nursing note reviewed.   Constitutional:       General: He is not in acute distress.     Appearance: Normal appearance. He is well-developed. He is not ill-appearing, toxic-appearing or diaphoretic.   HENT:      Head: Normocephalic and atraumatic.      Jaw: There is normal jaw occlusion. No trismus.      Right Ear: Tympanic membrane, ear canal and external ear normal.      Left Ear: Tympanic membrane, ear canal and external ear normal.      Nose: Nose normal.      Mouth/Throat:      Lips: Pink.      Mouth: Mucous membranes are moist.      Pharynx: Oropharynx is clear. Uvula midline.   Eyes:      General: Lids are normal. Vision grossly intact. Gaze aligned appropriately. No visual field deficit.     Extraocular Movements: Extraocular movements intact.      Right eye: No nystagmus.      Left eye: No nystagmus.      Conjunctiva/sclera: Conjunctivae normal.      Pupils: Pupils are equal, round, and reactive to light.   Cardiovascular:      Rate and Rhythm: Normal rate and regular rhythm.      Pulses:           Radial pulses are 2+ on the right side and 2+ on the left side.        Dorsalis pedis pulses are 2+ on the right side and 2+ on the left side.        Posterior tibial pulses  are 2+ on the right side and 2+ on the left side.      Heart sounds: Normal heart sounds, S1 normal and S2 normal.   Pulmonary:      Effort: Pulmonary effort is normal. No respiratory distress.      Breath sounds: Normal breath sounds and air entry.   Abdominal:      Palpations: Abdomen is soft.      Tenderness: There is no abdominal tenderness.   Musculoskeletal:         General: Normal range of motion.      Cervical back: Normal range of motion and neck supple.      Right lower leg: No edema.      Left lower leg: No edema.   Skin:     General: Skin is warm and dry.      Capillary Refill: Capillary refill takes less than 2 seconds.      Findings: No rash.   Neurological:      General: No focal deficit present.      Mental Status: He is alert and oriented to person, place, and time. Mental status is at baseline.      GCS: GCS eye subscore is 4. GCS verbal subscore is 5. GCS motor subscore is 6.      Cranial Nerves: No dysarthria or facial asymmetry.      Sensory: Sensation is intact.      Motor: Pronator drift (Both arms drop after 5 seconds) present.      Coordination: Finger-Nose-Finger Test and Heel to Shin Test normal.      Comments: Unable to assess gait         Vital Signs  ED Triage Vitals   Temperature Pulse Respirations Blood Pressure SpO2   08/07/24 1817 08/07/24 1817 08/07/24 1817 08/07/24 1817 08/07/24 1817   97.8 °F (36.6 °C) 98 18 149/92 93 %      Temp src Heart Rate Source Patient Position - Orthostatic VS BP Location FiO2 (%)   -- 08/07/24 1817 08/07/24 1830 08/07/24 1817 --    Monitor Sitting Right arm       Pain Score       08/07/24 1829       8           Vitals:    08/07/24 1830 08/07/24 2000 08/07/24 2130 08/07/24 2230   BP: 123/87 138/82 120/84 119/86   Pulse: 94 87 81 81   Patient Position - Orthostatic VS: Sitting            Visual Acuity      ED Medications  Medications   sodium chloride 0.9 % bolus 1,000 mL (0 mL Intravenous Stopped 8/7/24 2006)   iohexol (OMNIPAQUE) 350 MG/ML injection  (SINGLE-DOSE) 85 mL (85 mL Intravenous Given 8/7/24 2049)   ketorolac (TORADOL) injection 15 mg (15 mg Intravenous Given 8/7/24 2249)       Diagnostic Studies  Results Reviewed       Procedure Component Value Units Date/Time    Urine Microscopic [[id number]]  (Abnormal) Collected: 08/07/24 2006    Lab Status: Final result Specimen: Urine, Other Updated: 08/07/24 2021     RBC, UA None Seen /hpf      WBC, UA 2-4 /hpf      Epithelial Cells Occasional /hpf      Bacteria, UA Occasional /hpf      MUCUS THREADS Innumerable     Hyaline Casts, UA 0-1 /lpf     UA w Reflex to Microscopic w Reflex to Culture [[id number]]  (Abnormal) Collected: 08/07/24 2006    Lab Status: Final result Specimen: Urine, Other Updated: 08/07/24 2011     Color, UA Dark Yellow     Clarity, UA Clear     Specific Gravity, UA >=1.030     pH, UA 6.0     Leukocytes, UA Negative     Nitrite, UA Negative     Protein, UA Trace mg/dl      Glucose, UA Negative mg/dl      Ketones, UA 10 (1+) mg/dl      Urobilinogen, UA 2.0 mg/dl      Bilirubin, UA Negative     Occult Blood, UA Negative    TSH, 3rd generation with Free T4 reflex [[id number]]  (Normal) Collected: 08/07/24 1903    Lab Status: Final result Specimen: Blood from Arm, Left Updated: 08/07/24 1952     TSH 3RD GENERATON 1.705 uIU/mL     HS Troponin 0hr (reflex protocol) [[id number]]  (Normal) Collected: 08/07/24 1903    Lab Status: Final result Specimen: Blood from Arm, Left Updated: 08/07/24 1943     hs TnI 0hr <2 ng/L     Comprehensive metabolic panel [[id number]]  (Abnormal) Collected: 08/07/24 1903    Lab Status: Final result Specimen: Blood from Arm, Left Updated: 08/07/24 1935     Sodium 141 mmol/L      Potassium 3.5 mmol/L      Chloride 109 mmol/L      CO2 25 mmol/L      ANION GAP 7 mmol/L      BUN 26 mg/dL      Creatinine 0.91 mg/dL      Glucose 86 mg/dL      Calcium 9.0 mg/dL      AST 14 U/L      ALT 11 U/L      Alkaline Phosphatase 60 U/L      Total Protein 7.3 g/dL      Albumin 4.1 g/dL      Total  Bilirubin 0.46 mg/dL      eGFR 97 ml/min/1.73sq m     Narrative:      National Kid[employer] Foundation guidelines for Chronic Kidney Disease (CKD):     Stage 1 with normal or high GFR (GFR > 90 mL/min/1.73 square meters)    Stage 2 Mild CKD (GFR = 60-89 mL/min/1.73 square meters)    Stage 3A Moderate CKD (GFR = 45-59 mL/min/1.73 square meters)    Stage 3B Moderate CKD (GFR = 30-44 mL/min/1.73 square meters)    Stage 4 Severe CKD (GFR = 15-29 mL/min/1.73 square meters)    Stage 5 End Stage CKD (GFR <15 mL/min/1.73 square meters)  Note: GFR calculation is accurate only with a steady state creatinine    Magnesium [[id number]]  (Normal) Collected: 08/07/24 1903    Lab Status: Final result Specimen: Blood from Arm, Left Updated: 08/07/24 1935     Magnesium 2.2 mg/dL     CBC and differential [[id number]]  (Abnormal) Collected: 08/07/24 1903    Lab Status: Final result Specimen: Blood from Arm, Left Updated: 08/07/24 1919     WBC 8.49 Thousand/uL      RBC 5.06 Million/uL      Hemoglobin 15.1 g/dL      Hematocrit 45.3 %      MCV 90 fL      MCH 29.8 pg      MCHC 33.3 g/dL      RDW 12.8 %      MPV 10.4 fL      Platelets 230 Thousands/uL      nRBC 0 /100 WBCs      Segmented % 68 %      Immature Grans % 1 %      Lymphocytes % 16 %      Monocytes % 9 %      Eosinophils Relative 5 %      Basophils Relative 1 %      Absolute Neutrophils 5.78 Thousands/µL      Absolute Immature Grans 0.07 Thousand/uL      Absolute Lymphocytes 1.34 Thousands/µL      Absolute Monocytes 0.73 Thousand/µL      Eosinophils Absolute 0.46 Thousand/µL      Basophils Absolute 0.11 Thousands/µL     Fingerstick Glucose (POCT) [[id number]]  (Normal) Collected: 08/07/24 1912    Lab Status: Final result Specimen: Blood Updated: 08/07/24 1913     POC Glucose 103 mg/dl                    CTA head and neck with and without contrast   Final Result by Vasu Sullivan MD (08/07 2145)      CT Brain:  No acute intracranial abnormality.  Subtle decreased attenuation in the  periventricular and subcortical white matter is again seen, stable compared to prior exams which could be related to demyelination or mild chronic small vessel ischemic    changes.      CT Angiography:  Unremarkable CTA neck and brain without large vessel arterial occlusion, significant flow-limiting stenosis, or focal saccular aneurysm identified.                  Workstation performed: [id number]         XR chest 1 view portable   ED Interpretation by SHYANN Amato (08/07 2102)   No acute cardiopulmonary disease identified by me.                 Procedures  ECG 12 Lead Documentation Only    Date/Time: 8/7/2024 7:05 PM    Performed by: SHYANN Amato  Authorized by: SHYANN Amato    Indications / Diagnosis:  ACS workup  ECG reviewed by me, the ED Provider: yes    Patient location:  ED  Previous ECG:     Previous ECG:  Unavailable    Comparison to cardiac monitor: Yes    Interpretation:     Interpretation: non-specific    Rate:     ECG rate:  91    ECG rate assessment: normal    Rhythm:     Rhythm: sinus rhythm    Ectopy:     Ectopy: none    QRS:     QRS axis:  Normal    QRS intervals:  Normal  Conduction:     Conduction: normal    ST segments:     ST segments:  Normal  T waves:     T waves: non-specific and inverted      Inverted:  V2  Comments:      Sinus rhythm, normal axis, normal intervals, nonspecific T wave inversion of lead V2, no acute ischemic changes read by me           ED Course  ED Course as of 08/07/24 2319   Wed Aug 07, 2024   2145 Pt updated on results   2149 CTA head and neck with and without contrast  IMPRESSION:     CT Brain:  No acute intracranial abnormality.  Subtle decreased attenuation in the periventricular and subcortical white matter is again seen, stable compared to prior exams which could be related to demyelination or mild chronic small vessel ischemic   changes.     CT Angiography:  Unremarkable CTA neck and brain without large vessel arterial occlusion, significant  "flow-limiting stenosis, or focal saccular aneurysm identified.                                   SBIRT 20yo+      Flowsheet Row Most Recent Value   Initial Alcohol Screen: US AUDIT-C     1. How often do you have a drink containing alcohol? 0 Filed at: 08/07/2024 1813   2. How many drinks containing alcohol do you have on a typical day you are drinking?  0 Filed at: 08/07/2024 1813   3a. Male UNDER 65: How often do you have five or more drinks on one occasion? 0 Filed at: 08/07/2024 1813   3b. FEMALE Any Age, or MALE 65+: How often do you have 4 or more drinks on one occassion? 0 Filed at: 08/07/2024 1813   Audit-C Score 0 Filed at: 08/07/2024 1813   UNA: How many times in the past year have you...    Used an illegal drug or used a prescription medication for non-medical reasons? Never Filed at: 08/07/2024 1813                      Medical Decision Making  DDx including but not limited to: Anxiety, panic attack, migraine with aura, TIA, CVA, metabolic around, thyroid disorder, cardiac etiology, atypical migraine, seizure; considerable less likely MS flare    Patient seen here for similar symptoms x 3 this past month.  TIA workup initiated given neurocognitive symptoms.  I discussed with Dr. Barnhart from neurology who is in agreement with plan for TIA workup.  Given resolution of symptoms, no need for tPA. Labs without electrolyte derangement or anemia or organ dysfunction.  EKG nonischemic and troponin negative.  CTA head and neck unremarkable.  On serial reevaluations, patient sometimes doubles over speech but is without focal neurologic exam.  I discussed with the patient anxiety and stressors may be causing these symptoms more so than TIA or MS flare. I suspect his symptoms are more so related to anxiety and depression over current illness/prognosis of his disease, which I shared with the patient.  He acknowledges that at times of increased stress and anxiety he tends to have these \"exacerbations\" or \"episodes\". " I discussed with IDRIS Frederic from [facility] the presentation and current workup.  Plan made for observation stay for neuroconsult in the morning as well as likely need for psychiatry consult.     Problems Addressed:  Anxiety: acute illness or injury  Stroke-like episode: acute illness or injury    Amount and/or Complexity of Data Reviewed  Labs: ordered.  Radiology: ordered and independent interpretation performed. Decision-making details documented in ED Course.  ECG/medicine tests: ordered and independent interpretation performed.    Risk  OTC drugs.  Prescription drug management.  Decision regarding hospitalization.                 Disposition  Final diagnoses:   Stroke-like episode   Anxiety     Time reflects when diagnosis was documented in both MDM as applicable and the Disposition within this note       Time User Action Codes Description Comment    8/7/2024 10:56 PM Jessi Geller [R29.90] Stroke-like episode     8/7/2024 10:56 PM Jessi Geller [F41.9] Anxiety           ED Disposition       ED Disposition   Admit    Condition   Stable    Date/Time   Wed Aug 7, 2024 2440    Comment   Case was discussed with IDRIS Farmer and the patient's admission status was agreed to be Admission Status: observation status to the service of Dr. Greco .               Follow-up Information    None         Current Discharge Medication List        CONTINUE these medications which have NOT CHANGED    Details   aspirin 81 mg chewable tablet Chew 1 tablet (81 mg total) daily    Associated Diagnoses: MS (multiple sclerosis) (HCC)      cyanocobalamin 1,000 mcg/mL Take B12 1000 mcg IM once a week for 4 weeks, then once a month for 5 months  Qty: 9 mL, Refills: 0    Associated Diagnoses: MS (multiple sclerosis) (HCC)      dexamethasone (DECADRON) 2 mg tablet Take 1 tablet (2 mg total) by mouth daily as needed (headache)  Qty: 5 tablet, Refills: 2    Associated Diagnoses: Migraine with aura and without status migrainosus, not  "intractable; MS (multiple sclerosis) (HCC)      divalproex sodium (Depakote ER) 250 mg 24 hr tablet Take 2 tabs for 3 days, then 1 tab for 2 days  Qty: 8 tablet, Refills: 1    Associated Diagnoses: Migraine with aura and without status migrainosus, not intractable; MS (multiple sclerosis) (HCC)      ketorolac (TORADOL) 10 mg tablet Take 1 tablet (10 mg total) by mouth every 6 (six) hours as needed for moderate pain (headache)  Qty: 10 tablet, Refills: 0    Associated Diagnoses: MS (multiple sclerosis) (HCC); Migraine with aura and without status migrainosus, not intractable      MAGNESIUM PO Take by mouth      nortriptyline (PAMELOR) 25 mg capsule Take 1 capsule (25 mg total) by mouth daily at bedtime  Qty: 30 capsule, Refills: 5    Associated Diagnoses: MS (multiple sclerosis) (HCC); Migraine with aura and without status migrainosus, not intractable      ocrelizumab (Ocrevus) 300 MG/10ML SOLN Inject 20 mL (600 mg total) into a catheter in a vein every 6 (six) months  Qty: 20 mL, Refills: 1    Associated Diagnoses: MS (multiple sclerosis) (HCC)      prochlorperazine (COMPAZINE) 10 mg tablet Take 1 tablet (10 mg total) by mouth every 12 (twelve) hours as needed (HEADACHE) With Decadron in the morning  Qty: 30 tablet, Refills: 0    Associated Diagnoses: Migraine with aura and without status migrainosus, not intractable; MS (multiple sclerosis) (HCC)      Riboflavin ([employer] Vitamin B-2) 100 MG TABS Take 2 tablets (200 mg total) by mouth in the morning  Qty: 360 tablet, Refills: 2    Associated Diagnoses: Right hemiparesis (HCC); MS (multiple sclerosis) (HCC)      SYRINGE-NEEDLE, DISP, 3 ML 25G X 1\" 3 ML MISC Take with B12 regimen prescribed  Qty: 9 each, Refills: 0    Associated Diagnoses: MS (multiple sclerosis) (HCC)             No discharge procedures on file.    PDMP Review         Value Time User    PDMP Reviewed  Yes 7/15/2024  2:32 PM Rashmi Avalos PA-C            ED Provider  Electronically Signed " by             SHYANN Amato  08/07/24 8544

## 2024-08-07 NOTE — TELEPHONE ENCOUNTER
Patient`s sister called in and stated that the patient is Having a hard time talking and his arms are not working.  She is concerned that he may be having something similar like Stroke Like symptoms.   I attempted to Warm Transfer to [facility] but there was no answer.   I advised them to have him check at the ED just incase. She stated that she will have him go to be checked.   FYI!

## 2024-08-07 NOTE — TELEPHONE ENCOUNTER
Inbound call from [employer] Representative requesting to leave a message for Christina Benavidez RN. The Patient's insurance is not currently contracted with [employer] and he needs to be transferred to another company. [employer] Representative wanted to confirm which company she would prefer and she will handle the transfer. [employer] Representative [facility] is [facility] or [facility].    Christina Benavidez RN: Please follow up with [employer]. Their call back number is [phone], Thank you!

## 2024-08-08 ENCOUNTER — APPOINTMENT (OUTPATIENT)
Dept: MRI IMAGING | Facility: HOSPITAL | Age: 49
DRG: 881 | End: 2024-08-08
Payer: COMMERCIAL

## 2024-08-08 ENCOUNTER — APPOINTMENT (OUTPATIENT)
Dept: NON INVASIVE DIAGNOSTICS | Facility: HOSPITAL | Age: 49
DRG: 881 | End: 2024-08-08
Payer: COMMERCIAL

## 2024-08-08 PROBLEM — I47.29 NON-SUSTAINED VENTRICULAR TACHYCARDIA (HCC): Status: ACTIVE | Noted: 2024-08-08

## 2024-08-08 LAB
ANION GAP SERPL CALCULATED.3IONS-SCNC: 5 MMOL/L (ref 4–13)
AORTIC ROOT: 3.7 CM
APICAL FOUR CHAMBER EJECTION FRACTION: 71 %
ASCENDING AORTA: 3.7 CM
ATRIAL RATE: 91 BPM
BSA FOR ECHO PROCEDURE: 2.25 M2
BUN SERPL-MCNC: 24 MG/DL (ref 5–25)
CALCIUM SERPL-MCNC: 8.9 MG/DL (ref 8.4–10.2)
CHLORIDE SERPL-SCNC: 108 MMOL/L (ref 96–108)
CO2 SERPL-SCNC: 23 MMOL/L (ref 21–32)
CREAT SERPL-MCNC: 0.72 MG/DL (ref 0.6–1.3)
ERYTHROCYTE [DISTWIDTH] IN BLOOD BY AUTOMATED COUNT: 12.6 % (ref 11.6–15.1)
FRACTIONAL SHORTENING: 30 (ref 28–44)
GFR SERPL CREATININE-BSD FRML MDRD: 108 ML/MIN/1.73SQ M
GLUCOSE SERPL-MCNC: 91 MG/DL (ref 65–140)
HCT VFR BLD AUTO: 42.4 % (ref 36.5–49.3)
HGB BLD-MCNC: 13.9 G/DL (ref 12–17)
INTERVENTRICULAR SEPTUM IN DIASTOLE (PARASTERNAL SHORT AXIS VIEW): 1.2 CM
INTERVENTRICULAR SEPTUM: 1.2 CM (ref 0.6–1.1)
LAAS-AP2: 12 CM2
LAAS-AP4: 13.5 CM2
LEFT ATRIUM SIZE: 3.7 CM
LEFT ATRIUM VOLUME (MOD BIPLANE): 32 ML
LEFT ATRIUM VOLUME INDEX (MOD BIPLANE): 14.2 ML/M2
LEFT INTERNAL DIMENSION IN SYSTOLE: 2.3 CM (ref 2.1–4)
LEFT VENTRICLE DIASTOLIC VOLUME (MOD BIPLANE): 111 ML
LEFT VENTRICLE DIASTOLIC VOLUME INDEX (MOD BIPLANE): 49.3 ML/M2
LEFT VENTRICLE SYSTOLIC VOLUME (MOD BIPLANE): 38 ML
LEFT VENTRICLE SYSTOLIC VOLUME INDEX (MOD BIPLANE): 16.9 ML/M2
LEFT VENTRICULAR INTERNAL DIMENSION IN DIASTOLE: 3.3 CM (ref 3.5–6)
LEFT VENTRICULAR POSTERIOR WALL IN END DIASTOLE: 1.2 CM
LEFT VENTRICULAR STROKE VOLUME: 28 ML
LV EF: 65 %
LVSV (TEICH): 28 ML
MAGNESIUM SERPL-MCNC: 2.1 MG/DL (ref 1.9–2.7)
MCH RBC QN AUTO: 29.1 PG (ref 26.8–34.3)
MCHC RBC AUTO-ENTMCNC: 32.8 G/DL (ref 31.4–37.4)
MCV RBC AUTO: 89 FL (ref 82–98)
MV E'TISSUE VEL-LAT: 10 CM/S
MV E'TISSUE VEL-SEP: 7 CM/S
P AXIS: 36 DEGREES
PHOSPHATE SERPL-MCNC: 2.1 MG/DL (ref 2.7–4.5)
PHOSPHATE SERPL-MCNC: 2.6 MG/DL (ref 2.7–4.5)
PLATELET # BLD AUTO: 197 THOUSANDS/UL (ref 149–390)
PMV BLD AUTO: 10.8 FL (ref 8.9–12.7)
POTASSIUM SERPL-SCNC: 4.4 MMOL/L (ref 3.5–5.3)
PR INTERVAL: 178 MS
QRS AXIS: 31 DEGREES
QRSD INTERVAL: 98 MS
QT INTERVAL: 368 MS
QTC INTERVAL: 452 MS
RBC # BLD AUTO: 4.77 MILLION/UL (ref 3.88–5.62)
RIGHT ATRIUM AREA SYSTOLE A4C: 14.6 CM2
RIGHT VENTRICLE ID DIMENSION: 3.8 CM
SL CV LEFT ATRIUM LENGTH A2C: 4.1 CM
SL CV LV EF: 65
SL CV PED ECHO LEFT VENTRICLE DIASTOLIC VOLUME (MOD BIPLANE) 2D: 45 ML
SL CV PED ECHO LEFT VENTRICLE SYSTOLIC VOLUME (MOD BIPLANE) 2D: 17 ML
SODIUM SERPL-SCNC: 136 MMOL/L (ref 135–147)
T WAVE AXIS: 40 DEGREES
TR MAX PG: 33 MMHG
TR PEAK VELOCITY: 2.9 M/S
TRICUSPID ANNULAR PLANE SYSTOLIC EXCURSION: 2.7 CM
TRICUSPID VALVE PEAK REGURGITATION VELOCITY: 2.86 M/S
VENTRICULAR RATE: 91 BPM
WBC # BLD AUTO: 8.08 THOUSAND/UL (ref 4.31–10.16)

## 2024-08-08 PROCEDURE — 93306 TTE W/DOPPLER COMPLETE: CPT

## 2024-08-08 PROCEDURE — 83735 ASSAY OF MAGNESIUM: CPT | Performed by: INTERNAL MEDICINE

## 2024-08-08 PROCEDURE — 93010 ELECTROCARDIOGRAM REPORT: CPT | Performed by: INTERNAL MEDICINE

## 2024-08-08 PROCEDURE — 93306 TTE W/DOPPLER COMPLETE: CPT | Performed by: INTERNAL MEDICINE

## 2024-08-08 PROCEDURE — 92610 EVALUATE SWALLOWING FUNCTION: CPT

## 2024-08-08 PROCEDURE — 99214 OFFICE O/P EST MOD 30 MIN: CPT | Performed by: PSYCHIATRY & NEUROLOGY

## 2024-08-08 PROCEDURE — 97163 PT EVAL HIGH COMPLEX 45 MIN: CPT

## 2024-08-08 PROCEDURE — 70551 MRI BRAIN STEM W/O DYE: CPT

## 2024-08-08 PROCEDURE — 99223 1ST HOSP IP/OBS HIGH 75: CPT | Performed by: INTERNAL MEDICINE

## 2024-08-08 PROCEDURE — 99222 1ST HOSP IP/OBS MODERATE 55: CPT | Performed by: INTERNAL MEDICINE

## 2024-08-08 PROCEDURE — 97167 OT EVAL HIGH COMPLEX 60 MIN: CPT

## 2024-08-08 PROCEDURE — 99204 OFFICE O/P NEW MOD 45 MIN: CPT | Performed by: STUDENT IN AN ORGANIZED HEALTH CARE EDUCATION/TRAINING PROGRAM

## 2024-08-08 PROCEDURE — 84100 ASSAY OF PHOSPHORUS: CPT | Performed by: INTERNAL MEDICINE

## 2024-08-08 PROCEDURE — 80048 BASIC METABOLIC PNL TOTAL CA: CPT | Performed by: INTERNAL MEDICINE

## 2024-08-08 PROCEDURE — 99221 1ST HOSP IP/OBS SF/LOW 40: CPT | Performed by: PSYCHIATRY & NEUROLOGY

## 2024-08-08 PROCEDURE — 85027 COMPLETE CBC AUTOMATED: CPT | Performed by: INTERNAL MEDICINE

## 2024-08-08 RX ORDER — PANTOPRAZOLE SODIUM 40 MG/1
40 TABLET, DELAYED RELEASE ORAL
Status: DISCONTINUED | OUTPATIENT
Start: 2024-08-09 | End: 2024-08-09 | Stop reason: HOSPADM

## 2024-08-08 RX ORDER — MAGNESIUM HYDROXIDE/ALUMINUM HYDROXICE/SIMETHICONE 120; 1200; 1200 MG/30ML; MG/30ML; MG/30ML
30 SUSPENSION ORAL EVERY 6 HOURS PRN
Status: DISCONTINUED | OUTPATIENT
Start: 2024-08-08 | End: 2024-08-09 | Stop reason: HOSPADM

## 2024-08-08 RX ORDER — NORTRIPTYLINE HCL 25 MG
25 CAPSULE ORAL
Status: DISCONTINUED | OUTPATIENT
Start: 2024-08-08 | End: 2024-08-09 | Stop reason: HOSPADM

## 2024-08-08 RX ORDER — ENOXAPARIN SODIUM 100 MG/ML
40 INJECTION SUBCUTANEOUS DAILY
Status: DISCONTINUED | OUTPATIENT
Start: 2024-08-08 | End: 2024-08-09 | Stop reason: HOSPADM

## 2024-08-08 RX ORDER — ASPIRIN 81 MG/1
81 TABLET, CHEWABLE ORAL DAILY
Status: DISCONTINUED | OUTPATIENT
Start: 2024-08-08 | End: 2024-08-08

## 2024-08-08 RX ORDER — DIVALPROEX SODIUM 250 MG/1
250 TABLET, EXTENDED RELEASE ORAL DAILY
Status: COMPLETED | OUTPATIENT
Start: 2024-08-08 | End: 2024-08-08

## 2024-08-08 RX ORDER — NIACIN 100 MG
200 TABLET ORAL DAILY
Status: DISCONTINUED | OUTPATIENT
Start: 2024-08-08 | End: 2024-08-09 | Stop reason: HOSPADM

## 2024-08-08 RX ORDER — KETOROLAC TROMETHAMINE 10 MG/1
10 TABLET, FILM COATED ORAL EVERY 6 HOURS PRN
Status: DISCONTINUED | OUTPATIENT
Start: 2024-08-08 | End: 2024-08-09

## 2024-08-08 RX ORDER — SENNOSIDES 8.6 MG
1 TABLET ORAL
Status: DISCONTINUED | OUTPATIENT
Start: 2024-08-08 | End: 2024-08-09 | Stop reason: HOSPADM

## 2024-08-08 RX ORDER — ACETAMINOPHEN 325 MG/1
650 TABLET ORAL EVERY 4 HOURS PRN
Status: DISCONTINUED | OUTPATIENT
Start: 2024-08-08 | End: 2024-08-09

## 2024-08-08 RX ORDER — METHYLPREDNISOLONE SODIUM SUCCINATE 125 MG/2ML
100 INJECTION, POWDER, LYOPHILIZED, FOR SOLUTION INTRAMUSCULAR; INTRAVENOUS ONCE
Status: COMPLETED | OUTPATIENT
Start: 2024-08-08 | End: 2024-08-08

## 2024-08-08 RX ADMIN — ENOXAPARIN SODIUM 40 MG: 40 INJECTION SUBCUTANEOUS at 08:52

## 2024-08-08 RX ADMIN — NORTRIPTYLINE HYDROCHLORIDE 25 MG: 25 CAPSULE ORAL at 20:27

## 2024-08-08 RX ADMIN — POTASSIUM CHLORIDE 40 MEQ: 1500 TABLET, EXTENDED RELEASE ORAL at 00:51

## 2024-08-08 RX ADMIN — NORTRIPTYLINE HYDROCHLORIDE 25 MG: 25 CAPSULE ORAL at 00:56

## 2024-08-08 RX ADMIN — ASPIRIN 81 MG CHEWABLE TABLET 81 MG: 81 TABLET CHEWABLE at 08:52

## 2024-08-08 RX ADMIN — Medication 200 MG: at 08:52

## 2024-08-08 RX ADMIN — SODIUM CHLORIDE 900 MG: 0.9 INJECTION, SOLUTION INTRAVENOUS at 13:57

## 2024-08-08 RX ADMIN — DIVALPROEX SODIUM 250 MG: 250 TABLET, EXTENDED RELEASE ORAL at 08:52

## 2024-08-08 RX ADMIN — METHYLPREDNISOLONE SODIUM SUCCINATE 100 MG: 125 INJECTION, POWDER, FOR SOLUTION INTRAMUSCULAR; INTRAVENOUS at 00:56

## 2024-08-08 NOTE — PLAN OF CARE
Problem: Potential for Falls  Goal: Patient will remain free of falls  Description: INTERVENTIONS:  - Educate patient/family on patient safety including physical limitations  - Instruct patient to call for assistance with activity   - Consult OT/PT to assist with strengthening/mobility   - Keep Call bell within reach  - Keep bed low and locked with side rails adjusted as appropriate  - Keep care items and personal belongings within reach  - Initiate and maintain comfort rounds  - Make Fall Risk Sign visible to staff  - Offer Toileting every 2 Hours, in advance of need  - Initiate/Maintain 2 alarm  - Obtain necessary fall risk management equipment: 2  - Apply yellow socks and bracelet for high fall risk patients  - Consider moving patient to room near nurses station  8/8/2024 0104 by Gary Pereira RN  Outcome: Progressing  8/8/2024 0104 by Gary Pereira RN  Outcome: Progressing     Problem: PAIN - ADULT  Goal: Verbalizes/displays adequate comfort level or baseline comfort level  Description: Interventions:  - Encourage patient to monitor pain and request assistance  - Assess pain using appropriate pain scale  - Administer analgesics based on type and severity of pain and evaluate response  - Implement non-pharmacological measures as appropriate and evaluate response  - Consider cultural and social influences on pain and pain management  - Notify physician/advanced practitioner if interventions unsuccessful or patient reports new pain  Outcome: Progressing

## 2024-08-08 NOTE — TELEPHONE ENCOUNTER
"Phone calls such as this after 5 pm should have gone to the neurology on call provider.  Will forward to management (Vasu Moon) to look into.    Reviewed ER notes:  Presenting with left facial paresthesias, L sided numbness, and intermittent dysarthria/expressive aphasia that onset [id number] on 8/7  Admitted 7/15-7/16 for the same symptoms due to concern for TIA-MRI brain was negative at that time-was started on ASA 81 Mg daily (has been taking  mg daily as he ran out of baby aspirin)  Neurology also felt that this could have been a pseudo exacerbation of MS due to stressful events in patient's life  As symptoms are the same as prior-high suspicion for stress reaction as patient was told earlier today that he was unable to receive his monthly Solu-Medrol and Ocrevus treatment for his MS due to insurance change  CTA H/N: \"CT Brain: No acute intracranial abnormality. Subtle decreased attenuation in the periventricular and subcortical white matter is again seen, stable compared to prior exams which could be related to demyelination or mild chronic small vessel ischemic changes. CT Angiography: Unremarkable CTA neck and brain without large vessel arterial occlusion, significant flow-limiting stenosis, or focal saccular aneurysm identified.\"  ED spoke with neurology-felt like this was likely a stress reaction as symptoms were same as prior  will admit to stroke pathway for now  Obtain MRI brain  Monitor on tele with VS and neuro checks per protocol   Continue home aspirin -hold on statin as recent LDL is 65  Formal neurology consult  Goal of normotension  With frequent readmissions for the same symptoms and negative extensive workups-would benefit from referral to high utilizer plan   Psychiatry also consulted as this was recommended during recent admit by neurology and patient is willing  NIHSS on admit is 0-was also 0 in the ED therefore not a TNK candidate     No further recommendations at this time   "

## 2024-08-08 NOTE — CASE MANAGEMENT
Case Management Assessment & Discharge Planning Note    Patient name Carlos Cedillo  Location /-01 MRN [id number]  : 1975 Date 2024       Current Admission Date: 2024  Current Admission Diagnosis:Weakness   Patient Active Problem List    Diagnosis Date Noted Date Diagnosed    Non-sustained ventricular tachycardia (HCC) 2024     Weakness 2024     DAVID (obstructive sleep apnea) 2024     Stroke-like symptoms 2023     Pain of upper abdomen 2023     Right-sided thoracic back pain 2023     Hypomagnesemia 2023     Episodic tension-type headache, not intractable 2023     Left leg weakness 2022     Obesity (BMI 30-39.9) 2022     Ambulatory dysfunction 03/15/2022     MS (multiple sclerosis) (HCC) 03/15/2022     Dysarthria 2022     CNS demyelinating disease (HCC) 2022     Right hemiparesis (HCC) 2022       LOS (days): 0  Geometric Mean LOS (GMLOS) (days):   Days to GMLOS:     OBJECTIVE:              Current admission status: Observation       Preferred Pharmacy:   [id number] - [place] - [address]  [address]  [place] [address]  Phone: [phone] Fax: [phone]    Primary Care Provider: Ha Acosta MD    Primary Insurance: [employer]  Secondary Insurance:     ASSESSMENT:  Active Health Care Proxies       kimi cedillo [facility] Representative - [facility]   Primary Phone: [phone] (Home)                 Advance Directives  Does patient have a Health Care POA?: No  Was patient offered paperwork?: Yes  Does patient have Advance Directives?: No  Was patient offered paperwork?: Yes  Primary Contact: Kimi Cedillo: dtr: [phone]    Readmission Root Cause  30 Day Readmission: No    Patient Information  Admitted from:: Home  Mental Status: Alert  During Assessment patient was accompanied by: Not accompanied during assessment  Assessment information provided by::  Patient  Primary Caregiver: Self  Support Systems: Self, Daughter, Family members  County of Residence: [place]  What city do you live in?: [place]  Home entry access options. Select all that apply.: Ramp  Type of Current Residence: 2 story home  Upon entering residence, is there a bedroom on the main floor (no further steps)?: Yes  Upon entering residence, is there a bathroom on the main floor (no further steps)?: Yes  Living Arrangements: Lives w/ Daughter, Other (Comment) (Lives with parents)  Is patient a ?: No    Activities of Daily Living Prior to Admission  Functional Status: Independent  Completes ADLs independently?: Yes  Ambulates independently?: No  Level of ambulatory dependence: Assistance  Does patient use assisted devices?: Yes  Assisted Devices (DME) used: Wheelchair  Does patient currently own DME?: Yes  What DME does the patient currently own?: Walker, Wheelchair  Does patient have a history of Outpatient Therapy (PT/OT)?: No  Does the patient have a history of Short-Term Rehab?: Yes ( rehab)  Does patient have a history of HHC?: Yes ([facility])  Does patient currently have HHC?: No    Patient Information Continued  Income Source: SSI/SSD  Does patient have prescription coverage?: Yes  Does patient receive dialysis treatments?: No  Does patient have a history of substance abuse?: No  Does patient have a history of Mental Health Diagnosis?: No      Means of Transportation  Means of Transport to Appts:: Family transport      Social Determinants of Health (SDOH)      Flowsheet Row Most Recent Value   Housing Stability    In the last 12 months, was there a time when you were not able to pay the mortgage or rent on time? N   In the past 12 months, how many times have you moved where you were living? 0   At any time in the past 12 months, were you homeless or living in a shelter (including now)? N   Transportation Needs    In the past 12 months, has lack of transportation kept you  from medical appointments or from getting medications? no   In the past 12 months, has lack of transportation kept you from meetings, work, or from getting things needed for daily living? No   Food Insecurity    Within the past 12 months, you worried that your food would run out before you got the money to buy more. Never true   Within the past 12 months, the food you bought just didn't last and you didn't have money to get more. Never true   Utilities    In the past 12 months has the electric, gas, oil, or water company threatened to shut off services in your home? No            DISCHARGE DETAILS:    Requested Home Health Care         Is the patient interested in HHC at discharge?: Yes  Home Health Discipline requested:: Nursing, Occupational Therapy, Physical Therapy  Home Health Agency Name:: [facility]  HHA External Referral Reason (only applicable if external HHA name selected): Patient has established relationship with provider  Home Health Follow-Up Provider:: PCP  Home Health Services Needed:: Evaluate Functional Status and Safety, Gait/ADL Training, Strengthening/Theraputic Exercises to Improve Function  Homebound Criteria Met:: Uses an Assist Device (i.e. cane, walker, etc)  Supporting Clincal Findings:: Bed Bound or Wheelchair Bound      Additional Comments: Met with Pt. Pt presents AA&Ox3. Discussed role of case management. Pt lives with parents and dtr in 2sh, ramp to enter. Pt resides on first floor. Pt has stair lift to 2nd floor. Pt uses electric wheelchair and owns greg wheelchair and walker. Pt uses [facility] in [place] and able to afford medications. Uses [facility] pharmacy in [place] and able to afford meications. Hx of HHC and  rehab. Denies hx of MH/D&A. Pt's sister or father will transport home. Pt requesting [facility] for VNA/PT/OT. Referral to [facility] via [facility].

## 2024-08-08 NOTE — CONSULTS
Consult - Cardiology   Carlos DESTIN Landis 49 y.o. male MRN: [id number]  Unit/Bed#: -01 Encounter: [id number]        Reason For Consult:  NSVT  Outpatient Cardiologist: None               ASSESSMENT:  NSVT, 13 beats on tele 8/7/24 (asymptomatic)  See picture under media tab 8/8/2024  Electrolytes within normal limits  Troponin levels negative  4/16/2024 echo: LVEF 70%, normal RV size and systolic function, no hemodynamically significant valvular disease  Mild aortic root dilation 4.3 cm on echo in April 2024  Multiple sclerosis  Weakness, multiple neurologic issues  DAVID    PLAN/ DISCUSSION:     Mild aortic root dilation  Monitor as outpatient, can consider nonurgent outpatient CTA chest  NSVT  Asymptomatic and hemodynamically stable  Update echocardiogram  Outpatient 2-week ZIO monitor  If found to have high burden of nonsustained VT on ZIO monitor then would recommend ischemic evaluation with cardiac CTA. Assuming no structural issues on echocardiogram then no further inpatient cardiac workup is planned. Will arrange outpatient follow-up and see as needed moving forward.    History Of Present Illness:  This is a 49-year-old gentleman coming to the hospital with recurrent weakness which he attributes to a flare of multiple sclerosis.  He has been seen in the hospital several times for this in recent months.  He is being admitted for observation.  He was placed on telemetry and noted to have about 5 seconds of nonsustained VT.  He had no palpitations.  No dizziness or episodes of passing out.  He has no pain or pressure in the chest.  Periosteum consultation to comment on his brief arrhythmia.    Past Medical History:        Past Medical History:   Diagnosis Date    Arthritis     Diabetes mellitus (HCC)     prediabetes    MS (multiple sclerosis) (HCC)     Scoliosis     Visual impairment       Past Surgical History:   Procedure Laterality Date    HERNIA REPAIR      3 times    KNEE SURGERY Right         Allergy:       "  No Known Allergies    Medications:       Prior to Admission medications    Medication Sig Start Date End Date Taking? Authorizing Provider   aspirin 81 mg chewable tablet Chew 1 tablet (81 mg total) daily 7/17/24  Yes Gris Levine MD   cyanocobalamin 1,000 mcg/mL Take B12 1000 mcg IM once a week for 4 weeks, then once a month for 5 months 4/25/23  Yes Trera Hernandez MD   divalproex sodium (Depakote ER) 250 mg 24 hr tablet Take 2 tabs for 3 days, then 1 tab for 2 days 8/2/24  Yes Terra Hernandez MD   ketorolac (TORADOL) 10 mg tablet Take 1 tablet (10 mg total) by mouth every 6 (six) hours as needed for moderate pain (headache) 7/18/24  Yes Terra Hernandez MD   MAGNESIUM PO Take by mouth   Yes Historical Provider, MD   nortriptyline (PAMELOR) 25 mg capsule Take 1 capsule (25 mg total) by mouth daily at bedtime 7/18/24  Yes Terra Hernandez MD   ocrelizumab (Ocrevus) 300 MG/10ML SOLN Inject 20 mL (600 mg total) into a catheter in a vein every 6 (six) months 11/14/23  Yes Terra Hernandez MD   Riboflavin ([employer] Vitamin B-2) 100 MG TABS Take 2 tablets (200 mg total) by mouth in the morning 5/23/22  Yes Terra Hernandez MD   SYRINGE-NEEDLE, DISP, 3 ML 25G X 1\" 3 ML MISC Take with B12 regimen prescribed 5/9/23  Yes Terra Hernandez MD   dexamethasone (DECADRON) 2 mg tablet Take 1 tablet (2 mg total) by mouth daily as needed (headache)  Patient not taking: Reported on 8/7/2024 8/2/24   Terra Hernandez MD   prochlorperazine (COMPAZINE) 10 mg tablet Take 1 tablet (10 mg total) by mouth every 12 (twelve) hours as needed (HEADACHE) With Decadron in the morning  Patient not taking: Reported on 8/7/2024 8/2/24   Terra Hernandez MD       Family History:     Family History   Problem Relation Age of Onset    Stroke Maternal Grandmother     Multiple sclerosis Other         Social History:       Social History     Socioeconomic History    Marital status: Single     Spouse " name: None    Number of children: None    Years of education: None    Highest education level: None   Occupational History    None   Tobacco Use    Smoking status: Former    Smokeless tobacco: Former   Vaping Use    Vaping status: Former   Substance and Sexual Activity    Alcohol use: Not Currently    Drug use: Never    Sexual activity: Not Currently   Other Topics Concern    None   Social History Narrative    None     Social Determinants of Health     Financial Resource Strain: Not on file   Food Insecurity: No Food Insecurity (7/16/2024)    Hunger Vital Sign     Worried About Running Out of Food in the Last Year: Never true     Ran Out of Food in the Last Year: Never true   Transportation Needs: No Transportation Needs (7/16/2024)    PRAPARE - Transportation     Lack of Transportation (Medical): No     Lack of Transportation (Non-Medical): No   Physical Activity: Not on file   Stress: Not on file   Social Connections: Not on file   Intimate Partner Violence: Not on file   Housing Stability: Low Risk  (7/16/2024)    Housing Stability Vital Sign     Unable to Pay for Housing in the Last Year: No     Number of Times Moved in the Last Year: 0     Homeless in the Last Year: No       ROS:  14 point ROS negative except as outlined above  Remainder review of systems is negative    Exam:  General:  alert, oriented and in no distress, cooperative  Head: Normocephalic, atraumatic.  Eyes:  EOMI. Pupils - equal, round, reactive to accomodation.  No icterus.  Normal Conjunctiva.   Oropharynx: moist and normal-appearing mucosa  Neck: supple, symmetrical, trachea midline and no JVD  Heart:  RRR, No: murmer, rub or gallop, S1 & S2 normal   Respiratory effort / Chest Inspection: unlabored  Lungs:  normal air entry, lungs clear to auscultation and no rales, rhonchi or wheezing   Abdomen: flat, normal findings: bowel sounds normal and soft, non-tender  Lower Limbs:  no pitting edema  Pulses::  RLE - DP: present 2+                 LLE  - DP: present 2+  Musculoskeletal: ROM grossly normal        DATA:      ECG:                     Telemetry: Normal sinus rhythm, . HR 90s          Echocardiogram:           Ischemic Testing:         Weights:    Wt Readings from Last 3 Encounters:   08/07/24 106 kg (233 lb)   07/03/24 107 kg (236 lb 1.8 oz)   04/16/24 107 kg (236 lb)   , Body mass index is 32.5 kg/m².         Lab Studies:             Results from last 7 days   Lab Units 08/08/24  0408 08/07/24  1903   WBC Thousand/uL 8.08 8.49   HEMOGLOBIN g/dL 13.9 15.1   HEMATOCRIT % 42.4 45.3   PLATELETS Thousands/uL 197 230   ,   Results from last 7 days   Lab Units 08/08/24  0408 08/07/24  1903   POTASSIUM mmol/L 4.4 3.5   CHLORIDE mmol/L 108 109*   CO2 mmol/L 23 25   BUN mg/dL 24 26*   CREATININE mg/dL 0.72 0.91   CALCIUM mg/dL 8.9 9.0   ALK PHOS U/L  --  60   ALT U/L  --  11   AST U/L  --  14

## 2024-08-08 NOTE — ASSESSMENT & PLAN NOTE
Carlos Landis is a 49 y.o. male with MS on Ocrevus infusions Q6 months and monthly Solu-Medrol infusions, scoliosis, DAVID not on CPAP who presents to [facility] ED on 8/7/2024 with left-sided facial numbness, left hand weakness, expressive aphasia, and dysarthria.    Seen by inpatient neurology on 7/2/2024 for fluctuating left facial numbness, headache, right upper extremity coordination difficulty, and increased speech difficulty.   MRI brain unremarkable for active demyelinating lesion/enhancement, felt to be a pseudo exacerbation of MS in the setting of ongoing life stressors.    He received his outpatient Ocrevus infusion and Solu-Medrol infusion on 7/10/2024  Seen by inpatient neurology on 7/15/2024 for right frontal headache, speech difficulty, left facial numbness which spread to the right side of his face, and left-sided weakness.    MRI brain completed again w wo without evidence of active demyelinating lesions, and again thought to be MS pseudo flare/anxiety component.    Given TIA was in the differential during this admission, patient was recommended to continue on ASA 81 mg daily on discharge.  Inpatient neurology consulted this admission 8/8/2024 after patient developed left-sided numbness affecting face, arm, leg, as well as left hand weakness, word finding difficulties, and slurred speech.  Symptoms occurred after a stressful event, and resolved upon coming into the ED.    Workup:  - CTA head and neck: Unremarkable for acute injection abnormalities or evidence of large vessel occlusion/critical stenosis  - MRI brain without: Unremarkable for evidence of acute infarct; redemonstrated chronic demyelinating disease    Given significant improvement in symptoms and similar symptoms to the last 2 admissions, lower suspicion for true MS exacerbation.  MRI brain completed prior to neurology evaluation, unremarkable for acute intracranial abnormalities.  Unfortunately MR imaging was completed without contrast,  however lower suspicion for true MS exacerbation so will hold off on repeating with contrast.  Patient received 100 mg Solu-Medrol overnight, will give additional 900 mg to complete total dose of 1000 mg which patient has been receiving in the outpatient setting for monthly infusions.    Plan:  - Hold off on any further neuroimaging at this time  - Will give additional IV Solu-Medrol 900 mg x 1 since patient received 100 mg overnight to complete his dose for his monthly infusion  - ASA 81 mg daily discontinued, felt it was not needed from a neurology standpoint per discussion with attending neurologist  - PT/OT/speech  - Medical management and supportive care per primary team, notify with changes    No further inpatient recommendations at this time, call with questions

## 2024-08-08 NOTE — ASSESSMENT & PLAN NOTE
5-second run of nonsustained V. tach noted on telemetry  Patient asymptomatic and conversational when episode occurred, BP stable  Updated echo ordered-last was 4/2024 without any abnormalities  Cardiology consulted

## 2024-08-08 NOTE — PLAN OF CARE
Problem: OCCUPATIONAL THERAPY ADULT  Goal: Performs self-care activities at highest level of function for planned discharge setting.  See evaluation for individualized goals.  Description: Treatment Interventions: ADL retraining, Functional transfer training, Patient/family training, Equipment evaluation/education, Compensatory technique education, Continued evaluation, UE strengthening/ROM          See flowsheet documentation for full assessment, interventions and recommendations.   Note: Limitation: Decreased ADL status, Decreased self-care trans, Decreased high-level ADLs, Decreased endurance, Decreased UE strength  Prognosis: Good  Assessment: Pt is a 49 y.o. male seen for OT evaluation at [facility], admitted 8/7/2024 w/ Weakness.  OT completed extensive review of pt's medical and social history. Comorbidities affecting pt's functional performance at time of assessment include: MS, DAVID, dysarthia, R hemiparesis, ambulatory dysfunction. Personal factors affecting pt at time of IE include: difficulty performing ADLS, difficulty performing IADLS , decreased initiation and engagement , financial barriers, and health management . Prior to admission, pt was living with family In a 2SH with ramp & stair glide.  Pt was I w/  ADLS and w/ IADLS, (-) drove, & required use of RW PTA. Upon evaluation: Pt requires S for bed mobility, S for functional transfers, S for functional mobility, S for UB ADLs and S for LB ADLS 2* the following deficits impacting occupational performance: weakness, decreased balance, and decreased coping skills. Full objective findings from OT assessment regarding body systems outlined above. Pt to benefit from continued skilled OT tx while in the hospital to address deficits as defined above and maximize level of functional independence w/ ADL's and functional mobility. Occupational Performance areas to address include: bathing/shower, toilet hygiene, dressing, functional mobility, and  clothing management. Based on findings, pt is of high complexity. The patient's raw score on the AM-PAC Daily Activity inpatient short form is 20, standardized score is 42.03, greater than 39.4. Patients at this level are likely to benefit from DC to home. However, please refer to therapist recommendation for discharge planning given other factors that may influence destination. At this time, OT recommendations at time of discharge are DC with Level III - Low Rehab Resource Intensity resources.     Rehab Resource Intensity Level, OT: III (Minimum Resource Intensity)

## 2024-08-08 NOTE — CONSULTS
"TELEConsultation - Behavioral Health   Carlos Landis 49 y.o. male MRN: [id number]  Unit/Bed#: -01 Encounter: [id number]    REQUIRED DOCUMENTATION:     1. This service was provided via Telemedicine.  2. Provider located in [place].  3. TeleMed provider: Filemon Jiménez MD  4. Identify all parties in room with patient during tele consult: patient  5. After connecting through televideo, patient was identified by name and date of birth. Parent/patient was then informed that this was being conducted confidentially over secure lines. My office door was closed. Parties in the room listed above as per #4.  Patient acknowledged consent and understanding of privacy and security of the Telemedicine visit. The patient is aware this is a billable service and understands that he can discontinue the visit at any time. I informed the patient that I have reviewed their record in Epic and presented the opportunity for them to ask any questions regarding the visit today.  The patient agreed to participate.       Chief Complaint: \"I have been struggling a little bit\"    History of Present Illness   Physician Requesting Consult: Holly Byrd MD    Reason for Consult / Principal Problem: weakness in setting of stressors    Per H and P: Carlos Landis is a 49 y.o. male with a PMH of MS and DAVID who presents with left facial numbness, word finding difficulty, and feeling of inability to move his entire body earlier this evening.  Patient reports symptoms onset sometime between 1426-1689.  Reports that he did have more weakness on the left side of his body but states that \"I could not really move.\"Reports symptoms continued until his IV was inserted in the ED.  Reports that he has had increased stress due to insurance change and not having coverage of home infusion for MS medications.  Reports the symptoms at onset today were the same as prior in July which she was admitted for.  He has baseline right arm weakness from MS and ambulates " "with a walker at baseline.     On evaluation,    Patient was calm and cooperative with interview.  Was overall pleasant and responding to my questions appropriately.  Spoke with a little bit of a stutter, which he attributes to his MS.  He reports that he has been struggling with some isolation due to some people abandoning support for him socially since his diagnosis of MS 2 years ago.  He reports that he does have ongoing support from other friends and feels that he has adequate social support at home.  He reports multiple stressors recently including his ongoing difficulty with movement.  He however, denies depressed mood only citing that his situation \"is depressing\".  He reports getting \"pretty good sleep for a shaft\".  However, he laments the loss of functioning that prevents him from cooking and working as a .  He reports that his appetite is somewhat decreased but still eats 3 meals a day.  Denies any trouble with focus compared with baseline.  Denies suicidal ideation, intention, or plan.  Denies ever experiencing thoughts that life is not worth living or thoughts of death.  He denies feeling overtly anxious.  Denies SI/HI/AVH.  No history of psychiatric contact or psychiatric hospitalization.  We discussed stress as a cause of some of his psychiatric and neurologic symptoms.  Patient acknowledges that there could be potential benefit to speaking with a therapist but reports that he is hesitant because he does not want to be a burden on his family to help him attend appointments.  We discussed that there are multiple options for engaging in psychotherapy and he is willing to receive referrals.  Not interested in psychiatric medications for depressed mood, anxiety at this time.    Psychiatric Review Of Systems:  Medication side effects: none  Sleep: Fair  Appetite: Decreased  Hygiene: able to tend to instrumental and basic ADLs  Anxiety Symptoms: denies  Psychotic Symptoms: denies  Depression Symptoms: " denies  Manic Symptoms: denies  PTSD Symptoms: denies  Suicidal Thoughts: denies  Homicidal Thoughts: denies    Historical Information   Psychiatric History:   Diagnoses: None  Inpatient Hx: None  Outpatient Hx: None  Medications/Trials: None    Substance Abuse History:    Social History     Substance and Sexual Activity   Alcohol Use Not Currently     Social History     Substance and Sexual Activity   Drug Use Never       I discussed substance abuse with the patient and, if pertinent, discussed risks vs benefits of decreasing frequency of use.    Family History:   Family History   Problem Relation Age of Onset    Stroke Maternal Grandmother     Multiple sclerosis Other        Social History  Lives with parents and his daughter.  Was a  until his MS diagnosis impaired ability to work as a .  Rest of social history as per below:  Social History     Socioeconomic History    Marital status: Single     Spouse name: Not on file    Number of children: Not on file    Years of education: Not on file    Highest education level: Not on file   Occupational History    Not on file   Tobacco Use    Smoking status: Former    Smokeless tobacco: Former   Vaping Use    Vaping status: Former   Substance and Sexual Activity    Alcohol use: Not Currently    Drug use: Never    Sexual activity: Not Currently   Other Topics Concern    Not on file   Social History Narrative    Not on file     Social Determinants of Health     Financial Resource Strain: Not on file   Food Insecurity: No Food Insecurity (7/16/2024)    Hunger Vital Sign     Worried About Running Out of Food in the Last Year: Never true     Ran Out of Food in the Last Year: Never true   Transportation Needs: No Transportation Needs (7/16/2024)    PRAPARE - Transportation     Lack of Transportation (Medical): No     Lack of Transportation (Non-Medical): No   Physical Activity: Not on file   Stress: Not on file   Social Connections: Not on file   Intimate Partner  Violence: Not on file   Housing Stability: Low Risk  (7/16/2024)    Housing Stability Vital Sign     Unable to Pay for Housing in the Last Year: No     Number of Times Moved in the Last Year: 0     Homeless in the Last Year: No       Past Medical History:   Diagnosis Date    Arthritis     Diabetes mellitus (HCC)     prediabetes    MS (multiple sclerosis) (HCC)     Scoliosis     Visual impairment        Medical Review Of Systems:  Patient denies headache or dizziness.   Patient denies chest pain or palpitations.  Patient denies difficulty breathing or wheezing.  Patient denies nausea, vomiting, or diarrhea.  Patient denies polyuria or polydipsia.  Patient denies weakness or numbness.  Pertinent positives as per HPI.    Meds/Allergies   No Known Allergies  Current Facility-Administered Medications   Medication Dose Route Frequency    acetaminophen (TYLENOL) tablet 650 mg  650 mg Oral Q4H PRN    aluminum-magnesium hydroxide-simethicone (MAALOX) oral suspension 30 mL  30 mL Oral Q6H PRN    aspirin chewable tablet 81 mg  81 mg Oral Daily    enoxaparin (LOVENOX) subcutaneous injection 40 mg  40 mg Subcutaneous Daily    ketorolac (TORADOL) tablet 10 mg  10 mg Oral Q6H PRN    niacin tablet 200 mg  200 mg Oral Daily    nortriptyline (PAMELOR) capsule 25 mg  25 mg Oral HS    senna (SENOKOT) tablet 8.6 mg  1 tablet Oral HS PRN       Current Medications:  Current medications as per above. All medications have been reviewed.   Risks, benefits, alternatives, and possible side effects of patient's psychiatric medications were discussed with patient.     Objective   Vital signs in last 24 hours:  Temp:  [97.3 °F (36.3 °C)-97.8 °F (36.6 °C)] 97.3 °F (36.3 °C)  HR:  [79-98] 79  Resp:  [16-18] 16  BP: (119-149)/(81-92) 120/81    Mental Status Exam:  Appearance: casually dressed, consistent with stated age  Motor: no psychomotor retardation, no gait abnormalities  Behavior: cooperative, answers questions appropriately  Speech:  "Stuttering, normal volume  Mood: \"Stressed\"  Affect: Constricted, mostly euthymic and reactive  Thought Process: linear and goal-oriented  Thought Content: denies delusions  Risk Potential: denies suicidal ideation, plan, or intent. Denies homicidal ideation  Perceptions: denies auditory hallucinations, denies visual hallucinations,   Sensorium: Oriented to person, place, time, and situation  Cognition: cognitive ability appears intact but was not quantitatively tested  Consciousness: alert and awake  Attention: intact, able to focus without difficulty  Insight: fair  Judgement: limited      Laboratory results:  I have personally reviewed all pertinent laboratory/tests results.  Recent Results (from the past 48 hour(s))   ECG 12 lead    Collection Time: 08/07/24  7:01 PM   Result Value Ref Range    Ventricular Rate 91 BPM    Atrial Rate 91 BPM    NE Interval 178 ms    QRSD Interval 98 ms    QT Interval 368 ms    QTC Interval 452 ms    P Axis 36 degrees    QRS Axis 31 degrees    T Wave Axis 40 degrees   Comprehensive metabolic panel    Collection Time: 08/07/24  7:03 PM   Result Value Ref Range    Sodium 141 135 - 147 mmol/L    Potassium 3.5 3.5 - 5.3 mmol/L    Chloride 109 (H) 96 - 108 mmol/L    CO2 25 21 - 32 mmol/L    ANION GAP 7 4 - 13 mmol/L    BUN 26 (H) 5 - 25 mg/dL    Creatinine 0.91 0.60 - 1.30 mg/dL    Glucose 86 65 - 140 mg/dL    Calcium 9.0 8.4 - 10.2 mg/dL    AST 14 13 - 39 U/L    ALT 11 7 - 52 U/L    Alkaline Phosphatase 60 34 - 104 U/L    Total Protein 7.3 6.4 - 8.4 g/dL    Albumin 4.1 3.5 - 5.0 g/dL    Total Bilirubin 0.46 0.20 - 1.00 mg/dL    eGFR 97 ml/min/1.73sq m   TSH, 3rd generation with Free T4 reflex    Collection Time: 08/07/24  7:03 PM   Result Value Ref Range    TSH 3RD GENERATON 1.705 0.450 - 4.500 uIU/mL   Magnesium    Collection Time: 08/07/24  7:03 PM   Result Value Ref Range    Magnesium 2.2 1.9 - 2.7 mg/dL   HS Troponin 0hr (reflex protocol)    Collection Time: 08/07/24  7:03 PM " "  Result Value Ref Range    hs TnI 0hr <2 \"Refer to ACS Flowchart\"- see link ng/L   CBC and differential    Collection Time: 08/07/24  7:03 PM   Result Value Ref Range    WBC 8.49 4.31 - 10.16 Thousand/uL    RBC 5.06 3.88 - 5.62 Million/uL    Hemoglobin 15.1 12.0 - 17.0 g/dL    Hematocrit 45.3 36.5 - 49.3 %    MCV 90 82 - 98 fL    MCH 29.8 26.8 - 34.3 pg    MCHC 33.3 31.4 - 37.4 g/dL    RDW 12.8 11.6 - 15.1 %    MPV 10.4 8.9 - 12.7 fL    Platelets 230 149 - 390 Thousands/uL    nRBC 0 /100 WBCs    Segmented % 68 43 - 75 %    Immature Grans % 1 0 - 2 %    Lymphocytes % 16 14 - 44 %    Monocytes % 9 4 - 12 %    Eosinophils Relative 5 0 - 6 %    Basophils Relative 1 0 - 1 %    Absolute Neutrophils 5.78 1.85 - 7.62 Thousands/µL    Absolute Immature Grans 0.07 0.00 - 0.20 Thousand/uL    Absolute Lymphocytes 1.34 0.60 - 4.47 Thousands/µL    Absolute Monocytes 0.73 0.17 - 1.22 Thousand/µL    Eosinophils Absolute 0.46 0.00 - 0.61 Thousand/µL    Basophils Absolute 0.11 (H) 0.00 - 0.10 Thousands/µL   Phosphorus    Collection Time: 08/07/24  7:03 PM   Result Value Ref Range    Phosphorus 2.6 (L) 2.7 - 4.5 mg/dL   Fingerstick Glucose (POCT)    Collection Time: 08/07/24  7:12 PM   Result Value Ref Range    POC Glucose 103 65 - 140 mg/dl   UA w Reflex to Microscopic w Reflex to Culture    Collection Time: 08/07/24  8:06 PM    Specimen: Urine, Other   Result Value Ref Range    Color, UA Dark Yellow     Clarity, UA Clear     Specific Gravity, UA >=1.030 1.005 - 1.030    pH, UA 6.0 4.5, 5.0, 5.5, 6.0, 6.5, 7.0, 7.5, 8.0    Leukocytes, UA Negative Negative    Nitrite, UA Negative Negative    Protein, UA Trace (A) Negative mg/dl    Glucose, UA Negative Negative mg/dl    Ketones, UA 10 (1+) (A) Negative mg/dl    Urobilinogen, UA 2.0 (A) <2.0 mg/dl mg/dl    Bilirubin, UA Negative Negative    Occult Blood, UA Negative Negative   Urine Microscopic    Collection Time: 08/07/24  8:06 PM   Result Value Ref Range    RBC, UA None Seen None " Seen, 0-1, 1-2, 2-4, 0-5 /hpf    WBC, UA 2-4 None Seen, 0-1, 1-2, 0-5, 2-4 /hpf    Epithelial Cells Occasional None Seen, Occasional /hpf    Bacteria, UA Occasional None Seen, Occasional /hpf    MUCUS THREADS Innumerable (A) None Seen    Hyaline Casts, UA 0-1 (A) (none) /lpf   Basic metabolic panel    Collection Time: 08/08/24  4:08 AM   Result Value Ref Range    Sodium 136 135 - 147 mmol/L    Potassium 4.4 3.5 - 5.3 mmol/L    Chloride 108 96 - 108 mmol/L    CO2 23 21 - 32 mmol/L    ANION GAP 5 4 - 13 mmol/L    BUN 24 5 - 25 mg/dL    Creatinine 0.72 0.60 - 1.30 mg/dL    Glucose 91 65 - 140 mg/dL    Calcium 8.9 8.4 - 10.2 mg/dL    eGFR 108 ml/min/1.73sq m   CBC (With Platelets)    Collection Time: 08/08/24  4:08 AM   Result Value Ref Range    WBC 8.08 4.31 - 10.16 Thousand/uL    RBC 4.77 3.88 - 5.62 Million/uL    Hemoglobin 13.9 12.0 - 17.0 g/dL    Hematocrit 42.4 36.5 - 49.3 %    MCV 89 82 - 98 fL    MCH 29.1 26.8 - 34.3 pg    MCHC 32.8 31.4 - 37.4 g/dL    RDW 12.6 11.6 - 15.1 %    Platelets 197 149 - 390 Thousands/uL    MPV 10.8 8.9 - 12.7 fL   Magnesium    Collection Time: 08/08/24  4:08 AM   Result Value Ref Range    Magnesium 2.1 1.9 - 2.7 mg/dL   Phosphorus    Collection Time: 08/08/24  4:08 AM   Result Value Ref Range    Phosphorus 2.1 (L) 2.7 - 4.5 mg/dL          Assessment & Plan     Assessment: 48-year-old-year-old man with no psychiatric history currently admitted to the hospital for recurrent weakness which he attributes to a flare of his MS.  Multiple recent admissions for similar symptoms with no physical findings.  Patient minimizes his ongoing stress and denies overt depressed mood, suicidal ideation, intention, or plan.  Denies anxiety.  It appears that he is minimizing symptoms but he does report feeling supported socially and we discussed potential treatment options for stress, anxiety, depressed mood.  He was resistant to a referral for outpatient psychotherapy but in the end decided that he was  willing to receive information regarding outpatient behavioral health treatment.  Not interested in psychiatric medications at this time.  Did not meet criteria for primary mood disorder such as major depressive disorder or primary anxiety disorder.  Patient would likely benefit from outpatient behavioral health referral and should be given 1 on discharge.  Does not meet inpatient psychiatric hospitalization criteria and does not meet 302 criteria at this time.  If indeed his symptoms are functional neurologic disorder the treatment of choice is outpatient cognitive behavioral therapy and thus he should be given an outpatient psychotherapy referral on discharge regardless.    Diagnosis: Adjustment reaction with depressed mood    Recommendations:   -No indication for inpatient psychiatric hospitalization-Please refer for outpatient behavioral health treatment notably psychotherapy with cognitive behavioral therapy  --Patient not interested in psychotropic medications at this time  --Diagnoses, available treatment options, alternatives to treatment, and risks vs. benefits of current psychiatric treatment plan were discussed with the patient.  Prior records were reviewed in [facility].  The case was discussed with the primary team.    Filemon Jiménez MD    This note has been constructed using a voice recognition system. There may be translation, syntax, or grammatical errors. If you have any questions, please contact the dictating provider.

## 2024-08-08 NOTE — SPEECH THERAPY NOTE
Speech Language/Pathology  Speech-Language Pathology Bedside Swallow Evaluation      Patient Name: Carlos Landis    Today's Date: 8/8/2024     Problem List  Principal Problem:    Weakness  Active Problems:    MS (multiple sclerosis) (HCC)    DAVID (obstructive sleep apnea)    Non-sustained ventricular tachycardia (HCC)      Past Medical History  Past Medical History:   Diagnosis Date    Arthritis     Diabetes mellitus (HCC)     prediabetes    MS (multiple sclerosis) (HCC)     Scoliosis     Visual impairment        Past Surgical History  Past Surgical History:   Procedure Laterality Date    HERNIA REPAIR      3 times    KNEE SURGERY Right        Summary   Pt presented with suspected functional oropharyngeal swallow skills.     Complete labial seal for retrieval and containment of all materials, no anterior bolus loss present. Timely rotary mastication of regular textures w/ complete bolus breakdown and timely bolus transfer. No oral residue noted. Suspected fairly prompt swallow initiation and fair hyolaryngeal elevation to palpation. No overt s/s of aspiration at this time.     Pt w/ increased risk of aspiration 2/2 current admission concerning for CVA and MS diagnosis. SLP to monitor chart for MRI results, if MRI neg (-) SLP to s/o as no skilled intervention is required, if MRI pos (+) SLP to follow up for diet tolerance x1.      Recommended Diet: regular diet and thin liquids   Recommended Form of Meds: whole with liquid   Aspiration precautions and swallowing strategies: upright posture  Other Recommendations: Continue frequent oral care        Current Medical Status  Pt is a 49 y.o. male w/ a PMHx significant for but not limited to MS and DAVID, presenting to [facility] for L facial numbness, word finding difficulty, and feeling of inability to move his entire body earlier this evening. SLP consulted d/t stroke protocol.     Allergies:  No known food allergies    Past medical history:  Please see H&P for  details    Special Studies:  MRI pendin/7/24 CTA head and neck w wo contrast:  CT Brain:  No acute intracranial abnormality.  Subtle decreased attenuation in the periventricular and subcortical white matter is again seen, stable compared to prior exams which could be related to demyelination or mild chronic small vessel ischemic   changes.     CT Angiography:  Unremarkable CTA neck and brain without large vessel arterial occlusion, significant flow-limiting stenosis, or focal saccular aneurysm identified.    24 XR chest 1 view portable:  No acute cardiopulmonary disease.     Social/Education/Vocational Hx:  Pt lives with family    Swallow Information   Current Diet: regular diet and thin liquids   Baseline Diet: regular diet and thin liquids      Baseline Assessment   Behavior/Cognition: alert  Speech/Language Status: able to participate in conversation and able to follow commands  Patient Positioning: upright in bed  Pain Status/Interventions/Response to Interventions: No report of or nonverbal indications of pain.       Swallow Mechanism Exam  Facial: symmetrical  Labial: WFL  Lingual: WFL  Velum: symmetrical  Mandible: adequate ROM  Dentition: adequate  Vocal quality:clear/adequate   Volitional Cough: strong/productive   Respiratory Status: on RA       Consistencies Assessed and Performance   Consistencies Administered: thin liquids via cup and straw sip and hard solids    Oral Stage:   Complete labial seal for retrieval and containment of all materials, no anterior bolus loss present. Timely rotary mastication of regular textures w/ complete bolus breakdown and timely bolus transfer. No oral residue noted. Suspected fairly prompt swallow initiation and fair hyolaryngeal elevation to palpation. No overt s/s of aspiration at this time.    Pharyngeal Stage:   Suspected fairly prompt swallow initiation and fair hyolaryngeal elevation to palpation. No overt s/s of aspiration at this time.    Esophageal  Concerns: none reported      Summary and Recommendations (see above)    Results Reviewed with: patient, RN, and MD     Treatment Recommended: MRI pending, monitor MRI results, if MRI negative (-) SLP to s/o as no skilled intervention is required. If MRI positive (+) SLP to f/u x1 for diet tolerance

## 2024-08-08 NOTE — PLAN OF CARE
Problem: Potential for Falls  Goal: Patient will remain free of falls  Description: INTERVENTIONS:  - Educate patient/family on patient safety including physical limitations  - Instruct patient to call for assistance with activity   - Consult OT/PT to assist with strengthening/mobility   - Keep Call bell within reach  - Keep bed low and locked with side rails adjusted as appropriate  - Keep care items and personal belongings within reach  - Initiate and maintain comfort rounds  - Make Fall Risk Sign visible to staff  - Offer Toileting every 2 Hours, in advance of need  - Initiate/Maintain bed/chair alarm  - Obtain necessary fall risk management equipment  - Apply yellow socks and bracelet for high fall risk patients  - Consider moving patient to room near nurses station  Outcome: Progressing     Problem: PAIN - ADULT  Goal: Verbalizes/displays adequate comfort level or baseline comfort level  Description: Interventions:  - Encourage patient to monitor pain and request assistance  - Assess pain using appropriate pain scale  - Administer analgesics based on type and severity of pain and evaluate response  - Implement non-pharmacological measures as appropriate and evaluate response  - Consider cultural and social influences on pain and pain management  - Notify physician/advanced practitioner if interventions unsuccessful or patient reports new pain  Outcome: Progressing     Problem: SAFETY ADULT  Goal: Patient will remain free of falls  Description: INTERVENTIONS:  - Educate patient/family on patient safety including physical limitations  - Instruct patient to call for assistance with activity   - Consult OT/PT to assist with strengthening/mobility   - Keep Call bell within reach  - Keep bed low and locked with side rails adjusted as appropriate  - Keep care items and personal belongings within reach  - Initiate and maintain comfort rounds  - Make Fall Risk Sign visible to staff  - Offer Toileting every 2 Hours, in  advance of need  - Initiate/Maintain bed/chair alarm  - Obtain necessary fall risk management equipment  - Apply yellow socks and bracelet for high fall risk patients  - Consider moving patient to room near nurses station  Outcome: Progressing  Goal: Maintain or return to baseline ADL function  Description: INTERVENTIONS:  -  Assess patient's ability to carry out ADLs; assess patient's baseline for ADL function and identify physical deficits which impact ability to perform ADLs (bathing, care of mouth/teeth, toileting, grooming, dressing, etc.)  - Assess/evaluate cause of self-care deficits   - Assess range of motion  - Assess patient's mobility; develop plan if impaired  - Assess patient's need for assistive devices and provide as appropriate  - Encourage maximum independence but intervene and supervise when necessary  - Involve family in performance of ADLs  - Assess for home care needs following discharge   - Consider OT consult to assist with ADL evaluation and planning for discharge  - Provide patient education as appropriate  Outcome: Progressing  Goal: Maintains/Returns to pre admission functional level  Description: INTERVENTIONS:  - Perform AM-PAC 6 Click Basic Mobility/ Daily Activity assessment daily.  - Set and communicate daily mobility goal to care team and patient/family/caregiver.   - Collaborate with rehabilitation services on mobility goals if consulted  - Perform Range of Motion 3 times a day.  - Reposition patient every 2 hours.  - Dangle patient 3 times a day  - Stand patient 3 times a day  - Ambulate patient 3 times a day  - Out of bed to chair 3 times a day   - Out of bed for meals 3 times a day  - Out of bed for toileting  - Record patient progress and toleration of activity level   Outcome: Progressing     Problem: MOBILITY - ADULT  Goal: Maintain or return to baseline ADL function  Description: INTERVENTIONS:  -  Assess patient's ability to carry out ADLs; assess patient's baseline for ADL  function and identify physical deficits which impact ability to perform ADLs (bathing, care of mouth/teeth, toileting, grooming, dressing, etc.)  - Assess/evaluate cause of self-care deficits   - Assess range of motion  - Assess patient's mobility; develop plan if impaired  - Assess patient's need for assistive devices and provide as appropriate  - Encourage maximum independence but intervene and supervise when necessary  - Involve family in performance of ADLs  - Assess for home care needs following discharge   - Consider OT consult to assist with ADL evaluation and planning for discharge  - Provide patient education as appropriate  Outcome: Progressing  Goal: Maintains/Returns to pre admission functional level  Description: INTERVENTIONS:  - Perform AM-PAC 6 Click Basic Mobility/ Daily Activity assessment daily.  - Set and communicate daily mobility goal to care team and patient/family/caregiver.   - Collaborate with rehabilitation services on mobility goals if consulted  - Perform Range of Motion 3 times a day.  - Reposition patient every 2 hours.  - Dangle patient 3 times a day  - Stand patient 3 times a day  - Ambulate patient 3 times a day  - Out of bed to chair 3 times a day   - Out of bed for meals 3 times a day  - Out of bed for toileting  - Record patient progress and toleration of activity level   Outcome: Progressing

## 2024-08-08 NOTE — H&P
"[facility]  H&P  Name: Carlos WEIR Sabiha 49 y.o. male I MRN: [id number]  Unit/Bed#: -01 I Date of Admission: 8/7/2024   Date of Service: 8/8/2024 I Hospital Day: 0      Assessment & Plan   * Weakness  Assessment & Plan  Presenting with left facial paresthesias, L sided numbness, and intermittent dysarthria/expressive aphasia that onset 3345-9845 on 8/7  Admitted 7/15-7/16 for the same symptoms due to concern for TIA-MRI brain was negative at that time-was started on ASA 81 Mg daily (has been taking  mg daily as he ran out of baby aspirin)  Neurology also felt that this could have been a pseudo exacerbation of MS due to stressful events in patient's life  As symptoms are the same as prior-high suspicion for stress reaction as patient was told earlier today that he was unable to receive his monthly Solu-Medrol and Ocrevus treatment for his MS due to insurance change  CTA H/N: \"CT Brain:  No acute intracranial abnormality.  Subtle decreased attenuation in the periventricular and subcortical white matter is again seen, stable compared to prior exams which could be related to demyelination or mild chronic small vessel ischemic changes. CT Angiography:  Unremarkable CTA neck and brain without large vessel arterial occlusion, significant flow-limiting stenosis, or focal saccular aneurysm identified.\"  ED spoke with neurology-felt like this was likely a stress reaction as symptoms were same as prior  will admit to stroke pathway for now  Obtain MRI brain  Monitor on tele with VS and neuro checks per protocol   Continue home aspirin -hold on statin as recent LDL is 65  Formal neurology consult  Goal of normotension  With frequent readmissions for the same symptoms and negative extensive workups-would benefit from referral to high utilizer plan   Psychiatry also consulted as this was recommended during recent admit by neurology and patient is willing  NIHSS on admit is 0-was also 0 in the " "ED therefore not a TNK candidat    Non-sustained ventricular tachycardia (HCC)  Assessment & Plan  5-second run of nonsustained V. tach noted on telemetry  Patient asymptomatic and conversational when episode occurred, BP stable  Updated echo ordered-last was 4/2024 without any abnormalities  Cardiology consulted    MS (multiple sclerosis) (HCC)  Assessment & Plan  Follows with [facility] neurology  maintained on Ocrevus and Solu-Medrol every 4 weeks -last dose 7/10  Currently in the process of establishing new infusion provider due to insurance change   Missed Solu-Medrol dose ordered on admit    DAVID (obstructive sleep apnea)  Assessment & Plan  History of DAVID noncompliant with CPAP  Recommend continued outpatient sleep medicine follow-up           VTE Pharmacologic Prophylaxis: VTE Score: 7 High Risk (Score >/= 5) - Pharmacological DVT Prophylaxis Ordered: enoxaparin (Lovenox). Sequential Compression Devices Ordered.  Code Status: Level 1 - Full Code   Discussion with family: discussed with patient     Anticipated Length of Stay: Patient will be admitted on an observation basis with an anticipated length of stay of less than 2 midnights secondary to weakness, MS, DAVID.    Chief Complaint: \" Numbness on left side of face and I could not move\"    History of Present Illness:  Carlos Landis is a 49 y.o. male with a PMH of MS and DAVID who presents with left facial numbness, word finding difficulty, and feeling of inability to move his entire body earlier this evening.  Patient reports symptoms onset sometime between 5460-8560.  Reports that he did have more weakness on the left side of his body but states that \"I could not really move.\"Reports symptoms continued until his IV was inserted in the ED.  Reports that he has had increased stress due to insurance change and not having coverage of home infusion for MS medications.  Reports the symptoms at onset today were the same as prior in July which she was admitted for.  He " has baseline right arm weakness from MS and ambulates with a walker at baseline.    Review of Systems:  Review of Systems   Constitutional:  Negative for chills and fever.   HENT:  Negative for congestion.    Respiratory:  Negative for cough and shortness of breath.    Cardiovascular:  Negative for chest pain and leg swelling.   Gastrointestinal:  Negative for abdominal pain.   Genitourinary:  Negative for difficulty urinating and hematuria.   Musculoskeletal:  Positive for gait problem.   Neurological:  Positive for speech difficulty, weakness, numbness and headaches. Negative for facial asymmetry.   All other systems reviewed and are negative.      Past Medical and Surgical History:   Past Medical History:   Diagnosis Date    Arthritis     Diabetes mellitus (HCC)     prediabetes    MS (multiple sclerosis) (HCC)     Scoliosis     Visual impairment        Past Surgical History:   Procedure Laterality Date    HERNIA REPAIR      3 times    KNEE SURGERY Right        Meds/Allergies:  Prior to Admission medications    Medication Sig Start Date End Date Taking? Authorizing Provider   aspirin 81 mg chewable tablet Chew 1 tablet (81 mg total) daily 7/17/24   Gris Levine MD   cyanocobalamin 1,000 mcg/mL Take B12 1000 mcg IM once a week for 4 weeks, then once a month for 5 months 4/25/23   Terra Hernandez MD   dexamethasone (DECADRON) 2 mg tablet Take 1 tablet (2 mg total) by mouth daily as needed (headache) 8/2/24   Terra Hernandez MD   divalproex sodium (Depakote ER) 250 mg 24 hr tablet Take 2 tabs for 3 days, then 1 tab for 2 days 8/2/24   Terra Hernandez MD   ketorolac (TORADOL) 10 mg tablet Take 1 tablet (10 mg total) by mouth every 6 (six) hours as needed for moderate pain (headache) 7/18/24   Terra Hernandez MD   MAGNESIUM PO Take by mouth    Historical Provider, MD   nortriptyline (PAMELOR) 25 mg capsule Take 1 capsule (25 mg total) by mouth daily at bedtime 7/18/24   Terra  "MD David   ocrelizumab (Ocrevus) 300 MG/10ML SOLN Inject 20 mL (600 mg total) into a catheter in a vein every 6 (six) months 11/14/23   Terra Hernandez MD   prochlorperazine (COMPAZINE) 10 mg tablet Take 1 tablet (10 mg total) by mouth every 12 (twelve) hours as needed (HEADACHE) With Decadron in the morning 8/2/24   Terra Hernandez MD   Riboflavin ([employer] Vitamin B-2) 100 MG TABS Take 2 tablets (200 mg total) by mouth in the morning 5/23/22   Terra Hernandez MD   SYRINGE-NEEDLE, DISP, 3 ML 25G X 1\" 3 ML MISC Take with B12 regimen prescribed 5/9/23   Terra Hernandez MD     I have reviewed home medications with patient personally.    Allergies: No Known Allergies    Social History:  Marital Status: Single   Occupation: not assessed  Patient Pre-hospital Living Situation: Home, With other family member: parents  Patient Pre-hospital Level of Mobility: walks with walker  Patient Pre-hospital Diet Restrictions: none  Substance Use History:   Social History     Substance and Sexual Activity   Alcohol Use Not Currently     Social History     Tobacco Use   Smoking Status Former   Smokeless Tobacco Former     Social History     Substance and Sexual Activity   Drug Use Never       Family History:  Family History   Problem Relation Age of Onset    Stroke Maternal Grandmother     Multiple sclerosis Other    **    Physical Exam:     Vitals:   Blood Pressure: 125/82 (08/07/24 2339)  Pulse: 80 (08/07/24 2339)  Temperature: (!) 97.4 °F (36.3 °C) (08/07/24 2339)  Temp Source: Oral (08/07/24 2339)  Respirations: 18 (08/07/24 2326)  Height: 5' 11\" (180.3 cm) (08/07/24 2300)  Weight - Scale: 106 kg (233 lb) (08/07/24 2300)  SpO2: 95 % (08/07/24 2339)    Physical Exam  Vitals and nursing note reviewed.   Constitutional:       General: He is not in acute distress.     Appearance: Normal appearance. He is not ill-appearing.      Comments: Pleasant and conversational   HENT:      Mouth/Throat:      Mouth: " Mucous membranes are moist.   Eyes:      Conjunctiva/sclera: Conjunctivae normal.   Cardiovascular:      Rate and Rhythm: Normal rate and regular rhythm.      Pulses: Normal pulses.      Heart sounds: No murmur heard.  Pulmonary:      Effort: Pulmonary effort is normal. No respiratory distress.      Breath sounds: Normal breath sounds. No wheezing, rhonchi or rales.   Abdominal:      General: Abdomen is flat.      Palpations: Abdomen is soft.   Musculoskeletal:         General: Normal range of motion.      Cervical back: Normal range of motion.      Right lower leg: No edema.      Left lower leg: No edema.   Skin:     General: Skin is warm and dry.      Coloration: Skin is not pale.   Neurological:      Mental Status: He is alert.      Comments: NIHSS 0  Motor: 5/5 strength in bilateral upper and lower extremities.  No facial asymmetry  Sensory: Sensation is equal and intact in bilateral upper and lower extremities.  No facial numbness  Coordination: Normal finger-to-nose and heel-to-shin bilaterally  Has intermittent word finding difficulty, however no overt aphasia or dysarthria.  Able to name objects without difficulty.  Alert and oriented x 4   Psychiatric:         Mood and Affect: Mood normal.          Additional Data:     Lab Results:  Results from last 7 days   Lab Units 08/07/24  1903   WBC Thousand/uL 8.49   HEMOGLOBIN g/dL 15.1   HEMATOCRIT % 45.3   PLATELETS Thousands/uL 230   SEGS PCT % 68   LYMPHO PCT % 16   MONO PCT % 9   EOS PCT % 5     Results from last 7 days   Lab Units 08/07/24  1903   SODIUM mmol/L 141   POTASSIUM mmol/L 3.5   CHLORIDE mmol/L 109*   CO2 mmol/L 25   BUN mg/dL 26*   CREATININE mg/dL 0.91   ANION GAP mmol/L 7   CALCIUM mg/dL 9.0   ALBUMIN g/dL 4.1   TOTAL BILIRUBIN mg/dL 0.46   ALK PHOS U/L 60   ALT U/L 11   AST U/L 14   GLUCOSE RANDOM mg/dL 86         Results from last 7 days   Lab Units 08/07/24  1912   POC GLUCOSE mg/dl 103     Lab Results   Component Value Date    HGBA1C 5.1  07/16/2024    HGBA1C 5.1 04/16/2024    HGBA1C 5.1 09/27/2022           Lines/Drains:  Invasive Devices       Peripheral Intravenous Line  Duration             Peripheral IV 08/07/24 Distal;Left;Upper;Ventral (anterior) Arm <1 day                        Imaging: Reviewed radiology reports from this admission including: CT head  CTA head and neck with and without contrast   Final Result by Vasu Sullivan MD (08/07 2145)      CT Brain:  No acute intracranial abnormality.  Subtle decreased attenuation in the periventricular and subcortical white matter is again seen, stable compared to prior exams which could be related to demyelination or mild chronic small vessel ischemic    changes.      CT Angiography:  Unremarkable CTA neck and brain without large vessel arterial occlusion, significant flow-limiting stenosis, or focal saccular aneurysm identified.                  Workstation performed: [id number]         XR chest 1 view portable   ED Interpretation by SHYANN Amato (08/07 2102)   No acute cardiopulmonary disease identified by me.      Final Result by Vasu Sullivan MD (08/08 0033)      No acute cardiopulmonary disease.            Workstation performed: [id number]         MRI Inpatient Order    (Results Pending)       EKG and Other Studies Reviewed on Admission:   EKG: Personally Reviewed.  NSR without acute ischemia.  Normal QTc.  No acute ischemia..    ** Please Note: This note has been constructed using a voice recognition system. **

## 2024-08-08 NOTE — OCCUPATIONAL THERAPY NOTE
"    Occupational Therapy Evaluation     Patient Name: Carlos Landis  Today's Date: 8/8/2024  Problem List  Principal Problem:    Weakness  Active Problems:    MS (multiple sclerosis) (HCC)    DAVID (obstructive sleep apnea)    Non-sustained ventricular tachycardia (HCC)    Past Medical History  Past Medical History:   Diagnosis Date    Arthritis     Diabetes mellitus (HCC)     prediabetes    MS (multiple sclerosis) (HCC)     Scoliosis     Visual impairment      Past Surgical History  Past Surgical History:   Procedure Laterality Date    HERNIA REPAIR      3 times    KNEE SURGERY Right            08/08/24 1134   OT Last Visit   OT Visit Date 08/08/24   Note Type   Note type Evaluation   Pain Assessment   Pain Assessment Tool 0-10   Pain Score No Pain   Restrictions/Precautions   Weight Bearing Precautions Per Order No   Other Precautions Chair Alarm;Fall Risk;Bed Alarm   Home Living   Type of Home House   Home Layout Two level;Ramped entrance;Able to live on main level with bedroom/bathroom   Bathroom Shower/Tub Tub/shower unit   Bathroom Equipment Shower chair;Grab bars in shower   Home Equipment Walker;Electric scooter;Stair glide  (Transport chair)   Additional Comments Pt sleeps on first floor in recliner, uses stair glide to get to second floor   Prior Function   Level of [place] Independent with ADLs;Independent with functional mobility;Independent with IADLS   Lives With Family;Daughter  (Mother & father)   Receives Help From Family   IADLs Independent with meal prep;Independent with medication management;Family/Friend/Other provides transportation   Falls in the last 6 months 0   Lifestyle   Autonomy IND with ADLs & IADLs   Reciprocal Relationships Lives with parents & dtr   Service to Others Previously a    Intrinsic Gratification Cosplay   General   Family/Caregiver Present No   Additional General Comments Pt reports he has good days & bad days   Subjective   Subjective \"Yesterday was bad\"   ADL "   Eating Assistance 7  Independent   Grooming Assistance 7  Independent   UB Bathing Assistance 5  Supervision/Setup   LB Bathing Assistance 5  Supervision/Setup   UB Dressing Assistance 5  Supervision/Setup   LB Dressing Assistance 5  Supervision/Setup   Toileting Assistance  5  Supervision/Setup   Functional Assistance 5  Supervision/Setup   Bed Mobility   Supine to Sit 5  Supervision   Additional items Bedrails;Increased time required   Transfers   Sit to Stand 5  Supervision   Additional items Armrests;Bedrails   Stand to Sit 5  Supervision   Additional items Armrests   Additional Comments 2 sit <> stands, pt able to independently verbalize safe txfer techniques   Functional Mobility   Functional Mobility 5  Supervision   Additional Comments Functional household distance. Noticeable R sided weakness with R slight foot drop & inversion   Additional items Rolling walker   Balance   Static Sitting Good   Dynamic Sitting Good   Static Standing Good   Dynamic Standing Fair +   Ambulatory Fair +   Activity Tolerance   Activity Tolerance Patient tolerated treatment well   Medical Staff Made Aware PT Breanna   Nurse Made Aware LADI Jung   RUSVITLANA Assessment   RUE Assessment X  (Chronic R sided weakness however WFL with both gross & fine motor control. Pt only c/o chronic R sided deficits today)   LUE Assessment   LUE Assessment WFL   Hand Function   Gross Motor Coordination Functional   Fine Motor Coordination Functional   Sensation   Light Touch No apparent deficits   Additional Comments Pt only c/o sensory deficits to L side of face   Cognition   Overall Cognitive Status WFL   Arousal/Participation Alert   Attention Within functional limits   Orientation Level Oriented X4   Memory Within functional limits   Following Commands Follows all commands and directions without difficulty   Assessment   Limitation Decreased ADL status;Decreased self-care trans;Decreased high-level ADLs;Decreased endurance;Decreased UE strength    Prognosis Good   Assessment Pt is a 49 y.o. male seen for OT evaluation at [facility], admitted 8/7/2024 w/ Weakness.  OT completed extensive review of pt's medical and social history. Comorbidities affecting pt's functional performance at time of assessment include: MS, DAVID, dysarthia, R hemiparesis, ambulatory dysfunction. Personal factors affecting pt at time of IE include: difficulty performing ADLS, difficulty performing IADLS , decreased initiation and engagement , financial barriers, and health management . Prior to admission, pt was living with family In a 2SH with ramp & stair glide.  Pt was I w/  ADLS and w/ IADLS, (-) drove, & required use of RW PTA. Upon evaluation: Pt requires S for bed mobility, S for functional transfers, S for functional mobility, S for UB ADLs and S for LB ADLS 2* the following deficits impacting occupational performance: weakness, decreased balance, and decreased coping skills. Full objective findings from OT assessment regarding body systems outlined above. Pt to benefit from continued skilled OT tx while in the hospital to address deficits as defined above and maximize level of functional independence w/ ADL's and functional mobility. Occupational Performance areas to address include: bathing/shower, toilet hygiene, dressing, functional mobility, and clothing management. Based on findings, pt is of high complexity. The patient's raw score on the AM-PAC Daily Activity inpatient short form is 20, standardized score is 42.03, greater than 39.4. Patients at this level are likely to benefit from DC to home. However, please refer to therapist recommendation for discharge planning given other factors that may influence destination. At this time, OT recommendations at time of discharge are DC with Level III - Low Rehab Resource Intensity resources.   Goals   Patient Goals Pt wants to get better   Plan   Treatment Interventions ADL retraining;Functional transfer  training;Patient/family training;Equipment evaluation/education;Compensatory technique education;Continued evaluation;UE strengthening/ROM   Goal Expiration Date 08/18/24   OT Treatment Day 0   OT Frequency 2-3x/wk   Discharge Recommendation   Rehab Resource Intensity Level, OT III (Minimum Resource Intensity)   AM-PAC Daily Activity Inpatient   Lower Body Dressing 3   Bathing 3   Toileting 3   Upper Body Dressing 3   Grooming 4   Eating 4   Daily Activity Raw Score 20   Daily Activity Standardized Score (Calc for Raw Score >=11) 42.03   AM-PAC Applied Cognition Inpatient   Following a Speech/Presentation 4   Understanding Ordinary Conversation 4   Taking Medications 4   Remembering Where Things Are Placed or Put Away 4   Remembering List of 4-5 Errands 4   Taking Care of Complicated Tasks 4   Applied Cognition Raw Score 24   Applied Cognition Standardized Score 62.21   End of Consult   Education Provided Yes   Patient Position at End of Consult Bedside chair;Bed/Chair alarm activated;All needs within reach   Nurse Communication Nurse aware of consult     Pt will achieve the following goals within 10 days.    *Pt will complete grooming with IND.    *Pt will complete UB bathing and dressing with IND.    *Pt will complete LB bathing and dressing with Mod I & DME PRN.    *Pt will complete toileting w/ Mod I w/ G hygiene/thoroughness using DME PRN    *Pt will perform functional transfers with on/off all surfaces with Mod I using DME as needed w/ G balance/safety.    *Pt will improve functional mobility during ADL/IADL/leisure tasks to Mod I using DME as needed w/ G balance/safety.     *Assess DME needs    Nancy Love OTR/L

## 2024-08-08 NOTE — TELEPHONE ENCOUNTER
Received voicemail from Gena with [facility]. She reports pt's insurance changed and they are not contracted with them. Need to transfer to another pharmacy. Pharmacies listed yesterday cannot accept pt.   Gena [phone]    Will f/u on this.

## 2024-08-08 NOTE — PLAN OF CARE
Problem: Potential for Falls  Goal: Patient will remain free of falls  Description: INTERVENTIONS:  - Educate patient/family on patient safety including physical limitations  - Instruct patient to call for assistance with activity   - Consult OT/PT to assist with strengthening/mobility   - Keep Call bell within reach  - Keep bed low and locked with side rails adjusted as appropriate  - Keep care items and personal belongings within reach  - Initiate and maintain comfort rounds  - Make Fall Risk Sign visible to staff  - Obtain necessary fall risk management equipment  - Apply yellow socks and bracelet for high fall risk patients  - Consider moving patient to room near nurses station  Outcome: Progressing     Problem: PAIN - ADULT  Goal: Verbalizes/displays adequate comfort level or baseline comfort level  Description: Interventions:  - Encourage patient to monitor pain and request assistance  - Assess pain using appropriate pain scale  - Administer analgesics based on type and severity of pain and evaluate response  - Implement non-pharmacological measures as appropriate and evaluate response  - Consider cultural and social influences on pain and pain management  - Notify physician/advanced practitioner if interventions unsuccessful or patient reports new pain  Outcome: Progressing     Problem: SAFETY ADULT  Goal: Patient will remain free of falls  Description: INTERVENTIONS:  - Educate patient/family on patient safety including physical limitations  - Instruct patient to call for assistance with activity   - Consult OT/PT to assist with strengthening/mobility   - Keep Call bell within reach  - Keep bed low and locked with side rails adjusted as appropriate  - Keep care items and personal belongings within reach  - Initiate and maintain comfort rounds  - Make Fall Risk Sign visible to staff  - Offer Toileting every 2 Hours, in advance of need  - Initiate/Maintain bed alarm  - Obtain necessary fall risk management  equipment:   - Apply yellow socks and bracelet for high fall risk patients  - Consider moving patient to room near nurses station  Outcome: Progressing  Goal: Maintain or return to baseline ADL function  Description: INTERVENTIONS:  -  Assess patient's ability to carry out ADLs; assess patient's baseline for ADL function and identify physical deficits which impact ability to perform ADLs (bathing, care of mouth/teeth, toileting, grooming, dressing, etc.)  - Assess/evaluate cause of self-care deficits   - Assess range of motion  - Assess patient's mobility; develop plan if impaired  - Assess patient's need for assistive devices and provide as appropriate  - Encourage maximum independence but intervene and supervise when necessary  - Involve family in performance of ADLs  - Assess for home care needs following discharge   - Consider OT consult to assist with ADL evaluation and planning for discharge  - Provide patient education as appropriate  Outcome: Progressing  Goal: Maintains/Returns to pre admission functional level  Description: INTERVENTIONS:  - Perform AM-PAC 6 Click Basic Mobility/ Daily Activity assessment daily.  - Set and communicate daily mobility goal to care team and patient/family/caregiver.   - Collaborate with rehabilitation services on mobility goals if consulted  - Perform Range of Motion 3 times a day.  - Reposition patient every 2 hours.  - Dangle patient 2 times a day  - Stand patient 2 times a day  - Ambulate patient 2 times a day  - Out of bed to chair 2 times a day   - Out of bed for meals 2 times a day  - Out of bed for toileting  - Record patient progress and toleration of activity level   Outcome: Progressing

## 2024-08-08 NOTE — PHYSICAL THERAPY NOTE
PHYSICAL THERAPY EVAL  Physical Therapy Evaluation    Performed at least 2 patient identifiers during session:  Patient Active Problem List   Diagnosis    Dysarthria    CNS demyelinating disease (HCC)    Right hemiparesis (HCC)    Ambulatory dysfunction    MS (multiple sclerosis) (HCC)    Left leg weakness    Obesity (BMI 30-39.9)    Episodic tension-type headache, not intractable    Hypomagnesemia    Stroke-like symptoms    Pain of upper abdomen    Right-sided thoracic back pain    DAVID (obstructive sleep apnea)    Weakness    Non-sustained ventricular tachycardia (HCC)       Past Medical History:   Diagnosis Date    Arthritis     Diabetes mellitus (HCC)     prediabetes    MS (multiple sclerosis) (HCC)     Scoliosis     Visual impairment        Past Surgical History:   Procedure Laterality Date    HERNIA REPAIR      3 times    KNEE SURGERY Right           08/08/24 1135   PT Last Visit   PT Visit Date 08/08/24   Note Type   Note type Evaluation   Pain Assessment   Pain Assessment Tool 0-10   Pain Score No Pain   Restrictions/Precautions   Weight Bearing Precautions Per Order No   Other Precautions Chair Alarm;Bed Alarm;Fall Risk   Home Living   Type of Home House   Home Layout Two level;Ramped entrance;Able to live on main level with bedroom/bathroom  (Stairglide to the second floor)   Bathroom Shower/Tub Tub/shower unit   Bathroom Equipment Shower chair;Grab bars in shower   Home Equipment Walker;Wheelchair-electric;Stair glide   Prior Function   Level of [place] Independent with ADLs;Independent with functional mobility;Independent with IADLS   Lives With Family   Receives Help From Family   IADLs Family/Friend/Other provides transportation;Independent with meal prep;Independent with medication management   Falls in the last 6 months 0   General   Additional Pertinent History Hx of MS   Family/Caregiver Present No   Cognition  "  Overall Cognitive Status WFL   Arousal/Participation Alert   Attention Within functional limits   Orientation Level Oriented X4   Memory Within functional limits   Following Commands Follows all commands and directions without difficulty   Subjective   Subjective \"I am feeling better today.\"   RLE Assessment   RLE Assessment   (Mild foot drop, lacking approx 20 degrees of knee extension)   LLE Assessment   LLE Assessment WFL   Light Touch   RLE Light Touch Grossly intact   LLE Light Touch Grossly intact   Bed Mobility   Supine to Sit 5  Supervision   Additional items Bedrails;Increased time required   Transfers   Sit to Stand 5  Supervision   Additional items Armrests   Stand to Sit 5  Supervision   Additional items Armrests   Additional Comments 2 sit <> stands, pt able to independently verbalize safe txfer techniques   Ambulation/Elevation   Gait pattern R Foot drag;Forward Flexion;Decreased foot clearance;Wide MG  (R foot inversion)   Gait Assistance 5  Supervision   Assistive Device Rolling walker   Distance 150ft   Balance   Static Sitting Normal   Dynamic Sitting Good   Static Standing Fair +   Dynamic Standing Fair +   Ambulatory Fair +   Endurance Deficit   Endurance Deficit No   Activity Tolerance   Activity Tolerance Patient tolerated treatment well   Medical Staff Made Aware Nancy WILSON   Nurse Made Aware Fabiana BLEDSOE   Assessment   Prognosis Good   Problem List Decreased strength;Decreased range of motion;Impaired balance;Obesity   Assessment Patient is a 50y/o M who presented with  left facial paresthesias, L sided numbness, and intermittent dysarthria/expressive aphasia that onset 1119-4311 on 8/7 . Patient has a history of MS. Patient resides with family in a home with a ramped entrance. He is ind with mobility with a RW. Current medical status includes obesity, fall risk, bed/chair alarm, foot drop, decreased ROM, strength, sensation, decreased balance and mobility. Patient tolerated session well. " He has a slight foot drop on the R and weakness in the RLE along with decreased ROM at the knee. Patient required supervision for bed mobility, transfers, and amb. He will continue to benefit from inpatient P.T. Recommending level 3 resources. Low intensity. The patient's [facility] Basic Mobility Inpatient Short Form Raw Score is 18. A Raw score of greater than 17 suggests the patient may benefit from discharge to home. Please also refer to the recommendation of the Physical Therapist for safe discharge planning.   Barriers to Discharge None   Goals   Patient Goals To get back home   STG Expiration Date 08/15/24   Short Term Goal #1 1. Perform supine<>sit with HOB flat without use of bedrails ind 2. Perform sit<>stand transfers mod I 3. Ambulate 300ft with a RW mod I level   PT Treatment Day 0   Plan   Treatment/Interventions Functional transfer training;LE strengthening/ROM;Therapeutic exercise;Endurance training;Patient/family training;Equipment eval/education;Bed mobility;Gait training;Spoke to nursing;OT   PT Frequency 2-3x/wk   Discharge Recommendation   Rehab Resource Intensity Level, PT III (Minimum Resource Intensity)   AM-PAC Basic Mobility Inpatient   Turning in Flat Bed Without Bedrails 3   Lying on Back to Sitting on Edge of Flat Bed Without Bedrails 3   Moving Bed to Chair 3   Standing Up From Chair Using Arms 3   Walk in Room 3   Climb 3-5 Stairs With Railing 3   Basic Mobility Inpatient Raw Score 18   Basic Mobility Standardized Score 41.05   [facility] Highest Level Of Mobility   JH-HLM Goal 6: Walk 10 steps or more   JH-HLM Achieved 7: Walk 25 feet or more   End of Consult   Patient Position at End of Consult Bedside chair;Bed/Chair alarm activated;All needs within reach     Karrie Barragan, PT             Patient Name: Carlos Landis  Today's Date: 8/8/2024

## 2024-08-08 NOTE — CONSULTS
Consultation - Neurology   Carlos DESTIN Landis 49 y.o. male MRN: [id number]  Unit/Bed#: -01 Encounter: [id number]      Assessment & Plan   * Weakness  Assessment & Plan  Carloskalyan Landis is a 49 y.o. male with MS on Ocrevus infusions Q6 months and monthly Solu-Medrol infusions, scoliosis, DAVID not on CPAP who presents to [facility] ED on 8/7/2024 with left-sided facial numbness, left hand weakness, expressive aphasia, and dysarthria.    Seen by inpatient neurology on 7/2/2024 for fluctuating left facial numbness, headache, right upper extremity coordination difficulty, and increased speech difficulty.   MRI brain unremarkable for active demyelinating lesion/enhancement, felt to be a pseudo exacerbation of MS in the setting of ongoing life stressors.    He received his outpatient Ocrevus infusion and Solu-Medrol infusion on 7/10/2024  Seen by inpatient neurology on 7/15/2024 for right frontal headache, speech difficulty, left facial numbness which spread to the right side of his face, and left-sided weakness.    MRI brain completed again w wo without evidence of active demyelinating lesions, and again thought to be MS pseudo flare/anxiety component.    Given TIA was in the differential during this admission, patient was recommended to continue on ASA 81 mg daily on discharge.  Inpatient neurology consulted this admission 8/8/2024 after patient developed left-sided numbness affecting face, arm, leg, as well as left hand weakness, word finding difficulties, and slurred speech.  Symptoms occurred after a stressful event, and resolved upon coming into the ED.    Workup:  - CTA head and neck: Unremarkable for acute injection abnormalities or evidence of large vessel occlusion/critical stenosis  - MRI brain without: Unremarkable for evidence of acute infarct; redemonstrated chronic demyelinating disease    Given significant improvement in symptoms and similar symptoms to the last 2 admissions, lower suspicion for true MS  exacerbation.  MRI brain completed prior to neurology evaluation, unremarkable for acute intracranial abnormalities.  Unfortunately MR imaging was completed without contrast, however lower suspicion for true MS exacerbation so will hold off on repeating with contrast.  Patient received 100 mg Solu-Medrol overnight, will give additional 900 mg to complete total dose of 1000 mg which patient has been receiving in the outpatient setting for monthly infusions.    Plan:  - Hold off on any further neuroimaging at this time  - Will give additional IV Solu-Medrol 900 mg x 1 since patient received 100 mg overnight to complete his dose for his monthly infusion  - ASA 81 mg daily discontinued, felt it was not needed from a neurology standpoint per discussion with attending neurologist  - PT/OT/speech  - Medical management and supportive care per primary team, notify with changes    No further inpatient recommendations at this time, call with questions      Carlos Landis will need follow up in at the next regular appointment with multiple sclerosis attending or advance practitioner. He will not require outpatient neurological testing.  Patient scheduled with outpatient neurology on 10/24/2024, plan to keep appointment.    History of Present Illness     Reason for Consult / Principal Problem: Weakness    HPI: Carlos Landis is a 49 y.o.  male with MS on Ocrevus infusions Q6 months and monthly Solu-Medrol infusions, scoliosis, DAVID not on CPAP who presents to [facility] ED on 8/7/2024 with left-sided facial numbness, left hand weakness, expressive aphasia, and dysarthria.    Of note, patient was recently seen by inpatient neurology on 7/2/2024 for fluctuating left facial numbness, headache, right upper extremity coordination difficulty, and increased speech difficulty. MRI brain completed at that time unremarkable for active demyelinating lesion/enhancement and felt to be a pseudo exacerbation of MS in the setting of ongoing life  stressors.  He received his outpatient Ocrevus infusion and Solu-Medrol infusion on 7/10/2024.  Patient was again seen by inpatient neurology on 7/15/2024 for right frontal headache, speech difficulty, left facial numbness which spread to the right side of his face, and left-sided weakness.  MRI brain completed again w wo without evidence of active demyelinating lesions, and again thought to be MS pseudo flare/anxiety component.  Given TIA was in the differential during this admission, patient was recommended to continue on ASA 81 mg daily on discharge.  Patient appears to be scheduled for next Ocrevus infusion on 1/6/2025 per chart review.    Patient reports that he had a recent change in his insurance which has made it difficult to obtain his outpatient Ocrevus infusions and monthly Solu-Medrol infusions.  He states that this has been very stressful for him.  On 8/7/2024 at approximately 4 PM patient developed left-sided numbness affecting face, arm, and leg, as well as left hand weakness. Patient also reports word finding difficulties and family noted slurring in his speech. Family contacted neuro triage line who instructed the patient to come in to the ED.    Patient presented to [facility] ED on 8/7/2024, vital signs stable on admission. Since coming into the ED, patient's symptoms of left-sided facial numbness, left handed weakness, and speech dysfunction had rapidly improved. CTA head and neck unremarkable for acute injury or abnormalities or large vessel occlusion/critical stenosis.  Today patient reports he feels much better in regards to the left-sided numbness/weakness and speech dysfunction, however continues to feel very stressed about his insurance situation.    Inpatient consult to Neurology  Consult performed by: Ame Orozco PA-C  Consult ordered by: Ale Farmer PA-C        Review of Systems  12 point ROS performed, as stated above, all others negative.  Historical Information   Past  "Medical History:   Diagnosis Date    Arthritis     Diabetes mellitus (HCC)     prediabetes    MS (multiple sclerosis) (HCC)     Scoliosis     Visual impairment      Past Surgical History:   Procedure Laterality Date    HERNIA REPAIR      3 times    KNEE SURGERY Right      Social History   Social History     Substance and Sexual Activity   Alcohol Use Not Currently     Social History     Substance and Sexual Activity   Drug Use Never     E-Cigarette/Vaping    E-Cigarette Use Former User      E-Cigarette/Vaping Substances    Nicotine No     THC No     CBD No     Flavoring No     Other No     Unknown No      Social History     Tobacco Use   Smoking Status Former   Smokeless Tobacco Former     Family History:   Family History   Problem Relation Age of Onset    Stroke Maternal Grandmother     Multiple sclerosis Other        Review of previous medical records was completed.    Meds/Allergies   all current active meds have been reviewed, current meds:   Current Facility-Administered Medications   Medication Dose Route Frequency    acetaminophen (TYLENOL) tablet 650 mg  650 mg Oral Q4H PRN    aluminum-magnesium hydroxide-simethicone (MAALOX) oral suspension 30 mL  30 mL Oral Q6H PRN    enoxaparin (LOVENOX) subcutaneous injection 40 mg  40 mg Subcutaneous Daily    ketorolac (TORADOL) tablet 10 mg  10 mg Oral Q6H PRN    methylPREDNISolone sodium succinate (Solu-MEDROL) 900 mg in sodium chloride 0.9 % 250 mL IVPB  900 mg Intravenous Once    niacin tablet 200 mg  200 mg Oral Daily    nortriptyline (PAMELOR) capsule 25 mg  25 mg Oral HS    senna (SENOKOT) tablet 8.6 mg  1 tablet Oral HS PRN   , and PTA meds:   Prior to Admission Medications   Prescriptions Last Dose Informant Patient Reported? Taking?   MAGNESIUM PO 8/6/2024 Self Yes Yes   Sig: Take by mouth   Riboflavin ([employer] Vitamin B-2) 100 MG TABS 8/7/2024 Self No Yes   Sig: Take 2 tablets (200 mg total) by mouth in the morning   SYRINGE-NEEDLE, DISP, 3 ML 25G X 1\" 3 ML MISC " "Past Month Self No Yes   Sig: Take with B12 regimen prescribed   aspirin 81 mg chewable tablet 8/7/2024  No Yes   Sig: Chew 1 tablet (81 mg total) daily   cyanocobalamin 1,000 mcg/mL Past Month Self No Yes   Sig: Take B12 1000 mcg IM once a week for 4 weeks, then once a month for 5 months   dexamethasone (DECADRON) 2 mg tablet Not Taking  No No   Sig: Take 1 tablet (2 mg total) by mouth daily as needed (headache)   Patient not taking: Reported on 8/7/2024   divalproex sodium (Depakote ER) 250 mg 24 hr tablet 8/7/2024  No Yes   Sig: Take 2 tabs for 3 days, then 1 tab for 2 days   ketorolac (TORADOL) 10 mg tablet 8/6/2024  No Yes   Sig: Take 1 tablet (10 mg total) by mouth every 6 (six) hours as needed for moderate pain (headache)   nortriptyline (PAMELOR) 25 mg capsule 8/6/2024  No Yes   Sig: Take 1 capsule (25 mg total) by mouth daily at bedtime   ocrelizumab (Ocrevus) 300 MG/10ML SOLN Past Month Self No Yes   Sig: Inject 20 mL (600 mg total) into a catheter in a vein every 6 (six) months   prochlorperazine (COMPAZINE) 10 mg tablet Not Taking  No No   Sig: Take 1 tablet (10 mg total) by mouth every 12 (twelve) hours as needed (HEADACHE) With Decadron in the morning   Patient not taking: Reported on 8/7/2024      Facility-Administered Medications: None       No Known Allergies    Objective   Vitals:Blood pressure 121/84, pulse 105, temperature (!) 97.4 °F (36.3 °C), resp. rate 16, height 5' 11\" (1.803 m), weight 106 kg (233 lb), SpO2 93%.,Body mass index is 32.5 kg/m².    Intake/Output Summary (Last 24 hours) at 8/8/2024 1314  Last data filed at 8/8/2024 0921  Gross per 24 hour   Intake 2020 ml   Output 0 ml   Net 2020 ml       Invasive Devices:   Invasive Devices       Peripheral Intravenous Line  Duration             Peripheral IV 08/08/24 Dorsal (posterior);Right Hand <1 day                  Physical Exam  Vitals and nursing note reviewed.   Constitutional:       General: He is not in acute distress.     " Appearance: He is obese. He is not ill-appearing, toxic-appearing or diaphoretic.   HENT:      Head: Normocephalic and atraumatic.   Eyes:      General: No scleral icterus.        Right eye: No discharge.         Left eye: No discharge.      Extraocular Movements: EOM normal.      Conjunctiva/sclera: Conjunctivae normal.   Musculoskeletal:         General: Normal range of motion.   Skin:     General: Skin is warm and dry.      Coloration: Skin is not jaundiced or pale.   Neurological:      Mental Status: He is alert.   Psychiatric:         Mood and Affect: Mood normal.         Behavior: Behavior normal.         Thought Content: Thought content normal.         Judgment: Judgment normal.       Neurologic Exam     Mental Status   Patient is alert, lying in bed  Oriented to person, place, month, and year   Follows central and appendicular commands   Answers all questions appropriately   No dysarthria or aphasia noted   Stuttering speech noted      Cranial Nerves     CN III, IV, VI   Extraocular motions are normal.   Nystagmus: none   Upgaze: normal  Downgaze: normal  Conjugate gaze: present    CN VII   Right facial weakness: Mild right lower facial weakness noted at rest.    CN VIII   Hearing: intact  Diminished pinprick in left face with pinprick   Vibration intact throughout forehead      Motor Exam   Muscle bulk: normal  Overall muscle tone: normal  Able to elevate BUE and BLE off the bed and maintain antigravity without drift      Sensory Exam   Pinprick intact in LUE, diminished 60% in RUE  Pinprick intact in RLE, diminished 70% in LLE     Gait, Coordination, and Reflexes     Tremor   Resting tremor: absent  No involuntary movements or rhythmic seizure like activity noted throughout exam      Lab Results: I have personally reviewed pertinent reports.  Recent Results (from the past 24 hour(s))   ECG 12 lead    Collection Time: 08/07/24  7:01 PM   Result Value Ref Range    Ventricular Rate 91 BPM    Atrial Rate 91  "BPM    WI Interval 178 ms    QRSD Interval 98 ms    QT Interval 368 ms    QTC Interval 452 ms    P Axis 36 degrees    QRS Axis 31 degrees    T Wave Axis 40 degrees   Comprehensive metabolic panel    Collection Time: 08/07/24  7:03 PM   Result Value Ref Range    Sodium 141 135 - 147 mmol/L    Potassium 3.5 3.5 - 5.3 mmol/L    Chloride 109 (H) 96 - 108 mmol/L    CO2 25 21 - 32 mmol/L    ANION GAP 7 4 - 13 mmol/L    BUN 26 (H) 5 - 25 mg/dL    Creatinine 0.91 0.60 - 1.30 mg/dL    Glucose 86 65 - 140 mg/dL    Calcium 9.0 8.4 - 10.2 mg/dL    AST 14 13 - 39 U/L    ALT 11 7 - 52 U/L    Alkaline Phosphatase 60 34 - 104 U/L    Total Protein 7.3 6.4 - 8.4 g/dL    Albumin 4.1 3.5 - 5.0 g/dL    Total Bilirubin 0.46 0.20 - 1.00 mg/dL    eGFR 97 ml/min/1.73sq m   TSH, 3rd generation with Free T4 reflex    Collection Time: 08/07/24  7:03 PM   Result Value Ref Range    TSH 3RD GENERATON 1.705 0.450 - 4.500 uIU/mL   Magnesium    Collection Time: 08/07/24  7:03 PM   Result Value Ref Range    Magnesium 2.2 1.9 - 2.7 mg/dL   HS Troponin 0hr (reflex protocol)    Collection Time: 08/07/24  7:03 PM   Result Value Ref Range    hs TnI 0hr <2 \"Refer to ACS Flowchart\"- see link ng/L   CBC and differential    Collection Time: 08/07/24  7:03 PM   Result Value Ref Range    WBC 8.49 4.31 - 10.16 Thousand/uL    RBC 5.06 3.88 - 5.62 Million/uL    Hemoglobin 15.1 12.0 - 17.0 g/dL    Hematocrit 45.3 36.5 - 49.3 %    MCV 90 82 - 98 fL    MCH 29.8 26.8 - 34.3 pg    MCHC 33.3 31.4 - 37.4 g/dL    RDW 12.8 11.6 - 15.1 %    MPV 10.4 8.9 - 12.7 fL    Platelets 230 149 - 390 Thousands/uL    nRBC 0 /100 WBCs    Segmented % 68 43 - 75 %    Immature Grans % 1 0 - 2 %    Lymphocytes % 16 14 - 44 %    Monocytes % 9 4 - 12 %    Eosinophils Relative 5 0 - 6 %    Basophils Relative 1 0 - 1 %    Absolute Neutrophils 5.78 1.85 - 7.62 Thousands/µL    Absolute Immature Grans 0.07 0.00 - 0.20 Thousand/uL    Absolute Lymphocytes 1.34 0.60 - 4.47 Thousands/µL    Absolute " Monocytes 0.73 0.17 - 1.22 Thousand/µL    Eosinophils Absolute 0.46 0.00 - 0.61 Thousand/µL    Basophils Absolute 0.11 (H) 0.00 - 0.10 Thousands/µL   Phosphorus    Collection Time: 08/07/24  7:03 PM   Result Value Ref Range    Phosphorus 2.6 (L) 2.7 - 4.5 mg/dL   Fingerstick Glucose (POCT)    Collection Time: 08/07/24  7:12 PM   Result Value Ref Range    POC Glucose 103 65 - 140 mg/dl   UA w Reflex to Microscopic w Reflex to Culture    Collection Time: 08/07/24  8:06 PM    Specimen: Urine, Other   Result Value Ref Range    Color, UA Dark Yellow     Clarity, UA Clear     Specific Gravity, UA >=1.030 1.005 - 1.030    pH, UA 6.0 4.5, 5.0, 5.5, 6.0, 6.5, 7.0, 7.5, 8.0    Leukocytes, UA Negative Negative    Nitrite, UA Negative Negative    Protein, UA Trace (A) Negative mg/dl    Glucose, UA Negative Negative mg/dl    Ketones, UA 10 (1+) (A) Negative mg/dl    Urobilinogen, UA 2.0 (A) <2.0 mg/dl mg/dl    Bilirubin, UA Negative Negative    Occult Blood, UA Negative Negative   Urine Microscopic    Collection Time: 08/07/24  8:06 PM   Result Value Ref Range    RBC, UA None Seen None Seen, 0-1, 1-2, 2-4, 0-5 /hpf    WBC, UA 2-4 None Seen, 0-1, 1-2, 0-5, 2-4 /hpf    Epithelial Cells Occasional None Seen, Occasional /hpf    Bacteria, UA Occasional None Seen, Occasional /hpf    MUCUS THREADS Innumerable (A) None Seen    Hyaline Casts, UA 0-1 (A) (none) /lpf   Basic metabolic panel    Collection Time: 08/08/24  4:08 AM   Result Value Ref Range    Sodium 136 135 - 147 mmol/L    Potassium 4.4 3.5 - 5.3 mmol/L    Chloride 108 96 - 108 mmol/L    CO2 23 21 - 32 mmol/L    ANION GAP 5 4 - 13 mmol/L    BUN 24 5 - 25 mg/dL    Creatinine 0.72 0.60 - 1.30 mg/dL    Glucose 91 65 - 140 mg/dL    Calcium 8.9 8.4 - 10.2 mg/dL    eGFR 108 ml/min/1.73sq m   CBC (With Platelets)    Collection Time: 08/08/24  4:08 AM   Result Value Ref Range    WBC 8.08 4.31 - 10.16 Thousand/uL    RBC 4.77 3.88 - 5.62 Million/uL    Hemoglobin 13.9 12.0 - 17.0 g/dL     Hematocrit 42.4 36.5 - 49.3 %    MCV 89 82 - 98 fL    MCH 29.1 26.8 - 34.3 pg    MCHC 32.8 31.4 - 37.4 g/dL    RDW 12.6 11.6 - 15.1 %    Platelets 197 149 - 390 Thousands/uL    MPV 10.8 8.9 - 12.7 fL   Magnesium    Collection Time: 08/08/24  4:08 AM   Result Value Ref Range    Magnesium 2.1 1.9 - 2.7 mg/dL   Phosphorus    Collection Time: 08/08/24  4:08 AM   Result Value Ref Range    Phosphorus 2.1 (L) 2.7 - 4.5 mg/dL   Echo complete w/ contrast if indicated    Collection Time: 08/08/24 10:00 AM   Result Value Ref Range    Triscuspid Valve Regurgitation Peak Gradient 33.0 mmHg    RAA A4C 14.6 cm2    LA Volume Index (BP) 14.2 mL/m2    LV Diastolic Volume (BP) 111 mL    LV Systolic Volume (BP) 38 mL    TR Peak Clay 2.9 m/s    RVID d 3.8 cm    A4C EF 71 %    Tricuspid valve peak regurgitation velocity 2.86 m/s    Left ventricular stroke volume (2D) 28.00 mL    IVSd 1.20 cm    Tricuspid annular plane systolic excursion 2.70 cm    Ao root 3.70 cm    LVPWd 1.20 cm    LA size 3.7 cm    Asc Ao 3.7 cm    LA volume (BP) 32 mL    EF 65 %    FS 30 28 - 44    LVIDS 2.30 cm    IVS 1.2 cm    LVIDd 3.30 cm    LA length (A2C) 4.10 cm    LEFT VENTRICLE SYSTOLIC VOLUME (MOD BIPLANE) 2D 17 mL    LV DIASTOLIC VOLUME (MOD BIPLANE) 2D 45 mL    Left Atrium Area-systolic Four Chamber 13.5 cm2    Left Atrium Area-systolic Apical Two Chamber 12 cm2    MV E' Tissue Velocity Lateral 10 cm/s    MV E' Tissue Velocity Septal 7 cm/s    LVSV, 2D 28 mL    BSA 2.25 m2    LV Diastolic Volume Index (BP) 49.3 mL/m2    LV Systolic Volume Index (BP) 16.9 mL/m2     Imaging Studies: I have personally reviewed pertinent reports and I have personally reviewed pertinent films in PACS.    EKG, Pathology, and Other Studies: I have personally reviewed pertinent reports.    VTE Prophylaxis: Enoxaparin (Lovenox)    Code Status: Level 1 - Full Code    Dictation voice to text software has been used in the creation of this document.  Please consider this in light of any  contextual or grammatical errors.

## 2024-08-08 NOTE — ASSESSMENT & PLAN NOTE
"Presenting with left facial paresthesias, L sided numbness, and intermittent dysarthria/expressive aphasia that onset 1367-7715 on 8/7  Admitted 7/15-7/16 for the same symptoms due to concern for TIA-MRI brain was negative at that time-was started on ASA 81 Mg daily (has been taking  mg daily as he ran out of baby aspirin)  Neurology also felt that this could have been a pseudo exacerbation of MS due to stressful events in patient's life  As symptoms are the same as prior-high suspicion for stress reaction as patient was told earlier today that he was unable to receive his monthly Solu-Medrol and Ocrevus treatment for his MS due to insurance change  CTA H/N: \"CT Brain:  No acute intracranial abnormality.  Subtle decreased attenuation in the periventricular and subcortical white matter is again seen, stable compared to prior exams which could be related to demyelination or mild chronic small vessel ischemic changes. CT Angiography:  Unremarkable CTA neck and brain without large vessel arterial occlusion, significant flow-limiting stenosis, or focal saccular aneurysm identified.\"  ED spoke with neurology-felt like this was likely a stress reaction as symptoms were same as prior  will admit to stroke pathway for now  Obtain MRI brain  Monitor on tele with VS and neuro checks per protocol   Continue home aspirin -hold on statin as recent LDL is 65  Formal neurology consult  Goal of normotension  With frequent readmissions for the same symptoms and negative extensive workups-would benefit from referral to high utilizer plan   Psychiatry also consulted as this was recommended during recent admit by neurology and patient is willing  NIHSS on admit is 0-was also 0 in the ED therefore not a TNK candidat  "

## 2024-08-08 NOTE — PLAN OF CARE
Problem: PHYSICAL THERAPY ADULT  Goal: Performs mobility at highest level of function for planned discharge setting.  See evaluation for individualized goals.  Description: Treatment/Interventions: Functional transfer training, LE strengthening/ROM, Therapeutic exercise, Endurance training, Patient/family training, Equipment eval/education, Bed mobility, Gait training, Spoke to nursing, OT          See flowsheet documentation for full assessment, interventions and recommendations.  Note: Prognosis: Good  Problem List: Decreased strength, Decreased range of motion, Impaired balance, Obesity  Assessment: Patient is a 50y/o M who presented with  left facial paresthesias, L sided numbness, and intermittent dysarthria/expressive aphasia that onset [id number] on 8/7 . Patient has a history of MS. Patient resides with family in a home with a ramped entrance. He is ind with mobility with a RW. Current medical status includes obesity, fall risk, bed/chair alarm, foot drop, decreased ROM, strength, sensation, decreased balance and mobility. Patient tolerated session well. He has a slight foot drop on the R and weakness in the RLE along with decreased ROM at the knee. Patient required supervision for bed mobility, transfers, and amb. He will continue to benefit from inpatient P.T. Recommending level 3 resources. Low intensity. The patient's AM-PAC Basic Mobility Inpatient Short Form Raw Score is 18. A Raw score of greater than 17 suggests the patient may benefit from discharge to home. Please also refer to the recommendation of the Physical Therapist for safe discharge planning.  Barriers to Discharge: None     Rehab Resource Intensity Level, PT: III (Minimum Resource Intensity)    See flowsheet documentation for full assessment.

## 2024-08-08 NOTE — UTILIZATION REVIEW
"Initial Clinical Review    WAS OBSERVATION 8/7 CONVERTED TO INPATIENT ADMISSION 8/8 @ 1539 DUE TO CONTINUED STAY REQUIRED TO CARE FOR PATIENT WITH  Weakness. Non-sustained ventricular tachycardia. Stroke workup    Admission: Date/Time/Statement:   Admission Orders (From admission, onward)       Ordered        08/08/24 1539  INPATIENT ADMISSION  Once            08/07/24 2256  Place in Observation  Once                          Orders Placed This Encounter   Procedures    INPATIENT ADMISSION     Standing Status:   Standing     Number of Occurrences:   1     Order Specific Question:   Level of Care     Answer:   Med Surg [16]     Order Specific Question:   Estimated length of stay     Answer:   More than 2 Midnights     Order Specific Question:   Certification     Answer:   I certify that inpatient services are medically necessary for this patient for a duration of greater than two midnights. See H&P and MD Progress Notes for additional information about the patient's course of treatment.     ED Arrival Information       Expected   -    Arrival   8/7/2024 18:10    Acuity   Urgent              Means of arrival   Ambulance    Escorted by   [facility]    Service   Hospitalist    Admission type   Emergency              Arrival complaint   MS Flare -- L sided weakness             Chief Complaint   Patient presents with    Medical Problem     Pt states he's having an MS flare up. Increased weakness on the left side since this morning. Patient states he's also having facial weakness on the left. Was seen here last week for the same thing        Initial Presentation: 49 y.o. male to ED presents for  left facial numbness, word finding difficulty, and feeling of inability to move his entire body earlier this evening. Reports  symptoms onset sometime between [id number]. Notes that he did have more weakness on the left side of his body but states that \"I could not really move.\" \"Reports symptoms continued until his IV was " inserted in the ED.  Reports that he has had increased stress due to insurance change and not having coverage of home infusion for MS medications. Symptoms were the same as prior in July. He has baseline right arm weakness from MS and ambulates with a walker at baseline. PMH for MS, follows with Neurology, maintained on Ocrevus and Solu-Medrol every 4 weeks -last dose 7/10. DAVID.  Admit to Observation Dx; Weakness. Non-sustained ventricular tachycardia. 5-second run of nonsustained V. tach noted on telemetry   Plan; Stroke pathway. MRI Brain. Tele monitoring. Neurology consult. Continue home aspirin -hold on statin as recent LDL is 65. Cardiology and Psychiatry consult.  On exam; Has intermittent word finding difficulty, however no overt aphasia or dysarthria.     8/8  Neurology cons; Weakness. Left-sided numbness affecting face, arm, leg, as well as left hand weakness, word finding difficulties, and slurred speech. Symptoms occurred after a stressful event, and resolved upon coming into the ED.   Hold off any further neuroimaging for now. Give addt IV Solu-Medrol 900 mg x 1 since patient received 100 mg overnight to complete his dose for his monthly infusion  ASA 81 mg daily discontinued    8/8  Cardiology cons; NSVT, 13 beats on tele 8/7/24 (asymptomatic). Mild aortic root dilation.  Echo. If found to have high burden of nonsustained VT on ZIO monitor then would recommend ischemic evaluation with cardiac CTA  Outpatient 2-week ZIO monitor    Behavioral Health cons; Multiple recent admissions for similar symptoms with no physical findings. Patient minimizes his ongoing stress and denies overt depressed mood, suicidal ideation, intention, or plan. Denies anxiety. It appears that he is minimizing symptoms but he does report feeling supported socially and we discussed potential treatment options for stress, anxiety, depressed mood. He was resistant to a referral for outpatient psychotherapy but in the end decided that  he was willing to receive information regarding outpatient behavioral health treatment. Not interested in psychiatric medications at this time. Did not meet criteria for primary mood disorder such as major depressive disorder or primary anxiety disorder. Patient would likely benefit from outpatient behavioral health referral and should be given 1 on discharge. Does not meet inpatient psychiatric hospitalization criteria and does not meet 302 criteria at this time. If indeed his symptoms are functional neurologic disorder the treatment of choice is outpatient cognitive behavioral therapy and thus he should be given an outpatient psychotherapy referral on discharge regardless.   Dx: Adjustment reaction with depressed mood   Plan: No indication for inpatient psychiatric hospitalization. Pt not interested in psychotropic medications at this time.      Date: 8/9  Day 3: Has surpassed a 2nd midnight with active treatments and services.  Pt with 1 episode of nausea/vomiting this am. Given Tylenol followed by migraine cocktail x 1 with resolution of nausea/vomiting as well as headache   Stroke workup. MRI Brain without acute abnormality.   S/P IV solumedrol 1000 mg x 1 (monthly dose)     ED Triage Vitals   Temperature Pulse Respirations Blood Pressure SpO2 Pain Score   08/07/24 1817 08/07/24 1817 08/07/24 1817 08/07/24 1817 08/07/24 1817 08/07/24 1829   97.8 °F (36.6 °C) 98 18 149/92 93 % 8     Weight (last 2 days)       Date/Time Weight    08/08/24 0920 106 (233)    08/07/24 2300 106 (233)            Vital Signs (last 3 days)       Date/Time Temp Pulse Resp BP MAP (mmHg) SpO2 O2 Device Patient Position - Orthostatic VS [place] Coma Scale Score Pain    08/09/24 0746 -- -- -- -- -- -- -- -- -- 7    08/09/24 07:33:16 -- 114 18 -- -- 91 % -- -- -- --    08/09/24 0732 -- -- -- 155/109 -- -- -- Sitting -- --    08/09/24 03:40:33 -- 91 18 124/91 102 93 % -- Lying -- --    08/08/24 23:30:23 97.7 °F (36.5 °C) 104 -- 120/82 95 97 %  -- -- -- --    08/08/24 23:29:52 97.7 °F (36.5 °C) 107 -- 120/82 95 94 % -- -- -- --    08/08/24 20:24:01 98.3 °F (36.8 °C) 96 18 121/83 96 94 % None (Room air) Lying -- --    08/08/24 1941 -- -- -- -- -- -- -- -- -- No Pain    08/08/24 1800 -- -- -- -- -- -- -- -- 15 --    08/08/24 1300 -- -- -- -- -- -- -- -- 15 --    08/08/24 11:50:39 97.4 °F (36.3 °C) 105 16 121/84 96 93 % -- -- -- --    08/08/24 1135 -- -- -- -- -- -- -- -- -- No Pain    08/08/24 1134 -- -- -- -- -- -- -- -- -- No Pain    08/08/24 10:50:36 -- 92 -- 113/80 91 96 % -- -- -- --    08/08/24 0920 -- 94 -- 122/81 -- -- -- -- -- --    08/08/24 0900 -- -- -- -- -- -- None (Room air) -- 15 No Pain    08/08/24 07:47:45 97.3 °F (36.3 °C) 79 16 120/81 94 95 % None (Room air) Lying -- --    08/08/24 04:47:10 97.5 °F (36.4 °C) 82 16 122/81 95 94 % -- -- -- --    08/07/24 23:39:54 97.4 °F (36.3 °C) 80 -- 125/82 96 95 % None (Room air) Lying 15 No Pain    08/07/24 23:26:07 97.4 °F (36.3 °C) 84 18 132/82 99 96 % -- Lying -- --    08/07/24 2300 -- -- -- -- -- -- -- -- -- No Pain    08/07/24 2230 -- 81 18 119/86 100 96 % None (Room air) -- -- --    08/07/24 2130 -- 81 16 120/84 98 97 % None (Room air) -- -- --    08/07/24 2011 -- -- -- -- -- -- -- -- -- 7 08/07/24 2000 -- 87 18 138/82 102 97 % -- -- -- --    08/07/24 1924 -- -- -- -- -- -- -- -- -- 7 08/07/24 1910 -- -- -- -- -- -- -- -- 15 --    08/07/24 1830 -- 94 18 123/87 101 94 % None (Room air) Sitting -- 8    08/07/24 1829 -- -- -- -- -- -- -- -- -- 8 08/07/24 1817 97.8 °F (36.6 °C) 98 18 149/92 111 93 % -- -- -- --            Pertinent Labs/Diagnostic Test Results:   Radiology:  MRI brain wo contrast   Final Interpretation by Med Low MD (08/08 1135)      1. No acute infarct, mass effect or midline shift.      2. Redemonstrated white matter findings in keeping with chronic demyelinating disease.      Workstation performed: [id number]         CTA head and neck with and without contrast    Final Interpretation by Vasu Sullivan MD (08/07 2145)      CT Brain:  No acute intracranial abnormality.  Subtle decreased attenuation in the periventricular and subcortical white matter is again seen, stable compared to prior exams which could be related to demyelination or mild chronic small vessel ischemic    changes.      CT Angiography:  Unremarkable CTA neck and brain without large vessel arterial occlusion, significant flow-limiting stenosis, or focal saccular aneurysm identified.                  Workstation performed: [id number]         XR chest 1 view portable   ED Interpretation by SHYANN Amato (08/07 2102)   No acute cardiopulmonary disease identified by me.      Final Interpretation by Vasu Sullivan MD (08/08 0033)      No acute cardiopulmonary disease.            Workstation performed: [id number]           Cardiology:  Echo complete w/ contrast if indicated   Final Result by Don Loya MD (08/08 2154)        Left Ventricle: Left ventricular cavity size is normal. Wall thickness    is mildly increased. There is concentric remodeling. The left ventricular    ejection fraction is 65% by biplane measurement. Systolic function is    normal. Wall motion is normal.     Right Ventricle: Right ventricular cavity size is normal. Systolic    function is normal.     Left Atrium: The atrium is normal in size.     Right Atrium: The atrium is normal in size.     Mitral Valve: There is mild regurgitation.     Tricuspid Valve: There is mild regurgitation.     Aorta: The aortic root is normal in size. The ascending aorta is mildly    dilated. The ascending aorta is 3.7 cm.     Prior TTE study available for comparison. Prior study date: 4/16/2024.    No significant changes noted compared to the prior study.         ECG 12 lead   Final Result by Don Loya MD (08/08 4433)   Normal sinus rhythm   Early repolarization   Low voltage QRS   Abnormal ECG   When compared with ECG of 15-JUL-2024 10:51,    No significant change was found   Confirmed by Don Loya ([id number]) on 8/8/2024 11:08:17 PM        GI:  No orders to display           Results from last 7 days   Lab Units 08/09/24 0209 08/08/24 0408 08/07/24  1903   WBC Thousand/uL 8.60 8.08 8.49   HEMOGLOBIN g/dL 13.8 13.9 15.1   HEMATOCRIT % 40.9 42.4 45.3   PLATELETS Thousands/uL 232 197 230   TOTAL NEUT ABS Thousands/µL  --   --  5.78         Results from last 7 days   Lab Units 08/09/24 0209 08/08/24 0408 08/07/24  1903   SODIUM mmol/L 138 136 141   POTASSIUM mmol/L 4.4 4.4 3.5   CHLORIDE mmol/L 110* 108 109*   CO2 mmol/L 21 23 25   ANION GAP mmol/L 7 5 7   BUN mg/dL 20 24 26*   CREATININE mg/dL 0.77 0.72 0.91   EGFR ml/min/1.73sq m 105 108 97   CALCIUM mg/dL 9.1 8.9 9.0   MAGNESIUM mg/dL  --  2.1 2.2   PHOSPHORUS mg/dL  --  2.1* 2.6*     Results from last 7 days   Lab Units 08/07/24  1903   AST U/L 14   ALT U/L 11   ALK PHOS U/L 60   TOTAL PROTEIN g/dL 7.3   ALBUMIN g/dL 4.1   TOTAL BILIRUBIN mg/dL 0.46     Results from last 7 days   Lab Units 08/07/24  1912   POC GLUCOSE mg/dl 103     Results from last 7 days   Lab Units 08/09/24 0209 08/08/24  0408 08/07/24  1903   GLUCOSE RANDOM mg/dL 125 91 86       Results from last 7 days   Lab Units 08/07/24  1903   HS TNI 0HR ng/L <2             Results from last 7 days   Lab Units 08/07/24  1903   TSH 3RD GENERATON uIU/mL 1.705           Results from last 7 days   Lab Units 08/07/24 2006   CLARITY UA  Clear   COLOR UA  Dark Yellow   SPEC GRAV UA  >=1.030   PH UA  6.0   GLUCOSE UA mg/dl Negative   KETONES UA mg/dl 10 (1+)*   BLOOD UA  Negative   PROTEIN UA mg/dl Trace*   NITRITE UA  Negative   BILIRUBIN UA  Negative   UROBILINOGEN UA (BE) mg/dl 2.0*   LEUKOCYTES UA  Negative   WBC UA /hpf 2-4   RBC UA /hpf None Seen   BACTERIA UA /hpf Occasional   EPITHELIAL CELLS WET PREP /hpf Occasional   MUCUS THREADS  Innumerable*       ED Treatment-Medication Administration from 08/07/2024 8928 to 08/07/2024 1352          Date/Time Order Dose Route Action     08/07/2024 1906 sodium chloride 0.9 % bolus 1,000 mL 1,000 mL Intravenous New Bag     08/07/2024 2049 iohexol (OMNIPAQUE) 350 MG/ML injection (SINGLE-DOSE) 85 mL 85 mL Intravenous Given     08/07/2024 2249 ketorolac (TORADOL) injection 15 mg 15 mg Intravenous Given            Past Medical History:   Diagnosis Date    Arthritis     Diabetes mellitus (HCC)     prediabetes    MS (multiple sclerosis) (HCC)     Scoliosis     Visual impairment      Present on Admission:   MS (multiple sclerosis) (HCC)   DAVID (obstructive sleep apnea)      Admitting Diagnosis: Anxiety [F41.9]  Left-sided weakness [R53.1]  Stroke-like episode [R29.90]  Age/Sex: 49 y.o. male  Admission Orders:  Scheduled Medications:  acetaminophen, 975 mg, Oral, Q8H OFE  enoxaparin, 40 mg, Subcutaneous, Daily  niacin, 200 mg, Oral, Daily  nortriptyline, 25 mg, Oral, HS  pantoprazole, 40 mg, Oral, Early Morning      Continuous IV Infusions: None     PRN Meds:  aluminum-magnesium hydroxide-simethicone, 30 mL, Oral, Q6H PRN  ondansetron, 4 mg, Intravenous, Q4H PRN 8/9 x1  senna, 1 tablet, Oral, HS PRN        IP CONSULT TO NEUROLOGY  IP CONSULT TO CASE MANAGEMENT  IP CONSULT TO NUTRITION SERVICES  IP CONSULT TO CARDIOLOGY  IP CONSULT TO PSYCHIATRY    Network Utilization Review Department  ATTENTION: Please call with any questions or concerns to [phone] and carefully listen to the prompts so that you are directed to the right person. All voicemails are confidential.   For Discharge needs, contact Care Management DC Support Team at [phone] opt. [phone]  Send all requests for admission clinical reviews, approved or denied determinations and any other requests to dedicated fax number below belonging to the campus where the patient is receiving treatment. List of dedicated fax numbers for the Facilities:  FACILITY NAME UR FAX NUMBER   ADMISSION DENIALS (Administrative/Medical Necessity) [phone]   DISCHARGE SUPPORT TEAM  (NETWORK) [phone]   PARENT CHILD HEALTH (Maternity/NICU/Pediatrics) [phone]   [facility] [phone]   [facility] [phone]   Pending sale to [facility] [phone]   [facility] [phone]   [facility] [phone]   [facility] [phone]   [facility] [phone]   [facility] [phone]   [facility] [phone]   [facility] [phone]   [facility] [phone]   [facility] [phone]

## 2024-08-09 ENCOUNTER — TELEPHONE (OUTPATIENT)
Dept: CARDIOLOGY CLINIC | Facility: CLINIC | Age: 49
End: 2024-08-09

## 2024-08-09 VITALS
HEART RATE: 107 BPM | OXYGEN SATURATION: 93 % | TEMPERATURE: 97.4 F | BODY MASS INDEX: 32.62 KG/M2 | WEIGHT: 233 LBS | HEIGHT: 71 IN | RESPIRATION RATE: 18 BRPM | SYSTOLIC BLOOD PRESSURE: 113 MMHG | DIASTOLIC BLOOD PRESSURE: 71 MMHG

## 2024-08-09 DIAGNOSIS — G35 MS (MULTIPLE SCLEROSIS) (HCC): Primary | ICD-10-CM

## 2024-08-09 LAB
ANION GAP SERPL CALCULATED.3IONS-SCNC: 7 MMOL/L (ref 4–13)
BASOPHILS # BLD MANUAL: 0 THOUSAND/UL (ref 0–0.1)
BASOPHILS NFR MAR MANUAL: 0 % (ref 0–1)
BUN SERPL-MCNC: 20 MG/DL (ref 5–25)
CALCIUM SERPL-MCNC: 9.1 MG/DL (ref 8.4–10.2)
CHLORIDE SERPL-SCNC: 110 MMOL/L (ref 96–108)
CO2 SERPL-SCNC: 21 MMOL/L (ref 21–32)
CREAT SERPL-MCNC: 0.77 MG/DL (ref 0.6–1.3)
EOSINOPHIL # BLD MANUAL: 0 THOUSAND/UL (ref 0–0.4)
EOSINOPHIL NFR BLD MANUAL: 0 % (ref 0–6)
ERYTHROCYTE [DISTWIDTH] IN BLOOD BY AUTOMATED COUNT: 12.7 % (ref 11.6–15.1)
FLUAV RNA RESP QL NAA+PROBE: NEGATIVE
FLUBV RNA RESP QL NAA+PROBE: NEGATIVE
GFR SERPL CREATININE-BSD FRML MDRD: 105 ML/MIN/1.73SQ M
GLUCOSE SERPL-MCNC: 125 MG/DL (ref 65–140)
HCT VFR BLD AUTO: 40.9 % (ref 36.5–49.3)
HGB BLD-MCNC: 13.8 G/DL (ref 12–17)
LYMPHOCYTES # BLD AUTO: 0.26 THOUSAND/UL (ref 0.6–4.47)
LYMPHOCYTES # BLD AUTO: 3 % (ref 14–44)
MCH RBC QN AUTO: 29.4 PG (ref 26.8–34.3)
MCHC RBC AUTO-ENTMCNC: 33.7 G/DL (ref 31.4–37.4)
MCV RBC AUTO: 87 FL (ref 82–98)
MONOCYTES # BLD AUTO: 0.09 THOUSAND/UL (ref 0–1.22)
MONOCYTES NFR BLD: 1 % (ref 4–12)
NEUTROPHILS # BLD MANUAL: 8.26 THOUSAND/UL (ref 1.85–7.62)
NEUTS SEG NFR BLD AUTO: 96 % (ref 43–75)
PLATELET # BLD AUTO: 232 THOUSANDS/UL (ref 149–390)
PLATELET BLD QL SMEAR: ADEQUATE
PMV BLD AUTO: 10.8 FL (ref 8.9–12.7)
POTASSIUM SERPL-SCNC: 4.4 MMOL/L (ref 3.5–5.3)
RBC # BLD AUTO: 4.69 MILLION/UL (ref 3.88–5.62)
RBC MORPH BLD: NORMAL
RSV RNA RESP QL NAA+PROBE: NEGATIVE
SARS-COV-2 RNA RESP QL NAA+PROBE: NEGATIVE
SODIUM SERPL-SCNC: 138 MMOL/L (ref 135–147)
WBC # BLD AUTO: 8.6 THOUSAND/UL (ref 4.31–10.16)

## 2024-08-09 PROCEDURE — 0241U HB NFCT DS VIR RESP RNA 4 TRGT: CPT

## 2024-08-09 PROCEDURE — 80048 BASIC METABOLIC PNL TOTAL CA: CPT | Performed by: INTERNAL MEDICINE

## 2024-08-09 PROCEDURE — 85007 BL SMEAR W/DIFF WBC COUNT: CPT | Performed by: INTERNAL MEDICINE

## 2024-08-09 PROCEDURE — 99239 HOSP IP/OBS DSCHRG MGMT >30: CPT

## 2024-08-09 PROCEDURE — 85027 COMPLETE CBC AUTOMATED: CPT | Performed by: INTERNAL MEDICINE

## 2024-08-09 RX ORDER — ACETAMINOPHEN 325 MG/1
975 TABLET ORAL EVERY 8 HOURS SCHEDULED
Status: DISCONTINUED | OUTPATIENT
Start: 2024-08-09 | End: 2024-08-09 | Stop reason: HOSPADM

## 2024-08-09 RX ORDER — SODIUM CHLORIDE 9 MG/ML
20 INJECTION, SOLUTION INTRAVENOUS ONCE
OUTPATIENT
Start: 2024-09-05

## 2024-08-09 RX ORDER — KETOROLAC TROMETHAMINE 30 MG/ML
30 INJECTION, SOLUTION INTRAMUSCULAR; INTRAVENOUS ONCE
Status: COMPLETED | OUTPATIENT
Start: 2024-08-09 | End: 2024-08-09

## 2024-08-09 RX ORDER — DIPHENHYDRAMINE HYDROCHLORIDE 50 MG/ML
25 INJECTION INTRAMUSCULAR; INTRAVENOUS ONCE
Status: COMPLETED | OUTPATIENT
Start: 2024-08-09 | End: 2024-08-09

## 2024-08-09 RX ORDER — METOCLOPRAMIDE HYDROCHLORIDE 5 MG/ML
10 INJECTION INTRAMUSCULAR; INTRAVENOUS ONCE
Status: COMPLETED | OUTPATIENT
Start: 2024-08-09 | End: 2024-08-09

## 2024-08-09 RX ORDER — ONDANSETRON 2 MG/ML
4 INJECTION INTRAMUSCULAR; INTRAVENOUS EVERY 4 HOURS PRN
Status: DISCONTINUED | OUTPATIENT
Start: 2024-08-09 | End: 2024-08-09

## 2024-08-09 RX ORDER — ONDANSETRON 2 MG/ML
4 INJECTION INTRAMUSCULAR; INTRAVENOUS EVERY 4 HOURS PRN
Status: DISCONTINUED | OUTPATIENT
Start: 2024-08-09 | End: 2024-08-09 | Stop reason: HOSPADM

## 2024-08-09 RX ADMIN — ONDANSETRON 4 MG: 2 INJECTION, SOLUTION INTRAMUSCULAR; INTRAVENOUS at 07:06

## 2024-08-09 RX ADMIN — ACETAMINOPHEN 975 MG: 325 TABLET, FILM COATED ORAL at 14:14

## 2024-08-09 RX ADMIN — ENOXAPARIN SODIUM 40 MG: 40 INJECTION SUBCUTANEOUS at 10:15

## 2024-08-09 RX ADMIN — KETOROLAC TROMETHAMINE 30 MG: 30 INJECTION, SOLUTION INTRAMUSCULAR; INTRAVENOUS at 10:16

## 2024-08-09 RX ADMIN — PANTOPRAZOLE SODIUM 40 MG: 40 TABLET, DELAYED RELEASE ORAL at 04:53

## 2024-08-09 RX ADMIN — DIPHENHYDRAMINE HYDROCHLORIDE 25 MG: 50 INJECTION, SOLUTION INTRAMUSCULAR; INTRAVENOUS at 10:16

## 2024-08-09 RX ADMIN — ACETAMINOPHEN 975 MG: 325 TABLET, FILM COATED ORAL at 07:46

## 2024-08-09 RX ADMIN — METOCLOPRAMIDE 10 MG: 5 INJECTION, SOLUTION INTRAMUSCULAR; INTRAVENOUS at 10:16

## 2024-08-09 RX ADMIN — Medication 200 MG: at 10:16

## 2024-08-09 NOTE — ASSESSMENT & PLAN NOTE
Follows with [facility] neurology  maintained on Ocrevus and Solu-Medrol every 4 weeks -last dose 7/10  Currently in the process of establishing new infusion provider due to insurance change   Monthly solumedrol dose administered while inpatient

## 2024-08-09 NOTE — SPEECH THERAPY NOTE
"Speech Language/Pathology  Last seen by SLP 8/8 recommending  regular diet and thin liquids SLP to monitor chart for MRI results, if MRI neg (-) SLP to s/o as no skilled intervention is required, if MRI pos (+) SLP to follow up for diet tolerance x1.\"    MRI brain wo contrast results:   \"1. No acute infarct, mass effect or midline shift.     2. Redemonstrated white matter findings in keeping with chronic demyelinating disease.\"    SLP to s/o as no skilled intervention is required, no further IP ST necessary. Please re-consult if medically necessary.       "

## 2024-08-09 NOTE — ASSESSMENT & PLAN NOTE
"Presenting with left facial paresthesias, L sided numbness, and intermittent dysarthria/expressive aphasia that onset 6440-2437 on 8/7  Admitted 7/15-7/16 for the same symptoms due to concern for TIA-MRI brain was negative at that time-was started on ASA 81 Mg daily (has been taking  mg daily as he ran out of baby aspirin)  Neurology also felt that this could have been a pseudo exacerbation of MS due to stressful events in patient's life  As symptoms are the same as prior-high suspicion for stress reaction as patient was told earlier today that he was unable to receive his monthly Solu-Medrol and Ocrevus treatment for his MS due to insurance change  CTA H/N: \"CT Brain:  No acute intracranial abnormality.  Subtle decreased attenuation in the periventricular and subcortical white matter is again seen, stable compared to prior exams which could be related to demyelination or mild chronic small vessel ischemic changes.   CT Angiography:  Unremarkable CTA neck and brain without large vessel arterial occlusion, significant flow-limiting stenosis, or focal saccular aneurysm identified.\"    ED spoke with neurology-felt this was likely a stress reaction as sx were same as prior however admitted for CVA pathway. Neurology consult appreciated.   MRI brain without acute abnormality, chronic demyelinating disease  ASA discontinued by neurology  S/P IV solumedrol 1000 mg x 1 (monthly dose)  Discussed with neurology, no further inpatient recommendations, cleared for DC to home with outpatient f/u  Patient reports resolution of presenting sx, did note mild HA which resolved with migraine cocktail x 1  Given sx suspicious for psychologic stress as etiology of flare, psych consulted   No indication for inpatient psychiatric tx  Patient not interested in medication adjustment at this time  Recommended psychotherapy/CBT on discharge   "

## 2024-08-09 NOTE — DISCHARGE SUMMARY
"[facility]  Discharge- Carlos DESTIN Landis 1975, 49 y.o. male MRN: [id number]  Unit/Bed#: -01 Encounter: [id number]  Primary Care Provider: Ha Acosta MD   Date and time admitted to hospital: 8/7/2024  6:13 PM    * Weakness  Assessment & Plan  Presenting with left facial paresthesias, L sided numbness, and intermittent dysarthria/expressive aphasia that onset [id number] on 8/7  Admitted 7/15-7/16 for the same symptoms due to concern for TIA-MRI brain was negative at that time-was started on ASA 81 Mg daily (has been taking  mg daily as he ran out of baby aspirin)  Neurology also felt that this could have been a pseudo exacerbation of MS due to stressful events in patient's life  As symptoms are the same as prior-high suspicion for stress reaction as patient was told earlier today that he was unable to receive his monthly Solu-Medrol and Ocrevus treatment for his MS due to insurance change  CTA H/N: \"CT Brain:  No acute intracranial abnormality.  Subtle decreased attenuation in the periventricular and subcortical white matter is again seen, stable compared to prior exams which could be related to demyelination or mild chronic small vessel ischemic changes.   CT Angiography:  Unremarkable CTA neck and brain without large vessel arterial occlusion, significant flow-limiting stenosis, or focal saccular aneurysm identified.\"    ED spoke with neurology-felt this was likely a stress reaction as sx were same as prior however admitted for CVA pathway. Neurology consult appreciated.   MRI brain without acute abnormality, chronic demyelinating disease  ASA discontinued by neurology  S/P IV solumedrol 1000 mg x 1 (monthly dose)  Discussed with neurology, no further inpatient recommendations, cleared for DC to home with outpatient f/u  Patient reports resolution of presenting sx, did note mild HA which resolved with migraine cocktail x 1  Given sx suspicious for psychologic stress as " etiology of flare, psych consulted   No indication for inpatient psychiatric tx  Patient not interested in medication adjustment at this time  Recommended psychotherapy/CBT on discharge     MS (multiple sclerosis) (HCC)  Assessment & Plan  Follows with [facility] neurology  maintained on Ocrevus and Solu-Medrol every 4 weeks -last dose 7/10  Currently in the process of establishing new infusion provider due to insurance change   Monthly solumedrol dose administered while inpatient     Non-sustained ventricular tachycardia (HCC)  Assessment & Plan  5-second run of nonsustained V. tach noted on telemetry  Patient asymptomatic and conversational when episode occurred, BP stable  Updated echo ordered-last was 4/2024 without any abnormalities  Cardiology consulted  TTE reviewed, no significant abnormality  Recommended outpatient ZIO patch vs holter monitor, message sent to cards office for set up   No further inpatient recommendations     DAVID (obstructive sleep apnea)  Assessment & Plan  History of DAVID noncompliant with CPAP  Recommend continued outpatient sleep medicine follow-up        Medical Problems       Resolved Problems  Date Reviewed: 8/9/2024   None       Discharging Physician / Practitioner: Krys Roper PA-C  PCP: Ha Acosta MD  Admission Date:   Admission Orders (From admission, onward)       Ordered        08/08/24 1539  INPATIENT ADMISSION  Once            08/07/24 2256  Place in Observation  Once                          Discharge Date: 08/09/24    Consultations During Hospital Stay:  Cardiology  Neurology  Psychiatry     Procedures Performed:   None     Significant Findings / Test Results:   MRI brain wo contrast   Final Result by Med Low MD (08/08 1135)      1. No acute infarct, mass effect or midline shift.      2. Redemonstrated white matter findings in keeping with chronic demyelinating disease.      Workstation performed: [id number]         CTA head and neck with and  without contrast   Final Result by Vasu Sullivan MD (08/07 2145)      CT Brain:  No acute intracranial abnormality.  Subtle decreased attenuation in the periventricular and subcortical white matter is again seen, stable compared to prior exams which could be related to demyelination or mild chronic small vessel ischemic    changes.      CT Angiography:  Unremarkable CTA neck and brain without large vessel arterial occlusion, significant flow-limiting stenosis, or focal saccular aneurysm identified.                  Workstation performed: [id number]         XR chest 1 view portable   ED Interpretation by SHYANN Amato (08/07 2102)   No acute cardiopulmonary disease identified by me.      Final Result by Vasu Sullivan MD (08/08 0033)      No acute cardiopulmonary disease.            Workstation performed: [id number]          TTE:   Left Ventricle: Left ventricular cavity size is normal. Wall thickness is mildly increased. There is concentric remodeling. The left ventricular ejection fraction is 65% by biplane measurement. Systolic function is normal. Wall motion is normal.    Right Ventricle: Right ventricular cavity size is normal. Systolic function is normal.    Left Atrium: The atrium is normal in size.    Right Atrium: The atrium is normal in size.    Mitral Valve: There is mild regurgitation.    Tricuspid Valve: There is mild regurgitation.    Aorta: The aortic root is normal in size. The ascending aorta is mildly dilated. The ascending aorta is 3.7 cm.    Prior TTE study available for comparison. Prior study date: 4/16/2024. No significant changes noted compared to the prior study.    Incidental Findings:   None      Test Results Pending at Discharge (will require follow up):   None      Outpatient Tests Requested:  Outpatient cardiac rhythm monitoring with cardiology     Complications:  none     Reason for Admission: stroke like symptoms    Hospital Course:   Carlos Landis is a 49 y.o. male patient with  PMH of MS, DAVID, several recent admissions for left facial numbness/tingling, dysarthria, word finding difficulty who originally presented to the hospital on 8/7/2024 due to recurrent episode of left facial numbness/tingling, word finding difficulty and weakness.  Neurology was consulted and patient was admitted on stroke pathway.  MRI brain was without infarct or evidence of new MS lesion or demyelination.  Patient was treated with his monthly dose of IV Solu-Medrol for which he was due.  Neurology discontinued patient's previously prescribed aspirin and recommended outpatient follow-up without further need for inpatient workup.  Notably patient's numbness/tingling did resolve.  He experienced a mild headache which she states is typical of prior headache patterns, this resolved with IV migraine cocktail x 1.  Ultimately neurology suspected possible psychiatric etiology of patient's flare of symptoms.  Psychiatry was consulted, patient did not warrant inpatient psychiatric treatment and did not want to start new medications.  Psychiatry recommended outpatient follow-up with cognitive behavioral therapy/psychotherapy.    Notably patient did have brief nonsustained run of V. Tach during admission.  Cardiology was consulted.  Echocardiogram was obtained which was unremarkable.  Patient was monitored on telemetry without further runs of V. tach, remained in normal sinus rhythm.  Cardiology recommended outpatient follow-up for cardiac rhythm monitoring which would be set up outpatient in the office.  Message was sent to office.    Patient was evaluated by PT/OT who recommended home with home health care.  [facility] was set up by case management.  Prior to discharge all labs and vital stable, patient without further symptoms or complaints at this time.  Sister will transport to home.    Please see above list of diagnoses and related plan for additional information.     Condition at Discharge: stable    Discharge Day  "Visit / Exam:   Subjective: Patient with 1 episode of nausea/vomiting this morning.  This was also during acute headache.  Patient received Tylenol followed by migraine cocktail x 1 with resolution of nausea/vomiting as well as headache.  He denies any further numbness/tingling or weakness, neurologic exam appears to be at baseline.  Patient states he is feeling well for discharged home.  Denies any further questions at this time.    Vitals: Blood Pressure: 113/71 (08/09/24 1503)  Pulse: (!) 107 (08/09/24 1503)  Temperature: (!) 97.4 °F (36.3 °C) (08/09/24 1503)  Temp Source: Axillary (08/08/24 2024)  Respirations: 18 (08/09/24 0733)  Height: 5' 11\" (180.3 cm) (08/08/24 0920)  Weight - Scale: 106 kg (233 lb) (08/08/24 0920)  SpO2: 93 % (08/09/24 1503)  Exam:   Physical Exam  Vitals and nursing note reviewed.   Constitutional:       General: He is not in acute distress.     Appearance: He is well-developed. He is not ill-appearing.   HENT:      Head: Normocephalic and atraumatic.   Eyes:      General:         Right eye: No discharge.         Left eye: No discharge.      Extraocular Movements: Extraocular movements intact.      Conjunctiva/sclera: Conjunctivae normal.   Cardiovascular:      Rate and Rhythm: Normal rate and regular rhythm.      Heart sounds: No murmur heard.  Pulmonary:      Effort: Pulmonary effort is normal. No respiratory distress.      Breath sounds: Normal breath sounds. No wheezing, rhonchi or rales.   Abdominal:      General: Bowel sounds are normal. There is no distension.      Palpations: Abdomen is soft.      Tenderness: There is no abdominal tenderness.   Musculoskeletal:      Cervical back: Neck supple.      Right lower leg: No edema.      Left lower leg: No edema.   Skin:     General: Skin is warm and dry.      Capillary Refill: Capillary refill takes less than 2 seconds.   Neurological:      General: No focal deficit present.      Mental Status: He is alert. Mental status is at baseline. "      Cranial Nerves: No cranial nerve deficit.   Psychiatric:         Mood and Affect: Mood normal.         Behavior: Behavior normal.          Discussion with Family: Patient declined call to .     Discharge instructions/Information to patient and family:   See after visit summary for information provided to patient and family.      Provisions for Follow-Up Care:  See after visit summary for information related to follow-up care and any pertinent home health orders.      Mobility at time of Discharge:   Basic Mobility Inpatient Raw Score: 19  JH-HLM Goal: 6: Walk 10 steps or more  JH-HLM Achieved: 6: Walk 10 steps or more  HLM Goal achieved. Continue to encourage appropriate mobility.     Disposition:   Home with VNA Services (Reminder: Complete face to face encounter)    Planned Readmission: none      Discharge Statement:  I spent 45 minutes discharging the patient. This time was spent on the day of discharge. I had direct contact with the patient on the day of discharge. Greater than 50% of the total time was spent examining patient, answering all patient questions, arranging and discussing plan of care with patient as well as directly providing post-discharge instructions.  Additional time then spent on discharge activities.    Discharge Medications:  See after visit summary for reconciled discharge medications provided to patient and/or family.      **Please Note: This note may have been constructed using a voice recognition system**

## 2024-08-09 NOTE — TELEPHONE ENCOUNTER
Pt now has coverage with [employer]. Reviewed chart. Solumedrol 1,000mg ordered once every 28 days x6 months, skipping July.    Pt had first Solumedrol infusion with [facility] in June. July dose skipped per orders. Pt currently admitted, August dose of Solumedrol given 8/8/24. 3 more months remain.     Per [employer] Website, [place] is not required for , [address]24, or [address]00. Pt can continue Solumedrol infusions at [facility].     Therapy plan entered and sent for signature.     Gena with [employer] provided with update.

## 2024-08-09 NOTE — CASE MANAGEMENT
Case Management Discharge Planning Note    Patient name Carlos Hall /-01 MRN [id number]  : 1975 Date 2024       Current Admission Date: 2024  Current Admission Diagnosis:Weakness   Patient Active Problem List    Diagnosis Date Noted Date Diagnosed    Non-sustained ventricular tachycardia (HCC) 2024     Weakness 2024     DAVID (obstructive sleep apnea) 2024     Stroke-like symptoms 2023     Pain of upper abdomen 2023     Right-sided thoracic back pain 2023     Hypomagnesemia 2023     Episodic tension-type headache, not intractable 2023     Left leg weakness 2022     Obesity (BMI 30-39.9) 2022     Ambulatory dysfunction 03/15/2022     MS (multiple sclerosis) (HCC) 03/15/2022     Dysarthria 2022     CNS demyelinating disease ([facility]) 2022     Right hemiparesis ([facility]) 2022       LOS (days): 1  Geometric Mean LOS (GMLOS) (days): 4  Days to GMLOS:3     OBJECTIVE:  Risk of Unplanned Readmission Score: 11.66         Current admission status: Inpatient   Preferred Pharmacy:   [facility]  [place] [address] [address]  [address]  [place] [address]  Phone: [phone] Fax: [phone]    Primary Care Provider: Ha Acosta MD    Primary Insurance: [facility]  Secondary Insurance:     DISCHARGE DETAILS:    Requested Home Health Care         Is the patient interested in HHC at discharge?: Yes  Home Health Discipline requested:: Nursing, Occupational Therapy, Physical Therapy  [facility] External Referral Reason (only applicable if external HHA name selected): Services not provided in network or near patient location  Home Health Follow-Up Provider:: PCP  Home Health Services Needed:: Evaluate Functional Status and Safety, Gait/ADL Training, Strengthening/Theraputic Exercises to Improve Function  Homebound Criteria Met:: Uses an Assist Device (i.e. cane,  walker, etc)  Supporting Clincal Findings:: Bed Bound or Wheelchair Bound    Additional Comments: [facility] cannot accept Pt due no contract with insurance. [place] [facility] referrals sent via [employer]. Accepting VNA list reviewed with Pt. Pt has no preference and choosing [facility]. Reserved in [employer].

## 2024-08-09 NOTE — CASE MANAGEMENT
Case Management Progress Note    Patient name Carlos Landis  Location /-01 MRN [id number]  : 1975 Date 2024       LOS (days): 1  Geometric Mean LOS (GMLOS) (days): 4  Days to GMLOS:3        OBJECTIVE:        Current admission status: Inpatient  Preferred Pharmacy:   [facility]  [place] - [address]  [place] [address]  Phone: [phone] Fax: [phone]    Primary Care Provider: Ha Acosta MD    Primary Insurance: [employer]  Secondary Insurance:     PROGRESS NOTE:    Faxed discharge instructions to [facility] at [phone].

## 2024-08-09 NOTE — PROGRESS NOTES
Patient:  CHILO AHN    MRN:  [id number]    [place] Request ID:  [id number]    Level of care reserved:  Home Health Agency    Partner Reserved:  [place] [address] [phone]    Clinical needs requested:    Geography searched:  [id number]    Start of Service:    Request sent:  1:42pm EDT on 8/8/2024 by Abimbola Leigh    Partner reserved:  1:52pm EDT on 8/9/2024 by Abimbola Leigh    Choice list shared:  1:32pm EDT on 8/9/2024 by Abimbola Leigh

## 2024-08-09 NOTE — ASSESSMENT & PLAN NOTE
5-second run of nonsustained V. tach noted on telemetry  Patient asymptomatic and conversational when episode occurred, BP stable  Updated echo ordered-last was 4/2024 without any abnormalities  Cardiology consulted  TTE reviewed, no significant abnormality  Recommended outpatient ZIO patch vs holter monitor, message sent to cards office for set up   No further inpatient recommendations

## 2024-08-09 NOTE — TELEPHONE ENCOUNTER
AMB Extended Holter Monitor ([id number]) and ([id number]):    No Authorization Required per Wisam MOJICA  (Precert) at [facility] on 8/9/2024 at 2:08pm    Reference #[id number] MOJICA On 8/9/2024 at 2:08pm

## 2024-08-09 NOTE — DISCHARGE INSTR - AVS FIRST PAGE
Follow-up providers:  Please follow-up with PCP within 1 week of discharge for posthospital visit  Please follow-up with outpatient cardiology within 1 to 2 weeks to set up outpatient Holter monitor to monitor your heart rhythm over time  Please follow-up with outpatient neurology within 2 to 4 weeks for posthospital visit and further management of MS  Please establish care with outpatient psychiatry for psychotherapy and cognitive behavioral therapy    Please continue all home medications with the exception of aspirin which has been discontinued by your neurology team.  Please discuss further medication with outpatient neurology and PCP.    Please return to the hospital if you experience new fevers chills, headache, difficulty breathing, chest pain, palpitations, numbness/tingling, fatigue, or any additional concerning symptoms.

## 2024-08-11 NOTE — UTILIZATION REVIEW
NOTIFICATION OF INPATIENT ADMISSION   AUTHORIZATION REQUEST   SERVICING FACILITY:   [place] [address]  Tax ID: [id number]  NPI: [id number] ATTENDING PROVIDER:  Attending Name and NPI#: Holly Byrd Md [[id number]]  Address: [address] [place] [address]  Phone: [phone]   ADMISSION INFORMATION:  Place of Service: Inpatient [facility]  Place of Service Code: 21  Inpatient Admission Date/Time: 8/8/24  3:39 PM  Discharge Date/Time: 8/9/2024  5:03 PM  Admitting Diagnosis Code/Description:  Anxiety [F41.9]  Left-sided weakness [R53.1]  Stroke-like episode [R29.90]     UTILIZATION REVIEW CONTACT:  Ann Lama, Utilization   Network Utilization Review Department  Phone: [phone]  Fax: [phone]  Email: [email or web address]  Contact for approvals/pending authorizations, clinical reviews, and discharge.     PHYSICIAN ADVISORY SERVICES:  Medical Necessity Denial & Qvph-rl-Rvco Review  Phone: [phone]  Fax: [phone]  Email: [email or web address]     DISCHARGE SUPPORT TEAM:  For Patients Discharge Needs & Updates  Phone: [phone] opt. 2 Fax: [phone]  Email: [email or web address]   Initial Clinical Review     WAS OBSERVATION 8/7 CONVERTED TO INPATIENT ADMISSION 8/8 @ 1539 DUE TO CONTINUED STAY REQUIRED TO CARE FOR PATIENT WITH  Weakness. Non-sustained ventricular tachycardia. Stroke workup     Admission: Date/Time/Statement:   Admission Orders (From admission, onward)          Ordered         08/08/24 1539   INPATIENT ADMISSION  Once             08/07/24 2256   Place in Observation  Once                                     Orders Placed This Encounter   Procedures    INPATIENT ADMISSION       Standing Status:   Standing       Number of Occurrences:   1       Order Specific Question:   Level of Care       Answer:   Med Surg [16]       Order Specific  "Question:   Estimated length of stay       Answer:   More than 2 Midnights       Order Specific Question:   Certification       Answer:   I certify that inpatient services are medically necessary for this patient for a duration of greater than two midnights. See H&P and MD Progress Notes for additional information about the patient's course of treatment.      ED Arrival Information         Expected   -    Arrival   8/7/2024 18:10    Acuity   Urgent                 Means of arrival   Ambulance    Escorted by   [facility]    Service   Hospitalist    Admission type   Emergency                 Arrival complaint   MS Flare -- L sided weakness                     Chief Complaint   Patient presents with    Medical Problem       Pt states he's having an MS flare up. Increased weakness on the left side since this morning. Patient states he's also having facial weakness on the left. Was seen here last week for the same thing          Initial Presentation: 49 y.o. male to ED presents for  left facial numbness, word finding difficulty, and feeling of inability to move his entire body earlier this evening. Reports  symptoms onset sometime between [id number]. Notes that he did have more weakness on the left side of his body but states that \"I could not really move.\" \"Reports symptoms continued until his IV was inserted in the ED.  Reports that he has had increased stress due to insurance change and not having coverage of home infusion for MS medications. Symptoms were the same as prior in July. He has baseline right arm weakness from MS and ambulates with a walker at baseline. PMH for MS, follows with Neurology, maintained on Ocrevus and Solu-Medrol every 4 weeks -last dose 7/10. DAVID.  Admit to Observation Dx; Weakness. Non-sustained ventricular tachycardia. 5-second run of nonsustained V. tach noted on telemetry   Plan; Stroke pathway. MRI Brain. Tele monitoring. Neurology consult. Continue home aspirin -hold on statin as " recent LDL is 65. Cardiology and Psychiatry consult.  On exam; Has intermittent word finding difficulty, however no overt aphasia or dysarthria.      8/8  Neurology cons; Weakness. Left-sided numbness affecting face, arm, leg, as well as left hand weakness, word finding difficulties, and slurred speech. Symptoms occurred after a stressful event, and resolved upon coming into the ED.   Hold off any further neuroimaging for now. Give addt IV Solu-Medrol 900 mg x 1 since patient received 100 mg overnight to complete his dose for his monthly infusion  ASA 81 mg daily discontinued     8/8  Cardiology cons; NSVT, 13 beats on tele 8/7/24 (asymptomatic). Mild aortic root dilation.  Echo. If found to have high burden of nonsustained VT on ZIO monitor then would recommend ischemic evaluation with cardiac CTA  Outpatient 2-week ZIO monitor     Behavioral Health cons; Multiple recent admissions for similar symptoms with no physical findings. Patient minimizes his ongoing stress and denies overt depressed mood, suicidal ideation, intention, or plan. Denies anxiety. It appears that he is minimizing symptoms but he does report feeling supported socially and we discussed potential treatment options for stress, anxiety, depressed mood. He was resistant to a referral for outpatient psychotherapy but in the end decided that he was willing to receive information regarding outpatient behavioral health treatment. Not interested in psychiatric medications at this time. Did not meet criteria for primary mood disorder such as major depressive disorder or primary anxiety disorder. Patient would likely benefit from outpatient behavioral health referral and should be given 1 on discharge. Does not meet inpatient psychiatric hospitalization criteria and does not meet 302 criteria at this time. If indeed his symptoms are functional neurologic disorder the treatment of choice is outpatient cognitive behavioral therapy and thus he should be given  an outpatient psychotherapy referral on discharge regardless.   Dx: Adjustment reaction with depressed mood   Plan: No indication for inpatient psychiatric hospitalization. Pt not interested in psychotropic medications at this time.        Date: 8/9  Day 3: Has surpassed a 2nd midnight with active treatments and services.  Pt with 1 episode of nausea/vomiting this am. Given Tylenol followed by migraine cocktail x 1 with resolution of nausea/vomiting as well as headache   Stroke workup. MRI Brain without acute abnormality.   S/P IV solumedrol 1000 mg x 1 (monthly dose)              ED Triage Vitals   Temperature Pulse Respirations Blood Pressure SpO2 Pain Score   08/07/24 1817 08/07/24 1817 08/07/24 1817 08/07/24 1817 08/07/24 1817 08/07/24 1829   97.8 °F (36.6 °C) 98 18 149/92 93 % 8      Weight (last 2 days)         Date/Time Weight     08/08/24 0920 106 (233)     08/07/24 2300 106 (233)                Vital Signs (last 3 days)         Date/Time Temp Pulse Resp BP MAP (mmHg) SpO2 O2 Device Patient Position - Orthostatic VS [place] Coma Scale Score Pain     08/09/24 0746 -- -- -- -- -- -- -- -- -- 7     08/09/24 07:33:16 -- 114 18 -- -- 91 % -- -- -- --     08/09/24 0732 -- -- -- 155/109 -- -- -- Sitting -- --     08/09/24 03:40:33 -- 91 18 124/91 102 93 % -- Lying -- --     08/08/24 23:30:23 97.7 °F (36.5 °C) 104 -- 120/82 95 97 % -- -- -- --     08/08/24 23:29:52 97.7 °F (36.5 °C) 107 -- 120/82 95 94 % -- -- -- --     08/08/24 20:24:01 98.3 °F (36.8 °C) 96 18 121/83 96 94 % None (Room air) Lying -- --     08/08/24 1941 -- -- -- -- -- -- -- -- -- No Pain     08/08/24 1800 -- -- -- -- -- -- -- -- 15 --     08/08/24 1300 -- -- -- -- -- -- -- -- 15 --     08/08/24 11:50:39 97.4 °F (36.3 °C) 105 16 121/84 96 93 % -- -- -- --     08/08/24 1135 -- -- -- -- -- -- -- -- -- No Pain     08/08/24 1134 -- -- -- -- -- -- -- -- -- No Pain     08/08/24 10:50:36 -- 92 -- 113/80 91 96 % -- -- -- --     08/08/24 0920 -- 94 -- 122/81  -- -- -- -- -- --     08/08/24 0900 -- -- -- -- -- -- None (Room air) -- 15 No Pain     08/08/24 07:47:45 97.3 °F (36.3 °C) 79 16 120/81 94 95 % None (Room air) Lying -- --     08/08/24 04:47:10 97.5 °F (36.4 °C) 82 16 122/81 95 94 % -- -- -- --     08/07/24 23:39:54 97.4 °F (36.3 °C) 80 -- 125/82 96 95 % None (Room air) Lying 15 No Pain     08/07/24 23:26:07 97.4 °F (36.3 °C) 84 18 132/82 99 96 % -- Lying -- --     08/07/24 2300 -- -- -- -- -- -- -- -- -- No Pain     08/07/24 2230 -- 81 18 119/86 100 96 % None (Room air) -- -- --     08/07/24 2130 -- 81 16 120/84 98 97 % None (Room air) -- -- --     08/07/24 2011 -- -- -- -- -- -- -- -- -- 7 08/07/24 2000 -- 87 18 138/82 102 97 % -- -- -- --     08/07/24 1924 -- -- -- -- -- -- -- -- -- 7 08/07/24 1910 -- -- -- -- -- -- -- -- 15 --     08/07/24 1830 -- 94 18 123/87 101 94 % None (Room air) Sitting -- 8 08/07/24 1829 -- -- -- -- -- -- -- -- -- 8 08/07/24 1817 97.8 °F (36.6 °C) 98 18 149/92 111 93 % -- -- -- --                Pertinent Labs/Diagnostic Test Results:   Radiology:  MRI brain wo contrast   Final Interpretation by Med Low MD (08/08 1135)       1. No acute infarct, mass effect or midline shift.       2. Redemonstrated white matter findings in keeping with chronic demyelinating disease.       Workstation performed: [id number]           CTA head and neck with and without contrast   Final Interpretation by Vasu Sullivan MD (08/07 2145)       CT Brain:  No acute intracranial abnormality.  Subtle decreased attenuation in the periventricular and subcortical white matter is again seen, stable compared to prior exams which could be related to demyelination or mild chronic small vessel ischemic    changes.       CT Angiography:  Unremarkable CTA neck and brain without large vessel arterial occlusion, significant flow-limiting stenosis, or focal saccular aneurysm identified.                       Workstation performed: [id number]            XR chest 1 view portable   ED Interpretation by SHYANN Amato (08/07 2102)   No acute cardiopulmonary disease identified by me.       Final Interpretation by Vasu Sullivan MD (08/08 0033)       No acute cardiopulmonary disease.               Workstation performed: [id number]              Cardiology:  Echo complete w/ contrast if indicated   Final Result by Don Loya MD (08/08 2154)         Left Ventricle: Left ventricular cavity size is normal. Wall thickness    is mildly increased. There is concentric remodeling. The left ventricular    ejection fraction is 65% by biplane measurement. Systolic function is    normal. Wall motion is normal.     Right Ventricle: Right ventricular cavity size is normal. Systolic    function is normal.     Left Atrium: The atrium is normal in size.     Right Atrium: The atrium is normal in size.     Mitral Valve: There is mild regurgitation.     Tricuspid Valve: There is mild regurgitation.     Aorta: The aortic root is normal in size. The ascending aorta is mildly    dilated. The ascending aorta is 3.7 cm.     Prior TTE study available for comparison. Prior study date: 4/16/2024.    No significant changes noted compared to the prior study.           ECG 12 lead   Final Result by Don Loya MD (08/08 2308)   Normal sinus rhythm   Early repolarization   Low voltage QRS   Abnormal ECG   When compared with ECG of 15-JUL-2024 10:51,   No significant change was found   Confirmed by Don Loya ([id number]) on 8/8/2024 11:08:17 PM          GI:  No orders to display                    Results from last 7 days   Lab Units 08/09/24  0209 08/08/24  0408 08/07/24  1903   WBC Thousand/uL 8.60 8.08 8.49   HEMOGLOBIN g/dL 13.8 13.9 15.1   HEMATOCRIT % 40.9 42.4 45.3   PLATELETS Thousands/uL 232 197 230   TOTAL NEUT ABS Thousands/µL  --   --  5.78                 Results from last 7 days   Lab Units 08/09/24  0209 08/08/24  0408 08/07/24  1903   SODIUM mmol/L 138 136 141    POTASSIUM mmol/L 4.4 4.4 3.5   CHLORIDE mmol/L 110* 108 109*   CO2 mmol/L 21 23 25   ANION GAP mmol/L 7 5 7   BUN mg/dL 20 24 26*   CREATININE mg/dL 0.77 0.72 0.91   EGFR ml/min/1.73sq m 105 108 97   CALCIUM mg/dL 9.1 8.9 9.0   MAGNESIUM mg/dL  --  2.1 2.2   PHOSPHORUS mg/dL  --  2.1* 2.6*           Results from last 7 days   Lab Units 08/07/24  1903   AST U/L 14   ALT U/L 11   ALK PHOS U/L 60   TOTAL PROTEIN g/dL 7.3   ALBUMIN g/dL 4.1   TOTAL BILIRUBIN mg/dL 0.46           Results from last 7 days   Lab Units 08/07/24  1912   POC GLUCOSE mg/dl 103             Results from last 7 days   Lab Units 08/09/24  0209 08/08/24  0408 08/07/24  1903   GLUCOSE RANDOM mg/dL 125 91 86              Results from last 7 days   Lab Units 08/07/24  1903   HS TNI 0HR ng/L <2                   Results from last 7 days   Lab Units 08/07/24  1903   TSH 3RD GENERATON uIU/mL 1.705                  Results from last 7 days   Lab Units 08/07/24 2006   CLARITY UA   Clear   COLOR UA   Dark Yellow   SPEC GRAV UA   >=1.030   PH UA   6.0   GLUCOSE UA mg/dl Negative   KETONES UA mg/dl 10 (1+)*   BLOOD UA   Negative   PROTEIN UA mg/dl Trace*   NITRITE UA   Negative   BILIRUBIN UA   Negative   UROBILINOGEN UA (BE) mg/dl 2.0*   LEUKOCYTES UA   Negative   WBC UA /hpf 2-4   RBC UA /hpf None Seen   BACTERIA UA /hpf Occasional   EPITHELIAL CELLS WET PREP /hpf Occasional   MUCUS THREADS   Innumerable*         ED Treatment-Medication Administration from 08/07/2024 1809 to 08/07/2024 2317           Date/Time Order Dose Route Action       08/07/2024 1906 sodium chloride 0.9 % bolus 1,000 mL 1,000 mL Intravenous New Bag       08/07/2024 2049 iohexol (OMNIPAQUE) 350 MG/ML injection (SINGLE-DOSE) 85 mL 85 mL Intravenous Given       08/07/2024 2249 ketorolac (TORADOL) injection 15 mg 15 mg Intravenous Given                Medical History        Past Medical History:   Diagnosis Date    Arthritis      Diabetes mellitus (HCC)       prediabetes    MS (multiple  sclerosis) (HCC)      Scoliosis      Visual impairment           Present on Admission:   MS (multiple sclerosis) (HCC)   DAVID (obstructive sleep apnea)        Admitting Diagnosis: Anxiety [F41.9]  Left-sided weakness [R53.1]  Stroke-like episode [R29.90]  Age/Sex: 49 y.o. male  Admission Orders:  Scheduled Medications:    Scheduled Medications ONLY (does not pull in infusions nor PRN medications order   acetaminophen, 975 mg, Oral, Q8H OFE  enoxaparin, 40 mg, Subcutaneous, Daily  niacin, 200 mg, Oral, Daily  nortriptyline, 25 mg, Oral, HS  pantoprazole, 40 mg, Oral, Early Morning         Continuous IV Infusions: None  Infusions Meds - Displays dose, route, & frequency only         PRN Meds:  aluminum-magnesium hydroxide-simethicone, 30 mL, Oral, Q6H PRN  ondansetron, 4 mg, Intravenous, Q4H PRN 8/9 x1  senna, 1 tablet, Oral, HS PRN           IP CONSULT TO NEUROLOGY  IP CONSULT TO CASE MANAGEMENT  IP CONSULT TO NUTRITION SERVICES  IP CONSULT TO CARDIOLOGY  IP CONSULT TO PSYCHIATRY   [facility]  Discharge- Carlos Landis 1975, 49 y.o. male MRN: [id number]  Unit/Bed#: -01 Encounter: [id number]  Primary Care Provider: Ha Acosta MD   Date and time admitted to hospital: 8/7/2024  6:13 PM     * Weakness  Assessment & Plan  Presenting with left facial paresthesias, L sided numbness, and intermittent dysarthria/expressive aphasia that onset 9611-2591 on 8/7  Admitted 7/15-7/16 for the same symptoms due to concern for TIA-MRI brain was negative at that time-was started on ASA 81 Mg daily (has been taking  mg daily as he ran out of baby aspirin)  Neurology also felt that this could have been a pseudo exacerbation of MS due to stressful events in patient's life  As symptoms are the same as prior-high suspicion for stress reaction as patient was told earlier today that he was unable to receive his monthly Solu-Medrol and Ocrevus treatment for his MS due to insurance change  CTA  "H/N: \"CT Brain:  No acute intracranial abnormality.  Subtle decreased attenuation in the periventricular and subcortical white matter is again seen, stable compared to prior exams which could be related to demyelination or mild chronic small vessel ischemic changes.   CT Angiography:  Unremarkable CTA neck and brain without large vessel arterial occlusion, significant flow-limiting stenosis, or focal saccular aneurysm identified.\"     ED spoke with neurology-felt this was likely a stress reaction as sx were same as prior however admitted for CVA pathway. Neurology consult appreciated.   MRI brain without acute abnormality, chronic demyelinating disease  ASA discontinued by neurology  S/P IV solumedrol 1000 mg x 1 (monthly dose)  Discussed with neurology, no further inpatient recommendations, cleared for DC to home with outpatient f/u  Patient reports resolution of presenting sx, did note mild HA which resolved with migraine cocktail x 1  Given sx suspicious for psychologic stress as etiology of flare, psych consulted   No indication for inpatient psychiatric tx  Patient not interested in medication adjustment at this time  Recommended psychotherapy/CBT on discharge      MS (multiple sclerosis) (HCC)  Assessment & Plan  Follows with [facility] neurology  maintained on Ocrevus and Solu-Medrol every 4 weeks -last dose 7/10  Currently in the process of establishing new infusion provider due to insurance change   Monthly solumedrol dose administered while inpatient      Non-sustained ventricular tachycardia (HCC)  Assessment & Plan  5-second run of nonsustained V. tach noted on telemetry  Patient asymptomatic and conversational when episode occurred, BP stable  Updated echo ordered-last was 4/2024 without any abnormalities  Cardiology consulted  TTE reviewed, no significant abnormality  Recommended outpatient ZIO patch vs holter monitor, message sent to cards office for set up   No further inpatient recommendations    "   DAVID (obstructive sleep apnea)  Assessment & Plan  History of DAVID noncompliant with CPAP  Recommend continued outpatient sleep medicine follow-up           Medical Problems         Resolved Problems  Date Reviewed: 8/9/2024   None         Discharging Physician / Practitioner: Krys Roper PA-C  PCP: Ha Acosta MD  Admission Date:   Admission Orders (From admission, onward)          Ordered         08/08/24 1539   INPATIENT ADMISSION  Once             08/07/24 2256   Place in Observation  Once                               Discharge Date: 08/09/24     Consultations During Hospital Stay:  Cardiology  Neurology  Psychiatry      Procedures Performed:   None      Significant Findings / Test Results:   MRI brain wo contrast   Final Result by Med Low MD (08/08 1135)       1. No acute infarct, mass effect or midline shift.       2. Redemonstrated white matter findings in keeping with chronic demyelinating disease.       Workstation performed: [id number]           CTA head and neck with and without contrast   Final Result by Vasu Sullivan MD (08/07 2145)       CT Brain:  No acute intracranial abnormality.  Subtle decreased attenuation in the periventricular and subcortical white matter is again seen, stable compared to prior exams which could be related to demyelination or mild chronic small vessel ischemic    changes.       CT Angiography:  Unremarkable CTA neck and brain without large vessel arterial occlusion, significant flow-limiting stenosis, or focal saccular aneurysm identified.                       Workstation performed: [id number]           XR chest 1 view portable   ED Interpretation by SHYANN Amato (08/07 2102)   No acute cardiopulmonary disease identified by me.       Final Result by Vasu Sullivan MD (08/08 0033)       No acute cardiopulmonary disease.               Workstation performed: [id number]            TTE:   Left Ventricle: Left ventricular cavity size is normal. Wall  thickness is mildly increased. There is concentric remodeling. The left ventricular ejection fraction is 65% by biplane measurement. Systolic function is normal. Wall motion is normal.    Right Ventricle: Right ventricular cavity size is normal. Systolic function is normal.    Left Atrium: The atrium is normal in size.    Right Atrium: The atrium is normal in size.    Mitral Valve: There is mild regurgitation.    Tricuspid Valve: There is mild regurgitation.    Aorta: The aortic root is normal in size. The ascending aorta is mildly dilated. The ascending aorta is 3.7 cm.    Prior TTE study available for comparison. Prior study date: 4/16/2024. No significant changes noted compared to the prior study.     Incidental Findings:   None       Test Results Pending at Discharge (will require follow up):   None      Outpatient Tests Requested:  Outpatient cardiac rhythm monitoring with cardiology      Complications:  none      Reason for Admission: stroke like symptoms     Hospital Course:   Carlos Landis is a 49 y.o. male patient with PMH of MS, DAVID, several recent admissions for left facial numbness/tingling, dysarthria, word finding difficulty who originally presented to the hospital on 8/7/2024 due to recurrent episode of left facial numbness/tingling, word finding difficulty and weakness.  Neurology was consulted and patient was admitted on stroke pathway.  MRI brain was without infarct or evidence of new MS lesion or demyelination.  Patient was treated with his monthly dose of IV Solu-Medrol for which he was due.  Neurology discontinued patient's previously prescribed aspirin and recommended outpatient follow-up without further need for inpatient workup.  Notably patient's numbness/tingling did resolve.  He experienced a mild headache which she states is typical of prior headache patterns, this resolved with IV migraine cocktail x 1.  Ultimately neurology suspected possible psychiatric etiology of patient's flare of  "symptoms.  Psychiatry was consulted, patient did not warrant inpatient psychiatric treatment and did not want to start new medications.  Psychiatry recommended outpatient follow-up with cognitive behavioral therapy/psychotherapy.     Notably patient did have brief nonsustained run of V. Tach during admission.  Cardiology was consulted.  Echocardiogram was obtained which was unremarkable.  Patient was monitored on telemetry without further runs of V. tach, remained in normal sinus rhythm.  Cardiology recommended outpatient follow-up for cardiac rhythm monitoring which would be set up outpatient in the office.  Message was sent to office.     Patient was evaluated by PT/OT who recommended home with home health care.  [facility] was set up by case management.  Prior to discharge all labs and vital stable, patient without further symptoms or complaints at this time.  Sister will transport to home.     Please see above list of diagnoses and related plan for additional information.      Condition at Discharge: stable     Discharge Day Visit / Exam:   Subjective: Patient with 1 episode of nausea/vomiting this morning.  This was also during acute headache.  Patient received Tylenol followed by migraine cocktail x 1 with resolution of nausea/vomiting as well as headache.  He denies any further numbness/tingling or weakness, neurologic exam appears to be at baseline.  Patient states he is feeling well for discharged home.  Denies any further questions at this time.     Vitals: Blood Pressure: 113/71 (08/09/24 1503)  Pulse: (!) 107 (08/09/24 1503)  Temperature: (!) 97.4 °F (36.3 °C) (08/09/24 1503)  Temp Source: Axillary (08/08/24 2024)  Respirations: 18 (08/09/24 0733)  Height: 5' 11\" (180.3 cm) (08/08/24 0920)  Weight - Scale: 106 kg (233 lb) (08/08/24 0920)  SpO2: 93 % (08/09/24 1503)  Exam:   Physical Exam  Vitals and nursing note reviewed.   Constitutional:       General: He is not in acute distress.     Appearance: " He is well-developed. He is not ill-appearing.   HENT:      Head: Normocephalic and atraumatic.   Eyes:      General:         Right eye: No discharge.         Left eye: No discharge.      Extraocular Movements: Extraocular movements intact.      Conjunctiva/sclera: Conjunctivae normal.   Cardiovascular:      Rate and Rhythm: Normal rate and regular rhythm.      Heart sounds: No murmur heard.  Pulmonary:      Effort: Pulmonary effort is normal. No respiratory distress.      Breath sounds: Normal breath sounds. No wheezing, rhonchi or rales.   Abdominal:      General: Bowel sounds are normal. There is no distension.      Palpations: Abdomen is soft.      Tenderness: There is no abdominal tenderness.   Musculoskeletal:      Cervical back: Neck supple.      Right lower leg: No edema.      Left lower leg: No edema.   Skin:     General: Skin is warm and dry.      Capillary Refill: Capillary refill takes less than 2 seconds.   Neurological:      General: No focal deficit present.      Mental Status: He is alert. Mental status is at baseline.      Cranial Nerves: No cranial nerve deficit.   Psychiatric:         Mood and Affect: Mood normal.         Behavior: Behavior normal.            Discussion with Family: Patient declined call to .      Discharge instructions/Information to patient and family:   See after visit summary for information provided to patient and family.       Provisions for Follow-Up Care:  See after visit summary for information related to follow-up care and any pertinent home health orders.       Mobility at time of Discharge:   Basic Mobility Inpatient Raw Score: 19  JH-HLM Goal: 6: Walk 10 steps or more  JH-HLM Achieved: 6: Walk 10 steps or more  HLM Goal achieved. Continue to encourage appropriate mobility.     Disposition:   Home with VNA Services (Reminder: Complete face to face encounter)     Planned Readmission: none      Discharge Statement:  I spent 45 minutes discharging the  patient. This time was spent on the day of discharge. I had direct contact with the patient on the day of discharge. Greater than 50% of the total time was spent examining patient, answering all patient questions, arranging and discussing plan of care with patient as well as directly providing post-discharge instructions.  Additional time then spent on discharge activities.     Discharge Medications:  See after visit summary for reconciled discharge medications provided to patient and/or family.       **Please Note: This note may have been constructed using a voice recognition system**

## 2024-08-12 ENCOUNTER — TELEPHONE (OUTPATIENT)
Age: 49
End: 2024-08-12

## 2024-08-12 NOTE — TELEPHONE ENCOUNTER
Contacted patient in regards to Routine Referral in attempts to verify patient's needs of services. Writer verified N/A - NO ANSWER. Writer LVM and left callback number [phone]; option 3.    1st call attempt, 3 days defer

## 2024-08-12 NOTE — TELEPHONE ENCOUNTER
Pt returned call in regards to referral. States is interested in talk therapy and would like to be added to proper wait list.  Closing referral at this time.

## 2024-08-13 NOTE — TELEPHONE ENCOUNTER
Plan is signed. [employer] message sent to pt with update, asked if pt was agreeable to [facility].

## 2024-08-15 NOTE — TELEPHONE ENCOUNTER
[facility] to [facility]"" home

## 2024-08-16 ENCOUNTER — PATIENT MESSAGE (OUTPATIENT)
Dept: NEUROLOGY | Facility: CLINIC | Age: 49
End: 2024-08-16

## 2024-08-16 NOTE — TELEPHONE ENCOUNTER
Called and Left a message on pt's answering machine for a call back.    Need to confirm if pt is agreeable to continuing Solumedrol infusions at [facility].

## 2024-08-28 ENCOUNTER — CLINICAL SUPPORT (OUTPATIENT)
Dept: CARDIOLOGY CLINIC | Facility: CLINIC | Age: 49
End: 2024-08-28
Payer: COMMERCIAL

## 2024-08-28 DIAGNOSIS — I47.29 NON-SUSTAINED VENTRICULAR TACHYCARDIA (HCC): ICD-10-CM

## 2024-08-28 PROCEDURE — 93244 EXT ECG>48HR<7D REV&INTERPJ: CPT | Performed by: INTERNAL MEDICINE

## 2024-09-01 ENCOUNTER — APPOINTMENT (EMERGENCY)
Dept: CT IMAGING | Facility: HOSPITAL | Age: 49
End: 2024-09-01
Payer: COMMERCIAL

## 2024-09-01 ENCOUNTER — HOSPITAL ENCOUNTER (INPATIENT)
Facility: HOSPITAL | Age: 49
LOS: 4 days | Discharge: HOME HEALTH | DRG: 393 | End: 2024-09-05
Attending: INTERNAL MEDICINE | Admitting: INTERNAL MEDICINE
Payer: COMMERCIAL

## 2024-09-01 ENCOUNTER — HOSPITAL ENCOUNTER (EMERGENCY)
Facility: HOSPITAL | Age: 49
End: 2024-09-01
Attending: EMERGENCY MEDICINE
Payer: COMMERCIAL

## 2024-09-01 VITALS
SYSTOLIC BLOOD PRESSURE: 115 MMHG | OXYGEN SATURATION: 94 % | HEART RATE: 86 BPM | BODY MASS INDEX: 30.9 KG/M2 | DIASTOLIC BLOOD PRESSURE: 73 MMHG | WEIGHT: 221.56 LBS | RESPIRATION RATE: 20 BRPM | TEMPERATURE: 98.1 F

## 2024-09-01 DIAGNOSIS — T16.2XXA FOREIGN BODY OF LEFT EAR, INITIAL ENCOUNTER: ICD-10-CM

## 2024-09-01 DIAGNOSIS — I74.9 ARTERIAL THROMBOSIS (HCC): Primary | ICD-10-CM

## 2024-09-01 DIAGNOSIS — I82.90 THROMBUS: Primary | ICD-10-CM

## 2024-09-01 PROBLEM — N43.3 LEFT HYDROCELE: Status: ACTIVE | Noted: 2024-09-01

## 2024-09-01 LAB
ALBUMIN SERPL BCG-MCNC: 4.1 G/DL (ref 3.5–5)
ALP SERPL-CCNC: 59 U/L (ref 34–104)
ALT SERPL W P-5'-P-CCNC: 9 U/L (ref 7–52)
ANION GAP SERPL CALCULATED.3IONS-SCNC: 12 MMOL/L (ref 4–13)
APTT PPP: 31 SECONDS (ref 23–34)
APTT PPP: >210 SECONDS (ref 23–34)
AST SERPL W P-5'-P-CCNC: 10 U/L (ref 13–39)
BASOPHILS # BLD AUTO: 0.12 THOUSANDS/ÂΜL (ref 0–0.1)
BASOPHILS NFR BLD AUTO: 1 % (ref 0–1)
BILIRUB SERPL-MCNC: 0.75 MG/DL (ref 0.2–1)
BUN SERPL-MCNC: 20 MG/DL (ref 5–25)
CALCIUM SERPL-MCNC: 9.5 MG/DL (ref 8.4–10.2)
CHLORIDE SERPL-SCNC: 104 MMOL/L (ref 96–108)
CO2 SERPL-SCNC: 21 MMOL/L (ref 21–32)
CREAT SERPL-MCNC: 0.8 MG/DL (ref 0.6–1.3)
CRP SERPL QL: 82.6 MG/L
EOSINOPHIL # BLD AUTO: 0.32 THOUSAND/ÂΜL (ref 0–0.61)
EOSINOPHIL NFR BLD AUTO: 3 % (ref 0–6)
ERYTHROCYTE [DISTWIDTH] IN BLOOD BY AUTOMATED COUNT: 12.5 % (ref 11.6–15.1)
ERYTHROCYTE [DISTWIDTH] IN BLOOD BY AUTOMATED COUNT: 12.6 % (ref 11.6–15.1)
ERYTHROCYTE [SEDIMENTATION RATE] IN BLOOD: 54 MM/HOUR (ref 0–19)
GFR SERPL CREATININE-BSD FRML MDRD: 103 ML/MIN/1.73SQ M
GLUCOSE SERPL-MCNC: 93 MG/DL (ref 65–140)
HCT VFR BLD AUTO: 44.4 % (ref 36.5–49.3)
HCT VFR BLD AUTO: 45.2 % (ref 36.5–49.3)
HGB BLD-MCNC: 14.8 G/DL (ref 12–17)
HGB BLD-MCNC: 15.1 G/DL (ref 12–17)
IMM GRANULOCYTES # BLD AUTO: 0.25 THOUSAND/UL (ref 0–0.2)
IMM GRANULOCYTES NFR BLD AUTO: 2 % (ref 0–2)
INR PPP: 1.15 (ref 0.85–1.19)
LACTATE SERPL-SCNC: 0.9 MMOL/L (ref 0.5–2)
LYMPHOCYTES # BLD AUTO: 1.5 THOUSANDS/ÂΜL (ref 0.6–4.47)
LYMPHOCYTES NFR BLD AUTO: 12 % (ref 14–44)
MAGNESIUM SERPL-MCNC: 2.1 MG/DL (ref 1.9–2.7)
MCH RBC QN AUTO: 29.4 PG (ref 26.8–34.3)
MCH RBC QN AUTO: 29.5 PG (ref 26.8–34.3)
MCHC RBC AUTO-ENTMCNC: 33.3 G/DL (ref 31.4–37.4)
MCHC RBC AUTO-ENTMCNC: 33.4 G/DL (ref 31.4–37.4)
MCV RBC AUTO: 88 FL (ref 82–98)
MCV RBC AUTO: 88 FL (ref 82–98)
MONOCYTES # BLD AUTO: 0.91 THOUSAND/ÂΜL (ref 0.17–1.22)
MONOCYTES NFR BLD AUTO: 7 % (ref 4–12)
NEUTROPHILS # BLD AUTO: 9.26 THOUSANDS/ÂΜL (ref 1.85–7.62)
NEUTS SEG NFR BLD AUTO: 75 % (ref 43–75)
NRBC BLD AUTO-RTO: 0 /100 WBCS
PLATELET # BLD AUTO: 268 THOUSANDS/UL (ref 149–390)
PLATELET # BLD AUTO: 300 THOUSANDS/UL (ref 149–390)
PMV BLD AUTO: 9.5 FL (ref 8.9–12.7)
PMV BLD AUTO: 9.6 FL (ref 8.9–12.7)
POTASSIUM SERPL-SCNC: 4 MMOL/L (ref 3.5–5.3)
PROT SERPL-MCNC: 7.5 G/DL (ref 6.4–8.4)
PROTHROMBIN TIME: 15.2 SECONDS (ref 12.3–15)
RBC # BLD AUTO: 5.02 MILLION/UL (ref 3.88–5.62)
RBC # BLD AUTO: 5.13 MILLION/UL (ref 3.88–5.62)
SODIUM SERPL-SCNC: 137 MMOL/L (ref 135–147)
WBC # BLD AUTO: 12.36 THOUSAND/UL (ref 4.31–10.16)
WBC # BLD AUTO: 9.87 THOUSAND/UL (ref 4.31–10.16)

## 2024-09-01 PROCEDURE — 99291 CRITICAL CARE FIRST HOUR: CPT | Performed by: EMERGENCY MEDICINE

## 2024-09-01 PROCEDURE — 85610 PROTHROMBIN TIME: CPT | Performed by: EMERGENCY MEDICINE

## 2024-09-01 PROCEDURE — 99285 EMERGENCY DEPT VISIT HI MDM: CPT

## 2024-09-01 PROCEDURE — 80053 COMPREHEN METABOLIC PANEL: CPT | Performed by: EMERGENCY MEDICINE

## 2024-09-01 PROCEDURE — 85730 THROMBOPLASTIN TIME PARTIAL: CPT | Performed by: EMERGENCY MEDICINE

## 2024-09-01 PROCEDURE — 36415 COLL VENOUS BLD VENIPUNCTURE: CPT | Performed by: EMERGENCY MEDICINE

## 2024-09-01 PROCEDURE — 74177 CT ABD & PELVIS W/CONTRAST: CPT

## 2024-09-01 PROCEDURE — 96367 TX/PROPH/DG ADDL SEQ IV INF: CPT

## 2024-09-01 PROCEDURE — 85652 RBC SED RATE AUTOMATED: CPT | Performed by: INTERNAL MEDICINE

## 2024-09-01 PROCEDURE — 83735 ASSAY OF MAGNESIUM: CPT | Performed by: EMERGENCY MEDICINE

## 2024-09-01 PROCEDURE — 86140 C-REACTIVE PROTEIN: CPT | Performed by: INTERNAL MEDICINE

## 2024-09-01 PROCEDURE — 85730 THROMBOPLASTIN TIME PARTIAL: CPT | Performed by: INTERNAL MEDICINE

## 2024-09-01 PROCEDURE — 85025 COMPLETE CBC W/AUTO DIFF WBC: CPT | Performed by: EMERGENCY MEDICINE

## 2024-09-01 PROCEDURE — 99223 1ST HOSP IP/OBS HIGH 75: CPT | Performed by: SURGERY

## 2024-09-01 PROCEDURE — 96366 THER/PROPH/DIAG IV INF ADDON: CPT

## 2024-09-01 PROCEDURE — 87040 BLOOD CULTURE FOR BACTERIA: CPT | Performed by: INTERNAL MEDICINE

## 2024-09-01 PROCEDURE — 99223 1ST HOSP IP/OBS HIGH 75: CPT | Performed by: INTERNAL MEDICINE

## 2024-09-01 PROCEDURE — 85027 COMPLETE CBC AUTOMATED: CPT | Performed by: EMERGENCY MEDICINE

## 2024-09-01 PROCEDURE — 96365 THER/PROPH/DIAG IV INF INIT: CPT

## 2024-09-01 PROCEDURE — 96375 TX/PRO/DX INJ NEW DRUG ADDON: CPT

## 2024-09-01 PROCEDURE — 83605 ASSAY OF LACTIC ACID: CPT | Performed by: EMERGENCY MEDICINE

## 2024-09-01 RX ORDER — HEPARIN SODIUM 10000 [USP'U]/100ML
3-30 INJECTION, SOLUTION INTRAVENOUS
Status: DISCONTINUED | OUTPATIENT
Start: 2024-09-01 | End: 2024-09-04

## 2024-09-01 RX ORDER — ASPIRIN 81 MG/1
81 TABLET, CHEWABLE ORAL DAILY
Status: DISCONTINUED | OUTPATIENT
Start: 2024-09-01 | End: 2024-09-01 | Stop reason: HOSPADM

## 2024-09-01 RX ORDER — HEPARIN SODIUM 1000 [USP'U]/ML
8000 INJECTION, SOLUTION INTRAVENOUS; SUBCUTANEOUS ONCE
Status: COMPLETED | OUTPATIENT
Start: 2024-09-01 | End: 2024-09-01

## 2024-09-01 RX ORDER — ATORVASTATIN CALCIUM 40 MG/1
40 TABLET, FILM COATED ORAL
Status: DISCONTINUED | OUTPATIENT
Start: 2024-09-01 | End: 2024-09-05 | Stop reason: HOSPADM

## 2024-09-01 RX ORDER — HEPARIN SODIUM 1000 [USP'U]/ML
4000 INJECTION, SOLUTION INTRAVENOUS; SUBCUTANEOUS EVERY 6 HOURS PRN
Status: DISCONTINUED | OUTPATIENT
Start: 2024-09-01 | End: 2024-09-04

## 2024-09-01 RX ORDER — DEXTROSE MONOHYDRATE AND SODIUM CHLORIDE 5; .45 G/100ML; G/100ML
75 INJECTION, SOLUTION INTRAVENOUS CONTINUOUS
Status: DISCONTINUED | OUTPATIENT
Start: 2024-09-01 | End: 2024-09-04

## 2024-09-01 RX ORDER — HEPARIN SODIUM 10000 [USP'U]/100ML
3-30 INJECTION, SOLUTION INTRAVENOUS
Status: DISCONTINUED | OUTPATIENT
Start: 2024-09-01 | End: 2024-09-01 | Stop reason: HOSPADM

## 2024-09-01 RX ORDER — NYSTATIN 100000 [USP'U]/G
POWDER TOPICAL 2 TIMES DAILY
Status: DISCONTINUED | OUTPATIENT
Start: 2024-09-01 | End: 2024-09-05 | Stop reason: HOSPADM

## 2024-09-01 RX ORDER — HEPARIN SODIUM 1000 [USP'U]/ML
4000 INJECTION, SOLUTION INTRAVENOUS; SUBCUTANEOUS EVERY 6 HOURS PRN
Status: DISCONTINUED | OUTPATIENT
Start: 2024-09-01 | End: 2024-09-01 | Stop reason: HOSPADM

## 2024-09-01 RX ORDER — NORTRIPTYLINE HCL 25 MG
25 CAPSULE ORAL
Status: DISCONTINUED | OUTPATIENT
Start: 2024-09-01 | End: 2024-09-05 | Stop reason: HOSPADM

## 2024-09-01 RX ORDER — HEPARIN SODIUM 1000 [USP'U]/ML
8000 INJECTION, SOLUTION INTRAVENOUS; SUBCUTANEOUS EVERY 6 HOURS PRN
Status: DISCONTINUED | OUTPATIENT
Start: 2024-09-01 | End: 2024-09-04

## 2024-09-01 RX ORDER — HEPARIN SODIUM 1000 [USP'U]/ML
8000 INJECTION, SOLUTION INTRAVENOUS; SUBCUTANEOUS EVERY 6 HOURS PRN
Status: DISCONTINUED | OUTPATIENT
Start: 2024-09-01 | End: 2024-09-01 | Stop reason: HOSPADM

## 2024-09-01 RX ADMIN — IOHEXOL 100 ML: 350 INJECTION, SOLUTION INTRAVENOUS at 09:26

## 2024-09-01 RX ADMIN — DEXTROSE AND SODIUM CHLORIDE 75 ML/HR: 5; .45 INJECTION, SOLUTION INTRAVENOUS at 15:49

## 2024-09-01 RX ADMIN — HEPARIN SODIUM 8000 UNITS: 1000 INJECTION, SOLUTION INTRAVENOUS; SUBCUTANEOUS at 12:18

## 2024-09-01 RX ADMIN — NORTRIPTYLINE HYDROCHLORIDE 25 MG: 25 CAPSULE ORAL at 21:45

## 2024-09-01 RX ADMIN — ATORVASTATIN CALCIUM 40 MG: 40 TABLET, FILM COATED ORAL at 17:27

## 2024-09-01 RX ADMIN — HEPARIN SODIUM 18 UNITS/KG/HR: 10000 INJECTION, SOLUTION INTRAVENOUS at 15:49

## 2024-09-01 RX ADMIN — HEPARIN SODIUM 18 UNITS/KG/HR: 10000 INJECTION, SOLUTION INTRAVENOUS at 12:18

## 2024-09-01 RX ADMIN — ASPIRIN 81 MG CHEWABLE TABLET 81 MG: 81 TABLET CHEWABLE at 12:14

## 2024-09-01 RX ADMIN — SODIUM CHLORIDE 1000 MG: 0.9 INJECTION, SOLUTION INTRAVENOUS at 09:22

## 2024-09-01 NOTE — ED NOTES
Transfer Information:   Ambulance Squad: [id number]   Pickup Time: 1330   Destination: [facility]   Accepting Physician:    Report Number: [id number]         Grace Mccurdy RN  09/01/24 1306       Grace Mccurdy RN  09/01/24 1306

## 2024-09-01 NOTE — ASSESSMENT & PLAN NOTE
Patient presented to the hospital with generalized weakness and left facial numbness.  Had multiple admissions for same issue suspect MS flare versus psychogenic stress as etiology of flare.  Patient reports intermittent left-sided pressure-like abdominal pain for the past 4 days.  CT abdomen pelvis with contrast showed new mild ectasia of the proximal SMA with associated filling defect causing near complete occlusion of the proximal SMA with distal reconstitution suspicious for thrombus.  ED reached out to vascular surgery.  Comparing to the CT in December these are new findings.  Differential could be superior mesenteric artery dissection with thrombus formation, possible thromboembolic event, mycotic degeneration of the superior mesenteric artery  Mild leukocytosis of 12, repeat labs showed no leukocytosis.  Lactic acid negative  Order blood cultures, ESR, CRP  Start aspirin 81 mg daily, continue heparin drip which was started in the ED  Order echo, last echo 8/8/2024 without evidence of vegetations/thrombus or any valvular issues  Vascular surgery consult

## 2024-09-01 NOTE — ED PROVIDER NOTES
"History  Chief Complaint   Patient presents with    Weakness - Generalized     Pt to ED from home for MS flare. C/o L side abd pain, B/L weakness. Denies chest pain/sob. Started about 1.5hrs ago     49-year-old male with a history of multiple sclerosis presents for evaluation of concern for acute flare.  Patient reports approximately 1 hour prior to arrival, patient started with acute onset generalized numbness, left-sided facial droop and numbness.  Patient reports that symptoms started improving as soon as he entered the ambulance.  Upon arrival in ED, patient reports still with left-sided facial tingling but weakness has resolved.  Denies any further complaints at this time.    During ED evaluation, patient now reporting left lower quadrant abdominal pain.  Denies any urinary symptoms.        Prior to Admission Medications   Prescriptions Last Dose Informant Patient Reported? Taking?   MAGNESIUM PO  Self Yes No   Sig: Take by mouth   Riboflavin ([employer] Vitamin B-2) 100 MG TABS  Self No No   Sig: Take 2 tablets (200 mg total) by mouth in the morning   SYRINGE-NEEDLE, DISP, 3 ML 25G X 1\" 3 ML MISC  Self No No   Sig: Take with B12 regimen prescribed   cyanocobalamin 1,000 mcg/mL  Self No No   Sig: Take B12 1000 mcg IM once a week for 4 weeks, then once a month for 5 months   dexamethasone (DECADRON) 2 mg tablet   No No   Sig: Take 1 tablet (2 mg total) by mouth daily as needed (headache)   Patient not taking: Reported on 8/7/2024   nortriptyline (PAMELOR) 25 mg capsule   No No   Sig: Take 1 capsule (25 mg total) by mouth daily at bedtime   ocrelizumab (Ocrevus) 300 MG/10ML SOLN  Self No No   Sig: Inject 20 mL (600 mg total) into a catheter in a vein every 6 (six) months   prochlorperazine (COMPAZINE) 10 mg tablet   No No   Sig: Take 1 tablet (10 mg total) by mouth every 12 (twelve) hours as needed (HEADACHE) With Decadron in the morning   Patient not taking: Reported on 8/7/2024      Facility-Administered Medications: " None       Past Medical History:   Diagnosis Date    Arthritis     Diabetes mellitus (HCC)     prediabetes    MS (multiple sclerosis) (HCC)     Scoliosis     Visual impairment        Past Surgical History:   Procedure Laterality Date    HERNIA REPAIR      3 times    KNEE SURGERY Right        Family History   Problem Relation Age of Onset    Stroke Maternal Grandmother     Multiple sclerosis Other      I have reviewed and agree with the history as documented.    E-Cigarette/Vaping    E-Cigarette Use Former User      E-Cigarette/Vaping Substances    Nicotine No     THC No     CBD No     Flavoring No     Other No     Unknown No      Social History     Tobacco Use    Smoking status: Former    Smokeless tobacco: Former   Vaping Use    Vaping status: Former   Substance Use Topics    Alcohol use: Not Currently    Drug use: Never       Review of Systems   Constitutional:  Negative for fever.   Gastrointestinal:  Positive for abdominal pain.   Neurological:  Positive for numbness.       Physical Exam  Physical Exam  Vitals and nursing note reviewed.   Constitutional:       General: He is not in acute distress.     Appearance: He is well-developed.   HENT:      Head: Normocephalic and atraumatic.      Right Ear: External ear normal.      Left Ear: External ear normal.      Nose: Nose normal.   Eyes:      General: No scleral icterus.  Pulmonary:      Effort: Pulmonary effort is normal. No respiratory distress.   Abdominal:      General: There is no distension.      Palpations: Abdomen is soft.      Tenderness: There is no abdominal tenderness.   Musculoskeletal:         General: No deformity. Normal range of motion.      Cervical back: Normal range of motion and neck supple.      Comments: 5/5 strength of bilateral upper and lower extremities   Skin:     General: Skin is warm.      Findings: No rash.   Neurological:      General: No focal deficit present.      Mental Status: He is alert.      Gait: Gait normal.      Comments:  Subjective paresthesias to left face.  Otherwise no motor deficits.  Visual fields intact.   Psychiatric:         Mood and Affect: Mood normal.         Vital Signs  ED Triage Vitals [09/01/24 0724]   Temperature Pulse Respirations Blood Pressure SpO2   98.1 °F (36.7 °C) 92 18 135/91 98 %      Temp Source Heart Rate Source Patient Position - Orthostatic VS BP Location FiO2 (%)   Oral Monitor Sitting Left arm --      Pain Score       7           Vitals:    09/01/24 1100 09/01/24 1130 09/01/24 1230 09/01/24 1300   BP: 115/75 113/81 116/82 115/73   Pulse: 81 83 90 86   Patient Position - Orthostatic VS: Sitting Sitting Sitting Sitting         Visual Acuity      ED Medications  Medications   aspirin chewable tablet 81 mg (81 mg Oral Given 9/1/24 1214)   heparin (porcine) 25,000 units in 0.45% NaCl 250 mL infusion (premix) (18 Units/kg/hr × 100 kg (Order-Specific) Intravenous New Bag 9/1/24 1218)   heparin (porcine) injection 8,000 Units (has no administration in time range)   heparin (porcine) injection 4,000 Units (has no administration in time range)   methylPREDNISolone Na Suc (PF) 1,000 mg in sodium chloride 0.9 % 250 mL IVPB (0 mg Intravenous Stopped 9/1/24 1055)   iohexol (OMNIPAQUE) 350 MG/ML injection (MULTI-DOSE) 100 mL (100 mL Intravenous Given 9/1/24 0926)   heparin (porcine) injection 8,000 Units (8,000 Units Intravenous Given 9/1/24 1218)       Diagnostic Studies  Results Reviewed       Procedure Component Value Units Date/Time    APTT [[id number]]  (Normal) Collected: 09/01/24 1213    Lab Status: Final result Specimen: Blood from Arm, Right Updated: 09/01/24 1309     PTT 31 seconds     Protime-INR [[id number]]  (Abnormal) Collected: 09/01/24 1213    Lab Status: Final result Specimen: Blood from Arm, Right Updated: 09/01/24 1309     Protime 15.2 seconds      INR 1.15    Narrative:      INR Therapeutic Range    Indication                                             INR Range      Atrial Fibrillation                                                2.0-3.0  Hypercoagulable State                                    2.0.2.3  Left Ventricular Asist Device                            2.0-3.0  Mechanical Heart Valve                                  -    Aortic(with afib, MI, embolism, HF, LA enlargement,    and/or coagulopathy)                                     2.0-3.0 (2.5-3.5)     Mitral                                                             2.5-3.5  Prosthetic/Bioprosthetic Heart Valve               2.0-3.0  Venous thromboembolism (VTE: VT, PE        2.0-3.0    Lactic acid, plasma (w/reflex if result > 2.0) [[id number]]  (Normal) Collected: 09/01/24 1213    Lab Status: Final result Specimen: Blood from Arm, Right Updated: 09/01/24 1306     LACTIC ACID 0.9 mmol/L     Narrative:      Result may be elevated if tourniquet was used during collection.    CBC [[id number]]  (Normal) Collected: 09/01/24 1213    Lab Status: Final result Specimen: Blood from Arm, Right Updated: 09/01/24 1242     WBC 9.87 Thousand/uL      RBC 5.02 Million/uL      Hemoglobin 14.8 g/dL      Hematocrit 44.4 %      MCV 88 fL      MCH 29.5 pg      MCHC 33.3 g/dL      RDW 12.6 %      Platelets 268 Thousands/uL      MPV 9.6 fL     UA w Reflex to Microscopic w Reflex to Culture [[id number]]     Lab Status: No result Specimen: Urine     Comprehensive metabolic panel [[id number]]  (Abnormal) Collected: 09/01/24 0752    Lab Status: Final result Specimen: Blood from Arm, Right Updated: 09/01/24 0817     Sodium 137 mmol/L      Potassium 4.0 mmol/L      Chloride 104 mmol/L      CO2 21 mmol/L      ANION GAP 12 mmol/L      BUN 20 mg/dL      Creatinine 0.80 mg/dL      Glucose 93 mg/dL      Calcium 9.5 mg/dL      AST 10 U/L      ALT 9 U/L      Alkaline Phosphatase 59 U/L      Total Protein 7.5 g/dL      Albumin 4.1 g/dL      Total Bilirubin 0.75 mg/dL      eGFR 103 ml/min/1.73sq m     Narrative:      National Kidney Disease Foundation guidelines for Chronic Kidney Disease  (CKD):     Stage 1 with normal or high GFR (GFR > 90 mL/min/1.73 square meters)    Stage 2 Mild CKD (GFR = 60-89 mL/min/1.73 square meters)    Stage 3A Moderate CKD (GFR = 45-59 mL/min/1.73 square meters)    Stage 3B Moderate CKD (GFR = 30-44 mL/min/1.73 square meters)    Stage 4 Severe CKD (GFR = 15-29 mL/min/1.73 square meters)    Stage 5 End Stage CKD (GFR <15 mL/min/1.73 square meters)  Note: GFR calculation is accurate only with a steady state creatinine    Magnesium [[id number]]  (Normal) Collected: 09/01/24 0752    Lab Status: Final result Specimen: Blood from Arm, Right Updated: 09/01/24 0817     Magnesium 2.1 mg/dL     CBC and differential [[id number]]  (Abnormal) Collected: 09/01/24 0752    Lab Status: Final result Specimen: Blood from Arm, Right Updated: 09/01/24 0803     WBC 12.36 Thousand/uL      RBC 5.13 Million/uL      Hemoglobin 15.1 g/dL      Hematocrit 45.2 %      MCV 88 fL      MCH 29.4 pg      MCHC 33.4 g/dL      RDW 12.5 %      MPV 9.5 fL      Platelets 300 Thousands/uL      nRBC 0 /100 WBCs      Segmented % 75 %      Immature Grans % 2 %      Lymphocytes % 12 %      Monocytes % 7 %      Eosinophils Relative 3 %      Basophils Relative 1 %      Absolute Neutrophils 9.26 Thousands/µL      Absolute Immature Grans 0.25 Thousand/uL      Absolute Lymphocytes 1.50 Thousands/µL      Absolute Monocytes 0.91 Thousand/µL      Eosinophils Absolute 0.32 Thousand/µL      Basophils Absolute 0.12 Thousands/µL                    CT abdomen pelvis with contrast   Final Result by Emil Galvan MD (09/01 1115)      1. New mild ectasia of the proximal SMA. Associated filling defect causing near complete occlusion of the proximal SMA with distal reconstitution suspicious for thrombus. Vascular consultation recommended.      2. Moderate left hydrocele. Cystic structure superior to the right testis measuring 2.7 cm may be due to a loculated hydrocele or epididymal cyst.         The study was marked in EPIC for  immediate notification.               Resident: HUGO PINTO I, the attending radiologist, have reviewed the images and agree with the final report above.      Workstation performed: [id number]                    Procedures  CriticalCare Time    Date/Time: 9/1/2024 12:30 PM    Performed by: Felix Roman DO  Authorized by: Felix Roman DO    Critical care provider statement:     Critical care time (minutes):  37    Critical care time was exclusive of:  Separately billable procedures and treating other patients and teaching time    Critical care was necessary to treat or prevent imminent or life-threatening deterioration of the following conditions:  Circulatory failure and CNS failure or compromise    Critical care was time spent personally by me on the following activities:  Blood draw for specimens, obtaining history from patient or surrogate, development of treatment plan with patient or surrogate, discussions with consultants, discussions with primary provider, evaluation of patient's response to treatment, examination of patient, ordering and performing treatments and interventions, ordering and review of laboratory studies, re-evaluation of patient's condition, ordering and review of radiographic studies and review of old charts    I assumed direction of critical care for this patient from another provider in my specialty: no             ED Course                                 SBIRT 20yo+      Flowsheet Row Most Recent Value   Initial Alcohol Screen: US AUDIT-C     1. How often do you have a drink containing alcohol? 0 Filed at: 09/01/2024 0724   2. How many drinks containing alcohol do you have on a typical day you are drinking?  0 Filed at: 09/01/2024 0724   3a. Male UNDER 65: How often do you have five or more drinks on one occasion? 0 Filed at: 09/01/2024 0724   3b. FEMALE Any Age, or MALE 65+: How often do you have 4 or more drinks on one occassion? 0 Filed at: 09/01/2024 0724   Audit-C Score  0 Filed at: 09/01/2024 0724   UNA: How many times in the past year have you...    Used an illegal drug or used a prescription medication for non-medical reasons? Never Filed at: 09/01/2024 0724                      Medical Decision Making  49-year-old male presenting with acute onset numbness and weakness which is now resolved.  Patient reports this is typical of his acute MS flares.  Discussed case with patient's neurologist who advised high-dose steroids.    Called to bedside by nursing staff given patient's new complaint of abdominal pain.  Tender to palpation in left lower quadrant.  CT abdomen pelvis ordered.    CT reveals acute thrombus of SMA.  Discussed case with vascular surgery who advised heparin and transfer to [facility].  Patient's abdominal exam now benign.    Amount and/or Complexity of Data Reviewed  Labs: ordered.  Radiology: ordered.    Risk  OTC drugs.  Prescription drug management.                 Disposition  Final diagnoses:   Arterial thrombosis (HCC)     Time reflects when diagnosis was documented in both MDM as applicable and the Disposition within this note       Time User Action Codes Description Comment    9/1/2024 11:58 AM Felix Roman Add [I74.9] Arterial thrombosis (HCC)           ED Disposition       ED Disposition   Transfer to Another Facility-In Network    Condition   --    Date/Time   Sun Sep 1, 2024 1157    Comment   Carlos DESTIN Landis should be transferred out to [facility].               MD Documentation      Flowsheet Row Most Recent Value   Patient Condition The patient has been stabilized such that within reasonable medical probability, no material deterioration of the patient condition or the condition of the unborn child(lance) is likely to result from the transfer   Reason for Transfer Level of Care needed not available at this facility   Benefits of Transfer Specialized equipment and/or services available at the receiving facility (Include comment)________________________  "  Risks of Transfer Potential for delay in receiving treatment   Accepting Physician Dr Becker   Accepting Facility Name, City & State  [facility]   Sending MD Felix Roman   Provider Certification General risk, such as traffic hazards, adverse weather conditions, rough terrain or turbulence, possible failure of equipment (including vehicle or aircraft), or consequences of actions of persons outside the control of the transport personnel          RN Documentation      Flowsheet Row Most Recent Value   Accepting Facility Name, City & State  [facility]          Follow-up Information    None         Discharge Medication List as of 9/1/2024  1:39 PM        CONTINUE these medications which have NOT CHANGED    Details   cyanocobalamin 1,000 mcg/mL Take B12 1000 mcg IM once a week for 4 weeks, then once a month for 5 months, Normal      dexamethasone (DECADRON) 2 mg tablet Take 1 tablet (2 mg total) by mouth daily as needed (headache), Starting Fri 8/2/2024, Normal      MAGNESIUM PO Take by mouth, Historical Med      nortriptyline (PAMELOR) 25 mg capsule Take 1 capsule (25 mg total) by mouth daily at bedtime, Starting Thu 7/18/2024, Normal      ocrelizumab (Ocrevus) 300 MG/10ML SOLN Inject 20 mL (600 mg total) into a catheter in a vein every 6 (six) months, Starting Tue 11/14/2023, Print      prochlorperazine (COMPAZINE) 10 mg tablet Take 1 tablet (10 mg total) by mouth every 12 (twelve) hours as needed (HEADACHE) With Decadron in the morning, Starting Fri 8/2/2024, Phone In      Riboflavin ([employer] Vitamin B-2) 100 MG TABS Take 2 tablets (200 mg total) by mouth in the morning, Starting Mon 5/23/2022, Normal      SYRINGE-NEEDLE, DISP, 3 ML 25G X 1\" 3 ML MISC Take with B12 regimen prescribed, Normal             No discharge procedures on file.    PDMP Review         Value Time User    PDMP Reviewed  Yes 7/15/2024  2:32 PM Rashmi Avalos PA-C            ED Provider  Electronically Signed by             Felix Roman DO  09/01/24 " [id number]

## 2024-09-01 NOTE — QUICK NOTE
I was remotely informed of the patient's presentation and imaging findings.  In brief, my understanding is this is a 49-year-old male with a history of multiple sclerosis who has presented with weakness and numbness with the evolution of abdominal pain.  It appears the vital signs are stable.  His abdominal issues sound like a secondary issue with his presentation with left lower quadrant pain evolving while he was in the emergency department.  Again, his initial presenting complaint was MS flare with weakness and numbness.  His abdominal pain has now completely resolved.  Surprisingly the superior mesenteric artery appears ectatic with thrombus versus dissection plane present.  In comparison to a CT of the abdomen in December, these are new findings.  There is a mild leukocytosis to 12,000+.  We have requested addition of a lactic acid.  The patient will require blood cultures and inflammatory markers.  At this time we will initiate heparin therapy as well as aspirin 81 mg.  We expect the patient in transfer.    There could be several etiologies of this finding.  The patient could have a superior mesenteric artery dissection with thrombus formation noted.  It is also possible that he had a thromboembolic event.  In the case of thromboembolic events, my greatest concern is for mycotic degeneration of the superior mesenteric artery.  He has a long fusiform ectasia measuring up to 1.7 cm in diameter on my measurement.  There is thrombus within the lumen.  Surrounding haziness on the CT can be consistent with inflammation or infection or an acute response to dissection.  The patient is chronically treated for MS flares and has Decadron noted in his home medications.  There is a concern for opportunistic infection in that case.  He has had previous echocardiograms, all transthoracic which have been unable to identify if there are valvular issues/valvular vegetations.  I do have significant concern for a mycotic etiology  and the case of an isolated and fast evolving superior mesenteric artery ectasia/aneurysm.    Unfortunately if this is mycotic, the prognosis is grave.  To further delineate the etiology we will require blood cultures and interval imaging in 48 hours.  Reconstructive options are extremely limited.  An infected field with a long fusiform ectasia such as this, would not lend itself to reconstruction.  Debriding the retropancreatic aorta at the base of the superior mesenteric artery is not an undertaking consistent with survival.  Additionally, the extension of the fusiform ectasia toward the distal branch points with only several millimeters prior to the trifurcation of the superior mesenteric artery does not lend itself to distal anastomoses, certainly not distal anastomoses free of infectious material.  It is paramount to establish that this has the potential to be a grave finding.  Upon arrival to the emergency department we will discuss this thoroughly with the patient.    Dallas Rashid MD

## 2024-09-01 NOTE — H&P
[facility]  H&P  Name: Carlos Landis 49 y.o. male I MRN: [id number]  Unit/Bed#: [facility] 823-01 I Date of Admission: 9/1/2024   Date of Service: 9/1/2024 I Hospital Day: 0      Assessment & Plan   * SMA thrombus  Assessment & Plan  Patient presented to the hospital with generalized weakness and left facial numbness.  Had multiple admissions for same issue suspect MS flare versus psychogenic stress as etiology of flare.  Patient reports intermittent left-sided pressure-like abdominal pain for the past 4 days.  CT abdomen pelvis with contrast showed new mild ectasia of the proximal SMA with associated filling defect causing near complete occlusion of the proximal SMA with distal reconstitution suspicious for thrombus.  ED reached out to vascular surgery.  Comparing to the CT in December these are new findings.  Differential could be superior mesenteric artery dissection with thrombus formation, possible thromboembolic event, mycotic degeneration of the superior mesenteric artery  Mild leukocytosis of 12, repeat labs showed no leukocytosis.  Lactic acid negative  Order blood cultures, ESR, CRP  Start aspirin 81 mg daily, continue heparin drip which was started in the ED  Order echo, last echo 8/8/2024 without evidence of vegetations/thrombus or any valvular issues  Vascular surgery consult    MS (multiple sclerosis) (HCC)  Assessment & Plan  Multiple admissions for generalized weakness, numbness intermittent dysarthria, expressive aphasia.  Last admission beginning of August, had a stroke workup which was negative.  Suspect MS flare versus psychogenic stress as etiology of flare  On Ocrelizumab and he is getting monthly Solu-Medrol infusion 1000 mg (beginning of every month)  ED discussed with patient's neurologist, since he is getting his Solu-Medrol infusion at the beginning of the month, he received 1000 mg of Solu-Medrol IV while in the ED.  Per neurology no further steroids are  indicated  Continue to monitor neurostatus and consider neurology consult if needed    Left hydrocele  Assessment & Plan  CT showed moderate left hydrocele, cystic structure superior to the right testis measuring 2.7 cm might be due to loculated hydrocele or epididymal cyst  Outpatient follow-up with urology    DAVID (obstructive sleep apnea)  Assessment & Plan  History of DAVID, noncompliant with CPAP  Outpatient sleep medicine follow-up           VTE Pharmacologic Prophylaxis: VTE Score: 5 High Risk (Score >/= 5) - Pharmacological DVT Prophylaxis Ordered: heparin drip. Sequential Compression Devices Ordered.  Code Status: Level 1 - Full Code   Discussion with family: Patient declined call to .     Anticipated Length of Stay: Patient will be admitted on an inpatient basis with an anticipated length of stay of greater than 2 midnights secondary to SMA thrombosis.    Total Time Spent on Date of Encounter in care of patient: 60 mins. This time was spent on one or more of the following: performing physical exam; counseling and coordination of care; obtaining or reviewing history; documenting in the medical record; reviewing/ordering tests, medications or procedures; communicating with other healthcare professionals and discussing with patient's family/caregivers.    Chief Complaint: Generalized weakness, left-sided facial numbness and left-sided abdominal pain    History of Present Illness:  Carlos Landis is a 49 y.o. male with a PMH of MS, DAVID who presents with generalized weakness, left-sided facial numbness and left-sided abdominal pain to [facility] ED.  Patient had multiple admissions with generalized weakness and left-sided numbness thought to be MS flare/pseudo flare/psychogenic stress as etiology of flares.  Following neurology as outpatient, on admission at the beginning of August seen by neurology for strokelike symptoms, workup was negative.  He received 1 dose of steroids and his symptoms  improved.  He is following neurology as outpatient, he is on ocrelizumab every 6 months, last dose in June, also receiving Solu-Medrol 1000 mg every 4 weeks mostly at the beginning of the month.  ED discussed with patient's neurologist, she recommended to give the scheduled Solu-Medrol dose and no further steroids are indicated.  Patient was complaining of left-sided abdominal pain that has been intermittent pressure-like, not associated with nausea vomiting change of bowel habits, at baseline he has constipation.  CT was done which showed SMA thrombosis.  ED discussed with vascular surgery and recommended transfer to [facility], started on heparin drip.    Review of Systems:  Review of Systems   Constitutional:  Negative for activity change, appetite change, chills and fever.   HENT: Negative.     Respiratory:  Negative for shortness of breath.    Cardiovascular:  Negative for chest pain, palpitations and leg swelling.   Gastrointestinal:  Positive for constipation. Negative for blood in stool, diarrhea, nausea and vomiting.        Left-sided abdominal pain   Genitourinary:  Negative for dysuria.   Musculoskeletal: Negative.    Skin: Negative.    Neurological:  Negative for dizziness and speech difficulty.        Left-sided facial numbness and generalized weakness   Psychiatric/Behavioral: Negative.         Past Medical and Surgical History:   Past Medical History:   Diagnosis Date    Arthritis     Diabetes mellitus (HCC)     prediabetes    MS (multiple sclerosis) (HCC)     Scoliosis     Visual impairment        Past Surgical History:   Procedure Laterality Date    HERNIA REPAIR      3 times    KNEE SURGERY Right        Meds/Allergies:  Prior to Admission medications    Medication Sig Start Date End Date Taking? Authorizing Provider   cyanocobalamin 1,000 mcg/mL Take B12 1000 mcg IM once a week for 4 weeks, then once a month for 5 months 4/25/23  Yes Terra Hernandez MD   dexamethasone (DECADRON) 2 mg tablet  "Take 1 tablet (2 mg total) by mouth daily as needed (headache) 8/2/24  Yes Terra Hernandez MD   MAGNESIUM PO Take by mouth   Yes Historical Provider, MD   nortriptyline (PAMELOR) 25 mg capsule Take 1 capsule (25 mg total) by mouth daily at bedtime 7/18/24  Yes Terra Hernandez MD   Riboflavin ([employer] Vitamin B-2) 100 MG TABS Take 2 tablets (200 mg total) by mouth in the morning 5/23/22  Yes Terra Hernandez MD   SYRINGE-NEEDLE, DISP, 3 ML 25G X 1\" 3 ML MISC Take with B12 regimen prescribed 5/9/23  Yes Terra Hernandez MD   ocrelizumab (Ocrevus) 300 MG/10ML SOLN Inject 20 mL (600 mg total) into a catheter in a vein every 6 (six) months 11/14/23   Terra Hernandez MD   prochlorperazine (COMPAZINE) 10 mg tablet Take 1 tablet (10 mg total) by mouth every 12 (twelve) hours as needed (HEADACHE) With Decadron in the morning  Patient not taking: Reported on 8/7/2024 8/2/24   Terra Hernandez MD     I have reviewed home medications with patient personally.    Allergies: No Known Allergies    Social History:  Marital Status: Single     Substance Use History:   Social History     Substance and Sexual Activity   Alcohol Use Not Currently     Social History     Tobacco Use   Smoking Status Former   Smokeless Tobacco Former     Social History     Substance and Sexual Activity   Drug Use Never       Family History:  Family History   Problem Relation Age of Onset    Stroke Maternal Grandmother     Multiple sclerosis Other        Physical Exam:     Vitals:   Blood Pressure: 101/73 (09/01/24 1422)  Pulse: 88 (09/01/24 1422)  Temperature: (!) 97.4 °F (36.3 °C) (09/01/24 1422)  Respirations: 20 (09/01/24 1422)  Height: 5' 11\" (180.3 cm) (09/01/24 1430)  Weight - Scale: 101 kg (221 lb 9 oz) (09/01/24 1430)  SpO2: 96 % (09/01/24 1422)    Physical Exam  Constitutional:       General: He is not in acute distress.  HENT:      Head: Atraumatic.   Cardiovascular:      Rate and Rhythm: Normal rate and regular " rhythm.      Heart sounds: No murmur heard.  Pulmonary:      Effort: Pulmonary effort is normal. No respiratory distress.      Breath sounds: Normal breath sounds. No wheezing.   Abdominal:      General: Bowel sounds are normal. There is no distension.      Palpations: Abdomen is soft.      Tenderness: There is no abdominal tenderness.   Musculoskeletal:         General: No swelling.      Cervical back: Neck supple.   Skin:     General: Skin is warm and dry.   Neurological:      General: No focal deficit present.      Mental Status: He is alert and oriented to person, place, and time.      Cranial Nerves: No cranial nerve deficit.   Psychiatric:         Mood and Affect: Mood normal.          Additional Data:     Lab Results:  Results from last 7 days   Lab Units 09/01/24  1213 09/01/24  0752   WBC Thousand/uL 9.87 12.36*   HEMOGLOBIN g/dL 14.8 15.1   HEMATOCRIT % 44.4 45.2   PLATELETS Thousands/uL 268 300   SEGS PCT %  --  75   LYMPHO PCT %  --  12*   MONO PCT %  --  7   EOS PCT %  --  3     Results from last 7 days   Lab Units 09/01/24  0752   SODIUM mmol/L 137   POTASSIUM mmol/L 4.0   CHLORIDE mmol/L 104   CO2 mmol/L 21   BUN mg/dL 20   CREATININE mg/dL 0.80   ANION GAP mmol/L 12   CALCIUM mg/dL 9.5   ALBUMIN g/dL 4.1   TOTAL BILIRUBIN mg/dL 0.75   ALK PHOS U/L 59   ALT U/L 9   AST U/L 10*   GLUCOSE RANDOM mg/dL 93     Results from last 7 days   Lab Units 09/01/24  1213   INR  1.15         Lab Results   Component Value Date    HGBA1C 5.1 07/16/2024    HGBA1C 5.1 04/16/2024    HGBA1C 5.1 09/27/2022     Results from last 7 days   Lab Units 09/01/24  1213   LACTIC ACID mmol/L 0.9       Lines/Drains:  Invasive Devices       Peripheral Intravenous Line  Duration             Peripheral IV 09/01/24 Distal;Right;Upper;Ventral (anterior) Arm <1 day    Peripheral IV 09/01/24 Dorsal (posterior);Right Wrist <1 day                        Imaging: Reviewed radiology reports from this admission including: abdominal/pelvic CT  No  orders to display       EKG and Other Studies Reviewed on Admission:   EKG: No EKG obtained.    ** Please Note: This note has been constructed using a voice recognition system. **

## 2024-09-01 NOTE — CONSULTS
Consultation - Vascular Surgery   Carlos Landis 49 y.o. male MRN: [id number]  Unit/Bed#: [facility] 823-01 Encounter: [id number]      Assessment & Plan      Assessment:  49 year old male with multiple sclerosis with ectasia of proximal SMA, concerning for SMA dissection with thrombus.     Plan:  NPO, ok for coffee - per patient, he requires it with MS   IVF (D5 NS @ 75) per primary team  Repeat imaging in 48-72 hours, will confirm timing  Gtt heparin VTE high  Start ASA, Lipitor  Follow up blood cultures to r/o mycotic source  Follow up ESR, CRP  Follow up ECHO  Close monitoring of abdominal exam with serial exams. Discussed in detail with patient should he develop abdominal pain that he must immediately notify nursing  Appreciate SLIM admission and assistance with ongoing medical management for multiple sclerosis         History of Present Illness   Physician Requesting Consult: Adela Becker MD  Reason for Consult / Principal Problem: SMA dissection    HPI: Carlos Landis is a 49 y.o. year old male with a past medical history of multiple sclerosis (initial dx 3 years ago, on immunosuppressant injections every 6 months and steroid dosing monthly), DAVID who presented today for stroke like symptoms and concern for MS flare. In the ED, patient reported intermittent abdominal pain that he states has occurred over the last 2 days, prompting abdominal CT scan which revealed proximal SMA dissection with thrombus. Patient reports he initially came as he was concerned for having a stroke due to having left sided facial numbness. While in the ED, he had an episode of abdominal pain that was short in nature. He had this pain over the last 2 days prior. Patient states he does not typically have abdominal pain with MS but he was concerned this was a varying flare. Patient gets immunosuppressant injections every 6 months, most recently in June and next is for January. He also gets steroid dosing monthly. Patient denies pain with  eating but does have decreased appetite 2/2 MS. He denies fevers, chills, nausea, vomiting, history of hypertension or recent known episodes of such, denies diarrhea or blood in stool. Most recent BM was yesterday which was normal.    Inpatient consult to Vascular Surgery  Consult performed by: Mckenzie Carter MD  Consult ordered by: Adela Becker MD      Historical Information   Past Medical History:   Diagnosis Date    Arthritis     Diabetes mellitus (HCC)     prediabetes    MS (multiple sclerosis) (HCC)     Scoliosis     Visual impairment      Past Surgical History:   Procedure Laterality Date    HERNIA REPAIR      3 times    KNEE SURGERY Right      Social History   Social History     Substance and Sexual Activity   Alcohol Use Not Currently     Social History     Substance and Sexual Activity   Drug Use Never     E-Cigarette/Vaping    E-Cigarette Use Former User      E-Cigarette/Vaping Substances    Nicotine No     THC No     CBD No     Flavoring No     Other No     Unknown No      Social History     Tobacco Use   Smoking Status Former   Smokeless Tobacco Former     Family History:   Family History   Problem Relation Age of Onset    Stroke Maternal Grandmother     Multiple sclerosis Other    }    Meds/Allergies   all current active meds have been reviewed, current meds:   Current Facility-Administered Medications   Medication Dose Route Frequency    [START ON 9/2/2024] aspirin (ECOTRIN LOW STRENGTH) EC tablet 81 mg  81 mg Oral Daily    dextrose 5 % and sodium chloride 0.45 % infusion  75 mL/hr Intravenous Continuous    heparin (porcine) 25,000 units in 0.45% NaCl 250 mL infusion (premix)  3-30 Units/kg/hr (Order-Specific) Intravenous Titrated    heparin (porcine) injection 4,000 Units  4,000 Units Intravenous Q6H PRN    heparin (porcine) injection 8,000 Units  8,000 Units Intravenous Q6H PRN    nortriptyline (PAMELOR) capsule 25 mg  25 mg Oral HS   , and PTA meds:   Prior to Admission Medications  "  Prescriptions Last Dose Informant Patient Reported? Taking?   MAGNESIUM PO 8/31/2024 Self Yes Yes   Sig: Take by mouth   Riboflavin ([employer] Vitamin B-2) 100 MG TABS 8/31/2024 Self No Yes   Sig: Take 2 tablets (200 mg total) by mouth in the morning   SYRINGE-NEEDLE, DISP, 3 ML 25G X 1\" 3 ML MISC Past Month Self No Yes   Sig: Take with B12 regimen prescribed   cyanocobalamin 1,000 mcg/mL Past Month Self No Yes   Sig: Take B12 1000 mcg IM once a week for 4 weeks, then once a month for 5 months   dexamethasone (DECADRON) 2 mg tablet Past Week  No Yes   Sig: Take 1 tablet (2 mg total) by mouth daily as needed (headache)   nortriptyline (PAMELOR) 25 mg capsule Past Week  No Yes   Sig: Take 1 capsule (25 mg total) by mouth daily at bedtime   ocrelizumab (Ocrevus) 300 MG/10ML SOLN More than a month Self No No   Sig: Inject 20 mL (600 mg total) into a catheter in a vein every 6 (six) months   prochlorperazine (COMPAZINE) 10 mg tablet Not Taking  No No   Sig: Take 1 tablet (10 mg total) by mouth every 12 (twelve) hours as needed (HEADACHE) With Decadron in the morning   Patient not taking: Reported on 8/7/2024      Facility-Administered Medications: None     No Known Allergies    Objective   Vitals: Blood pressure 101/73, pulse 88, temperature (!) 97.4 °F (36.3 °C), resp. rate 20, height 5' 11\" (1.803 m), weight 101 kg (221 lb 9 oz), SpO2 96%.,Body mass index is 30.9 kg/m².  No intake or output data in the 24 hours ending 09/01/24 1533  Invasive Devices       Peripheral Intravenous Line  Duration             Peripheral IV 09/01/24 Distal;Right;Upper;Ventral (anterior) Arm <1 day    Peripheral IV 09/01/24 Dorsal (posterior);Right Wrist <1 day                    Physical Exam  Vitals and nursing note reviewed.   Constitutional:       General: He is not in acute distress.     Appearance: Normal appearance. He is not ill-appearing.   HENT:      Head: Normocephalic and atraumatic.      Nose: Nose normal.      Mouth/Throat:      " Mouth: Mucous membranes are moist.      Pharynx: Oropharynx is clear.   Eyes:      Extraocular Movements: Extraocular movements intact.   Cardiovascular:      Rate and Rhythm: Normal rate and regular rhythm.   Pulmonary:      Effort: Pulmonary effort is normal. No respiratory distress.   Abdominal:      General: Abdomen is flat. There is no distension.      Palpations: Abdomen is soft.      Tenderness: There is no abdominal tenderness. There is no guarding or rebound.   Musculoskeletal:         General: No swelling or tenderness.      Cervical back: Neck supple.   Skin:     General: Skin is warm and dry.      Coloration: Skin is not jaundiced.   Neurological:      Mental Status: He is alert and oriented to person, place, and time. Mental status is at baseline.      Cranial Nerves: No facial asymmetry.   Psychiatric:         Attention and Perception: Attention normal.         Mood and Affect: Mood normal.         Speech: Speech is delayed. Speech is not slurred.         Behavior: Behavior is cooperative.         Thought Content: Thought content normal.         Cognition and Memory: Cognition and memory normal.         Lab Results: CBC with diff:   Lab Results   Component Value Date    WBC 9.87 09/01/2024    HGB 14.8 09/01/2024    HCT 44.4 09/01/2024    MCV 88 09/01/2024     09/01/2024    RBC 5.02 09/01/2024    MCH 29.5 09/01/2024    MCHC 33.3 09/01/2024    RDW 12.6 09/01/2024    MPV 9.6 09/01/2024    NRBC 0 09/01/2024   , BMP/CMP:   Lab Results   Component Value Date    SODIUM 137 09/01/2024    K 4.0 09/01/2024     09/01/2024    CO2 21 09/01/2024    BUN 20 09/01/2024    CREATININE 0.80 09/01/2024    CALCIUM 9.5 09/01/2024    AST 10 (L) 09/01/2024    ALT 9 09/01/2024    ALKPHOS 59 09/01/2024    EGFR 103 09/01/2024   , Coags:   Lab Results   Component Value Date    PTT 31 09/01/2024    INR 1.15 09/01/2024     Imaging Studies:   9/1 CTAP: 1. New mild ectasia of the proximal SMA. Associated filling defect  causing near complete occlusion of the proximal SMA with distal reconstitution suspicious for thrombus. 2. Moderate left hydrocele. Cystic structure superior to the right testis measuring 2.7 cm may be due to a loculated hydrocele or epididymal cyst.     EKG, Pathology, and Other Studies: I have personally reviewed pertinent reports.    VTE Prophylaxis: Sequential compression device (Venodyne)  and Heparin     Code Status: Level 1 - Full Code  Advance Directive and Living Will:      Power of :    POLST:

## 2024-09-01 NOTE — ASSESSMENT & PLAN NOTE
CT showed moderate left hydrocele, cystic structure superior to the right testis measuring 2.7 cm might be due to loculated hydrocele or epididymal cyst  Outpatient follow-up with urology

## 2024-09-01 NOTE — PLAN OF CARE
Problem: Prexisting or High Potential for Compromised Skin Integrity  Goal: Skin integrity is maintained or improved  Description: INTERVENTIONS:  - Identify patients at risk for skin breakdown  - Assess and monitor skin integrity  - Assess and monitor nutrition and hydration status  - Monitor labs   - Assess for incontinence   - Turn and reposition patient  - Assist with mobility/ambulation  - Relieve pressure over bony prominences  - Avoid friction and shearing  - Provide appropriate hygiene as needed including keeping skin clean and dry  - Evaluate need for skin moisturizer/barrier cream  - Collaborate with interdisciplinary team   - Patient/family teaching  - Consider wound care consult   9/1/2024 1440 by Jeremy Sosa  Outcome: Progressing  9/1/2024 1440 by Jeremy Sosa  Outcome: Progressing     Problem: PAIN - ADULT  Goal: Verbalizes/displays adequate comfort level or baseline comfort level  Description: Interventions:  - Encourage patient to monitor pain and request assistance  - Assess pain using appropriate pain scale  - Administer analgesics based on type and severity of pain and evaluate response  - Implement non-pharmacological measures as appropriate and evaluate response  - Consider cultural and social influences on pain and pain management  - Notify physician/advanced practitioner if interventions unsuccessful or patient reports new pain  Outcome: Progressing     Problem: INFECTION - ADULT  Goal: Absence or prevention of progression during hospitalization  Description: INTERVENTIONS:  - Assess and monitor for signs and symptoms of infection  - Monitor lab/diagnostic results  - Monitor all insertion sites, i.e. indwelling lines, tubes, and drains  - Monitor endotracheal if appropriate and nasal secretions for changes in amount and color  - [place] appropriate cooling/warming therapies per order  - Administer medications as ordered  - Instruct and encourage patient and family to use good  hand hygiene technique  - Identify and instruct in appropriate isolation precautions for identified infection/condition  Outcome: Progressing  Goal: Absence of fever/infection during neutropenic period  Description: INTERVENTIONS:  - Monitor WBC    Outcome: Progressing     Problem: SAFETY ADULT  Goal: Patient will remain free of falls  Description: INTERVENTIONS:  - Educate patient/family on patient safety including physical limitations  - Instruct patient to call for assistance with activity   - Consult OT/PT to assist with strengthening/mobility   - Keep Call bell within reach  - Keep bed low and locked with side rails adjusted as appropriate  - Keep care items and personal belongings within reach  - Initiate and maintain comfort rounds  - Make Fall Risk Sign visible to staff  - Offer Toileting every 4 Hours, in advance of need  - Initiate/Maintain bed alarm  - Obtain necessary fall risk management equipment:   - Apply yellow socks and bracelet for high fall risk patients  - Consider moving patient to room near nurses station  Outcome: Progressing  Goal: Maintain or return to baseline ADL function  Description: INTERVENTIONS:  -  Assess patient's ability to carry out ADLs; assess patient's baseline for ADL function and identify physical deficits which impact ability to perform ADLs (bathing, care of mouth/teeth, toileting, grooming, dressing, etc.)  - Assess/evaluate cause of self-care deficits   - Assess range of motion  - Assess patient's mobility; develop plan if impaired  - Assess patient's need for assistive devices and provide as appropriate  - Encourage maximum independence but intervene and supervise when necessary  - Involve family in performance of ADLs  - Assess for home care needs following discharge   - Consider OT consult to assist with ADL evaluation and planning for discharge  - Provide patient education as appropriate  Outcome: Progressing  Goal: Maintains/Returns to pre admission functional  level  Description: INTERVENTIONS:  - Perform AM-PAC 6 Click Basic Mobility/ Daily Activity assessment daily.  - Set and communicate daily mobility goal to care team and patient/family/caregiver.   - Collaborate with rehabilitation services on mobility goals if consulted  - Perform Range of Motion 3 times a day.  - Reposition patient every 3 hours.  - Dangle patient 3 times a day  - Stand patient 3 times a day  - Ambulate patient 3 times a day  - Out of bed to chair 3 times a day   - Out of bed for meals 3 times a day  - Out of bed for toileting  - Record patient progress and toleration of activity level   Outcome: Progressing     Problem: DISCHARGE PLANNING  Goal: Discharge to home or other facility with appropriate resources  Description: INTERVENTIONS:  - Identify barriers to discharge w/patient and caregiver  - Arrange for needed discharge resources and transportation as appropriate  - Identify discharge learning needs (meds, wound care, etc.)  - Arrange for interpretive services to assist at discharge as needed  - Refer to Case Management Department for coordinating discharge planning if the patient needs post-hospital services based on physician/advanced practitioner order or complex needs related to functional status, cognitive ability, or social support system  Outcome: Progressing     Problem: Knowledge Deficit  Goal: Patient/family/caregiver demonstrates understanding of disease process, treatment plan, medications, and discharge instructions  Description: Complete learning assessment and assess knowledge base.  Interventions:  - Provide teaching at level of understanding  - Provide teaching via preferred learning methods  Outcome: Progressing

## 2024-09-01 NOTE — ASSESSMENT & PLAN NOTE
Multiple admissions for generalized weakness, numbness intermittent dysarthria, expressive aphasia.  Last admission beginning of August, had a stroke workup which was negative.  Suspect MS flare versus psychogenic stress as etiology of flare  On Ocrelizumab and he is getting monthly Solu-Medrol infusion 1000 mg (beginning of every month)  ED discussed with patient's neurologist, since he is getting his Solu-Medrol infusion at the beginning of the month, he received 1000 mg of Solu-Medrol IV while in the ED.  Per neurology no further steroids are indicated  Continue to monitor neurostatus and consider neurology consult if needed

## 2024-09-01 NOTE — EMTALA/ACUTE CARE TRANSFER
[facility] EMERGENCY DEPARTMENT  [address] [facility] [address]  Dept: [phone]      EMTALA TRANSFER CONSENT    NAME Carlos DESTIN Landis                                        SUDARSHAN 1975                              MRN [id number]    I have been informed of my rights regarding examination, treatment, and transfer   by Dr. Felix Roman DO    Benefits: Specialized equipment and/or services available at the receiving facility (Include comment)________________________    Risks: Potential for delay in receiving treatment      Consent for Transfer:  I acknowledge that my medical condition has been evaluated and explained to me by the emergency department physician or other qualified medical person and/or my attending physician, who has recommended that I be transferred to the service of  Accepting Physician: Dr Becker at Accepting Facility Name, City & State : [facility]. The above potential benefits of such transfer, the potential risks associated with such transfer, and the probable risks of not being transferred have been explained to me, and I fully understand them.  The doctor has explained that, in my case, the benefits of transfer outweigh the risks.  I agree to be transferred.    I authorize the performance of emergency medical procedures and treatments upon me in both transit and upon arrival at the receiving facility.  Additionally, I authorize the release of any and all medical records to the receiving facility and request they be transported with me, if possible.  I understand that the safest mode of transportation during a medical emergency is an ambulance and that the Hospital advocates the use of this mode of transport. Risks of traveling to the receiving facility by car, including absence of medical control, life sustaining equipment, such as oxygen, and medical personnel has been explained to me and I fully understand them.    (TEQUILA CORRECT BOX BELOW)  [  ]  I consent to  the stated transfer and to be transported by ambulance/helicopter.  [  ]  I consent to the stated transfer, but refuse transportation by ambulance and accept full responsibility for my transportation by car.  I understand the risks of non-ambulance transfers and I exonerate the Hospital and its staff from any deterioration in my condition that results from this refusal.    X___________________________________________    DATE  24  TIME________  Signature of patient or legally responsible individual signing on patient behalf           RELATIONSHIP TO PATIENT_________________________          Provider Certification    NAME Carlos DESTIN Landis                                         1975                              MRN [id number]    A medical screening exam was performed on the above named patient.  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    Patient Condition: The patient has been stabilized such that within reasonable medical probability, no material deterioration of the patient condition or the condition of the unborn child(lance) is likely to result from the transfer    Reason for Transfer: Level of Care needed not available at this facility    Transfer Requirements: Facility [facility]   Space available and qualified personnel available for treatment as acknowledged by    Agreed to accept transfer and to provide appropriate medical treatment as acknowledged by       Dr Becker  Appropriate medical records of the examination and treatment of the patient are provided at the time of transfer   STAFF INITIAL WHEN COMPLETED _______  Transfer will be performed by qualified personnel from    and appropriate transfer equipment as required, including the use of necessary and appropriate life support measures.    Provider Certification: I have examined the patient and explained the following risks and benefits of being transferred/refusing transfer  to the patient/family:  General risk, such as traffic hazards, adverse weather conditions, rough terrain or turbulence, possible failure of equipment (including vehicle or aircraft), or consequences of actions of persons outside the control of the transport personnel      Based on these reasonable risks and benefits to the patient and/or the unborn child(lance), and based upon the information available at the time of the patient’s examination, I certify that the medical benefits reasonably to be expected from the provision of appropriate medical treatments at another medical facility outweigh the increasing risks, if any, to the individual’s medical condition, and in the case of labor to the unborn child, from effecting the transfer.    X____________________________________________ DATE 09/01/24        TIME_______      ORIGINAL - SEND TO MEDICAL RECORDS   COPY - SEND WITH PATIENT DURING TRANSFER

## 2024-09-02 ENCOUNTER — APPOINTMENT (INPATIENT)
Dept: NON INVASIVE DIAGNOSTICS | Facility: HOSPITAL | Age: 49
DRG: 393 | End: 2024-09-02
Payer: COMMERCIAL

## 2024-09-02 PROBLEM — T16.2XXA FOREIGN BODY OF LEFT EAR: Status: ACTIVE | Noted: 2024-09-02

## 2024-09-02 LAB
ANION GAP SERPL CALCULATED.3IONS-SCNC: 10 MMOL/L (ref 4–13)
AORTIC ROOT: 3.9 CM
APICAL FOUR CHAMBER EJECTION FRACTION: 57 %
APTT PPP: 116 SECONDS (ref 23–34)
APTT PPP: 205 SECONDS (ref 23–34)
APTT PPP: 56 SECONDS (ref 23–34)
ASCENDING AORTA: 4.3 CM
BSA FOR ECHO PROCEDURE: 2.2 M2
BUN SERPL-MCNC: 18 MG/DL (ref 5–25)
CALCIUM SERPL-MCNC: 9.3 MG/DL (ref 8.4–10.2)
CHLORIDE SERPL-SCNC: 105 MMOL/L (ref 96–108)
CO2 SERPL-SCNC: 22 MMOL/L (ref 21–32)
CREAT SERPL-MCNC: 0.74 MG/DL (ref 0.6–1.3)
E WAVE DECELERATION TIME: 179 MS
E/A RATIO: 0.78
ERYTHROCYTE [DISTWIDTH] IN BLOOD BY AUTOMATED COUNT: 12.6 % (ref 11.6–15.1)
GFR SERPL CREATININE-BSD FRML MDRD: 106 ML/MIN/1.73SQ M
GLUCOSE SERPL-MCNC: 144 MG/DL (ref 65–140)
HCT VFR BLD AUTO: 40.9 % (ref 36.5–49.3)
HGB BLD-MCNC: 13.6 G/DL (ref 12–17)
MCH RBC QN AUTO: 28.9 PG (ref 26.8–34.3)
MCHC RBC AUTO-ENTMCNC: 33.3 G/DL (ref 31.4–37.4)
MCV RBC AUTO: 87 FL (ref 82–98)
MV E'TISSUE VEL-LAT: 9 CM/S
MV E'TISSUE VEL-SEP: 10 CM/S
MV PEAK A VEL: 0.6 M/S
MV PEAK E VEL: 47 CM/S
MV STENOSIS PRESSURE HALF TIME: 52 MS
MV VALVE AREA P 1/2 METHOD: 4.2
PLATELET # BLD AUTO: 260 THOUSANDS/UL (ref 149–390)
PMV BLD AUTO: 9.4 FL (ref 8.9–12.7)
POTASSIUM SERPL-SCNC: 4.2 MMOL/L (ref 3.5–5.3)
RBC # BLD AUTO: 4.71 MILLION/UL (ref 3.88–5.62)
SINOTUBULAR JUNCTION: 3.8 CM
SL CV LV EF: 60
SL CV SINUS OF VALSALVA 2D: 4 CM
SODIUM SERPL-SCNC: 137 MMOL/L (ref 135–147)
TRICUSPID ANNULAR PLANE SYSTOLIC EXCURSION: 2.5 CM
WBC # BLD AUTO: 6.93 THOUSAND/UL (ref 4.31–10.16)

## 2024-09-02 PROCEDURE — 80048 BASIC METABOLIC PNL TOTAL CA: CPT | Performed by: INTERNAL MEDICINE

## 2024-09-02 PROCEDURE — 93325 DOPPLER ECHO COLOR FLOW MAPG: CPT | Performed by: INTERNAL MEDICINE

## 2024-09-02 PROCEDURE — 93321 DOPPLER ECHO F-UP/LMTD STD: CPT

## 2024-09-02 PROCEDURE — 85027 COMPLETE CBC AUTOMATED: CPT | Performed by: INTERNAL MEDICINE

## 2024-09-02 PROCEDURE — 85730 THROMBOPLASTIN TIME PARTIAL: CPT | Performed by: INTERNAL MEDICINE

## 2024-09-02 PROCEDURE — 99232 SBSQ HOSP IP/OBS MODERATE 35: CPT

## 2024-09-02 PROCEDURE — 93321 DOPPLER ECHO F-UP/LMTD STD: CPT | Performed by: INTERNAL MEDICINE

## 2024-09-02 PROCEDURE — 93308 TTE F-UP OR LMTD: CPT | Performed by: INTERNAL MEDICINE

## 2024-09-02 PROCEDURE — 93325 DOPPLER ECHO COLOR FLOW MAPG: CPT

## 2024-09-02 PROCEDURE — 93308 TTE F-UP OR LMTD: CPT

## 2024-09-02 RX ADMIN — ATORVASTATIN CALCIUM 40 MG: 40 TABLET, FILM COATED ORAL at 16:18

## 2024-09-02 RX ADMIN — HEPARIN SODIUM 4000 UNITS: 1000 INJECTION INTRAVENOUS; SUBCUTANEOUS at 18:04

## 2024-09-02 RX ADMIN — DEXTROSE AND SODIUM CHLORIDE 75 ML/HR: 5; .45 INJECTION, SOLUTION INTRAVENOUS at 03:53

## 2024-09-02 RX ADMIN — HEPARIN SODIUM 12 UNITS/KG/HR: 10000 INJECTION, SOLUTION INTRAVENOUS at 03:51

## 2024-09-02 RX ADMIN — DEXTROSE AND SODIUM CHLORIDE 75 ML/HR: 5; .45 INJECTION, SOLUTION INTRAVENOUS at 17:20

## 2024-09-02 RX ADMIN — NYSTATIN: 100000 POWDER TOPICAL at 08:46

## 2024-09-02 RX ADMIN — ASPIRIN 81 MG: 81 TABLET, COATED ORAL at 08:45

## 2024-09-02 RX ADMIN — NORTRIPTYLINE HYDROCHLORIDE 25 MG: 25 CAPSULE ORAL at 21:27

## 2024-09-02 NOTE — ASSESSMENT & PLAN NOTE
Patient presented to the hospital with generalized weakness and left facial numbness.  Had multiple admissions for same issue suspect MS flare versus psychogenic stress as etiology of flare.  Patient reports intermittent left-sided pressure-like abdominal pain for the past 4 days.  CT abdomen pelvis with contrast showed new mild ectasia of the proximal SMA with associated filling defect causing near complete occlusion of the proximal SMA with distal reconstitution suspicious for thrombus.  ED reached out to vascular surgery.  Comparing to the CT in December these are new findings.  Differential could be superior mesenteric artery dissection with thrombus formation, possible thromboembolic event, mycotic degeneration of the superior mesenteric artery  Mild leukocytosis of 12, repeat labs showed no leukocytosis.  Lactic acid negative  Start aspirin 81 mg daily, continue heparin drip which was started in the ED  Order echo, last echo 8/8/2024 without evidence of vegetations/thrombus or any valvular issues, pending  Vascular surgery consult, input appreciated  Clear liquid diet, okay to have coffee  Continue IV fluids  Repeat imaging in 24 to 48 hours  Continue heparin drip  Start aspirin and Lipitor  Follow-up blood cultures  ESR and CRP both elevated  Follow-up echo  Close monitoring of abdominal exam

## 2024-09-02 NOTE — PROGRESS NOTES
[facility]  Progress Note  Name: Carlos Landis I  MRN: [id number]  Unit/Bed#: [facility] 823-01 I Date of Admission: 9/1/2024   Date of Service: 9/2/2024 I Hospital Day: 1    Assessment & Plan   * SMA thrombus  Assessment & Plan  Patient presented to the hospital with generalized weakness and left facial numbness.  Had multiple admissions for same issue suspect MS flare versus psychogenic stress as etiology of flare.  Patient reports intermittent left-sided pressure-like abdominal pain for the past 4 days.  CT abdomen pelvis with contrast showed new mild ectasia of the proximal SMA with associated filling defect causing near complete occlusion of the proximal SMA with distal reconstitution suspicious for thrombus.  ED reached out to vascular surgery.  Comparing to the CT in December these are new findings.  Differential could be superior mesenteric artery dissection with thrombus formation, possible thromboembolic event, mycotic degeneration of the superior mesenteric artery  Mild leukocytosis of 12, repeat labs showed no leukocytosis.  Lactic acid negative  Start aspirin 81 mg daily, continue heparin drip which was started in the ED  Order echo, last echo 8/8/2024 without evidence of vegetations/thrombus or any valvular issues, pending  Vascular surgery consult, input appreciated  Clear liquid diet, okay to have coffee  Continue IV fluids  Repeat imaging in 24 to 48 hours  Continue heparin drip  Start aspirin and Lipitor  Follow-up blood cultures  ESR and CRP both elevated  Follow-up echo  Close monitoring of abdominal exam      MS (multiple sclerosis) (HCC)  Assessment & Plan  Multiple admissions for generalized weakness, numbness intermittent dysarthria, expressive aphasia.  Last admission beginning of August, had a stroke workup which was negative.  Suspect MS flare versus psychogenic stress as etiology of flare  On Ocrelizumab and he is getting monthly Solu-Medrol infusion 1000  mg (beginning of every month)  ED discussed with patient's neurologist, since he is getting his Solu-Medrol infusion at the beginning of the month, he received 1000 mg of Solu-Medrol IV while in the ED.  Per neurology no further steroids are indicated  Continue to monitor neurostatus and consider neurology consult if needed  9/2 weakness much improved s/p IV steroids in ED.  Patient reports he is back to baseline    Foreign body of left ear  Assessment & Plan  Patient reports he had a earbud that the rubber piece remained in his left ear and he can still feel it.  Otoscope on floor not functional, trying to obtain working otoscope to evaluate    Left hydrocele  Assessment & Plan  CT showed moderate left hydrocele, cystic structure superior to the right testis measuring 2.7 cm might be due to loculated hydrocele or epididymal cyst  Outpatient follow-up with urology    DAVID (obstructive sleep apnea)  Assessment & Plan  History of DAVID, noncompliant with CPAP  Outpatient sleep medicine follow-up               VTE Pharmacologic Prophylaxis: VTE Score: 5 High Risk (Score >/= 5) - Pharmacological DVT Prophylaxis Ordered: heparin drip. Sequential Compression Devices Ordered.    Mobility:   Basic Mobility Inpatient Raw Score: 10  JH-HLM Goal: 4: Move to chair/commode  JH-HLM Achieved: 4: Move to chair/commode  JH-HLM Goal achieved. Continue to encourage appropriate mobility.    Patient Centered Rounds: I performed bedside rounds with nursing staff today.   Discussions with Specialists or Other Care Team Provider: Vascular surgery    Education and Discussions with Family / Patient: Patient declined call to .     Total Time Spent on Date of Encounter in care of patient:  mins. This time was spent on one or more of the following: performing physical exam; counseling and coordination of care; obtaining or reviewing history; documenting in the medical record; reviewing/ordering tests, medications or procedures;  communicating with other healthcare professionals and discussing with patient's family/caregivers.    Current Length of Stay: 1 day(s)  Current Patient Status: Inpatient   Certification Statement: The patient will continue to require additional inpatient hospital stay due to IV heparin drip, SMA thrombosis requiring further evaluation with vascular surgery  Discharge Plan: Anticipate discharge in 48-72 hrs to discharge location to be determined pending rehab evaluations.    Code Status: Level 1 - Full Code    Subjective:   Patient feels his weakness is much improved today.  He feels he is back to baseline after receiving the steroids.  He denies abdominal pain at this time.  He is asking for something to eat or drink.    Objective:     Vitals:   Temp (24hrs), Av.7 °F (36.5 °C), Min:97.4 °F (36.3 °C), Max:98 °F (36.7 °C)    Temp:  [97.4 °F (36.3 °C)-98 °F (36.7 °C)] 97.6 °F (36.4 °C)  HR:  [80-93] 80  Resp:  [14-20] 16  BP: (101-116)/(73-82) 108/75  SpO2:  [93 %-100 %] 100 %  Body mass index is 30.9 kg/m².     Input and Output Summary (last 24 hours):     Intake/Output Summary (Last 24 hours) at 2024 0910  Last data filed at 2024 0628  Gross per 24 hour   Intake 1389.85 ml   Output 1550 ml   Net -160.15 ml       Physical Exam:   Physical Exam  Vitals and nursing note reviewed.   Constitutional:       General: He is not in acute distress.     Appearance: Normal appearance.   HENT:      Head: Normocephalic.   Cardiovascular:      Rate and Rhythm: Normal rate and regular rhythm.      Pulses: Normal pulses.      Heart sounds: Normal heart sounds. No murmur heard.  Pulmonary:      Effort: Pulmonary effort is normal. No respiratory distress.      Breath sounds: Normal breath sounds. No wheezing or rhonchi.   Abdominal:      General: Bowel sounds are normal. There is no distension.      Palpations: Abdomen is soft.      Tenderness: There is no abdominal tenderness. There is no guarding.   Musculoskeletal:       Right lower leg: No edema.      Left lower leg: No edema.   Skin:     General: Skin is warm and dry.   Neurological:      General: No focal deficit present.      Mental Status: He is alert. Mental status is at baseline.          Additional Data:     Labs:  Results from last 7 days   Lab Units 09/02/24  0148 09/01/24  1213 09/01/24  0752   WBC Thousand/uL 6.93   < > 12.36*   HEMOGLOBIN g/dL 13.6   < > 15.1   HEMATOCRIT % 40.9   < > 45.2   PLATELETS Thousands/uL 260   < > 300   SEGS PCT %  --   --  75   LYMPHO PCT %  --   --  12*   MONO PCT %  --   --  7   EOS PCT %  --   --  3    < > = values in this interval not displayed.     Results from last 7 days   Lab Units 09/02/24  0148 09/01/24  0752   SODIUM mmol/L 137 137   POTASSIUM mmol/L 4.2 4.0   CHLORIDE mmol/L 105 104   CO2 mmol/L 22 21   BUN mg/dL 18 20   CREATININE mg/dL 0.74 0.80   ANION GAP mmol/L 10 12   CALCIUM mg/dL 9.3 9.5   ALBUMIN g/dL  --  4.1   TOTAL BILIRUBIN mg/dL  --  0.75   ALK PHOS U/L  --  59   ALT U/L  --  9   AST U/L  --  10*   GLUCOSE RANDOM mg/dL 144* 93     Results from last 7 days   Lab Units 09/01/24  1213   INR  1.15             Results from last 7 days   Lab Units 09/01/24  1213   LACTIC ACID mmol/L 0.9       Lines/Drains:  Invasive Devices       Peripheral Intravenous Line  Duration             Peripheral IV 09/01/24 Distal;Right;Upper;Ventral (anterior) Arm 1 day    Peripheral IV 09/01/24 Dorsal (posterior);Right Wrist <1 day                          Imaging: Reviewed radiology reports from this admission including: abdominal/pelvic CT    Recent Cultures (last 7 days):   Results from last 7 days   Lab Units 09/01/24  1532   BLOOD CULTURE  Received in Microbiology Lab. Culture in Progress.  Received in Microbiology Lab. Culture in Progress.       Last 24 Hours Medication List:   Current Facility-Administered Medications   Medication Dose Route Frequency Provider Last Rate    aspirin  81 mg Oral Daily Adela Becker MD       atorvastatin  40 mg Oral Daily With Dinner Inderjit Mota DO      dextrose 5 % and sodium chloride 0.45 %  75 mL/hr Intravenous Continuous Adela Becker MD 75 mL/hr (09/02/24 0353)    heparin (porcine)  3-30 Units/kg/hr (Order-Specific) Intravenous Titrated Adela Becker MD 12 Units/kg/hr (09/02/24 0351)    heparin (porcine)  4,000 Units Intravenous Q6H PRN Adela Becker MD      heparin (porcine)  8,000 Units Intravenous Q6H PRN Adela Becker MD      nortriptyline  25 mg Oral HS Adela Becker MD      nystatin   Topical BID Adela Becker MD          Today, Patient Was Seen By: SHYANN Fabian    **Please Note: This note may have been constructed using a voice recognition system.**

## 2024-09-02 NOTE — PROGRESS NOTES
Patient:  CHILO AHN    MRN:  [id number]    Coby Request ID:  [id number]    Level of care reserved:  Home Health Agency    Partner Reserved:  [facility] [place] [address] [phone]    Clinical needs requested:    Geography searched:  10 miles around [id number]    Start of Service:    Request sent:  10:33am EDT on 9/2/2024 by Sepideh Gibson    Partner reserved:  12:27pm EDT on 9/2/2024 by Sepideh Gibson    Choice list shared:  12:27pm EDT on 9/2/2024 by Sepideh Gibson

## 2024-09-02 NOTE — PLAN OF CARE
Problem: Prexisting or High Potential for Compromised Skin Integrity  Goal: Skin integrity is maintained or improved  Description: INTERVENTIONS:  - Identify patients at risk for skin breakdown  - Assess and monitor skin integrity  - Assess and monitor nutrition and hydration status  - Monitor labs   - Assess for incontinence   - Turn and reposition patient  - Assist with mobility/ambulation  - Relieve pressure over bony prominences  - Avoid friction and shearing  - Provide appropriate hygiene as needed including keeping skin clean and dry  - Evaluate need for skin moisturizer/barrier cream  - Collaborate with interdisciplinary team   - Patient/family teaching  - Consider wound care consult   Outcome: Progressing     Problem: PAIN - ADULT  Goal: Verbalizes/displays adequate comfort level or baseline comfort level  Description: Interventions:  - Encourage patient to monitor pain and request assistance  - Assess pain using appropriate pain scale  - Administer analgesics based on type and severity of pain and evaluate response  - Implement non-pharmacological measures as appropriate and evaluate response  - Consider cultural and social influences on pain and pain management  - Notify physician/advanced practitioner if interventions unsuccessful or patient reports new pain  Outcome: Progressing     Problem: INFECTION - ADULT  Goal: Absence or prevention of progression during hospitalization  Description: INTERVENTIONS:  - Assess and monitor for signs and symptoms of infection  - Monitor lab/diagnostic results  - Monitor all insertion sites, i.e. indwelling lines, tubes, and drains  - Monitor endotracheal if appropriate and nasal secretions for changes in amount and color  - [place] appropriate cooling/warming therapies per order  - Administer medications as ordered  - Instruct and encourage patient and family to use good hand hygiene technique  - Identify and instruct in appropriate isolation precautions for  identified infection/condition  Outcome: Progressing  Goal: Absence of fever/infection during neutropenic period  Description: INTERVENTIONS:  - Monitor WBC    Outcome: Progressing     Problem: SAFETY ADULT  Goal: Patient will remain free of falls  Description: INTERVENTIONS:  - Educate patient/family on patient safety including physical limitations  - Instruct patient to call for assistance with activity   - Consult OT/PT to assist with strengthening/mobility   - Keep Call bell within reach  - Keep bed low and locked with side rails adjusted as appropriate  - Keep care items and personal belongings within reach  - Initiate and maintain comfort rounds  - Make Fall Risk Sign visible to staff  - Offer Toileting every 2 Hours, in advance of need  - Initiate/Maintain bed alarm  - Obtain necessary fall risk management equipment: walker  - Apply yellow socks and bracelet for high fall risk patients  - Consider moving patient to room near nurses station  Outcome: Progressing  Goal: Maintain or return to baseline ADL function  Description: INTERVENTIONS:  -  Assess patient's ability to carry out ADLs; assess patient's baseline for ADL function and identify physical deficits which impact ability to perform ADLs (bathing, care of mouth/teeth, toileting, grooming, dressing, etc.)  - Assess/evaluate cause of self-care deficits   - Assess range of motion  - Assess patient's mobility; develop plan if impaired  - Assess patient's need for assistive devices and provide as appropriate  - Encourage maximum independence but intervene and supervise when necessary  - Involve family in performance of ADLs  - Assess for home care needs following discharge   - Consider OT consult to assist with ADL evaluation and planning for discharge  - Provide patient education as appropriate  Outcome: Progressing  Goal: Maintains/Returns to pre admission functional level  Description: INTERVENTIONS:  - Perform AM-PAC 6 Click Basic Mobility/ Daily  Activity assessment daily.  - Set and communicate daily mobility goal to care team and patient/family/caregiver.   - Collaborate with rehabilitation services on mobility goals if consulted  - Perform Range of Motion 3 times a day.  - Reposition patient every 2 hours.  - Dangle patient 3 times a day  - Stand patient 3 times a day  - Ambulate patient 3 times a day  - Out of bed to chair 3 times a day   - Out of bed for meals 3 times a day  - Out of bed for toileting  - Record patient progress and toleration of activity level   Outcome: Progressing     Problem: DISCHARGE PLANNING  Goal: Discharge to home or other facility with appropriate resources  Description: INTERVENTIONS:  - Identify barriers to discharge w/patient and caregiver  - Arrange for needed discharge resources and transportation as appropriate  - Identify discharge learning needs (meds, wound care, etc.)  - Arrange for interpretive services to assist at discharge as needed  - Refer to Case Management Department for coordinating discharge planning if the patient needs post-hospital services based on physician/advanced practitioner order or complex needs related to functional status, cognitive ability, or social support system  Outcome: Progressing     Problem: Knowledge Deficit  Goal: Patient/family/caregiver demonstrates understanding of disease process, treatment plan, medications, and discharge instructions  Description: Complete learning assessment and assess knowledge base.  Interventions:  - Provide teaching at level of understanding  - Provide teaching via preferred learning methods  Outcome: Progressing

## 2024-09-02 NOTE — ASSESSMENT & PLAN NOTE
Multiple admissions for generalized weakness, numbness intermittent dysarthria, expressive aphasia.  Last admission beginning of August, had a stroke workup which was negative.  Suspect MS flare versus psychogenic stress as etiology of flare  On Ocrelizumab and he is getting monthly Solu-Medrol infusion 1000 mg (beginning of every month)  ED discussed with patient's neurologist, since he is getting his Solu-Medrol infusion at the beginning of the month, he received 1000 mg of Solu-Medrol IV while in the ED.  Per neurology no further steroids are indicated  Continue to monitor neurostatus and consider neurology consult if needed  9/2 weakness much improved s/p IV steroids in ED.  Patient reports he is back to baseline

## 2024-09-02 NOTE — PLAN OF CARE
Problem: Prexisting or High Potential for Compromised Skin Integrity  Goal: Skin integrity is maintained or improved  Description: INTERVENTIONS:  - Identify patients at risk for skin breakdown  - Assess and monitor skin integrity  - Assess and monitor nutrition and hydration status  - Monitor labs   - Assess for incontinence   - Turn and reposition patient  - Assist with mobility/ambulation  - Relieve pressure over bony prominences  - Avoid friction and shearing  - Provide appropriate hygiene as needed including keeping skin clean and dry  - Evaluate need for skin moisturizer/barrier cream  - Collaborate with interdisciplinary team   - Patient/family teaching  - Consider wound care consult   Outcome: Progressing     Problem: PAIN - ADULT  Goal: Verbalizes/displays adequate comfort level or baseline comfort level  Description: Interventions:  - Encourage patient to monitor pain and request assistance  - Assess pain using appropriate pain scale  - Administer analgesics based on type and severity of pain and evaluate response  - Implement non-pharmacological measures as appropriate and evaluate response  - Consider cultural and social influences on pain and pain management  - Notify physician/advanced practitioner if interventions unsuccessful or patient reports new pain  Outcome: Progressing     Problem: INFECTION - ADULT  Goal: Absence or prevention of progression during hospitalization  Description: INTERVENTIONS:  - Assess and monitor for signs and symptoms of infection  - Monitor lab/diagnostic results  - Monitor all insertion sites, i.e. indwelling lines, tubes, and drains  - Monitor endotracheal if appropriate and nasal secretions for changes in amount and color  - [place] appropriate cooling/warming therapies per order  - Administer medications as ordered  - Instruct and encourage patient and family to use good hand hygiene technique  - Identify and instruct in appropriate isolation precautions for  identified infection/condition  Outcome: Progressing  Goal: Absence of fever/infection during neutropenic period  Description: INTERVENTIONS:  - Monitor WBC    Outcome: Progressing

## 2024-09-02 NOTE — CASE MANAGEMENT
Case Management Assessment & Discharge Planning Note    Patient name Carlos Landis  Location [facility] 823/[facility] 823-01 MRN [id number]  : 1975 Date 2024       Current Admission Date: 2024  Current Admission Diagnosis:SMA thrombus   Patient Active Problem List    Diagnosis Date Noted Date Diagnosed    Foreign body of left ear 2024     SMA thrombus 2024     Left hydrocele 2024     Non-sustained ventricular tachycardia (HCC) 2024     Weakness 2024     DAVID (obstructive sleep apnea) 2024     Stroke-like symptoms 2023     Pain of upper abdomen 2023     Right-sided thoracic back pain 2023     Hypomagnesemia 2023     Episodic tension-type headache, not intractable 2023     Left leg weakness 2022     Obesity (BMI 30-39.9) 2022     Ambulatory dysfunction 03/15/2022     MS (multiple sclerosis) ([facility]) 03/15/2022     Dysarthria 2022     CNS demyelinating disease (HCC) 2022     Right hemiparesis (HCC) 2022       LOS (days): 1  Geometric Mean LOS (GMLOS) (days):   Days to GMLOS:     OBJECTIVE:  PATIENT READMITTED TO HOSPITAL  Risk of Unplanned Readmission Score: 15.97         Current admission status: Inpatient       Preferred Pharmacy:   [id number]  [address] - [address]  [address]  [place] [address]  Phone: [phone] Fax: [phone]    Primary Care Provider: Ha Acosta MD    Primary Insurance: [employer]  Secondary Insurance:     ASSESSMENT:  Active Health Care Proxies       mamadou [facility] Representative - Sister   Primary Phone: [phone] (Home)                           Readmission Root Cause  30 Day Readmission: Yes  Who directed you to return to the hospital?: Self  Did you understand whom to contact if you had questions or problems?: Yes  Did you get your prescriptions before you left the hospital?: Yes  Were you able to get your prescriptions filled  when you left the hospital?: Yes  Did you take your medications as prescribed?: Yes  Were you able to get to your follow-up appointments?: Yes  During previous admission, was a post-acute recommendation made?: Yes  What post-acute resources were offered?: [facility] ([facility])  Patient was readmitted due to: MS flare  Action Plan: Arterial thrombosis/ vascular    Patient Information  Admitted from:: Home  Mental Status: Alert                               Social Determinants of Health (SDOH)      Flowsheet Row Most Recent Value   Housing Stability    In the last 12 months, was there a time when you were not able to pay the mortgage or rent on time? N   In the past 12 months, how many times have you moved where you were living? 0   At any time in the past 12 months, were you homeless or living in a shelter (including now)? N   Transportation Needs    In the past 12 months, has lack of transportation kept you from medical appointments or from getting medications? no   In the past 12 months, has lack of transportation kept you from meetings, work, or from getting things needed for daily living? No   Food Insecurity    Within the past 12 months, you worried that your food would run out before you got the money to buy more. Never true   Within the past 12 months, the food you bought just didn't last and you didn't have money to get more. Never true   Utilities    In the past 12 months has the electric, gas, oil, or water company threatened to shut off services in your home? No            DISCHARGE DETAILS:    Discharge planning discussed with:: patient @ bedside  [place] of Choice: Yes  Comments - Freedom of Choice: SACHA with [facility]   contacted family/caregiver?: No- see comments (pt AAOx3)  Were Treatment Team discharge recommendations reviewed with patient/caregiver?: Yes  Did patient/caregiver verbalize understanding of patient care needs?: Yes  Were patient/caregiver advised of the risks associated with not following  Treatment Team discharge recommendations?: Yes    Contacts  Patient Contacts: Beverly Landis-   Relationship to Patient:: Family  Contact Method: Phone  Phone Number: [phone]  Reason/Outcome: Emergency Contact    Requested Home Health Care         Is the patient interested in HHC at discharge?: Yes  Home Health Discipline requested:: Occupational Therapy, Physical Therapy, Nursing  [facility] Health Agency [facility] External Referral Reason (only applicable if external HHA name selected): Patient has established relationship with provider  Home Health Follow-Up Provider:: PCP  Home Health Services Needed:: Strengthening/Theraputic Exercises to Improve Function, Evaluate Functional Status and Safety, Wound/Ostomy Care  Homebound Criteria Met:: Requires the Assistance of Another Person for Safe Ambulation or to Leave the Home, Uses an Assist Device (i.e. cane, walker, etc)  Supporting Clincal Findings:: Bed Bound or Wheelchair Bound               Pt 30 day readmit  Resides in 2SH ( ramp to enter) with parents and daughter.   First floor set up, stair glide to 2nd  Hx of STR/ and HHC  Currently active with [facility]- return referral placed  CM to follow    Patient/caregiver received discharge checklist.  Content reviewed.  Patient/caregiver encouraged to participate in discharge plan of care prior to discharge home.  CM reviewed d/c planning process including the following: identifying help at home, patient preference for d/c planning needs, Discharge Lounge, [facility] Meds to Bed program, availability of treatment team to discuss questions or concerns patient and/or family may have regarding understanding medications and recognizing signs and symptoms once discharged.  CM also encouraged patient to follow up with all recommended appointments after discharge. Patient advised of importance for patient and family to participate in managing patient’s medical well being.

## 2024-09-02 NOTE — ASSESSMENT & PLAN NOTE
Patient reports he had a earbud that the rubber piece remained in his left ear and he can still feel it.  ENT evaluated, no foreign body noted    [Mother] : mother

## 2024-09-02 NOTE — NURSING NOTE
Pt just informed me that 2 weeks ago, he got the rubber part of an ear bud stuck in his left ear.  He was unable to dislodge it at that time.  I looked in his ear with a flash light and didn't see anything foreign.  Will ask for a consult to ENT in am.

## 2024-09-02 NOTE — ASSESSMENT & PLAN NOTE
Patient reports he had a earbud that the rubber piece remained in his left ear and he can still feel it.  Otoscope on floor not functional, trying to obtain working otoscope to evaluate

## 2024-09-03 ENCOUNTER — TELEPHONE (OUTPATIENT)
Dept: CARDIOLOGY CLINIC | Facility: CLINIC | Age: 49
End: 2024-09-03

## 2024-09-03 PROBLEM — T16.2XXA FOREIGN BODY OF LEFT EAR: Status: RESOLVED | Noted: 2024-09-02 | Resolved: 2024-09-03

## 2024-09-03 PROBLEM — I72.8 SUPERIOR MESENTERIC ANEURYSM (HCC): Status: ACTIVE | Noted: 2024-09-03

## 2024-09-03 PROBLEM — I72.8 SUPERIOR MESENTERIC ANEURYSM (HCC): Status: RESOLVED | Noted: 2024-09-03 | Resolved: 2024-09-03

## 2024-09-03 LAB
ANION GAP SERPL CALCULATED.3IONS-SCNC: 7 MMOL/L (ref 4–13)
APTT PPP: 114 SECONDS (ref 23–34)
APTT PPP: 50 SECONDS (ref 23–34)
APTT PPP: 58 SECONDS (ref 23–34)
APTT PPP: 83 SECONDS (ref 23–34)
BUN SERPL-MCNC: 16 MG/DL (ref 5–25)
CALCIUM SERPL-MCNC: 9.5 MG/DL (ref 8.4–10.2)
CHLORIDE SERPL-SCNC: 107 MMOL/L (ref 96–108)
CO2 SERPL-SCNC: 26 MMOL/L (ref 21–32)
CREAT SERPL-MCNC: 0.88 MG/DL (ref 0.6–1.3)
ERYTHROCYTE [DISTWIDTH] IN BLOOD BY AUTOMATED COUNT: 13 % (ref 11.6–15.1)
GFR SERPL CREATININE-BSD FRML MDRD: 99 ML/MIN/1.73SQ M
GLUCOSE SERPL-MCNC: 113 MG/DL (ref 65–140)
HCT VFR BLD AUTO: 42.3 % (ref 36.5–49.3)
HGB BLD-MCNC: 13.4 G/DL (ref 12–17)
MCH RBC QN AUTO: 28.6 PG (ref 26.8–34.3)
MCHC RBC AUTO-ENTMCNC: 31.7 G/DL (ref 31.4–37.4)
MCV RBC AUTO: 90 FL (ref 82–98)
PLATELET # BLD AUTO: 247 THOUSANDS/UL (ref 149–390)
PMV BLD AUTO: 9.5 FL (ref 8.9–12.7)
POTASSIUM SERPL-SCNC: 3.9 MMOL/L (ref 3.5–5.3)
RBC # BLD AUTO: 4.69 MILLION/UL (ref 3.88–5.62)
SODIUM SERPL-SCNC: 140 MMOL/L (ref 135–147)
WBC # BLD AUTO: 10.52 THOUSAND/UL (ref 4.31–10.16)

## 2024-09-03 PROCEDURE — 99232 SBSQ HOSP IP/OBS MODERATE 35: CPT | Performed by: PHYSICIAN ASSISTANT

## 2024-09-03 PROCEDURE — 80048 BASIC METABOLIC PNL TOTAL CA: CPT

## 2024-09-03 PROCEDURE — 99232 SBSQ HOSP IP/OBS MODERATE 35: CPT | Performed by: SURGERY

## 2024-09-03 PROCEDURE — 97167 OT EVAL HIGH COMPLEX 60 MIN: CPT

## 2024-09-03 PROCEDURE — 85027 COMPLETE CBC AUTOMATED: CPT

## 2024-09-03 PROCEDURE — 85730 THROMBOPLASTIN TIME PARTIAL: CPT | Performed by: INTERNAL MEDICINE

## 2024-09-03 PROCEDURE — 85730 THROMBOPLASTIN TIME PARTIAL: CPT | Performed by: STUDENT IN AN ORGANIZED HEALTH CARE EDUCATION/TRAINING PROGRAM

## 2024-09-03 PROCEDURE — 97163 PT EVAL HIGH COMPLEX 45 MIN: CPT

## 2024-09-03 RX ADMIN — NYSTATIN: 100000 POWDER TOPICAL at 08:14

## 2024-09-03 RX ADMIN — HEPARIN SODIUM 4000 UNITS: 1000 INJECTION INTRAVENOUS; SUBCUTANEOUS at 23:21

## 2024-09-03 RX ADMIN — NORTRIPTYLINE HYDROCHLORIDE 25 MG: 25 CAPSULE ORAL at 21:57

## 2024-09-03 RX ADMIN — ASPIRIN 81 MG: 81 TABLET, COATED ORAL at 08:14

## 2024-09-03 RX ADMIN — DEXTROSE AND SODIUM CHLORIDE 75 ML/HR: 5; .45 INJECTION, SOLUTION INTRAVENOUS at 19:49

## 2024-09-03 RX ADMIN — DEXTROSE AND SODIUM CHLORIDE 75 ML/HR: 5; .45 INJECTION, SOLUTION INTRAVENOUS at 06:19

## 2024-09-03 RX ADMIN — NYSTATIN: 100000 POWDER TOPICAL at 17:47

## 2024-09-03 RX ADMIN — HEPARIN SODIUM 4000 UNITS: 1000 INJECTION INTRAVENOUS; SUBCUTANEOUS at 10:30

## 2024-09-03 RX ADMIN — ATORVASTATIN CALCIUM 40 MG: 40 TABLET, FILM COATED ORAL at 17:47

## 2024-09-03 RX ADMIN — HEPARIN SODIUM 8 UNITS/KG/HR: 10000 INJECTION, SOLUTION INTRAVENOUS at 06:39

## 2024-09-03 NOTE — UTILIZATION REVIEW
Initial Clinical Review    Admission: Date/Time/Statement:   Admission Orders (From admission, onward)       Ordered        09/01/24 1417  INPATIENT ADMISSION  Once                          Orders Placed This Encounter   Procedures    INPATIENT ADMISSION     Standing Status:   Standing     Number of Occurrences:   1     Order Specific Question:   Level of Care     Answer:   Med Surg [16]     Order Specific Question:   Estimated length of stay     Answer:   More than 2 Midnights     Order Specific Question:   Certification     Answer:   I certify that inpatient services are medically necessary for this patient for a duration of greater than two midnights. See H&P and MD Progress Notes for additional information about the patient's course of treatment.         Initial Presentation: 49 y.o. male transferred from [facility] ED to [facility] inpatient bed for SMA thrombus, left hydrocele.  H/O  MS, DAVID.  To the ED with left sided facial numbness, left sided abdominal pain.  CT shows SMA thrombosis. Mild leukocytosis 12, lactic acid neg.  Check Blood cultures.  Vascular surgery consult.  GCs 15.      Vascular surgery consult:  NPO, iv fluids started.  Started on IV heparin drip. Follow up blood cultures, ESR< CRP.  Check ECHo.  Closely monitor abdomianl exam. Repeat CTA at 48-72 hours.     Anticipated Length of Stay/Certification Statement: Patient will be admitted on an inpatient basis with an anticipated length of stay of greater than 2 midnights secondary to SMA thrombosis.      Date: 9/2   Day 2:   Continue with IV fluids, IV heparin drip.  Clear liquid diet.  ON ocrelizumab fro MX.   CT showed moderate left hydrocele, cystic structure superior to the right testis measuring 2.7 cm might be due to loculated hydrocele or epididymal cyst . OP URology consult.     ENT consult:  Thought he had plastic piece of earbud stuck in ear for about 2 weeks.On exam, no foreign body noted.     Date: 9/3  Day 3:  Has surpassed a 2nd midnight with active treatments and services.INitially transferred for SMA thrombus. Has been managed by vascular surgery on IV heparin drip. Currenlty denying abdominal pain,repeat CTA. Tolerating clear liquid diet.          Temperature Pulse Respirations Blood Pressure SpO2 Pain Score   09/01/24 1422 09/01/24 1422 09/01/24 1422 09/01/24 1422 09/01/24 1422 09/01/24 1430   (!) 97.4 °F (36.3 °C) 88 20 101/73 96 % 7     Weight (last 2 days)       Date/Time Weight    09/02/24 0947 100 (221)    09/01/24 1430 101 (221.56)            Vital Signs (last 3 days)       Date/Time Temp Pulse Resp BP MAP (mmHg) SpO2 O2 Device [place] Coma Scale Score Pain    09/03/24 0836 -- -- -- -- -- -- -- -- No Pain    09/03/24 08:25:54 -- 69 -- 111/75 87 94 % -- -- --    09/03/24 08:16:22 98.1 °F (36.7 °C) 76 18 104/73 83 98 % -- -- --    09/03/24 08:15:51 98.1 °F (36.7 °C) 73 18 104/73 83 98 % -- -- --    09/02/24 22:32:24 97.6 °F (36.4 °C) 73 16 106/72 83 97 % -- -- --    09/02/24 2000 -- -- -- -- -- -- None (Room air) 15 No Pain    09/02/24 15:08:25 97.6 °F (36.4 °C) 69 16 105/73 84 100 % -- -- --    09/02/24 0947 -- 80 -- 108/75 -- -- -- -- --    09/02/24 0738 -- -- -- -- -- -- -- 15 No Pain    09/02/24 07:14:59 97.6 °F (36.4 °C) 80 16 108/75 86 100 % -- -- --    09/02/24 0334 -- -- -- -- -- -- -- -- 5    09/01/24 2334 -- -- -- -- -- 95 % None (Room air) -- 6    09/01/24 2330 -- -- -- -- -- -- -- 15 --    09/01/24 22:51:04 98 °F (36.7 °C) 86 19 103/74 84 95 % -- -- --    09/01/24 2230 -- 90 -- -- -- 93 % -- -- --    09/01/24 2200 -- 93 -- -- -- 96 % -- -- --    09/01/24 2130 -- 88 -- -- -- 94 % -- -- --    09/01/24 2100 -- 91 -- -- -- 94 % -- -- --    09/01/24 2030 -- 89 -- -- -- 94 % -- -- --    09/01/24 2000 -- 85 -- -- -- 95 % -- -- --    09/01/24 1930 -- 92 -- -- -- 95 % -- -- --    09/01/24 1445 -- -- -- -- -- -- -- 15 7 09/01/24 1430 -- -- -- -- -- -- -- -- 7 09/01/24 14:22:21 97.4 °F (36.3 °C) 88 20  101/73 82 96 % -- -- --            Pertinent Labs/Diagnostic Test Results:   CT a/p:MPRESSION:     New mild ectasia of the proximal SMA. Associated filling defect causing near complete occlusion of the proximal SMA with distal reconstitution suspicious for thrombus. Vascular consultation recommended.   Moderate left hydrocele. Cystic structure superior to the right testis measuring 2.7 cm may be due to a loculated hydrocele or epididymal cyst.         Cardiology:  Echo follow up/limited w/ contrast if indicated   Final Result by Tomas Varela MD (09/02 1149)        Left Ventricle: Left ventricular cavity size is normal. Wall thickness    is normal. The left ventricular ejection fraction is 60%. Systolic    function is normal. Wall motion is normal. Diastolic function is normal.     Right Ventricle: Right ventricular cavity size is mildly dilated.    Systolic function is normal.     Mitral Valve: There is mild regurgitation.     Tricuspid Valve: There is trace regurgitation.     Aorta: The aortic root is mildly dilated. The ascending aorta is mildly    dilated. The aortic root is 3.90 cm. The ascending aorta is 4.3 cm.             Results from last 7 days   Lab Units 09/03/24  0842 09/02/24  0148 09/01/24  1213 09/01/24  0752   WBC Thousand/uL 10.52* 6.93 9.87 12.36*   HEMOGLOBIN g/dL 13.4 13.6 14.8 15.1   HEMATOCRIT % 42.3 40.9 44.4 45.2   PLATELETS Thousands/uL 247 260 268 300   TOTAL NEUT ABS Thousands/µL  --   --   --  9.26*         Results from last 7 days   Lab Units 09/03/24  0842 09/02/24  0148 09/01/24  0752   SODIUM mmol/L 140 137 137   POTASSIUM mmol/L 3.9 4.2 4.0   CHLORIDE mmol/L 107 105 104   CO2 mmol/L 26 22 21   ANION GAP mmol/L 7 10 12   BUN mg/dL 16 18 20   CREATININE mg/dL 0.88 0.74 0.80   EGFR ml/min/1.73sq m 99 106 103   CALCIUM mg/dL 9.5 9.3 9.5   MAGNESIUM mg/dL  --   --  2.1     Results from last 7 days   Lab Units 09/01/24  0752   AST U/L 10*   ALT U/L 9   ALK PHOS U/L 59   TOTAL  PROTEIN g/dL 7.5   ALBUMIN g/dL 4.1   TOTAL BILIRUBIN mg/dL 0.75         Results from last 7 days   Lab Units 09/03/24  0842 09/02/24  0148 09/01/24  0752   GLUCOSE RANDOM mg/dL 113 144* 93         Results from last 7 days   Lab Units 09/03/24  0905 09/03/24  0038 09/02/24  1717 09/01/24  1815 09/01/24  1213   PROTIME seconds  --   --   --   --  15.2*   INR   --   --   --   --  1.15   PTT seconds 50* 114* 56*   < > 31    < > = values in this interval not displayed.       Results from last 7 days   Lab Units 09/01/24  1213   LACTIC ACID mmol/L 0.9     Results from last 7 days   Lab Units 09/01/24  1533   CRP mg/L 82.6*   SED RATE mm/hour 54*         Results from last 7 days   Lab Units 09/01/24  1532   BLOOD CULTURE  No Growth at 24 hrs.  No Growth at 24 hrs.         Past Medical History:   Diagnosis Date    Arthritis     Diabetes mellitus (HCC)     prediabetes    Foreign body of left ear 09/02/2024    MS (multiple sclerosis) (HCC)     Scoliosis     Visual impairment      Present on Admission:   MS (multiple sclerosis) (HCC)   DAVID (obstructive sleep apnea)   (Resolved) Foreign body of left ear      Admitting Diagnosis: Arterial thrombosis (HCC) [I74.9]  Age/Sex: 49 y.o. male  Admission Orders:  Scheduled Medications:  aspirin, 81 mg, Oral, Daily  atorvastatin, 40 mg, Oral, Daily With Dinner  nortriptyline, 25 mg, Oral, HS  nystatin, , Topical, BID      Continuous IV Infusions:  dextrose 5 % and sodium chloride 0.45 %, 75 mL/hr, Intravenous, Continuous  heparin (porcine), 3-30 Units/kg/hr (Order-Specific), Intravenous, Titrated      PRN Meds:  heparin (porcine), 4,000 Units, Intravenous, Q6H PRN  heparin (porcine), 8,000 Units, Intravenous, Q6H PRN        IP CONSULT TO VASCULAR SURGERY  IP CONSULT TO ENT    Network Utilization Review Department  ATTENTION: Please call with any questions or concerns to [phone] and carefully listen to the prompts so that you are directed to the right person. All voicemails are  confidential.   For Discharge needs, contact Care Management DC Support Team at [phone] opt. 2  Send all requests for admission clinical reviews, approved or denied determinations and any other requests to dedicated fax number below belonging to the campus where the patient is receiving treatment. List of dedicated fax numbers for the Facilities:  FACILITY NAME UR FAX NUMBER   ADMISSION DENIALS (Administrative/Medical Necessity) [phone]   DISCHARGE SUPPORT TEAM (NETWORK) [phone]   PARENT CHILD HEALTH (Maternity/NICU/Pediatrics) [phone]   [facility] [phone]   [facility] [phone]   [facility] [phone]   [facility] [phone]   Critical access hospital [phone]   [facility] [phone]   [facility] [phone]   [facility] [phone]   [facility] [phone]   [facility] [phone]   [facility] [phone]   [facility] [phone]

## 2024-09-03 NOTE — PLAN OF CARE
Problem: Prexisting or High Potential for Compromised Skin Integrity  Goal: Skin integrity is maintained or improved  Description: INTERVENTIONS:  - Identify patients at risk for skin breakdown  - Assess and monitor skin integrity  - Assess and monitor nutrition and hydration status  - Monitor labs   - Assess for incontinence   - Turn and reposition patient  - Assist with mobility/ambulation  - Relieve pressure over bony prominences  - Avoid friction and shearing  - Provide appropriate hygiene as needed including keeping skin clean and dry  - Evaluate need for skin moisturizer/barrier cream  - Collaborate with interdisciplinary team   - Patient/family teaching  - Consider wound care consult   Outcome: Progressing     Problem: PAIN - ADULT  Goal: Verbalizes/displays adequate comfort level or baseline comfort level  Description: Interventions:  - Encourage patient to monitor pain and request assistance  - Assess pain using appropriate pain scale  - Administer analgesics based on type and severity of pain and evaluate response  - Implement non-pharmacological measures as appropriate and evaluate response  - Consider cultural and social influences on pain and pain management  - Notify physician/advanced practitioner if interventions unsuccessful or patient reports new pain  Outcome: Progressing     Problem: INFECTION - ADULT  Goal: Absence or prevention of progression during hospitalization  Description: INTERVENTIONS:  - Assess and monitor for signs and symptoms of infection  - Monitor lab/diagnostic results  - Monitor all insertion sites, i.e. indwelling lines, tubes, and drains  - Monitor endotracheal if appropriate and nasal secretions for changes in amount and color  - [place] appropriate cooling/warming therapies per order  - Administer medications as ordered  - Instruct and encourage patient and family to use good hand hygiene technique  - Identify and instruct in appropriate isolation precautions for  identified infection/condition  Outcome: Progressing  Goal: Absence of fever/infection during neutropenic period  Description: INTERVENTIONS:  - Monitor WBC    Outcome: Progressing     Problem: SAFETY ADULT  Goal: Patient will remain free of falls  Description: INTERVENTIONS:  - Educate patient/family on patient safety including physical limitations  - Instruct patient to call for assistance with activity   - Consult OT/PT to assist with strengthening/mobility   - Keep Call bell within reach  - Keep bed low and locked with side rails adjusted as appropriate  - Keep care items and personal belongings within reach  - Initiate and maintain comfort rounds  - Make Fall Risk Sign visible to staff  - Apply yellow socks and bracelet for high fall risk patients  - Consider moving patient to room near nurses station  Outcome: Progressing  Goal: Maintain or return to baseline ADL function  Description: INTERVENTIONS:  -  Assess patient's ability to carry out ADLs; assess patient's baseline for ADL function and identify physical deficits which impact ability to perform ADLs (bathing, care of mouth/teeth, toileting, grooming, dressing, etc.)  - Assess/evaluate cause of self-care deficits   - Assess range of motion  - Assess patient's mobility; develop plan if impaired  - Assess patient's need for assistive devices and provide as appropriate  - Encourage maximum independence but intervene and supervise when necessary  - Involve family in performance of ADLs  - Assess for home care needs following discharge   - Consider OT consult to assist with ADL evaluation and planning for discharge  - Provide patient education as appropriate  Outcome: Progressing  Goal: Maintains/Returns to pre admission functional level  Description: INTERVENTIONS:  - Perform AM-PAC 6 Click Basic Mobility/ Daily Activity assessment daily.  - Set and communicate daily mobility goal to care team and patient/family/caregiver.   - Collaborate with rehabilitation  services on mobility goals if consulted  - Out of bed for toileting  - Record patient progress and toleration of activity level   Outcome: Progressing     Problem: DISCHARGE PLANNING  Goal: Discharge to home or other facility with appropriate resources  Description: INTERVENTIONS:  - Identify barriers to discharge w/patient and caregiver  - Arrange for needed discharge resources and transportation as appropriate  - Identify discharge learning needs (meds, wound care, etc.)  - Arrange for interpretive services to assist at discharge as needed  - Refer to Case Management Department for coordinating discharge planning if the patient needs post-hospital services based on physician/advanced practitioner order or complex needs related to functional status, cognitive ability, or social support system  Outcome: Progressing     Problem: Knowledge Deficit  Goal: Patient/family/caregiver demonstrates understanding of disease process, treatment plan, medications, and discharge instructions  Description: Complete learning assessment and assess knowledge base.  Interventions:  - Provide teaching at level of understanding  - Provide teaching via preferred learning methods  Outcome: Progressing     Problem: Nutrition/Hydration-ADULT  Goal: Nutrient/Hydration intake appropriate for improving, restoring or maintaining nutritional needs  Description: Monitor and assess patient's nutrition/hydration status for malnutrition. Collaborate with interdisciplinary team and initiate plan and interventions as ordered.  Monitor patient's weight and dietary intake as ordered or per policy. Utilize nutrition screening tool and intervene as necessary. Determine patient's food preferences and provide high-protein, high-caloric foods as appropriate.     INTERVENTIONS:  - Monitor oral intake, urinary output, labs, and treatment plans  - Assess nutrition and hydration status and recommend course of action  - Evaluate amount of meals eaten  - Assist  patient with eating if necessary   - Allow adequate time for meals  - Recommend/ encourage appropriate diets, oral nutritional supplements, and vitamin/mineral supplements  - Order, calculate, and assess calorie counts as needed  - Recommend, monitor, and adjust tube feedings and TPN/PPN based on assessed needs  - Assess need for intravenous fluids  - Provide specific nutrition/hydration education as appropriate  - Include patient/family/caregiver in decisions related to nutrition  Outcome: Progressing

## 2024-09-03 NOTE — PROGRESS NOTES
PTT 50. 4,000 unit bolus of heparin given. Increased heparin drip by 2 units/kg/hr. Heparin drip now infusing at 10 units/kg/hr= 10 ml/hr. PTT in 6 hours.

## 2024-09-03 NOTE — PHYSICAL THERAPY NOTE
Physical Therapy Evaluation     Patient's Name: Carlos Landis    Admitting Diagnosis  Arterial thrombosis (HCC) [I74.9]    Problem List  Patient Active Problem List   Diagnosis    Dysarthria    CNS demyelinating disease (HCC)    Right hemiparesis (HCC)    Ambulatory dysfunction    MS (multiple sclerosis) (HCC)    Left leg weakness    Obesity (BMI 30-39.9)    Episodic tension-type headache, not intractable    Hypomagnesemia    Stroke-like symptoms    Pain of upper abdomen    Right-sided thoracic back pain    DAVID (obstructive sleep apnea)    Weakness    Non-sustained ventricular tachycardia (HCC)    SMA thrombus    Left hydrocele       Past Medical History  Past Medical History:   Diagnosis Date    Arthritis     Diabetes mellitus (HCC)     prediabetes    Foreign body of left ear 09/02/2024    MS (multiple sclerosis) (HCC)     Scoliosis     Visual impairment        Past Surgical History  Past Surgical History:   Procedure Laterality Date    HERNIA REPAIR      3 times    KNEE SURGERY Right           09/03/24 0837   PT Last Visit   PT Visit Date 09/03/24   Note Type   Note type Evaluation   Pain Assessment   Pain Assessment Tool 0-10   Pain Score No Pain   Restrictions/Precautions   Weight Bearing Precautions Per Order No   Other Precautions Chair Alarm;Bed Alarm;Multiple lines;Fall Risk   Home Living   Type of Home House   Home Layout Two level;Ramped entrance;Able to live on main level with bedroom/bathroom   Bathroom Shower/Tub Tub/shower unit   Bathroom Toilet Standard   Bathroom Equipment Grab bars in shower;Shower chair   Home Equipment Walker;Electric scooter;Stair glide;Other (Comment)  (Transport chair)   Additional Comments 2STH with FFSU with recliner. Pt does have Ramp into home, with stair glide to 2nd floor   Prior Function   Level of [place] Independent with functional mobility;Independent with ADLs;Needs assistance with IADLS   Lives With Family;Daughter  (Parents)   Receives Help From Family    IADLs Family/Friend/Other provides transportation;Family/Friend/Other provides meals;Family/Friend/Other provides medication management   Falls in the last 6 months 0   Vocational On disability   Cognition   Overall Cognitive Status WFL   Arousal/Participation Alert   Attention Attends with cues to redirect   Orientation Level Oriented X4   Following Commands Follows one step commands without difficulty   Comments Pt cooperative during session   Subjective   Subjective Agreeable to mobilize   RLE Assessment   RLE Assessment   (grossly 3+-4/5)   LLE Assessment   LLE Assessment   (grossly 3+-4/5)   Bed Mobility   Supine to Sit 4  Minimal assistance   Additional items Assist x 1;HOB elevated;Bedrails;Increased time required;Verbal cues   Sit to Supine Unable to assess   Additional Comments Pt in bed upon arrival. c/o lightheadedness upon sitting 111/75 BP. Symtpoms improve with rest.   Transfers   Sit to Stand 4  Minimal assistance  (CG A)   Additional items Assist x 1;Increased time required;Verbal cues   Stand to Sit 4  Minimal assistance  (CG A)   Additional items Increased time required;Verbal cues;Assist x 1   Additional Comments w/RW   Ambulation/Elevation   Gait pattern Narrow MG;Improper Weight shift   Gait Assistance 4  Minimal assist   Additional items Assist x 1;Verbal cues   Assistive Device Rolling walker   Distance 3 ft   Ambulation/Elevation Additional Comments Pt limited 2* fatigue and initial lightheadedness.   Balance   Static Sitting Fair +   Dynamic Sitting Fair   Static Standing Fair -   Dynamic Standing Fair -   Ambulatory Poor +   Endurance Deficit   Endurance Deficit Yes   Activity Tolerance   Activity Tolerance Patient limited by fatigue;Other (Comment)  (Lightheadedness)   Medical Staff Made Aware Co-eval with OT   Nurse Made Aware RN cleared pt for therapy   Assessment   Prognosis Good   Problem List Decreased endurance;Decreased mobility;Decreased strength   Assessment Pt is a 49 y.o.  male seen for PT evaluation s/p admit to [facility] on 9/1/2024. Pt was admitted with a primary dx of: SMA thrombus, Left hydrocele. PMH sx for MS, DAVID, Stroke like symptoms, Non sustained ventricular tachycardia.  PT now consulted for assessment of mobility and d/c needs. Pt with Up and OOB as tolerated  orders. Pts current clinical presentation is Unstable/ Unpredictable (high complexity) due to Ongoing medical management for primary dx, Increased reliance on more restrictive AD compared to baseline, Decreased activity tolerance compared to baseline. Prior to admission, pt was Ind for mobility and ADLs, with supportive family who assist with IADLs. Pt has 2STH but with FFSU, Ramp into home and stair glide to 2nd floor if necessary. Pt owns RW, electric scooter and transport w/c, Uses RW at baseline.  Upon evaluation, pt currently is requiring Min A for all mobility tasks as detailed in above flowsheet 2* fatigue and initial lightheadedness. Pt presents at PT eval functioning below baseline and currently w/ overall mobility deficits 2* to: decreased endurance, decreased activity tolerance compared to baseline, decreased functional mobility tolerance compared to baseline.  Pt will continue to benefit from skilled acute PT interventions to maximize functional mobility; for ongoing pt/ family training; and DME needs. At conclusion of PT session chair alarm engaged, all needs in reach, RN notified of session findings/recommendations, and pt returned back in recliner chair with phone and call bell within reach. Pt denies any further questions at this time. The patient's AM-PAC Basic Mobility Inpatient Short Form Raw Score is 14. A Raw score of less than or equal to 16 suggests the patient may benefit from discharge to post-acute rehabilitation services. Please also refer to the recommendation of the Physical Therapist for safe discharge planning. Recommend Level 3 upon hospital D/C pending further progress  with mobility , as anticipate pt to improve In mobility level through hospital course, limited today 2* fatigue and lightheadedness. Pt also has good family support and home set up.   Goals   Patient Goals to get better   STG Expiration Date 09/17/24   Short Term Goal #1 STG 1. Pt will be able to perform bed mobility tasks with Sup in order to improve overall functional mobility and assist in safe d/c. STG 2. Pt with sit EOB for at least 25 minutes at Sup level in order to strengthen abdominal musculature and assist in future transfers/ ambulation. STG 3. Pt will be able to perform functional transfer with Sup in order to improve overall functional mobility and assist in safe d/c. STG 4. Pt will be able to ambulate at least 100 feet with least restrictive device with Sup A in order to improve overall functional mobility and assist in safe d/c. STG 5. Pt will improve sitting/standing static/dynamic balance 1/2 grade in order to improve functional mobility and assist in safe d/c. STG 6. Pt will improve LE strength by 1/2 grade in order to improve functional mobility and assist in safe d/c.   PT Treatment Day 0   Plan   Treatment/Interventions Functional transfer training;Therapeutic exercise;Bed mobility;Gait training;Spoke to nursing;Spoke to case management;OT   PT Frequency 3-5x/wk   Discharge Recommendation   Rehab Resource Intensity Level, PT III (Minimum Resource Intensity)  (pending progress, anticipate pt to improve in mobility through hospital course)   Equipment Recommended Walker  (Pt owns)   AM-PAC Basic Mobility Inpatient   Turning in Flat Bed Without Bedrails 3   Lying on Back to Sitting on Edge of Flat Bed Without Bedrails 2   Moving Bed to Chair 3   Standing Up From Chair Using Arms 3   Walk in Room 2   Climb 3-5 Stairs With Railing 1   Basic Mobility Inpatient Raw Score 14   Basic Mobility Standardized Score 35.55   [facility] Highest Level Of Mobility   [facility] Goal 4: Move to chair/commode   [facility]  Achieved 4: Move to chair/commode   Modified Alysa Scale   Modified Alysa Scale 4   End of Consult   Patient Position at End of Consult All needs within reach;Bed/Chair alarm activated;Bedside chair         Marcy Ceballos, PT DPT

## 2024-09-03 NOTE — OCCUPATIONAL THERAPY NOTE
Occupational Therapy Evaluation     Patient Name: Carlos Landis  Today's Date: 9/3/2024  Problem List  Principal Problem:    SMA thrombus  Active Problems:    MS (multiple sclerosis) (HCC)    DAVID (obstructive sleep apnea)    Left hydrocele    Past Medical History  Past Medical History:   Diagnosis Date    Arthritis     Diabetes mellitus (HCC)     prediabetes    Foreign body of left ear 09/02/2024    MS (multiple sclerosis) (HCC)     Scoliosis     Visual impairment      Past Surgical History  Past Surgical History:   Procedure Laterality Date    HERNIA REPAIR      3 times    KNEE SURGERY Right              09/03/24 0836   OT Last Visit   OT Visit Date 09/03/24   Note Type   Note type Evaluation   Pain Assessment   Pain Assessment Tool 0-10   Pain Score No Pain   Restrictions/Precautions   Other Precautions Cognitive;Chair Alarm;Bed Alarm;Multiple lines;Fall Risk   Home Living   Type of Home House   Home Layout Two level;Ramped entrance;Performs ADLs on one level  (FFSU with recliner)   Bathroom Shower/Tub Tub/shower unit   Bathroom Toilet Standard   Bathroom Equipment Grab bars in shower;Shower chair   Bathroom Accessibility Accessible   Home Equipment Walker;Electric scooter;Other (Comment);Stair glide  (transport chair)   Additional Comments Pt lives in a 2  with ramped entrance, has a FFSU although uses stair glide to 2nd fl as needed, uses a tub shower with shower chair and GB.   Prior Function   Level of [place] Independent with ADLs;Independent with functional mobility;Needs assistance with IADLS   Lives With Family;Daughter  (parents)   Receives Help From Family   IADLs Family/Friend/Other provides transportation;Family/Friend/Other provides meals;Family/Friend/Other provides medication management   Falls in the last 6 months 0   Vocational On disability   Lifestyle   Autonomy Pta pt I with most ADL and functional mobility, has assist for IADLs, (-)    Reciprocal Relationships supportive dtr  "and parents   Service to Others used to work as a    Intrinsic Gratification enjoys time with family   General   Additional Pertinent History Pt is PMH including MS   Subjective   Subjective \"I haven't been up in awhile so it makes sense that I am dizzy\"   ADL   Where Assessed Edge of bed   Eating Assistance 5  Supervision/Setup   Grooming Assistance 5  Supervision/Setup   UB Bathing Assistance 4  Minimal Assistance   LB Bathing Assistance 4  Minimal Assistance   UB Dressing Assistance 5  Supervision/Setup   LB Dressing Assistance 3  Moderate Assistance   Toileting Assistance  4  Minimal Assistance   Functional Assistance 4  Minimal Assistance   Bed Mobility   Supine to Sit 4  Minimal assistance   Additional items Assist x 1;Increased time required;Verbal cues   Additional Comments Pt greeted in bed, left in chair with alarm on and all needs within reach.   Transfers   Sit to Stand 4  Minimal assistance   Additional items Assist x 1;Increased time required;Verbal cues   Stand to Sit 4  Minimal assistance   Additional items Assist x 1;Increased time required;Verbal cues   Additional Comments with RW   Functional Mobility   Functional Mobility 4  Minimal assistance   Additional Comments Pt performs a couple steps bed>chair with MIN A x 1 with RW   Additional items Rolling walker   Balance   Static Sitting Fair +   Dynamic Sitting Fair   Static Standing Fair -   Dynamic Standing Poor +   Ambulatory Poor +   Activity Tolerance   Activity Tolerance Patient limited by fatigue;Treatment limited secondary to medical complications (Comment)  (lightheaded following bed mobility although BP stable)   Medical Staff Made Aware Co-eval with DPT due to high medical complexity   Nurse Made Aware RN cleared for therapy   RUE Assessment   RUE Assessment WFL   LUE Assessment   LUE Assessment WFL   Hand Function   Gross Motor Coordination Functional   Fine Motor Coordination Functional   Sensation   Light Touch No apparent deficits "   Psychosocial   Psychosocial (WDL) WDL   Cognition   Overall Cognitive Status WFL  (with some higher level deficits detected)   Arousal/Participation Cooperative;Alert   Attention Attends with cues to redirect   Orientation Level Oriented X4   Memory Decreased recall of recent events  (mild difficulty with word finding)   Following Commands Follows one step commands without difficulty   Comments Pt cooperative to therapy although with some higher level deficits detected, requiring vc for safety throughout session, chair alarm on at end of session.   Assessment   Limitation Decreased ADL status;Decreased Safe judgement during ADL;Decreased endurance;Decreased cognition;Decreased self-care trans;Decreased high-level ADLs   Prognosis Good   Assessment Pt is a 49 y.o. male who was admitted to [facility] on 9/1/2024 with stroke-like symptoms and concern for MS flare, found to have SMA Thrombus. Pt seen for an OT evaluation per active OT orders.  Pt  has a past medical history of Arthritis, Diabetes mellitus ([facility]), Foreign body of left ear, MS (multiple sclerosis) ([facility]), Scoliosis, and Visual impairment. Pt lives in a [facility] with ramped entrance, has a FF, uses a tub shower with shower chair and GB. Pta, pt was independent w/ ADL functional mobility, had assist for IADL, was (-) driving and was using a RW, electric scooter/transport chair. Currently, pt is Supervision for UB ADL, Min-Mod Ax1 for LB ADL, and completed transfers/short FM w Min Ax1 with RW. Pt currently presents with impairments in the following categories -difficulty performing ADLS and limited insight into deficits activity tolerance, endurance, standing balance/tolerance, memory, insight, safety , judgement , attention , and sequencing . These impairments, as well as pt's fatigue and risk for falls  limit pt's ability to safely engage in all baseline areas of occupation, includinggrooming, bathing, dressing, toileting, functional  mobility/transfers, and community mobility.    The patient's raw score on the -PAC Daily Activity Inpatient Short Form is 19. A raw score of greater than or equal to 19 suggests the patient may benefit from discharge to home. Please refer to the recommendation of the Occupational Therapist for safe discharge planning. Pt would benefit from continued acute OT services throughout hospital course and following D/C. Plan for OT interventions 2-3x per week. From OT standpoint, recommend home with increased social support, level III services upon D/C. Pt was left seated in bedside chair with chair alarm on and all needs within reach.   Goals   Patient Goals to get better   Plan   Treatment Interventions ADL retraining;Functional transfer training;Endurance training;Patient/family training;Continued evaluation;Energy conservation   Goal Expiration Date 09/17/24   OT Frequency 2-3x/wk   Discharge Recommendation   Rehab Resource Intensity Level, OT III (Minimum Resource Intensity)   -PAC Daily Activity Inpatient   Lower Body Dressing 2   Bathing 3   Toileting 3   Upper Body Dressing 3   Grooming 4   Eating 4   Daily Activity Raw Score 19   Daily Activity Standardized Score (Calc for Raw Score >=11) 40.22   -PAC Applied Cognition Inpatient   Following a Speech/Presentation 3   Understanding Ordinary Conversation 4   Taking Medications 3   Remembering Where Things Are Placed or Put Away 3   Remembering List of 4-5 Errands 3   Taking Care of Complicated Tasks 3   Applied Cognition Raw Score 19   Applied Cognition Standardized Score 39.77   Modified [place] Scale   Modified [place] Scale 4   End of Consult   Education Provided Yes   Patient Position at End of Consult Bedside chair;Bed/Chair alarm activated;All needs within reach   Nurse Communication Nurse aware of consult       OT Goals    - Pt will be Supervision with LB ADL by end of hospital course.    - Pt will be Mod I with UB ADL by end of hospital course.    - Pt  will be Mod I with all functional transfers required for patient safety by end of hospital course.    - Pt will be Mod I with functional mobility to/from bathroom for increased independence with toileting tasks.    - Pt will independently recall and implement safety precautions during OT sessions.     - Pt activity tolerance will increase to 30 minutes in order to safely engage in ADL and transfers.     - Pt standing tolerance will increase to 5 minutes  in order to promote sink side ADLs and IADL activities.    - Pt will consistently follow one-step directions with min to no vc or prompting.     - Pt will attend to functional tasks for 10 minutes with min to no vc for attention/redirection.    - Pt will attend to and complete ongoing cognitive assessment in order to inform safe discharge planning.            THELMA Layne, OTR/L

## 2024-09-03 NOTE — PROGRESS NOTES
[facility]  Progress Note  Name: Carlos Landis I  MRN: [id number]  Unit/Bed#: [facility] 823-01 I Date of Admission: 9/1/2024   Date of Service: 9/3/2024 I Hospital Day: 2    Assessment & Plan   * SMA thrombus  Assessment & Plan  49 y.o. year old male with a PMHx of multiple sclerosis (initial dx 3 years ago, on immunosuppressant injections every 6 months and steroid dosing monthly), DAVID who presented for stroke like symptoms and concern for MS flare. In the ED, patient reported intermittent abdominal pain over the last 2 days, prompting abdominal CT scan which revealed proximal SMA dissection with thrombus. Patient initially arrived in ED with concerns for stroke due to having left sided facial numbness. While in the ED, he had an episode of abdominal pain that was short in nature. He had this pain over the last 2 days prior.   Patient denies pain with eating but does have decreased appetite 2/2 MS. He denies fevers, chills, nausea, vomiting, history of hypertension or recent known episodes of such, denies diarrhea or blood in stool.     Imaging  9/1 CTA:  Mild atherosclerosis without aneurysm. Mild ectasia of the proximal SMA, measuring up to 1.2 cm new from prior. There is also a new filling defect in the proximal SMA with near complete occlusion and distal    Labs  BC: NG at 24 hours  9/3 WBC 6.93  9/1 CRP 82  9/1 SR 54    Plan  -Pt is currently denying any abdominal pain symptoms at this time. CTA from 9/1 demonstrates mild ectasia of the proximal SMA, measuring up to 1.2 cm new from prior. There is also a new filling defect in the proximal SMA with near complete occlusion and distal reconstitution. It is unclear if this is mycotic aneurysmal development, dissection with degeneration or some other etiology including vasculitis. Plan to repeat CTA in 42 to 78 hours.  -ECHO completed: EF 60% with right ventricular cavity mildly dilated, mild mitral regurg. Ascending aorta is  "4.3cm  -Maintain on clear liquid diet.   -Continue with Heparin gtt  -ASA and statin  -Will discuss with Dr.Maggie Herron           Subjective: Pt resting comfortably in bed. No acute events overnight.     Vitals:  /72   Pulse 73   Temp 97.6 °F (36.4 °C)   Resp 16   Ht 5' 11\" (1.803 m)   Wt 100 kg (221 lb)   SpO2 97%   BMI 30.82 kg/m²     I/Os:  I/O last 3 completed shifts:  In: 1389.9 [P.O.:100; I.V.:1289.9]  Out: 3000 [Urine:3000]  I/O this shift:  In: -   Out: 275 [Urine:275]      Lab Results and Cultures:   CBC with diff:   Lab Results   Component Value Date    WBC 6.93 09/02/2024    HGB 13.6 09/02/2024    HCT 40.9 09/02/2024    MCV 87 09/02/2024     09/02/2024    RBC 4.71 09/02/2024    MCH 28.9 09/02/2024    MCHC 33.3 09/02/2024    RDW 12.6 09/02/2024    MPV 9.4 09/02/2024    NRBC 0 09/01/2024   ,   BMP/CMP:  Lab Results   Component Value Date    SODIUM 137 09/02/2024    K 4.2 09/02/2024     09/02/2024    CO2 22 09/02/2024    BUN 18 09/02/2024    CREATININE 0.74 09/02/2024    CALCIUM 9.3 09/02/2024    AST 10 (L) 09/01/2024    ALT 9 09/01/2024    ALKPHOS 59 09/01/2024    EGFR 106 09/02/2024   ,   Lipid Panel: No results found for: \"CHOL\",   Coags:   Lab Results   Component Value Date     (H) 09/03/2024    INR 1.15 09/01/2024   ,     Blood Culture:   Lab Results   Component Value Date    BLOODCX No Growth at 24 hrs. 09/01/2024    BLOODCX No Growth at 24 hrs. 09/01/2024   ,   Urinalysis:   Lab Results   Component Value Date    COLORU Dark Yellow 08/07/2024    CLARITYU Clear 08/07/2024    SPECGRAV >=1.030 08/07/2024    PHUR 6.0 08/07/2024    LEUKOCYTESUR Negative 08/07/2024    NITRITE Negative 08/07/2024    GLUCOSEU Negative 08/07/2024    KETONESU 10 (1+) (A) 08/07/2024    BILIRUBINUR Negative 08/07/2024    BLOODU Negative 08/07/2024   ,   Urine Culture: No results found for: \"URINECX\",   Wound Culure: No results found for: \"WOUNDCULT\"    Medications:  Current Facility-Administered " Medications   Medication Dose Route Frequency    aspirin (ECOTRIN LOW STRENGTH) EC tablet 81 mg  81 mg Oral Daily    atorvastatin (LIPITOR) tablet 40 mg  40 mg Oral Daily With Dinner    dextrose 5 % and sodium chloride 0.45 % infusion  75 mL/hr Intravenous Continuous    heparin (porcine) 25,000 units in 0.45% NaCl 250 mL infusion (premix)  3-30 Units/kg/hr (Order-Specific) Intravenous Titrated    heparin (porcine) injection 4,000 Units  4,000 Units Intravenous Q6H PRN    heparin (porcine) injection 8,000 Units  8,000 Units Intravenous Q6H PRN    nortriptyline (PAMELOR) capsule 25 mg  25 mg Oral HS    nystatin (MYCOSTATIN) powder   Topical BID       Imagin24 CTA w/ contrast: New mild ectasia of the proximal SMA. Associated filling defect causing near complete occlusion of the proximal SMA with distal reconstitution suspicious for thrombus. Vascular consultation recommended.    24 ECHO Left ventricular EF 60%, Right ventricular cavity mildly dilated. Mild mitral regurgitation. Aortic root is 3.90 cm. Ascending aorta is 4.3cm.     Physical Exam:    General: Alert and Oriented X3  CV: Clear S2 and S2  Respiratory: NAD  Abdominal: SNT abdomen, no pain with palpation. No mass or distention.   Extremities: Warm, dry with skin intact.   Neurologic: Intact    Wound/Incision:  none      SHYANN Benitez  9/3/2024  [facility]  [phone]

## 2024-09-03 NOTE — PLAN OF CARE
Problem: Prexisting or High Potential for Compromised Skin Integrity  Goal: Skin integrity is maintained or improved  Description: INTERVENTIONS:  - Identify patients at risk for skin breakdown  - Assess and monitor skin integrity  - Assess and monitor nutrition and hydration status  - Monitor labs   - Assess for incontinence   - Turn and reposition patient  - Assist with mobility/ambulation  - Relieve pressure over bony prominences  - Avoid friction and shearing  - Provide appropriate hygiene as needed including keeping skin clean and dry  - Evaluate need for skin moisturizer/barrier cream  - Collaborate with interdisciplinary team   - Patient/family teaching  - Consider wound care consult   Outcome: Progressing     Problem: PAIN - ADULT  Goal: Verbalizes/displays adequate comfort level or baseline comfort level  Description: Interventions:  - Encourage patient to monitor pain and request assistance  - Assess pain using appropriate pain scale  - Administer analgesics based on type and severity of pain and evaluate response  - Implement non-pharmacological measures as appropriate and evaluate response  - Consider cultural and social influences on pain and pain management  - Notify physician/advanced practitioner if interventions unsuccessful or patient reports new pain  Outcome: Progressing     Problem: INFECTION - ADULT  Goal: Absence or prevention of progression during hospitalization  Description: INTERVENTIONS:  - Assess and monitor for signs and symptoms of infection  - Monitor lab/diagnostic results  - Monitor all insertion sites, i.e. indwelling lines, tubes, and drains  - Monitor endotracheal if appropriate and nasal secretions for changes in amount and color  - Perry appropriate cooling/warming therapies per order  - Administer medications as ordered  - Instruct and encourage patient and family to use good hand hygiene technique  - Identify and instruct in appropriate isolation precautions for  identified infection/condition  Outcome: Progressing  Goal: Absence of fever/infection during neutropenic period  Description: INTERVENTIONS:  - Monitor WBC    Outcome: Progressing     Problem: DISCHARGE PLANNING  Goal: Discharge to home or other facility with appropriate resources  Description: INTERVENTIONS:  - Identify barriers to discharge w/patient and caregiver  - Arrange for needed discharge resources and transportation as appropriate  - Identify discharge learning needs (meds, wound care, etc.)  - Arrange for interpretive services to assist at discharge as needed  - Refer to Case Management Department for coordinating discharge planning if the patient needs post-hospital services based on physician/advanced practitioner order or complex needs related to functional status, cognitive ability, or social support system  Outcome: Progressing     Problem: Knowledge Deficit  Goal: Patient/family/caregiver demonstrates understanding of disease process, treatment plan, medications, and discharge instructions  Description: Complete learning assessment and assess knowledge base.  Interventions:  - Provide teaching at level of understanding  - Provide teaching via preferred learning methods  Outcome: Progressing

## 2024-09-03 NOTE — PROGRESS NOTES
[facility]  Progress Note  Name: Carlos Landis I  MRN: [id number]  Unit/Bed#: [facility] 823-01 I Date of Admission: 9/1/2024   Date of Service: 9/3/2024 I Hospital Day: 2    Assessment & Plan   * SMA thrombus  Assessment & Plan  Patient presented to the hospital with generalized weakness and left facial numbness.  Had multiple admissions for same issue suspect MS flare versus psychogenic stress as etiology of flare.  Patient reports intermittent left-sided pressure-like abdominal pain for the past 4 days.  CT abdomen pelvis with contrast showed new mild ectasia of the proximal SMA with associated filling defect causing near complete occlusion of the proximal SMA with distal reconstitution suspicious for thrombus.  ED reached out to vascular surgery.  Comparing to the CT in December these are new findings.  Differential could be superior mesenteric artery dissection with thrombus formation, possible thromboembolic event, mycotic degeneration of the superior mesenteric artery  Mild leukocytosis of 12, repeat labs showed no leukocytosis.  Lactic acid negative  Start aspirin 81 mg daily, continue heparin drip which was started in the ED  Echo unremarkable   Vascular surgery consult, input appreciated  Clear liquid diet, okay to have coffee  Continue IV fluids  Repeat imaging in 24 to 48 hours -- d/w vascular and they will order repeat imaging   Continue heparin drip  Start aspirin and Lipitor  Follow-up blood cultures, which are negative at 24 hrs   ESR and CRP both elevated  Close monitoring of abdominal exam -- pt denies any abdominal pain at this time, denies n/v     Left hydrocele  Assessment & Plan  CT showed moderate left hydrocele, cystic structure superior to the right testis measuring 2.7 cm might be due to loculated hydrocele or epididymal cyst  Outpatient follow-up with urology    DAVID (obstructive sleep apnea)  Assessment & Plan  History of DAVID, noncompliant with  CPAP  Outpatient sleep medicine follow-up    MS (multiple sclerosis) ([facility])  Assessment & Plan  Multiple admissions for generalized weakness, numbness intermittent dysarthria, expressive aphasia.  Last admission beginning of August, had a stroke workup which was negative.  Suspect MS flare versus psychogenic stress as etiology of flare  On Ocrelizumab and he is getting monthly Solu-Medrol infusion 1000 mg (beginning of every month)  ED discussed with patient's neurologist, since he is getting his Solu-Medrol infusion at the beginning of the month, he received 1000 mg of Solu-Medrol IV while in the ED.  Per neurology no further steroids are indicated  Continue to monitor neurostatus and consider neurology consult if needed  At this time weakness much improved s/p IV steroids in ED.  Patient reports he is back to baseline    Foreign body of left ear-resolved as of 9/3/2024  Assessment & Plan  Patient reports he had a earbud that the rubber piece remained in his left ear and he can still feel it.  ENT evaluated, no foreign body noted            VTE Pharmacologic Prophylaxis: VTE Score: 5 High Risk (Score >/= 5) - Pharmacological DVT Prophylaxis Ordered: heparin drip. Sequential Compression Devices Ordered.    Mobility:   Basic Mobility Inpatient Raw Score: 10  JH-HLM Goal: 4: Move to chair/commode  JH-HLM Achieved: 3: Sit at edge of bed  JH-HLM Goal NOT achieved. Continue with multidisciplinary rounding and encourage appropriate mobility to improve upon JH-HLM goals.    Patient Centered Rounds: I performed bedside rounds with nursing staff today.   Discussions with Specialists or Other Care Team Provider: vascular.  Will d/w CM.    Education and Discussions with Family / Patient: Patient declined call to .     Total Time Spent on Date of Encounter in care of patient: 27 mins. This time was spent on one or more of the following: performing physical exam; counseling and coordination of care; obtaining or  reviewing history; documenting in the medical record; reviewing/ordering tests, medications or procedures; communicating with other healthcare professionals and discussing with patient's family/caregivers.    Current Length of Stay: 2 day(s)  Current Patient Status: Inpatient   Certification Statement: The patient will continue to require additional inpatient hospital stay due to SMA thrombus on heparin gtt, pending repeat imaging for monitoring  Discharge Plan: Anticipate discharge in 48 hrs to home.    Code Status: Level 1 - Full Code    Subjective:   No acute complaints.  He denies abdominal pain, n/v.  Tolerating clears.  No concerns regarding his MS, feels he is at his baseline following steroids.      Objective:     Vitals:   Temp (24hrs), Av.9 °F (36.6 °C), Min:97.6 °F (36.4 °C), Max:98.1 °F (36.7 °C)    Temp:  [97.6 °F (36.4 °C)-98.1 °F (36.7 °C)] 98.1 °F (36.7 °C)  HR:  [69-76] 69  Resp:  [16-18] 18  BP: (104-111)/(72-75) 111/75  SpO2:  [94 %-100 %] 94 %  Body mass index is 30.82 kg/m².     Input and Output Summary (last 24 hours):     Intake/Output Summary (Last 24 hours) at 9/3/2024 1052  Last data filed at 2024 2201  Gross per 24 hour   Intake 0 ml   Output 1725 ml   Net -1725 ml       Physical Exam:   Physical Exam  Vitals reviewed.   Constitutional:       General: He is not in acute distress.     Appearance: He is not toxic-appearing.   HENT:      Head: Normocephalic and atraumatic.   Eyes:      Extraocular Movements: Extraocular movements intact.   Pulmonary:      Effort: Pulmonary effort is normal. No respiratory distress.   Abdominal:      General: There is no distension.   Musculoskeletal:         General: Normal range of motion.   Neurological:      General: No focal deficit present.      Mental Status: He is alert and oriented to person, place, and time.   Psychiatric:         Mood and Affect: Mood normal.         Behavior: Behavior normal.         Thought Content: Thought content normal.           Additional Data:     Labs:  Results from last 7 days   Lab Units 09/03/24  0842 09/01/24  1213 09/01/24  0752   WBC Thousand/uL 10.52*   < > 12.36*   HEMOGLOBIN g/dL 13.4   < > 15.1   HEMATOCRIT % 42.3   < > 45.2   PLATELETS Thousands/uL 247   < > 300   SEGS PCT %  --   --  75   LYMPHO PCT %  --   --  12*   MONO PCT %  --   --  7   EOS PCT %  --   --  3    < > = values in this interval not displayed.     Results from last 7 days   Lab Units 09/03/24  0842 09/02/24  0148 09/01/24  0752   SODIUM mmol/L 140   < > 137   POTASSIUM mmol/L 3.9   < > 4.0   CHLORIDE mmol/L 107   < > 104   CO2 mmol/L 26   < > 21   BUN mg/dL 16   < > 20   CREATININE mg/dL 0.88   < > 0.80   ANION GAP mmol/L 7   < > 12   CALCIUM mg/dL 9.5   < > 9.5   ALBUMIN g/dL  --   --  4.1   TOTAL BILIRUBIN mg/dL  --   --  0.75   ALK PHOS U/L  --   --  59   ALT U/L  --   --  9   AST U/L  --   --  10*   GLUCOSE RANDOM mg/dL 113   < > 93    < > = values in this interval not displayed.     Results from last 7 days   Lab Units 09/01/24  1213   INR  1.15             Results from last 7 days   Lab Units 09/01/24  1213   LACTIC ACID mmol/L 0.9       Lines/Drains:  Invasive Devices       Peripheral Intravenous Line  Duration             Peripheral IV 09/01/24 Distal;Right;Upper;Ventral (anterior) Arm 2 days    Peripheral IV 09/01/24 Dorsal (posterior);Right Wrist 1 day                          Imaging: No pertinent imaging reviewed.    Recent Cultures (last 7 days):   Results from last 7 days   Lab Units 09/01/24  1532   BLOOD CULTURE  No Growth at 24 hrs.  No Growth at 24 hrs.       Last 24 Hours Medication List:   Current Facility-Administered Medications   Medication Dose Route Frequency Provider Last Rate    aspirin  81 mg Oral Daily Adela Becker MD      atorvastatin  40 mg Oral Daily With Dinner Inderjit Mota DO      dextrose 5 % and sodium chloride 0.45 %  75 mL/hr Intravenous Continuous Adela Becker MD 75 mL/hr (09/03/24 0619)    heparin  (porcine)  3-30 Units/kg/hr (Order-Specific) Intravenous Titrated Adela Becker MD 10 Units/kg/hr (09/03/24 1034)    heparin (porcine)  4,000 Units Intravenous Q6H PRN Adela Becker MD      heparin (porcine)  8,000 Units Intravenous Q6H PRN Adela Becker MD      nortriptyline  25 mg Oral HS Adela Becker MD      nystatin   Topical BID Adela Becker MD          Today, Patient Was Seen By: Keerthi Hannah PA-C    **Please Note: This note may have been constructed using a voice recognition system.**

## 2024-09-03 NOTE — ASSESSMENT & PLAN NOTE
Patient presented to the hospital with generalized weakness and left facial numbness.  Had multiple admissions for same issue suspect MS flare versus psychogenic stress as etiology of flare.  Patient reports intermittent left-sided pressure-like abdominal pain for the past 4 days.  CT abdomen pelvis with contrast showed new mild ectasia of the proximal SMA with associated filling defect causing near complete occlusion of the proximal SMA with distal reconstitution suspicious for thrombus.  ED reached out to vascular surgery.  Comparing to the CT in December these are new findings.  Differential could be superior mesenteric artery dissection with thrombus formation, possible thromboembolic event, mycotic degeneration of the superior mesenteric artery  Mild leukocytosis of 12, repeat labs showed no leukocytosis.  Lactic acid negative  Start aspirin 81 mg daily, continue heparin drip which was started in the ED  Echo unremarkable   Vascular surgery consult, input appreciated  Clear liquid diet, okay to have coffee  Continue IV fluids  Repeat imaging in 24 to 48 hours -- d/w vascular and they will order repeat imaging   Continue heparin drip  Start aspirin and Lipitor  Follow-up blood cultures, which are negative at 24 hrs   ESR and CRP both elevated  Close monitoring of abdominal exam -- pt denies any abdominal pain at this time, denies n/v

## 2024-09-03 NOTE — CONSULTS
Otorhinolaryngology - Head & Neck Surgery Consultation    Date of Service: 9/2/2024    Reason for consult: ear check    ASSESSMENT/PLAN:    -no FB in b/l ears  -rest of care per primary    HPI  Carlos DESTIN Landis is a 49 y.o. male who endorsed possible earbud rubber piece stuck in L ear 2wk/a, denied otalgia, hearing changes, vertigo, tinnitus, otorrhea, no bloody dc, no F/C.    LABORATORY  -    RADIOLOGY  -    PROCEDURES  -    CURRENT HOSPITAL MEDICATIONS  Current Facility-Administered Medications   Medication Dose Route Frequency Provider Last Rate Last Admin    aspirin (ECOTRIN LOW STRENGTH) EC tablet 81 mg  81 mg Oral Daily Adela Becker MD   81 mg at 09/02/24 0845    atorvastatin (LIPITOR) tablet 40 mg  40 mg Oral Daily With Dinner Inderjit Mota DO   40 mg at 09/02/24 1618    dextrose 5 % and sodium chloride 0.45 % infusion  75 mL/hr Intravenous Continuous Adela Becker MD 75 mL/hr at 09/02/24 1720 75 mL/hr at 09/02/24 1720    heparin (porcine) 25,000 units in 0.45% NaCl 250 mL infusion (premix)  3-30 Units/kg/hr (Order-Specific) Intravenous Titrated Adela Becker MD 11 mL/hr at 09/02/24 1807 11 Units/kg/hr at 09/02/24 1807    heparin (porcine) injection 4,000 Units  4,000 Units Intravenous Q6H PRN Adela Becker MD   4,000 Units at 09/02/24 1804    heparin (porcine) injection 8,000 Units  8,000 Units Intravenous Q6H PRN Adela Becker MD        nortriptyline (PAMELOR) capsule 25 mg  25 mg Oral HS Adela Becker MD   25 mg at 09/01/24 2145    nystatin (MYCOSTATIN) powder   Topical BID Adela Becker MD   Given at 09/02/24 0846       REVIEW OF SYSTEMS  As above    HISTORIES  PMH:  Past Medical History:   Diagnosis Date    Arthritis     Diabetes mellitus (HCC)     prediabetes    MS (multiple sclerosis) (HCC)     Scoliosis     Visual impairment        PSH:  Past Surgical History:   Procedure Laterality Date    HERNIA REPAIR      3 times    KNEE SURGERY Right        SH:  Social History  "    Tobacco Use    Smoking status: Former    Smokeless tobacco: Former   Vaping Use    Vaping status: Former   Substance Use Topics    Alcohol use: Not Currently    Drug use: Never       FH:  Family History   Problem Relation Age of Onset    Stroke Maternal Grandmother     Multiple sclerosis Other        ALLERGIES:  No Known Allergies    PHYSICAL EXAM  Visit Vitals  /73   Pulse 69   Temp 97.6 °F (36.4 °C)   Resp 16   Ht 5' 11\" (1.803 m)   Wt 100 kg (221 lb)   SpO2 100%   BMI 30.82 kg/m²   Smoking Status Former   BSA 2.2 m²       General: NAD, resting in bed  Ears: External appearance normal, ear canals clear, no signs of any FB b/l, TM easily visualized, no fluid, no retraction, no signs of irritation/infection/inflammation  Nose: External appearance normal  Oral cavity: External appearance normal  Skin: No obvious facial lesions  Lungs: Normal work of breathing, symmetrical chest expansion on RA  Vascular: Well perfused    Patient Active Problem List    Diagnosis Date Noted    Foreign body of left ear 09/02/2024    SMA thrombus 09/01/2024    Left hydrocele 09/01/2024    Non-sustained ventricular tachycardia (HCC) 08/08/2024    Weakness 08/07/2024    DAVID (obstructive sleep apnea) 03/14/2024    Stroke-like symptoms 12/06/2023    Pain of upper abdomen 12/06/2023    Right-sided thoracic back pain 12/06/2023    Hypomagnesemia 05/19/2023    Episodic tension-type headache, not intractable 01/23/2023    Left leg weakness 09/27/2022    Obesity (BMI 30-39.9) 09/27/2022    Ambulatory dysfunction 03/15/2022    MS (multiple sclerosis) (HCC) 03/15/2022    Dysarthria 02/07/2022    CNS demyelinating disease (HCC) 02/07/2022    Right hemiparesis (HCC) 02/07/2022       Zane Do MD  PGY-3  Otorhinolaryngology - Head & Neck Surgery    "

## 2024-09-03 NOTE — ASSESSMENT & PLAN NOTE
49 y.o. year old male with a PMHx of multiple sclerosis (initial dx 3 years ago, on immunosuppressant injections every 6 months and steroid dosing monthly), DAVID who presented for stroke like symptoms and concern for MS flare. In the ED, patient reported intermittent abdominal pain  over the last 2 days, prompting abdominal CT scan which revealed proximal SMA dissection with thrombus. Patient reports he initially came as he was concerned for having a stroke due to having left sided facial numbness. While in the ED, he had an episode of abdominal pain that was short in nature. He had this pain over the last 2 days prior. Patient gets immunosuppressant injections every 6 months, most recently in June and next is for January. He also gets steroid dosing monthly.   Patient denies pain with eating but does have decreased appetite 2/2 MS. He denies fevers, chills, nausea, vomiting, history of hypertension or recent known episodes of such, denies diarrhea or blood in stool. Most recent BM was yesterday which was normal.     Imaging  9/1 CTA:  Mild atherosclerosis without aneurysm. Mild ectasia of the proximal SMA, measuring up to 1.2 cm new from prior. There is also a new filling defect in the proximal SMA with near complete occlusion and distal    Labs  BC: NG at 24 hours  WBC 6.93    Plan  -Pt is currently denying any abdominal pain symptoms. CTA from 9/1 demonstrates Mild atherosclerosis without aneurysm. Mild ectasia of the proximal SMA, measuring up to 1.2 cm new from prior. There is also a new filling defect in the proximal SMA with near complete occlusion and distal reconstitution.  -IV fluids  -Continue with Heparin gtt  -Will discuss with Dr.Maggie Herron

## 2024-09-03 NOTE — ASSESSMENT & PLAN NOTE
Multiple admissions for generalized weakness, numbness intermittent dysarthria, expressive aphasia.  Last admission beginning of August, had a stroke workup which was negative.  Suspect MS flare versus psychogenic stress as etiology of flare  On Ocrelizumab and he is getting monthly Solu-Medrol infusion 1000 mg (beginning of every month)  ED discussed with patient's neurologist, since he is getting his Solu-Medrol infusion at the beginning of the month, he received 1000 mg of Solu-Medrol IV while in the ED.  Per neurology no further steroids are indicated  Continue to monitor neurostatus and consider neurology consult if needed  At this time weakness much improved s/p IV steroids in ED.  Patient reports he is back to baseline

## 2024-09-03 NOTE — ASSESSMENT & PLAN NOTE
49 y.o. year old male with a PMHx of multiple sclerosis (initial dx 3 years ago, on immunosuppressant injections every 6 months and steroid dosing monthly), DAVID who presented for stroke like symptoms and concern for MS flare. In the ED, patient reported intermittent abdominal pain over the last 2 days, prompting abdominal CT scan which revealed proximal SMA dissection with thrombus. Patient initially arrived in ED with concerns for stroke due to having left sided facial numbness. While in the ED, he had an episode of abdominal pain that was short in nature. He had this pain over the last 2 days prior.   Patient denies pain with eating but does have decreased appetite 2/2 MS. He denies fevers, chills, nausea, vomiting, history of hypertension or recent known episodes of such, denies diarrhea or blood in stool.     Imaging  9/1 CTA:  Mild atherosclerosis without aneurysm. Mild ectasia of the proximal SMA, measuring up to 1.2 cm new from prior. There is also a new filling defect in the proximal SMA with near complete occlusion and distal    Labs  BC: NG at 24 hours  9/3 WBC 6.93  9/1 CRP 82  9/1 SR 54    Plan  -Pt is currently denying any abdominal pain symptoms at this time. CTA from 9/1 demonstrates mild ectasia of the proximal SMA, measuring up to 1.2 cm new from prior. There is also a new filling defect in the proximal SMA with near complete occlusion and distal reconstitution. It is unclear if this is mycotic aneurysmal development, dissection with degeneration or some other etiology including vasculitis. Plan to repeat CTA in 42 to 78 hours.  -ECHO completed: EF 60% with right ventricular cavity mildly dilated, mild mitral regurg. Ascending aorta is 4.3cm  -Maintain on clear liquid diet.   -Continue with Heparin gtt  -ASA and statin  -Will discuss with Dr.Maggie Herron

## 2024-09-03 NOTE — PLAN OF CARE
Problem: PHYSICAL THERAPY ADULT  Goal: Performs mobility at highest level of function for planned discharge setting.  See evaluation for individualized goals.  Description: Treatment/Interventions: Functional transfer training, Therapeutic exercise, Bed mobility, Gait training, Spoke to nursing, Spoke to case management, OT  Equipment Recommended: Walker (Pt owns)       See flowsheet documentation for full assessment, interventions and recommendations.  Note: Prognosis: Good  Problem List: Decreased endurance, Decreased mobility, Decreased strength  Assessment: Pt is a 49 y.o. male seen for PT evaluation s/p admit to [facility] on 9/1/2024. Pt was admitted with a primary dx of: SMA thrombus, Left hydrocele. PMH sx for MS, DAVID, Stroke like symptoms, Non sustained ventricular tachycardia.  PT now consulted for assessment of mobility and d/c needs. Pt with Up and OOB as tolerated  orders. Pts current clinical presentation is Unstable/ Unpredictable (high complexity) due to Ongoing medical management for primary dx, Increased reliance on more restrictive AD compared to baseline, Decreased activity tolerance compared to baseline. Prior to admission, pt was Ind for mobility and ADLs, with supportive family who assist with IADLs. Pt has 2STH but with FFSU, Ramp into home and stair glide to 2nd floor if necessary. Pt owns RW, electric scooter and transport w/c, Uses RW at baseline.  Upon evaluation, pt currently is requiring Min A for all mobility tasks as detailed in above flowsheet 2* fatigue and initial lightheadedness. Pt presents at PT eval functioning below baseline and currently w/ overall mobility deficits 2* to: decreased endurance, decreased activity tolerance compared to baseline, decreased functional mobility tolerance compared to baseline.  Pt will continue to benefit from skilled acute PT interventions to maximize functional mobility; for ongoing pt/ family training; and DME needs. At conclusion of  PT session chair alarm engaged, all needs in reach, RN notified of session findings/recommendations, and pt returned back in recliner chair with phone and call bell within reach. Pt denies any further questions at this time. The patient's AM-PAC Basic Mobility Inpatient Short Form Raw Score is 14. A Raw score of less than or equal to 16 suggests the patient may benefit from discharge to post-acute rehabilitation services. Please also refer to the recommendation of the Physical Therapist for safe discharge planning. Recommend Level 3 upon hospital D/C pending further progress with mobility , as anticipate pt to improve In mobility level through hospital course, limited today 2* fatigue and lightheadedness. Pt also has good family support and home set up.        Rehab Resource Intensity Level, PT: III (Minimum Resource Intensity) (pending progress, anticipate pt to improve in mobility through hospital course)    See flowsheet documentation for full assessment.

## 2024-09-03 NOTE — TELEPHONE ENCOUNTER
ALEXANDER (10:25am) to Reschedule 9/13/2024 appointment.  Raul Patel will not be in the office on this date

## 2024-09-03 NOTE — PLAN OF CARE
Problem: OCCUPATIONAL THERAPY ADULT  Goal: Performs self-care activities at highest level of function for planned discharge setting.  See evaluation for individualized goals.  Description: Treatment Interventions: ADL retraining, Functional transfer training, Endurance training, Patient/family training, Continued evaluation, Energy conservation          See flowsheet documentation for full assessment, interventions and recommendations.   Outcome: Progressing  Note: Limitation: Decreased ADL status, Decreased Safe judgement during ADL, Decreased endurance, Decreased cognition, Decreased self-care trans, Decreased high-level ADLs  Prognosis: Good  Assessment: Pt is a 49 y.o. male who was admitted to [facility] on 9/1/2024 with stroke-like symptoms and concern for MS flare, found to have SMA Thrombus. Pt seen for an OT evaluation per active OT orders.  Pt  has a past medical history of Arthritis, Diabetes mellitus ([facility]), Foreign body of left ear, MS (multiple sclerosis) ([facility]), Scoliosis, and Visual impairment. Pt lives in a [facility] with ramped entrance, has a [facility], uses a tub shower with shower chair and GB. Pta, pt was independent w/ ADL functional mobility, had assist for IADL, was (-) driving and was using a RW, electric scooter/transport chair. Currently, pt is Supervision for UB ADL, Min-Mod Ax1 for LB ADL, and completed transfers/short FM w Min Ax1 with RW. Pt currently presents with impairments in the following categories -difficulty performing ADLS and limited insight into deficits activity tolerance, endurance, standing balance/tolerance, memory, insight, safety , judgement , attention , and sequencing . These impairments, as well as pt's fatigue and risk for falls  limit pt's ability to safely engage in all baseline areas of occupation, includinggrooming, bathing, dressing, toileting, functional mobility/transfers, and community mobility.    The patient's raw score on the AM-PAC Daily Activity  Inpatient Short Form is 19. A raw score of greater than or equal to 19 suggests the patient may benefit from discharge to home. Please refer to the recommendation of the Occupational Therapist for safe discharge planning. Pt would benefit from continued acute OT services throughout hospital course and following D/C. Plan for OT interventions 2-3x per week. From OT standpoint, recommend home with increased social support, level III services upon D/C. Pt was left seated in bedside chair with chair alarm on and all needs within reach.     Rehab Resource Intensity Level, OT: III (Minimum Resource Intensity)

## 2024-09-04 ENCOUNTER — APPOINTMENT (INPATIENT)
Dept: RADIOLOGY | Facility: HOSPITAL | Age: 49
DRG: 393 | End: 2024-09-04
Payer: COMMERCIAL

## 2024-09-04 LAB
ANION GAP SERPL CALCULATED.3IONS-SCNC: 8 MMOL/L (ref 4–13)
APTT PPP: 140 SECONDS (ref 23–34)
BUN SERPL-MCNC: 14 MG/DL (ref 5–25)
CALCIUM SERPL-MCNC: 8.5 MG/DL (ref 8.4–10.2)
CHLORIDE SERPL-SCNC: 110 MMOL/L (ref 96–108)
CO2 SERPL-SCNC: 23 MMOL/L (ref 21–32)
CREAT SERPL-MCNC: 0.76 MG/DL (ref 0.6–1.3)
ERYTHROCYTE [DISTWIDTH] IN BLOOD BY AUTOMATED COUNT: 13 % (ref 11.6–15.1)
GFR SERPL CREATININE-BSD FRML MDRD: 105 ML/MIN/1.73SQ M
GLUCOSE SERPL-MCNC: 84 MG/DL (ref 65–140)
HCT VFR BLD AUTO: 38.8 % (ref 36.5–49.3)
HGB BLD-MCNC: 12.8 G/DL (ref 12–17)
MCH RBC QN AUTO: 29.2 PG (ref 26.8–34.3)
MCHC RBC AUTO-ENTMCNC: 33 G/DL (ref 31.4–37.4)
MCV RBC AUTO: 89 FL (ref 82–98)
PLATELET # BLD AUTO: 233 THOUSANDS/UL (ref 149–390)
PMV BLD AUTO: 9.6 FL (ref 8.9–12.7)
POTASSIUM SERPL-SCNC: 3.8 MMOL/L (ref 3.5–5.3)
RBC # BLD AUTO: 4.38 MILLION/UL (ref 3.88–5.62)
SODIUM SERPL-SCNC: 141 MMOL/L (ref 135–147)
WBC # BLD AUTO: 9.13 THOUSAND/UL (ref 4.31–10.16)

## 2024-09-04 PROCEDURE — 99232 SBSQ HOSP IP/OBS MODERATE 35: CPT | Performed by: SURGERY

## 2024-09-04 PROCEDURE — 80048 BASIC METABOLIC PNL TOTAL CA: CPT | Performed by: PHYSICIAN ASSISTANT

## 2024-09-04 PROCEDURE — 85730 THROMBOPLASTIN TIME PARTIAL: CPT | Performed by: STUDENT IN AN ORGANIZED HEALTH CARE EDUCATION/TRAINING PROGRAM

## 2024-09-04 PROCEDURE — 99232 SBSQ HOSP IP/OBS MODERATE 35: CPT | Performed by: PHYSICIAN ASSISTANT

## 2024-09-04 PROCEDURE — 74175 CTA ABDOMEN W/CONTRAST: CPT

## 2024-09-04 PROCEDURE — 85027 COMPLETE CBC AUTOMATED: CPT | Performed by: PHYSICIAN ASSISTANT

## 2024-09-04 RX ORDER — RIVAROXABAN 15 MG-20MG
KIT ORAL
Qty: 1 EACH | Refills: 0 | Status: SHIPPED | OUTPATIENT
Start: 2024-09-04

## 2024-09-04 RX ADMIN — NYSTATIN: 100000 POWDER TOPICAL at 08:57

## 2024-09-04 RX ADMIN — RIVAROXABAN 15 MG: 15 TABLET, FILM COATED ORAL at 14:22

## 2024-09-04 RX ADMIN — RIVAROXABAN 15 MG: 15 TABLET, FILM COATED ORAL at 22:33

## 2024-09-04 RX ADMIN — DEXTROSE AND SODIUM CHLORIDE 75 ML/HR: 5; .45 INJECTION, SOLUTION INTRAVENOUS at 09:05

## 2024-09-04 RX ADMIN — IOHEXOL 75 ML: 350 INJECTION, SOLUTION INTRAVENOUS at 12:06

## 2024-09-04 RX ADMIN — HEPARIN SODIUM 9 UNITS/KG/HR: 10000 INJECTION, SOLUTION INTRAVENOUS at 08:58

## 2024-09-04 RX ADMIN — NORTRIPTYLINE HYDROCHLORIDE 25 MG: 25 CAPSULE ORAL at 22:32

## 2024-09-04 RX ADMIN — ATORVASTATIN CALCIUM 40 MG: 40 TABLET, FILM COATED ORAL at 16:32

## 2024-09-04 RX ADMIN — NYSTATIN: 100000 POWDER TOPICAL at 16:32

## 2024-09-04 RX ADMIN — ASPIRIN 81 MG: 81 TABLET, COATED ORAL at 08:57

## 2024-09-04 NOTE — PLAN OF CARE
Problem: Prexisting or High Potential for Compromised Skin Integrity  Goal: Skin integrity is maintained or improved  Description: INTERVENTIONS:  - Identify patients at risk for skin breakdown  - Assess and monitor skin integrity  - Assess and monitor nutrition and hydration status  - Monitor labs   - Assess for incontinence   - Turn and reposition patient  - Assist with mobility/ambulation  - Relieve pressure over bony prominences  - Avoid friction and shearing  - Provide appropriate hygiene as needed including keeping skin clean and dry  - Evaluate need for skin moisturizer/barrier cream  - Collaborate with interdisciplinary team   - Patient/family teaching  - Consider wound care consult   Outcome: Progressing     Problem: PAIN - ADULT  Goal: Verbalizes/displays adequate comfort level or baseline comfort level  Description: Interventions:  - Encourage patient to monitor pain and request assistance  - Assess pain using appropriate pain scale  - Administer analgesics based on type and severity of pain and evaluate response  - Implement non-pharmacological measures as appropriate and evaluate response  - Consider cultural and social influences on pain and pain management  - Notify physician/advanced practitioner if interventions unsuccessful or patient reports new pain  Outcome: Progressing     Problem: Knowledge Deficit  Goal: Patient/family/caregiver demonstrates understanding of disease process, treatment plan, medications, and discharge instructions  Description: Complete learning assessment and assess knowledge base.  Interventions:  - Provide teaching at level of understanding  - Provide teaching via preferred learning methods  Outcome: Progressing

## 2024-09-04 NOTE — ASSESSMENT & PLAN NOTE
49 y.o. year old male with a PMHx of multiple sclerosis (initial dx 3 years ago, on immunosuppressant injections every 6 months and steroid dosing monthly), DAVID who presented for stroke like symptoms and concern for MS flare. In the ED, patient reported intermittent abdominal pain over the last 2 days, prompting abdominal CT scan which revealed proximal SMA dissection with thrombus. Patient initially arrived in ED with concerns for stroke due to having left sided facial numbness. While in the ED, he had an episode of abdominal pain that was short in nature. He had this pain over the last 2 days prior.   Patient denies pain with eating but does have decreased appetite 2/2 MS. He denies fevers, chills, nausea, vomiting, history of hypertension or recent known episodes of such, denies diarrhea or blood in stool.     Imaging  9/1 CTA:  Mild atherosclerosis without aneurysm. Mild ectasia of the proximal SMA, measuring up to 1.2 cm new from prior. There is also a new filling defect in the proximal SMA with near complete occlusion and distal    Plan  -Continue with Heparin gtt  -ASA and statin  -adv to soft diet  -plan to repeat CTA 9/4  -analgesia and anti-emetics  -dvt ppx  -oob and ambulation TID  -encourage IS use  -PT/OT to evaluate

## 2024-09-04 NOTE — PROGRESS NOTES
[facility]  Progress Note  Name: Carlos Landis I  MRN: [id number]  Unit/Bed#: [facility]PHP 823-01 I Date of Admission: 9/1/2024   Date of Service: 9/4/2024 I Hospital Day: 3    Assessment & Plan   * SMA thrombus  Assessment & Plan  Patient presented to the hospital with generalized weakness and left facial numbness.  Had multiple admissions for same issue suspect MS flare versus psychogenic stress as etiology of flare.  Patient reports intermittent left-sided pressure-like abdominal pain for the past 4 days.  CT abdomen pelvis with contrast showed new mild ectasia of the proximal SMA with associated filling defect causing near complete occlusion of the proximal SMA with distal reconstitution suspicious for thrombus.  ED reached out to vascular surgery.  Comparing to the CT in December these are new findings.  Differential could be superior mesenteric artery dissection with thrombus formation, possible thromboembolic event, mycotic degeneration of the superior mesenteric artery  Mild leukocytosis of 12, repeat labs showed no leukocytosis.  Lactic acid negative  Start aspirin 81 mg daily, continue heparin drip which was started in the ED  Echo unremarkable   Vascular surgery consult, input appreciated  Advanced diet   Continue IV fluids  Repeat CTA on 9/4  Continue heparin drip  Xarelto sent for price check -- about $11/month per CM   Start aspirin and Lipitor  Follow-up blood cultures, which are negative at 48 hrs   ESR and CRP both elevated  Close monitoring of abdominal exam -- pt denies any abdominal pain at this time, denies n/v     Left hydrocele  Assessment & Plan  CT showed moderate left hydrocele, cystic structure superior to the right testis measuring 2.7 cm might be due to loculated hydrocele or epididymal cyst  Outpatient follow-up with urology    DAVID (obstructive sleep apnea)  Assessment & Plan  History of DAVID, noncompliant with CPAP  Outpatient sleep medicine follow-up    MS  (multiple sclerosis) ([facility])  Assessment & Plan  Multiple admissions for generalized weakness, numbness intermittent dysarthria, expressive aphasia.  Last admission beginning of August, had a stroke workup which was negative.  Suspect MS flare versus psychogenic stress as etiology of flare  On Ocrelizumab and he is getting monthly Solu-Medrol infusion 1000 mg (beginning of every month)  ED discussed with patient's neurologist, since he is getting his Solu-Medrol infusion at the beginning of the month, he received 1000 mg of Solu-Medrol IV while in the ED.  Per neurology no further steroids are indicated  Continue to monitor neurostatus and consider neurology consult if needed  At this time weakness much improved s/p IV steroids in ED.  Patient reports he is back to baseline           VTE Pharmacologic Prophylaxis: VTE Score: 5 High Risk (Score >/= 5) - Pharmacological DVT Prophylaxis Ordered: heparin drip. Sequential Compression Devices Ordered.    Mobility:   Basic Mobility Inpatient Raw Score: 13  JH-HLM Goal: 4: Move to chair/commode  JH-HLM Achieved: 2: Bed activities/Dependent transfer  JH-HLM Goal NOT achieved. Continue with multidisciplinary rounding and encourage appropriate mobility to improve upon JH-HLM goals.    Patient Centered Rounds: I performed bedside rounds with nursing staff today.   Discussions with Specialists or Other Care Team Provider: CM, vascular     Education and Discussions with Family / Patient: Patient declined call to .     Total Time Spent on Date of Encounter in care of patient: 20 mins. This time was spent on one or more of the following: performing physical exam; counseling and coordination of care; obtaining or reviewing history; documenting in the medical record; reviewing/ordering tests, medications or procedures; communicating with other healthcare professionals and discussing with patient's family/caregivers.    Current Length of Stay: 3 day(s)  Current Patient  Status: Inpatient   Certification Statement: The patient will continue to require additional inpatient hospital stay due to follow repeat CTA today with SMA thrombus  Discharge Plan: Anticipate discharge in 24-48 hrs to home.    Code Status: Level 1 - Full Code    Subjective:   Denies abdominal pain, n/v.  Tolerated diet     Objective:     Vitals:   Temp (24hrs), Av °F (36.7 °C), Min:97.9 °F (36.6 °C), Max:98 °F (36.7 °C)    Temp:  [97.9 °F (36.6 °C)-98 °F (36.7 °C)] 97.9 °F (36.6 °C)  HR:  [71-86] 72  Resp:  [18] 18  BP: (106-114)/(71-76) 106/73  SpO2:  [94 %-97 %] 94 %  Body mass index is 30.82 kg/m².     Input and Output Summary (last 24 hours):     Intake/Output Summary (Last 24 hours) at 2024 1125  Last data filed at 2024 0900  Gross per 24 hour   Intake 3780.67 ml   Output 3300 ml   Net 480.67 ml       Physical Exam:   Physical Exam  Vitals reviewed.   Constitutional:       General: He is not in acute distress.     Appearance: He is not toxic-appearing.   HENT:      Head: Normocephalic and atraumatic.   Eyes:      Extraocular Movements: Extraocular movements intact.   Cardiovascular:      Rate and Rhythm: Normal rate and regular rhythm.   Pulmonary:      Effort: Pulmonary effort is normal. No respiratory distress.      Breath sounds: Normal breath sounds.   Abdominal:      General: Bowel sounds are normal. There is no distension.      Palpations: Abdomen is soft.   Musculoskeletal:         General: Normal range of motion.   Neurological:      General: No focal deficit present.      Mental Status: He is alert and oriented to person, place, and time.   Psychiatric:         Mood and Affect: Mood normal.         Behavior: Behavior normal.         Thought Content: Thought content normal.          Additional Data:     Labs:  Results from last 7 days   Lab Units 24  0610 24  1213 24  0752   WBC Thousand/uL 9.13   < > 12.36*   HEMOGLOBIN g/dL 12.8   < > 15.1   HEMATOCRIT % 38.8   < > 45.2    PLATELETS Thousands/uL 233   < > 300   SEGS PCT %  --   --  75   LYMPHO PCT %  --   --  12*   MONO PCT %  --   --  7   EOS PCT %  --   --  3    < > = values in this interval not displayed.     Results from last 7 days   Lab Units 09/04/24  0610 09/02/24  0148 09/01/24  0752   SODIUM mmol/L 141   < > 137   POTASSIUM mmol/L 3.8   < > 4.0   CHLORIDE mmol/L 110*   < > 104   CO2 mmol/L 23   < > 21   BUN mg/dL 14   < > 20   CREATININE mg/dL 0.76   < > 0.80   ANION GAP mmol/L 8   < > 12   CALCIUM mg/dL 8.5   < > 9.5   ALBUMIN g/dL  --   --  4.1   TOTAL BILIRUBIN mg/dL  --   --  0.75   ALK PHOS U/L  --   --  59   ALT U/L  --   --  9   AST U/L  --   --  10*   GLUCOSE RANDOM mg/dL 84   < > 93    < > = values in this interval not displayed.     Results from last 7 days   Lab Units 09/01/24  1213   INR  1.15             Results from last 7 days   Lab Units 09/01/24  1213   LACTIC ACID mmol/L 0.9       Lines/Drains:  Invasive Devices       Peripheral Intravenous Line  Duration             Peripheral IV 09/01/24 Distal;Right;Upper;Ventral (anterior) Arm 3 days    Peripheral IV 09/01/24 Dorsal (posterior);Right Wrist 2 days                          Imaging: No pertinent imaging reviewed.    Recent Cultures (last 7 days):   Results from last 7 days   Lab Units 09/01/24  1532   BLOOD CULTURE  No Growth at 48 hrs.  No Growth at 48 hrs.       Last 24 Hours Medication List:   Current Facility-Administered Medications   Medication Dose Route Frequency Provider Last Rate    aspirin  81 mg Oral Daily Aedla Becker MD      atorvastatin  40 mg Oral Daily With Dinner Inderjit Mota DO      dextrose 5 % and sodium chloride 0.45 %  75 mL/hr Intravenous Continuous Adela Becker MD 75 mL/hr (09/04/24 0905)    heparin (porcine)  3-30 Units/kg/hr (Order-Specific) Intravenous Titrated Adela Becker MD 9 Units/kg/hr (09/04/24 0858)    heparin (porcine)  4,000 Units Intravenous Q6H PRN Adela Becker MD      heparin (porcine)   8,000 Units Intravenous Q6H PRN Adela Becker MD      nortriptyline  25 mg Oral HS Adela Becker MD      nystatin   Topical BID Adela Becker MD          Today, Patient Was Seen By: Keerthi Hannah PA-C    **Please Note: This note may have been constructed using a voice recognition system.**

## 2024-09-04 NOTE — ASSESSMENT & PLAN NOTE
Patient presented to the hospital with generalized weakness and left facial numbness.  Had multiple admissions for same issue suspect MS flare versus psychogenic stress as etiology of flare.  Patient reports intermittent left-sided pressure-like abdominal pain for the past 4 days.  CT abdomen pelvis with contrast showed new mild ectasia of the proximal SMA with associated filling defect causing near complete occlusion of the proximal SMA with distal reconstitution suspicious for thrombus.  ED reached out to vascular surgery.  Comparing to the CT in December these are new findings.  Differential could be superior mesenteric artery dissection with thrombus formation, possible thromboembolic event, mycotic degeneration of the superior mesenteric artery  Mild leukocytosis of 12, repeat labs showed no leukocytosis.  Lactic acid negative  Start aspirin 81 mg daily, continue heparin drip which was started in the ED  Echo unremarkable   Vascular surgery consult, input appreciated  Advanced diet   Continue IV fluids  Repeat CTA on 9/4  Continue heparin drip  Xarelto sent for price check -- about $11/month per CM   Start aspirin and Lipitor  Follow-up blood cultures, which are negative at 48 hrs   ESR and CRP both elevated  Close monitoring of abdominal exam -- pt denies any abdominal pain at this time, denies n/v

## 2024-09-04 NOTE — ASSESSMENT & PLAN NOTE
49 y.o. year old male with a PMHx of multiple sclerosis (initial dx 3 years ago, on immunosuppressant injections every 6 months and steroid dosing monthly), DAVID who presented for stroke like symptoms and concern for MS flare. In the ED, patient reported intermittent abdominal pain over the last 2 days, prompting abdominal CT scan which revealed proximal SMA dissection with thrombus. Patient initially arrived in ED with concerns for stroke due to having left sided facial numbness. While in the ED, he had an episode of abdominal pain that was short in nature. He had this pain over the last 2 days prior.   Patient denies pain with eating but does have decreased appetite 2/2 MS. He denies fevers, chills, nausea, vomiting, history of hypertension or recent known episodes of such, denies diarrhea or blood in stool.     Imaging  9/1 CTA:  Mild atherosclerosis without aneurysm. Mild ectasia of the proximal SMA, measuring up to 1.2 cm new from prior. 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    Plan  -Transition to Xarelto yesterday.  -Repeat CTA 9/4 is unchanged, pt is currently asymptomatic and denies abdominal pain symptoms, no current indication for vascular intervention.   -Continue ASA and statin  -Regular diet- pt is tolerating.   -analgesia and anti-emetics  -dvt ppx  -oob and ambulation TID  -encourage IS use  -PT/OT to evaluate

## 2024-09-04 NOTE — CASE MANAGEMENT
Case Management Discharge Planning Note    Patient name Carlos Landis  Location [facility] 823/[facility] 823-01 MRN [id number]  : 1975 Date 2024       Current Admission Date: 2024  Current Admission Diagnosis:SMA thrombus   Patient Active Problem List    Diagnosis Date Noted Date Diagnosed    SMA thrombus 2024     Left hydrocele 2024     Non-sustained ventricular tachycardia (HCC) 2024     Weakness 2024     DAVID (obstructive sleep apnea) 2024     Stroke-like symptoms 2023     Pain of upper abdomen 2023     Right-sided thoracic back pain 2023     Hypomagnesemia 2023     Episodic tension-type headache, not intractable 2023     Left leg weakness 2022     Obesity (BMI 30-39.9) 2022     Ambulatory dysfunction 03/15/2022     MS (multiple sclerosis) ([facility]) 03/15/2022     Dysarthria 2022     CNS demyelinating disease ([facility]) 2022     Right hemiparesis ([facility]) 2022       LOS (days): 3  Geometric Mean LOS (GMLOS) (days): 4.4  Days to GMLOS:1.6     OBJECTIVE:  Risk of Unplanned Readmission Score: 15.23         Current admission status: Inpatient   Preferred Pharmacy:   [id number] - [place] [address]  [address]  [place] [address]  Phone: [phone] Fax: [phone]    Primary Care Provider: Ha Acosta MD    Primary Insurance: [employer]  REP  Secondary Insurance:     DISCHARGE DETAILS:                       Attempted to contact p[facility] Xarelto and starter pack. Pharmacy closed at present, will re-attempt at 9:00AM          Update 10:15AM: Spoke to [facility]- price for starter pack and Xarelto 20mg is $11.20.     Provider updated and aware.

## 2024-09-04 NOTE — PLAN OF CARE
Problem: Prexisting or High Potential for Compromised Skin Integrity  Goal: Skin integrity is maintained or improved  Description: INTERVENTIONS:  - Identify patients at risk for skin breakdown  - Assess and monitor skin integrity  - Assess and monitor nutrition and hydration status  - Monitor labs   - Assess for incontinence   - Turn and reposition patient  - Assist with mobility/ambulation  - Relieve pressure over bony prominences  - Avoid friction and shearing  - Provide appropriate hygiene as needed including keeping skin clean and dry  - Evaluate need for skin moisturizer/barrier cream  - Collaborate with interdisciplinary team   - Patient/family teaching  - Consider wound care consult   Outcome: Progressing     Problem: PAIN - ADULT  Goal: Verbalizes/displays adequate comfort level or baseline comfort level  Description: Interventions:  - Encourage patient to monitor pain and request assistance  - Assess pain using appropriate pain scale  - Administer analgesics based on type and severity of pain and evaluate response  - Implement non-pharmacological measures as appropriate and evaluate response  - Consider cultural and social influences on pain and pain management  - Notify physician/advanced practitioner if interventions unsuccessful or patient reports new pain  Outcome: Progressing     Problem: INFECTION - ADULT  Goal: Absence or prevention of progression during hospitalization  Description: INTERVENTIONS:  - Assess and monitor for signs and symptoms of infection  - Monitor lab/diagnostic results  - Monitor all insertion sites, i.e. indwelling lines, tubes, and drains  - Monitor endotracheal if appropriate and nasal secretions for changes in amount and color  - [place] appropriate cooling/warming therapies per order  - Administer medications as ordered  - Instruct and encourage patient and family to use good hand hygiene technique  - Identify and instruct in appropriate isolation precautions for  identified infection/condition  Outcome: Progressing  Goal: Absence of fever/infection during neutropenic period  Description: INTERVENTIONS:  - Monitor WBC    Outcome: Progressing     Problem: SAFETY ADULT  Goal: Patient will remain free of falls  Description: INTERVENTIONS:  - Educate patient/family on patient safety including physical limitations  - Instruct patient to call for assistance with activity   - Consult OT/PT to assist with strengthening/mobility   - Keep Call bell within reach  - Keep bed low and locked with side rails adjusted as appropriate  - Keep care items and personal belongings within reach  - Initiate and maintain comfort rounds  - Make Fall Risk Sign visible to staff  - Initiate/Maintain bed alarm  - Apply yellow socks and bracelet for high fall risk patients  - Consider moving patient to room near nurses station  Outcome: Progressing  Goal: Maintain or return to baseline ADL function  Description: INTERVENTIONS:  -  Assess patient's ability to carry out ADLs; assess patient's baseline for ADL function and identify physical deficits which impact ability to perform ADLs (bathing, care of mouth/teeth, toileting, grooming, dressing, etc.)  - Assess/evaluate cause of self-care deficits   - Assess range of motion  - Assess patient's mobility; develop plan if impaired  - Assess patient's need for assistive devices and provide as appropriate  - Encourage maximum independence but intervene and supervise when necessary  - Involve family in performance of ADLs  - Assess for home care needs following discharge   - Consider OT consult to assist with ADL evaluation and planning for discharge  - Provide patient education as appropriate  Outcome: Progressing  Goal: Maintains/Returns to pre admission functional level  Description: INTERVENTIONS:  - Perform AM-PAC 6 Click Basic Mobility/ Daily Activity assessment daily.  - Set and communicate daily mobility goal to care team and patient/family/caregiver.   -  Collaborate with rehabilitation services on mobility goals if consulted  - Out of bed for toileting  - Record patient progress and toleration of activity level   Outcome: Progressing     Problem: DISCHARGE PLANNING  Goal: Discharge to home or other facility with appropriate resources  Description: INTERVENTIONS:  - Identify barriers to discharge w/patient and caregiver  - Arrange for needed discharge resources and transportation as appropriate  - Identify discharge learning needs (meds, wound care, etc.)  - Arrange for interpretive services to assist at discharge as needed  - Refer to Case Management Department for coordinating discharge planning if the patient needs post-hospital services based on physician/advanced practitioner order or complex needs related to functional status, cognitive ability, or social support system  Outcome: Progressing     Problem: Knowledge Deficit  Goal: Patient/family/caregiver demonstrates understanding of disease process, treatment plan, medications, and discharge instructions  Description: Complete learning assessment and assess knowledge base.  Interventions:  - Provide teaching at level of understanding  - Provide teaching via preferred learning methods  Outcome: Progressing     Problem: Nutrition/Hydration-ADULT  Goal: Nutrient/Hydration intake appropriate for improving, restoring or maintaining nutritional needs  Description: Monitor and assess patient's nutrition/hydration status for malnutrition. Collaborate with interdisciplinary team and initiate plan and interventions as ordered.  Monitor patient's weight and dietary intake as ordered or per policy. Utilize nutrition screening tool and intervene as necessary. Determine patient's food preferences and provide high-protein, high-caloric foods as appropriate.     INTERVENTIONS:  - Monitor oral intake, urinary output, labs, and treatment plans  - Assess nutrition and hydration status and recommend course of action  - Evaluate  amount of meals eaten  - Assist patient with eating if necessary   - Allow adequate time for meals  - Recommend/ encourage appropriate diets, oral nutritional supplements, and vitamin/mineral supplements  - Order, calculate, and assess calorie counts as needed  - Recommend, monitor, and adjust tube feedings and TPN/PPN based on assessed needs  - Assess need for intravenous fluids  - Provide specific nutrition/hydration education as appropriate  - Include patient/family/caregiver in decisions related to nutrition  Outcome: Progressing

## 2024-09-04 NOTE — PROGRESS NOTES
"[facility]  Progress Note  Name: Carlos Landis I  MRN: [id number]  Unit/Bed#: [facility] 823-01 I Date of Admission: 9/1/2024   Date of Service: 9/4/2024 I Hospital Day: 3    Assessment & Plan   * SMA thrombus  Assessment & Plan  49 y.o. year old male with a PMHx of multiple sclerosis (initial dx 3 years ago, on immunosuppressant injections every 6 months and steroid dosing monthly), DAVID who presented for stroke like symptoms and concern for MS flare. In the ED, patient reported intermittent abdominal pain over the last 2 days, prompting abdominal CT scan which revealed proximal SMA dissection with thrombus. Patient initially arrived in ED with concerns for stroke due to having left sided facial numbness. While in the ED, he had an episode of abdominal pain that was short in nature. He had this pain over the last 2 days prior.   Patient denies pain with eating but does have decreased appetite 2/2 MS. He denies fevers, chills, nausea, vomiting, history of hypertension or recent known episodes of such, denies diarrhea or blood in stool.     Imaging  9/1 CTA:  Mild atherosclerosis without aneurysm. Mild ectasia of the proximal SMA, measuring up to 1.2 cm new from prior. There is also a new filling defect in the proximal SMA with near complete occlusion and distal    Plan  -Continue with Heparin gtt  -ASA and statin  -adv to soft diet  -plan to repeat CTA 9/4  -analgesia and anti-emetics  -dvt ppx  -oob and ambulation TID  -encourage IS use  -PT/OT to evaluate   -discontinue IVFs             Vitals:  /76   Pulse 86   Temp 98 °F (36.7 °C)   Resp 18   Ht 5' 11\" (1.803 m)   Wt 100 kg (221 lb)   SpO2 95%   BMI 30.82 kg/m²     I/Os:  I/O last 3 completed shifts:  In: 3780.7 [P.O.:740; I.V.:3040.7]  Out: 2975 [Urine:2975]  No intake/output data recorded.      Lab Results and Cultures:   CBC with diff:   Lab Results   Component Value Date    WBC 9.13 09/04/2024    HGB 12.8 " "09/04/2024    HCT 38.8 09/04/2024    MCV 89 09/04/2024     09/04/2024    RBC 4.38 09/04/2024    MCH 29.2 09/04/2024    MCHC 33.0 09/04/2024    RDW 13.0 09/04/2024    MPV 9.6 09/04/2024    NRBC 0 09/01/2024   ,   BMP/CMP:  Lab Results   Component Value Date    SODIUM 141 09/04/2024    K 3.8 09/04/2024     (H) 09/04/2024    CO2 23 09/04/2024    BUN 14 09/04/2024    CREATININE 0.76 09/04/2024    CALCIUM 8.5 09/04/2024    AST 10 (L) 09/01/2024    ALT 9 09/01/2024    ALKPHOS 59 09/01/2024    EGFR 105 09/04/2024   ,   Lipid Panel: No results found for: \"CHOL\",   Coags:   Lab Results   Component Value Date     (HH) 09/04/2024    INR 1.15 09/01/2024   ,     Blood Culture:   Lab Results   Component Value Date    BLOODCX No Growth at 48 hrs. 09/01/2024    BLOODCX No Growth at 48 hrs. 09/01/2024   ,   Urinalysis:   Lab Results   Component Value Date    COLORU Dark Yellow 08/07/2024    CLARITYU Clear 08/07/2024    SPECGRAV >=1.030 08/07/2024    PHUR 6.0 08/07/2024    LEUKOCYTESUR Negative 08/07/2024    NITRITE Negative 08/07/2024    GLUCOSEU Negative 08/07/2024    KETONESU 10 (1+) (A) 08/07/2024    BILIRUBINUR Negative 08/07/2024    BLOODU Negative 08/07/2024   ,   Urine Culture: No results found for: \"URINECX\",   Wound Culure: No results found for: \"WOUNDCULT\"    Medications:  Current Facility-Administered Medications   Medication Dose Route Frequency    aspirin (ECOTRIN LOW STRENGTH) EC tablet 81 mg  81 mg Oral Daily    atorvastatin (LIPITOR) tablet 40 mg  40 mg Oral Daily With Dinner    dextrose 5 % and sodium chloride 0.45 % infusion  75 mL/hr Intravenous Continuous    heparin (porcine) 25,000 units in 0.45% NaCl 250 mL infusion (premix)  3-30 Units/kg/hr (Order-Specific) Intravenous Titrated    heparin (porcine) injection 4,000 Units  4,000 Units Intravenous Q6H PRN    heparin (porcine) injection 8,000 Units  8,000 Units Intravenous Q6H PRN    nortriptyline (PAMELOR) capsule 25 mg  25 mg Oral HS    " nystatin (MYCOSTATIN) powder   Topical BID       Physical Exam:  General: No acute distress, alert and oriented  CV: RRR  Lungs: Normal work of breathing   Abdomen: soft, non tender, non distended  Skin: Warm, dry      Luther Corona MD  9/4/2024

## 2024-09-05 VITALS
RESPIRATION RATE: 18 BRPM | TEMPERATURE: 97.9 F | SYSTOLIC BLOOD PRESSURE: 103 MMHG | OXYGEN SATURATION: 96 % | BODY MASS INDEX: 30.94 KG/M2 | HEART RATE: 95 BPM | HEIGHT: 71 IN | WEIGHT: 221 LBS | DIASTOLIC BLOOD PRESSURE: 71 MMHG

## 2024-09-05 LAB
ANION GAP SERPL CALCULATED.3IONS-SCNC: 7 MMOL/L (ref 4–13)
BUN SERPL-MCNC: 10 MG/DL (ref 5–25)
CALCIUM SERPL-MCNC: 8.7 MG/DL (ref 8.4–10.2)
CHLORIDE SERPL-SCNC: 110 MMOL/L (ref 96–108)
CO2 SERPL-SCNC: 24 MMOL/L (ref 21–32)
CREAT SERPL-MCNC: 0.78 MG/DL (ref 0.6–1.3)
ERYTHROCYTE [DISTWIDTH] IN BLOOD BY AUTOMATED COUNT: 13.2 % (ref 11.6–15.1)
GFR SERPL CREATININE-BSD FRML MDRD: 104 ML/MIN/1.73SQ M
GLUCOSE SERPL-MCNC: 72 MG/DL (ref 65–140)
HCT VFR BLD AUTO: 40.5 % (ref 36.5–49.3)
HGB BLD-MCNC: 13.3 G/DL (ref 12–17)
MCH RBC QN AUTO: 29.6 PG (ref 26.8–34.3)
MCHC RBC AUTO-ENTMCNC: 32.8 G/DL (ref 31.4–37.4)
MCV RBC AUTO: 90 FL (ref 82–98)
PLATELET # BLD AUTO: 244 THOUSANDS/UL (ref 149–390)
PMV BLD AUTO: 9.9 FL (ref 8.9–12.7)
POTASSIUM SERPL-SCNC: 3.6 MMOL/L (ref 3.5–5.3)
RBC # BLD AUTO: 4.5 MILLION/UL (ref 3.88–5.62)
SODIUM SERPL-SCNC: 141 MMOL/L (ref 135–147)
WBC # BLD AUTO: 9.18 THOUSAND/UL (ref 4.31–10.16)

## 2024-09-05 PROCEDURE — 99232 SBSQ HOSP IP/OBS MODERATE 35: CPT | Performed by: SURGERY

## 2024-09-05 PROCEDURE — 80048 BASIC METABOLIC PNL TOTAL CA: CPT | Performed by: PHYSICIAN ASSISTANT

## 2024-09-05 PROCEDURE — 85027 COMPLETE CBC AUTOMATED: CPT | Performed by: PHYSICIAN ASSISTANT

## 2024-09-05 PROCEDURE — 99239 HOSP IP/OBS DSCHRG MGMT >30: CPT | Performed by: PHYSICIAN ASSISTANT

## 2024-09-05 RX ORDER — ATORVASTATIN CALCIUM 40 MG/1
40 TABLET, FILM COATED ORAL
Qty: 90 TABLET | Refills: 0 | Status: SHIPPED | OUTPATIENT
Start: 2024-09-05

## 2024-09-05 RX ADMIN — RIVAROXABAN 15 MG: 15 TABLET, FILM COATED ORAL at 08:49

## 2024-09-05 RX ADMIN — ASPIRIN 81 MG: 81 TABLET, COATED ORAL at 08:49

## 2024-09-05 NOTE — PLAN OF CARE
Problem: Prexisting or High Potential for Compromised Skin Integrity  Goal: Skin integrity is maintained or improved  Description: INTERVENTIONS:  - Identify patients at risk for skin breakdown  - Assess and monitor skin integrity  - Assess and monitor nutrition and hydration status  - Monitor labs   - Assess for incontinence   - Turn and reposition patient  - Assist with mobility/ambulation  - Relieve pressure over bony prominences  - Avoid friction and shearing  - Provide appropriate hygiene as needed including keeping skin clean and dry  - Evaluate need for skin moisturizer/barrier cream  - Collaborate with interdisciplinary team   - Patient/family teaching  - Consider wound care consult   Outcome: Progressing     Problem: INFECTION - ADULT  Goal: Absence or prevention of progression during hospitalization  Description: INTERVENTIONS:  - Assess and monitor for signs and symptoms of infection  - Monitor lab/diagnostic results  - Monitor all insertion sites, i.e. indwelling lines, tubes, and drains  - Monitor endotracheal if appropriate and nasal secretions for changes in amount and color  - [place] appropriate cooling/warming therapies per order  - Administer medications as ordered  - Instruct and encourage patient and family to use good hand hygiene technique  - Identify and instruct in appropriate isolation precautions for identified infection/condition  Outcome: Progressing     Problem: Knowledge Deficit  Goal: Patient/family/caregiver demonstrates understanding of disease process, treatment plan, medications, and discharge instructions  Description: Complete learning assessment and assess knowledge base.  Interventions:  - Provide teaching at level of understanding  - Provide teaching via preferred learning methods  Outcome: Progressing     Problem: Nutrition/Hydration-ADULT  Goal: Nutrient/Hydration intake appropriate for improving, restoring or maintaining nutritional needs  Description: Monitor and  assess patient's nutrition/hydration status for malnutrition. Collaborate with interdisciplinary team and initiate plan and interventions as ordered.  Monitor patient's weight and dietary intake as ordered or per policy. Utilize nutrition screening tool and intervene as necessary. Determine patient's food preferences and provide high-protein, high-caloric foods as appropriate.     INTERVENTIONS:  - Monitor oral intake, urinary output, labs, and treatment plans  - Assess nutrition and hydration status and recommend course of action  - Evaluate amount of meals eaten  - Assist patient with eating if necessary   - Allow adequate time for meals  - Recommend/ encourage appropriate diets, oral nutritional supplements, and vitamin/mineral supplements  - Order, calculate, and assess calorie counts as needed  - Recommend, monitor, and adjust tube feedings and TPN/PPN based on assessed needs  - Assess need for intravenous fluids  - Provide specific nutrition/hydration education as appropriate  - Include patient/family/caregiver in decisions related to nutrition  Outcome: Progressing

## 2024-09-05 NOTE — PROGRESS NOTES
"[facility]  Progress Note  Name: Carlos Landis I  MRN: [id number]  Unit/Bed#: [facility] 823-01 I Date of Admission: 9/1/2024   Date of Service: 9/5/2024 I Hospital Day: 4    Assessment & Plan   * SMA thrombus  Assessment & Plan  49 y.o. year old male with a PMHx of multiple sclerosis (initial dx 3 years ago, on immunosuppressant injections every 6 months and steroid dosing monthly), DAVID who presented for stroke like symptoms and concern for MS flare. In the ED, patient reported intermittent abdominal pain over the last 2 days, prompting abdominal CT scan which revealed proximal SMA dissection with thrombus. Patient initially arrived in ED with concerns for stroke due to having left sided facial numbness. While in the ED, he had an episode of abdominal pain that was short in nature. He had this pain over the last 2 days prior.   Patient denies pain with eating but does have decreased appetite 2/2 MS. He denies fevers, chills, nausea, vomiting, history of hypertension or recent known episodes of such, denies diarrhea or blood in stool.     Imaging  9/1 CTA:  Mild atherosclerosis without aneurysm. Mild ectasia of the proximal SMA, measuring up to 1.2 cm new from prior. 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    Plan  -Transition to Xarelto yesterday.  -Repeat CTA 9/4 is unchanged, pt is currently asymptomatic and denies abdominal pain symptoms, no current indication for vascular intervention.   -Continue ASA and statin  -Regular diet- pt is tolerating.   -analgesia and anti-emetics  -dvt ppx  -oob and ambulation TID  -encourage IS use  -PT/OT to evaluate                Vitals:  /75   Pulse 82   Temp 98.1 °F (36.7 °C)   Resp 18   Ht 5' 11\" (1.803 m)   Wt 100 kg (221 lb)   SpO2 95%   BMI " "30.82 kg/m²     I/Os:  I/O last 3 completed shifts:  In: 300 [P.O.:300]  Out: 3975 [Urine:3975]  No intake/output data recorded.    Subjective: Pt resting in bed comfortably. Denies any pain aside from occasional 'MS flair' to the left side of the face. Denies any abdominal pain/ discomfort.       Lab Results and Cultures:   CBC with diff:   Lab Results   Component Value Date    WBC 9.18 09/05/2024    HGB 13.3 09/05/2024    HCT 40.5 09/05/2024    MCV 90 09/05/2024     09/05/2024    RBC 4.50 09/05/2024    MCH 29.6 09/05/2024    MCHC 32.8 09/05/2024    RDW 13.2 09/05/2024    MPV 9.9 09/05/2024    NRBC 0 09/01/2024   ,   BMP/CMP:  Lab Results   Component Value Date    SODIUM 141 09/05/2024    K 3.6 09/05/2024     (H) 09/05/2024    CO2 24 09/05/2024    BUN 10 09/05/2024    CREATININE 0.78 09/05/2024    CALCIUM 8.7 09/05/2024    AST 10 (L) 09/01/2024    ALT 9 09/01/2024    ALKPHOS 59 09/01/2024    EGFR 104 09/05/2024   ,   Lipid Panel: No results found for: \"CHOL\",   Coags:   Lab Results   Component Value Date     (HH) 09/04/2024    INR 1.15 09/01/2024   ,     Blood Culture:   Lab Results   Component Value Date    BLOODCX No Growth at 72 hrs. 09/01/2024    BLOODCX No Growth at 72 hrs. 09/01/2024   ,   Urinalysis:   Lab Results   Component Value Date    COLORU Dark Yellow 08/07/2024    CLARITYU Clear 08/07/2024    SPECGRAV >=1.030 08/07/2024    PHUR 6.0 08/07/2024    LEUKOCYTESUR Negative 08/07/2024    NITRITE Negative 08/07/2024    GLUCOSEU Negative 08/07/2024    KETONESU 10 (1+) (A) 08/07/2024    BILIRUBINUR Negative 08/07/2024    BLOODU Negative 08/07/2024   ,   Urine Culture: No results found for: \"URINECX\",   Wound Culure: No results found for: \"WOUNDCULT\"    Medications:  Current Facility-Administered Medications   Medication Dose Route Frequency    aspirin (ECOTRIN LOW STRENGTH) EC tablet 81 mg  81 mg Oral Daily    atorvastatin (LIPITOR) tablet 40 mg  40 mg Oral Daily With Dinner    " nortriptyline (PAMELOR) capsule 25 mg  25 mg Oral HS    nystatin (MYCOSTATIN) powder   Topical BID    rivaroxaban (XARELTO) tablet 15 mg  15 mg Oral BID       Imagin/1 CTA:  Mild atherosclerosis without aneurysm. Mild ectasia of the proximal SMA, measuring up to 1.2 cm new from prior. There is also a new filling defect in the proximal SMA with near complete occlusion and distal     CTA: aneurysmal proximal SMA at 1.5 cm  that is unchanged with focal dissection and nonocclusive thrombus with patent branches.    Physical Exam:    General: Alert and Oriented x3  Respiratory: No distress  Abdominal: SNT, reports passing gas. Denies abdominal distention.   Extremities: Warm, dry with no swelling.   Neurologic: Grossly Intact.        SHYANN Benitez  2024  [facility]  [phone]

## 2024-09-05 NOTE — PLAN OF CARE
Problem: PAIN - ADULT  Goal: Verbalizes/displays adequate comfort level or baseline comfort level  Description: Interventions:  - Encourage patient to monitor pain and request assistance  - Assess pain using appropriate pain scale  - Administer analgesics based on type and severity of pain and evaluate response  - Implement non-pharmacological measures as appropriate and evaluate response  - Consider cultural and social influences on pain and pain management  - Notify physician/advanced practitioner if interventions unsuccessful or patient reports new pain  Outcome: Progressing     Problem: SAFETY ADULT  Goal: Patient will remain free of falls  Description: INTERVENTIONS:  - Educate patient/family on patient safety including physical limitations  - Instruct patient to call for assistance with activity   - Consult OT/PT to assist with strengthening/mobility   - Keep Call bell within reach  - Keep bed low and locked with side rails adjusted as appropriate  - Keep care items and personal belongings within reach  - Initiate and maintain comfort rounds  - Make Fall Risk Sign visible to staff  - Offer Toileting every 2 Hours, in advance of need  - Initiate/Maintain bed alarm  - Obtain necessary fall risk management equipment: assistive devices  - Apply yellow socks and bracelet for high fall risk patients  - Consider moving patient to room near nurses station  Outcome: Progressing     Problem: Prexisting or High Potential for Compromised Skin Integrity  Goal: Skin integrity is maintained or improved  Description: INTERVENTIONS:  - Identify patients at risk for skin breakdown  - Assess and monitor skin integrity  - Assess and monitor nutrition and hydration status  - Monitor labs   - Assess for incontinence   - Turn and reposition patient  - Assist with mobility/ambulation  - Relieve pressure over bony prominences  - Avoid friction and shearing  - Provide appropriate hygiene as needed including keeping skin clean and  dry  - Evaluate need for skin moisturizer/barrier cream  - Collaborate with interdisciplinary team   - Patient/family teaching  - Consider wound care consult   Outcome: Progressing     Problem: Nutrition/Hydration-ADULT  Goal: Nutrient/Hydration intake appropriate for improving, restoring or maintaining nutritional needs  Description: Monitor and assess patient's nutrition/hydration status for malnutrition. Collaborate with interdisciplinary team and initiate plan and interventions as ordered.  Monitor patient's weight and dietary intake as ordered or per policy. Utilize nutrition screening tool and intervene as necessary. Determine patient's food preferences and provide high-protein, high-caloric foods as appropriate.     INTERVENTIONS:  - Monitor oral intake, urinary output, labs, and treatment plans  - Assess nutrition and hydration status and recommend course of action  - Evaluate amount of meals eaten  - Assist patient with eating if necessary   - Allow adequate time for meals  - Recommend/ encourage appropriate diets, oral nutritional supplements, and vitamin/mineral supplements  - Order, calculate, and assess calorie counts as needed  - Recommend, monitor, and adjust tube feedings and TPN/PPN based on assessed needs  - Assess need for intravenous fluids  - Provide specific nutrition/hydration education as appropriate  - Include patient/family/caregiver in decisions related to nutrition  Outcome: Progressing     Problem: Knowledge Deficit  Goal: Patient/family/caregiver demonstrates understanding of disease process, treatment plan, medications, and discharge instructions  Description: Complete learning assessment and assess knowledge base.  Interventions:  - Provide teaching at level of understanding  - Provide teaching via preferred learning methods  Outcome: Progressing     Problem: DISCHARGE PLANNING  Goal: Discharge to home or other facility with appropriate resources  Description: INTERVENTIONS:  -  Identify barriers to discharge w/patient and caregiver  - Arrange for needed discharge resources and transportation as appropriate  - Identify discharge learning needs (meds, wound care, etc.)  - Arrange for interpretive services to assist at discharge as needed  - Refer to Case Management Department for coordinating discharge planning if the patient needs post-hospital services based on physician/advanced practitioner order or complex needs related to functional status, cognitive ability, or social support system  Outcome: Progressing

## 2024-09-05 NOTE — DISCHARGE INSTR - AVS FIRST PAGE
Take Xarelto 15 mg twice daily for 39 doses -- you have been prescribed 41 doses of this however you got a few doses in the hospital.  Once you complete this initial dosing then take Xarelto 20 mg once daily.    Take Aspirin 81 mg daily, atorvastatin 40 mg daily.     Follow up with vascular and primary care on discharge.

## 2024-09-05 NOTE — ASSESSMENT & PLAN NOTE
Patient presented to the hospital with generalized weakness and left facial numbness.  Had multiple admissions for same issue suspect MS flare versus psychogenic stress as etiology of flare.  Patient reports intermittent left-sided pressure-like abdominal pain for the past 4 days.  CT abdomen pelvis with contrast showed new mild ectasia of the proximal SMA with associated filling defect causing near complete occlusion of the proximal SMA with distal reconstitution suspicious for thrombus.  ED reached out to vascular surgery.  Comparing to the CT in December these are new findings.  Differential could be superior mesenteric artery dissection with thrombus formation, possible thromboembolic event, mycotic degeneration of the superior mesenteric artery  Mild leukocytosis of 12, repeat labs showed no leukocytosis.  Lactic acid negative  Start aspirin 81 mg daily, continue heparin drip which was started in the ED  Echo unremarkable   Vascular surgery consult, input appreciated  Started on heparin gtt initially   Advanced diet -- he is tolerating this   Repeat CTA on 9/4 stable.    D/w vascular.  Transitioned to PO Xarelto.  He is tolerating this.  Price checked for $11/month.  15 mg BID x 21 days then 20 mg daily thereafter   Start aspirin and Lipitor  Follow-up blood cultures, which are negative at 72 hrs   ESR and CRP both elevated  Close monitoring of abdominal exam -- pt denies any abdominal pain at this time, denies n/v   D/w vascular, he is cleared for d/c.  Follow up outpt

## 2024-09-05 NOTE — DISCHARGE SUMMARY
[facility]  Discharge- Carlos DESTIN Parkercori 1975, 49 y.o. male MRN: [id number]  [facility] Encounter: [id number]  Primary Care Provider: Ha Acosta MD   Date and time admitted to hospital: 9/1/2024  2:14 PM    * SMA thrombus  Assessment & Plan  Patient presented to the hospital with generalized weakness and left facial numbness.  Had multiple admissions for same issue suspect MS flare versus psychogenic stress as etiology of flare.  Patient reports intermittent left-sided pressure-like abdominal pain for the past 4 days.  CT abdomen pelvis with contrast showed new mild ectasia of the proximal SMA with associated filling defect causing near complete occlusion of the proximal SMA with distal reconstitution suspicious for thrombus.  ED reached out to vascular surgery.  Comparing to the CT in December these are new findings.  Differential could be superior mesenteric artery dissection with thrombus formation, possible thromboembolic event, mycotic degeneration of the superior mesenteric artery  Mild leukocytosis of 12, repeat labs showed no leukocytosis.  Lactic acid negative  Start aspirin 81 mg daily, continue heparin drip which was started in the ED  Echo unremarkable   Vascular surgery consult, input appreciated  Started on heparin gtt initially   Advanced diet -- he is tolerating this   Repeat CTA on 9/4 stable.    D/w vascular.  Transitioned to PO Xarelto.  He is tolerating this.  Price checked for $11/month.  15 mg BID x 21 days then 20 mg daily thereafter   Start aspirin and Lipitor  Follow-up blood cultures, which are negative at 72 hrs   ESR and CRP both elevated  Close monitoring of abdominal exam -- pt denies any abdominal pain at this time, denies n/v   D/w vascular, he is cleared for d/c.  Follow up outpt     Left hydrocele  Assessment & Plan  CT showed moderate left hydrocele, cystic structure superior to the right testis measuring 2.7 cm might be due to  loculated hydrocele or epididymal cyst  Outpatient follow-up with urology    DAVID (obstructive sleep apnea)  Assessment & Plan  History of DAVID, noncompliant with CPAP  Outpatient sleep medicine follow-up    MS (multiple sclerosis) (HCC)  Assessment & Plan  Multiple admissions for generalized weakness, numbness intermittent dysarthria, expressive aphasia.  Last admission beginning of August, had a stroke workup which was negative.  Suspect MS flare versus psychogenic stress as etiology of flare  On Ocrelizumab and he is getting monthly Solu-Medrol infusion 1000 mg (beginning of every month)  ED discussed with patient's neurologist, since he is getting his Solu-Medrol infusion at the beginning of the month, he received 1000 mg of Solu-Medrol IV while in the ED.  Per neurology no further steroids are indicated  Continue to monitor neurostatus and consider neurology consult if needed  At this time weakness much improved s/p IV steroids in ED.  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     Resolved by  SHYANN Benitez        Discharging Physician / Practitioner: Keerthi Hannah PA-C  PCP: Ha Acosta MD  Admission Date:   Admission Orders (From admission, onward)       Ordered        09/01/24 1417  INPATIENT ADMISSION  Once                          Discharge Date: 09/05/24    Consultations During Hospital Stay:  Vascular Surgery   ENT     Procedures Performed:   None    Significant Findings / Test Results:   CTA abdomen with SMA thrombosis, repeat CTA with stable findings     Incidental Findings:   None     Test Results Pending at Discharge (will require follow up):   None     Outpatient Tests Requested:  None     Complications:  None    Reason for Admission: SMA thrombus     Hospital Course:   Carlos Landis  "is a 49 y.o. male patient who originally presented to the hospital on 9/1/2024 initially due to weakness, L facial numbness concerning for \"MS flare.\"  His sx resolved when he got a dose of steroids recommended by neurology (he was due for this as an outpt regardless).  He did not require any further neurologic work up.  However additionally the pt also c/o abdominal pain so CT imaging of abdomen was obtained which demonstrated SMA thrombus.  He was tx to B for vascular evaluation and started on heparin gtt, ASA, stating.  Repeat CTA on 9/4 was stable so he was transitioned to PO Xarelto.  He is tolerating this well.  He is tolerating a diet.  He has denied any further abdominal pain during his hospital stay.  He is cleared by vascular for discharge.        Please see above list of diagnoses and related plan for additional information.     Condition at Discharge: good    Discharge Day Visit / Exam:   Subjective:  No complaints, denies abd pain, n/v, he is tolerating his diet ok   Vitals: Blood Pressure: 103/71 (09/05/24 0838)  Pulse: 95 (09/05/24 0838)  Temperature: 97.9 °F (36.6 °C) (09/05/24 0838)  Temp Source: Oral (09/03/24 1557)  Respirations: 18 (09/05/24 0838)  Height: 5' 11\" (180.3 cm) (09/02/24 0947)  Weight - Scale: 100 kg (221 lb) (09/02/24 0947)  SpO2: 96 % (09/05/24 0838)  Exam:   Physical Exam  Vitals reviewed.   Constitutional:       General: He is not in acute distress.     Appearance: He is not toxic-appearing.   HENT:      Head: Normocephalic and atraumatic.   Eyes:      Extraocular Movements: Extraocular movements intact.   Cardiovascular:      Rate and Rhythm: Normal rate and regular rhythm.   Pulmonary:      Effort: Pulmonary effort is normal. No respiratory distress.   Abdominal:      General: Bowel sounds are normal. There is no distension.      Palpations: Abdomen is soft.      Tenderness: There is no abdominal tenderness.   Musculoskeletal:         General: Normal range of motion. "   Neurological:      General: No focal deficit present.      Mental Status: He is alert and oriented to person, place, and time.   Psychiatric:         Mood and Affect: Mood normal.         Behavior: Behavior normal.          Discussion with Family: Patient declined call to .     Discharge instructions/Information to patient and family:   See after visit summary for information provided to patient and family.      Provisions for Follow-Up Care:  See after visit summary for information related to follow-up care and any pertinent home health orders.      Mobility at time of Discharge:   Basic Mobility Inpatient Raw Score: 13  -HLM Goal: 4: Move to chair/commode  JH-HLM Achieved: 3: Sit at edge of bed  HLM Goal NOT achieved. Continue to encourage mobility in post discharge setting.     Disposition:   Home with VNA Services (Reminder: Complete face to face encounter)    Planned Readmission: no     Discharge Statement:  I spent 32 minutes discharging the patient. This time was spent on the day of discharge. I had direct contact with the patient on the day of discharge. Greater than 50% of the total time was spent examining patient, answering all patient questions, arranging and discussing plan of care with patient as well as directly providing post-discharge instructions.  Additional time then spent on discharge activities.    Discharge Medications:  See after visit summary for reconciled discharge medications provided to patient and/or family.      **Please Note: This note may have been constructed using a voice recognition system**

## 2024-09-06 LAB
BACTERIA BLD CULT: NORMAL
BACTERIA BLD CULT: NORMAL

## 2024-09-06 NOTE — UTILIZATION REVIEW
NOTIFICATION OF ADMISSION DISCHARGE   This is a Notification of Discharge from [facility]. Please be advised that this patient has been discharge from our facility. Below you will find the admission and discharge date and time including the patient’s disposition.   UTILIZATION REVIEW CONTACT:  Evelyn Shipley  Utilization   Network Utilization Review Department  Phone: [phone] x carefully listen to the prompts. All voicemails are confidential.  Email: [email or web address]     ADMISSION INFORMATION  PRESENTATION DATE: 9/1/2024  2:14 PM  OBERVATION ADMISSION DATE: N/A  INPATIENT ADMISSION DATE: 9/1/24  2:14 PM   DISCHARGE DATE: 9/5/2024  3:52 PM   DISPOSITION:Home with Home Health Care    Network Utilization Review Department  ATTENTION: Please call with any questions or concerns to [phone] and carefully listen to the prompts so that you are directed to the right person. All voicemails are confidential.   For Discharge needs, contact Care Management DC Support Team at [phone] opt. 2  Send all requests for admission clinical reviews, approved or denied determinations and any other requests to dedicated fax number below belonging to the campus where the patient is receiving treatment. List of dedicated fax numbers for the Facilities:  FACILITY NAME UR FAX NUMBER   ADMISSION DENIALS (Administrative/Medical Necessity) [phone]   DISCHARGE SUPPORT TEAM ([facility]) [phone]   PARENT CHILD HEALTH (Maternity/NICU/Pediatrics) [phone]   [facility] [phone]   [facility] [phone]   Critical access hospital [phone]   [facility] [phone]   [facility] [phone]   [facility] [phone]   [facility] [phone]   [facility]  [phone]   [facility] [phone]   [facility] [phone]   [facility] [phone]   [facility] [phone]

## 2024-09-10 ENCOUNTER — TELEPHONE (OUTPATIENT)
Age: 49
End: 2024-09-10

## 2024-09-10 NOTE — TELEPHONE ENCOUNTER
Contacted patient off of [facility]  to verify needs of services in attempts to offer patient an available appointment. LVM for patient to contact intake dept  in regards to wait list.     1st attempt

## 2024-09-10 NOTE — TELEPHONE ENCOUNTER
Patient returned call and states he is no longer interested in services at this time. Patient states he is doing better. Patient has been removed off the wait list.

## 2024-09-19 ENCOUNTER — HOSPITAL ENCOUNTER (EMERGENCY)
Facility: HOSPITAL | Age: 49
Discharge: HOME | End: 2024-09-20
Attending: EMERGENCY MEDICINE
Payer: COMMERCIAL

## 2024-09-19 DIAGNOSIS — G35 MULTIPLE SCLEROSIS EXACERBATION (HCC): Primary | ICD-10-CM

## 2024-09-19 PROCEDURE — 99284 EMERGENCY DEPT VISIT MOD MDM: CPT

## 2024-09-19 PROCEDURE — 99284 EMERGENCY DEPT VISIT MOD MDM: CPT | Performed by: EMERGENCY MEDICINE

## 2024-09-20 ENCOUNTER — APPOINTMENT (EMERGENCY)
Dept: CT IMAGING | Facility: HOSPITAL | Age: 49
End: 2024-09-20
Payer: COMMERCIAL

## 2024-09-20 VITALS
TEMPERATURE: 97.9 F | SYSTOLIC BLOOD PRESSURE: 121 MMHG | RESPIRATION RATE: 18 BRPM | OXYGEN SATURATION: 95 % | HEART RATE: 72 BPM | DIASTOLIC BLOOD PRESSURE: 77 MMHG

## 2024-09-20 LAB
ANION GAP SERPL CALCULATED.3IONS-SCNC: 8 MMOL/L (ref 4–13)
BASOPHILS # BLD AUTO: 0.07 THOUSANDS/ΜL (ref 0–0.1)
BASOPHILS NFR BLD AUTO: 1 % (ref 0–1)
BUN SERPL-MCNC: 18 MG/DL (ref 5–25)
CALCIUM SERPL-MCNC: 8.8 MG/DL (ref 8.4–10.2)
CHLORIDE SERPL-SCNC: 113 MMOL/L (ref 96–108)
CO2 SERPL-SCNC: 24 MMOL/L (ref 21–32)
CREAT SERPL-MCNC: 0.79 MG/DL (ref 0.6–1.3)
EOSINOPHIL # BLD AUTO: 0.36 THOUSAND/ΜL (ref 0–0.61)
EOSINOPHIL NFR BLD AUTO: 4 % (ref 0–6)
ERYTHROCYTE [DISTWIDTH] IN BLOOD BY AUTOMATED COUNT: 13.4 % (ref 11.6–15.1)
GFR SERPL CREATININE-BSD FRML MDRD: 103 ML/MIN/1.73SQ M
GLUCOSE SERPL-MCNC: 135 MG/DL (ref 65–140)
HCT VFR BLD AUTO: 37.8 % (ref 36.5–49.3)
HGB BLD-MCNC: 12.6 G/DL (ref 12–17)
IMM GRANULOCYTES # BLD AUTO: 0.08 THOUSAND/UL (ref 0–0.2)
IMM GRANULOCYTES NFR BLD AUTO: 1 % (ref 0–2)
LYMPHOCYTES # BLD AUTO: 1.06 THOUSANDS/ΜL (ref 0.6–4.47)
LYMPHOCYTES NFR BLD AUTO: 13 % (ref 14–44)
MCH RBC QN AUTO: 30.3 PG (ref 26.8–34.3)
MCHC RBC AUTO-ENTMCNC: 33.3 G/DL (ref 31.4–37.4)
MCV RBC AUTO: 91 FL (ref 82–98)
MONOCYTES # BLD AUTO: 0.61 THOUSAND/ΜL (ref 0.17–1.22)
MONOCYTES NFR BLD AUTO: 7 % (ref 4–12)
NEUTROPHILS # BLD AUTO: 6.16 THOUSANDS/ΜL (ref 1.85–7.62)
NEUTS SEG NFR BLD AUTO: 74 % (ref 43–75)
NRBC BLD AUTO-RTO: 0 /100 WBCS
PLATELET # BLD AUTO: 199 THOUSANDS/UL (ref 149–390)
PMV BLD AUTO: 9.6 FL (ref 8.9–12.7)
POTASSIUM SERPL-SCNC: 3.7 MMOL/L (ref 3.5–5.3)
RBC # BLD AUTO: 4.16 MILLION/UL (ref 3.88–5.62)
SODIUM SERPL-SCNC: 145 MMOL/L (ref 135–147)
WBC # BLD AUTO: 8.34 THOUSAND/UL (ref 4.31–10.16)

## 2024-09-20 PROCEDURE — 85025 COMPLETE CBC W/AUTO DIFF WBC: CPT | Performed by: EMERGENCY MEDICINE

## 2024-09-20 PROCEDURE — 96365 THER/PROPH/DIAG IV INF INIT: CPT

## 2024-09-20 PROCEDURE — 80048 BASIC METABOLIC PNL TOTAL CA: CPT | Performed by: EMERGENCY MEDICINE

## 2024-09-20 PROCEDURE — 70450 CT HEAD/BRAIN W/O DYE: CPT

## 2024-09-20 PROCEDURE — 36415 COLL VENOUS BLD VENIPUNCTURE: CPT | Performed by: EMERGENCY MEDICINE

## 2024-09-20 RX ORDER — PREDNISONE 50 MG/1
200 TABLET ORAL DAILY
Qty: 20 TABLET | Refills: 0 | Status: SHIPPED | OUTPATIENT
Start: 2024-09-20 | End: 2024-09-25

## 2024-09-20 RX ORDER — ACETAMINOPHEN 325 MG/1
975 TABLET ORAL ONCE
Status: COMPLETED | OUTPATIENT
Start: 2024-09-20 | End: 2024-09-20

## 2024-09-20 RX ADMIN — SODIUM CHLORIDE 1000 MG: 0.9 INJECTION, SOLUTION INTRAVENOUS at 01:38

## 2024-09-20 RX ADMIN — ACETAMINOPHEN 975 MG: 325 TABLET ORAL at 00:29

## 2024-09-20 NOTE — ED PROVIDER NOTES
1. Multiple sclerosis exacerbation (HCC)      ED Disposition       ED Disposition   Discharge    Condition   Stable    Date/Time   Fri Sep 20, 2024  1:44 AM    Comment   Carloskalyna Landis discharge to home/self care.                   Assessment & Plan       Medical Decision Making    49 y.o. male presenting for headaches and facial paresthesias.  Vital stable, resting comfortably.  Per patient symptoms are typical of prior MS flare.  Will check labs to evaluate for anemia, lecture abnormality or KATI.  As patient recently started on Xarelto will obtain CT of the head to exclude subarachnoid hemorrhage.    Reassessment: Patient resting comfortably in the room.  Reviewed lab and CT results.  Again suspect MS flare.  Will treat with IV Solu-Medrol and outpatient course of prednisone.    Disposition: I have discussed with the patient our plan to discharge them from the ED and the patient is in agreement with this plan.     Discharge Plan: Rx for prednisone. RTED precautions emphasized. The patient was provided a written after visit summary with strict RTED precautions.     Followup: I have discussed with the patient plan to follow up with neurology. Contact information provided in AVS.    Amount and/or Complexity of Data Reviewed  Labs: ordered.  Radiology: ordered.    Risk  OTC drugs.  Prescription drug management.            Stroke Assessment       Row Name 09/20/24 0020             NIH Stroke Scale    Interval Baseline      Level of Consciousness (1a.) 0      LOC Questions (1b.) 0      LOC Commands (1c.) 0      Best Gaze (2.) 0      Visual (3.) 0      Facial Palsy (4.) 0      Motor Arm, Left (5a.) 0      Motor Arm, Right (5b.) 0      Motor Leg, Left (6a.) 0      Motor Leg, Right (6b.) 0      Limb Ataxia (7.) 0      Sensory (8.) 0      Best Language (9.) 0      Dysarthria (10.) 0      Extinction and Inattention (11.) (Formerly Neglect) 0      Total 0                           Medications   methylPREDNISolone Na Suc (PF)  1,000 mg in sodium chloride 0.9 % 250 mL IVPB (1,000 mg Intravenous New Bag 9/20/24 0138)   acetaminophen (TYLENOL) tablet 975 mg (975 mg Oral Given 9/20/24 0029)       History of Present Illness       Carlos Landis is a 49 y.o. year old male with PMH of MS, SMA thrombus on xarelto presenting to the [facility] ED for headaches and facial tingling. Patient reporting two hours of bilateral headache and bilateral facial tingling. Headache was not maximal intensity at onset and gradually worsening. Currently rated as 8/10. Patient states tingling and headaches feel typical of prior MS exacerbation/flare. No reported fall or head trauma. Patient denies new onset weakness/numbness in extremities. No double vision/blurred vision/loss of vision. No N/V. Patient has taken ibuprofen at home for symptomatic treatment.  He is following with neurology as outpatient and was previously managed on monthly Solu-Medrol infusions.      History provided by:  Medical records and patient   used: No    Headache  Associated symptoms: numbness    Associated symptoms: no abdominal pain, no dizziness, no fever, no photophobia, no vomiting and no weakness        Review of Systems   Constitutional:  Negative for chills and fever.   Eyes:  Negative for photophobia and visual disturbance.   Respiratory:  Negative for shortness of breath.    Cardiovascular:  Negative for chest pain.   Gastrointestinal:  Negative for abdominal pain and vomiting.   Neurological:  Positive for numbness and headaches. Negative for dizziness, facial asymmetry, speech difficulty and weakness.   All other systems reviewed and are negative.          Objective     ED Triage Vitals [09/19/24 2358]   Temperature Pulse Blood Pressure Respirations SpO2 Patient Position - Orthostatic VS   97.9 °F (36.6 °C) 88 125/76 18 96 % Lying      Temp Source Heart Rate Source BP Location FiO2 (%) Pain Score    Temporal Monitor Right arm -- 7        Physical Exam  Vitals and  nursing note reviewed.   Constitutional:       General: He is not in acute distress.     Appearance: Normal appearance. He is well-developed. He is not ill-appearing, toxic-appearing or diaphoretic.   HENT:      Head: Normocephalic and atraumatic.      Nose: No congestion or rhinorrhea.   Eyes:      General:         Right eye: No discharge.         Left eye: No discharge.      Extraocular Movements: Extraocular movements intact.      Pupils: Pupils are equal, round, and reactive to light.   Cardiovascular:      Rate and Rhythm: Normal rate and regular rhythm.   Pulmonary:      Effort: Pulmonary effort is normal. No respiratory distress.      Breath sounds: Normal breath sounds. No wheezing or rales.   Abdominal:      General: There is no distension.      Palpations: Abdomen is soft.      Tenderness: There is no abdominal tenderness. There is no guarding or rebound.   Musculoskeletal:      Cervical back: Normal range of motion. No rigidity.   Skin:     General: Skin is warm.      Capillary Refill: Capillary refill takes less than 2 seconds.   Neurological:      Mental Status: He is alert and oriented to person, place, and time.      GCS: GCS eye subscore is 4. GCS verbal subscore is 5. GCS motor subscore is 6.      Cranial Nerves: No dysarthria or facial asymmetry.      Sensory: Sensation is intact.      Motor: Motor function is intact.      Comments: Strength +5/5 in bilateral UE/L.  No vertical nystagmus noted.  CN III, IV and VI intact.  Cerebellar testing: Normal FNF without ataxia.   Psychiatric:         Mood and Affect: Mood and affect and mood normal.         Behavior: Behavior normal.         Labs Reviewed   CBC AND DIFFERENTIAL - Abnormal       Result Value    WBC 8.34      RBC 4.16      Hemoglobin 12.6      Hematocrit 37.8      MCV 91      MCH 30.3      MCHC 33.3      RDW 13.4      MPV 9.6      Platelets 199      nRBC 0      Segmented % 74      Immature Grans % 1      Lymphocytes % 13 (*)     Monocytes % 7   "    Eosinophils Relative 4      Basophils Relative 1      Absolute Neutrophils 6.16      Absolute Immature Grans 0.08      Absolute Lymphocytes 1.06      Absolute Monocytes 0.61      Eosinophils Absolute 0.36      Basophils Absolute 0.07     BASIC METABOLIC PANEL - Abnormal    Sodium 145      Potassium 3.7      Chloride 113 (*)     CO2 24      ANION GAP 8      BUN 18      Creatinine 0.79      Glucose 135      Calcium 8.8      eGFR 103      Narrative:     National Kidney Disease Foundation guidelines for Chronic Kidney Disease (CKD):     Stage 1 with normal or high GFR (GFR > 90 mL/min/1.73 square meters)    Stage 2 Mild CKD (GFR = 60-89 mL/min/1.73 square meters)    Stage 3A Moderate CKD (GFR = 45-59 mL/min/1.73 square meters)    Stage 3B Moderate CKD (GFR = 30-44 mL/min/1.73 square meters)    Stage 4 Severe CKD (GFR = 15-29 mL/min/1.73 square meters)    Stage 5 End Stage CKD (GFR <15 mL/min/1.73 square meters)  Note: GFR calculation is accurate only with a steady state creatinine   LIGHT BLUE TOP     CT head without contrast   Final Interpretation by Felix Lovell DO (09/20 0059)      No acute intracranial abnormality.                  Workstation performed: [id number]             Procedures    ED Medication and Procedure Management   Prior to Admission Medications   Prescriptions Last Dose Informant Patient Reported? Taking?   MAGNESIUM PO  Self Yes No   Sig: Take by mouth   Riboflavin ([employer] Vitamin B-2) 100 MG TABS  Self No No   Sig: Take 2 tablets (200 mg total) by mouth in the morning   SYRINGE-NEEDLE, DISP, 3 ML 25G X 1\" 3 ML MISC  Self No No   Sig: Take with B12 regimen prescribed   aspirin (ECOTRIN LOW STRENGTH) 81 mg EC tablet   No No   Sig: Take 1 tablet (81 mg total) by mouth daily   atorvastatin (LIPITOR) 40 mg tablet   No No   Sig: Take 1 tablet (40 mg total) by mouth daily with dinner   cyanocobalamin 1,000 mcg/mL  Self No No   Sig: Take B12 1000 mcg IM once a week for 4 weeks, then once a month for 5 " months   dexamethasone (DECADRON) 2 mg tablet   No No   Sig: Take 1 tablet (2 mg total) by mouth daily as needed (headache)   nortriptyline (PAMELOR) 25 mg capsule   No No   Sig: Take 1 capsule (25 mg total) by mouth daily at bedtime   ocrelizumab (Ocrevus) 300 MG/10ML SOLN  Self No No   Sig: Inject 20 mL (600 mg total) into a catheter in a vein every 6 (six) months   prochlorperazine (COMPAZINE) 10 mg tablet   No No   Sig: Take 1 tablet (10 mg total) by mouth every 12 (twelve) hours as needed (HEADACHE) With Decadron in the morning   Patient not taking: Reported on 8/7/2024   rivaroxaban (XARELTO) 15 mg tablet   No No   Sig: Take 1 tablet (15 mg total) by mouth 2 (two) times a day   rivaroxaban (Xarelto Starter Pack) 15 & 20 MG starter pack   No No   Sig: As directed: 15mg po BID (Day #0-21) then 20mg po daily (Day #22- forward)   rivaroxaban (Xarelto) 20 mg tablet   No No   Sig: Take 1 tablet (20 mg total) by mouth daily with breakfast Start after completion of starter pack.      Facility-Administered Medications: None     Patient's Medications   Discharge Prescriptions    PREDNISONE 50 MG TABLET    Take 4 tablets (200 mg total) by mouth daily for 5 days       Start Date: 9/20/2024 End Date: 9/25/2024       Order Dose: 200 mg       Quantity: 20 tablet    Refills: 0     No discharge procedures on file.     Beltran Villanueva, DO  09/20/24 0217

## 2024-09-20 NOTE — DISCHARGE INSTRUCTIONS
You have been seen for evaluation of headaches and tingling likely due to an MS flare. Please complete the course of prednisone as prescribed. Return to the emergency department if you develop worsening headaches, weakness/numbness, fevers, vomiting or any other symptoms of concern. Please follow up with your neurologist by calling the number provided.

## 2024-09-23 ENCOUNTER — OFFICE VISIT (OUTPATIENT)
Dept: VASCULAR SURGERY | Facility: CLINIC | Age: 49
End: 2024-09-23
Payer: COMMERCIAL

## 2024-09-23 VITALS
HEART RATE: 74 BPM | OXYGEN SATURATION: 97 % | WEIGHT: 228 LBS | BODY MASS INDEX: 31.92 KG/M2 | SYSTOLIC BLOOD PRESSURE: 124 MMHG | DIASTOLIC BLOOD PRESSURE: 90 MMHG | HEIGHT: 71 IN

## 2024-09-23 DIAGNOSIS — I77.79 DISSECTION OF MESENTERIC ARTERY (HCC): Primary | ICD-10-CM

## 2024-09-23 PROCEDURE — 99213 OFFICE O/P EST LOW 20 MIN: CPT | Performed by: SURGERY

## 2024-09-23 NOTE — LETTER
September 23, 2024     Ha Acosta MD  [address] [facility] [address]    Patient: Carlos Landis   YOB: 1975   Date of Visit: 9/23/2024       Dear Dr. Acosta:    Thank you for referring Carlos Sabiha to me for evaluation. Below are the relevant portions of my assessment and plan of care.       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 Abdominal pain has resolved. States that abdominal pain was preceded by flu-like illness. History of MS with initial concern for MS flare. Currently tolerating regular diet and having normal bowel function.     CTA a/p 9/1 and 9/4 reviewed - stable 1.5cm SMA dissection with associated thrombus/patent lumen     -We discussed the pathophysiology of spontaneous mesenteric artery dissection, available treatment options and indications for treatment.   -Possible etiologies may include but are not limited to connective tissue disorders, FMD, segmental arteriole mediolysis, smoking, atherosclerosis, ETOH abuse, heavy weight lifting/strenuous activity, obesity.  -Continue medical optimization. Currently on ASA, therapeutic xarelto and statin.   -Discussed signs and symptoms of worsening SMA dissection/rupture including postprandial pain, food fear, sudden onset abdominal pain with associated systemic symptoms and advised to call the office or go to the ED.  -Follow-up with repeat CTA a/p in 3 months with follow-up to re-evaluate symptoms. If CTA remains stable or improved, will continue surveillance with mesenteric duplex.        Orders:  •  CTA abdomen pelvis w wo contrast; Future        If you have questions, please do not hesitate to call me. I look forward to following Carlos along with you.         Sincerely,        Janelle Herron MD        CC: No Recipients

## 2024-09-23 NOTE — PROGRESS NOTES
Ambulatory Visit  Name: Carlos Landis      : 1975      MRN: [id number]  Encounter Provider: Janelle Herron MD  Encounter Date: 2024   Encounter department: 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 Abdominal pain has resolved. States that abdominal pain was preceded by flu-like illness. History of MS with initial concern for MS flare. Currently tolerating regular diet and having normal bowel function.    CTA a/p  and  reviewed - stable 1.5cm SMA dissection with associated thrombus/patent lumen    -We discussed the pathophysiology of spontaneous mesenteric artery dissection, available treatment options and indications for treatment.   -Possible etiologies may include but are not limited to connective tissue disorders, FMD, segmental arteriole mediolysis, smoking, atherosclerosis, ETOH abuse, heavy weight lifting/strenuous activity, obesity.  -Continue medical optimization. Currently on ASA, therapeutic xarelto and statin.   -Discussed signs and symptoms of worsening SMA dissection/rupture including postprandial pain, food fear, sudden onset abdominal pain with associated systemic symptoms and advised to call the office or go to the ED.  -Follow-up with repeat CTA a/p in 3 months with follow-up to re-evaluate symptoms. If CTA remains stable or improved, will continue surveillance with mesenteric duplex.      Orders:    CTA abdomen pelvis w wo contrast; Future      History of Present Illness       Patient is here today for a hospital f/u for an admission [facility] on 2024 till 2024. Patient had a CT ABD/Pelvis with contrast done 2024 and a CTA ABD w/wo contrast done 2024. Patient denies any abdominal pain. Patient has a history of MS. He is taking Xarelto, ASA 81 mg and Atorvastatin. Patient is a former smoker.           Carlos Landis is  "a 49 y.o. male former smoker with DAVID, MS, Spontaneous SMA dissection with associated thrombus presented in early September to the hospital with weakness/numbness and several day history of abdominal pain. He states that this was preceded by a flu-like illness. His initial complaint of recurring weakness, intermittent dysarthria, and expressive aphasia was felt to be consistent with an MS flare. His abdominal pain completely resolved early on during his hospital course and he tolerated a diet. Initial concern for infectious etiology/mycotic degeneration due to leukocytosis but work-up was ultimately unremarkable. He is on steroids for MS. His neurologic symptoms improved with IV steroid dose in the ED. He denies any recurrent abdominal pain, food fear, postprandial pain or abnormal bowel habits.    History obtained from : patient and patient's Significant Other    I have reviewed and made appropriate changes to the review of systems input by the medical assistant.    Vitals:    09/23/24 0920   BP: 124/90   BP Location: Left arm   Patient Position: Sitting   Cuff Size: Large   Pulse: 74   SpO2: 97%   Weight: 103 kg (228 lb)   Height: 5' 11\" (1.803 m)       Patient Active Problem List   Diagnosis    Dysarthria    CNS demyelinating disease (HCC)    Right hemiparesis (HCC)    Ambulatory dysfunction    MS (multiple sclerosis) (HCC)    Left leg weakness    Obesity (BMI 30-39.9)    Episodic tension-type headache, not intractable    Hypomagnesemia    Stroke-like symptoms    Pain of upper abdomen    Right-sided thoracic back pain    DAVID (obstructive sleep apnea)    Weakness    Non-sustained ventricular tachycardia (HCC)    Dissection of mesenteric artery (HCC)    Left hydrocele       Past Surgical History:   Procedure Laterality Date    HERNIA REPAIR      3 times    KNEE SURGERY Right        Family History   Problem Relation Age of Onset    Stroke Maternal Grandmother     Multiple sclerosis Other        Social History "     Socioeconomic History    Marital status: Single     Spouse name: Not on file    Number of children: Not on file    Years of education: Not on file    Highest education level: Not on file   Occupational History    Not on file   Tobacco Use    Smoking status: Former    Smokeless tobacco: Former   Vaping Use    Vaping status: Former   Substance and Sexual Activity    Alcohol use: Not Currently    Drug use: Never    Sexual activity: Not Currently   Other Topics Concern    Not on file   Social History Narrative    Not on file     Social Determinants of Health     Financial Resource Strain: Not on file   Food Insecurity: No Food Insecurity (9/2/2024)    Hunger Vital Sign     Worried About Running Out of Food in the Last Year: Never true     Ran Out of Food in the Last Year: Never true   Transportation Needs: No Transportation Needs (9/2/2024)    PRAPARE - Transportation     Lack of Transportation (Medical): No     Lack of Transportation (Non-Medical): No   Physical Activity: Not on file   Stress: Not on file   Social Connections: Not on file   Intimate Partner Violence: Not on file   Housing Stability: Low Risk  (9/2/2024)    Housing Stability Vital Sign     Unable to Pay for Housing in the Last Year: No     Number of Times Moved in the Last Year: 0     Homeless in the Last Year: No       No Known Allergies      Current Outpatient Medications:     aspirin (ECOTRIN LOW STRENGTH) 81 mg EC tablet, Take 1 tablet (81 mg total) by mouth daily, Disp: 90 tablet, Rfl: 0    atorvastatin (LIPITOR) 40 mg tablet, Take 1 tablet (40 mg total) by mouth daily with dinner, Disp: 90 tablet, Rfl: 0    cyanocobalamin 1,000 mcg/mL, Take B12 1000 mcg IM once a week for 4 weeks, then once a month for 5 months, Disp: 9 mL, Rfl: 0    MAGNESIUM PO, Take by mouth, Disp: , Rfl:     NON FORMULARY, 1 patch in the morning Patch MD OLIVERA, Disp: , Rfl:     nortriptyline (PAMELOR) 25 mg capsule, Take 1 capsule (25 mg total) by mouth daily at bedtime,  "Disp: 30 capsule, Rfl: 5    ocrelizumab (Ocrevus) 300 MG/10ML SOLN, Inject 20 mL (600 mg total) into a catheter in a vein every 6 (six) months, Disp: 20 mL, Rfl: 1    predniSONE 50 mg tablet, Take 4 tablets (200 mg total) by mouth daily for 5 days, Disp: 20 tablet, Rfl: 0    prochlorperazine (COMPAZINE) 10 mg tablet, Take 1 tablet (10 mg total) by mouth every 12 (twelve) hours as needed (HEADACHE) With Decadron in the morning, Disp: 30 tablet, Rfl: 0    Riboflavin ([employer] Vitamin B-2) 100 MG TABS, Take 2 tablets (200 mg total) by mouth in the morning, Disp: 360 tablet, Rfl: 2    rivaroxaban (Xarelto Starter Pack) 15 & 20 MG starter pack, As directed: 15mg po BID (Day #0-21) then 20mg po daily (Day #22- forward), Disp: 1 each, Rfl: 0    SYRINGE-NEEDLE, DISP, 3 ML 25G X 1\" 3 ML MISC, Take with B12 regimen prescribed, Disp: 9 each, Rfl: 0    dexamethasone (DECADRON) 2 mg tablet, Take 1 tablet (2 mg total) by mouth daily as needed (headache) (Patient not taking: Reported on 9/23/2024), Disp: 5 tablet, Rfl: 2    rivaroxaban (XARELTO) 15 mg tablet, Take 1 tablet (15 mg total) by mouth 2 (two) times a day (Patient not taking: Reported on 9/23/2024), Disp: 90 tablet, Rfl: 0    rivaroxaban (Xarelto) 20 mg tablet, Take 1 tablet (20 mg total) by mouth daily with breakfast Start after completion of starter pack. (Patient not taking: Reported on 9/23/2024), Disp: 30 tablet, Rfl: 3    Review of Systems   Constitutional: Negative.    HENT: Negative.     Eyes: Negative.    Respiratory: Negative.     Cardiovascular: Negative.    Gastrointestinal: Negative.    Endocrine: Negative.    Genitourinary: Negative.    Musculoskeletal: Negative.    Skin: Negative.    Allergic/Immunologic: Negative.    Neurological: Negative.    Hematological: Negative.    Psychiatric/Behavioral: Negative.       I have personally reviewed the ROS entered by MA and agree as documented.        Objective     /90 (BP Location: Left arm, Patient Position: " "Sitting, Cuff Size: Large)   Pulse 74   Ht 5' 11\" (1.803 m)   Wt 103 kg (228 lb)   SpO2 97%   BMI 31.80 kg/m²     Physical Exam  Constitutional:       Appearance: Normal appearance.   HENT:      Head: Normocephalic and atraumatic.   Cardiovascular:      Rate and Rhythm: Normal rate.      Pulses: Normal pulses.   Pulmonary:      Effort: Pulmonary effort is normal.   Abdominal:      General: There is no distension.      Palpations: Abdomen is soft.      Tenderness: There is no abdominal tenderness.   Musculoskeletal:         General: Normal range of motion.      Cervical back: Normal range of motion and neck supple.   Skin:     General: Skin is warm and dry.      Capillary Refill: Capillary refill takes less than 2 seconds.   Neurological:      General: No focal deficit present.      Mental Status: He is alert and oriented to person, place, and time.   Psychiatric:         Mood and Affect: Mood normal.         Behavior: Behavior normal.         Thought Content: Thought content normal.         Judgment: Judgment normal.       Administrative Statements   I have spent a total time of 35 minutes in caring for this patient on the day of the visit/encounter including Diagnostic results, Prognosis, Risks and benefits of tx options, Instructions for management, Patient and family education, Importance of tx compliance, Risk factor reductions, Impressions, Counseling / Coordination of care, Documenting in the medical record, Reviewing / ordering tests, medicine, procedures  , and Obtaining or reviewing history  .  "

## 2024-09-23 NOTE — PATIENT INSTRUCTIONS
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 Abdominal pain has resolved. States that abdominal pain was preceded by flu-like illness. History of MS with initial concern for MS flare. Currently tolerating regular diet and having normal bowel function.     CTA a/p 9/1 and 9/4 reviewed - stable 1.5cm SMA dissection with associated thrombus/patent lumen     -We discussed the pathophysiology of spontaneous mesenteric artery dissection, available treatment options and indications for treatment.   -Possible etiologies may include but are not limited to connective tissue disorders, FMD, segmental arteriole mediolysis, smoking, atherosclerosis, ETOH abuse, heavy weight lifting/strenuous activity, obesity.  -Continue medical optimization. Currently on ASA, therapeutic xarelto and statin.   -Discussed signs and symptoms of worsening SMA dissection/rupture including postprandial pain, food fear, sudden onset abdominal pain with associated systemic symptoms and advised to call the office or go to the ED.  -Follow-up with repeat CTA a/p in 3 months with follow-up to re-evaluate symptoms. If CTA remains stable or improved, will continue surveillance with mesenteric duplex.        Orders:    CTA abdomen pelvis w wo contrast; Future

## 2024-09-24 NOTE — TELEPHONE ENCOUNTER
[address] [place] [address] at [phone] and spoke with Sheila Barajas. She reports they need pt's consent to ship before they are able to schedule delivery of medication. Provided my direct line for call back once obtained. Need to:  -Schedule delivery of Solumedrol to [facility]    -Schedule Solumedrol infusion at [facility] as well as switch Ocrevus appt to [facility]  -Schedule delivery of Ocrevus to [facility] once appt is confirmed    [employer] message sent to pt. Will f/u.
[address] [place] [address] at [phone] and spoke with Fam Vaughan. She reports pt did provide consent for both Solumedrol and Ocrevus. Pt scheduled delivery of medication for 11/21/23 to his own home. Advised both medications need to be shipped to [facility] infusion center. Address was provided. Fam Vaughan updated address on order. Both medications will be delivered on 11/21/23 via [employer]. Confirmed date of last Ocrevus infusion. Called [facility] infusion center and spoke with Chago Gonzalez. Scheduled pt for the following:    Solumedrol (first available after 11/21/23) 11/28/23 @ 2:30pm    Ocrevus 12/19/23 @ 7:30am (same date and time, site change only)    Called pt at [phone] (phone # provided in [facility]). Made him aware of above. He reports Solumedrol infusions were supposed to be in his home. Advised that per my discussion with Dr. Papito Govea, infusions were to be scheduled at [address] [place] [address]. Apologized for any confusion. Offered to begin process for home infusion. Pt states he needs Solumedrol infusions and will go to the infusion center. No further questions at this time.
Are you agreeable to Solumedrol script being called in?:  -methylprednisolone sodium succinate 500mg, infuse 500mg every 14 days for 3 months (6 doses)
Can not sign through Epic - see notification; can't prion, can't phone in. ..
Correct sig on rx for Methylprednisolone (there were two). Will try to e-scribe again. Called [facility]. Spoke w/Carolina. She advised if we cannot e-scribe the Methylpredisolone, we can fax a script w/a "wet" signature. ([phone]) [phone]     Dr. Marvin Ambriz I corrected sig and spoke w/pharmacy. Please try to sign one more time. If it will still not go thru, we will fax rx w/your signature. Thank you!
Dr. Molly Snyder - I have set the rx to print and notified Alexis Esteban to please keep watch at [facility] printer. She will then have you sign rx and fax to [facility]. Susana herman; thank you!
Ocrevus Rx was set to print. Called [facility] at [phone] and spoke with Francine. She transferred call to pharmacist Soledad. Called in Humberto Sanderson Rx as written on printed script. Did also call in methylprednisolone 500mg vials quantity of 2 vials per 28 days with 2 refills. Will f/u to schedule delivery of medication and schedule infusions. Update sent to pt via [employer] message.
Per office note, "patient will be advised to consider Solu-Medrol 500 mg IV q. 14 days for next 3 months. Patient may consider Solu-Medrol 500 mg monthly afterwards."    Message also received from  advising pt would like all infusions (solumedrol and Ocrevus) to be given at [address] [place] [address] infusion center. Confirmed with [facility]- cannot buy and bill with pt's insurance. Will need to obtain from pharmacy. SOLUMEDROL  Therapy plan entered and sent for signature. Script pended, please review and sign if agreeable. *Once script is sent to pharmacy, will need to schedule delivery to [facility] infusion center. After delivery date is known, can schedule infusion. OCREVUS  Next scheduled 12/19/23. Michael Hopson is [facility] from 5/18/23 to 5/17/24 for 20mls/180 days. Medication must be obtained from [facility]. New script is needed. Once appt location is changed, can schedule medication delivery to that infusion center. Script pended. *Will switch Ocrevus infusion to [facility] when calling for Solumedrol.
Please print and fax
Please review and sign pended scripts. Solumedrol Rx set to print, to be faxed to pharmacy once signed. Ocrevus Rx set to send electronically. Please approve scripts if agreeable.
Same as last week:
Solumedrol 500 mg IV q 14 weeks starting next week,treatment will be for 3 months
Yes, please call in Solumedrol as agreed   Gladys Zurita was signed without issues
110.2

## 2024-09-26 ENCOUNTER — APPOINTMENT (EMERGENCY)
Dept: RADIOLOGY | Facility: HOSPITAL | Age: 49
End: 2024-09-26
Payer: COMMERCIAL

## 2024-09-26 ENCOUNTER — HOSPITAL ENCOUNTER (EMERGENCY)
Facility: HOSPITAL | Age: 49
Discharge: HOME | End: 2024-09-26
Attending: EMERGENCY MEDICINE
Payer: COMMERCIAL

## 2024-09-26 VITALS
HEART RATE: 74 BPM | RESPIRATION RATE: 19 BRPM | TEMPERATURE: 97.7 F | SYSTOLIC BLOOD PRESSURE: 131 MMHG | OXYGEN SATURATION: 95 % | DIASTOLIC BLOOD PRESSURE: 68 MMHG

## 2024-09-26 DIAGNOSIS — R07.89 ATYPICAL CHEST PAIN: Primary | ICD-10-CM

## 2024-09-26 DIAGNOSIS — G35 MULTIPLE SCLEROSIS (HCC): ICD-10-CM

## 2024-09-26 DIAGNOSIS — I77.79 DISSECTION OF MESENTERIC ARTERY (HCC): ICD-10-CM

## 2024-09-26 LAB
ALBUMIN SERPL BCG-MCNC: 3.2 G/DL (ref 3.5–5)
ALP SERPL-CCNC: 44 U/L (ref 34–104)
ALT SERPL W P-5'-P-CCNC: 29 U/L (ref 7–52)
ANION GAP SERPL CALCULATED.3IONS-SCNC: 6 MMOL/L (ref 4–13)
ANISOCYTOSIS BLD QL SMEAR: PRESENT
AST SERPL W P-5'-P-CCNC: 21 U/L (ref 13–39)
BASOPHILS # BLD MANUAL: 0 THOUSAND/UL (ref 0–0.1)
BASOPHILS NFR MAR MANUAL: 0 % (ref 0–1)
BILIRUB SERPL-MCNC: 0.56 MG/DL (ref 0.2–1)
BUN SERPL-MCNC: 20 MG/DL (ref 5–25)
CALCIUM ALBUM COR SERPL-MCNC: 8.8 MG/DL (ref 8.3–10.1)
CALCIUM SERPL-MCNC: 8.2 MG/DL (ref 8.4–10.2)
CARDIAC TROPONIN I PNL SERPL HS: <2 NG/L
CHLORIDE SERPL-SCNC: 107 MMOL/L (ref 96–108)
CO2 SERPL-SCNC: 25 MMOL/L (ref 21–32)
CREAT SERPL-MCNC: 0.61 MG/DL (ref 0.6–1.3)
EOSINOPHIL # BLD MANUAL: 0.5 THOUSAND/UL (ref 0–0.4)
EOSINOPHIL NFR BLD MANUAL: 5 % (ref 0–6)
ERYTHROCYTE [DISTWIDTH] IN BLOOD BY AUTOMATED COUNT: 14.2 % (ref 11.6–15.1)
GFR SERPL CREATININE-BSD FRML MDRD: 115 ML/MIN/1.73SQ M
GLUCOSE SERPL-MCNC: 86 MG/DL (ref 65–140)
HCT VFR BLD AUTO: 39.8 % (ref 36.5–49.3)
HGB BLD-MCNC: 13.4 G/DL (ref 12–17)
LYMPHOCYTES # BLD AUTO: 1.8 THOUSAND/UL (ref 0.6–4.47)
LYMPHOCYTES # BLD AUTO: 16 % (ref 14–44)
MCH RBC QN AUTO: 29.9 PG (ref 26.8–34.3)
MCHC RBC AUTO-ENTMCNC: 33.7 G/DL (ref 31.4–37.4)
MCV RBC AUTO: 89 FL (ref 82–98)
MONOCYTES # BLD AUTO: 0.3 THOUSAND/UL (ref 0–1.22)
MONOCYTES NFR BLD: 3 % (ref 4–12)
NEUTROPHILS # BLD MANUAL: 7.41 THOUSAND/UL (ref 1.85–7.62)
NEUTS SEG NFR BLD AUTO: 74 % (ref 43–75)
PLATELET # BLD AUTO: 214 THOUSANDS/UL (ref 149–390)
PLATELET BLD QL SMEAR: ADEQUATE
PMV BLD AUTO: 9.9 FL (ref 8.9–12.7)
POTASSIUM SERPL-SCNC: 4 MMOL/L (ref 3.5–5.3)
PROT SERPL-MCNC: 5.8 G/DL (ref 6.4–8.4)
RBC # BLD AUTO: 4.48 MILLION/UL (ref 3.88–5.62)
RBC MORPH BLD: PRESENT
SODIUM SERPL-SCNC: 138 MMOL/L (ref 135–147)
VARIANT LYMPHS # BLD AUTO: 2 %
WBC # BLD AUTO: 10.01 THOUSAND/UL (ref 4.31–10.16)

## 2024-09-26 PROCEDURE — 85027 COMPLETE CBC AUTOMATED: CPT | Performed by: EMERGENCY MEDICINE

## 2024-09-26 PROCEDURE — 84484 ASSAY OF TROPONIN QUANT: CPT | Performed by: EMERGENCY MEDICINE

## 2024-09-26 PROCEDURE — 71045 X-RAY EXAM CHEST 1 VIEW: CPT

## 2024-09-26 PROCEDURE — 99285 EMERGENCY DEPT VISIT HI MDM: CPT | Performed by: EMERGENCY MEDICINE

## 2024-09-26 PROCEDURE — 85007 BL SMEAR W/DIFF WBC COUNT: CPT | Performed by: EMERGENCY MEDICINE

## 2024-09-26 PROCEDURE — 36415 COLL VENOUS BLD VENIPUNCTURE: CPT | Performed by: EMERGENCY MEDICINE

## 2024-09-26 PROCEDURE — 93005 ELECTROCARDIOGRAM TRACING: CPT

## 2024-09-26 PROCEDURE — 99285 EMERGENCY DEPT VISIT HI MDM: CPT

## 2024-09-26 PROCEDURE — 80053 COMPREHEN METABOLIC PANEL: CPT | Performed by: EMERGENCY MEDICINE

## 2024-09-26 NOTE — PROGRESS NOTES
"Cardiology Office Follow Up  Carlos Landis  1975  [id number]      ASSESSMENT:  NSVT, 13 beats on tele 8/7/24 (asymptomatic)  See picture under media tab 8/8/2024  Electrolytes within normal limits  Troponin levels negative  4/16/2024 echo: LVEF 70%, normal RV size and systolic function, no hemodynamically significant valvular disease  Mild aortic root dilation 4.3 cm on echo in April 2024  Multiple sclerosis  Weakness, multiple neurologic issues  DAVID  History of spontaneous SMA dissection with associated thrombosis in September 2024 at which time patient placed on Xarelto    PLAN/ DISCUSSION:  History of nonsustained VT  No recurrence on recent ZIO monitor  2 separate echocardiograms within the last 2 months showing mild aortic root dilation but otherwise no significant structural heart disease  He has no symptoms of angina  We will continue to monitor moving forward  Mild aortic root dilation  Ongoing surveillance with CTA chest recommended  History of SMA artery dissection and thrombosis in September 2024  He remains on Xarelto and is following with vascular    Follow-up in 6 months    Interval History/ HPI:   49-year-old gentleman coming in for a follow-up visit.  Today he reports feeling just okay.  He continues to have a lot of issues with multiple sclerosis.  He has had many admissions for flares unfortunately.  He says this has been a very difficult year for MS because of the heat.  He does report an occasional left-sided chest discomfort.  He could point to the side of the pain.  He says it happens randomly.  It occurs at rest.  He has no exertional chest pain or chest pressure.  His breathing is stable.  He has no palpitations or episodes of passing out.     Vitals:  /70 (BP Location: Right arm, Patient Position: Sitting, Cuff Size: Standard)   Pulse 95   Ht 5' 11\" (1.803 m)   Wt 84.8 kg (187 lb)   SpO2 99%   BMI 26.08 kg/m²      Past Medical History:   Diagnosis Date    Arthritis     " Diabetes mellitus (HCC)     prediabetes    Foreign body of left ear 09/02/2024    MS (multiple sclerosis) (HCC)     Scoliosis     Visual impairment      Social History     Socioeconomic History    Marital status: Single     Spouse name: Not on file    Number of children: Not on file    Years of education: Not on file    Highest education level: Not on file   Occupational History    Not on file   Tobacco Use    Smoking status: Former    Smokeless tobacco: Former   Vaping Use    Vaping status: Former   Substance and Sexual Activity    Alcohol use: Not Currently    Drug use: Never    Sexual activity: Not Currently   Other Topics Concern    Not on file   Social History Narrative    Not on file     Social Determinants of Health     Financial Resource Strain: Not on file   Food Insecurity: No Food Insecurity (9/2/2024)    Hunger Vital Sign     Worried About Running Out of Food in the Last Year: Never true     Ran Out of Food in the Last Year: Never true   Transportation Needs: No Transportation Needs (9/2/2024)    PRAPARE - Transportation     Lack of Transportation (Medical): No     Lack of Transportation (Non-Medical): No   Physical Activity: Not on file   Stress: Not on file   Social Connections: Not on file   Intimate Partner Violence: Not on file   Housing Stability: Low Risk  (9/2/2024)    Housing Stability Vital Sign     Unable to Pay for Housing in the Last Year: No     Number of Times Moved in the Last Year: 0     Homeless in the Last Year: No      Family History   Problem Relation Age of Onset    Stroke Maternal Grandmother     Multiple sclerosis Other      Past Surgical History:   Procedure Laterality Date    HERNIA REPAIR      3 times    KNEE SURGERY Right        Current Outpatient Medications:     aspirin (ECOTRIN LOW STRENGTH) 81 mg EC tablet, Take 1 tablet (81 mg total) by mouth daily, Disp: 90 tablet, Rfl: 0    atorvastatin (LIPITOR) 40 mg tablet, Take 1 tablet (40 mg total) by mouth daily with dinner,  "Disp: 90 tablet, Rfl: 0    cyanocobalamin 1,000 mcg/mL, Take B12 1000 mcg IM once a week for 4 weeks, then once a month for 5 months, Disp: 9 mL, Rfl: 0    dexamethasone (DECADRON) 2 mg tablet, Take 1 tablet (2 mg total) by mouth daily as needed (headache) (Patient not taking: Reported on 9/23/2024), Disp: 5 tablet, Rfl: 2    MAGNESIUM PO, Take by mouth, Disp: , Rfl:     NON FORMULARY, 1 patch in the morning Patch MD OLIVERA, Disp: , Rfl:     nortriptyline (PAMELOR) 25 mg capsule, Take 1 capsule (25 mg total) by mouth daily at bedtime, Disp: 30 capsule, Rfl: 5    ocrelizumab (Ocrevus) 300 MG/10ML SOLN, Inject 20 mL (600 mg total) into a catheter in a vein every 6 (six) months, Disp: 20 mL, Rfl: 1    prochlorperazine (COMPAZINE) 10 mg tablet, Take 1 tablet (10 mg total) by mouth every 12 (twelve) hours as needed (HEADACHE) With Decadron in the morning, Disp: 30 tablet, Rfl: 0    Riboflavin ([employer] Vitamin B-2) 100 MG TABS, Take 2 tablets (200 mg total) by mouth in the morning, Disp: 360 tablet, Rfl: 2    rivaroxaban (Xarelto Starter Pack) 15 & 20 MG starter pack, As directed: 15mg po BID (Day #0-21) then 20mg po daily (Day #22- forward), Disp: 1 each, Rfl: 0    rivaroxaban (XARELTO) 15 mg tablet, Take 1 tablet (15 mg total) by mouth 2 (two) times a day (Patient not taking: Reported on 9/23/2024), Disp: 90 tablet, Rfl: 0    rivaroxaban (Xarelto) 20 mg tablet, Take 1 tablet (20 mg total) by mouth daily with breakfast Start after completion of starter pack. (Patient not taking: Reported on 9/23/2024), Disp: 30 tablet, Rfl: 3    SYRINGE-NEEDLE, DISP, 3 ML 25G X 1\" 3 ML MISC, Take with B12 regimen prescribed, Disp: 9 each, Rfl: 0      Review of Systems:  Review of Systems   Constitutional:  Negative for chills and fever.   HENT:  Negative for ear pain and sore throat.    Eyes:  Negative for pain and visual disturbance.   Respiratory:  Negative for cough and shortness of breath.    Cardiovascular:  Negative for chest pain and " palpitations.   Gastrointestinal:  Negative for abdominal pain and vomiting.   Genitourinary:  Negative for dysuria and hematuria.   Musculoskeletal:  Negative for arthralgias and back pain.   Skin:  Negative for color change and rash.   Neurological:  Negative for seizures and syncope.   All other systems reviewed and are negative.        Physical Exam:  Physical Exam  Constitutional:       Appearance: He is well-developed.   HENT:      Head: Normocephalic and atraumatic.   Eyes:      Pupils: Pupils are equal, round, and reactive to light.   Cardiovascular:      Rate and Rhythm: Normal rate and regular rhythm.      Heart sounds: No murmur heard.     No friction rub. No gallop.   Pulmonary:      Effort: Pulmonary effort is normal. No respiratory distress.      Breath sounds: Normal breath sounds. No wheezing or rales.   Abdominal:      General: Bowel sounds are normal. There is no distension.      Palpations: Abdomen is soft.      Tenderness: There is no abdominal tenderness.   Musculoskeletal:         General: No deformity. Normal range of motion.      Cervical back: Normal range of motion and neck supple.   Skin:     General: Skin is warm and dry.   Neurological:      Mental Status: He is alert and oriented to person, place, and time.   Psychiatric:         Behavior: Behavior normal.         This note was completed in part utilizing [employer].  Grammatical errors, random word insertions, spelling mistakes, and incomplete sentences can be an occasional consequence of this system secondary to software limitations, ambient noise, and hardware issues.  If you have any questions or concerns about the content, text, or information contained within the body of this dictation, please contact the provider for clarification.

## 2024-09-26 NOTE — ED PROVIDER NOTES
Final diagnoses:   Atypical chest pain   Multiple sclerosis (HCC)   Dissection of mesenteric artery (HCC)     ED Disposition       ED Disposition   Discharge    Condition   Stable    Date/Time   Thu Sep 26, 2024  2:15 PM    Comment   Carlos Landis discharge to home/self care.                   Assessment & Plan       Medical Decision Making  Diagnosis: Arrhythmia, ectopy, electrolyte disturbance, anemia, muscle strain, exacerbation of MS  Plan For EKG, chest x-ray, laboratories.    Patient with unremarkable cardiac workup.  Discussed case with neurology who agrees with plan for discharge and close outpatient office follow-up.    The patient (and any family present) verbalized understanding of the discharge instructions and warnings that would necessitate return to the Emergency Department.    All questions were answered prior to discharge.    Amount and/or Complexity of Data Reviewed  Labs: ordered.  Radiology: ordered and independent interpretation performed.        ED Course as of 09/26/24 1701   Thu Sep 26, 2024   1401 Patient remains asymptomatic upon reevaluation.  Just generally feels weak at this time.  Will discuss with neurology   1413 Discussed case with Dr. Hernandez.  Reviewed history of present illness, ED course past medical history.  Patient remained asymptomatic in the ED.  Cardiac workup unremarkable.  She is advising no steroids at this time.  Will arrange for close outpatient neurology follow-up for worsening MS       Medications - No data to display    ED Risk Strat Scores   HEART Risk Score      Flowsheet Row Most Recent Value   Heart Score Risk Calculator    History 1 Filed at: 09/26/2024 1418   ECG 0 Filed at: 09/26/2024 1418   Age 1 Filed at: 09/26/2024 1418   Risk Factors 1 Filed at: 09/26/2024 1418   Troponin 0 Filed at: 09/26/2024 1418   HEART Score 3 Filed at: 09/26/2024 1418                               SBIRT 22yo+      Flowsheet Row Most Recent Value   Initial Alcohol Screen: US  AUDIT-C     1. How often do you have a drink containing alcohol? 0 Filed at: 09/26/2024 1311   2. How many drinks containing alcohol do you have on a typical day you are drinking?  0 Filed at: 09/26/2024 1311   3a. Male UNDER 65: How often do you have five or more drinks on one occasion? 0 Filed at: 09/26/2024 1311   3b. FEMALE Any Age, or MALE 65+: How often do you have 4 or more drinks on one occassion? 0 Filed at: 09/26/2024 1311   Audit-C Score 0 Filed at: 09/26/2024 1311   UNA: How many times in the past year have you...    Used an illegal drug or used a prescription medication for non-medical reasons? Never Filed at: 09/26/2024 1311                            History of Present Illness       Chief Complaint   Patient presents with    Chest Pain     Patient presents to the ED with c/o chest pain since last evening during PT, states 1 nitro given en route by EMS with no change. Reports hx of MS which has worsened this year        Past Medical History:   Diagnosis Date    Arthritis     Diabetes mellitus (HCC)     prediabetes    Foreign body of left ear 09/02/2024    MS (multiple sclerosis) (HCC)     Scoliosis     Visual impairment       Past Surgical History:   Procedure Laterality Date    HERNIA REPAIR      3 times    KNEE SURGERY Right       Family History   Problem Relation Age of Onset    Stroke Maternal Grandmother     Multiple sclerosis Other       Social History     Tobacco Use    Smoking status: Former    Smokeless tobacco: Former   Vaping Use    Vaping status: Former   Substance Use Topics    Alcohol use: Not Currently    Drug use: Never      E-Cigarette/Vaping    E-Cigarette Use Former User       E-Cigarette/Vaping Substances    Nicotine No     THC No     CBD No     Flavoring No     Other No     Unknown No       I have reviewed and agree with the history as documented.       Chest Pain      Review of Systems   Cardiovascular:  Positive for chest pain.           Objective       ED Triage Vitals    Temperature Pulse Blood Pressure Respirations SpO2 Patient Position - Orthostatic VS   09/26/24 1315 09/26/24 1310 09/26/24 1310 09/26/24 1310 09/26/24 1310 09/26/24 1310   97.7 °F (36.5 °C) 81 121/81 18 97 % Sitting      Temp Source Heart Rate Source BP Location FiO2 (%) Pain Score    09/26/24 1315 09/26/24 1400 09/26/24 1310 -- 09/26/24 1310    Oral Monitor Left arm  7      Vitals      Date and Time Temp Pulse SpO2 Resp BP Pain Score FACES Pain Rating User   09/26/24 1400 -- 74 95 % 19 131/68 -- -- KP   09/26/24 1330 -- 75 96 % 21 125/78 -- --    09/26/24 1315 97.7 °F (36.5 °C) -- -- -- -- -- --    09/26/24 1310 -- 81 97 % 18 121/81 7 --             Physical Exam  Vitals and nursing note reviewed.   Constitutional:       General: He is not in acute distress.     Appearance: He is well-developed.   HENT:      Head: Normocephalic and atraumatic.      Right Ear: External ear normal.      Left Ear: External ear normal.   Eyes:      General: No scleral icterus.  Cardiovascular:      Rate and Rhythm: Normal rate and regular rhythm.      Heart sounds: Normal heart sounds.   Pulmonary:      Effort: Pulmonary effort is normal. No respiratory distress.   Abdominal:      General: There is no distension.   Musculoskeletal:      Cervical back: Normal range of motion.   Skin:     Findings: No rash.   Neurological:      General: No focal deficit present.      Mental Status: He is alert and oriented to person, place, and time. Mental status is at baseline.   Psychiatric:         Mood and Affect: Mood normal.         Results Reviewed       Procedure Component Value Units Date/Time    RBC Morphology Reflex Test [[id number]] Collected: 09/26/24 1316    Lab Status: Final result Specimen: Blood from Arm, Left Updated: 09/26/24 1401    CBC and differential [[id number]]  (Normal) Collected: 09/26/24 1316    Lab Status: Final result Specimen: Blood from Arm, Left Updated: 09/26/24 1351     WBC 10.01 Thousand/uL      RBC 4.48  Million/uL      Hemoglobin 13.4 g/dL      Hematocrit 39.8 %      MCV 89 fL      MCH 29.9 pg      MCHC 33.7 g/dL      RDW 14.2 %      MPV 9.9 fL      Platelets 214 Thousands/uL     Narrative:      This is an appended report.  These results have been appended to a previously verified report.    Manual Differential(PHLEBS Do Not Order) [[id number]]  (Abnormal) Collected: 09/26/24 1316    Lab Status: Final result Specimen: Blood from Arm, Left Updated: 09/26/24 1351     Segmented % 74 %      Lymphocytes % 16 %      Monocytes % 3 %      Eosinophils % 5 %      Basophils % 0 %      Atypical Lymphocytes % 2 %      Absolute Neutrophils 7.41 Thousand/uL      Absolute Lymphocytes 1.80 Thousand/uL      Absolute Monocytes 0.30 Thousand/uL      Absolute Eosinophils 0.50 Thousand/uL      Absolute Basophils 0.00 Thousand/uL      Total Counted --     RBC Morphology Present     Platelet Estimate Adequate     Anisocytosis Present    HS Troponin 0hr (reflex protocol) [[id number]]  (Normal) Collected: 09/26/24 1316    Lab Status: Final result Specimen: Blood from Arm, Left Updated: 09/26/24 1347     hs TnI 0hr <2 ng/L     Comprehensive metabolic panel [[id number]]  (Abnormal) Collected: 09/26/24 1316    Lab Status: Final result Specimen: Blood from Arm, Left Updated: 09/26/24 1346     Sodium 138 mmol/L      Potassium 4.0 mmol/L      Chloride 107 mmol/L      CO2 25 mmol/L      ANION GAP 6 mmol/L      BUN 20 mg/dL      Creatinine 0.61 mg/dL      Glucose 86 mg/dL      Calcium 8.2 mg/dL      Corrected Calcium 8.8 mg/dL      AST 21 U/L      ALT 29 U/L      Alkaline Phosphatase 44 U/L      Total Protein 5.8 g/dL      Albumin 3.2 g/dL      Total Bilirubin 0.56 mg/dL      eGFR 115 ml/min/1.73sq m     Narrative:      National Kid[employer] guidelines for Chronic Kidney Disease (CKD):     Stage 1 with normal or high GFR (GFR > 90 mL/min/1.73 square meters)    Stage 2 Mild CKD (GFR = 60-89 mL/min/1.73 square meters)    Stage 3A Moderate  "CKD (GFR = 45-59 mL/min/1.73 square meters)    Stage 3B Moderate CKD (GFR = 30-44 mL/min/1.73 square meters)    Stage 4 Severe CKD (GFR = 15-29 mL/min/1.73 square meters)    Stage 5 End Stage CKD (GFR <15 mL/min/1.73 square meters)  Note: GFR calculation is accurate only with a steady state creatinine            XR chest 1 view portable   ED Interpretation by Azam Irizarry DO ( [id number])   No acute cardiopulmonary process.  Appears relatively unchanged when compared to previous.      Final Interpretation by Yoav Wright MD ( [id number])      No acute cardiopulmonary disease.            Workstation performed: [id number]             ECG 12 Lead Documentation Only    Date/Time: 2024 1:37 PM    Performed by: Azam Irizarry DO  Authorized by: Azam Irizarry DO    Indications / Diagnosis:  Chest pain  ECG reviewed by me, the ED Provider: yes    Patient location:  ED  Previous ECG:     Previous ECG:  Compared to current    Comparison ECG info:  24    Similarity:  No change  Interpretation:     Interpretation: normal    Rate:     ECG rate:  78    ECG rate assessment: normal    Rhythm:     Rhythm: sinus rhythm    Ectopy:     Ectopy: none    QRS:     QRS axis:  Normal    QRS intervals:  Normal  Conduction:     Conduction: normal    ST segments:     ST segments:  Normal  T waves:     T waves: normal        ED Medication and Procedure Management   Prior to Admission Medications   Prescriptions Last Dose Informant Patient Reported? Taking?   MAGNESIUM PO  Self Yes No   Sig: Take by mouth   NON FORMULARY  Self Yes No   Si patch in the morning Patch MD NAD   Riboflavin ([employer] Vitamin B-2) 100 MG TABS  Self No No   Sig: Take 2 tablets (200 mg total) by mouth in the morning   SYRINGE-NEEDLE, DISP, 3 ML 25G X 1\" 3 ML MISC  Self No No   Sig: Take with B12 regimen prescribed   aspirin (ECOTRIN LOW STRENGTH) 81 mg EC tablet  Self No No   Sig: Take 1 tablet (81 mg total) by mouth daily   atorvastatin " (LIPITOR) 40 mg tablet  Self No No   Sig: Take 1 tablet (40 mg total) by mouth daily with dinner   cyanocobalamin 1,000 mcg/mL  Self No No   Sig: Take B12 1000 mcg IM once a week for 4 weeks, then once a month for 5 months   dexamethasone (DECADRON) 2 mg tablet  Self No No   Sig: Take 1 tablet (2 mg total) by mouth daily as needed (headache)   Patient not taking: Reported on 9/23/2024   nortriptyline (PAMELOR) 25 mg capsule  Self No No   Sig: Take 1 capsule (25 mg total) by mouth daily at bedtime   ocrelizumab (Ocrevus) 300 MG/10ML SOLN  Self No No   Sig: Inject 20 mL (600 mg total) into a catheter in a vein every 6 (six) months   predniSONE 50 mg tablet  Self No No   Sig: Take 4 tablets (200 mg total) by mouth daily for 5 days   prochlorperazine (COMPAZINE) 10 mg tablet  Self No No   Sig: Take 1 tablet (10 mg total) by mouth every 12 (twelve) hours as needed (HEADACHE) With Decadron in the morning   rivaroxaban (XARELTO) 15 mg tablet  Self No No   Sig: Take 1 tablet (15 mg total) by mouth 2 (two) times a day   Patient not taking: Reported on 9/23/2024   rivaroxaban (Xarelto Starter Pack) 15 & 20 MG starter pack  Self No No   Sig: As directed: 15mg po BID (Day #0-21) then 20mg po daily (Day #22- forward)   rivaroxaban (Xarelto) 20 mg tablet  Self No No   Sig: Take 1 tablet (20 mg total) by mouth daily with breakfast Start after completion of starter pack.   Patient not taking: Reported on 9/23/2024      Facility-Administered Medications: None     Discharge Medication List as of 9/26/2024  2:21 PM        CONTINUE these medications which have NOT CHANGED    Details   aspirin (ECOTRIN LOW STRENGTH) 81 mg EC tablet Take 1 tablet (81 mg total) by mouth daily, Starting Fri 9/6/2024, Normal      atorvastatin (LIPITOR) 40 mg tablet Take 1 tablet (40 mg total) by mouth daily with dinner, Starting Thu 9/5/2024, Normal      cyanocobalamin 1,000 mcg/mL Take B12 1000 mcg IM once a week for 4 weeks, then once a month for 5  "months, Normal      dexamethasone (DECADRON) 2 mg tablet Take 1 tablet (2 mg total) by mouth daily as needed (headache), Starting Fri 8/2/2024, Normal      MAGNESIUM PO Take by mouth, Historical Med      NON FORMULARY 1 patch in the morning Patch MD NAD, Historical Med      nortriptyline (PAMELOR) 25 mg capsule Take 1 capsule (25 mg total) by mouth daily at bedtime, Starting Thu 7/18/2024, Normal      ocrelizumab (Ocrevus) 300 MG/10ML SOLN Inject 20 mL (600 mg total) into a catheter in a vein every 6 (six) months, Starting Tue 11/14/2023, Print      prochlorperazine (COMPAZINE) 10 mg tablet Take 1 tablet (10 mg total) by mouth every 12 (twelve) hours as needed (HEADACHE) With Decadron in the morning, Starting Fri 8/2/2024, Phone In      Riboflavin ([employer] Vitamin B-2) 100 MG TABS Take 2 tablets (200 mg total) by mouth in the morning, Starting Mon 5/23/2022, Normal      rivaroxaban (Xarelto Starter Pack) 15 & 20 MG starter pack As directed: 15mg po BID (Day #0-21) then 20mg po daily (Day #22- forward), Normal      !! rivaroxaban (XARELTO) 15 mg tablet Take 1 tablet (15 mg total) by mouth 2 (two) times a day, Starting Thu 9/5/2024, Normal      !! rivaroxaban (Xarelto) 20 mg tablet Take 1 tablet (20 mg total) by mouth daily with breakfast Start after completion of starter pack., Starting Wed 9/4/2024, Normal      SYRINGE-NEEDLE, DISP, 3 ML 25G X 1\" 3 ML MISC Take with B12 regimen prescribed, Normal       !! - Potential duplicate medications found. Please discuss with provider.        STOP taking these medications       predniSONE 50 mg tablet Comments:   Reason for Stopping:             No discharge procedures on file.  ED SEPSIS DOCUMENTATION   Time reflects when diagnosis was documented in both MDM as applicable and the Disposition within this note       Time User Action Codes Description Comment    9/26/2024  2:15 PM Azam Irizarry Add [R07.89] Atypical chest pain     9/26/2024  2:15 PM Azam Irizarry Add [G35] " Multiple sclerosis (HCC)     9/26/2024  2:16 PM Azam Irizarry Add [I77.79] Dissection of mesenteric artery ([facility])                  Azam Irizarry,   09/26/24 6833

## 2024-09-27 LAB
ATRIAL RATE: 78 BPM
P AXIS: 44 DEGREES
PR INTERVAL: 164 MS
QRS AXIS: 28 DEGREES
QRSD INTERVAL: 96 MS
QT INTERVAL: 402 MS
QTC INTERVAL: 458 MS
T WAVE AXIS: 49 DEGREES
VENTRICULAR RATE: 78 BPM

## 2024-09-27 PROCEDURE — 93010 ELECTROCARDIOGRAM REPORT: CPT | Performed by: INTERNAL MEDICINE

## 2024-09-30 ENCOUNTER — OFFICE VISIT (OUTPATIENT)
Dept: CARDIOLOGY CLINIC | Facility: CLINIC | Age: 49
End: 2024-09-30
Payer: COMMERCIAL

## 2024-09-30 VITALS
DIASTOLIC BLOOD PRESSURE: 70 MMHG | OXYGEN SATURATION: 99 % | HEIGHT: 71 IN | HEART RATE: 95 BPM | WEIGHT: 187 LBS | SYSTOLIC BLOOD PRESSURE: 120 MMHG | BODY MASS INDEX: 26.18 KG/M2

## 2024-09-30 DIAGNOSIS — I47.29 NON-SUSTAINED VENTRICULAR TACHYCARDIA (HCC): Primary | ICD-10-CM

## 2024-09-30 PROCEDURE — 99214 OFFICE O/P EST MOD 30 MIN: CPT | Performed by: PHYSICIAN ASSISTANT

## 2024-09-30 NOTE — PATIENT INSTRUCTIONS
No changes to medications today, please continue everything the same. If you need refills of your cardiac medications prescribed by our office, please call us ahead of time so that they can be filled.   We will see you back in the office in 6 months. If you have any questions or concerns in the mean time please do not hesitate to call our office.

## 2024-10-07 ENCOUNTER — TELEPHONE (OUTPATIENT)
Age: 49
End: 2024-10-07

## 2024-10-07 NOTE — TELEPHONE ENCOUNTER
MS Triage :  New numbness/tingling? Still the same on the left side of face. It comes and goes, it happens for about 1 -2 hours.     New Weakness/muscle spasms? Muscle spasms in stomach area.  Pt stated that he is using the walker 24-7 it is hard to tell if there has been an increase in muscle weakness.   Bowel/Bladder Changes? No   (if so explain how)    UTI Sx? No   (burning, itching, painful urination, retention, urgency etc.)   (if so explain sx)    Vision changes? No, but stated he has to use his glasses more than normal. Stated he feels this normal he has always had glasses for distance.   (blurred/double vision, painful vision, decreased vision)   (if so explain changes)    Recent illness? Last cold was when he had covid in 2023   (cough, cold, chills, fever, sinus infection, URI, UTI, etc.)   (if so what symptoms)    Taking any disease modifying medication? Xarelto  (copaxone, tecifdera, tysabri etc.)  (if yes, what medication)  Missed doses? Only time pt missed was when he was in the hospital   (why/how often/long)    New or worsening fatigue?no     Date/Type of Last MRI (brain/Tspine/Cspine)? MRI oliverio 07/15/2024    History of IV steroids?yes , last steriod IV was 09/01/2024 at [facility] , pt stated he does not have a problem with it beside not be able to sleep right away after. Pt stated he does not sleep well to begin with. Pt stated he is not sure when he will be going next.   (If so when was last IV steroids)  (How were they tolerated)  (location of last infusion home vs. Infusion center)    Diabetes or psych Hx? Pt was pre diabetic type 2 and stated he has lost weight/ life style change and not pre diabetic anymore. Stated he is not eating all the time like he was. Pt stated he was a .     Increase in stress? Right after his last hospital admission his mom went in and has been in the hospital and rehab, pt mom is still not home. Potientally 10/10/09/2024 she will be home.  He stated  that he has not been able to see her since. The sections she is in is high with covid. Pt mom caught covid. This has delayed her coming home. (new/lost job, family death, divorce etc.)  (if so what is the new stressor)    Any drug use? No   (including prescribed medical marijuana, cannabis, and/or CBD, illicit street drugs etc)  (if yes, what drug and when was the last use/dose)

## 2024-10-07 NOTE — TELEPHONE ENCOUNTER
Called pt, no answer. LVM requesting call back. Awaiting call back from pt to go over MS questions to get better understanding of symptoms.    MS Triage :  New numbness/tingling?  (If so where)  New Weakness/muscle spasms?   (If so where)  Bowel/Bladder Changes?   (if so explain how)  UTI Sx?   (burning, itching, painful urination, retention, urgency etc.)   (if so explain sx)  Vision changes? (blurred/double vision, painful vision, decreased vision)   (if so explain changes)  Recent illness?   (cough, cold, chills, fever, sinus infection, URI, UTI, etc.)   (if so what symptoms)  Taking any disease modifying medication?   (copaxone, tecifdera, tysabri etc.)  (if yes, what medication)  Missed doses?   (why/how often/long)  New or worsening fatigue?  Date/Type of Last MRI (brain/Tspine/Cspine)?   History of IV steroids?  (If so when was last IV steroids)  (How were they tolerated)  (location of last infusion home vs. Infusion center)  Diabetes or psych Hx?  Increase in stress? (new/lost job, family death, divorce etc.)  (if so what is the new stressor)  Any drug use?   (including prescribed medical marijuana, cannabis, and/or CBD, illicit street drugs etc)  (if yes, what drug and when was the last use/dose)

## 2024-10-07 NOTE — TELEPHONE ENCOUNTER
----- Message from Terra Hernandez MD sent at 9/26/2024  2:14 PM EDT -----  Patient presented to the [facility] with chest pain and described progressive worsening MS symptoms OVER THE year.    Please apply acute MS relapse questionnaire to get better understanding of his neurologic complaints.   Patient is to keep his appointment with [facility] in October as originally scheduled or earlier if any available.     Thank you,    David

## 2024-10-24 ENCOUNTER — OFFICE VISIT (OUTPATIENT)
Dept: NEUROLOGY | Facility: CLINIC | Age: 49
End: 2024-10-24
Payer: COMMERCIAL

## 2024-10-24 VITALS
HEIGHT: 71 IN | BODY MASS INDEX: 31.46 KG/M2 | HEART RATE: 92 BPM | WEIGHT: 224.7 LBS | SYSTOLIC BLOOD PRESSURE: 130 MMHG | TEMPERATURE: 97.3 F | OXYGEN SATURATION: 96 % | DIASTOLIC BLOOD PRESSURE: 70 MMHG

## 2024-10-24 DIAGNOSIS — G35 MS (MULTIPLE SCLEROSIS) (HCC): Primary | ICD-10-CM

## 2024-10-24 PROCEDURE — 99215 OFFICE O/P EST HI 40 MIN: CPT

## 2024-10-24 NOTE — Clinical Note
Hey,  This patient needs 1g IV Solu medrol set up at [facility] October, November, December to complete 6 month course ordered by Dr. DUNCAN  Thanks! Tarsha

## 2024-10-24 NOTE — PROGRESS NOTES
Ambulatory Visit  Name: Carlos Landis      : 1975      MRN: [id number]  Encounter Provider: SHYANN Mujica  Encounter Date: 10/24/2024   Encounter department: [facility] [place]    Assessment & Plan  MS (multiple sclerosis) ([facility])  Carlos Landis is a 49 year old male with MS maintained on Ocrevus with his last infusion 7/10/2024. He also receives IV Solumedrol 1 gram monthly x 6 months (started 2024, skipping 2024 due to Ocrevus administration). He has completed 3 out of 6 months of IV solumedrol and requires assistance with arranging remaining infusions. Since he was last seen he is no longer using Decadron prn for headache as this was causing transient episodes of worsening weakness, numbness and tingling. He reports no reoccurrence since discontinuation. He denies any recent or recurrent infections. He is up to date on surveillance labs and imaging, with stable MRI Brain 2024. He denies any new or worsening neurologic symptoms concerning for disease progression or relapse. His chronic weakness is stable and he is ambulating with a walker. He will continue IV Solumedrol for remaining 3 treatments (message sent to MS navigator for assistance), proceed with Ocrevus as scheduled and then follow up with Dr. Hernandez in 2025; sooner if needed.         Patient Instructions   - Nursing will call with dates for monthly IV Solumedrol to complete 6 month course  - Continue Ocrevus as scheduled next; labs 10 days prior   - Continue Nortriptyline 50 mg at bedtime   - Continue off of Decadron; use Tylenol as needed. Avoid NSAIDs   - Follow up with Dr. Hernandez in 5 months       History of Present Illness     HPI Carlos Landis is a 49 year old male with MS, maintained on Ocrevus with last infusion 7/10/2024. He was last seen by Dr. Hernandez 24. He presents today to follow up.    He reports since he was last seen he was in the hospital several times  with worsening weakness, numbness and tingling. His sister states she had him discontinue oral decadron prn prescribed for headache and then these episodes of increased weakness, numbness/tingling resolved. He is still taking Nortriptyline 50 mg qhs without any issues. He denies any recent URI/UTI or other reoccurring infections. He feels he is back to his normal baseline. He has baseline chronic weakness, he uses the walker. He has not used the wheelchair unless going further distances. He denies any change in bowel or bladder. He has chronic left facial intermittent numbness/tingling.  He recently complete physical therapy. In addition to his Ocrevus he has been receiving 1 g of IV steroids once per month. He was to complete a 6 month course starting June 2024 (July 2024 was skipped due to administration of Ocrevus). He has not yet received October 2024 and it not scheduled for November or December. He reports tolerating IV steroids with no adverse effects similar to dexamethasone.      8/8/2024 MRI Brain wo  1. No acute infarct, mass effect or midline shift  2. Redemonstrated white matter findings in keeping with chronic demyelinating disease.    4/16/24 MRI Cervical spine wo  Unchanged demyelinating lesions in cervical spine. No new or enhancing lesion.  Mild multilevel degenerative changes of cervical spine with mild foraminal narrowing left C7-T1, as detailed above. No significant canal stenosis    9/29/2022 MRI Thoracic spine wwo  1.  No definite cord signal abnormality within limitation of distorted image quality by motion artifact in axial sequences.  No pathologic enhancement.  2.  Significant dextroscoliosis with apex at T8-9.  3.  Patent and nonstenotic thoracic spinal canal.  No significant disc bulge or herniation    Labs  7/3/2024 Acute hep panel/TB/HIV negative/nonreactive  7/3/24 Immunoglobulins within normal limits   9/26/24 CBC within normal limits  9/26/24 CMP within normal limits with the  exception of low total protein, albumin and calcium of 8.2      Review of multiple admissions    [facility] ER 9/19/24  Carlos Landis is a 49 y.o. 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 Patient reporting two hours of bilateral headache and bilateral facial tingling. Headache was not maximal intensity at onset and gradually worsening. Currently rated as 8/10. Patient states tingling and headaches feel typical of prior MS exacerbation/flare. No reported fall or head trauma. Patient denies new onset weakness/numbness in extremities. No double vision/blurred vision/loss of vision. No N/V. Patient has taken ibuprofen at home for symptomatic treatment. He is following with neurology as outpatient and was previously managed on monthly Solu-Medrol infusions.       9/1/24 [facility]   Patient presented to the hospital with generalized weakness and left facial numbness.  Had multiple admissions for same issue suspect MS flare versus psychogenic stress as etiology of flare.  Patient reports intermittent left-sided pressure-like abdominal pain for the past 4 days.  CT abdomen pelvis with contrast showed new mild ectasia of the proximal SMA with associated filling defect causing near complete occlusion of the proximal SMA with distal reconstitution suspicious for thrombus.  ED reached out to vascular surgery.  Comparing to the CT in December these are new findings.  Differential could be superior mesenteric artery dissection with thrombus formation, possible thromboembolic event, mycotic degeneration of the superior mesenteric artery  Mild leukocytosis of 12, repeat labs showed no leukocytosis.  Lactic acid negative  Start aspirin 81 mg daily, continue heparin drip which was started in the ED  Echo unremarkable   Vascular surgery consult, input appreciated  Started on heparin gtt initially   Advanced diet -- he is tolerating this   Repeat CTA on 9/4 stable.    D/w  vascular.  Transitioned to PO Xarelto.  He is tolerating this.  Price checked for $11/month.  15 mg BID x 21 days then 20 mg daily thereafter   Start aspirin and Lipitor  Follow-up blood cultures, which are negative at 72 hrs   ESR and CRP both elevated  Close monitoring of abdominal exam -- pt denies any abdominal pain at this time, denies n/v   D/w vascular, he is cleared for d/c.  Follow up outpt     MS (multiple sclerosis) ([facility])  Assessment & Plan  Multiple admissions for generalized weakness, numbness intermittent dysarthria, expressive aphasia.  Last admission beginning of August, had a stroke workup which was negative.  Suspect MS flare versus psychogenic stress as etiology of flare  On Ocrelizumab and he is getting monthly Solu-Medrol infusion 1000 mg (beginning of every month)  ED discussed with patient's neurologist, since he is getting his Solu-Medrol infusion at the beginning of the month, he received 1000 mg of Solu-Medrol IV while in the ED.  Per neurology no further steroids are indicated  Continue to monitor neurostatus and consider neurology consult if needed  At this time weakness much improved s/p IV steroids in ED.  Patient reports he is back to baseline     Review of Systems   Constitutional:  Negative for appetite change, fatigue and fever.   HENT: Negative.  Negative for hearing loss, tinnitus, trouble swallowing and voice change.    Eyes: Negative.  Negative for photophobia, pain and visual disturbance.   Respiratory: Negative.  Negative for shortness of breath.    Cardiovascular: Negative.  Negative for palpitations.   Gastrointestinal: Negative.  Negative for nausea and vomiting.   Endocrine: Negative.  Negative for cold intolerance.   Genitourinary: Negative.  Negative for dysuria, frequency and urgency.   Musculoskeletal:  Negative for back pain, gait problem, myalgias, neck pain and neck stiffness.   Skin: Negative.  Negative for rash.   Allergic/Immunologic: Negative.    Neurological:   "Positive for weakness (L sided upper body and face). Negative for dizziness, tremors, seizures, syncope, facial asymmetry, speech difficulty, light-headedness, numbness and headaches.   Hematological: Negative.  Does not bruise/bleed easily.   Psychiatric/Behavioral: Negative.  Negative for confusion, hallucinations and sleep disturbance.    All other systems reviewed and are negative.    I have personally reviewed the MA's review of systems and made changes as necessary.    Current Outpatient Medications on File Prior to Visit   Medication Sig Dispense Refill    aspirin (ECOTRIN LOW STRENGTH) 81 mg EC tablet Take 1 tablet (81 mg total) by mouth daily 90 tablet 0    atorvastatin (LIPITOR) 40 mg tablet Take 1 tablet (40 mg total) by mouth daily with dinner 90 tablet 0    cyanocobalamin 1,000 mcg/mL Take B12 1000 mcg IM once a week for 4 weeks, then once a month for 5 months 9 mL 0    MAGNESIUM PO Take by mouth      NON FORMULARY 1 patch in the morning Patch MD OLIVERA      nortriptyline (PAMELOR) 25 mg capsule Take 1 capsule (25 mg total) by mouth daily at bedtime 30 capsule 5    ocrelizumab (Ocrevus) 300 MG/10ML SOLN Inject 20 mL (600 mg total) into a catheter in a vein every 6 (six) months 20 mL 1    prochlorperazine (COMPAZINE) 10 mg tablet Take 1 tablet (10 mg total) by mouth every 12 (twelve) hours as needed (HEADACHE) With Decadron in the morning 30 tablet 0    Riboflavin ([employer] Vitamin B-2) 100 MG TABS Take 2 tablets (200 mg total) by mouth in the morning 360 tablet 2    rivaroxaban (Xarelto) 20 mg tablet Take 1 tablet (20 mg total) by mouth daily with breakfast Start after completion of starter pack. 30 tablet 3    SYRINGE-NEEDLE, DISP, 3 ML 25G X 1\" 3 ML MISC Take with B12 regimen prescribed 9 each 0     No current facility-administered medications on file prior to visit.      Objective     /70 (BP Location: Right arm, Patient Position: Sitting, Cuff Size: Large)   Pulse 92   Temp (!) 97.3 °F (36.3 °C) " "(Temporal)   Ht 5' 11\" (1.803 m)   Wt 102 kg (224 lb 11.2 oz)   SpO2 96%   BMI 31.34 kg/m²     Neurologic Exam  On neurological examination patient is alert, awake, oriented and in no distress. Speech is fluent without dysarthria or aphasia. Cranial nerves 2-12 were symmetrically intact bilaterally. Motor testing reveals 4/5 shoulder abduction; 4/5 right hip flexion and 4/5 knee flexion and 3/5 dorsiflexion;  4/5 left hip flexion, 4/5 knee flexion and dorsiflexion;  There was no pronator drift.  No fasciculations present. No abnormal involuntary movements. Finger- to-nose reveals no tremor or ataxia and intact proprioceptive function, no dysmetria was noted. Rapid alternating movement normal. Sensation was diminished to vibration, light touch, and temperature in LUE/LLE and left V1-V3. Deep tendon reflexes were 2++ and symmetric in the bilateral upper and lower extremities. He is able to rise easily without assistance from a seated position. Casual gait is steady. He ambulates with a walker.     Administrative Statements     I have spent a total time of 45 minutes in caring for this patient on the day of the visit/encounter including Diagnostic results, Prognosis, Risks and benefits of tx options, Instructions for management, Patient and family education, Importance of tx compliance, Risk factor reductions, Impressions, Counseling / Coordination of care, Documenting in the medical record, Reviewing / ordering tests, medicine, procedures  , and Obtaining or reviewing history  .  "

## 2024-10-24 NOTE — PATIENT INSTRUCTIONS
- Nursing will call with dates for monthly IV Solumedrol to complete 6 month course  - Continue Ocrevus as scheduled next; labs 10 days prior   - Continue Nortriptyline 50 mg at bedtime   - Continue off of Decadron; use Tylenol as needed. Avoid NSAIDs   - Follow up with Dr. Hernandez in 5 months

## 2024-10-25 ENCOUNTER — TELEPHONE (OUTPATIENT)
Dept: NEUROLOGY | Facility: CLINIC | Age: 49
End: 2024-10-25

## 2024-10-25 DIAGNOSIS — G35 MS (MULTIPLE SCLEROSIS) (HCC): Primary | ICD-10-CM

## 2024-10-25 RX ORDER — SODIUM CHLORIDE 9 MG/ML
20 INJECTION, SOLUTION INTRAVENOUS ONCE
OUTPATIENT
Start: 2024-10-30

## 2024-10-25 NOTE — ASSESSMENT & PLAN NOTE
Carlos Landis is a 49 year old male with MS maintained on Ocrevus with his last infusion 7/10/2024. He also receives IV Solumedrol 1 gram monthly x 6 months. He has completed 3 out of 6 months and requires assistance with arranging remaining infusions. Since he was last seen he is no longer using Decadron prn for headache as this was causing transient episodes of worsening weakness, numbness and tingling. He reports no reoccurrence since discontinuation. He denies any recent or recurrent infections. He is up to date on surveillance labs and imaging, with stable MRI Brain 8/8/2024. He denies any new or worsening neurologic symptoms concerning for disease progression or relapse. His chronic weakness is stable and he is ambulating with a walker. He will continue IV Solumedrol for remaining 3 treatments, proceed with Ocrevus as scheduled and then follow up with Dr. Hernandez in March 2025; sooner if needed.

## 2024-10-25 NOTE — TELEPHONE ENCOUNTER
Per [employer] Website, PA is not required for , [id number], or [id number].     Therapy plan entered and sent for signature.

## 2024-10-25 NOTE — ASSESSMENT & PLAN NOTE
Carlos Landis is a 49 year old male with MS maintained on Ocrevus with his last infusion 7/10/2024. He also receives IV Solumedrol 1 gram monthly x 6 months (started June 2024, skipping July 2024 due to Ocrevus administration). He has completed 3 out of 6 months of IV solumedrol and requires assistance with arranging remaining infusions. Since he was last seen he is no longer using Decadron prn for headache as this was causing transient episodes of worsening weakness, numbness and tingling. He reports no reoccurrence since discontinuation. He denies any recent or recurrent infections. He is up to date on surveillance labs and imaging, with stable MRI Brain 8/8/2024. He denies any new or worsening neurologic symptoms concerning for disease progression or relapse. His chronic weakness is stable and he is ambulating with a walker. He will continue IV Solumedrol for remaining 3 treatments (message sent to MS navigator for assistance), proceed with Ocrevus as scheduled and then follow up with Dr. Hernandez in March 2025; sooner if needed.

## 2024-10-25 NOTE — TELEPHONE ENCOUNTER
----- Message from SHYANN Flores sent at 10/25/2024  2:53 PM EDT -----  Hey,    This patient needs 1g IV Solu medrol set up at [facility] - needs October, November, December to complete 6 month course ordered by Dr. DUNCAN    Thanks!  Tarsha

## 2024-10-29 NOTE — TELEPHONE ENCOUNTER
Called [facility] infusion center and left message requesting scheduling for pt's infusions. Requested call back to office at [phone] or call directly to patient regarding scheduling as MS nurse navigator will be out of office Wednesday and Thursday.

## 2024-10-30 NOTE — TELEPHONE ENCOUNTER
Message left for patient to return call to office regarding solumedrol infusion - asking what days and times work best. Also provided the number for [facility] infusion if he would like to call and schedule himself.     Will continue to follow.

## 2024-10-31 NOTE — TELEPHONE ENCOUNTER
Second message left for patient to return call to office regarding scheduling of solumedrol/steroid infusions. Also provided the number for [facility] infusion center if he would prefer to schedule himself.     Awaiting call back.

## 2024-11-01 NOTE — TELEPHONE ENCOUNTER
Office has been unable to reach pt. Will hold off on scheduling until contact is made.     [employer] message sent to pt.

## 2024-11-05 NOTE — TELEPHONE ENCOUNTER
Called [facility] infusion center and spoke with Christine. Scheduled pt for the following (11/18 is first available):    11/18/24 @ 10:30am  12/16/24 @ 10am  1/20/25 @ 11am    Left detailed message for pt (okay per communication consent) making him aware.     MyChart message also sent.

## 2024-11-08 NOTE — TELEPHONE ENCOUNTER
Unable to get pt scheduled in October- see notes below. Scheduled November, December, January.     Pt has next Ocrevus infusion scheduled 1/6/25. Solumedrol infusion scheduled 1/20/25.     To confirm, should pt keep both appointments as scheduled in January? Thanks!

## 2024-11-08 NOTE — TELEPHONE ENCOUNTER
No, please cancel January IV solumedrol. He can have November, December IV solumedrol and then January Ocrevus. Thnx

## 2024-11-12 DIAGNOSIS — G35 MS (MULTIPLE SCLEROSIS) (HCC): Primary | ICD-10-CM

## 2024-11-12 RX ORDER — SODIUM CHLORIDE 9 MG/ML
20 INJECTION, SOLUTION INTRAVENOUS ONCE
Status: CANCELLED | OUTPATIENT
Start: 2024-11-18

## 2024-11-12 NOTE — TELEPHONE ENCOUNTER
Called [facility] infusion center and cancelled 1/20/25 Solumedrol appt. Plan adjusted to allow for 2 visits instead of 3.     Message sent to pt making him aware.

## 2024-11-17 ENCOUNTER — PATIENT MESSAGE (OUTPATIENT)
Dept: NEUROLOGY | Facility: CLINIC | Age: 49
End: 2024-11-17

## 2024-11-17 DIAGNOSIS — G43.109 MIGRAINE WITH AURA AND WITHOUT STATUS MIGRAINOSUS, NOT INTRACTABLE: Primary | ICD-10-CM

## 2024-11-18 ENCOUNTER — HOSPITAL ENCOUNTER (OUTPATIENT)
Dept: INFUSION CENTER | Facility: HOSPITAL | Age: 49
Discharge: HOME | End: 2024-11-18
Attending: PSYCHIATRY & NEUROLOGY
Payer: COMMERCIAL

## 2024-11-18 VITALS
HEART RATE: 98 BPM | RESPIRATION RATE: 16 BRPM | TEMPERATURE: 98 F | SYSTOLIC BLOOD PRESSURE: 140 MMHG | DIASTOLIC BLOOD PRESSURE: 88 MMHG | OXYGEN SATURATION: 96 %

## 2024-11-18 DIAGNOSIS — G35 MS (MULTIPLE SCLEROSIS) (HCC): Primary | ICD-10-CM

## 2024-11-18 PROCEDURE — 96365 THER/PROPH/DIAG IV INF INIT: CPT

## 2024-11-18 RX ORDER — SODIUM CHLORIDE 9 MG/ML
20 INJECTION, SOLUTION INTRAVENOUS ONCE
OUTPATIENT
Start: 2024-12-16

## 2024-11-18 RX ORDER — SODIUM CHLORIDE 9 MG/ML
20 INJECTION, SOLUTION INTRAVENOUS ONCE
Status: COMPLETED | OUTPATIENT
Start: 2024-11-18 | End: 2024-11-18

## 2024-11-18 RX ADMIN — SODIUM CHLORIDE 1000 MG: 0.9 INJECTION, SOLUTION INTRAVENOUS at 11:44

## 2024-11-18 RX ADMIN — SODIUM CHLORIDE 20 ML/HR: 9 INJECTION, SOLUTION INTRAVENOUS at 11:07

## 2024-11-18 NOTE — PROGRESS NOTES
Carlos Landis  tolerated treatment well with no complications.      Carlos Landis is aware of future appt on 12/16 at 1000.     AVS printed and given to Carlos Landis:  Yes

## 2024-11-19 RX ORDER — DIVALPROEX SODIUM 250 MG/1
TABLET, DELAYED RELEASE ORAL
Qty: 8 TABLET | Refills: 0 | Status: SHIPPED | OUTPATIENT
Start: 2024-11-19

## 2024-11-20 ENCOUNTER — PATIENT MESSAGE (OUTPATIENT)
Dept: NEUROLOGY | Facility: CLINIC | Age: 49
End: 2024-11-20

## 2024-11-20 DIAGNOSIS — G43.109 MIGRAINE WITH AURA AND WITHOUT STATUS MIGRAINOSUS, NOT INTRACTABLE: Primary | ICD-10-CM

## 2024-11-23 ENCOUNTER — PATIENT MESSAGE (OUTPATIENT)
Dept: NEUROLOGY | Facility: CLINIC | Age: 49
End: 2024-11-23

## 2024-11-25 ENCOUNTER — TELEPHONE (OUTPATIENT)
Dept: NEUROLOGY | Facility: CLINIC | Age: 49
End: 2024-11-25

## 2024-11-25 NOTE — TELEPHONE ENCOUNTER
PA approved until 12/31/2025.    Pharmacy notified of approval. Script processed successfully. They will get medication ready.    Pt notified via [employer] message.

## 2024-12-10 ENCOUNTER — HOSPITAL ENCOUNTER (OUTPATIENT)
Dept: CT IMAGING | Facility: HOSPITAL | Age: 49
Discharge: HOME | End: 2024-12-10
Attending: SURGERY
Payer: COMMERCIAL

## 2024-12-10 DIAGNOSIS — I77.79 DISSECTION OF MESENTERIC ARTERY (HCC): ICD-10-CM

## 2024-12-10 PROCEDURE — 74174 CTA ABD&PLVS W/CONTRAST: CPT

## 2024-12-10 RX ADMIN — IOHEXOL 100 ML: 350 INJECTION, SOLUTION INTRAVENOUS at 09:03

## 2024-12-13 ENCOUNTER — TELEPHONE (OUTPATIENT)
Dept: NEUROLOGY | Facility: CLINIC | Age: 49
End: 2024-12-13

## 2024-12-13 DIAGNOSIS — G35 MS (MULTIPLE SCLEROSIS) (HCC): Primary | ICD-10-CM

## 2024-12-13 NOTE — TELEPHONE ENCOUNTER
Pt is scheduled for next Ocrevus infusion on 1/6/25.     Attempted to initiate PA on [employer]. Plan with [employer] will only be valid through 12/31/24.    Per further review of chart, pt requesting to switch to [employer]. Pt was instructed to schedule sooner appt with Dr. DUNCAN to discuss.     Called and Left a message on pt's answering machine for a call back    [employer] message sent.     Need to confirm insurance for 2025 and if pt would like to keep Ocrevus appt as scheduled/move up appt with  if possible.

## 2024-12-14 NOTE — PATIENT COMMUNICATION
Addressed in [id number] 6/19/23 Infusion encounter  Task complete  hide

## 2024-12-16 ENCOUNTER — HOSPITAL ENCOUNTER (OUTPATIENT)
Dept: INFUSION CENTER | Facility: HOSPITAL | Age: 49
Discharge: HOME | End: 2024-12-16
Attending: PSYCHIATRY & NEUROLOGY
Payer: COMMERCIAL

## 2024-12-16 VITALS
RESPIRATION RATE: 16 BRPM | SYSTOLIC BLOOD PRESSURE: 138 MMHG | HEART RATE: 78 BPM | TEMPERATURE: 98.8 F | OXYGEN SATURATION: 97 % | DIASTOLIC BLOOD PRESSURE: 84 MMHG

## 2024-12-16 DIAGNOSIS — G35 MS (MULTIPLE SCLEROSIS) (HCC): Primary | ICD-10-CM

## 2024-12-16 RX ORDER — SODIUM CHLORIDE 9 MG/ML
20 INJECTION, SOLUTION INTRAVENOUS ONCE
Status: CANCELLED | OUTPATIENT
Start: 2024-12-16

## 2024-12-16 RX ORDER — SODIUM CHLORIDE 9 MG/ML
20 INJECTION, SOLUTION INTRAVENOUS ONCE
Status: COMPLETED | OUTPATIENT
Start: 2024-12-16 | End: 2024-12-16

## 2024-12-16 RX ADMIN — SODIUM CHLORIDE 20 ML/HR: 0.9 INJECTION, SOLUTION INTRAVENOUS at 10:38

## 2024-12-16 RX ADMIN — SODIUM CHLORIDE 1000 MG: 0.9 INJECTION, SOLUTION INTRAVENOUS at 10:49

## 2024-12-16 NOTE — PROGRESS NOTES
Carlos Landis  tolerated treatment well with no complications.      Carlos Landis is aware of future appt on 01/06/2024 at 08:00 AM.     AVS printed and given to Carlos Landis:  No (Declined by Carlos Landis)

## 2024-12-19 ENCOUNTER — TELEPHONE (OUTPATIENT)
Dept: NEUROLOGY | Facility: CLINIC | Age: 49
End: 2024-12-19

## 2024-12-19 DIAGNOSIS — Z11.1 TUBERCULOSIS SCREENING: ICD-10-CM

## 2024-12-19 DIAGNOSIS — G35 MS (MULTIPLE SCLEROSIS) (HCC): Primary | ICD-10-CM

## 2024-12-19 DIAGNOSIS — D84.821 IMMUNODEFICIENCY DUE TO TREATMENT WITH IMMUNOSUPPRESSIVE MEDICATION  (HCC): ICD-10-CM

## 2024-12-19 DIAGNOSIS — Z79.899 IMMUNODEFICIENCY DUE TO TREATMENT WITH IMMUNOSUPPRESSIVE MEDICATION  (HCC): ICD-10-CM

## 2024-12-19 DIAGNOSIS — Z01.89 ENCOUNTER FOR OTHER SPECIFIED SPECIAL EXAMINATIONS: ICD-10-CM

## 2024-12-19 DIAGNOSIS — Z11.59 ENCOUNTER FOR SCREENING FOR VIRAL DISEASE: ICD-10-CM

## 2024-12-19 NOTE — TELEPHONE ENCOUNTER
Left detailed message for pt (okay per communication consent) asking pt to call office ASAP to confirm insurance information for 2025.

## 2024-12-19 NOTE — TELEPHONE ENCOUNTER
Pt is scheduled for Ocrevus on 1/6/25.     Ocrevus PA being addressed in other encounter.    The following labs are required and have been entered as appropriate per protocol:    CBC  CMP  Immunoglobulins  HIV  Quantiferon TB gold  Acute hepatitis panel    Left detailed message for pt (okay per communication consent) making him aware of needed labs 7 days prior to infusion appt.    [employer] message also sent with reminder and request for insurance information.

## 2024-12-20 ENCOUNTER — OFFICE VISIT (OUTPATIENT)
Dept: VASCULAR SURGERY | Facility: CLINIC | Age: 49
End: 2024-12-20
Payer: COMMERCIAL

## 2024-12-20 VITALS
HEART RATE: 77 BPM | WEIGHT: 236 LBS | OXYGEN SATURATION: 98 % | RESPIRATION RATE: 18 BRPM | DIASTOLIC BLOOD PRESSURE: 78 MMHG | SYSTOLIC BLOOD PRESSURE: 134 MMHG | HEIGHT: 71 IN | BODY MASS INDEX: 33.04 KG/M2

## 2024-12-20 DIAGNOSIS — I77.79 DISSECTION OF MESENTERIC ARTERY (HCC): Primary | ICD-10-CM

## 2024-12-20 PROCEDURE — 99213 OFFICE O/P EST LOW 20 MIN: CPT | Performed by: SURGERY

## 2024-12-20 NOTE — PROGRESS NOTES
Name: Carlos Landis      : 1975      MRN: [id number]  Encounter Provider: Janelle Herron MD  Encounter Date: 2024   Encounter department: 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 Denies any abdominal pain, food fear, postprandial pain, loss of appetite or weight loss. He has completed 3 month course of xarelto and is taking ASA.     CTA a/p 2024 reviewed - improvement in complex SMA dissection flap now with single dissection flap and decreased false false lumen channel. Inflammatory change and aneurysmal degeneration slightly improved from 1.5 to 1.3cm    -We discussed the pathophysiology of mesenteric artery dissection, available treatment options and indications for treatment.  -Recommend continued conservative management given resolution of abdominal symptoms and improving SMA dissection flap, stable/improved aneurysmal degeneration.  -Continue medical optimization. Currently on ASA and statin.   -Recommend continued surveillance with mesenteric duplex and follow-up in 6 months to re-evaluate symptoms.      Orders:    VAS CELIAC AND/OR MESENTERIC DUPLEX; Future        History of Present Illness   HPI  See assessment & plan    Patient had a CTA abd/ pel on 12/10/24.  Pt denies new or worsening abd pain or back pain. Pt denies change in appetite.       History obtained from: patient    Review of Systems   Constitutional: Negative.    HENT: Negative.     Eyes: Negative.    Respiratory: Negative.     Cardiovascular: Negative.    Gastrointestinal: Negative.    Endocrine: Negative.    Genitourinary: Negative.    Musculoskeletal:  Positive for back pain and gait problem.        Pt has MS   Skin: Negative.    Allergic/Immunologic: Negative.    Neurological:  Positive for weakness and headaches. Negative for numbness.        MS   Hematological: Negative.   "  Psychiatric/Behavioral: Negative.       I have personally reviewed the ROS entered by MA and agree as documented.       Objective   /78 (BP Location: Left arm, Patient Position: Sitting)   Pulse 77   Resp 18   Ht 5' 11\" (1.803 m)   Wt 107 kg (236 lb)   SpO2 98%   BMI 32.92 kg/m²      Physical Exam  Constitutional:       Appearance: Normal appearance.   HENT:      Head: Normocephalic and atraumatic.   Cardiovascular:      Rate and Rhythm: Normal rate.   Pulmonary:      Effort: Pulmonary effort is normal.   Abdominal:      Palpations: Abdomen is soft.      Tenderness: There is no abdominal tenderness.   Musculoskeletal:         General: Normal range of motion.      Cervical back: Normal range of motion and neck supple.   Skin:     General: Skin is warm and dry.      Capillary Refill: Capillary refill takes less than 2 seconds.   Neurological:      General: No focal deficit present.      Mental Status: He is alert and oriented to person, place, and time.   Psychiatric:         Mood and Affect: Mood normal.         Behavior: Behavior normal.         Thought Content: Thought content normal.         Judgment: Judgment normal.         Administrative Statements   I have spent a total time of 25 minutes in caring for this patient on the day of the visit/encounter including Diagnostic results, Prognosis, Risks and benefits of tx options, Instructions for management, Patient and family education, Importance of tx compliance, Risk factor reductions, Impressions, Counseling / Coordination of care, Documenting in the medical record, Reviewing / ordering tests, medicine, procedures  , and Obtaining or reviewing history  . Topics discussed with the patient / family include symptom assessment and management and anticipatory guidance.  " 2 seconds or less

## 2024-12-20 NOTE — ASSESSMENT & PLAN NOTE
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 Denies any abdominal pain, food fear, postprandial pain, loss of appetite or weight loss. He has completed 3 month course of xarelto and is taking ASA.     CTA a/p 12/2024 reviewed - improvement in complex SMA dissection flap now with single dissection flap and decreased false false lumen channel. Inflammatory change and aneurysmal degeneration slightly improved from 1.5 to 1.3cm    -We discussed the pathophysiology of mesenteric artery dissection, available treatment options and indications for treatment.  -Recommend continued conservative management given resolution of abdominal symptoms and improving SMA dissection flap, stable/improved aneurysmal degeneration.  -Continue medical optimization. Currently on ASA and statin.   -Recommend continued surveillance with mesenteric duplex and follow-up in 6 months to re-evaluate symptoms.      Orders:    VAS CELIAC AND/OR MESENTERIC DUPLEX; Future

## 2024-12-24 NOTE — TELEPHONE ENCOUNTER
Left detailed message at mobile #.     Also attempted to reach pt at home # on file. Unknown female answered the phone, she could not hear MS nurse navigator.     3 attempts made to contact pt. If no response is received, will need to cancel infusion.

## 2024-12-26 NOTE — TELEPHONE ENCOUNTER
Pt will have coverage with [employer] for 2025. PA is required for Ocrevus.     Faxed PA request with clinicals. May not be able to be reviewed until 1/1/25. Will f/u.

## 2025-01-02 NOTE — TELEPHONE ENCOUNTER
Called and Left a message on pt's answering machine for a call back. Also spoke with Malgorzata, asked for pt to call back.

## 2025-01-02 NOTE — TELEPHONE ENCOUNTER
Left detailed message for pt advising him to have labs completed asap. Advised if results are not available by tomorrow afternoon, will need to reschedule infusion appt.

## 2025-01-02 NOTE — TELEPHONE ENCOUNTER
It appears prior PA fax was rejected, likely because pt's plan was not active. Faxed request again.

## 2025-01-03 ENCOUNTER — APPOINTMENT (OUTPATIENT)
Dept: LAB | Facility: HOSPITAL | Age: 50
End: 2025-01-03
Payer: MEDICARE

## 2025-01-03 ENCOUNTER — TELEPHONE (OUTPATIENT)
Dept: INFUSION CENTER | Facility: HOSPITAL | Age: 50
End: 2025-01-03

## 2025-01-03 DIAGNOSIS — Z01.89 ENCOUNTER FOR OTHER SPECIFIED SPECIAL EXAMINATIONS: ICD-10-CM

## 2025-01-03 DIAGNOSIS — G35 MS (MULTIPLE SCLEROSIS) (HCC): Primary | ICD-10-CM

## 2025-01-03 DIAGNOSIS — G35 MS (MULTIPLE SCLEROSIS) (HCC): ICD-10-CM

## 2025-01-03 DIAGNOSIS — D84.821 IMMUNODEFICIENCY DUE TO TREATMENT WITH IMMUNOSUPPRESSIVE MEDICATION  (HCC): ICD-10-CM

## 2025-01-03 DIAGNOSIS — Z11.1 TUBERCULOSIS SCREENING: ICD-10-CM

## 2025-01-03 DIAGNOSIS — Z11.59 ENCOUNTER FOR SCREENING FOR VIRAL DISEASE: ICD-10-CM

## 2025-01-03 DIAGNOSIS — Z79.899 IMMUNODEFICIENCY DUE TO TREATMENT WITH IMMUNOSUPPRESSIVE MEDICATION  (HCC): ICD-10-CM

## 2025-01-03 LAB
BASOPHILS # BLD AUTO: 0.12 THOUSANDS/ΜL (ref 0–0.1)
BASOPHILS NFR BLD AUTO: 1 % (ref 0–1)
EOSINOPHIL # BLD AUTO: 1.06 THOUSAND/ΜL (ref 0–0.61)
EOSINOPHIL NFR BLD AUTO: 12 % (ref 0–6)
ERYTHROCYTE [DISTWIDTH] IN BLOOD BY AUTOMATED COUNT: 12.9 % (ref 11.6–15.1)
HAV IGM SER QL: NORMAL
HBV CORE IGM SER QL: NORMAL
HBV SURFACE AG SER QL: NORMAL
HCT VFR BLD AUTO: 46 % (ref 36.5–49.3)
HCV AB SER QL: NORMAL
HGB BLD-MCNC: 14.9 G/DL (ref 12–17)
HIV 1+2 AB+HIV1 P24 AG SERPL QL IA: NORMAL
HIV 2 AB SERPL QL IA: NORMAL
HIV1 AB SERPL QL IA: NORMAL
HIV1 P24 AG SERPL QL IA: NORMAL
IMM GRANULOCYTES # BLD AUTO: 0.06 THOUSAND/UL (ref 0–0.2)
IMM GRANULOCYTES NFR BLD AUTO: 1 % (ref 0–2)
LYMPHOCYTES # BLD AUTO: 1.38 THOUSANDS/ΜL (ref 0.6–4.47)
LYMPHOCYTES NFR BLD AUTO: 16 % (ref 14–44)
MCH RBC QN AUTO: 29.3 PG (ref 26.8–34.3)
MCHC RBC AUTO-ENTMCNC: 32.4 G/DL (ref 31.4–37.4)
MCV RBC AUTO: 91 FL (ref 82–98)
MONOCYTES # BLD AUTO: 0.67 THOUSAND/ΜL (ref 0.17–1.22)
MONOCYTES NFR BLD AUTO: 8 % (ref 4–12)
NEUTROPHILS # BLD AUTO: 5.36 THOUSANDS/ΜL (ref 1.85–7.62)
NEUTS SEG NFR BLD AUTO: 62 % (ref 43–75)
NRBC BLD AUTO-RTO: 0 /100 WBCS
PLATELET # BLD AUTO: 245 THOUSANDS/UL (ref 149–390)
PMV BLD AUTO: 10.3 FL (ref 8.9–12.7)
RBC # BLD AUTO: 5.08 MILLION/UL (ref 3.88–5.62)
WBC # BLD AUTO: 8.65 THOUSAND/UL (ref 4.31–10.16)

## 2025-01-03 PROCEDURE — 36415 COLL VENOUS BLD VENIPUNCTURE: CPT

## 2025-01-03 PROCEDURE — 80074 ACUTE HEPATITIS PANEL: CPT

## 2025-01-03 PROCEDURE — 86480 TB TEST CELL IMMUN MEASURE: CPT

## 2025-01-03 PROCEDURE — 87389 HIV-1 AG W/HIV-1&-2 AB AG IA: CPT

## 2025-01-03 PROCEDURE — 82784 ASSAY IGA/IGD/IGG/IGM EACH: CPT

## 2025-01-03 PROCEDURE — 85025 COMPLETE CBC W/AUTO DIFF WBC: CPT

## 2025-01-03 PROCEDURE — 80053 COMPREHEN METABOLIC PANEL: CPT

## 2025-01-03 RX ORDER — ACETAMINOPHEN 325 MG/1
650 TABLET ORAL ONCE
OUTPATIENT
Start: 2025-01-17

## 2025-01-03 RX ORDER — SODIUM CHLORIDE 9 MG/ML
20 INJECTION, SOLUTION INTRAVENOUS ONCE
OUTPATIENT
Start: 2025-01-17

## 2025-01-03 NOTE — TELEPHONE ENCOUNTER
Pt did not have labs completed. Results would not be available for review by provider prior to appt on Monday. Needs reschedule.     Called [facility] infusion center and spoke with Obdulia. Rescheduled pt's infusion for next available appt:    1/17/25 @ 8am    Left detailed message for pt making him aware of rescheduled infusion appt. Advised pt again to have labs completed at least 1 week ahead of appt.    [employer] message sent as well.

## 2025-01-03 NOTE — TELEPHONE ENCOUNTER
Phone call placed to pt reminding him to have labs completed prior to Ocrevus infusion Monday. Advised him according to Neurology he cannot be treated without labs done. No answer, LVM with phone number to call back.

## 2025-01-03 NOTE — TELEPHONE ENCOUNTER
No determination received.     Called [facility] at [phone] and spoke with Eugenia. She reports PA for [employer]  is not on file. She confirms PA is required. She advised PA request should be faxed to [phone] ([facility] authorization dept). She provided phone # [phone].    PA faxed to above fax #.

## 2025-01-04 LAB
ALBUMIN SERPL BCG-MCNC: 4.2 G/DL (ref 3.5–5)
ALP SERPL-CCNC: 68 U/L (ref 34–104)
ALT SERPL W P-5'-P-CCNC: 14 U/L (ref 7–52)
ANION GAP SERPL CALCULATED.3IONS-SCNC: 12 MMOL/L (ref 4–13)
AST SERPL W P-5'-P-CCNC: 13 U/L (ref 13–39)
BILIRUB SERPL-MCNC: 0.38 MG/DL (ref 0.2–1)
BUN SERPL-MCNC: 28 MG/DL (ref 5–25)
CALCIUM SERPL-MCNC: 9.5 MG/DL (ref 8.4–10.2)
CHLORIDE SERPL-SCNC: 107 MMOL/L (ref 96–108)
CO2 SERPL-SCNC: 24 MMOL/L (ref 21–32)
CREAT SERPL-MCNC: 0.84 MG/DL (ref 0.6–1.3)
GAMMA INTERFERON BACKGROUND BLD IA-ACNC: 0.01 IU/ML
GFR SERPL CREATININE-BSD FRML MDRD: 101 ML/MIN/1.73SQ M
GLUCOSE SERPL-MCNC: 78 MG/DL (ref 65–140)
IGA SERPL-MCNC: 243 MG/DL (ref 66–433)
IGG SERPL-MCNC: 866 MG/DL (ref 635–1741)
IGM SERPL-MCNC: 71 MG/DL (ref 45–281)
M TB IFN-G BLD-IMP: NEGATIVE
M TB IFN-G CD4+ BCKGRND COR BLD-ACNC: 0 IU/ML
M TB IFN-G CD4+ BCKGRND COR BLD-ACNC: 0 IU/ML
MITOGEN IGNF BCKGRD COR BLD-ACNC: 9.99 IU/ML
POTASSIUM SERPL-SCNC: 4.1 MMOL/L (ref 3.5–5.3)
PROT SERPL-MCNC: 7.5 G/DL (ref 6.4–8.4)
SODIUM SERPL-SCNC: 143 MMOL/L (ref 135–147)

## 2025-01-06 ENCOUNTER — HOSPITAL ENCOUNTER (OUTPATIENT)
Dept: INFUSION CENTER | Facility: HOSPITAL | Age: 50
Discharge: HOME | End: 2025-01-06
Attending: PSYCHIATRY & NEUROLOGY

## 2025-01-08 NOTE — TELEPHONE ENCOUNTER
PROGRESS NOTE     Subjective   Khushbu is a 64 year old here for Knee Pain (Right knee pain, ongoing for the past month. Seeing Alec in October. Knee replacment 5 years on left and 6 years on right. )    Right Knee Pain  Notes right knee pain ongoing x 1 month. Notes just prior to onset she did 3 hours of yard work and noted her hamstrings felt sore the next day. A few days after noticed her right hip and right knee were tender and aching, feels her right knee becomes intermittently swollen. One episode of clicking to the right knee. No pain with sitting or standing, however does note pain with walking that is 8/10 without her cane and 4/10 with the cane.     She has been icing and elevating intermittently for pain. She notes she has been walking more to prepare for a 2 week [place] trip this winter and has had no issues with stairs. Has had history of bilateral knee replacements. Seeing Dr. Michael in 10/02/24.    Denies overt injury, trauma, redness, hot joint, decreased sensation, decreased range of motion, popping, locking joint.     REVIEW OF SYSTEMS  All other systems were reviewed and are negative except as documented in the history of present illness.     Reviewed and updated patient's allergies, medications, problem list and past medical history to include surgical history to best of ability.    HISTORIES  ALLERGIES  Allergies   Allergen Reactions   • Celecoxib THROAT SWELLING   • Hydrocodone DIZZINESS     Dizziness and nausea   • Sulfa Drugs Cross Reactors RASH     MEDICATIONS  Current Outpatient Medications   Medication Sig Dispense Refill   • meloxicam (MOBIC) 15 MG tablet Take 1 tablet by mouth daily. 14 tablet 0   • clobetasol (TEMOVATE) 0.05 % topical solution APPLY TO THE AFFECTED AREA OF SCALP AT NIGHT AND LEAVE IN     • estradiol (ESTRACE) 0.5 MG tablet Take 1 tablet by mouth daily. 90 tablet 3   • levothyroxine 150 MCG tablet Take 1 tablet by mouth 6 days a week. Indications: Underactive Thyroid 70  Still awaiting determination. Refaxed request. Will f/u with plan.   tablet 3   • fluticasone (FLONASE) 50 MCG/ACT nasal spray Spray 2 sprays in each nostril daily. 16 g 1   • acetaminophen (TYLENOL) 325 MG tablet Take 650 mg by mouth every 4 hours as needed for Pain.       No current facility-administered medications for this visit.     MEDICAL HISTORY  Past Medical History:   Diagnosis Date   • Allergy    • BCC (basal cell carcinoma)     basal cell, nose   • Hypothyroidism    • OA (osteoarthritis) of knee      SURGICAL HISTORY  Past Surgical History:   Procedure Laterality Date   • Adenoidectomy     • Basal cell carcinoma excision     • Biopsy of breast, incisional  2013    Breast Biopsy   • Cholecystectomy     • Colonoscopy diagnostic  2009    Colonoscopy, Dx   • Dexa bone density axial skeleton  2012   • Hysterectomy      total   • Joint replacement Right 2018    Dr. Michael/ Right TKA   • Open access colonoscopy  2023    hyperplastic polyps - repeat 10 years   • Removal gallbladder     • Tonsillectomy     • Total knee arthroplasty Right    • Total knee arthroplasty Left 10/21/2019     FAMILY HISTORY  Family History   Problem Relation Age of Onset   • Thyroid Mother    • Natural Causes Mother    • Heart disease Father         Bypass surgery    • Hypertension Father          sepsis    • Thyroid Sister    • Allergic Rhinitis Sister         shrimp and lobster   • Hypothyroid Sister    • Patient is unaware of any medical problems Brother    • Natural Causes Maternal Grandmother    • Natural Causes Maternal Grandfather    • Heart disease Paternal Grandmother    • Cancer, Breast Paternal Grandmother         unsure age of diagnosis   • Heart disease Paternal Grandfather      SOCIAL HISTORY  Social History     Tobacco Use   • Smoking status: Never   • Smokeless tobacco: Never   Vaping Use   • Vaping status: never used   Substance Use Topics   • Alcohol use: No   • Drug use: No      SDOH Never Smoker       Objective   Vitals:    24 1452   BP:  138/88   Pulse: 91   Resp: 20   SpO2: 95%   Weight: 101.2 kg (223 lb 1.6 oz)     Physical Exam  Constitutional:       General: She is not in acute distress.     Appearance: Normal appearance. She is obese.   HENT:      Head: Normocephalic and atraumatic.      Neck: Normal.   Musculoskeletal:      Thoracic back: Normal.      Lumbar back: Normal.      Right hip: Tenderness (to lateral aspect of hip over greater trochanter) present. No deformity, bony tenderness or crepitus. Decreased range of motion (decreased passive internal rotation). Decreased strength (4/5).      Left hip: Normal.      Right upper leg: Tenderness (to right gluteus muscles) present. No swelling or deformity.      Left upper leg: Normal.      Right knee: No swelling, deformity, erythema, ecchymosis, bony tenderness or crepitus. Decreased range of motion (with passive flexion about 90 degrees). Tenderness present over the LCL. No medial joint line, lateral joint line, MCL or patellar tendon tenderness. No LCL laxity, MCL laxity, ACL laxity or PCL laxity.      Instability Tests: Anterior drawer test negative. Posterior drawer test negative. Medial Yonatan test negative and lateral Yonatan test negative.      Left knee: No swelling, deformity, erythema, ecchymosis, bony tenderness or crepitus. Decreased range of motion (with passive flexion about 90 degrees). No tenderness. No LCL laxity, MCL laxity, ACL laxity or PCL laxity.     Right lower leg: No swelling.      Left lower leg: No swelling.      Right ankle: Normal.      Left ankle: Normal.      Right foot: Deformity (hammer toe right foot 2nd and 3rd digit) present.      Left foot: Normal.      Comments: No paraspinal muscle tenderness throughout back, no SI joint tenderness. AMRITA negative bilaterally; No pain with FADIR testing.    Skin:     General: Skin is warm and dry.      Comments: Well healed surgical scars to anterior aspect of bilateral knees   Neurological:      General: No focal  deficit present.      Mental Status: She is alert and oriented to person, place, and time.      Sensory: No sensory deficit.      Gait: Gait abnormal (moderately antalgic).      Deep Tendon Reflexes: Reflexes normal.   Psychiatric:         Mood and Affect: Mood normal.         Behavior: Behavior normal.            ASSESSMENT AND PLAN        1. Acute pain of right hip  -     SERVICE TO PHYSICAL THERAPY  -     meloxicam (MOBIC) 15 MG tablet; Take 1 tablet by mouth daily.  2. Acute pain of right knee  -     SERVICE TO PHYSICAL THERAPY  -     meloxicam (MOBIC) 15 MG tablet; Take 1 tablet by mouth daily.  Suspect possible greater trochanteric bursitis versus osteoarthritis given her increased activity level recently - gait accomodation due to hip pain could be causing lateral knee pain. No overt signs on exam today of suspected fracture, ligamentous injury, meniscal injury or septic joint today.   -We discussed trial of NSAIDs like meloxicam and PT with or without imaging of the hip and knee to evaluate joint spacing and knee hardware. She would like to trial NSAIDs and PT. Notably has an allergy to celecoxib - she has had meloxicam as an alternative to celecoxib for joint pains with effect and no adverse side effects, otherwise tolerated well. Recommended taking once daily with food and glass of water. Discussed signs/symptoms for which patient should seek care/be re-evaluated.  -Otherwise recommend use of cane until symptoms improve, ice, elevation, could consider icy hot or topical pain relief over-the-counter.  -PT referral ordered.   -Recommended to keep appointment with Dr. Michael in October for further evaluation if symptoms do not improve.     The medication risks, benefits, side effect profile and alternative treatments were discussed with the patient today. She is in agreement with proceeding, no further questions at this time.     Follow Up  Return for If symptoms fail to improve or as needed, schedule PT with  Yuliya.      Warning signs/symptoms discussed for which patient should seek care emergently.   Patient verbalized understanding and is agreeable to treatment plan.  No further questions at this time.    Niki Hayes NP

## 2025-01-08 NOTE — TELEPHONE ENCOUNTER
Patient is cleared for infusion.  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

## 2025-01-08 NOTE — TELEPHONE ENCOUNTER
Labs drawn on 1/3/25. Results in chart.     Since last neuro OV on 10/24/24:  -Solumedrol infusion 11/18/24, 12/16/24  -CT abdomen pelvis 12/10/24  -vascular surgery f/u appt for dissection of mesenteric artery    Okay to proceed with Ocrevus infusion on 1/17/25 with current lab results on file?

## 2025-01-10 RX ORDER — OCRELIZUMAB 300 MG/10ML
600 INJECTION INTRAVENOUS
Qty: 20 ML | Refills: 1 | Status: SHIPPED | OUTPATIENT
Start: 2025-01-10 | End: 2025-01-14

## 2025-01-10 NOTE — TELEPHONE ENCOUNTER
Fax received on 1/7/25. This states Ocrevus is approved under pt's prescription drug plan. This is approved from 1/1/25 to 1/7/26. Case ID # [id number].    Also received fax on 1/8/25 stating PA was not reviewed by the coverage review dept as patient has medicare. It appears Ocrevus cannot be approved with medical plan, only pharmacy.    [facility] is [facility]'s preferred specialty pharmacy.    Please review and sign Rx if agreeable. Marked Rx as urgent.     If Ocrevus copay is unaffordable (approval letter notes tier exception is not allowed), pt may need to apply for free drug. Infusion appt may need to be rescheduled to allow for medication delivery.

## 2025-01-14 RX ORDER — OCRELIZUMAB 300 MG/10ML
600 INJECTION INTRAVENOUS
Qty: 20 ML | Refills: 1 | Status: SHIPPED | OUTPATIENT
Start: 2025-01-14

## 2025-01-14 NOTE — TELEPHONE ENCOUNTER
[facility] with [facility] states [facility] has paid claim from 1/10/25. If they back out of their claim, [facility] should be able to fill for pt.     Called [employer] and spoke with Abhishek. Advised him of above. He reports he backed out of claim, [facility] should now be able to fill. Message sent to [facility].

## 2025-01-14 NOTE — TELEPHONE ENCOUNTER
Gil with [facility] reports they are able to fill Ocrevus Rx for pt. Copay is $12.15. Script is needed. They will order medication for pt.    Script pended, please review and sign if agreeable.

## 2025-01-14 NOTE — TELEPHONE ENCOUNTER
Per [facility] portal, Ocrevus Rx is still in process. Called [facility] and spoke with Raina. She reports they are still working to verify pt's benefits. She will expedite request and wagner as urgent. She reports there is no guarantee they will be able to deliver Ocrevus on Thursday.    Also sent message to [facility] specialty team to see if they are able to provide medication with pt's insurance.

## 2025-01-14 NOTE — TELEPHONE ENCOUNTER
Called [facility] at [phone] and spoke with Danielle. She transferred call to Lidya. She states she is unable to see any claims being processed. She called pharmacy team, they cancelled pt's Rx. States Rx should be able to be filled at local pharmacy now.     Message sent to [facility] with [facility].

## 2025-01-14 NOTE — TELEPHONE ENCOUNTER
camden from [facility] specialty pharm called asking where pt will be getting ocrevus infusion, so that they can process order.  she also would like to know if pt on loading dose or maintenance dose  [phone]

## 2025-01-16 NOTE — TELEPHONE ENCOUNTER
Confirmed with Juan at [facility] specialty pharmacy- Ocrevus will arrive at [facility] infusion center pharmacy late morning/ early afternoon. Being sent with  this AM.

## 2025-01-17 ENCOUNTER — HOSPITAL ENCOUNTER (OUTPATIENT)
Dept: INFUSION CENTER | Facility: HOSPITAL | Age: 50
End: 2025-01-17
Attending: PSYCHIATRY & NEUROLOGY
Payer: COMMERCIAL

## 2025-01-17 VITALS
RESPIRATION RATE: 16 BRPM | TEMPERATURE: 97.8 F | OXYGEN SATURATION: 97 % | SYSTOLIC BLOOD PRESSURE: 109 MMHG | DIASTOLIC BLOOD PRESSURE: 74 MMHG | HEART RATE: 75 BPM

## 2025-01-17 DIAGNOSIS — G35 MS (MULTIPLE SCLEROSIS) (HCC): Primary | ICD-10-CM

## 2025-01-17 PROCEDURE — 96415 CHEMO IV INFUSION ADDL HR: CPT

## 2025-01-17 PROCEDURE — 96413 CHEMO IV INFUSION 1 HR: CPT

## 2025-01-17 PROCEDURE — 96367 TX/PROPH/DG ADDL SEQ IV INF: CPT

## 2025-01-17 RX ORDER — ACETAMINOPHEN 325 MG/1
650 TABLET ORAL ONCE
Status: COMPLETED | OUTPATIENT
Start: 2025-01-17 | End: 2025-01-17

## 2025-01-17 RX ORDER — SODIUM CHLORIDE 9 MG/ML
20 INJECTION, SOLUTION INTRAVENOUS ONCE
OUTPATIENT
Start: 2025-07-16

## 2025-01-17 RX ORDER — SODIUM CHLORIDE 9 MG/ML
20 INJECTION, SOLUTION INTRAVENOUS ONCE
Status: COMPLETED | OUTPATIENT
Start: 2025-01-17 | End: 2025-01-17

## 2025-01-17 RX ORDER — ACETAMINOPHEN 325 MG/1
650 TABLET ORAL ONCE
OUTPATIENT
Start: 2025-07-16

## 2025-01-17 RX ADMIN — FAMOTIDINE 20 MG: 10 INJECTION INTRAVENOUS at 08:20

## 2025-01-17 RX ADMIN — OCRELIZUMAB 600 MG: 300 INJECTION INTRAVENOUS at 10:13

## 2025-01-17 RX ADMIN — ACETAMINOPHEN 650 MG: 325 TABLET ORAL at 08:48

## 2025-01-17 RX ADMIN — SODIUM CHLORIDE 20 ML/HR: 0.9 INJECTION, SOLUTION INTRAVENOUS at 08:19

## 2025-01-17 RX ADMIN — DIPHENHYDRAMINE HYDROCHLORIDE 50 MG: 50 INJECTION, SOLUTION INTRAMUSCULAR; INTRAVENOUS at 08:46

## 2025-01-17 RX ADMIN — SODIUM CHLORIDE 100 MG: 0.9 INJECTION, SOLUTION INTRAVENOUS at 09:11

## 2025-01-17 NOTE — PROGRESS NOTES
Carlos Landis  tolerated treatment well with no complications.      Carlos Landis is aware of future appt on 7/16/25 at 0800.     AVS printed and given to Carlos Landis:  No (Declined by Carlos Landis)

## 2025-02-08 ENCOUNTER — PATIENT MESSAGE (OUTPATIENT)
Dept: NEUROLOGY | Facility: CLINIC | Age: 50
End: 2025-02-08

## 2025-03-12 ENCOUNTER — TELEPHONE (OUTPATIENT)
Dept: NEUROLOGY | Facility: CLINIC | Age: 50
End: 2025-03-12

## 2025-03-12 NOTE — TELEPHONE ENCOUNTER
Patient sisterBeverly calling to Rs the patient for 4/28/2025 at  3pm     Patient sister is the patient transportation and named on the correction form and they need to ensure the patients transport

## 2025-03-12 NOTE — TELEPHONE ENCOUNTER
LMOM asking pt to CB to see if pt interested in moving his 3/24/25 appt due to Dr DUNCAN being out of the office to today at 11:30am. I explained we had a cancelation and the open appt slot is up for gramarlene, I asked he call if interested ASAP. I did explain that it's open to anyone so it could be filled if he calls in too late which if that happens please R/S 3/24 appt anyway as Dr DUNCAN is out of office.    Pt calls back:   Offer to reschedule 3/24/25 appt to today 3/12/25 at 11:30am if open   If 3/12/25 11:30am not avail please reschedule appt to a day Dr DUNCAN is available

## 2025-04-02 ENCOUNTER — TELEPHONE (OUTPATIENT)
Age: 50
End: 2025-04-02

## 2025-04-08 ENCOUNTER — OFFICE VISIT (OUTPATIENT)
Dept: CARDIOLOGY CLINIC | Facility: CLINIC | Age: 50
End: 2025-04-08
Payer: COMMERCIAL

## 2025-04-08 VITALS
HEART RATE: 84 BPM | DIASTOLIC BLOOD PRESSURE: 88 MMHG | SYSTOLIC BLOOD PRESSURE: 132 MMHG | WEIGHT: 237.2 LBS | HEIGHT: 71 IN | BODY MASS INDEX: 33.21 KG/M2

## 2025-04-08 DIAGNOSIS — R26.2 AMBULATORY DYSFUNCTION: ICD-10-CM

## 2025-04-08 DIAGNOSIS — I47.29 NON-SUSTAINED VENTRICULAR TACHYCARDIA (HCC): Primary | ICD-10-CM

## 2025-04-08 DIAGNOSIS — I71.21 ANEURYSM OF ASCENDING AORTA WITHOUT RUPTURE (HCC): ICD-10-CM

## 2025-04-08 DIAGNOSIS — G35 MS (MULTIPLE SCLEROSIS) (HCC): ICD-10-CM

## 2025-04-08 PROCEDURE — 99214 OFFICE O/P EST MOD 30 MIN: CPT | Performed by: INTERNAL MEDICINE

## 2025-04-08 NOTE — PROGRESS NOTES
[facility]    Name:Carlos Landis   DOS: 4/8/2025     Chief Complaint:   Chief Complaint   Patient presents with    Follow-up     6 months     Chest Pain     Throbbing on right side of chest x1 2 weeks ago        HISTORY OF PRESENT ILLNESS:    HPI:  Carlos Landis is a 49 y.o. male. He  has a past medical history of Arthritis, Diabetes mellitus ([facility]), Foreign body of left ear (09/02/2024), MS (multiple sclerosis) ([facility]), Scoliosis, and Visual impairment.    He presents for follow-up evaluation.  The patient is known to me from his hospitalization in August 2020 for when I had seen him in consultation during his inpatient hospitalization.  At that time, he was being seen due to an asymptomatic run of nonsustained ventricular tachycardia consisting of 13 beats, monomorphic noted on telemetry.  He was subsequently monitored with a cardiac patch rhythm monitor for 5 days, with no recurrence of this arrhythmia.  LVEF through 2 serial echocardiograms has been within normal limits, electrolytes were repleted in the hospital.  Incidentally, he was noted to have a mildly dilated ascending aorta at 4.3 cm.    Today, he tells me he has no new cardiopulmonary complaints.  Does recall a single isolated episode of burning chest discomfort that was immediately relieved by eating a meal.  This occurred at rest, and he has had no similar episode previously or after that event.  He specifically otherwise denies chest pain, shortness of breath, diaphoresis, dizziness, palpitations, orthopnea, edema.  He has had no syncopal episodes.  Does report that his MS is getting worse overall, he has been experiencing frequent headaches and is scheduled to see his neurologist for follow-up.           ROS    ROS: Pertinent positives and negatives as described in History of Present Illness. Remainder of a 14 point review of systems was negative.     Allergies   Allergen Reactions    Cat Dander Other (See Comments)     Unknown  "reaction         Current Outpatient Medications on File Prior to Visit   Medication Sig Dispense Refill    aspirin (ECOTRIN LOW STRENGTH) 81 mg EC tablet Take 1 tablet (81 mg total) by mouth daily 90 tablet 0    cyanocobalamin 1,000 mcg/mL Take B12 1000 mcg IM once a week for 4 weeks, then once a month for 5 months 9 mL 0    MAGNESIUM PO Take by mouth      NON FORMULARY 1 patch in the morning Patch MD OLIVERA      nortriptyline (PAMELOR) 25 mg capsule Take 1 capsule (25 mg total) by mouth daily at bedtime 30 capsule 5    prochlorperazine (COMPAZINE) 10 mg tablet Take 1 tablet (10 mg total) by mouth every 12 (twelve) hours as needed (HEADACHE) With Decadron in the morning 30 tablet 0    Riboflavin ([employer] Vitamin B-2) 100 MG TABS Take 2 tablets (200 mg total) by mouth in the morning 360 tablet 2    Ubrogepant (Ubrelvy) 100 MG tablet Take 1 tablet (100 mg total) by mouth as needed (migraine) May repeat in 2 hours if needed.  Limit of 200 mg in 24 hours 10 tablet 3    atorvastatin (LIPITOR) 40 mg tablet Take 1 tablet (40 mg total) by mouth daily with dinner (Patient not taking: Reported on 4/8/2025) 90 tablet 0    divalproex sodium (DEPAKOTE) 250 mg DR tablet Depakote 500 mg PO QHS for 3 days then 250 mg QHS for 2 days then stop. (Patient not taking: Reported on 4/8/2025) 8 tablet 0    ocrelizumab (Ocrevus) 300 MG/10ML SOLN Inject 20 mL (600 mg total) into a catheter in a vein every 6 (six) months (Patient not taking: Reported on 4/8/2025) 20 mL 1    Ocrevus 300 MG/10ML SOLN Inject 20 mL (600 mg total) into a catheter in a vein every 6 (six) months (Patient not taking: Reported on 4/8/2025) 20 mL 1    SYRINGE-NEEDLE, DISP, 3 ML 25G X 1\" 3 ML MISC Take with B12 regimen prescribed 9 each 0     No current facility-administered medications on file prior to visit.       Past Medical History:   Diagnosis Date    Arthritis     Diabetes mellitus (HCC)     prediabetes    Foreign body of left ear 09/02/2024    MS (multiple sclerosis) " "(HCC)     Scoliosis     Visual impairment        Past Surgical History:   Procedure Laterality Date    HERNIA REPAIR      3 times    KNEE SURGERY Right        Family History   Problem Relation Age of Onset    Stroke Maternal Grandmother     Multiple sclerosis Other        Social History     Socioeconomic History    Marital status: Single     Spouse name: Not on file    Number of children: Not on file    Years of education: Not on file    Highest education level: Not on file   Occupational History    Not on file   Tobacco Use    Smoking status: Former    Smokeless tobacco: Former   Vaping Use    Vaping status: Former   Substance and Sexual Activity    Alcohol use: Not Currently    Drug use: Never    Sexual activity: Not Currently   Other Topics Concern    Not on file   Social History Narrative    Not on file     Social Drivers of Health     Financial Resource Strain: Not on file   Food Insecurity: No Food Insecurity (9/2/2024)    Nursing - Inadequate Food Risk Classification     Worried About Running Out of Food in the Last Year: Never true     Ran Out of Food in the Last Year: Never true     Ran Out of Food in the Last Year: Not on file   Transportation Needs: No Transportation Needs (9/2/2024)    PRAPARE - Transportation     Lack of Transportation (Medical): No     Lack of Transportation (Non-Medical): No   Physical Activity: Not on file   Stress: Not on file   Social Connections: Not on file   Intimate Partner Violence: Not on file   Housing Stability: Low Risk  (9/2/2024)    Housing Stability Vital Sign     Unable to Pay for Housing in the Last Year: No     Number of Times Moved in the Last Year: 0     Homeless in the Last Year: No       OBJECTIVE:    /88 (BP Location: Left arm, Patient Position: Sitting, Cuff Size: Standard)   Pulse 84   Ht 5' 11\" (1.803 m)   Wt 108 kg (237 lb 3.2 oz)   BMI 33.08 kg/m²      BP Readings from Last 3 Encounters:   04/08/25 132/88   01/17/25 109/74   12/20/24 134/78 "       Wt Readings from Last 3 Encounters:   04/08/25 108 kg (237 lb 3.2 oz)   12/20/24 107 kg (236 lb)   10/24/24 102 kg (224 lb 11.2 oz)         Physical Exam  Vitals reviewed.   Constitutional:       General: He is not in acute distress.     Appearance: Normal appearance. He is not diaphoretic.   HENT:      Head: Normocephalic and atraumatic.   Eyes:      Conjunctiva/sclera: Conjunctivae normal.   Neck:      Vascular: No JVD.   Cardiovascular:      Rate and Rhythm: Normal rate and regular rhythm.      Pulses: Normal pulses.      Heart sounds: Normal heart sounds. No murmur heard.     No friction rub. No gallop.   Pulmonary:      Effort: Pulmonary effort is normal.      Breath sounds: Normal breath sounds. No wheezing, rhonchi or rales.   Abdominal:      General: Abdomen is flat. Bowel sounds are normal.   Musculoskeletal:      Right lower leg: No edema.      Left lower leg: No edema.   Skin:     General: Skin is warm and dry.   Neurological:      General: No focal deficit present.      Mental Status: He is alert and oriented to person, place, and time.   Psychiatric:         Mood and Affect: Mood normal.         Behavior: Behavior normal.                                                             LABS:  Lab Results   Component Value Date    BUN 28 (H) 01/03/2025    CREATININE 0.84 01/03/2025    CALCIUM 9.5 01/03/2025    K 4.1 01/03/2025    CO2 24 01/03/2025     01/03/2025    ALKPHOS 68 01/03/2025    AST 13 01/03/2025    ALT 14 01/03/2025        Lab Results   Component Value Date    WBC 8.65 01/03/2025    HGB 14.9 01/03/2025    HCT 46.0 01/03/2025    MCV 91 01/03/2025     01/03/2025       Lab Results   Component Value Date    HDL 29 (L) 07/16/2024    LDLCALC 65 07/16/2024    TRIG 164 (H) 07/16/2024       Lab Results   Component Value Date    HGBA1C 5.1 07/16/2024         ASSESSMENT/PLAN:  Diagnoses and all orders for this visit:    Non-sustained ventricular tachycardia (HCC)    Aneurysm of ascending  "aorta without rupture (HCC)  -     CT chest wo contrast; Future    MS (multiple sclerosis) (HCC)    Ambulatory dysfunction    Plan:  CT chest for further evaluation of his ascending aorta and accurate measurement.  Patient advised to continue atorvastatin 40 mg once daily for management of lipids.  6-month follow-up interval.  Advised to follow-up with his neurologist in regards to MS.        Don Loya MD [facility]      Portions of the record may have been created with voice recognition software. Occasional wrong word or \"sound alike\" substitutions may have occurred due to the inherent limitations of voice recognition software. Please review the chart carefully and recognize, using context, where substitutions/typographical errors may have occurred.     "

## 2025-04-08 NOTE — PATIENT INSTRUCTIONS
You were seen today in the Cardiology office for follow up evaluation.     Below is a summary of the recommendations based upon today's visit:    1. Dietary modifications - Follow a Mediterranean diet - one that is low in saturated fats (avoid red meat, dairy, cheeses), emphasize chicken, fish, turkey, tofu, vegetables, fruit (moderate quantities); also avoid simple carbs/starch/sugars, use whole wheat/grain/bran/oatmeal, snack on small quantities of nuts and fruits.     2. Exercise is very important. Ideally, AT LEAST four to five times weekly for 30 to 60 minutes per session - both cardiovascular and resistance work is OK. Listen to your body and rest when needed.    3. Continue all present medications.    4. Testing prior to your next visit includes: CT chest    5. Remember the ABCDEs to cardiovascular health for patients:  A: Awareness of cardiovascular symptoms  B: Blood pressure control  C: Cholesterol control  C: Cigarette avoidance  D: Diabetes control  D: Dietary choices  E: Exercise    6. Return in 6 months     Any testing ordered in office today will be available for patient review via [employer], and reviewed with me at the follow-up office visit as scheduled.    Thank you for choosing [facility].    Please call our office or use [employer] with any questions.

## 2025-04-28 ENCOUNTER — TELEPHONE (OUTPATIENT)
Dept: NEUROLOGY | Facility: CLINIC | Age: 50
End: 2025-04-28

## 2025-04-28 ENCOUNTER — OFFICE VISIT (OUTPATIENT)
Dept: NEUROLOGY | Facility: CLINIC | Age: 50
End: 2025-04-28
Payer: COMMERCIAL

## 2025-04-28 VITALS
HEART RATE: 103 BPM | TEMPERATURE: 98.3 F | OXYGEN SATURATION: 97 % | HEIGHT: 71 IN | SYSTOLIC BLOOD PRESSURE: 130 MMHG | BODY MASS INDEX: 33.18 KG/M2 | WEIGHT: 237 LBS | RESPIRATION RATE: 16 BRPM | DIASTOLIC BLOOD PRESSURE: 84 MMHG

## 2025-04-28 DIAGNOSIS — G35 MS (MULTIPLE SCLEROSIS) (HCC): Primary | ICD-10-CM

## 2025-04-28 DIAGNOSIS — G43.109 MIGRAINE WITH AURA AND WITHOUT STATUS MIGRAINOSUS, NOT INTRACTABLE: ICD-10-CM

## 2025-04-28 DIAGNOSIS — G81.91 RIGHT HEMIPARESIS (HCC): ICD-10-CM

## 2025-04-28 PROCEDURE — G2211 COMPLEX E/M VISIT ADD ON: HCPCS | Performed by: PSYCHIATRY & NEUROLOGY

## 2025-04-28 PROCEDURE — 99215 OFFICE O/P EST HI 40 MIN: CPT | Performed by: PSYCHIATRY & NEUROLOGY

## 2025-04-28 NOTE — ASSESSMENT & PLAN NOTE
Patient is concerned for worsening dexterity in his right hand-Occupational Therapy as a part of home therapy will be working with the patient in providing additional home-based exercises.    Patient is to continue following with [facility] neurology within 6 months.  Orders:  •  Referral to Home Health- [facility]; Future

## 2025-04-28 NOTE — LETTER
5/2/2025        Carlos DESTIN Landis  [address] [place] [id number]      Carlos,     Please see attached brochures for the three MS foundations.  Also, here is the information for financial assistance contacts for bills and medical cost concerns:     [facility] and [facility] offer programs based on income and other eligibility requirements.  These programs are for those have no insurance or are underinsured.      -[facility] Financial Assistance for [facility] (Call and request application) #[phone].  300% FPL or below (will consider income, assets and debt if over may still qualify)    -[facility] (Call and request application) #[phone], ask for financial assistance office    -[facility] (Call and request application) #[phone]      Let me know how you make out and if there is anything else I can do to assist you further.  Thank you and take care.          Sincerely,      Kat Frausto  Outpatient   [facility]  [phone]  [email or web address]

## 2025-04-28 NOTE — ASSESSMENT & PLAN NOTE
Mr. Landis has presented to [facility] multiple sclerosis center for follow-up of multiple sclerosis and related concerns.  Patient has been taking ocrelizumab infusion with last infusion completed in January 2025 as we agreed patient will continue same regimen going forward.  Patient is up-to-date on serologic workup also completed in January 2025 including HIV/TB/immunoglobulins/hepatitis due to taking high risk immunosuppressive regimen.  Patient stated he felt the best when he was offered monthly Solu-Medrol 1 g infusion with more energy and better functional capacity advised for at least 2-3 weeks after the infusion.  We agreed with the patient we would prefer to have another round of Solu-Medrol 1 g IV monthly infusion for additional 12 months.    Patient is up-to-date on brain imaging with last done in August 2024.  I would agree patient has progressive form of numbness and he is wheelchair-bound.  Patient is capable to ambulate with a walker at home already.    Patient will be working with home PT/OT on right-sided hemiparesis/ambulatory dysfunction/dexterity problem right hand.  Orders:  •  Referral to Home Health- [facility]; Future

## 2025-04-28 NOTE — PROGRESS NOTES
Name: Carlos Landis      : 1975      MRN: [id number]  Encounter Provider: Terra Hernandez MD  Encounter Date: 2025   Encounter department: [facility] [place]  :  Assessment & Plan  MS (multiple sclerosis) (HCC)  Mr. Landis has presented to [facility] multiple sclerosis center for follow-up of multiple sclerosis and related concerns.  Patient has been taking ocrelizumab infusion with last infusion completed in 2025 as we agreed patient will continue same regimen going forward.  Patient is up-to-date on serologic workup also completed in 2025 including HIV/TB/immunoglobulins/hepatitis due to taking high risk immunosuppressive regimen.  Patient stated he felt the best when he was offered monthly Solu-Medrol 1 g infusion with more energy and better functional capacity advised for at least 2-3 weeks after the infusion.  We agreed with the patient we would prefer to have another round of Solu-Medrol 1 g IV monthly infusion for additional 12 months.    Patient is up-to-date on brain imaging with last done in 2024.  I would agree patient has progressive form of numbness and he is wheelchair-bound.  Patient is capable to ambulate with a walker at home already.    Patient will be working with home PT/OT on right-sided hemiparesis/ambulatory dysfunction/dexterity problem right hand.  Orders:  •  Referral to Home Health- [facility]; Future    Migraine with aura and without status migrainosus, not intractable  Patient has been reaching wonderful outcomes in the light of his headaches as frequency of headaches has been subsiding.  Patient believes Ubrelvy provides essential benefits to control his infrequent headaches.  Orders:  •  Ubrogepant (Ubrelvy) 100 MG tablet; Take 1 tablet (100 mg total) by mouth as needed (migraine) May repeat in 2 hours if needed.  Limit of 200 mg in 24 hours    Right hemiparesis (HCC)  Patient is concerned for worsening dexterity in  his right hand-Occupational Therapy as a part of home therapy will be working with the patient in providing additional home-based exercises.    Patient is to continue following with [facility] neurology within 6 months.  Orders:  •  Referral to Home Health- [facility]; Future        History of Present Illness   HPI     Mr. Landis has presented to [facility] multiple sclerosis center for follow-up of multiple sclerosis and related concerns.  Since last office visit, patient was hospitalized for generalized body weakness as well as right sided facial weakness.  Patient has been taking high efficacy immunosuppressive regimen with last dose of ocrelizumab was offered in January 2024.    Today's concerns are: Carlos is here with his friend.  Patient described facial numbness involves left and right side and may last up to 30 seconds during the dinnertime only.  Patient believes he's not getting any positive results while taking ocrelizumab twice a year. He is in a wheel chair.  Patient described he is not capable to keep his CPAP during the night and he is no longer using CPAP machine for obstructive sleep apnea.  Patient described after ocrelizumab infusion or Solu-Medrol infusion he feels reasonably well for 2-3 weeks and then he is more fatigued and with decreasing ambulatory function.  Patient has been describing worsening dexterity in right hand.  Patient ambulates with a walker around the house.      Patient is interested in applying for financial support and multiple sclerosis foundation-will be moving  team to further discuss options.     Review of Systems   Constitutional: Negative.  Negative for chills and fever.   HENT: Negative.  Negative for ear pain and sore throat.    Eyes: Negative.  Negative for pain and visual disturbance.   Respiratory: Negative.  Negative for cough and shortness of breath.    Cardiovascular: Negative.  Negative for chest pain and palpitations.   Gastrointestinal:  "Negative.  Negative for abdominal pain and vomiting.   Endocrine: Negative.    Genitourinary: Negative.  Negative for dysuria and hematuria.   Musculoskeletal:  Positive for gait problem. Negative for arthralgias and back pain (in wheel chair).   Skin: Negative.  Negative for color change and rash.   Allergic/Immunologic: Negative.    Neurological:  Positive for numbness (numbness on both side). Negative for seizures and syncope.   Hematological: Negative.    Psychiatric/Behavioral: Negative.     All other systems reviewed and are negative.   I have personally reviewed the MA's review of systems and made changes as necessary.         Objective   /84 (BP Location: Left arm, Patient Position: Sitting, Cuff Size: Large)   Pulse 103   Temp 98.3 °F (36.8 °C) (Temporal)   Resp 16   Ht 5' 11\" (1.803 m)   Wt 108 kg (237 lb)   SpO2 97%   BMI 33.05 kg/m²     Physical Exam  Neurological Exam  CONSTITUTIONAL: NAD, pleasant. NECK: supple, no lymphadenopathy, no thyromegaly, no JVD. CARDIOVASCULAR: RRR, normal S1S2, no murmurs, no rubs. RESP: clear to auscultation bilaterally, no wheezes/rhonchi/rales. ABDOMEN: soft, non tender, non distended. SKIN: no rash or skin lesions. EXTREMITIES: no edema, pulses 2+bilaterally. PSYCH: appropriate mood and affect  NEUROLOGIC COMPREHENSIVE EXAM: Patient is oriented to person, place and time, NAD; appropriate affect. CN II, III, IV, V, VI, VII,VIII,IX,X,XI-XII intact with EOMI, PERRLA, OKN intact, VF grossly intact, f4-/5 right shoulder abduction, elbow flexion  3/5 finger extension, 3/5 right hip flexion  and 4/5 knee flexion and dorsiflexion; 5/5 LUE/LLE ; Sensory: Decreased to light touch and pinprick bilaterally lower extremity only; normal vibration sensation feet bilaterally; Coordination within normal limits on FTN and TEJINDER testing; DTR: 2/4 through, no Babinski, no clonus.      Administrative Statements   I have spent a total time of 30 minutes in caring for this patient " on the day of the visit/encounter including Diagnostic results, Prognosis, Risks and benefits of tx options, Instructions for management, Counseling / Coordination of care, Documenting in the medical record, Reviewing/placing orders in the medical record (including tests, medications, and/or procedures), Obtaining or reviewing history  , and Communicating with other healthcare professionals .

## 2025-04-28 NOTE — TELEPHONE ENCOUNTER
Patient will be starting monthly Solumedrol 1000 mg IV starting anytime this week.    We will be continuing monthly infusion for 12 months     MSW team: Patient is interested in applying for financial support from  multiple sclerosis foundation-patient has ideas and questions how he can get this financial help

## 2025-04-29 ENCOUNTER — TELEPHONE (OUTPATIENT)
Dept: NEUROLOGY | Facility: CLINIC | Age: 50
End: 2025-04-29

## 2025-04-29 DIAGNOSIS — G35 MS (MULTIPLE SCLEROSIS) (HCC): ICD-10-CM

## 2025-04-29 DIAGNOSIS — G35 MS (MULTIPLE SCLEROSIS) (HCC): Primary | ICD-10-CM

## 2025-04-29 DIAGNOSIS — R26.2 AMBULATORY DYSFUNCTION: ICD-10-CM

## 2025-04-29 DIAGNOSIS — D84.821 IMMUNODEFICIENCY DUE TO TREATMENT WITH IMMUNOSUPPRESSIVE MEDICATION (HCC): ICD-10-CM

## 2025-04-29 DIAGNOSIS — G81.91 RIGHT HEMIPARESIS (HCC): ICD-10-CM

## 2025-04-29 DIAGNOSIS — T45.1X5A IMMUNODEFICIENCY DUE TO TREATMENT WITH IMMUNOSUPPRESSIVE MEDICATION (HCC): ICD-10-CM

## 2025-04-29 DIAGNOSIS — Z79.60 IMMUNODEFICIENCY DUE TO TREATMENT WITH IMMUNOSUPPRESSIVE MEDICATION (HCC): ICD-10-CM

## 2025-04-29 DIAGNOSIS — R53.1 WEAKNESS: Primary | ICD-10-CM

## 2025-04-29 RX ORDER — SODIUM CHLORIDE 9 MG/ML
20 INJECTION, SOLUTION INTRAVENOUS ONCE
Status: CANCELLED | OUTPATIENT
Start: 2025-05-02

## 2025-04-29 NOTE — TELEPHONE ENCOUNTER
Terra Hernandez MD Physician Addendum Yesterday     Copy     Patient will be starting monthly Solumedrol 1000 mg IV starting anytime this week.     We will be continuing monthly infusion for 12 months

## 2025-04-29 NOTE — TELEPHONE ENCOUNTER
Primary coverage with [facility] Medicare. Per website, PA is not required for , [address], or [address].     Medicare A/B is secondary. PA is not required.     Therapy plan entered and sent for signature.

## 2025-05-01 NOTE — TELEPHONE ENCOUNTER
Plan is signed. Called [facility] infusion center and scheduled pt for tomorrow 5/2/25 @ 2:30pm.     Spoke with pt, made him aware. He verbalizes understanding and is agreeable.

## 2025-05-02 ENCOUNTER — HOSPITAL ENCOUNTER (OUTPATIENT)
Dept: INFUSION CENTER | Facility: HOSPITAL | Age: 50
End: 2025-05-02
Attending: PSYCHIATRY & NEUROLOGY
Payer: COMMERCIAL

## 2025-05-02 VITALS
TEMPERATURE: 98 F | HEART RATE: 97 BPM | OXYGEN SATURATION: 97 % | DIASTOLIC BLOOD PRESSURE: 90 MMHG | SYSTOLIC BLOOD PRESSURE: 127 MMHG | RESPIRATION RATE: 18 BRPM

## 2025-05-02 DIAGNOSIS — R53.1 WEAKNESS: ICD-10-CM

## 2025-05-02 DIAGNOSIS — G35 MS (MULTIPLE SCLEROSIS) (HCC): Primary | ICD-10-CM

## 2025-05-02 PROCEDURE — 96365 THER/PROPH/DIAG IV INF INIT: CPT

## 2025-05-02 RX ORDER — SODIUM CHLORIDE 9 MG/ML
20 INJECTION, SOLUTION INTRAVENOUS ONCE
OUTPATIENT
Start: 2025-05-30

## 2025-05-02 RX ORDER — SODIUM CHLORIDE 9 MG/ML
20 INJECTION, SOLUTION INTRAVENOUS ONCE
Status: COMPLETED | OUTPATIENT
Start: 2025-05-02 | End: 2025-05-02

## 2025-05-02 RX ORDER — SODIUM CHLORIDE 9 MG/ML
20 INJECTION, SOLUTION INTRAVENOUS ONCE
OUTPATIENT
Start: 2025-07-16

## 2025-05-02 RX ORDER — ACETAMINOPHEN 325 MG/1
650 TABLET ORAL ONCE
OUTPATIENT
Start: 2025-07-16

## 2025-05-02 RX ADMIN — SODIUM CHLORIDE 1000 MG: 0.9 INJECTION, SOLUTION INTRAVENOUS at 14:36

## 2025-05-02 RX ADMIN — SODIUM CHLORIDE 20 ML/HR: 0.9 INJECTION, SOLUTION INTRAVENOUS at 14:31

## 2025-05-02 NOTE — PROGRESS NOTES
Carlos Landis  tolerated treatment well with no complications.      Carlos Landis is aware of future appt on 7/16/25 at 0800.     AVS printed and given to Carlos Landis:  Yes

## 2025-05-02 NOTE — TELEPHONE ENCOUNTER
SW called patient at [phone].  Patient said he is in the process of applying for Medicaid, has family to assist in this process.    Patient also discussed general financial concerns such as medical bills and groceries.  SW discussed hospital financial assistance office and applying for help.  Patient asked if the number could be sent to him by mail.     SW also discussed the MS Society, MS Focus, and MS Association.  Reviewed different things each helps with.  Explained the MS Society would provide a nurse navigator to assess for further needs.  Patient asked if this information could be provided to him through postal mail.      No other needs at this time.  SW uploaded information to chart and placed in mail.

## 2025-05-05 ENCOUNTER — TELEPHONE (OUTPATIENT)
Dept: NEUROLOGY | Facility: CLINIC | Age: 50
End: 2025-05-05

## 2025-05-05 NOTE — TELEPHONE ENCOUNTER
Please guide the case-[facility]'s home services declined patient caseMICHELLE is not accepting his visit.     Please help the patient establish with home PT/OT as originally agreed during the office visit.

## 2025-05-12 NOTE — TELEPHONE ENCOUNTER
Home therapies started with [facility].  No further questions or needs at this time.  SW will be available for future social needs as requested. Patient has contact information should further needs arise.  Will close task at this time.

## 2025-05-15 DIAGNOSIS — G35 MS (MULTIPLE SCLEROSIS) (HCC): ICD-10-CM

## 2025-05-15 DIAGNOSIS — G43.109 MIGRAINE WITH AURA AND WITHOUT STATUS MIGRAINOSUS, NOT INTRACTABLE: ICD-10-CM

## 2025-05-15 RX ORDER — NORTRIPTYLINE HYDROCHLORIDE 25 MG/1
25 CAPSULE ORAL
Qty: 30 CAPSULE | Refills: 5 | Status: SHIPPED | OUTPATIENT
Start: 2025-05-15

## 2025-05-16 DIAGNOSIS — G35 MS (MULTIPLE SCLEROSIS) (HCC): ICD-10-CM

## 2025-05-21 DIAGNOSIS — G35 MS (MULTIPLE SCLEROSIS) (HCC): ICD-10-CM

## 2025-05-21 DIAGNOSIS — E53.8 B12 DEFICIENCY: Primary | ICD-10-CM

## 2025-05-21 RX ORDER — CYANOCOBALAMIN 1000 UG/ML
INJECTION, SOLUTION INTRAMUSCULAR; SUBCUTANEOUS
Qty: 9 ML | Refills: 0 | Status: CANCELLED | OUTPATIENT
Start: 2025-05-21

## 2025-06-17 ENCOUNTER — PATIENT MESSAGE (OUTPATIENT)
Dept: NEUROLOGY | Facility: CLINIC | Age: 50
End: 2025-06-17

## 2025-07-01 ENCOUNTER — TELEPHONE (OUTPATIENT)
Dept: NEUROLOGY | Facility: CLINIC | Age: 50
End: 2025-07-01

## 2025-07-01 NOTE — TELEPHONE ENCOUNTER
Pt is scheduled for Ocrevus infusion on 7/16/25.     Fax received on 1/7/25. This states Ocrevus is approved under pt's prescription drug plan. This is approved from 1/1/25 to 1/7/26. Case ID # [id number].    [facility] specialty pharmacy provides patient's medication. Rx should have 1 remaining refill. Will contact pharmacy.

## 2025-07-03 ENCOUNTER — TELEPHONE (OUTPATIENT)
Dept: NEUROLOGY | Facility: CLINIC | Age: 50
End: 2025-07-03

## 2025-07-03 DIAGNOSIS — D84.821 IMMUNODEFICIENCY DUE TO TREATMENT WITH IMMUNOSUPPRESSIVE MEDICATION (HCC): ICD-10-CM

## 2025-07-03 DIAGNOSIS — Z01.89 ENCOUNTER FOR OTHER SPECIFIED SPECIAL EXAMINATIONS: ICD-10-CM

## 2025-07-03 DIAGNOSIS — T45.1X5A IMMUNODEFICIENCY DUE TO TREATMENT WITH IMMUNOSUPPRESSIVE MEDICATION (HCC): ICD-10-CM

## 2025-07-03 DIAGNOSIS — Z11.1 TUBERCULOSIS SCREENING: ICD-10-CM

## 2025-07-03 DIAGNOSIS — G35 MS (MULTIPLE SCLEROSIS) (HCC): Primary | ICD-10-CM

## 2025-07-03 DIAGNOSIS — Z79.60 IMMUNODEFICIENCY DUE TO TREATMENT WITH IMMUNOSUPPRESSIVE MEDICATION (HCC): ICD-10-CM

## 2025-07-03 DIAGNOSIS — Z11.59 ENCOUNTER FOR SCREENING FOR VIRAL DISEASE: ICD-10-CM

## 2025-07-03 NOTE — TELEPHONE ENCOUNTER
"Pt is scheduled for Ocrevus on 7/16/25.     Fax received on 1/7/25. This states Ocrevus is approved under pt's prescription drug plan. This is approved from 1/1/25 to 1/7/26. Case ID # [id number]. [facility] specialty pharmacy provides patient's medication.     See note from today: \"Called [facility] and spoke with Gia. Confirmed infusion date as well as [facility] infusion center address. They will send Ocrevus medication via . Cannot provide exact delivery date but they will ensure it is available to the infusion center prior to 7/16/25.\"    The following labs are required and have been entered as appropriate per protocol:    CBC  CMP  Immunoglobulins  HIV  Quantiferon TB gold  Acute hepatitis panel    MyChart message sent to pt reminding them of needed labs.    Awaiting labs.    "

## 2025-07-03 NOTE — TELEPHONE ENCOUNTER
Called [facility] and spoke with Gia. Confirmed infusion date as well as [facility] infusion center address. They will send Ocrevus medication via . Cannot provide exact delivery date but they will ensure it is available to the infusion center prior to 7/16/25.

## 2025-07-09 ENCOUNTER — APPOINTMENT (OUTPATIENT)
Dept: LAB | Facility: HOSPITAL | Age: 50
End: 2025-07-09
Payer: COMMERCIAL

## 2025-07-09 ENCOUNTER — PATIENT MESSAGE (OUTPATIENT)
Dept: NEUROLOGY | Facility: CLINIC | Age: 50
End: 2025-07-09

## 2025-07-09 DIAGNOSIS — Z11.1 TUBERCULOSIS SCREENING: ICD-10-CM

## 2025-07-09 DIAGNOSIS — Z01.89 ENCOUNTER FOR OTHER SPECIFIED SPECIAL EXAMINATIONS: ICD-10-CM

## 2025-07-09 DIAGNOSIS — D84.821 IMMUNODEFICIENCY DUE TO TREATMENT WITH IMMUNOSUPPRESSIVE MEDICATION (HCC): ICD-10-CM

## 2025-07-09 DIAGNOSIS — G35 MS (MULTIPLE SCLEROSIS) (HCC): ICD-10-CM

## 2025-07-09 DIAGNOSIS — Z11.59 ENCOUNTER FOR SCREENING FOR VIRAL DISEASE: ICD-10-CM

## 2025-07-09 DIAGNOSIS — E53.8 B12 DEFICIENCY: ICD-10-CM

## 2025-07-09 DIAGNOSIS — T45.1X5A IMMUNODEFICIENCY DUE TO TREATMENT WITH IMMUNOSUPPRESSIVE MEDICATION (HCC): ICD-10-CM

## 2025-07-09 DIAGNOSIS — Z79.60 IMMUNODEFICIENCY DUE TO TREATMENT WITH IMMUNOSUPPRESSIVE MEDICATION (HCC): ICD-10-CM

## 2025-07-09 LAB
ALBUMIN SERPL BCG-MCNC: 4.3 G/DL (ref 3.5–5)
ALP SERPL-CCNC: 71 U/L (ref 34–104)
ALT SERPL W P-5'-P-CCNC: 17 U/L (ref 7–52)
ANION GAP SERPL CALCULATED.3IONS-SCNC: 10 MMOL/L (ref 4–13)
AST SERPL W P-5'-P-CCNC: 17 U/L (ref 13–39)
BASOPHILS # BLD AUTO: 0.13 THOUSANDS/ÂΜL (ref 0–0.1)
BASOPHILS NFR BLD AUTO: 2 % (ref 0–1)
BILIRUB SERPL-MCNC: 0.41 MG/DL (ref 0.2–1)
BUN SERPL-MCNC: 19 MG/DL (ref 5–25)
CALCIUM SERPL-MCNC: 9.2 MG/DL (ref 8.4–10.2)
CHLORIDE SERPL-SCNC: 105 MMOL/L (ref 96–108)
CO2 SERPL-SCNC: 26 MMOL/L (ref 21–32)
CREAT SERPL-MCNC: 0.89 MG/DL (ref 0.6–1.3)
EOSINOPHIL # BLD AUTO: 0.37 THOUSAND/ÂΜL (ref 0–0.61)
EOSINOPHIL NFR BLD AUTO: 5 % (ref 0–6)
ERYTHROCYTE [DISTWIDTH] IN BLOOD BY AUTOMATED COUNT: 13.2 % (ref 11.6–15.1)
GFR SERPL CREATININE-BSD FRML MDRD: 98 ML/MIN/1.73SQ M
GLUCOSE P FAST SERPL-MCNC: 77 MG/DL (ref 65–99)
HCT VFR BLD AUTO: 44.3 % (ref 36.5–49.3)
HGB BLD-MCNC: 14.4 G/DL (ref 12–17)
HIV 1+2 AB+HIV1 P24 AG SERPL QL IA: NORMAL
IGA SERPL-MCNC: 296 MG/DL (ref 66–433)
IGG SERPL-MCNC: 1047 MG/DL (ref 635–1741)
IGM SERPL-MCNC: 69 MG/DL (ref 45–281)
IMM GRANULOCYTES # BLD AUTO: 0.08 THOUSAND/UL (ref 0–0.2)
IMM GRANULOCYTES NFR BLD AUTO: 1 % (ref 0–2)
LYMPHOCYTES # BLD AUTO: 1.2 THOUSANDS/ÂΜL (ref 0.6–4.47)
LYMPHOCYTES NFR BLD AUTO: 18 % (ref 14–44)
MCH RBC QN AUTO: 28.7 PG (ref 26.8–34.3)
MCHC RBC AUTO-ENTMCNC: 32.5 G/DL (ref 31.4–37.4)
MCV RBC AUTO: 88 FL (ref 82–98)
MONOCYTES # BLD AUTO: 0.56 THOUSAND/ÂΜL (ref 0.17–1.22)
MONOCYTES NFR BLD AUTO: 8 % (ref 4–12)
NEUTROPHILS # BLD AUTO: 4.52 THOUSANDS/ÂΜL (ref 1.85–7.62)
NEUTS SEG NFR BLD AUTO: 66 % (ref 43–75)
NRBC BLD AUTO-RTO: 0 /100 WBCS
PLATELET # BLD AUTO: 269 THOUSANDS/UL (ref 149–390)
PMV BLD AUTO: 9.9 FL (ref 8.9–12.7)
POTASSIUM SERPL-SCNC: 3.8 MMOL/L (ref 3.5–5.3)
PROT SERPL-MCNC: 7.4 G/DL (ref 6.4–8.4)
RBC # BLD AUTO: 5.01 MILLION/UL (ref 3.88–5.62)
SODIUM SERPL-SCNC: 141 MMOL/L (ref 135–147)
VIT B12 SERPL-MCNC: 737 PG/ML (ref 180–914)
WBC # BLD AUTO: 6.86 THOUSAND/UL (ref 4.31–10.16)

## 2025-07-09 PROCEDURE — 87389 HIV-1 AG W/HIV-1&-2 AB AG IA: CPT

## 2025-07-09 PROCEDURE — 80053 COMPREHEN METABOLIC PANEL: CPT

## 2025-07-09 PROCEDURE — 82784 ASSAY IGA/IGD/IGG/IGM EACH: CPT

## 2025-07-09 PROCEDURE — 82607 VITAMIN B-12: CPT

## 2025-07-09 PROCEDURE — 86480 TB TEST CELL IMMUN MEASURE: CPT

## 2025-07-09 PROCEDURE — 80074 ACUTE HEPATITIS PANEL: CPT

## 2025-07-09 PROCEDURE — 85025 COMPLETE CBC W/AUTO DIFF WBC: CPT

## 2025-07-09 PROCEDURE — 36415 COLL VENOUS BLD VENIPUNCTURE: CPT

## 2025-07-10 DIAGNOSIS — G35 MS (MULTIPLE SCLEROSIS) (HCC): ICD-10-CM

## 2025-07-10 LAB
GAMMA INTERFERON BACKGROUND BLD IA-ACNC: 0.02 IU/ML
HAV IGM SER QL: NORMAL
HBV CORE IGM SER QL: NORMAL
HBV SURFACE AG SER QL: NORMAL
HCV AB SER QL: NORMAL
M TB IFN-G BLD-IMP: NEGATIVE
M TB IFN-G CD4+ BCKGRND COR BLD-ACNC: 0.01 IU/ML
M TB IFN-G CD4+ BCKGRND COR BLD-ACNC: 0.02 IU/ML
MITOGEN IGNF BCKGRD COR BLD-ACNC: 9.98 IU/ML

## 2025-07-10 RX ORDER — UBLITUXIMAB 150 MG/6ML
600 INJECTION, SOLUTION, CONCENTRATE INTRAVENOUS
Qty: 20 ML | Refills: 1 | Status: SHIPPED | OUTPATIENT
Start: 2025-07-10 | End: 2025-07-10 | Stop reason: CLARIF

## 2025-07-10 RX ORDER — OCRELIZUMAB 300 MG/10ML
600 INJECTION INTRAVENOUS
Qty: 2 ML | Refills: 1 | Status: CANCELLED | OUTPATIENT
Start: 2025-07-10

## 2025-07-10 RX ORDER — OCRELIZUMAB 300 MG/10ML
600 INJECTION INTRAVENOUS
Qty: 20 ML | Refills: 1 | Status: SHIPPED | OUTPATIENT
Start: 2025-07-10

## 2025-07-10 NOTE — TELEPHONE ENCOUNTER
New DMT: Briumvi    Current DMT and instructions on how/when to stop:day of the Ocrevus infusion should be substituted by Briumvi 150 mg IV and the 2 weeks later Briumvi 450 mg IV     Was start form submitted/consent obtained if needed: not done - insurance preferred drug was Briumvi, switch was made without patient's agreement     Are labs needed prior to starting: same labs     If an infusion- preferred infusion site: same site     Any special instructions: please notify the patient insurance offered another B-cell depleting regimen and provider agreed with it.

## 2025-07-10 NOTE — TELEPHONE ENCOUNTER
Pt is scheduled for Ocrevus infusion on 7/16/25.    Fax received on 1/7/25. This states Ocrevus is approved under pt's prescription drug plan. This is approved from 1/1/25 to 1/7/26. Case ID # [id number].     [facility] It is unclear why Briumvi Rx was requested.     Called [facility] specialty pharmacy and spoke with Juan. He confirms they received paid claim for Ocrevus and this will be in route to infusion center. He reports they requested Ocrevus Rx refill for next infusion in 6 months. It seems that sure scripts system may have noted that Briumvi and Kesimpta are approved with plan HOWEVER this is not a requirement. Ocrevus is already approved. Advised Juan we would like to continue with Ocrevus. He cancelled Briumvi Rx.     New Ocrevus Rx pended, please review and sign if agreeable. Thanks!

## 2025-07-16 ENCOUNTER — HOSPITAL ENCOUNTER (OUTPATIENT)
Dept: INFUSION CENTER | Facility: HOSPITAL | Age: 50
Discharge: HOME | End: 2025-07-16
Attending: PSYCHIATRY & NEUROLOGY
Payer: COMMERCIAL

## 2025-07-16 VITALS
RESPIRATION RATE: 16 BRPM | HEART RATE: 96 BPM | TEMPERATURE: 97.1 F | DIASTOLIC BLOOD PRESSURE: 79 MMHG | SYSTOLIC BLOOD PRESSURE: 111 MMHG | OXYGEN SATURATION: 97 %

## 2025-07-16 DIAGNOSIS — G35 MS (MULTIPLE SCLEROSIS) (HCC): Primary | ICD-10-CM

## 2025-07-16 PROCEDURE — 96413 CHEMO IV INFUSION 1 HR: CPT

## 2025-07-16 PROCEDURE — 96367 TX/PROPH/DG ADDL SEQ IV INF: CPT

## 2025-07-16 PROCEDURE — 96415 CHEMO IV INFUSION ADDL HR: CPT

## 2025-07-16 RX ORDER — SODIUM CHLORIDE 9 MG/ML
20 INJECTION, SOLUTION INTRAVENOUS ONCE
Status: COMPLETED | OUTPATIENT
Start: 2025-07-16 | End: 2025-07-16

## 2025-07-16 RX ORDER — ACETAMINOPHEN 325 MG/1
650 TABLET ORAL ONCE
Status: COMPLETED | OUTPATIENT
Start: 2025-07-16 | End: 2025-07-16

## 2025-07-16 RX ORDER — ACETAMINOPHEN 325 MG/1
650 TABLET ORAL ONCE
OUTPATIENT
Start: 2026-01-12

## 2025-07-16 RX ORDER — SODIUM CHLORIDE 9 MG/ML
20 INJECTION, SOLUTION INTRAVENOUS ONCE
OUTPATIENT
Start: 2026-01-12

## 2025-07-16 RX ADMIN — SODIUM CHLORIDE 100 MG: 0.9 INJECTION, SOLUTION INTRAVENOUS at 09:09

## 2025-07-16 RX ADMIN — ACETAMINOPHEN 650 MG: 325 TABLET, FILM COATED ORAL at 09:09

## 2025-07-16 RX ADMIN — SODIUM CHLORIDE 20 ML/HR: 9 INJECTION, SOLUTION INTRAVENOUS at 08:14

## 2025-07-16 RX ADMIN — DIPHENHYDRAMINE HYDROCHLORIDE 50 MG: 50 INJECTION INTRAMUSCULAR; INTRAVENOUS at 08:41

## 2025-07-16 RX ADMIN — FAMOTIDINE 20 MG: 10 INJECTION INTRAVENOUS at 08:16

## 2025-07-16 RX ADMIN — OCRELIZUMAB 600 MG: 300 INJECTION INTRAVENOUS at 09:56

## 2025-07-16 NOTE — PROGRESS NOTES
Carlos Landis  tolerated treatment well with no complications.  Oberved for 30 min post infusion (this was OK per Dr. Reilly).     Carlos Landis will call for next appointments.  Per patient and Dr. Reilly, patient may be changing therapies to a new infusion.      AVS printed and given to Carlos Landis:  No (Declined by Carlos Landis)

## 2025-07-29 DIAGNOSIS — G35 MS (MULTIPLE SCLEROSIS) (HCC): ICD-10-CM

## 2025-07-29 DIAGNOSIS — R53.1 WEAKNESS: Primary | ICD-10-CM

## 2025-07-29 RX ORDER — SODIUM CHLORIDE 9 MG/ML
20 INJECTION, SOLUTION INTRAVENOUS ONCE
Status: CANCELLED | OUTPATIENT
Start: 2025-08-07

## 2025-08-07 ENCOUNTER — HOSPITAL ENCOUNTER (OUTPATIENT)
Dept: INFUSION CENTER | Facility: HOSPITAL | Age: 50
Discharge: HOME | End: 2025-08-07
Attending: PSYCHIATRY & NEUROLOGY
Payer: COMMERCIAL